# Patient Record
Sex: FEMALE | Race: OTHER | Employment: FULL TIME | URBAN - METROPOLITAN AREA
[De-identification: names, ages, dates, MRNs, and addresses within clinical notes are randomized per-mention and may not be internally consistent; named-entity substitution may affect disease eponyms.]

---

## 2017-01-04 ENCOUNTER — OFFICE VISIT (OUTPATIENT)
Dept: FAMILY MEDICINE CLINIC | Age: 27
End: 2017-01-04

## 2017-01-04 VITALS
HEIGHT: 61 IN | WEIGHT: 190.3 LBS | BODY MASS INDEX: 35.93 KG/M2 | TEMPERATURE: 98.1 F | RESPIRATION RATE: 20 BRPM | OXYGEN SATURATION: 98 % | HEART RATE: 84 BPM | SYSTOLIC BLOOD PRESSURE: 122 MMHG | DIASTOLIC BLOOD PRESSURE: 84 MMHG

## 2017-01-04 DIAGNOSIS — M54.6 THORACIC SPINE PAIN: Primary | ICD-10-CM

## 2017-01-04 PROBLEM — Z98.890 HISTORY OF NISSEN FUNDOPLICATION: Status: ACTIVE | Noted: 2017-01-04

## 2017-01-04 RX ORDER — TRAZODONE HYDROCHLORIDE 100 MG/1
1 TABLET ORAL DAILY
Refills: 0 | Status: ON HOLD | COMMUNITY
Start: 2016-11-30 | End: 2017-04-10

## 2017-01-04 RX ORDER — CYCLOBENZAPRINE HCL 5 MG
1 TABLET ORAL
Refills: 0 | Status: ON HOLD | COMMUNITY
Start: 2016-11-21 | End: 2017-04-10

## 2017-01-04 RX ORDER — CITALOPRAM 20 MG/1
1 TABLET, FILM COATED ORAL DAILY
Refills: 0 | Status: ON HOLD | COMMUNITY
Start: 2016-11-30 | End: 2017-04-10

## 2017-01-04 RX ORDER — OXYCODONE HYDROCHLORIDE 5 MG/1
1 TABLET ORAL
Refills: 0 | Status: ON HOLD | COMMUNITY
Start: 2016-11-21 | End: 2017-04-10
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
## 2017-01-04 NOTE — PATIENT INSTRUCTIONS

## 2017-01-04 NOTE — MR AVS SNAPSHOT
Visit Information Date & Time Provider Department Dept. Phone Encounter #  
 1/4/2017  1:30 PM Prabha Mccord MD [address] [facility] [id number] Follow-up Instructions Return for routine care. Upcoming Health Maintenance Date Due  
 PAP AKA CERVICAL CYTOLOGY 12/5/2009 DTaP/Tdap/Td series (1 - Tdap) 2/2/2011 Allergies as of 1/4/2017  Review Complete On: 1/4/2017 By: Prabha Mccord MD  
  
 Severity Noted Reaction Type Reactions Latex High 01/30/2011    Anaphylaxis Acetaminophen High 01/30/2011    Anaphylaxis Other Plant, Animal, Environmental High 01/04/2017   Systemic Hives Allergic to everything outside. Nsaids (Non-steroidal Anti-inflammatory Drug) Low 01/04/2017   Systemic Other (comments) Advised by her GI doctor not to take till they figure out what is going on with her stomach. Currently has a Nissen-fundiplication. Current Immunizations  Never Reviewed Name Date Influenza Vaccine  Deferred (Contraindication) TD Vaccine 2/1/2011 12:00 AM  
  
 Not reviewed this visit You Were Diagnosed With   
  
 Codes Comments Thoracic spine pain    -  Primary ICD-10-CM: M54.6 ICD-9-CM: 724.1 Vitals BP Pulse Temp Resp Height(growth percentile) Weight(growth percentile) 122/84 (BP 1 Location: Left arm, BP Patient Position: Sitting) 84 98.1 °F (36.7 °C) (Oral) 20 5' 1\" (1.549 m) 190 lb 4.8 oz (86.3 kg) SpO2 BMI OB Status Smoking Status 98% 35.96 kg/m2 IUD Never Smoker Vitals History BMI and BSA Data Body Mass Index Body Surface Area 35.96 kg/m 2 1.93 m 2 Preferred Pharmacy Pharmacy Name Phone [id number] [address] [place] [address] [address] [place] [address] [phone] Your Updated Medication List  
  
   
This list is accurate as of: 1/4/17  2:01 PM.  Always use your most recent med list.  
  
  
  
  
 amitriptyline 25 mg tablet Commonly known as:  ELAVIL Take 1 Tab by mouth nightly. Take 2-3 tablets at bedtime. Anti-depressant for headache prevention. citalopram 20 mg tablet Commonly known as:  Maegan Bongo Take 1 Tab by mouth daily. cyclobenzaprine 5 mg tablet Commonly known as:  FLEXERIL Take 1 Tab by mouth every eight (8) hours as needed. frovatriptan 2.5 mg tablet Commonly known as:  FROVA  
1 tab at onset of migraine; repeat 1 tab in 2 hours if headache remains; Limit: 2 tabs in 24 hours, not more than 3 days a week. guaiFENesin 400 mg tablet Commonly known as:  Sakina China Take 1 Tab by mouth every four (4) hours as needed for Congestion. guaiFENesin-codeine 100-10 mg/5 mL solution Commonly known as:  ROBITUSSIN AC Take 10 mL by mouth three (3) times daily as needed for Cough. Max Daily Amount: 30 mL. oxyCODONE IR 5 mg immediate release tablet Commonly known as:  Elridge Ditto Take 1 Tab by mouth every eight (8) hours as needed. traZODone 100 mg tablet Commonly known as:  Dearborn Askew Take 1 Tab by mouth daily. zolpidem 10 mg tablet Commonly known as:  AMBIEN Take 10 mg by mouth nightly as needed for Sleep. Follow-up Instructions Return for routine care. Referral Information Referral ID Referred By Referred To  
  
 [id number] [facility]   
   [address] Phone: [phone] Fax: [phone] Visits Status Start Date End Date 1 New Request 1/4/17 1/4/18 If your referral has a status of pending review or denied, additional information will be sent to support the outcome of this decision. Patient Instructions A Healthy Lifestyle: Care Instructions Your Care Instructions A healthy lifestyle can help you feel good, stay at a healthy weight, and have plenty of energy for both work and play.  A healthy lifestyle is something you can share with your whole family. A healthy lifestyle also can lower your risk for serious health problems, such as high blood pressure, heart disease, and diabetes. You can follow a few steps listed below to improve your health and the health of your family. Follow-up care is a key part of your treatment and safety. Be sure to make and go to all appointments, and call your doctor if you are having problems. Its also a good idea to know your test results and keep a list of the medicines you take. How can you care for yourself at home? · Do not eat too much sugar, fat, or fast foods. You can still have dessert and treats now and then. The goal is moderation. · Start small to improve your eating habits. Pay attention to portion sizes, drink less juice and soda pop, and eat more fruits and vegetables. ¨ Eat a healthy amount of food. A 3-ounce serving of meat, for example, is about the size of a deck of cards. Fill the rest of your plate with vegetables and whole grains. ¨ Limit the amount of soda and sports drinks you have every day. Drink more water when you are thirsty. ¨ Eat at least 5 servings of fruits and vegetables every day. It may seem like a lot, but it is not hard to reach this goal. A serving or helping is 1 piece of fruit, 1 cup of vegetables, or 2 cups of leafy, raw vegetables. Have an apple or some carrot sticks as an afternoon snack instead of a candy bar. Try to have fruits and/or vegetables at every meal. 
· Make exercise part of your daily routine. You may want to start with simple activities, such as walking, bicycling, or slow swimming. Try to be active 30 to 60 minutes every day. You do not need to do all 30 to 60 minutes all at once. For example, you can exercise 3 times a day for 10 or 20 minutes.  Moderate exercise is safe for most people, but it is always a good idea to talk to your doctor before starting an exercise program. 
 · Keep moving. [place] the lawn, work in the garden, or MUJIN. Take the stairs instead of the elevator at work. · If you smoke, quit. People who smoke have an increased risk for heart attack, stroke, cancer, and other lung illnesses. Quitting is hard, but there are ways to boost your chance of quitting tobacco for good. ¨ Use nicotine gum, patches, or lozenges. ¨ Ask your doctor about stop-smoking programs and medicines. ¨ Keep trying. In addition to reducing your risk of diseases in the future, you will notice some benefits soon after you stop using tobacco. If you have shortness of breath or asthma symptoms, they will likely get better within a few weeks after you quit. · Limit how much alcohol you drink. Moderate amounts of alcohol (up to 2 drinks a day for men, 1 drink a day for women) are okay. But drinking too much can lead to liver problems, high blood pressure, and other health problems. Family health If you have a family, there are many things you can do together to improve your health. · Eat meals together as a family as often as possible. · Eat healthy foods. This includes fruits, vegetables, lean meats and dairy, and whole grains. · Include your family in your fitness plan. Most people think of activities such as jogging or tennis as the way to fitness, but there are many ways you and your family can be more active. Anything that makes you breathe hard and gets your heart pumping is exercise. Here are some tips: 
¨ Walk to do errands or to take your child to school or the bus. ¨ Go for a family bike ride after dinner instead of watching TV. Where can you learn more? Go to http://connor-radha.info/. Enter H751 in the search box to learn more about \"A Healthy Lifestyle: Care Instructions. \" Current as of: July 26, 2016 Content Version: 11.1 © 0909-6220 [employer].  Care instructions adapted under license by [address] (which disclaims liability or warranty for this information). If you have questions about a medical condition or this instruction, always ask your healthcare professional. [address] any warranty or liability for your use of this information. Introducing hospitals & HEALTH SERVICES! [facility] introduces [facility] patient portal. Now you can access parts of your medical record, email your doctor's office, and request medication refills online. 1. In your internet browser, go to [email or web address] 2. Click on the First Time User? Click Here link in the Sign In box. You will see the New Member Sign Up page. 3. Enter your Bloodhound Access Code exactly as it appears below. You will not need to use this code after youve completed the sign-up process. If you do not sign up before the expiration date, you must request a new code. · [employer] Access Code: [email or web address] Expires: 2/5/2017  7:19 PM 
 
4. Enter the last four digits of your Social Security Number (xxxx) and Date of Birth (mm/dd/yyyy) as indicated and click Submit. You will be taken to the next sign-up page. 5. Create a Bloodhound ID. This will be your [employer]hound login ID and cannot be changed, so think of one that is secure and easy to remember. 6. Create a [employer]hound password. You can change your password at any time. 7. Enter your Password Reset Question and Answer. This can be used at a later time if you forget your password. 8. Enter your e-mail address. You will receive e-mail notification when new information is available in [address] 9. Click Sign Up. You can now view and download portions of your medical record. 10. Click the Download Summary menu link to download a portable copy of your medical information. If you have questions, please visit the Frequently Asked Questions section of the [employer] website.  Remember, [employer] is NOT to be used for urgent needs. For medical emergencies, dial 911. Now available from your iPhone and Android! Please provide this summary of care documentation to your next provider. Your primary care clinician is listed as Tyrone Barker. If you have any questions after today's visit, please call [phone].
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
## 2017-01-06 ENCOUNTER — TELEPHONE (OUTPATIENT)
Dept: FAMILY MEDICINE CLINIC | Age: 27
End: 2017-01-06

## 2017-01-06 NOTE — TELEPHONE ENCOUNTER
Patient called stating that the provider on the referral for pain management is no longer accepting new patients.  Advised her to contact her insurance company to see who accepted her insurance as well as new patients then to give us a call with the provider information so we could generate a new referral.

## 2017-03-24 ENCOUNTER — OFFICE VISIT (OUTPATIENT)
Dept: FAMILY MEDICINE CLINIC | Age: 27
End: 2017-03-24

## 2017-03-24 VITALS
TEMPERATURE: 98.6 F | SYSTOLIC BLOOD PRESSURE: 110 MMHG | DIASTOLIC BLOOD PRESSURE: 70 MMHG | BODY MASS INDEX: 36.52 KG/M2 | RESPIRATION RATE: 20 BRPM | HEART RATE: 80 BPM | HEIGHT: 60 IN | WEIGHT: 186 LBS

## 2017-03-24 DIAGNOSIS — L72.3 SEBACEOUS CYST: Primary | ICD-10-CM

## 2017-03-24 DIAGNOSIS — R22.2 CHEST WALL MASS: ICD-10-CM

## 2017-03-24 NOTE — PROGRESS NOTES
HISTORY OF PRESENT ILLNESS  Geoff Chen is a 27 y.o. female. HPI  Pt c/o having a nodule on her central chest, it has been there for months but in last 2 weeks feels larger. It is constant. ROS  ROS negative except for symptoms noted in HPI. Physical Exam  Gen: Oriented to person, place and time and well-developed, well-nourished and in no distress. HEENT:    Head: normocephalic and atraumatic. Eyes:  EOM are normal. Pupils equal and round. Neck:  Normal range of motion. Neck supple. Cardiovascular: normal rate, regular rhythm and normal heart sounds. Pulmonary/Chest:  Effort normal and breath sounds normal.  No respiratory distress. No wheezes, no rales. Abdominal: soft, normal  bowel sounds. Musculoskeletal:  No edema, no tenderness. No calf tenderness or edema. Neurological:  Alert, oriented to person, place and time. Skin: skin is warm and dry. There is a 2 cm circular, firm, movable, non tender nodule on sternal chest. No erthyma or swelling. .       ASSESSMENT and PLAN      Follow-up Disposition:  Return for US chest nodule. 1. Chest wall mass, possible cyst     - US CHEST; Future    I  have discussed the diagnosis with the patient and the intended treatment plan as seen in the above orders. Patient has provided input and agrees with goals. The patient has received an after-visit summary and questions were answered concerning future plans. I have discussed medication side effects and warnings with the patient as well.

## 2017-03-27 DIAGNOSIS — L72.3 SEBACEOUS CYST: Primary | ICD-10-CM

## 2017-03-31 ENCOUNTER — OFFICE VISIT (OUTPATIENT)
Dept: SURGERY | Age: 27
End: 2017-03-31

## 2017-03-31 VITALS
RESPIRATION RATE: 20 BRPM | BODY MASS INDEX: 36.52 KG/M2 | WEIGHT: 186 LBS | OXYGEN SATURATION: 98 % | SYSTOLIC BLOOD PRESSURE: 131 MMHG | DIASTOLIC BLOOD PRESSURE: 78 MMHG | HEART RATE: 81 BPM | TEMPERATURE: 98.1 F | HEIGHT: 60 IN

## 2017-03-31 DIAGNOSIS — L72.3 SEBACEOUS CYST: Primary | ICD-10-CM

## 2017-03-31 PROBLEM — E66.9 OBESITY, CLASS II, BMI 35-39.9: Status: ACTIVE | Noted: 2017-03-31

## 2017-03-31 RX ORDER — DIAZEPAM 5 MG/1
5 TABLET ORAL
COMMUNITY
End: 2017-11-10
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
## 2017-03-31 NOTE — PROGRESS NOTES
1. Have you been to the ER, urgent care clinic since your last visit? Hospitalized since your last visit? new patient    2. Have you seen or consulted any other health care providers outside of the [address] [place] [address] since your last visit? Include any pap smears or colon screening.  New patient

## 2017-04-07 ENCOUNTER — ANESTHESIA EVENT (OUTPATIENT)
Dept: SURGERY | Age: 27
End: 2017-04-07
Payer: MEDICAID

## 2017-04-10 ENCOUNTER — HOSPITAL ENCOUNTER (OUTPATIENT)
Age: 27
Setting detail: OUTPATIENT SURGERY
Discharge: HOME | End: 2017-04-10
Attending: SURGERY | Admitting: SURGERY
Payer: MEDICAID

## 2017-04-10 ENCOUNTER — SURGERY (OUTPATIENT)
Age: 27
End: 2017-04-10

## 2017-04-10 ENCOUNTER — ANESTHESIA (OUTPATIENT)
Dept: SURGERY | Age: 27
End: 2017-04-10
Payer: MEDICAID

## 2017-04-10 VITALS
BODY MASS INDEX: 34.67 KG/M2 | HEART RATE: 84 BPM | SYSTOLIC BLOOD PRESSURE: 128 MMHG | DIASTOLIC BLOOD PRESSURE: 77 MMHG | TEMPERATURE: 98.4 F | WEIGHT: 183.64 LBS | OXYGEN SATURATION: 98 % | HEIGHT: 61 IN | RESPIRATION RATE: 17 BRPM

## 2017-04-10 DIAGNOSIS — L72.3 SEBACEOUS CYST: ICD-10-CM

## 2017-04-10 LAB — HCG UR QL: NEGATIVE

## 2017-04-10 PROCEDURE — 77030031139 HC SUT VCRL2 J&J -A: Performed by: SURGERY

## 2017-04-10 PROCEDURE — 77030002933 HC SUT MCRYL J&J -A: Performed by: SURGERY

## 2017-04-10 PROCEDURE — 74011000250 HC RX REV CODE- 250: Performed by: SURGERY

## 2017-04-10 PROCEDURE — 74011250637 HC RX REV CODE- 250/637: Performed by: SURGERY

## 2017-04-10 PROCEDURE — 76210000021 HC REC RM PH II 0.5 TO 1 HR: Performed by: SURGERY

## 2017-04-10 PROCEDURE — 77030020782 HC GWN BAIR PAWS FLX 3M -B

## 2017-04-10 PROCEDURE — 81025 URINE PREGNANCY TEST: CPT

## 2017-04-10 PROCEDURE — 74011000250 HC RX REV CODE- 250

## 2017-04-10 PROCEDURE — 76060000032 HC ANESTHESIA 0.5 TO 1 HR: Performed by: SURGERY

## 2017-04-10 PROCEDURE — 77030018836 HC SOL IRR NACL ICUM -A: Performed by: SURGERY

## 2017-04-10 PROCEDURE — 77030032490 HC SLV COMPR SCD KNE COVD -B: Performed by: SURGERY

## 2017-04-10 PROCEDURE — 74011250636 HC RX REV CODE- 250/636

## 2017-04-10 PROCEDURE — 88304 TISSUE EXAM BY PATHOLOGIST: CPT | Performed by: SURGERY

## 2017-04-10 PROCEDURE — 76010000138 HC OR TIME 0.5 TO 1 HR: Performed by: SURGERY

## 2017-04-10 PROCEDURE — 74011250636 HC RX REV CODE- 250/636: Performed by: ANESTHESIOLOGY

## 2017-04-10 RX ORDER — PROPOFOL 10 MG/ML
INJECTION, EMULSION INTRAVENOUS AS NEEDED
Status: DISCONTINUED | OUTPATIENT
Start: 2017-04-10 | End: 2017-04-10 | Stop reason: HOSPADM

## 2017-04-10 RX ORDER — MIDAZOLAM HYDROCHLORIDE 1 MG/ML
INJECTION, SOLUTION INTRAMUSCULAR; INTRAVENOUS AS NEEDED
Status: DISCONTINUED | OUTPATIENT
Start: 2017-04-10 | End: 2017-04-10 | Stop reason: HOSPADM

## 2017-04-10 RX ORDER — SODIUM CHLORIDE, SODIUM LACTATE, POTASSIUM CHLORIDE, CALCIUM CHLORIDE 600; 310; 30; 20 MG/100ML; MG/100ML; MG/100ML; MG/100ML
125 INJECTION, SOLUTION INTRAVENOUS CONTINUOUS
Status: DISCONTINUED | OUTPATIENT
Start: 2017-04-10 | End: 2017-04-10 | Stop reason: HOSPADM

## 2017-04-10 RX ORDER — OXYCODONE HYDROCHLORIDE 5 MG/1
5 TABLET ORAL
Status: COMPLETED | OUTPATIENT
Start: 2017-04-10 | End: 2017-04-10

## 2017-04-10 RX ORDER — OXYCODONE HYDROCHLORIDE 5 MG/1
5 TABLET ORAL
Qty: 15 TAB | Refills: 0 | Status: SHIPPED | OUTPATIENT
Start: 2017-04-10 | End: 2017-04-28

## 2017-04-10 RX ORDER — PROPOFOL 10 MG/ML
INJECTION, EMULSION INTRAVENOUS
Status: DISCONTINUED | OUTPATIENT
Start: 2017-04-10 | End: 2017-04-10 | Stop reason: HOSPADM

## 2017-04-10 RX ORDER — FENTANYL CITRATE 50 UG/ML
INJECTION, SOLUTION INTRAMUSCULAR; INTRAVENOUS AS NEEDED
Status: DISCONTINUED | OUTPATIENT
Start: 2017-04-10 | End: 2017-04-10 | Stop reason: HOSPADM

## 2017-04-10 RX ORDER — BACITRACIN ZINC 500 UNIT/G
OINTMENT (GRAM) TOPICAL AS NEEDED
Status: DISCONTINUED | OUTPATIENT
Start: 2017-04-10 | End: 2017-04-10 | Stop reason: HOSPADM

## 2017-04-10 RX ORDER — MIDAZOLAM HYDROCHLORIDE 1 MG/ML
2 INJECTION, SOLUTION INTRAMUSCULAR; INTRAVENOUS
Status: DISCONTINUED | OUTPATIENT
Start: 2017-04-10 | End: 2017-04-10 | Stop reason: HOSPADM

## 2017-04-10 RX ORDER — FLUMAZENIL 0.1 MG/ML
0.2 INJECTION INTRAVENOUS
Status: DISCONTINUED | OUTPATIENT
Start: 2017-04-10 | End: 2017-04-10 | Stop reason: HOSPADM

## 2017-04-10 RX ORDER — LIDOCAINE HYDROCHLORIDE 10 MG/ML
0.1 INJECTION, SOLUTION EPIDURAL; INFILTRATION; INTRACAUDAL; PERINEURAL AS NEEDED
Status: DISCONTINUED | OUTPATIENT
Start: 2017-04-10 | End: 2017-04-10 | Stop reason: HOSPADM

## 2017-04-10 RX ORDER — NALOXONE HYDROCHLORIDE 0.4 MG/ML
0.2 INJECTION, SOLUTION INTRAMUSCULAR; INTRAVENOUS; SUBCUTANEOUS
Status: DISCONTINUED | OUTPATIENT
Start: 2017-04-10 | End: 2017-04-10 | Stop reason: HOSPADM

## 2017-04-10 RX ORDER — LIDOCAINE HYDROCHLORIDE 20 MG/ML
INJECTION, SOLUTION INFILTRATION; PERINEURAL AS NEEDED
Status: DISCONTINUED | OUTPATIENT
Start: 2017-04-10 | End: 2017-04-10 | Stop reason: HOSPADM

## 2017-04-10 RX ORDER — DIPHENHYDRAMINE HYDROCHLORIDE 50 MG/ML
12.5 INJECTION, SOLUTION INTRAMUSCULAR; INTRAVENOUS AS NEEDED
Status: DISCONTINUED | OUTPATIENT
Start: 2017-04-10 | End: 2017-04-10 | Stop reason: HOSPADM

## 2017-04-10 RX ORDER — HYDROMORPHONE HYDROCHLORIDE 1 MG/ML
.25-1 INJECTION, SOLUTION INTRAMUSCULAR; INTRAVENOUS; SUBCUTANEOUS
Status: DISCONTINUED | OUTPATIENT
Start: 2017-04-10 | End: 2017-04-10 | Stop reason: HOSPADM

## 2017-04-10 RX ADMIN — FENTANYL CITRATE 50 MCG: 50 INJECTION, SOLUTION INTRAMUSCULAR; INTRAVENOUS at 12:01

## 2017-04-10 RX ADMIN — LIDOCAINE HYDROCHLORIDE 20 ML: 10; .005 INJECTION, SOLUTION EPIDURAL; INFILTRATION; INTRACAUDAL; PERINEURAL at 12:07

## 2017-04-10 RX ADMIN — SODIUM CHLORIDE, SODIUM LACTATE, POTASSIUM CHLORIDE, AND CALCIUM CHLORIDE 125 ML/HR: 600; 310; 30; 20 INJECTION, SOLUTION INTRAVENOUS at 10:59

## 2017-04-10 RX ADMIN — PROPOFOL 30 MG: 10 INJECTION, EMULSION INTRAVENOUS at 11:55

## 2017-04-10 RX ADMIN — HYDROMORPHONE HYDROCHLORIDE 0.5 MG: 1 INJECTION, SOLUTION INTRAMUSCULAR; INTRAVENOUS; SUBCUTANEOUS at 12:59

## 2017-04-10 RX ADMIN — MIDAZOLAM HYDROCHLORIDE 1 MG: 1 INJECTION, SOLUTION INTRAMUSCULAR; INTRAVENOUS at 11:48

## 2017-04-10 RX ADMIN — BACITRACIN ZINC 500 UNITS: 500 OINTMENT TOPICAL at 12:25

## 2017-04-10 RX ADMIN — MIDAZOLAM HYDROCHLORIDE 1 MG: 1 INJECTION, SOLUTION INTRAMUSCULAR; INTRAVENOUS at 11:49

## 2017-04-10 RX ADMIN — LIDOCAINE HYDROCHLORIDE 60 MG: 20 INJECTION, SOLUTION INFILTRATION; PERINEURAL at 11:54

## 2017-04-10 RX ADMIN — FENTANYL CITRATE 50 MCG: 50 INJECTION, SOLUTION INTRAMUSCULAR; INTRAVENOUS at 11:47

## 2017-04-10 RX ADMIN — PROPOFOL 50 MG: 10 INJECTION, EMULSION INTRAVENOUS at 12:04

## 2017-04-10 RX ADMIN — PROPOFOL 100 MCG/KG/MIN: 10 INJECTION, EMULSION INTRAVENOUS at 11:55

## 2017-04-10 RX ADMIN — OXYCODONE HYDROCHLORIDE 5 MG: 5 TABLET ORAL at 13:25

## 2017-04-10 RX ADMIN — PROPOFOL 20 MG: 10 INJECTION, EMULSION INTRAVENOUS at 11:58

## 2017-04-10 RX ADMIN — MIDAZOLAM HYDROCHLORIDE 3 MG: 1 INJECTION, SOLUTION INTRAMUSCULAR; INTRAVENOUS at 11:47

## 2017-04-10 NOTE — IP AVS SNAPSHOT
Charlie Suggs 
 
 
 [address] [phone] Patient: Quita Ortiz MRN: [id number] GOD:[birth year] You are allergic to the following Allergen Reactions Latex Anaphylaxis Acetaminophen Anaphylaxis Other Plant, Animal, Environmental Hives Allergic to everything outside. Nsaids (Non-Steroidal Anti-Inflammatory Drug) Other (comments) Advised by her GI doctor not to take till they figure out what is going on with her stomach. Currently has a Nissen-fundiplication. Recent Documentation Height Weight BMI OB Status Smoking Status 1.549 m 83.3 kg 34.7 kg/m2 IUD Never Smoker Emergency Contacts Name Discharge Info Relation Home Work Mobile Velasquez,Talia  Parent [1] [phone] Nancie Gains  Other Relative [6] [phone] About your hospitalization You were admitted on:  April 10, 2017 You last received care in the:  [facility] PACU You were discharged on:  April 10, 2017 Unit phone number:  [phone] Why you were hospitalized Your primary diagnosis was:  Not on File Providers Seen During Your Hospitalizations Provider Role Specialty Primary office phone Billy Boyd MD Attending Provider Surgery [phone] Your Primary Care Physician (PCP) Primary Care Physician Office Phone Office Fax Jen [facility] [phone] Follow-up Information Follow up With Details Comments Contact Info Maxwell Velasquez MD   [facility] 
[phone] Your Appointments Monday April 24, 2017  9:00 AM EDT  
POST OP with Billy Boyd MD  
[facility] Surgery [facility]) [address] [phone] Current Discharge Medication List  
  
START taking these medications Dose & Instructions Dispensing Information Comments Morning Noon Evening Bedtime  
 oxyCODONE IR 5 mg immediate release tablet Commonly known as:  Opal Zarate Your last dose was: Your next dose is:    
   
   
 Dose:  5 mg Take 1 Tab by mouth every four (4) hours as needed for Pain. Max Daily Amount: 30 mg.  
 Quantity:  15 Tab Refills:  0 CONTINUE these medications which have NOT CHANGED Dose & Instructions Dispensing Information Comments Morning Noon Evening Bedtime  
 amitriptyline 25 mg tablet Commonly known as:  ELAVIL Your last dose was: Your next dose is:    
   
   
 Dose:  25 mg Take 1 Tab by mouth nightly. Take 2-3 tablets at bedtime. Anti-depressant for headache prevention. Quantity:  90 Tab Refills:  3 VALIUM 5 mg tablet Generic drug:  diazePAM  
   
Your last dose was: Your next dose is:    
   
   
 Dose:  5 mg Take 5 mg by mouth every six (6) hours as needed for Anxiety. Refills:  0 Where to Get Your Medications Information on where to get these meds will be given to you by the nurse or doctor. ! Ask your nurse or doctor about these medications  
  oxyCODONE IR 5 mg immediate release tablet Discharge Instructions Instructions Following Excision of Cyst, Soft Tissue Mass Activity · No pulling or stretching quickly with chest. 
· May shower tomorrow. No bath for 2 weeks and until after seen by your doctor. Diet · Advance to regular diet as tolerated. · If nausea and vomiting continues, call your doctor. Pain · Take pain medication as directed by your doctor. ·  Call your doctor if pain is NOT relieved by medication. · DO NOT take blood thinners until directed by your doctor. Dressing Care:  
Remove your bandage on Wednesday, 4/12. Keep your incision clean and dry. Apply a topical antibiotic ointment daily and cover with a dry bandage. Follow-Up Phone Calls: 
· Call will be made by my nursing staff tomorrow. · If you have any problems or concerns, call your doctor as needed. Call your doctor if you experience: 
· Excessive bleeding that does not stop after holding mild pressure over the area. · Temperature of 101° Fahrenheit or above. · Redness, excessive swelling or bruising, and/or green or yellow, smelly discharge from incision. After Anesthesia: 
· For the first 24 hours and while taking narcotic pain medication: DO NOT drive, drink alcoholic beverages, or make important decisions. · Be aware of dizziness following anesthesia and while taking pain medication. Gallo Jensen. Renny Puri MD, FACS General Surgery at [address], [address] [place] [address] [employer]. 
[phone] Fax [phone] DISCHARGE SUMMARY from your Nurse The following personal items collected during your admission are returned to you:  
Dental Appliance: Dental Appliances: None Vision: Visual Aid: None Hearing Aid:   
Jewelry: Jewelry: None Clothing: Clothing: Other (comment) (Street clothes) Other Valuables: Other Valuables: None Valuables sent to safe: Personal Items Sent to Safe: None PATIENT INSTRUCTIONS: 
 
After general anesthesia or intravenous sedation, for 24 hours or while taking prescription Narcotics: · Limit your activities · Do not drive and operate hazardous machinery · Do not make important personal or business decisions · Do  not drink alcoholic beverages · If you have not urinated within 8 hours after discharge, please contact your surgeon on call. Report the following to your surgeon: 
· Excessive pain, swelling, redness or odor of or around the surgical area · Temperature over 100.5 · Nausea and vomiting lasting longer than 4 hours or if unable to take medications · Any signs of decreased circulation or nerve impairment to extremity: change in color, persistent  numbness, tingling, coldness or increase pain · Any questions [address] Breathing deep and coughing are very important exercises to do after surgery. Deep breathing and coughing open the little air tubes and air sacks in your lungs. You take deep breaths every day. You may not even notice - it is just something you do when you sigh or yawn. It is a natural exercise you do to keep these air passages open. After surgery, take deep breaths and cough, on purpose. Coughing and deep breathing help prevent bronchitis and pneumonia after surgery. If you had chest or belly surgery, use a pillow as a \"hug favian\" and hold it tightly to your chest or belly when you cough. DIRECTIONS: 
6. Take 10 to 15 slow deep breaths every hour while awake. 7. Breathe in deeply, and hold it for 2 seconds. 8. Exhale slowly through puckered lips, like blowing up a balloon. 9. After every 4th or 5th deep breath, hug your pillow to your chest or belly and give a hard, deep cough. Yes, it will probably hurt. But doing this exercise is very important part of healing after surgery. Take your pain medicine to help you do this exercise without too much pain. IF YOU HAVE BEEN DIAGNOSED WITH SLEEP APNEA, PLEASE USE YOUR SLEEIFP APNEA DEVICE OR CPAP MACHINE WHEN YOU INTEND TO NAP AFTER TAKING PAIN MEDICATION. Ankle Pumps Ankle pumps increase the circulation of oxygenated blood to your lower extremities and decrease your risk for circulation problems such as blood clots. They also stretch the muscles, tendons and ligaments in your foot and ankle, and prevent joint contracture in the ankle and foot, especially after surgeries on the legs. It is important to do ankle pump exercises regularly after surgery because immobility increases your risk for developing a blood clot.   Your doctor may also have you take an Aspirin for the next few days as well. If your doctor did not ask you to take an Aspirin, consult with him before starting Aspirin therapy on your own. Slowly point your foot forward, feeling the muscles on the top of your lower leg stretch, and hold this position for 5 seconds. Next, pull your foot back toward you as far as possible, stretching the calf muscles, and hold that position for 5 seconds. Repeat with the other foot. Perform 10 repetitions every hour while awake for both ankles if possible (down and then up with the foot once is one repetition). You should feel gentle stretching of the muscles in your lower leg when doing this exercise. If you feel pain, or your range of motion is limited, don't  Push too hard. Only go the limit your joint and muscles will let you go. If you have increasing pain, progressively worsening leg warmth or swelling, STOP the exercise and call your doctor. Below is information about the medications your doctor is prescribing after your visit: 
 
Other information in your discharge envelope: PRESCRIPTIONS PHYSICAL THERAPY PRESCRIPTION 
     APPOINTMENT CARDS [facility] Pamphlet for block or block with On-Q Catheter from Anesthesia Service Medical device information sheets/pamphlets from their  School/work excuse note. /parent work excuse note. These are general instructions for a healthy lifestyle: *  Please give a list of your current medications to your Primary Care Provider. *  Please update this list whenever your medications are discontinued, doses are 
    changed, or new medications (including over-the-counter products) are added. *  Please carry medication information at all times in case of emergency situations. About Smoking No smoking / No tobacco products / Avoid exposure to second hand smoke Surgeon General's Warning:  Quitting smoking now greatly reduces serious risk to your health. Obesity, smoking, and sedentary lifestyle greatly increases your risk for illness and disease. A healthy diet, regular physical exercise & weight monitoring are important for maintaining a healthy lifestyle. Congestive Heart Failure You may be retaining fluid if you have a history of heart failure or if you experience any of the following symptoms:  Weight gain of 3 pounds or more overnight or 5 pounds in a week, increased swelling in our hands or feet or shortness of breath while lying flat in bed. Please call your doctor as soon as you notice any of these symptoms; do not wait until your next office visit. Recognize signs and symptoms of STROKE: 
F - face looks uneven A - arms unable to move or move even S - speech slurred or non-existent T - time-call 911 as soon as signs and symptoms begin-DO NOT go Back to bed or wait to see if you get better-TIME IS BRAIN. Warning signs of HEART ATTACK Call 911 if you have these symptoms · Chest discomfort. Most heart attacks involve discomfort in the center of the chest that lasts more than a few minutes, or that goes away and comes back. It can feel like uncomfortable pressure, squeezing, fullness, or pain. · Discomfort in other areas of the upper body. Symptoms can include pain or discomfort in one or both Arms, the back, neck, jaw, or stomach. ·  Shortness of breath with or without chest discomfort. · Other signs may include breaking out in a cold sweat, nausea, or lightheadedness Don't wait more than five minutes to call 911 - MINUTES MATTER! Fast action can save your life. Calling 911 is almost always the fastest way to get lifesaving treatment. Emergency Medical Services staff can begin treatment when they arrive - up to an hour sooner than if someone gets to the hospital by car. SHANNAN ARRIAGA MEDICATION AND SIDE EFFECT GUIDE The [address] [place] EFFECT GUIDE was provided to the PATIENT AND CARE PROVIDER. Information provided includes instruction about drug purpose and common side effects for the following medications: 
 
· oxycodone Discharge Orders None Introducing [facility]! Salasarnulfo Ruiz introduces [employer] patient portal. Now you can access parts of your medical record, email your doctor's office, and request medication refills online. 1. In your internet browser, go to [email or web address] 2. Click on the First Time User? Click Here link in the Sign In box. You will see the New Member Sign Up page. 3. Enter your [employer] Access Code exactly as it appears below. You will not need to use this code after youve completed the sign-up process. If you do not sign up before the expiration date, you must request a new code. · [employer] Access Code: [email or web address] Expires: 7/5/2017  9:40 AM 
 
4. Enter the last four digits of your Social Security Number (xxxx) and Date of Birth (mm/dd/yyyy) as indicated and click Submit. You will be taken to the next sign-up page. 5. Create a [employer] ID. This will be your [employer] login ID and cannot be changed, so think of one that is secure and easy to remember. 6. Create a [employer] password. You can change your password at any time. 7. Enter your Password Reset Question and Answer. This can be used at a later time if you forget your password. 8. Enter your e-mail address. You will receive e-mail notification when new information is available in [address] 9. Click Sign Up. You can now view and download portions of your medical record. 10. Click the Download Summary menu link to download a portable copy of your medical information. If you have questions, please visit the Frequently Asked Questions section of the [employer] website.  Remember, [employer] is NOT to be used for urgent needs. For medical emergencies, dial 911. Now available from your iPhone and Android! General Information Please provide this summary of care documentation to your next provider. Patient Signature:  ____________________________________________________________ Date:  ____________________________________________________________  
  
Juma Kostas Provider Signature:  ____________________________________________________________ Date:  ____________________________________________________________

## 2017-04-10 NOTE — OP NOTES
Operative Report        Roberto Bacon    MRN:  [id number]    Date of Procedure:  4/10/2017    Surgeon:  Michelle Friedman MD.     Assistant:   [facility]. Anesthesia:   1. MAC.  2. 1% Xylocaine with Epinephrine and 0.5% Marcaine. Preoperative Diagnosis:   CYST OF THE ANTERIOR CHEST WALL. Postoperative Diagnosis:    CYST OF THE ANTERIOR CHEST WALL. Procedure:  Excision of cyst of the anterior chest wall. Indication:   Roberto Bacon is a 27 yrs black female who presents with a cyst of the chest wall which is getting bigger and causing discomfort. She would like to have it removed. Procedure in Detail:   The patient was seen preoperatively in the holding area. The risks, benefits, and expected outcome were discussed with the patient, and all questions were answered satisfactorily. The patient concurred with the proposed plan, giving informed consent. The cyst was identified by the patient and confirmed by me. The cyst was then marked. The patient was taken to the OR. The patient was identified as Roberto Bacon, and the procedure verified as Procedure(s):  EXCISION CYST ANTERIOR CHEST WALL  (LATEX ALLERGY). The patient was placed on the OR table in the supine position. MAC anesthesia was administered and tolerated well. The patient's anterior chest wall was prepped with Chlorprep and draped in the usual sterile fashion. A Time Out was performed, and the above information was confirmed. The cyst was palpated and remarked. The local anesthetic was infiltrated into the skin and subcutaneous tissue. A longitudinal elliptical incision was made directly over the most prominent portion of the xyst.  Dissection was taken down into the subcutaneous tissue with a #15 blade, and a cyst was discovered. The cysttumor was dissected out in its entirety with a #15 blade and cautery and passed off as a specimen. No other masses were palpated within the wound.       Hemostasis was obtained within the wound as needed with cautery. The wound was irrigated with saline. The subcutaneous tissue was approximated with interrupted 3-0 Vicryl. The skin was closed with 4-0 Nylon in simple interrupted fashion. The wound was cleaned and dried, and a topical antibiotic ointment and a sterile dressing were applied. Estimated Blood Loss:    Less than 25 ml. Specimen:     ID Type Source Tests Collected by Time Destination   1 : CYST-ANTERIOR CHEST WALL Preservative Chest  Jayshree Fuller MD 4/10/2017 1211 Pathology        Drain:   * No implants in log *    Findings: An approximately 3 x 2.25 cm subcutaneous cyst of the anterior chest wall in the midline at the top of the cleavage. Counts: All sponge, needle, and instrument counts were correct x 2. Complications:  None. Disposition:  The patient was transferred to the recovery room in stable condition, having tolerated the procedure and anesthesia well.                Signed By: Jayshree Fuller MD     April 10, 2017        Hugh Stapleton MD

## 2017-04-10 NOTE — ANESTHESIA POSTPROCEDURE EVALUATION
Post-Anesthesia Evaluation and Assessment    Patient: Ilir Ramirez MRN: [id number]  SSN: [id number]    YOB: 1990  Age: 27 y.o. Sex: female       Cardiovascular Function/Vital Signs  Visit Vitals    /77    Pulse 84    Temp 36.9 °C (98.4 °F)    Resp 17    Ht 5' 1\" (1.549 m)    Wt 83.3 kg (183 lb 10.3 oz)    SpO2 98%    BMI 34.7 kg/m2       Patient is status post MAC anesthesia for Procedure(s):  EXCISION CYST ANTERIOR CHEST WALL  (LATEX ALLERGY). Nausea/Vomiting: None    Postoperative hydration reviewed and adequate. Pain:  Pain Scale 1: Numeric (0 - 10) (04/10/17 1310)  Pain Intensity 1: 2 (04/10/17 1310)   Managed    Neurological Status:   Neuro (WDL): Exceptions to WDL (04/10/17 1310)  Neuro  Neurologic State: Drowsy (04/10/17 1310)  LUE Motor Response: Purposeful (04/10/17 1232)  LLE Motor Response: Purposeful (04/10/17 1232)  RUE Motor Response: Purposeful (04/10/17 1232)  RLE Motor Response: Purposeful (04/10/17 1232)   At baseline    Mental Status and Level of Consciousness: Arousable    Pulmonary Status:   O2 Device: Room air (04/10/17 1310)   Adequate oxygenation and airway patent    Complications related to anesthesia: None    Post-anesthesia assessment completed.  No concerns    Signed By: Eliezer Fisher MD     April 10, 2017
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
## 2017-04-10 NOTE — DISCHARGE INSTRUCTIONS
Instructions Following Excision of Cyst, Soft Tissue Mass    Activity  · No pulling or stretching quickly with chest.  · May shower tomorrow. No bath for 2 weeks and until after seen by your doctor. Diet  · Advance to regular diet as tolerated. · If nausea and vomiting continues, call your doctor. Pain  · Take pain medication as directed by your doctor. ·  Call your doctor if pain is NOT relieved by medication. · DO NOT take blood thinners until directed by your doctor. Dressing Care:   Remove your bandage on Wednesday, 4/12. Keep your incision clean and dry. Apply a topical antibiotic ointment daily and cover with a dry bandage. Follow-Up Phone Calls:  · Call will be made by my nursing staff tomorrow. · If you have any problems or concerns, call your doctor as needed. Call your doctor if you experience:  · Excessive bleeding that does not stop after holding mild pressure over the area. · Temperature of 101° Fahrenheit or above. · Redness, excessive swelling or bruising, and/or green or yellow, smelly discharge from incision. After Anesthesia:  · For the first 24 hours and while taking narcotic pain medication: DO NOT drive, drink alcoholic beverages, or make important decisions. · Be aware of dizziness following anesthesia and while taking pain medication. Sasha Simmons. Kaylan Gu MD, FACS  General Surgery at [address] [place] [address], [address]  [phone]  Fax [phone]      DISCHARGE SUMMARY from your Nurse    The following personal items collected during your admission are returned to you:   Dental Appliance: Dental Appliances: None  Vision: Visual Aid: None  Hearing Aid:    Jewelry: Jewelry: None  Clothing: Clothing: Other (comment) (Street clothes)  Other Valuables:  Other Valuables: None  Valuables sent to safe: Personal Items Sent to Safe: None    PATIENT INSTRUCTIONS:    After general anesthesia or intravenous sedation, for 24 hours or while taking prescription Narcotics:  · Limit your activities  · Do not drive and operate hazardous machinery  · Do not make important personal or business decisions  · Do  not drink alcoholic beverages  · If you have not urinated within 8 hours after discharge, please contact your surgeon on call. Report the following to your surgeon:  · Excessive pain, swelling, redness or odor of or around the surgical area  · Temperature over 100.5  · Nausea and vomiting lasting longer than 4 hours or if unable to take medications  · Any signs of decreased circulation or nerve impairment to extremity: change in color, persistent  numbness, tingling, coldness or increase pain  · Any questions    COUGH AND DEEP BREATHE    Breathing deep and coughing are very important exercises to do after surgery. Deep breathing and coughing open the little air tubes and air sacks in your lungs. You take deep breaths every day. You may not even notice - it is just something you do when you sigh or yawn. It is a natural exercise you do to keep these air passages open. After surgery, take deep breaths and cough, on purpose. Coughing and deep breathing help prevent bronchitis and pneumonia after surgery. If you had chest or belly surgery, use a pillow as a \"hug buddy\" and hold it tightly to your chest or belly when you cough. DIRECTIONS:  6. Take 10 to 15 slow deep breaths every hour while awake. 7. Breathe in deeply, and hold it for 2 seconds. 8. Exhale slowly through puckered lips, like blowing up a balloon. 9. After every 4th or 5th deep breath, hug your pillow to your chest or belly and give a hard, deep cough. Yes, it will probably hurt. But doing this exercise is very important part of healing after surgery. Take your pain medicine to help you do this exercise without too much pain.     IF YOU HAVE BEEN DIAGNOSED WITH SLEEP APNEA, PLEASE USE YOUR SLEEIFP APNEA DEVICE OR CPAP MACHINE WHEN YOU INTEND TO NAP AFTER TAKING PAIN MEDICATION. Ankle Pumps    Ankle pumps increase the circulation of oxygenated blood to your lower extremities and decrease your risk for circulation problems such as blood clots. They also stretch the muscles, tendons and ligaments in your foot and ankle, and prevent joint contracture in the ankle and foot, especially after surgeries on the legs. It is important to do ankle pump exercises regularly after surgery because immobility increases your risk for developing a blood clot. Your doctor may also have you take an Aspirin for the next few days as well. If your doctor did not ask you to take an Aspirin, consult with him before starting Aspirin therapy on your own. Slowly point your foot forward, feeling the muscles on the top of your lower leg stretch, and hold this position for 5 seconds. Next, pull your foot back toward you as far as possible, stretching the calf muscles, and hold that position for 5 seconds. Repeat with the other foot. Perform 10 repetitions every hour while awake for both ankles if possible (down and then up with the foot once is one repetition). You should feel gentle stretching of the muscles in your lower leg when doing this exercise. If you feel pain, or your range of motion is limited, don't  Push too hard. Only go the limit your joint and muscles will let you go. If you have increasing pain, progressively worsening leg warmth or swelling, STOP the exercise and call your doctor.      Below is information about the medications your doctor is prescribing after your visit:    Other information in your discharge envelope:  []     PRESCRIPTIONS  []     PHYSICAL THERAPY PRESCRIPTION  []     APPOINTMENT CARDS  []     Regional Anesthesia Pamphlet for block or block with On-Q Catheter from Anesthesia Service  []     Medical device information sheets/pamphlets from their    [] School/work excuse note. []     /parent work excuse note. These are general instructions for a healthy lifestyle:    *  Please give a list of your current medications to your Primary Care Provider. *  Please update this list whenever your medications are discontinued, doses are      changed, or new medications (including over-the-counter products) are added. *  Please carry medication information at all times in case of emergency situations. About Smoking  No smoking / No tobacco products / Avoid exposure to second hand smoke    Surgeon General's Warning:  Quitting smoking now greatly reduces serious risk to your health. Obesity, smoking, and sedentary lifestyle greatly increases your risk for illness and disease. A healthy diet, regular physical exercise & weight monitoring are important for maintaining a healthy lifestyle. Congestive Heart Failure  You may be retaining fluid if you have a history of heart failure or if you experience any of the following symptoms:  Weight gain of 3 pounds or more overnight or 5 pounds in a week, increased swelling in our hands or feet or shortness of breath while lying flat in bed. Please call your doctor as soon as you notice any of these symptoms; do not wait until your next office visit. Recognize signs and symptoms of STROKE:  F - face looks uneven  A - arms unable to move or move even  S - speech slurred or non-existent  T - time-call 911 as soon as signs and symptoms begin-DO NOT go         Back to bed or wait to see if you get better-TIME IS BRAIN. Warning signs of HEART ATTACK  Call 911 if you have these symptoms    · Chest discomfort. Most heart attacks involve discomfort in the center of the chest that lasts more than a few minutes, or that goes away and comes back. It can feel like uncomfortable pressure, squeezing, fullness, or pain. · Discomfort in other areas of the upper body.        Symptoms can include pain or discomfort in one or both        Arms, the back, neck, jaw, or stomach. ·  Shortness of breath with or without chest discomfort. · Other signs may include breaking out in a cold sweat, nausea, or lightheadedness    Don't wait more than five minutes to call 911 - MINUTES MATTER! Fast action can save your life. Calling 911 is almost always the fastest way to get lifesaving treatment. Emergency Medical Services staff can begin treatment when they arrive - up to an hour sooner than if someone gets to the hospital by car. [facility] MEDICATION AND SIDE EFFECT GUIDE    The [facility] MEDICATION AND SIDE EFFECT GUIDE was provided to the PATIENT AND CARE PROVIDER.   Information provided includes instruction about drug purpose and common side effects for the following medications:    · oxycodone

## 2017-04-10 NOTE — INTERVAL H&P NOTE
H&P Update:  Osmar De Santiago was seen and examined. History and physical has been reviewed. The patient has been examined.  There have been no significant clinical changes since the completion of the originally dated History and Physical.    Signed By: Bernardo Rodriguez MD     April 10, 2017 8:18 AM

## 2017-04-10 NOTE — ANESTHESIA PREPROCEDURE EVALUATION
Anesthetic History   No history of anesthetic complications            Review of Systems / Medical History  Patient summary reviewed, nursing notes reviewed and pertinent labs reviewed    Pulmonary  Within defined limits                 Neuro/Psych         Headaches and psychiatric history     Cardiovascular  Within defined limits                     GI/Hepatic/Renal     GERD           Endo/Other        Morbid obesity     Other Findings   Comments: Chronic back pain  Depression  Migraines           Physical Exam    Airway  Mallampati: II  TM Distance: > 6 cm  Neck ROM: normal range of motion   Mouth opening: Normal     Cardiovascular  Regular rate and rhythm,  S1 and S2 normal,  no murmur, click, rub, or gallop             Dental  No notable dental hx       Pulmonary  Breath sounds clear to auscultation               Abdominal  GI exam deferred       Other Findings            Anesthetic Plan    ASA: 2  Anesthesia type: MAC          Induction: Intravenous  Anesthetic plan and risks discussed with: Patient

## 2017-04-11 ENCOUNTER — TELEPHONE (OUTPATIENT)
Dept: SURGERY | Age: 27
End: 2017-04-11

## 2017-04-28 ENCOUNTER — APPOINTMENT (OUTPATIENT)
Dept: CT IMAGING | Age: 27
End: 2017-04-28
Attending: STUDENT IN AN ORGANIZED HEALTH CARE EDUCATION/TRAINING PROGRAM
Payer: MEDICAID

## 2017-04-28 ENCOUNTER — HOSPITAL ENCOUNTER (EMERGENCY)
Age: 27
Discharge: HOME | End: 2017-04-28
Attending: STUDENT IN AN ORGANIZED HEALTH CARE EDUCATION/TRAINING PROGRAM
Payer: MEDICAID

## 2017-04-28 VITALS
OXYGEN SATURATION: 99 % | SYSTOLIC BLOOD PRESSURE: 163 MMHG | HEIGHT: 61 IN | RESPIRATION RATE: 16 BRPM | BODY MASS INDEX: 34.17 KG/M2 | WEIGHT: 181 LBS | HEART RATE: 73 BPM | TEMPERATURE: 98.7 F | DIASTOLIC BLOOD PRESSURE: 85 MMHG

## 2017-04-28 DIAGNOSIS — R10.13 ABDOMINAL PAIN, EPIGASTRIC: Primary | ICD-10-CM

## 2017-04-28 DIAGNOSIS — R79.89 ELEVATED LFTS: ICD-10-CM

## 2017-04-28 LAB
ALBUMIN SERPL BCP-MCNC: 4 G/DL (ref 3.5–5)
ALBUMIN/GLOB SERPL: 1.1 {RATIO} (ref 1.1–2.2)
ALP SERPL-CCNC: 117 U/L (ref 45–117)
ALT SERPL-CCNC: 99 U/L (ref 12–78)
AMYLASE SERPL-CCNC: 56 U/L (ref 25–115)
ANION GAP BLD CALC-SCNC: 12 MMOL/L (ref 5–15)
APPEARANCE UR: CLEAR
AST SERPL W P-5'-P-CCNC: 205 U/L (ref 15–37)
BACTERIA URNS QL MICRO: NEGATIVE /HPF
BASOPHILS # BLD AUTO: 0 K/UL (ref 0–0.1)
BASOPHILS # BLD: 0 % (ref 0–1)
BILIRUB SERPL-MCNC: 0.6 MG/DL (ref 0.2–1)
BILIRUB UR QL: NEGATIVE
BUN SERPL-MCNC: 11 MG/DL (ref 6–20)
BUN/CREAT SERPL: 10 (ref 12–20)
CALCIUM SERPL-MCNC: 9.2 MG/DL (ref 8.5–10.1)
CHLORIDE SERPL-SCNC: 106 MMOL/L (ref 97–108)
CO2 SERPL-SCNC: 23 MMOL/L (ref 21–32)
COLOR UR: ABNORMAL
CREAT SERPL-MCNC: 1.05 MG/DL (ref 0.55–1.02)
EOSINOPHIL # BLD: 0.1 K/UL (ref 0–0.4)
EOSINOPHIL NFR BLD: 1 % (ref 0–7)
EPITH CASTS URNS QL MICRO: ABNORMAL /LPF
ERYTHROCYTE [DISTWIDTH] IN BLOOD BY AUTOMATED COUNT: 12.8 % (ref 11.5–14.5)
GLOBULIN SER CALC-MCNC: 3.8 G/DL (ref 2–4)
GLUCOSE SERPL-MCNC: 118 MG/DL (ref 65–100)
GLUCOSE UR STRIP.AUTO-MCNC: 100 MG/DL
HCG UR QL: NEGATIVE
HCT VFR BLD AUTO: 38.1 % (ref 35–47)
HGB BLD-MCNC: 13.2 G/DL (ref 11.5–16)
HGB UR QL STRIP: ABNORMAL
KETONES UR QL STRIP.AUTO: NEGATIVE MG/DL
LACTATE SERPL-SCNC: 2.1 MMOL/L (ref 0.4–2)
LEUKOCYTE ESTERASE UR QL STRIP.AUTO: NEGATIVE
LIPASE SERPL-CCNC: 266 U/L (ref 73–393)
LYMPHOCYTES # BLD AUTO: 16 % (ref 12–49)
LYMPHOCYTES # BLD: 2.3 K/UL (ref 0.8–3.5)
MCH RBC QN AUTO: 30.4 PG (ref 26–34)
MCHC RBC AUTO-ENTMCNC: 34.6 G/DL (ref 30–36.5)
MCV RBC AUTO: 87.8 FL (ref 80–99)
MONOCYTES # BLD: 1.3 K/UL (ref 0–1)
MONOCYTES NFR BLD AUTO: 9 % (ref 5–13)
NEUTS SEG # BLD: 10.8 K/UL (ref 1.8–8)
NEUTS SEG NFR BLD AUTO: 74 % (ref 32–75)
NITRITE UR QL STRIP.AUTO: NEGATIVE
PH UR STRIP: 7.5 [PH] (ref 5–8)
PLATELET # BLD AUTO: 304 K/UL (ref 150–400)
POTASSIUM SERPL-SCNC: 3.3 MMOL/L (ref 3.5–5.1)
PROT SERPL-MCNC: 7.8 G/DL (ref 6.4–8.2)
PROT UR STRIP-MCNC: 30 MG/DL
RBC # BLD AUTO: 4.34 M/UL (ref 3.8–5.2)
RBC #/AREA URNS HPF: ABNORMAL /HPF (ref 0–5)
SODIUM SERPL-SCNC: 141 MMOL/L (ref 136–145)
SP GR UR REFRACTOMETRY: 1.02 (ref 1–1.03)
UROBILINOGEN UR QL STRIP.AUTO: 2 EU/DL (ref 0.2–1)
WBC # BLD AUTO: 14.5 K/UL (ref 3.6–11)
WBC URNS QL MICRO: ABNORMAL /HPF (ref 0–4)

## 2017-04-28 PROCEDURE — 83605 ASSAY OF LACTIC ACID: CPT | Performed by: STUDENT IN AN ORGANIZED HEALTH CARE EDUCATION/TRAINING PROGRAM

## 2017-04-28 PROCEDURE — 36415 COLL VENOUS BLD VENIPUNCTURE: CPT | Performed by: STUDENT IN AN ORGANIZED HEALTH CARE EDUCATION/TRAINING PROGRAM

## 2017-04-28 PROCEDURE — 81025 URINE PREGNANCY TEST: CPT

## 2017-04-28 PROCEDURE — 82150 ASSAY OF AMYLASE: CPT | Performed by: STUDENT IN AN ORGANIZED HEALTH CARE EDUCATION/TRAINING PROGRAM

## 2017-04-28 PROCEDURE — 81001 URINALYSIS AUTO W/SCOPE: CPT | Performed by: EMERGENCY MEDICINE

## 2017-04-28 PROCEDURE — 74011000250 HC RX REV CODE- 250: Performed by: STUDENT IN AN ORGANIZED HEALTH CARE EDUCATION/TRAINING PROGRAM

## 2017-04-28 PROCEDURE — 74177 CT ABD & PELVIS W/CONTRAST: CPT

## 2017-04-28 PROCEDURE — 74011636320 HC RX REV CODE- 636/320: Performed by: STUDENT IN AN ORGANIZED HEALTH CARE EDUCATION/TRAINING PROGRAM

## 2017-04-28 PROCEDURE — 80053 COMPREHEN METABOLIC PANEL: CPT | Performed by: EMERGENCY MEDICINE

## 2017-04-28 PROCEDURE — 85025 COMPLETE CBC W/AUTO DIFF WBC: CPT | Performed by: EMERGENCY MEDICINE

## 2017-04-28 PROCEDURE — 96361 HYDRATE IV INFUSION ADD-ON: CPT

## 2017-04-28 PROCEDURE — 83690 ASSAY OF LIPASE: CPT | Performed by: EMERGENCY MEDICINE

## 2017-04-28 PROCEDURE — 74011250636 HC RX REV CODE- 250/636: Performed by: STUDENT IN AN ORGANIZED HEALTH CARE EDUCATION/TRAINING PROGRAM

## 2017-04-28 PROCEDURE — 74011000258 HC RX REV CODE- 258: Performed by: STUDENT IN AN ORGANIZED HEALTH CARE EDUCATION/TRAINING PROGRAM

## 2017-04-28 PROCEDURE — 96374 THER/PROPH/DIAG INJ IV PUSH: CPT

## 2017-04-28 PROCEDURE — 99284 EMERGENCY DEPT VISIT MOD MDM: CPT

## 2017-04-28 PROCEDURE — 96375 TX/PRO/DX INJ NEW DRUG ADDON: CPT

## 2017-04-28 RX ORDER — OXYCODONE HYDROCHLORIDE 5 MG/1
5 TABLET ORAL
Qty: 10 TAB | Refills: 0 | Status: SHIPPED | OUTPATIENT
Start: 2017-04-28 | End: 2017-05-08 | Stop reason: SDUPTHER

## 2017-04-28 RX ORDER — SODIUM CHLORIDE 0.9 % (FLUSH) 0.9 %
10 SYRINGE (ML) INJECTION
Status: COMPLETED | OUTPATIENT
Start: 2017-04-28 | End: 2017-04-28

## 2017-04-28 RX ORDER — ONDANSETRON 4 MG/1
4 TABLET, ORALLY DISINTEGRATING ORAL
Qty: 9 TAB | Refills: 0 | Status: SHIPPED | OUTPATIENT
Start: 2017-04-28 | End: 2017-05-25 | Stop reason: SDUPTHER

## 2017-04-28 RX ORDER — MORPHINE SULFATE 4 MG/ML
4 INJECTION, SOLUTION INTRAMUSCULAR; INTRAVENOUS ONCE
Status: DISCONTINUED | OUTPATIENT
Start: 2017-04-28 | End: 2017-04-29 | Stop reason: HOSPADM

## 2017-04-28 RX ORDER — DOCUSATE SODIUM 100 MG/1
100 CAPSULE, LIQUID FILLED ORAL 2 TIMES DAILY
Qty: 60 CAP | Refills: 0 | Status: SHIPPED | OUTPATIENT
Start: 2017-04-28 | End: 2017-07-27

## 2017-04-28 RX ORDER — HYDROMORPHONE HYDROCHLORIDE 1 MG/ML
1 INJECTION, SOLUTION INTRAMUSCULAR; INTRAVENOUS; SUBCUTANEOUS ONCE
Status: COMPLETED | OUTPATIENT
Start: 2017-04-28 | End: 2017-04-28

## 2017-04-28 RX ORDER — FAMOTIDINE 10 MG/ML
20 INJECTION INTRAVENOUS
Status: COMPLETED | OUTPATIENT
Start: 2017-04-28 | End: 2017-04-28

## 2017-04-28 RX ADMIN — HYDROMORPHONE HYDROCHLORIDE 1 MG: 1 INJECTION, SOLUTION INTRAMUSCULAR; INTRAVENOUS; SUBCUTANEOUS at 19:47

## 2017-04-28 RX ADMIN — FAMOTIDINE 20 MG: 10 INJECTION, SOLUTION INTRAVENOUS at 18:16

## 2017-04-28 RX ADMIN — IOPAMIDOL 100 ML: 755 INJECTION, SOLUTION INTRAVENOUS at 20:10

## 2017-04-28 RX ADMIN — Medication 10 ML: at 20:09

## 2017-04-28 RX ADMIN — SODIUM CHLORIDE 100 ML: 900 INJECTION, SOLUTION INTRAVENOUS at 20:09

## 2017-04-28 RX ADMIN — SODIUM CHLORIDE 1000 ML: 900 INJECTION, SOLUTION INTRAVENOUS at 18:16

## 2017-04-28 NOTE — ED NOTES
Patient ambulatory to restroom. Steady gait noted. Urine sample obtained. POC pregnancy test NEGATIVE.
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
## 2017-04-29 NOTE — DISCHARGE INSTRUCTIONS
Abdominal Pain: Care Instructions  Your Care Instructions    Abdominal pain has many possible causes. Some aren't serious and get better on their own in a few days. Others need more testing and treatment. If your pain continues or gets worse, you need to be rechecked and may need more tests to find out what is wrong. You may need surgery to correct the problem. Don't ignore new symptoms, such as fever, nausea and vomiting, urination problems, pain that gets worse, and dizziness. These may be signs of a more serious problem. Your doctor may have recommended a follow-up visit in the next 8 to 12 hours. If you are not getting better, you may need more tests or treatment. The doctor has checked you carefully, but problems can develop later. If you notice any problems or new symptoms, get medical treatment right away. Follow-up care is a key part of your treatment and safety. Be sure to make and go to all appointments, and call your doctor if you are having problems. It's also a good idea to know your test results and keep a list of the medicines you take. How can you care for yourself at home? · Rest until you feel better. · To prevent dehydration, drink plenty of fluids, enough so that your urine is light yellow or clear like water. Choose water and other caffeine-free clear liquids until you feel better. If you have kidney, heart, or liver disease and have to limit fluids, talk with your doctor before you increase the amount of fluids you drink. · If your stomach is upset, eat mild foods, such as rice, dry toast or crackers, bananas, and applesauce. Try eating several small meals instead of two or three large ones. · Wait until 48 hours after all symptoms have gone away before you have spicy foods, alcohol, and drinks that contain caffeine. · Do not eat foods that are high in fat. · Avoid anti-inflammatory medicines such as aspirin, ibuprofen (Advil, Motrin), and naproxen (Aleve).  These can cause stomach upset. Talk to your doctor if you take daily aspirin for another health problem. When should you call for help? Call 911 anytime you think you may need emergency care. For example, call if:  · You passed out (lost consciousness). · You pass maroon or very bloody stools. · You vomit blood or what looks like coffee grounds. · You have new, severe belly pain. Call your doctor now or seek immediate medical care if:  · Your pain gets worse, especially if it becomes focused in one area of your belly. · You have a new or higher fever. · Your stools are black and look like tar, or they have streaks of blood. · You have unexpected vaginal bleeding. · You have symptoms of a urinary tract infection. These may include:  ¨ Pain when you urinate. ¨ Urinating more often than usual.  ¨ Blood in your urine. · You are dizzy or lightheaded, or you feel like you may faint. Watch closely for changes in your health, and be sure to contact your doctor if:  · You are not getting better after 1 day (24 hours). Where can you learn more? Go to http://[employer].info/. Enter Q446 in the search box to learn more about \"Abdominal Pain: Care Instructions. \"  Current as of: May 27, 2016  Content Version: 11.2  © 6275-9272 [employer]. Care instructions adapted under license by [employer] (which disclaims liability or warranty for this information). If you have questions about a medical condition or this instruction, always ask your healthcare professional. [place] [address] any warranty or liability for your use of this information. We hope that we have addressed all of your medical concerns. The examination and treatment you received in the Emergency Department were for an emergent problem and were not intended as complete care. It is important that you follow up with your healthcare provider(s) for ongoing care.  If your symptoms worsen or do not improve as expected, and you are unable to reach your usual health care provider(s), you should return to the Emergency Department. Today's healthcare is undergoing tremendous change, and patient satisfaction surveys are one of the many tools to assess the quality of medical care. You may receive a survey from the [employer] regarding your experience in the Emergency Department. I hope that your experience has been completely positive, particularly the medical care that I provided. As such, please participate in the survey; anything less than excellent does not meet my expectations or intentions. [facility] and [facility] participate in nationally recognized quality of care measures. If your blood pressure is greater than 120/80, as reported below, we urge that you seek medical care to address the potential of high blood pressure, commonly known as hypertension. Hypertension can be hereditary or can be caused by certain medical conditions, pain, stress, or \"white coat syndrome. \"       Please make an appointment with your health care provider(s) for follow up of your Emergency Department visit. VITALS:   Patient Vitals for the past 8 hrs:   Temp Pulse Resp BP SpO2   04/28/17 1712 98.4 °F (36.9 °C) 99 20 131/85 99 %          Thank you for allowing us to provide you with medical care today. We realize that you have many choices for your emergency care needs. Please choose us in the future for any continued health care needs. Epifanio Renee, [address] [place], [place] [address]y 20.   Office: [phone]            Recent Results (from the past 24 hour(s))   CBC WITH AUTOMATED DIFF    Collection Time: 04/28/17  5:42 PM   Result Value Ref Range    WBC 14.5 (H) 3.6 - 11.0 K/uL    RBC 4.34 3.80 - 5.20 M/uL    HGB 13.2 11.5 - 16.0 g/dL    HCT 38.1 35.0 - 47.0 %    MCV 87.8 80.0 - 99.0 FL    MCH 30.4 26.0 - 34.0 PG MCHC 34.6 30.0 - 36.5 g/dL    RDW 12.8 11.5 - 14.5 %    PLATELET 171 090 - 537 K/uL    NEUTROPHILS 74 32 - 75 %    LYMPHOCYTES 16 12 - 49 %    MONOCYTES 9 5 - 13 %    EOSINOPHILS 1 0 - 7 %    BASOPHILS 0 0 - 1 %    ABS. NEUTROPHILS 10.8 (H) 1.8 - 8.0 K/UL    ABS. LYMPHOCYTES 2.3 0.8 - 3.5 K/UL    ABS. MONOCYTES 1.3 (H) 0.0 - 1.0 K/UL    ABS. EOSINOPHILS 0.1 0.0 - 0.4 K/UL    ABS. BASOPHILS 0.0 0.0 - 0.1 K/UL   METABOLIC PANEL, COMPREHENSIVE    Collection Time: 04/28/17  5:42 PM   Result Value Ref Range    Sodium 141 136 - 145 mmol/L    Potassium 3.3 (L) 3.5 - 5.1 mmol/L    Chloride 106 97 - 108 mmol/L    CO2 23 21 - 32 mmol/L    Anion gap 12 5 - 15 mmol/L    Glucose 118 (H) 65 - 100 mg/dL    BUN 11 6 - 20 MG/DL    Creatinine 1.05 (H) 0.55 - 1.02 MG/DL    BUN/Creatinine ratio 10 (L) 12 - 20      GFR est AA >60 >60 ml/min/1.73m2    GFR est non-AA >60 >60 ml/min/1.73m2    Calcium 9.2 8.5 - 10.1 MG/DL    Bilirubin, total 0.6 0.2 - 1.0 MG/DL    ALT (SGPT) 99 (H) 12 - 78 U/L    AST (SGOT) 205 (H) 15 - 37 U/L    Alk.  phosphatase 117 45 - 117 U/L    Protein, total 7.8 6.4 - 8.2 g/dL    Albumin 4.0 3.5 - 5.0 g/dL    Globulin 3.8 2.0 - 4.0 g/dL    A-G Ratio 1.1 1.1 - 2.2     LIPASE    Collection Time: 04/28/17  5:42 PM   Result Value Ref Range    Lipase 266 73 - 393 U/L   AMYLASE    Collection Time: 04/28/17  5:42 PM   Result Value Ref Range    Amylase 56 25 - 115 U/L   LACTIC ACID, PLASMA    Collection Time: 04/28/17  6:15 PM   Result Value Ref Range    Lactic acid 2.1 (HH) 0.4 - 2.0 MMOL/L   URINALYSIS W/MICROSCOPIC    Collection Time: 04/28/17  7:49 PM   Result Value Ref Range    Color YELLOW/STRAW      Appearance CLEAR CLEAR      Specific gravity 1.018 1.003 - 1.030      pH (UA) 7.5 5.0 - 8.0      Protein 30 (A) NEG mg/dL    Glucose 100 (A) NEG mg/dL    Ketone NEGATIVE  NEG mg/dL    Bilirubin NEGATIVE  NEG      Blood SMALL (A) NEG      Urobilinogen 2.0 (H) 0.2 - 1.0 EU/dL    Nitrites NEGATIVE  NEG      Leukocyte Esterase NEGATIVE  NEG      WBC 0-4 0 - 4 /hpf    RBC 0-5 0 - 5 /hpf    Epithelial cells FEW FEW /lpf    Bacteria NEGATIVE  NEG /hpf   HCG URINE, QL. - POC    Collection Time: 04/28/17  7:50 PM   Result Value Ref Range    Pregnancy test,urine (POC) NEGATIVE  NEG         Ct Abd Pelv W Cont    Result Date: 4/28/2017  INDICATION: Abdominal pain COMPARISON: None TECHNIQUE: Following the uneventful intravenous administration of 100 cc Isovue-370, thin axial images were obtained through the abdomen and pelvis. Coronal and sagittal reconstructions were generated. Oral contrast was not administered. CT dose reduction was achieved through use of a standardized protocol tailored for this examination and automatic exposure control for dose modulation. FINDINGS: LUNG BASES: Clear. INCIDENTALLY IMAGED HEART AND MEDIASTINUM: Unremarkable. LIVER: No mass or biliary dilatation. GALLBLADDER: Unremarkable. SPLEEN: No mass. PANCREAS: No mass or ductal dilatation. ADRENALS: Unremarkable. KIDNEYS: No mass, calculus, or hydronephrosis. STOMACH: Unremarkable. SMALL BOWEL: No dilatation or wall thickening. COLON: No dilatation or wall thickening. APPENDIX: Unremarkable. PERITONEUM: No ascites or pneumoperitoneum. RETROPERITONEUM: No lymphadenopathy or aortic aneurysm. REPRODUCTIVE ORGANS: The uterus contains an intrauterine device. There is no pelvic free fluid. URINARY BLADDER: No mass or calculus. BONES: No destructive bone lesion. ADDITIONAL COMMENTS: N/A     IMPRESSION: No acute abdominal or pelvic pathology.

## 2017-05-01 ENCOUNTER — OFFICE VISIT (OUTPATIENT)
Dept: SURGERY | Age: 27
End: 2017-05-01

## 2017-05-01 VITALS
HEIGHT: 61 IN | DIASTOLIC BLOOD PRESSURE: 64 MMHG | OXYGEN SATURATION: 98 % | WEIGHT: 181 LBS | RESPIRATION RATE: 13 BRPM | SYSTOLIC BLOOD PRESSURE: 96 MMHG | HEART RATE: 74 BPM | BODY MASS INDEX: 34.17 KG/M2 | TEMPERATURE: 98.2 F

## 2017-05-01 DIAGNOSIS — Z48.89 ENCOUNTER FOR POSTOPERATIVE WOUND CHECK: Primary | ICD-10-CM

## 2017-05-01 NOTE — PROGRESS NOTES
Subjective:      Deyanira James is a 27 y.o. black female presents for postop care 3 weeks following an excision. Ms. Alfredo Feldman is doing fine. She missed her appointment last week because her grandmother  of lung CA and then her cousin committed suicide. She has had no problems with her incision. Objective:     Visit Vitals    BP 96/64 (BP 1 Location: Left arm, BP Patient Position: Sitting)    Pulse 74    Temp 98.2 °F (36.8 °C) (Oral)    Resp 13    Ht 5' 1\" (1.549 m)    Wt 181 lb (82.1 kg)    SpO2 98%    BMI 34.2 kg/m2       General:  alert, cooperative, no distress   Chest wall: The incision is clean, dry, and intact with no erythema. It is hypertrophic. Assessment:     1st POV, s/p excision of an epidermal inclusion cyst of the anterior chest wall. Plan:     The pathology report was discussed with Ms. Alfredo Feldman. Her sutures were removed in the office today, and her incision looks fine. She can f/u with me on an as needed basis.

## 2017-05-01 NOTE — PROGRESS NOTES
1. Have you been to the ER, urgent care clinic since your last visit? Hospitalized since your last visit?no    2. Have you seen or consulted any other health care providers outside of the [address] [place] [address] since your last visit? Include any pap smears or colon screening.  no

## 2017-05-08 ENCOUNTER — OFFICE VISIT (OUTPATIENT)
Dept: FAMILY MEDICINE CLINIC | Age: 27
End: 2017-05-08

## 2017-05-08 VITALS
DIASTOLIC BLOOD PRESSURE: 85 MMHG | HEIGHT: 61 IN | BODY MASS INDEX: 34.17 KG/M2 | WEIGHT: 181 LBS | HEART RATE: 82 BPM | SYSTOLIC BLOOD PRESSURE: 123 MMHG | TEMPERATURE: 98.2 F | OXYGEN SATURATION: 97 % | RESPIRATION RATE: 20 BRPM

## 2017-05-08 DIAGNOSIS — D72.829 LEUKOCYTOSIS, UNSPECIFIED TYPE: ICD-10-CM

## 2017-05-08 DIAGNOSIS — R19.7 DIARRHEA, UNSPECIFIED TYPE: ICD-10-CM

## 2017-05-08 DIAGNOSIS — R10.11 RUQ PAIN: Primary | ICD-10-CM

## 2017-05-08 RX ORDER — DIPHENOXYLATE HYDROCHLORIDE AND ATROPINE SULFATE 2.5; .025 MG/1; MG/1
1 TABLET ORAL
Qty: 20 TAB | Refills: 0 | Status: SHIPPED | OUTPATIENT
Start: 2017-05-08 | End: 2017-05-10

## 2017-05-08 RX ORDER — CIPROFLOXACIN 500 MG/1
500 TABLET ORAL 2 TIMES DAILY
Qty: 20 TAB | Refills: 0 | Status: SHIPPED | OUTPATIENT
Start: 2017-05-08 | End: 2017-05-18

## 2017-05-08 RX ORDER — OXYCODONE HYDROCHLORIDE 5 MG/1
5 TABLET ORAL
Qty: 20 TAB | Refills: 0 | Status: SHIPPED | OUTPATIENT
Start: 2017-05-08 | End: 2017-11-10

## 2017-05-08 NOTE — MR AVS SNAPSHOT
Visit Information Date & Time Provider Department Dept. Phone Encounter #  
 5/8/2017  1:45 PM Lyric Hamm  [facility] [phone] [id number] Follow-up Instructions Return in about 4 weeks (around 6/5/2017). Upcoming Health Maintenance Date Due  
 PAP AKA CERVICAL CYTOLOGY 12/5/2009 DTaP/Tdap/Td series (1 - Tdap) 2/2/2011 INFLUENZA AGE 9 TO ADULT 8/1/2017 Allergies as of 5/8/2017  Review Complete On: 5/8/2017 By: Jh Salazar Severity Noted Reaction Type Reactions Latex High 01/30/2011    Anaphylaxis Acetaminophen High 01/30/2011    Anaphylaxis Other Plant, Animal, Environmental High 01/04/2017   Systemic Hives Allergic to everything outside. Nsaids (Non-steroidal Anti-inflammatory Drug) Low 01/04/2017   Systemic Other (comments) Advised by her GI doctor not to take till they figure out what is going on with her stomach. Currently has a Nissen-fundiplication. Current Immunizations  Never Reviewed Name Date Influenza Vaccine  Deferred (Contraindication) TD Vaccine 2/1/2011 12:00 AM  
  
 Not reviewed this visit You Were Diagnosed With   
  
 Codes Comments RUQ pain    -  Primary ICD-10-CM: R10.11 ICD-9-CM: 789.01 Leukocytosis, unspecified type     ICD-10-CM: D72.829 ICD-9-CM: 288.60 Diarrhea, unspecified type     ICD-10-CM: R19.7 ICD-9-CM: 787.91 Vitals BP Pulse Temp Resp Height(growth percentile) Weight(growth percentile) 123/85 (BP 1 Location: Right arm, BP Patient Position: Sitting) 82 98.2 °F (36.8 °C) (Oral) 20 5' 1\" (1.549 m) 181 lb (82.1 kg) SpO2 BMI OB Status Smoking Status 97% 34.2 kg/m2 IUD Never Smoker BMI and BSA Data Body Mass Index Body Surface Area  
 34.2 kg/m 2 1.88 m 2 Preferred Pharmacy Pharmacy Name Phone [id number] [address] [place] [address] [phone] Your Updated Medication List  
  
   
This list is accurate as of: 5/8/17  2:32 PM.  Always use your most recent med list.  
  
  
  
  
 amitriptyline 25 mg tablet Commonly known as:  ELAVIL Take 1 Tab by mouth nightly. Take 2-3 tablets at bedtime. Anti-depressant for headache prevention. BENTYL PO Take  by mouth four (4) times daily. ciprofloxacin HCl 500 mg tablet Commonly known as:  CIPRO Take 1 Tab by mouth two (2) times a day for 10 days. diphenoxylate-atropine 2.5-0.025 mg per tablet Commonly known as:  LOMOTIL Take 1 Tab by mouth four (4) times daily as needed for Diarrhea for up to 2 days. Max Daily Amount: 4 Tabs. docusate sodium 100 mg capsule Commonly known as:  Irina Constant Take 1 Cap by mouth two (2) times a day for 90 days. ondansetron 4 mg disintegrating tablet Commonly known as:  ZOFRAN ODT Take 1 Tab by mouth every eight (8) hours as needed for Nausea. oxyCODONE IR 5 mg immediate release tablet Commonly known as:  Melodye Saber Take 1 Tab by mouth every four (4) hours as needed for Pain. Max Daily Amount: 30 mg. VALIUM 5 mg tablet Generic drug:  diazePAM  
Take 5 mg by mouth every six (6) hours as needed for Anxiety. Prescriptions Printed Refills diphenoxylate-atropine (LOMOTIL) 2.5-0.025 mg per tablet 0 Sig: Take 1 Tab by mouth four (4) times daily as needed for Diarrhea for up to 2 days. Max Daily Amount: 4 Tabs. Class: Print Route: Oral  
 oxyCODONE IR (ROXICODONE) 5 mg immediate release tablet 0 Sig: Take 1 Tab by mouth every four (4) hours as needed for Pain. Max Daily Amount: 30 mg.  
 Class: Print Route: Oral  
  
Prescriptions Sent to Pharmacy Refills  
 ciprofloxacin HCl (CIPRO) 500 mg tablet 0 Sig: Take 1 Tab by mouth two (2) times a day for 10 days. Class: Normal  
 Pharmacy: [facility] [address] #: [phone] Route: Oral  
  
We Performed the Following AMYLASE [id number] CPT(R)] CBC WITH AUTOMATED DIFF [40155 CPT(R)] LIPASE [id number] CPT(R)] METABOLIC PANEL, COMPREHENSIVE [11776 CPT(R)] Follow-up Instructions Return in about 4 weeks (around 6/5/2017). Patient Instructions Abdominal Pain: Care Instructions Your Care Instructions Abdominal pain has many possible causes. Some aren't serious and get better on their own in a few days. Others need more testing and treatment. If your pain continues or gets worse, you need to be rechecked and may need more tests to find out what is wrong. You may need surgery to correct the problem. Don't ignore new symptoms, such as fever, nausea and vomiting, urination problems, pain that gets worse, and dizziness. These may be signs of a more serious problem. Your doctor may have recommended a follow-up visit in the next 8 to 12 hours. If you are not getting better, you may need more tests or treatment. The doctor has checked you carefully, but problems can develop later. If you notice any problems or new symptoms, get medical treatment right away. Follow-up care is a key part of your treatment and safety. Be sure to make and go to all appointments, and call your doctor if you are having problems. It's also a good idea to know your test results and keep a list of the medicines you take. How can you care for yourself at home? · Rest until you feel better. · To prevent dehydration, drink plenty of fluids, enough so that your urine is light yellow or clear like water. Choose water and other caffeine-free clear liquids until you feel better. If you have kidney, heart, or liver disease and have to limit fluids, talk with your doctor before you increase the amount of fluids you drink. · If your stomach is upset, eat mild foods, such as rice, dry toast or crackers, bananas, and applesauce.  Try eating several small meals instead of two or three large ones. · Wait until 48 hours after all symptoms have gone away before you have spicy foods, alcohol, and drinks that contain caffeine. · Do not eat foods that are high in fat. · Avoid anti-inflammatory medicines such as aspirin, ibuprofen (Advil, Motrin), and naproxen (Aleve). These can cause stomach upset. Talk to your doctor if you take daily aspirin for another health problem. When should you call for help? Call 911 anytime you think you may need emergency care. For example, call if: 
· You passed out (lost consciousness). · You pass maroon or very bloody stools. · You vomit blood or what looks like coffee grounds. · You have new, severe belly pain. Call your doctor now or seek immediate medical care if: 
· Your pain gets worse, especially if it becomes focused in one area of your belly. · You have a new or higher fever. · Your stools are black and look like tar, or they have streaks of blood. · You have unexpected vaginal bleeding. · You have symptoms of a urinary tract infection. These may include: 
¨ Pain when you urinate. ¨ Urinating more often than usual. 
¨ Blood in your urine. · You are dizzy or lightheaded, or you feel like you may faint. Watch closely for changes in your health, and be sure to contact your doctor if: 
· You are not getting better after 1 day (24 hours). Where can you learn more? Go to http://connor-radah.info/. Enter O006 in the search box to learn more about \"Abdominal Pain: Care Instructions. \" Current as of: May 27, 2016 Content Version: 11.2 © 2898-6927 [employer]. Care instructions adapted under license by [employer] (which disclaims liability or warranty for this information). If you have questions about a medical condition or this instruction, always ask your healthcare professional. [address] any warranty or liability for your use of this information. Diarrhea: Care Instructions Your Care Instructions Diarrhea is loose, watery stools (bowel movements). The exact cause is often hard to find. Sometimes diarrhea is your body's way of getting rid of what caused an upset stomach. Viruses, food poisoning, and many medicines can cause diarrhea. Some people get diarrhea in response to emotional stress, anxiety, or certain foods. Almost everyone has diarrhea now and then. It usually isn't serious, and your stools will return to normal soon. The important thing to do is replace the fluids you have lost, so you can prevent dehydration. The doctor has checked you carefully, but problems can develop later. If you notice any problems or new symptoms, get medical treatment right away. Follow-up care is a key part of your treatment and safety. Be sure to make and go to all appointments, and call your doctor if you are having problems. It's also a good idea to know your test results and keep a list of the medicines you take. How can you care for yourself at home? · Watch for signs of dehydration, which means your body has lost too much water. Dehydration is a serious condition and should be treated right away. Signs of dehydration are: 
¨ Increasing thirst and dry eyes and mouth. ¨ Feeling faint or lightheaded. ¨ Darker urine, and a smaller amount of urine than normal. 
· To prevent dehydration, drink plenty of fluids, enough so that your urine is light yellow or clear like water. Choose water and other caffeine-free clear liquids until you feel better. If you have kidney, heart, or liver disease and have to limit fluids, talk with your doctor before you increase the amount of fluids you drink. · Begin eating small amounts of mild foods the next day, if you feel like it. ¨ Try yogurt that has live cultures of Lactobacillus. (Check the label.) ¨ Avoid spicy foods, fruits, alcohol, and caffeine until 48 hours after all symptoms are gone. ¨ Avoid chewing gum that contains sorbitol. ¨ Avoid dairy products (except for yogurt with Lactobacillus) while you have diarrhea and for 3 days after symptoms are gone. · The doctor may recommend that you take over-the-counter medicine, such as loperamide (Imodium), if you still have diarrhea after 6 hours. Read and follow all instructions on the label. Do not use this medicine if you have bloody diarrhea, a high fever, or other signs of serious illness. Call your doctor if you think you are having a problem with your medicine. When should you call for help? Call 911 anytime you think you may need emergency care. For example, call if: 
· You passed out (lost consciousness). · Your stools are maroon or very bloody. Call your doctor now or seek immediate medical care if: 
· You are dizzy or lightheaded, or you feel like you may faint. · Your stools are black and look like tar, or they have streaks of blood. · You have new or worse belly pain. · You have symptoms of dehydration, such as: ¨ Dry eyes and a dry mouth. ¨ Passing only a little dark urine. ¨ Feeling thirstier than usual. 
· You have a new or higher fever. Watch closely for changes in your health, and be sure to contact your doctor if: 
· Your diarrhea is getting worse. · You see pus in the diarrhea. · You are not getting better after 2 days (48 hours). Where can you learn more? Go to http://connor-radha.info/. Enter Q179 in the search box to learn more about \"Diarrhea: Care Instructions. \" Current as of: May 27, 2016 Content Version: 11.2 © 7474-2043 [employer]. Care instructions adapted under license by [employer] (which disclaims liability or warranty for this information). If you have questions about a medical condition or this instruction, always ask your healthcare professional. [address] any warranty or liability for your use of this information. Introducing [facility]! [facility] introduces Openbucks patient portal. Now you can access parts of your medical record, email your doctor's office, and request medication refills online. 1. In your internet browser, go to [email or web address] 2. Click on the First Time User? Click Here link in the Sign In box. You will see the New Member Sign Up page. 3. Enter your Openbucks Access Code exactly as it appears below. You will not need to use this code after youve completed the sign-up process. If you do not sign up before the expiration date, you must request a new code. · Openbucks Access Code: [email or web address] Expires: 7/5/2017  9:40 AM 
 
4. Enter the last four digits of your Social Security Number (xxxx) and Date of Birth (mm/dd/yyyy) as indicated and click Submit. You will be taken to the next sign-up page. 5. Create a Openbucks ID. This will be your Openbucks login ID and cannot be changed, so think of one that is secure and easy to remember. 6. Create a Openbucks password. You can change your password at any time. 7. Enter your Password Reset Question and Answer. This can be used at a later time if you forget your password. 8. Enter your e-mail address. You will receive e-mail notification when new information is available in [address] 9. Click Sign Up. You can now view and download portions of your medical record. 10. Click the Download Summary menu link to download a portable copy of your medical information. If you have questions, please visit the Frequently Asked Questions section of the Openbucks website. Remember, Openbucks is NOT to be used for urgent needs. For medical emergencies, dial 911. Now available from your iPhone and Android! Please provide this summary of care documentation to your next provider. Your primary care clinician is listed as Samantha Doyle. If you have any questions after today's visit, please call [phone].

## 2017-05-08 NOTE — PATIENT INSTRUCTIONS
Abdominal Pain: Care Instructions  Your Care Instructions    Abdominal pain has many possible causes. Some aren't serious and get better on their own in a few days. Others need more testing and treatment. If your pain continues or gets worse, you need to be rechecked and may need more tests to find out what is wrong. You may need surgery to correct the problem. Don't ignore new symptoms, such as fever, nausea and vomiting, urination problems, pain that gets worse, and dizziness. These may be signs of a more serious problem. Your doctor may have recommended a follow-up visit in the next 8 to 12 hours. If you are not getting better, you may need more tests or treatment. The doctor has checked you carefully, but problems can develop later. If you notice any problems or new symptoms, get medical treatment right away. Follow-up care is a key part of your treatment and safety. Be sure to make and go to all appointments, and call your doctor if you are having problems. It's also a good idea to know your test results and keep a list of the medicines you take. How can you care for yourself at home? · Rest until you feel better. · To prevent dehydration, drink plenty of fluids, enough so that your urine is light yellow or clear like water. Choose water and other caffeine-free clear liquids until you feel better. If you have kidney, heart, or liver disease and have to limit fluids, talk with your doctor before you increase the amount of fluids you drink. · If your stomach is upset, eat mild foods, such as rice, dry toast or crackers, bananas, and applesauce. Try eating several small meals instead of two or three large ones. · Wait until 48 hours after all symptoms have gone away before you have spicy foods, alcohol, and drinks that contain caffeine. · Do not eat foods that are high in fat. · Avoid anti-inflammatory medicines such as aspirin, ibuprofen (Advil, Motrin), and naproxen (Aleve).  These can cause stomach upset. Talk to your doctor if you take daily aspirin for another health problem. When should you call for help? Call 911 anytime you think you may need emergency care. For example, call if:  · You passed out (lost consciousness). · You pass maroon or very bloody stools. · You vomit blood or what looks like coffee grounds. · You have new, severe belly pain. Call your doctor now or seek immediate medical care if:  · Your pain gets worse, especially if it becomes focused in one area of your belly. · You have a new or higher fever. · Your stools are black and look like tar, or they have streaks of blood. · You have unexpected vaginal bleeding. · You have symptoms of a urinary tract infection. These may include:  ¨ Pain when you urinate. ¨ Urinating more often than usual.  ¨ Blood in your urine. · You are dizzy or lightheaded, or you feel like you may faint. Watch closely for changes in your health, and be sure to contact your doctor if:  · You are not getting better after 1 day (24 hours). Where can you learn more? Go to http://connorCITYBIZLISTradha.info/. Enter W980 in the search box to learn more about \"Abdominal Pain: Care Instructions. \"  Current as of: May 27, 2016  Content Version: 11.2  © 1701-5435 [employer]. Care instructions adapted under license by [employer] (which disclaims liability or warranty for this information). If you have questions about a medical condition or this instruction, always ask your healthcare professional. [place] [address] any warranty or liability for your use of this information. Diarrhea: Care Instructions  Your Care Instructions    Diarrhea is loose, watery stools (bowel movements). The exact cause is often hard to find. Sometimes diarrhea is your body's way of getting rid of what caused an upset stomach. Viruses, food poisoning, and many medicines can cause diarrhea.  Some people get diarrhea in response to emotional stress, anxiety, or certain foods. Almost everyone has diarrhea now and then. It usually isn't serious, and your stools will return to normal soon. The important thing to do is replace the fluids you have lost, so you can prevent dehydration. The doctor has checked you carefully, but problems can develop later. If you notice any problems or new symptoms, get medical treatment right away. Follow-up care is a key part of your treatment and safety. Be sure to make and go to all appointments, and call your doctor if you are having problems. It's also a good idea to know your test results and keep a list of the medicines you take. How can you care for yourself at home? · Watch for signs of dehydration, which means your body has lost too much water. Dehydration is a serious condition and should be treated right away. Signs of dehydration are:  ¨ Increasing thirst and dry eyes and mouth. ¨ Feeling faint or lightheaded. ¨ Darker urine, and a smaller amount of urine than normal.  · To prevent dehydration, drink plenty of fluids, enough so that your urine is light yellow or clear like water. Choose water and other caffeine-free clear liquids until you feel better. If you have kidney, heart, or liver disease and have to limit fluids, talk with your doctor before you increase the amount of fluids you drink. · Begin eating small amounts of mild foods the next day, if you feel like it. ¨ Try yogurt that has live cultures of Lactobacillus. (Check the label.)  ¨ Avoid spicy foods, fruits, alcohol, and caffeine until 48 hours after all symptoms are gone. ¨ Avoid chewing gum that contains sorbitol. ¨ Avoid dairy products (except for yogurt with Lactobacillus) while you have diarrhea and for 3 days after symptoms are gone. · The doctor may recommend that you take over-the-counter medicine, such as loperamide (Imodium), if you still have diarrhea after 6 hours.  Read and follow all instructions on the label. Do not use this medicine if you have bloody diarrhea, a high fever, or other signs of serious illness. Call your doctor if you think you are having a problem with your medicine. When should you call for help? Call 911 anytime you think you may need emergency care. For example, call if:  · You passed out (lost consciousness). · Your stools are maroon or very bloody. Call your doctor now or seek immediate medical care if:  · You are dizzy or lightheaded, or you feel like you may faint. · Your stools are black and look like tar, or they have streaks of blood. · You have new or worse belly pain. · You have symptoms of dehydration, such as:  ¨ Dry eyes and a dry mouth. ¨ Passing only a little dark urine. ¨ Feeling thirstier than usual.  · You have a new or higher fever. Watch closely for changes in your health, and be sure to contact your doctor if:  · Your diarrhea is getting worse. · You see pus in the diarrhea. · You are not getting better after 2 days (48 hours). Where can you learn more? Go to http://connor-radha.info/. Enter F134 in the search box to learn more about \"Diarrhea: Care Instructions. \"  Current as of: May 27, 2016  Content Version: 11.2  © 0386-5700 [employer]. Care instructions adapted under license by [employer]-pins (which disclaims liability or warranty for this information). If you have questions about a medical condition or this instruction, always ask your healthcare professional. [place] [address] any warranty or liability for your use of this information.

## 2017-05-08 NOTE — PROGRESS NOTES
Chief Complaint   Patient presents with    Establish Care     New Patient     GI Problem     C/o abdominal x 2 weeks.  Kristin Johnson

## 2017-05-09 LAB
ALBUMIN SERPL-MCNC: 4.9 G/DL (ref 3.5–5.5)
ALBUMIN/GLOB SERPL: 2 {RATIO} (ref 1.2–2.2)
ALP SERPL-CCNC: 99 IU/L (ref 39–117)
ALT SERPL-CCNC: 36 IU/L (ref 0–32)
AMYLASE SERPL-CCNC: 56 U/L (ref 31–124)
AST SERPL-CCNC: 20 IU/L (ref 0–40)
BASOPHILS # BLD AUTO: 0 X10E3/UL (ref 0–0.2)
BASOPHILS NFR BLD AUTO: 0 %
BILIRUB SERPL-MCNC: 0.6 MG/DL (ref 0–1.2)
BUN SERPL-MCNC: 10 MG/DL (ref 6–20)
BUN/CREAT SERPL: 14 (ref 9–23)
CALCIUM SERPL-MCNC: 9.7 MG/DL (ref 8.7–10.2)
CHLORIDE SERPL-SCNC: 100 MMOL/L (ref 96–106)
CO2 SERPL-SCNC: 25 MMOL/L (ref 18–29)
CREAT SERPL-MCNC: 0.73 MG/DL (ref 0.57–1)
EOSINOPHIL # BLD AUTO: 0.1 X10E3/UL (ref 0–0.4)
EOSINOPHIL NFR BLD AUTO: 1 %
ERYTHROCYTE [DISTWIDTH] IN BLOOD BY AUTOMATED COUNT: 13.3 % (ref 12.3–15.4)
GLOBULIN SER CALC-MCNC: 2.5 G/DL (ref 1.5–4.5)
GLUCOSE SERPL-MCNC: 84 MG/DL (ref 65–99)
HCT VFR BLD AUTO: 41.1 % (ref 34–46.6)
HGB BLD-MCNC: 13.8 G/DL (ref 11.1–15.9)
IMM GRANULOCYTES # BLD: 0 X10E3/UL (ref 0–0.1)
IMM GRANULOCYTES NFR BLD: 0 %
LIPASE SERPL-CCNC: 23 U/L (ref 0–59)
LYMPHOCYTES # BLD AUTO: 3.4 X10E3/UL (ref 0.7–3.1)
LYMPHOCYTES NFR BLD AUTO: 39 %
MCH RBC QN AUTO: 30.2 PG (ref 26.6–33)
MCHC RBC AUTO-ENTMCNC: 33.6 G/DL (ref 31.5–35.7)
MCV RBC AUTO: 90 FL (ref 79–97)
MONOCYTES # BLD AUTO: 0.6 X10E3/UL (ref 0.1–0.9)
MONOCYTES NFR BLD AUTO: 7 %
NEUTROPHILS # BLD AUTO: 4.5 X10E3/UL (ref 1.4–7)
NEUTROPHILS NFR BLD AUTO: 53 %
PLATELET # BLD AUTO: 308 X10E3/UL (ref 150–379)
POTASSIUM SERPL-SCNC: 4.1 MMOL/L (ref 3.5–5.2)
PROT SERPL-MCNC: 7.4 G/DL (ref 6–8.5)
RBC # BLD AUTO: 4.57 X10E6/UL (ref 3.77–5.28)
SODIUM SERPL-SCNC: 142 MMOL/L (ref 134–144)
WBC # BLD AUTO: 8.7 X10E3/UL (ref 3.4–10.8)

## 2017-05-09 NOTE — PROGRESS NOTES
Labs all back to normal. Recommend f/u with GI as planned, advance diet as tolerated, push fluids, to ED if symptoms become severe again or become dehydrated.
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
## 2017-05-25 ENCOUNTER — OFFICE VISIT (OUTPATIENT)
Dept: FAMILY MEDICINE CLINIC | Age: 27
End: 2017-05-25

## 2017-05-25 VITALS
TEMPERATURE: 98.3 F | WEIGHT: 181 LBS | DIASTOLIC BLOOD PRESSURE: 73 MMHG | HEIGHT: 61 IN | BODY MASS INDEX: 34.17 KG/M2 | OXYGEN SATURATION: 98 % | HEART RATE: 72 BPM | RESPIRATION RATE: 20 BRPM | SYSTOLIC BLOOD PRESSURE: 127 MMHG

## 2017-05-25 DIAGNOSIS — Z90.49 S/P LAPAROSCOPIC CHOLECYSTECTOMY: ICD-10-CM

## 2017-05-25 DIAGNOSIS — N30.00 ACUTE CYSTITIS WITHOUT HEMATURIA: Primary | ICD-10-CM

## 2017-05-25 DIAGNOSIS — R39.198 DIFFICULTY VOIDING: ICD-10-CM

## 2017-05-25 DIAGNOSIS — R11.2 NON-INTRACTABLE VOMITING WITH NAUSEA, UNSPECIFIED VOMITING TYPE: ICD-10-CM

## 2017-05-25 DIAGNOSIS — G89.18 PAIN ASSOCIATED WITH SURGICAL PROCEDURE: ICD-10-CM

## 2017-05-25 LAB
BILIRUB UR QL STRIP: NORMAL
GLUCOSE UR-MCNC: NEGATIVE MG/DL
KETONES P FAST UR STRIP-MCNC: NEGATIVE MG/DL
PH UR STRIP: 6 [PH] (ref 4.6–8)
PROT UR QL STRIP: NORMAL MG/DL
SP GR UR STRIP: 1.03 (ref 1–1.03)
UA UROBILINOGEN AMB POC: NORMAL (ref 0.2–1)
URINALYSIS CLARITY POC: CLEAR
URINALYSIS COLOR POC: NORMAL
URINE BLOOD POC: NEGATIVE
URINE LEUKOCYTES POC: NORMAL
URINE NITRITES POC: NEGATIVE

## 2017-05-25 RX ORDER — SULFAMETHOXAZOLE AND TRIMETHOPRIM 800; 160 MG/1; MG/1
1 TABLET ORAL 2 TIMES DAILY
Qty: 20 TAB | Refills: 0 | Status: SHIPPED | OUTPATIENT
Start: 2017-05-25 | End: 2017-06-04

## 2017-05-25 RX ORDER — ONDANSETRON 4 MG/1
4 TABLET, ORALLY DISINTEGRATING ORAL
Qty: 25 TAB | Refills: 1 | Status: SHIPPED | OUTPATIENT
Start: 2017-05-25 | End: 2017-10-23 | Stop reason: SDUPTHER

## 2017-05-25 NOTE — PATIENT INSTRUCTIONS
Nausea and Vomiting: Care Instructions  Your Care Instructions    When you are nauseated, you may feel weak and sweaty and notice a lot of saliva in your mouth. Nausea often leads to vomiting. Most of the time you do not need to worry about nausea and vomiting, but they can be signs of other illnesses. Two common causes of nausea and vomiting are stomach flu and food poisoning. Nausea and vomiting from viral stomach flu will usually start to improve within 24 hours. Nausea and vomiting from food poisoning may last from 12 to 48 hours. The doctor has checked you carefully, but problems can develop later. If you notice any problems or new symptoms, get medical treatment right away. Follow-up care is a key part of your treatment and safety. Be sure to make and go to all appointments, and call your doctor if you are having problems. It's also a good idea to know your test results and keep a list of the medicines you take. How can you care for yourself at home? · To prevent dehydration, drink plenty of fluids, enough so that your urine is light yellow or clear like water. Choose water and other caffeine-free clear liquids until you feel better. If you have kidney, heart, or liver disease and have to limit fluids, talk with your doctor before you increase the amount of fluids you drink. · Rest in bed until you feel better. · When you are able to eat, try clear soups, mild foods, and liquids until all symptoms are gone for 12 to 48 hours. Other good choices include dry toast, crackers, cooked cereal, and gelatin dessert, such as Jell-O. When should you call for help? Call 911 anytime you think you may need emergency care. For example, call if:  · You passed out (lost consciousness). Call your doctor now or seek immediate medical care if:  · You have symptoms of dehydration, such as:  ¨ Dry eyes and a dry mouth. ¨ Passing only a little dark urine.   ¨ Feeling thirstier than usual.  · You have new or worsening belly pain. · You have a new or higher fever. · You vomit blood or what looks like coffee grounds. Watch closely for changes in your health, and be sure to contact your doctor if:  · You have ongoing nausea and vomiting. · Your vomiting is getting worse. · Your vomiting lasts longer than 2 days. · You are not getting better as expected. Where can you learn more? Go to http://connor-radha.info/. Enter 25 [id number] in the search box to learn more about \"Nausea and Vomiting: Care Instructions. \"  Current as of: May 27, 2016  Content Version: 11.2  © 4526-7735 [employer]. Care instructions adapted under license by [employer] (which disclaims liability or warranty for this information). If you have questions about a medical condition or this instruction, always ask your healthcare professional. [address] any warranty or liability for your use of this information. Urinary Retention: Care Instructions  Your Care Instructions    Urinary retention means that you aren't able to urinate. In men, it is often caused by a blockage of the urinary tract from an enlarged prostate gland. In men and women, it can also be caused by an infection or nerve damage. Or it may be a side effect of a medicine. The doctor may have drained the urine from your bladder. If you still have problems passing urine, you may need to use a catheter at home. This is used to empty your bladder until the problem can be fixed. Your doctor may put a catheter in your bladder before you go home. If so, he or she will tell you when to come back to have the catheter removed. The doctor has checked you closely. But problems can develop later. If you notice any problems or new symptoms, get medical treatment right away. Follow-up care is a key part of your treatment and safety. Be sure to make and go to all appointments, and call your doctor if you are having problems.  It's also a good idea to know your test results and keep a list of the medicines you take. How can you care for yourself at home? · Take your medicines exactly as prescribed. Call your doctor if you think you are having a problem with your medicine. You will get more details on the specific medicines your doctor prescribes. · Check with your doctor before you use any over-the-counter medicines. Many cold and allergy medicines, for example, can make this problem worse. Make sure your doctor knows all of the medicines, vitamins, supplements, and herbal remedies you take. · Spread out through the day the amount of fluid you drink. Do not drink a lot at bedtime. · Avoid alcohol and caffeine. · If you have been given a catheter, or if one is already in place, follow the instructions you were given. Always wash your hands before and after you handle the catheter. When should you call for help? Call your doctor now or seek immediate medical care if:  · You cannot urinate at all, or it is getting harder to urinate. · You have symptoms of a urinary tract infection. These may include:  ¨ Pain or burning when you urinate. ¨ A frequent need to urinate without being able to pass much urine. ¨ Pain in the flank, which is just below the rib cage and above the waist on either side of the back. ¨ Blood in your urine. ¨ A fever. Watch closely for changes in your health, and be sure to contact your doctor if:  · You have any problems with your catheter. · You do not get better as expected. Where can you learn more? Go to http://connor-radha.info/. Enter M244 in the search box to learn more about \"Urinary Retention: Care Instructions. \"  Current as of: August 12, 2016  Content Version: 11.2  © 2145-7520 [employer]. Care instructions adapted under license by [employer] (which disclaims liability or warranty for this information).  If you have questions about a medical condition or this instruction, always ask your healthcare professional. [address] any warranty or liability for your use of this information. Soft-Textured, [place] Diet: Care Instructions  Your Care Instructions  A soft-textured, bland diet is used when you need food that is easy to chew, swallow, and digest. You will need to choose soft foods that are low in spices and seasonings. You will need to avoid high-fat foods, as well as caffeine and alcohol. Your doctor or dietitian can help you plan a soft-textured, bland diet based on your health and what you prefer to eat. Ask your doctor how long you should stay on this diet. As you get better, you will probably be able to go back to a regular diet. Talk with your doctor or dietitian before you make changes in your diet. Follow-up care is a key part of your treatment and safety. Be sure to make and go to all appointments, and call your doctor if you are having problems. Its also a good idea to know your test results and keep a list of the medicines you take. How can you care for yourself at home? · Choose foods that are easy to chew and swallow. Good choices are mashed potatoes, soft breads and rolls, cream soups, oatmeal, and Cream of Wheat. · Choose soft, well-cooked vegetables and soft or canned fruits. Good choices are applesauce, ripe bananas, and non-citrus fruit juice. · Try milk, yogurt, or other milk products, if you can digest dairy without too many problems. Your doctor may limit milk and milk products for a while. If so, he or she may recommend a calcium and vitamin D supplement. · Choose soft protein foods such as eggs, tofu, steamed fish, chicken, and turkey. Slow-cooking methods, such as stewing, will help soften meat. Chopping meat in a  or  also will make it easier to eat. · Avoid nuts, raw vegetables, hard crackers, tough meats, and prunes and prune juice.   · Avoid foods that are very spicy, such as foods seasoned with black pepper, chili peppers, horseradish, or hot sauce. · Avoid highly acidic foods such as citrus fruits, citrus fruit juices, and tomato-based foods. · Avoid high-fat foods such as fried meat, chips, and rich desserts. · Check with your doctor before you drink alcohol or beverages that have caffeine, such as coffee, tea, and cola beverages. Where can you learn more? Go to http://connor-radha.info/. Enter T351 in the search box to learn more about \"Soft-Textured, Willia Divine Diet: Care Instructions. \"  Current as of: July 26, 2016  Content Version: 11.2  © 9278-1512 "[employer]. Care instructions adapted under license by [employer] (which disclaims liability or warranty for this information). If you have questions about a medical condition or this instruction, always ask your healthcare professional. [address] any warranty or liability for your use of this information.

## 2017-05-25 NOTE — MR AVS SNAPSHOT
Visit Information Date & Time Provider Department Dept. Phone Encounter #  
 5/25/2017 12:15 PM Veronica Wynn  [facility] [phone] [id number] Follow-up Instructions Return if symptoms worsen or fail to improve. Upcoming Health Maintenance Date Due Pneumococcal 19-64 Highest Risk (1 of 3 - PCV13) 12/5/2007 PAP AKA CERVICAL CYTOLOGY 12/5/2009 DTaP/Tdap/Td series (1 - Tdap) 2/2/2011 INFLUENZA AGE 9 TO ADULT 8/1/2017 Allergies as of 5/25/2017  Review Complete On: 5/25/2017 By: Faina Silverman Severity Noted Reaction Type Reactions Latex High 01/30/2011    Anaphylaxis Acetaminophen High 01/30/2011    Anaphylaxis Other Plant, Animal, Environmental High 01/04/2017   Systemic Hives Allergic to everything outside. Nsaids (Non-steroidal Anti-inflammatory Drug) Low 01/04/2017   Systemic Other (comments) Advised by her GI doctor not to take till they figure out what is going on with her stomach. Currently has a Nissen-fundiplication. Current Immunizations  Never Reviewed Name Date Influenza Vaccine  Deferred (Contraindication) TD Vaccine 2/1/2011 12:00 AM  
  
 Not reviewed this visit You Were Diagnosed With   
  
 Codes Comments Acute cystitis without hematuria    -  Primary ICD-10-CM: N30.00 ICD-9-CM: 595.0 Non-intractable vomiting with nausea, unspecified vomiting type     ICD-10-CM: R11.2 ICD-9-CM: 787.01 Difficulty voiding     ICD-10-CM: R39.198 ICD-9-CM: 788.99 S/P laparoscopic cholecystectomy     ICD-10-CM: Z90.49 ICD-9-CM: V45.89 Pain associated with surgical procedure     ICD-10-CM: G89.18 
ICD-9-CM: 338.18 Vitals BP Pulse Temp Resp Height(growth percentile) Weight(growth percentile) 127/73 (BP 1 Location: Left arm, BP Patient Position: Sitting) 72 98.3 °F (36.8 °C) (Oral) 20 5' 1\" (1.549 m) 181 lb (82.1 kg) SpO2 BMI OB Status Smoking Status 98% 34.2 kg/m2 IUD Never Smoker BMI and BSA Data Body Mass Index Body Surface Area  
 34.2 kg/m 2 1.88 m 2 Preferred Pharmacy Pharmacy Name Phone [id number] [address] [place] [address] [address] [place] [address] [phone] Your Updated Medication List  
  
   
This list is accurate as of: 5/25/17 12:26 PM.  Always use your most recent med list.  
  
  
  
  
 amitriptyline 25 mg tablet Commonly known as:  ELAVIL Take 1 Tab by mouth nightly. Take 2-3 tablets at bedtime. Anti-depressant for headache prevention. BENTYL PO Take  by mouth four (4) times daily. docusate sodium 100 mg capsule Commonly known as:  Rafat Stallion Take 1 Cap by mouth two (2) times a day for 90 days. ondansetron 4 mg disintegrating tablet Commonly known as:  ZOFRAN ODT Take 1 Tab by mouth every eight (8) hours as needed for Nausea. oxyCODONE IR 5 mg immediate release tablet Commonly known as:  Heather Oleg Take 1 Tab by mouth every four (4) hours as needed for Pain. Max Daily Amount: 30 mg.  
  
 trimethoprim-sulfamethoxazole 160-800 mg per tablet Commonly known as:  BACTRIM DS, SEPTRA DS Take 1 Tab by mouth two (2) times a day for 10 days. VALIUM 5 mg tablet Generic drug:  diazePAM  
Take 5 mg by mouth every six (6) hours as needed for Anxiety. Prescriptions Sent to Pharmacy Refills  
 ondansetron (ZOFRAN ODT) 4 mg disintegrating tablet 1 Sig: Take 1 Tab by mouth every eight (8) hours as needed for Nausea. Class: Normal  
 [facility] [address] Ph #: [phone] Route: Oral  
 trimethoprim-sulfamethoxazole (BACTRIM DS, SEPTRA DS) 160-800 mg per tablet 0 Sig: Take 1 Tab by mouth two (2) times a day for 10 days. Class: Normal  
 Pharmacy: [address] Ph #: [phone]  Route: Oral  
  
 We Performed the Following AMB POC URINALYSIS DIP STICK AUTO W/O MICRO [37439 CPT(R)] Follow-up Instructions Return if symptoms worsen or fail to improve. Patient Instructions Nausea and Vomiting: Care Instructions Your Care Instructions When you are nauseated, you may feel weak and sweaty and notice a lot of saliva in your mouth. Nausea often leads to vomiting. Most of the time you do not need to worry about nausea and vomiting, but they can be signs of other illnesses. Two common causes of nausea and vomiting are stomach flu and food poisoning. Nausea and vomiting from viral stomach flu will usually start to improve within 24 hours. Nausea and vomiting from food poisoning may last from 12 to 48 hours. The doctor has checked you carefully, but problems can develop later. If you notice any problems or new symptoms, get medical treatment right away. Follow-up care is a key part of your treatment and safety. Be sure to make and go to all appointments, and call your doctor if you are having problems. It's also a good idea to know your test results and keep a list of the medicines you take. How can you care for yourself at home? · To prevent dehydration, drink plenty of fluids, enough so that your urine is light yellow or clear like water. Choose water and other caffeine-free clear liquids until you feel better. If you have kidney, heart, or liver disease and have to limit fluids, talk with your doctor before you increase the amount of fluids you drink. · Rest in bed until you feel better. · When you are able to eat, try clear soups, mild foods, and liquids until all symptoms are gone for 12 to 48 hours. Other good choices include dry toast, crackers, cooked cereal, and gelatin dessert, such as Jell-O. When should you call for help? Call 911 anytime you think you may need emergency care. For example, call if: 
· You passed out (lost consciousness). Call your doctor now or seek immediate medical care if: 
· You have symptoms of dehydration, such as: ¨ Dry eyes and a dry mouth. ¨ Passing only a little dark urine. ¨ Feeling thirstier than usual. 
· You have new or worsening belly pain. · You have a new or higher fever. · You vomit blood or what looks like coffee grounds. Watch closely for changes in your health, and be sure to contact your doctor if: 
· You have ongoing nausea and vomiting. · Your vomiting is getting worse. · Your vomiting lasts longer than 2 days. · You are not getting better as expected. Where can you learn more? Go to http://connor-radha.info/. Enter 25 [id number] in the search box to learn more about \"Nausea and Vomiting: Care Instructions. \" Current as of: May 27, 2016 Content Version: 11.2 © 4728-0117 [employer]. Care instructions adapted under license by "[employer] (which disclaims liability or warranty for this information). If you have questions about a medical condition or this instruction, always ask your healthcare professional. [place] [address] any warranty or liability for your use of this information. Urinary Retention: Care Instructions Your Care Instructions Urinary retention means that you aren't able to urinate. In men, it is often caused by a blockage of the urinary tract from an enlarged prostate gland. In men and women, it can also be caused by an infection or nerve damage. Or it may be a side effect of a medicine. The doctor may have drained the urine from your bladder. If you still have problems passing urine, you may need to use a catheter at home. This is used to empty your bladder until the problem can be fixed. Your doctor may put a catheter in your bladder before you go home. If so, he or she will tell you when to come back to have the catheter removed. The doctor has checked you closely. But problems can develop later.  If you notice any problems or new symptoms, get medical treatment right away. Follow-up care is a key part of your treatment and safety. Be sure to make and go to all appointments, and call your doctor if you are having problems. It's also a good idea to know your test results and keep a list of the medicines you take. How can you care for yourself at home? · Take your medicines exactly as prescribed. Call your doctor if you think you are having a problem with your medicine. You will get more details on the specific medicines your doctor prescribes. · Check with your doctor before you use any over-the-counter medicines. Many cold and allergy medicines, for example, can make this problem worse. Make sure your doctor knows all of the medicines, vitamins, supplements, and herbal remedies you take. · Spread out through the day the amount of fluid you drink. Do not drink a lot at bedtime. · Avoid alcohol and caffeine. · If you have been given a catheter, or if one is already in place, follow the instructions you were given. Always wash your hands before and after you handle the catheter. When should you call for help? Call your doctor now or seek immediate medical care if: 
· You cannot urinate at all, or it is getting harder to urinate. · You have symptoms of a urinary tract infection. These may include: 
¨ Pain or burning when you urinate. ¨ A frequent need to urinate without being able to pass much urine. ¨ Pain in the flank, which is just below the rib cage and above the waist on either side of the back. ¨ Blood in your urine. ¨ A fever. Watch closely for changes in your health, and be sure to contact your doctor if: 
· You have any problems with your catheter. · You do not get better as expected. Where can you learn more? Go to http://connor-radha.info/. Enter M244 in the search box to learn more about \"Urinary Retention: Care Instructions. \" Current as of: August 12, 2016 Content Version: 11.2 © 5861-0032 [employer]. Care instructions adapted under license by [employer] (which disclaims liability or warranty for this information). If you have questions about a medical condition or this instruction, always ask your healthcare professional. [address] any warranty or liability for your use of this information. Soft-Textured, [place] Diet: Care Instructions Your Care Instructions A soft-textured, bland diet is used when you need food that is easy to chew, swallow, and digest. You will need to choose soft foods that are low in spices and seasonings. You will need to avoid high-fat foods, as well as caffeine and alcohol. Your doctor or dietitian can help you plan a soft-textured, bland diet based on your health and what you prefer to eat. Ask your doctor how long you should stay on this diet. As you get better, you will probably be able to go back to a regular diet. Talk with your doctor or dietitian before you make changes in your diet. Follow-up care is a key part of your treatment and safety. Be sure to make and go to all appointments, and call your doctor if you are having problems. Its also a good idea to know your test results and keep a list of the medicines you take. How can you care for yourself at home? · Choose foods that are easy to chew and swallow. Good choices are mashed potatoes, soft breads and rolls, cream soups, oatmeal, and Cream of Wheat. · Choose soft, well-cooked vegetables and soft or canned fruits. Good choices are applesauce, ripe bananas, and non-citrus fruit juice. · Try milk, yogurt, or other milk products, if you can digest dairy without too many problems. Your doctor may limit milk and milk products for a while. If so, he or she may recommend a calcium and vitamin D supplement.  
· Choose soft protein foods such as eggs, tofu, steamed fish, chicken, and turkey. Slow-cooking methods, such as stewing, will help soften meat. Chopping meat in a  or  also will make it easier to eat. · Avoid nuts, raw vegetables, hard crackers, tough meats, and prunes and prune juice. · Avoid foods that are very spicy, such as foods seasoned with black pepper, chili peppers, horseradish, or hot sauce. · Avoid highly acidic foods such as citrus fruits, citrus fruit juices, and tomato-based foods. · Avoid high-fat foods such as fried meat, chips, and rich desserts. · Check with your doctor before you drink alcohol or beverages that have caffeine, such as coffee, tea, and cola beverages. Where can you learn more? Go to http://connor-radha.info/. Enter Z869 in the search box to learn more about \"Soft-Textured, Marni Billing Diet: Care Instructions. \" Current as of: July 26, 2016 Content Version: 11.2 © 3647-7313 Ayi Laile. Care instructions adapted under license by [employer] (which disclaims liability or warranty for this information). If you have questions about a medical condition or this instruction, always ask your healthcare professional. [place] [address] any warranty or liability for your use of this information. Introducing [facility] [place] [facility]! [facility] introduces [employer] patient portal. Now you can access parts of your medical record, email your doctor's office, and request medication refills online. 1. In your internet browser, go to [email or web address]. [employer] 2. Click on the First Time User? Click Here link in the Sign In box. You will see the New Member Sign Up page. 3. Enter your [employer] Access Code exactly as it appears below. You will not need to use this code after youve completed the sign-up process. If you do not sign up before the expiration date, you must request a new code. · [employer] Access Code: [email or web address] Expires: 7/5/2017  9:40 AM 
 
 4. Enter the last four digits of your Social Security Number (xxxx) and Date of Birth (mm/dd/yyyy) as indicated and click Submit. You will be taken to the next sign-up page. 5. Create a [employer] ID. This will be your [employer] login ID and cannot be changed, so think of one that is secure and easy to remember. 6. Create a [employer] password. You can change your password at any time. 7. Enter your Password Reset Question and Answer. This can be used at a later time if you forget your password. 8. Enter your e-mail address. You will receive e-mail notification when new information is available in [address] 9. Click Sign Up. You can now view and download portions of your medical record. 10. Click the Download Summary menu link to download a portable copy of your medical information. If you have questions, please visit the Frequently Asked Questions section of the [employer] website. Remember, [employer] is NOT to be used for urgent needs. For medical emergencies, dial 911. Now available from your iPhone and Android! Please provide this summary of care documentation to your next provider. Your primary care clinician is listed as Ancel Gaucher. If you have any questions after today's visit, please call [phone].

## 2017-05-25 NOTE — PROGRESS NOTES
Chief Complaint   Patient presents with   [facility] Follow Up     [facility] 5/21/17     1. Have you been to the ER, urgent care clinic since your last visit? Hospitalized since your last visit? No    2. Have you seen or consulted any other health care providers outside of the [address] [place] [address] since your last visit? Include any pap smears or colon screening.  Yes Mary Alice Nascimento

## 2017-05-27 LAB — BACTERIA UR CULT: NORMAL

## 2017-05-28 NOTE — PROGRESS NOTES
No infection noted on culture- no additional treatment needed.  If symptoms persist, consider urology referral
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
## 2017-05-30 ENCOUNTER — TELEPHONE (OUTPATIENT)
Dept: FAMILY MEDICINE CLINIC | Age: 27
End: 2017-05-30

## 2017-06-30 ENCOUNTER — OFFICE VISIT (OUTPATIENT)
Dept: FAMILY MEDICINE CLINIC | Age: 27
End: 2017-06-30

## 2017-06-30 DIAGNOSIS — Z71.9 ENCOUNTER FOR HEALTH EDUCATION: Primary | ICD-10-CM

## 2017-06-30 NOTE — PROGRESS NOTES
Answered questions and discussed patients concerns re upcoming ERCP for possible retained stones. Patient is encouraged to contact her gastroenterologist for further information concerning her procedure, anticipated risks and benefits. She verbalizes understanding. Libby Puga NP  06/30/17      .

## 2017-07-14 ENCOUNTER — ED HISTORICAL/CONVERTED ENCOUNTER (OUTPATIENT)
Dept: OTHER | Age: 27
End: 2017-07-14

## 2017-07-14 ENCOUNTER — OP HISTORICAL/CONVERTED ENCOUNTER (OUTPATIENT)
Dept: OTHER | Age: 27
End: 2017-07-14

## 2017-08-14 ENCOUNTER — IP HISTORICAL/CONVERTED ENCOUNTER (OUTPATIENT)
Dept: OTHER | Age: 27
End: 2017-08-14

## 2017-08-30 ENCOUNTER — OFFICE VISIT (OUTPATIENT)
Dept: FAMILY MEDICINE CLINIC | Age: 27
End: 2017-08-30

## 2017-08-30 VITALS
TEMPERATURE: 98.3 F | BODY MASS INDEX: 35.68 KG/M2 | DIASTOLIC BLOOD PRESSURE: 72 MMHG | OXYGEN SATURATION: 98 % | HEART RATE: 96 BPM | HEIGHT: 61 IN | SYSTOLIC BLOOD PRESSURE: 109 MMHG | RESPIRATION RATE: 18 BRPM | WEIGHT: 189 LBS

## 2017-08-30 DIAGNOSIS — L50.9 URTICARIA: Primary | ICD-10-CM

## 2017-08-30 DIAGNOSIS — T78.40XA ALLERGIC REACTION, INITIAL ENCOUNTER: ICD-10-CM

## 2017-08-30 RX ORDER — TRIAMCINOLONE ACETONIDE 0.25 MG/G
CREAM TOPICAL 2 TIMES DAILY
Qty: 15 G | Refills: 0 | Status: SHIPPED | OUTPATIENT
Start: 2017-08-30 | End: 2017-11-10

## 2017-08-30 RX ORDER — PREDNISONE 10 MG/1
TABLET ORAL
Qty: 34 TAB | Refills: 0 | Status: SHIPPED | OUTPATIENT
Start: 2017-08-30 | End: 2017-11-10

## 2017-08-30 NOTE — MR AVS SNAPSHOT
Visit Information Date & Time Provider Department Dept. Phone Encounter #  
 8/30/2017  8:15 AM Lee Conti  [facility] [phone] [id number] Follow-up Instructions Return if symptoms worsen or fail to improve. Upcoming Health Maintenance Date Due Pneumococcal 19-64 Highest Risk (1 of 3 - PCV13) 12/5/2007 PAP AKA CERVICAL CYTOLOGY 12/5/2009 DTaP/Tdap/Td series (1 - Tdap) 2/2/2011 INFLUENZA AGE 9 TO ADULT 8/1/2017 Allergies as of 8/30/2017  Review Complete On: 8/30/2017 By: Brittni Watson Severity Noted Reaction Type Reactions Latex High 01/30/2011    Anaphylaxis Acetaminophen High 01/30/2011    Anaphylaxis Other Plant, Animal, Environmental High 01/04/2017   Systemic Hives Allergic to everything outside. Nsaids (Non-steroidal Anti-inflammatory Drug) Low 01/04/2017   Systemic Other (comments) Advised by her GI doctor not to take till they figure out what is going on with her stomach. Currently has a Nissen-fundiplication. Current Immunizations  Never Reviewed Name Date Influenza Vaccine  Deferred (Contraindication) TD Vaccine 2/1/2011 12:00 AM  
  
 Not reviewed this visit You Were Diagnosed With   
  
 Codes Comments Urticaria    -  Primary ICD-10-CM: L50.9 ICD-9-CM: 708. 9 Allergic reaction, initial encounter     ICD-10-CM: T78.40XA ICD-9-CM: 050. 3 Vitals BP Pulse Temp Resp Height(growth percentile) Weight(growth percentile) 109/72 (BP 1 Location: Right arm, BP Patient Position: Sitting) 96 98.3 °F (36.8 °C) (Oral) 18 5' 1\" (1.549 m) 189 lb (85.7 kg) SpO2 BMI OB Status Smoking Status 98% 35.71 kg/m2 IUD Never Smoker BMI and BSA Data Body Mass Index Body Surface Area 35.71 kg/m 2 1.92 m 2 Preferred Pharmacy Pharmacy Name Phone [id number]  [phone] [phone] Your Updated Medication List  
  
   
This list is accurate as of: 8/30/17  8:37 AM.  Always use your most recent med list.  
  
  
  
  
 amitriptyline 25 mg tablet Commonly known as:  ELAVIL Take 1 Tab by mouth nightly. Take 2-3 tablets at bedtime. Anti-depressant for headache prevention. BENTYL PO Take  by mouth four (4) times daily. NORTRIPTYLINE PO Take  by mouth. ondansetron 4 mg disintegrating tablet Commonly known as:  ZOFRAN ODT Take 1 Tab by mouth every eight (8) hours as needed for Nausea. oxyCODONE IR 5 mg immediate release tablet Commonly known as:  Nobie Marciano Take 1 Tab by mouth every four (4) hours as needed for Pain. Max Daily Amount: 30 mg.  
  
 predniSONE 10 mg tablet Commonly known as:  Lannis Javier Day 1-3: 5 pills; Day 4-5: 4 pills, Day 6-7: 3 pills, Day 8-9: 2 pills; Day 10: 1 pill  
  
 triamcinolone acetonide 0.025 % topical cream  
Commonly known as:  KENALOG Apply  to affected area two (2) times a day. use thin layer VALIUM 5 mg tablet Generic drug:  diazePAM  
Take 5 mg by mouth every six (6) hours as needed for Anxiety. Prescriptions Sent to Pharmacy Refills  
 predniSONE (DELTASONE) 10 mg tablet 0 Sig: Day 1-3: 5 pills; Day 4-5: 4 pills, Day 6-7: 3 pills, Day 8-9: 2 pills; Day 10: 1 pill Class: Normal  
 Pharmacy: [facility] #[id number] Ph #: [phone]  
 triamcinolone acetonide (KENALOG) 0.025 % topical cream 0 Sig: Apply  to affected area two (2) times a day. use thin layer Class: Normal  
 Pharmacy: [facility]/pharmacy #[id number] Jayden Wells [address] [place] [address] Ph #: [phone] Route: Topical  
  
Follow-up Instructions Return if symptoms worsen or fail to improve. Patient Instructions Hives: Care Instructions Your Care Instructions Hives are raised, red, itchy patches of skin.  They are also called wheals or welts. They usually have red borders and pale centers. Hives range in size from ¼ inch to 3 inches or more across. They may seem to move from place to place on the skin. Several hives may form a large area of raised, red skin. You can get hives after an insect sting, after taking medicine or eating certain foods, or because of infection or stress. Other causes include plants, things you breathe in, makeup, heat, cold, sunlight, and latex. You cannot spread hives to other people. Hives may last a few minutes or a few days, but a single spot may last less than 36 hours. Follow-up care is a key part of your treatment and safety. Be sure to make and go to all appointments, and call your doctor if you are having problems. It's also a good idea to know your test results and keep a list of the medicines you take. How can you care for yourself at home? · Avoid whatever you think may have caused your hives, such as a certain food or medicine. However, you may not know the cause. · Put a cool, wet towel on the area to relieve itching. · Take an over-the-counter antihistamine, such as diphenhydramine (Benadryl), cetirizine (Zyrtec), or loratadine (Claritin), to help stop the hives and calm the itching. Read and follow directions on the label. These medicines can make you feel sleepy. Do not drive while using them. · Stay away from strong soaps, detergents, and chemicals. These can make itching worse. When should you call for help? Call 911 anytime you think you may need emergency care. For example, call if: 
· You have symptoms of a severe allergic reaction. These may include: 
¨ Sudden raised, red areas (hives) all over your body. ¨ Swelling of the throat, mouth, lips, or tongue. ¨ Trouble breathing. ¨ Passing out (losing consciousness). Or you may feel very lightheaded or suddenly feel weak, confused, or restless. Call your doctor now or seek immediate medical care if: · You have symptoms of an allergic reaction, such as: ¨ A rash or hives (raised, red areas on the skin). ¨ Itching. ¨ Swelling. ¨ Belly pain, nausea, or vomiting. · You get hives after you start a new medicine. · Hives have not gone away after 24 hours. Watch closely for changes in your health, and be sure to contact your doctor if: 
· You do not get better as expected. Where can you learn more? Go to http://connor-radha.info/. Enter R071 in the search box to learn more about \"Hives: Care Instructions. \" Current as of: March 20, 2017 Content Version: 11.3 © 2526-5181 Easydiagnosis. Care instructions adapted under license by [employer] (which disclaims liability or warranty for this information). If you have questions about a medical condition or this instruction, always ask your healthcare professional. [place] [address] any warranty or liability for your use of this information. Allergic Reaction: Care Instructions Your Care Instructions An allergic reaction is an excessive response from your immune system to a medicine, chemical, food, insect bite, or other substance. A reaction can range from mild to life-threatening. Some people have a mild rash, hives, and itching or stomach cramps. In severe reactions, swelling of your tongue and throat can close up your airway so that you cannot breathe. Follow-up care is a key part of your treatment and safety. Be sure to make and go to all appointments, and call your doctor if you are having problems. It's also a good idea to know your test results and keep a list of the medicines you take. How can you care for yourself at home? · If you know what caused your allergic reaction, be sure to avoid it. Your allergy may become more severe each time you have a reaction.  
· Take an over-the-counter antihistamine, such as cetirizine (Zyrtec) or loratadine (Claritin), to treat mild symptoms. Read and follow directions on the label. Some antihistamines can make you feel sleepy. Do not give antihistamines to a child unless you have checked with your doctor first. Mild symptoms include sneezing or an itchy or runny nose; an itchy mouth; a few hives or mild itching; and mild nausea or stomach discomfort. · Do not scratch hives or a rash. Put a cold, moist towel on them or take cool baths to relieve itching. Put ice packs on hives, swelling, or insect stings for 10 to 15 minutes at a time. Put a thin cloth between the ice pack and your skin. Do not take hot baths or showers. They will make the itching worse. · Your doctor may prescribe a shot of epinephrine to carry with you in case you have a severe reaction. Learn how to give yourself the shot and keep it with you at all times. Make sure it is not . · Go to the emergency room every time you have a severe reaction, even if you have used your shot of epinephrine and are feeling better. Symptoms can come back after a shot. · Wear medical alert jewelry that lists your allergies. You can buy this at most [employer]. · If your child has a severe allergy, make sure that his or her teachers, babysitters, coaches, and other caregivers know about the allergy. They should have an epinephrine shot, know how and when to give it, and have a plan to take your child to the hospital. 
When should you call for help? Give an epinephrine shot if: 
· You think you are having a severe allergic reaction. · You have symptoms in more than one body area, such as mild nausea and an itchy mouth. After giving an epinephrine shot call 911, even if you feel better. Call 911 if: 
· You have symptoms of a severe allergic reaction. These may include: 
¨ Sudden raised, red areas (hives) all over your body. ¨ Swelling of the throat, mouth, lips, or tongue. ¨ Trouble breathing. ¨ Passing out (losing consciousness). Or you may feel very lightheaded or suddenly feel weak, confused, or restless. · You have been given an epinephrine shot, even if you feel better. Call your doctor now or seek immediate medical care if: 
· You have symptoms of an allergic reaction, such as: ¨ A rash or hives (raised, red areas on the skin). ¨ Itching. ¨ Swelling. ¨ Belly pain, nausea, or vomiting. Watch closely for changes in your health, and be sure to contact your doctor if: 
· You do not get better as expected. Where can you learn more? Go to http://connor-radha.info/. Enter S625 in the search box to learn more about \"Allergic Reaction: Care Instructions. \" Current as of: April 3, 2017 Content Version: 11.3 © 5138-1096 Transaq. Care instructions adapted under license by [employer] (which disclaims liability or warranty for this information). If you have questions about a medical condition or this instruction, always ask your healthcare professional. [place] [address] any warranty or liability for your use of this information. Introducing [facility]! [facility] introduces 24Symbols patient portal. Now you can access parts of your medical record, email your doctor's office, and request medication refills online. 1. In your internet browser, go to [email or web address] 2. Click on the First Time User? Click Here link in the Sign In box. You will see the New Member Sign Up page. 3. Enter your 24Symbols Access Code exactly as it appears below. You will not need to use this code after youve completed the sign-up process. If you do not sign up before the expiration date, you must request a new code. · 24Symbols Access Code: [email or web address] Expires: 11/28/2017  8:25 AM 
 
4.  Enter the last four digits of your Social Security Number (xxxx) and Date of Birth (mm/dd/yyyy) as indicated and click Submit. You will be taken to the next sign-up page. 5. Create a turboBOTZ ID. This will be your turboBOTZ login ID and cannot be changed, so think of one that is secure and easy to remember. 6. Create a turboBOTZ password. You can change your password at any time. 7. Enter your Password Reset Question and Answer. This can be used at a later time if you forget your password. 8. Enter your e-mail address. You will receive e-mail notification when new information is available in [address] 9. Click Sign Up. You can now view and download portions of your medical record. 10. Click the Download Summary menu link to download a portable copy of your medical information. If you have questions, please visit the Frequently Asked Questions section of the turboBOTZ website. Remember, turboBOTZ is NOT to be used for urgent needs. For medical emergencies, dial 911. Now available from your iPhone and Android! Please provide this summary of care documentation to your next provider. Your primary care clinician is listed as Vilinda Claude. If you have any questions after today's visit, please call [phone].

## 2017-08-30 NOTE — PROGRESS NOTES
Chief Complaint   Patient presents with    Allergic Reaction     Possible allergic reaction, woke up with red spotches & hives all over various areas of her body. [place] unknown        1. Have you been to the ER, urgent care clinic since your last visit? Hospitalized since your last visit? No    2. Have you seen or consulted any other health care providers outside of the [facility] since your last visit? Include any pap smears or colon screening.  No
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
## 2017-08-30 NOTE — PATIENT INSTRUCTIONS
Hives: Care Instructions  Your Care Instructions  Hives are raised, red, itchy patches of skin. They are also called wheals or welts. They usually have red borders and pale centers. Hives range in size from ¼ inch to 3 inches or more across. They may seem to move from place to place on the skin. Several hives may form a large area of raised, red skin. You can get hives after an insect sting, after taking medicine or eating certain foods, or because of infection or stress. Other causes include plants, things you breathe in, makeup, heat, cold, sunlight, and latex. You cannot spread hives to other people. Hives may last a few minutes or a few days, but a single spot may last less than 36 hours. Follow-up care is a key part of your treatment and safety. Be sure to make and go to all appointments, and call your doctor if you are having problems. It's also a good idea to know your test results and keep a list of the medicines you take. How can you care for yourself at home? · Avoid whatever you think may have caused your hives, such as a certain food or medicine. However, you may not know the cause. · Put a cool, wet towel on the area to relieve itching. · Take an over-the-counter antihistamine, such as diphenhydramine (Benadryl), cetirizine (Zyrtec), or loratadine (Claritin), to help stop the hives and calm the itching. Read and follow directions on the label. These medicines can make you feel sleepy. Do not drive while using them. · Stay away from strong soaps, detergents, and chemicals. These can make itching worse. When should you call for help? Call 911 anytime you think you may need emergency care. For example, call if:  · You have symptoms of a severe allergic reaction. These may include:  ¨ Sudden raised, red areas (hives) all over your body. ¨ Swelling of the throat, mouth, lips, or tongue. ¨ Trouble breathing. ¨ Passing out (losing consciousness).  Or you may feel very lightheaded or suddenly feel weak, confused, or restless. Call your doctor now or seek immediate medical care if:  · You have symptoms of an allergic reaction, such as:  ¨ A rash or hives (raised, red areas on the skin). ¨ Itching. ¨ Swelling. ¨ Belly pain, nausea, or vomiting. · You get hives after you start a new medicine. · Hives have not gone away after 24 hours. Watch closely for changes in your health, and be sure to contact your doctor if:  · You do not get better as expected. Where can you learn more? Go to http://connorreQallradha.info/. Enter B585 in the search box to learn more about \"Hives: Care Instructions. \"  Current as of: March 20, 2017  Content Version: 11.3  © 1161-8241 [employer]. Care instructions adapted under license by [employer] (which disclaims liability or warranty for this information). If you have questions about a medical condition or this instruction, always ask your healthcare professional. [place] [address] any warranty or liability for your use of this information. Allergic Reaction: Care Instructions  Your Care Instructions  An allergic reaction is an excessive response from your immune system to a medicine, chemical, food, insect bite, or other substance. A reaction can range from mild to life-threatening. Some people have a mild rash, hives, and itching or stomach cramps. In severe reactions, swelling of your tongue and throat can close up your airway so that you cannot breathe. Follow-up care is a key part of your treatment and safety. Be sure to make and go to all appointments, and call your doctor if you are having problems. It's also a good idea to know your test results and keep a list of the medicines you take. How can you care for yourself at home? · If you know what caused your allergic reaction, be sure to avoid it. Your allergy may become more severe each time you have a reaction.   · Take an over-the-counter antihistamine, such as cetirizine (Zyrtec) or loratadine (Claritin), to treat mild symptoms. Read and follow directions on the label. Some antihistamines can make you feel sleepy. Do not give antihistamines to a child unless you have checked with your doctor first. Mild symptoms include sneezing or an itchy or runny nose; an itchy mouth; a few hives or mild itching; and mild nausea or stomach discomfort. · Do not scratch hives or a rash. Put a cold, moist towel on them or take cool baths to relieve itching. Put ice packs on hives, swelling, or insect stings for 10 to 15 minutes at a time. Put a thin cloth between the ice pack and your skin. Do not take hot baths or showers. They will make the itching worse. · Your doctor may prescribe a shot of epinephrine to carry with you in case you have a severe reaction. Learn how to give yourself the shot and keep it with you at all times. Make sure it is not . · Go to the emergency room every time you have a severe reaction, even if you have used your shot of epinephrine and are feeling better. Symptoms can come back after a shot. · Wear medical alert jewelry that lists your allergies. You can buy this at most [employer]. · If your child has a severe allergy, make sure that his or her teachers, babysitters, coaches, and other caregivers know about the allergy. They should have an epinephrine shot, know how and when to give it, and have a plan to take your child to the hospital.  When should you call for help? Give an epinephrine shot if:  · You think you are having a severe allergic reaction. · You have symptoms in more than one body area, such as mild nausea and an itchy mouth. After giving an epinephrine shot call 911, even if you feel better. Call 911 if:  · You have symptoms of a severe allergic reaction. These may include:  ¨ Sudden raised, red areas (hives) all over your body. ¨ Swelling of the throat, mouth, lips, or tongue.   ¨ Trouble breathing. ¨ Passing out (losing consciousness). Or you may feel very lightheaded or suddenly feel weak, confused, or restless. · You have been given an epinephrine shot, even if you feel better. Call your doctor now or seek immediate medical care if:  · You have symptoms of an allergic reaction, such as:  ¨ A rash or hives (raised, red areas on the skin). ¨ Itching. ¨ Swelling. ¨ Belly pain, nausea, or vomiting. Watch closely for changes in your health, and be sure to contact your doctor if:  · You do not get better as expected. Where can you learn more? Go to http://connor-radha.info/. Enter X876 in the search box to learn more about \"Allergic Reaction: Care Instructions. \"  Current as of: April 3, 2017  Content Version: 11.3  © 6055-0924 [employer]. Care instructions adapted under license by [employer] (which disclaims liability or warranty for this information). If you have questions about a medical condition or this instruction, always ask your healthcare professional. [address] any warranty or liability for your use of this information.

## 2017-09-01 ENCOUNTER — TELEPHONE (OUTPATIENT)
Dept: FAMILY MEDICINE CLINIC | Age: 27
End: 2017-09-01

## 2017-09-01 NOTE — TELEPHONE ENCOUNTER
Takes time for all to resolve- this is why 10 days of prednisone were prescribed. discontinue the steroid cream to the irritated areas. RTC if discomfort persists or suspect infection has developed.

## 2017-09-01 NOTE — TELEPHONE ENCOUNTER
Returned call to patient regarding allergic reaction/skin problem. Patient states that all the hives have resolved except for the large one on her chest. She states it is really red, painful & has turned into a blister. States when she applies the cream to that area it burns.  Please advise

## 2017-10-23 ENCOUNTER — OFFICE VISIT (OUTPATIENT)
Dept: FAMILY MEDICINE CLINIC | Age: 27
End: 2017-10-23

## 2017-10-23 ENCOUNTER — APPOINTMENT (OUTPATIENT)
Dept: CT IMAGING | Age: 27
End: 2017-10-23
Attending: PHYSICIAN ASSISTANT
Payer: MEDICAID

## 2017-10-23 ENCOUNTER — HOSPITAL ENCOUNTER (EMERGENCY)
Age: 27
Discharge: HOME | End: 2017-10-23
Attending: EMERGENCY MEDICINE
Payer: MEDICAID

## 2017-10-23 VITALS
SYSTOLIC BLOOD PRESSURE: 121 MMHG | TEMPERATURE: 98.5 F | HEART RATE: 91 BPM | RESPIRATION RATE: 17 BRPM | OXYGEN SATURATION: 98 % | DIASTOLIC BLOOD PRESSURE: 80 MMHG

## 2017-10-23 VITALS
HEART RATE: 80 BPM | RESPIRATION RATE: 16 BRPM | WEIGHT: 189 LBS | TEMPERATURE: 98.2 F | OXYGEN SATURATION: 99 % | SYSTOLIC BLOOD PRESSURE: 133 MMHG | HEIGHT: 61 IN | DIASTOLIC BLOOD PRESSURE: 84 MMHG | BODY MASS INDEX: 35.68 KG/M2

## 2017-10-23 DIAGNOSIS — R11.10 VOMITING, INTRACTABILITY OF VOMITING NOT SPECIFIED, PRESENCE OF NAUSEA NOT SPECIFIED, UNSPECIFIED VOMITING TYPE: ICD-10-CM

## 2017-10-23 DIAGNOSIS — M79.10 MYALGIA: ICD-10-CM

## 2017-10-23 DIAGNOSIS — R10.84 ABDOMINAL PAIN, GENERALIZED: Primary | ICD-10-CM

## 2017-10-23 DIAGNOSIS — L93.0 LUPUS ERYTHEMATOSUS, UNSPECIFIED FORM: ICD-10-CM

## 2017-10-23 DIAGNOSIS — R11.2 NON-INTRACTABLE VOMITING WITH NAUSEA, UNSPECIFIED VOMITING TYPE: Primary | ICD-10-CM

## 2017-10-23 LAB
ALBUMIN SERPL-MCNC: 3.8 G/DL (ref 3.5–5)
ALBUMIN/GLOB SERPL: 1 {RATIO} (ref 1.1–2.2)
ALP SERPL-CCNC: 109 U/L (ref 45–117)
ALT SERPL-CCNC: 41 U/L (ref 12–78)
ANION GAP SERPL CALC-SCNC: 11 MMOL/L (ref 5–15)
APPEARANCE UR: ABNORMAL
AST SERPL-CCNC: 19 U/L (ref 15–37)
BACTERIA URNS QL MICRO: NEGATIVE /HPF
BASOPHILS # BLD: 0 K/UL (ref 0–0.1)
BASOPHILS NFR BLD: 0 % (ref 0–1)
BILIRUB SERPL-MCNC: 0.3 MG/DL (ref 0.2–1)
BILIRUB UR QL: NEGATIVE
BUN SERPL-MCNC: 6 MG/DL (ref 6–20)
BUN/CREAT SERPL: 8 (ref 12–20)
CALCIUM SERPL-MCNC: 8.6 MG/DL (ref 8.5–10.1)
CHLORIDE SERPL-SCNC: 108 MMOL/L (ref 97–108)
CO2 SERPL-SCNC: 25 MMOL/L (ref 21–32)
COLOR UR: ABNORMAL
CREAT SERPL-MCNC: 0.8 MG/DL (ref 0.55–1.02)
CRP SERPL-MCNC: 2.28 MG/DL
EOSINOPHIL # BLD: 0.1 K/UL (ref 0–0.4)
EOSINOPHIL NFR BLD: 1 % (ref 0–7)
EPITH CASTS URNS QL MICRO: ABNORMAL /LPF
ERYTHROCYTE [DISTWIDTH] IN BLOOD BY AUTOMATED COUNT: 12.6 % (ref 11.5–14.5)
ERYTHROCYTE [SEDIMENTATION RATE] IN BLOOD: 28 MM/HR (ref 0–20)
GLOBULIN SER CALC-MCNC: 3.7 G/DL (ref 2–4)
GLUCOSE SERPL-MCNC: 109 MG/DL (ref 65–100)
GLUCOSE UR STRIP.AUTO-MCNC: NEGATIVE MG/DL
HCG UR QL: NEGATIVE
HCT VFR BLD AUTO: 35.2 % (ref 35–47)
HGB BLD-MCNC: 12.2 G/DL (ref 11.5–16)
HGB UR QL STRIP: ABNORMAL
KETONES UR QL STRIP.AUTO: NEGATIVE MG/DL
LEUKOCYTE ESTERASE UR QL STRIP.AUTO: NEGATIVE
LYMPHOCYTES # BLD: 3.4 K/UL (ref 0.8–3.5)
LYMPHOCYTES NFR BLD: 31 % (ref 12–49)
MCH RBC QN AUTO: 31.2 PG (ref 26–34)
MCHC RBC AUTO-ENTMCNC: 34.7 G/DL (ref 30–36.5)
MCV RBC AUTO: 90 FL (ref 80–99)
MONOCYTES # BLD: 0.8 K/UL (ref 0–1)
MONOCYTES NFR BLD: 8 % (ref 5–13)
NEUTS SEG # BLD: 6.7 K/UL (ref 1.8–8)
NEUTS SEG NFR BLD: 60 % (ref 32–75)
NITRITE UR QL STRIP.AUTO: NEGATIVE
PH UR STRIP: 5.5 [PH] (ref 5–8)
PLATELET # BLD AUTO: 280 K/UL (ref 150–400)
POTASSIUM SERPL-SCNC: 3.7 MMOL/L (ref 3.5–5.1)
PROT SERPL-MCNC: 7.5 G/DL (ref 6.4–8.2)
PROT UR STRIP-MCNC: NEGATIVE MG/DL
RBC # BLD AUTO: 3.91 M/UL (ref 3.8–5.2)
RBC #/AREA URNS HPF: ABNORMAL /HPF (ref 0–5)
SODIUM SERPL-SCNC: 144 MMOL/L (ref 136–145)
SP GR UR REFRACTOMETRY: >1.03 (ref 1–1.03)
UROBILINOGEN UR QL STRIP.AUTO: 0.2 EU/DL (ref 0.2–1)
WBC # BLD AUTO: 11.1 K/UL (ref 3.6–11)
WBC URNS QL MICRO: ABNORMAL /HPF (ref 0–4)

## 2017-10-23 PROCEDURE — 74011250636 HC RX REV CODE- 250/636: Performed by: PHYSICIAN ASSISTANT

## 2017-10-23 PROCEDURE — 74177 CT ABD & PELVIS W/CONTRAST: CPT

## 2017-10-23 PROCEDURE — 99283 EMERGENCY DEPT VISIT LOW MDM: CPT

## 2017-10-23 PROCEDURE — 81025 URINE PREGNANCY TEST: CPT

## 2017-10-23 PROCEDURE — 85025 COMPLETE CBC W/AUTO DIFF WBC: CPT | Performed by: PHYSICIAN ASSISTANT

## 2017-10-23 PROCEDURE — 86140 C-REACTIVE PROTEIN: CPT | Performed by: PHYSICIAN ASSISTANT

## 2017-10-23 PROCEDURE — 96374 THER/PROPH/DIAG INJ IV PUSH: CPT

## 2017-10-23 PROCEDURE — 74011000250 HC RX REV CODE- 250: Performed by: PHYSICIAN ASSISTANT

## 2017-10-23 PROCEDURE — 96361 HYDRATE IV INFUSION ADD-ON: CPT

## 2017-10-23 PROCEDURE — 80053 COMPREHEN METABOLIC PANEL: CPT | Performed by: PHYSICIAN ASSISTANT

## 2017-10-23 PROCEDURE — 85652 RBC SED RATE AUTOMATED: CPT | Performed by: PHYSICIAN ASSISTANT

## 2017-10-23 PROCEDURE — 74011250637 HC RX REV CODE- 250/637: Performed by: PHYSICIAN ASSISTANT

## 2017-10-23 PROCEDURE — 81001 URINALYSIS AUTO W/SCOPE: CPT | Performed by: PHYSICIAN ASSISTANT

## 2017-10-23 PROCEDURE — 36415 COLL VENOUS BLD VENIPUNCTURE: CPT | Performed by: PHYSICIAN ASSISTANT

## 2017-10-23 PROCEDURE — 74011636320 HC RX REV CODE- 636/320: Performed by: RADIOLOGY

## 2017-10-23 RX ORDER — ONDANSETRON 2 MG/ML
4 INJECTION INTRAMUSCULAR; INTRAVENOUS
Status: COMPLETED | OUTPATIENT
Start: 2017-10-23 | End: 2017-10-23

## 2017-10-23 RX ORDER — TRAMADOL HYDROCHLORIDE 50 MG/1
50 TABLET ORAL
Status: DISCONTINUED | OUTPATIENT
Start: 2017-10-23 | End: 2017-10-23

## 2017-10-23 RX ORDER — ONDANSETRON 4 MG/1
4 TABLET, ORALLY DISINTEGRATING ORAL
Qty: 10 TAB | Refills: 0 | Status: SHIPPED | OUTPATIENT
Start: 2017-10-23 | End: 2017-11-10

## 2017-10-23 RX ORDER — BACLOFEN 10 MG/1
10 TABLET ORAL 3 TIMES DAILY
COMMUNITY
Start: 2017-10-13 | End: 2018-03-10

## 2017-10-23 RX ORDER — ONDANSETRON 4 MG/1
4 TABLET, ORALLY DISINTEGRATING ORAL
Qty: 25 TAB | Refills: 1 | Status: SHIPPED | OUTPATIENT
Start: 2017-10-23 | End: 2017-11-10

## 2017-10-23 RX ADMIN — IOPAMIDOL 100 ML: 755 INJECTION, SOLUTION INTRAVENOUS at 22:40

## 2017-10-23 RX ADMIN — SODIUM CHLORIDE 1000 ML: 900 INJECTION, SOLUTION INTRAVENOUS at 21:02

## 2017-10-23 RX ADMIN — ONDANSETRON 4 MG: 2 INJECTION INTRAMUSCULAR; INTRAVENOUS at 21:02

## 2017-10-23 RX ADMIN — LIDOCAINE HYDROCHLORIDE 40 ML: 20 SOLUTION ORAL; TOPICAL at 21:55

## 2017-10-23 NOTE — PROGRESS NOTES
1. Have you been to the ER, urgent care clinic since your last visit? Hospitalized since your last visit? No    2. Have you seen or consulted any other health care providers outside of the [address] [place] [address] since your last visit? Include any pap smears or colon screening.  No     Chief Complaint   Patient presents with    Generalized Body Aches     x 2 weeks    Vomiting     x 2 weeks, stopped saturday

## 2017-10-23 NOTE — PATIENT INSTRUCTIONS
Muscle Aches: Care Instructions  Your Care Instructions  Muscle aches have many possible causes. Some common ones are overuse, tension, and injuries such as a strained muscle. An infection such as the flu can cause muscle aches. Or the aches may be caused by some medicines, such as statins. Muscle aches may also be a symptom of a disease like lupus or fibromyalgia. Myalgia is the medical term for muscle aches. The doctor will do a physical exam and ask questions to try to find what is causing your pain. You may also have tests such as blood tests or imaging tests like X-rays. These can help find or rule out serious problems. The doctor has checked you carefully, but problems can develop later. If you notice any problems or new symptoms, get medical treatment right away. Follow-up care is a key part of your treatment and safety. Be sure to make and go to all appointments, and call your doctor if you are having problems. It's also a good idea to know your test results and keep a list of the medicines you take. How can you care for yourself at home? · Rest the area that hurts. You may need to stop or reduce the activity that causes your symptoms. Then you can return to it slowly. · Put ice or a cold pack on the area for 10 to 20 minutes at a time to ease pain. Put a thin cloth between the ice and your skin. · Take an over-the-counter pain medicine, such as acetaminophen (Tylenol), ibuprofen (Advil, Motrin), or naproxen (Aleve). Be safe with medicines. Read and follow all instructions on the label. When should you call for help? Call your doctor now or seek immediate medical care if:  · Your pain gets worse. · You have new symptoms, such as a fever, swelling, or a rash. Watch closely for changes in your health, and be sure to contact your doctor if:  · You do not get better as expected. Where can you learn more? Go to http://connor-radha.info/.   Enter G355 in the search box to learn more about \"Muscle Aches: Care Instructions. \"  Current as of: October 14, 2016  Content Version: 11.3  © 9165-0882 [employer]. Care instructions adapted under license by [employer] (which disclaims liability or warranty for this information). If you have questions about a medical condition or this instruction, always ask your healthcare professional. [address] any warranty or liability for your use of this information. Nausea and Vomiting: Care Instructions  Your Care Instructions    When you are nauseated, you may feel weak and sweaty and notice a lot of saliva in your mouth. Nausea often leads to vomiting. Most of the time you do not need to worry about nausea and vomiting, but they can be signs of other illnesses. Two common causes of nausea and vomiting are stomach flu and food poisoning. Nausea and vomiting from viral stomach flu will usually start to improve within 24 hours. Nausea and vomiting from food poisoning may last from 12 to 48 hours. The doctor has checked you carefully, but problems can develop later. If you notice any problems or new symptoms, get medical treatment right away. Follow-up care is a key part of your treatment and safety. Be sure to make and go to all appointments, and call your doctor if you are having problems. It's also a good idea to know your test results and keep a list of the medicines you take. How can you care for yourself at home? · To prevent dehydration, drink plenty of fluids, enough so that your urine is light yellow or clear like water. Choose water and other caffeine-free clear liquids until you feel better. If you have kidney, heart, or liver disease and have to limit fluids, talk with your doctor before you increase the amount of fluids you drink. · Rest in bed until you feel better. · When you are able to eat, try clear soups, mild foods, and liquids until all symptoms are gone for 12 to 48 hours.  Other good choices include dry toast, crackers, cooked cereal, and gelatin dessert, such as Jell-O. When should you call for help? Call 911 anytime you think you may need emergency care. For example, call if:  · You passed out (lost consciousness). Call your doctor now or seek immediate medical care if:  · You have symptoms of dehydration, such as:  ¨ Dry eyes and a dry mouth. ¨ Passing only a little dark urine. ¨ Feeling thirstier than usual.  · You have new or worsening belly pain. · You have a new or higher fever. · You vomit blood or what looks like coffee grounds. Watch closely for changes in your health, and be sure to contact your doctor if:  · You have ongoing nausea and vomiting. · Your vomiting is getting worse. · Your vomiting lasts longer than 2 days. · You are not getting better as expected. Where can you learn more? Go to http://connor-radha.info/. Enter 25 [id number] in the search box to learn more about \"Nausea and Vomiting: Care Instructions. \"  Current as of: March 20, 2017  Content Version: 11.3  © 3796-8947 [employer]. Care instructions adapted under license by [employer] (which disclaims liability or warranty for this information). If you have questions about a medical condition or this instruction, always ask your healthcare professional. [place] [address] any warranty or liability for your use of this information. Lupus: Care Instructions  Your Care Instructions  Lupus is a long-term disease that can cause inflammation, pain, and tissue damage in your body. It is an autoimmune disease. This means the immune system attacks its own tissues. Lupus may cause problems with your skin, kidneys, heart, lungs, nerves, or blood cells. This information is about systemic lupus erythematosus (SLE). SLE is the most common and most serious type of lupus.  But there are other types of lupus, such as discoid or cutaneous lupus, drug-induced systemic lupus, and  lupus. When you have lupus symptoms, you are having flares or relapses. When your symptoms get better, you are in remission. Lupus may get worse very quickly. There is no way to tell when a flare will happen or how bad it will be. When you have a lupus flare, you may have new symptoms as well as symptoms you have had in the past.  Learn your body's signs of a flare, such as joint pain, a rash, a fever, or being more tired. When you see any of these signs, take steps to control your symptoms. Follow-up care is a key part of your treatment and safety. Be sure to make and go to all appointments, and call your doctor if you are having problems. It's also a good idea to know your test results and keep a list of the medicines you take. How can you care for yourself at home? Reduce stress and tiredness  · Keep your daily schedule as simple as possible. · Keep your list of things to do as short as you can. · Exercise regularly. A daily walk or swim, for example, can lower stress, clear your head, improve your mood, and help fight tiredness. · Use meditation, yoga, or guided imagery to relax. · Get plenty of rest. Some people with lupus need up to 12 hours of sleep every night. · Pace yourself. Do not do too many activities. · Ask others for help. Do not try to do everything yourself. · Take short breaks from your usual activities. Think about cutting down on work hours when your symptoms are severe. · If you think that depression or anxiety is making you feel more tired, talk to your doctor, a mental health professional, or both. Take care of your skin  · Ask your doctor about the use of corticosteroid creams for skin symptoms. · If you are bothered by the way a lupus rash looks on your face or if you have scars from lupus, you can try makeup, such as [employer]mark, to cover the rash or scars.   · Stay out of the sun, especially when the sun's rays are the strongest, usually between 10 a.m. and 4 p.m. If you must be in the sun, cover your arms and legs, and wear a hat. Make sure to use a broad-spectrum sunscreen that has a sun protection factor (SPF) of 50 or higher. Put more sunscreen on after swimming, sweating, or toweling off. Practice good self-care  · Learn more about lupus and how to take care of yourself. · Take your medicines exactly as prescribed. Call your doctor if you have any problems with your medicine. · Do not smoke. If you need help quitting, talk to your doctor about stop-smoking programs and medicines. These can increase your chances of quitting for good. · Eat a healthy, balanced diet. A balanced diet includes whole grains, dairy, fruits and vegetables, and protein. Eat a variety of foods from each of those groups so you get all the nutrients you need. · Avoid other people who are sick with colds or the flu. These illnesses can cause lupus flares. Talk to your doctor about flu shots and pneumococcal vaccinations. If you do get sick or think you are getting an infection, talk with your doctor so you can treat your symptoms right away. · Brush and floss your teeth each day. See your dentist two times a year. · Get regular eye exams. · Build a support system of family, friends, and health professionals. When should you call for help? Call 911 anytime you think you may need emergency care. For example, call if:  · You have symptoms of a heart attack. These may include:  ¨ Chest pain or pressure, or a strange feeling in the chest.  ¨ Sweating. ¨ Shortness of breath. ¨ Nausea or vomiting. ¨ Pain, pressure, or a strange feeling in the back, neck, jaw, or upper belly or in one or both shoulders or arms. ¨ Lightheadedness or sudden weakness. ¨ A fast or irregular heartbeat. After you call 911, the  may tell you to chew 1 adult-strength or 2 to 4 low-dose aspirin. Wait for an ambulance. Do not try to drive yourself.   Call your doctor now or seek immediate medical care if:  · You are short of breath. · You have blood in your urine or are urinating less often and in smaller amounts than usual.  · You have a fever. · You feel depressed or notice any changes in your behavior or thinking. · You are dizzy or have muscle weakness. · You have swelling of the lower legs or feet. Watch closely for changes in your health, and be sure to contact your doctor if:  · Your symptoms get worse or you develop any new symptoms. These may include aching or swollen joints, increased fatigue, loss of appetite, hair loss, skin rashes, or new sores in your mouth or nose. Where can you learn more? Go to http://connor-radha.info/. Enter Y392 in the search box to learn more about \"Lupus: Care Instructions. \"  Current as of: October 31, 2016  Content Version: 11.3  © 5102-4902 [employer]. Care instructions adapted under license by [employer] (which disclaims liability or warranty for this information). If you have questions about a medical condition or this instruction, always ask your healthcare professional. [address] any warranty or liability for your use of this information.

## 2017-10-23 NOTE — MR AVS SNAPSHOT
Visit Information Date & Time Provider Department Dept. Phone Encounter #  
 10/23/2017  1:15 PM Sonja Hills  [facility] [phone] [id number] Follow-up Instructions Return if symptoms worsen or fail to improve. Upcoming Health Maintenance Date Due Pneumococcal 19-64 Highest Risk (1 of 3 - PCV13) 12/5/2007 PAP AKA CERVICAL CYTOLOGY 12/5/2009 DTaP/Tdap/Td series (1 - Tdap) 2/2/2011 INFLUENZA AGE 9 TO ADULT 8/1/2017 Allergies as of 10/23/2017  Review Complete On: 10/23/2017 By: Linda Jackson LPN Severity Noted Reaction Type Reactions Latex High 01/30/2011    Anaphylaxis Acetaminophen High 01/30/2011    Anaphylaxis Other Plant, Animal, Environmental High 01/04/2017   Systemic Hives Allergic to everything outside. Nsaids (Non-steroidal Anti-inflammatory Drug) Low 01/04/2017   Systemic Other (comments) Advised by her GI doctor not to take till they figure out what is going on with her stomach. Currently has a Nissen-fundiplication. Current Immunizations  Never Reviewed Name Date Influenza Vaccine  Deferred (Contraindication) TD Vaccine 2/1/2011 12:00 AM  
  
 Not reviewed this visit You Were Diagnosed With   
  
 Codes Comments Non-intractable vomiting with nausea, unspecified vomiting type    -  Primary ICD-10-CM: R11.2 ICD-9-CM: 787.01 Lupus erythematosus, unspecified form     ICD-10-CM: L93.0 ICD-9-CM: 695.4 Myalgia     ICD-10-CM: M79.1 ICD-9-CM: 729.1 Vitals BP Pulse Temp Resp SpO2 OB Status 121/80 91 98.5 °F (36.9 °C) (Oral) 17 98% IUD Smoking Status Never Smoker Vitals History Preferred Pharmacy Pharmacy Name Phone [facility]/PHARMACY #[id number] BARBRA Khan  [phone] [phone] Your Updated Medication List  
  
   
This list is accurate as of: 10/23/17  1:42 PM.  Always use your most recent med list.  
  
  
  
  
 amitriptyline 25 mg tablet Commonly known as:  ELAVIL Take 1 Tab by mouth nightly. Take 2-3 tablets at bedtime. Anti-depressant for headache prevention. baclofen 10 mg tablet Commonly known as:  LIORESAL  
  
 BENTYL PO Take  by mouth four (4) times daily. NORTRIPTYLINE PO Take  by mouth. ondansetron 4 mg disintegrating tablet Commonly known as:  ZOFRAN ODT Take 1 Tab by mouth every eight (8) hours as needed for Nausea. oxyCODONE IR 5 mg immediate release tablet Commonly known as:  Sallie Gita Take 1 Tab by mouth every four (4) hours as needed for Pain. Max Daily Amount: 30 mg.  
  
 predniSONE 10 mg tablet Commonly known as:  [place] Day 1-3: 5 pills; Day 4-5: 4 pills, Day 6-7: 3 pills, Day 8-9: 2 pills; Day 10: 1 pill  
  
 triamcinolone acetonide 0.025 % topical cream  
Commonly known as:  KENALOG Apply  to affected area two (2) times a day. use thin layer VALIUM 5 mg tablet Generic drug:  diazePAM  
Take 5 mg by mouth every six (6) hours as needed for Anxiety. Prescriptions Sent to Pharmacy Refills  
 ondansetron (ZOFRAN ODT) 4 mg disintegrating tablet 1 Sig: Take 1 Tab by mouth every eight (8) hours as needed for Nausea. Class: Normal  
 Pharmacy: [facility]/pharmacy #[id number] Stephanie Knutson[address] Ph #: [phone] Route: Oral  
  
We Performed the Following REFERRAL TO RHEUMATOLOGY [JSQ46 Custom] Comments:  
 Recent lupus diagnosis Follow-up Instructions Return if symptoms worsen or fail to improve. Referral Information Referral ID Referred By Referred To  
  
 [id number] MD Ruben Santana S Yaya Gonzalez Phone: [phone] Fax: [phone] Visits Status Start Date End Date 1 New Request 10/23/17 10/23/18 If your referral has a status of pending review or denied, additional information will be sent to support the outcome of this decision. Patient Instructions Muscle Aches: Care Instructions Your Care Instructions Muscle aches have many possible causes. Some common ones are overuse, tension, and injuries such as a strained muscle. An infection such as the flu can cause muscle aches. Or the aches may be caused by some medicines, such as statins. Muscle aches may also be a symptom of a disease like lupus or fibromyalgia. Myalgia is the medical term for muscle aches. The doctor will do a physical exam and ask questions to try to find what is causing your pain. You may also have tests such as blood tests or imaging tests like X-rays. These can help find or rule out serious problems. The doctor has checked you carefully, but problems can develop later. If you notice any problems or new symptoms, get medical treatment right away. Follow-up care is a key part of your treatment and safety. Be sure to make and go to all appointments, and call your doctor if you are having problems. It's also a good idea to know your test results and keep a list of the medicines you take. How can you care for yourself at home? · Rest the area that hurts. You may need to stop or reduce the activity that causes your symptoms. Then you can return to it slowly. · Put ice or a cold pack on the area for 10 to 20 minutes at a time to ease pain. Put a thin cloth between the ice and your skin. · Take an over-the-counter pain medicine, such as acetaminophen (Tylenol), ibuprofen (Advil, Motrin), or naproxen (Aleve). Be safe with medicines. Read and follow all instructions on the label. When should you call for help? Call your doctor now or seek immediate medical care if: 
· Your pain gets worse. · You have new symptoms, such as a fever, swelling, or a rash.  
Watch closely for changes in your health, and be sure to contact your doctor if: 
· You do not get better as expected. Where can you learn more? Go to http://connor-radha.info/. Enter G355 in the search box to learn more about \"Muscle Aches: Care Instructions. \" Current as of: October 14, 2016 Content Version: 11.3 © 8000-8946 [employer]. Care instructions adapted under license by [employer] (which disclaims liability or warranty for this information). If you have questions about a medical condition or this instruction, always ask your healthcare professional. [place] [address] any warranty or liability for your use of this information. Nausea and Vomiting: Care Instructions Your Care Instructions When you are nauseated, you may feel weak and sweaty and notice a lot of saliva in your mouth. Nausea often leads to vomiting. Most of the time you do not need to worry about nausea and vomiting, but they can be signs of other illnesses. Two common causes of nausea and vomiting are stomach flu and food poisoning. Nausea and vomiting from viral stomach flu will usually start to improve within 24 hours. Nausea and vomiting from food poisoning may last from 12 to 48 hours. The doctor has checked you carefully, but problems can develop later. If you notice any problems or new symptoms, get medical treatment right away. Follow-up care is a key part of your treatment and safety. Be sure to make and go to all appointments, and call your doctor if you are having problems. It's also a good idea to know your test results and keep a list of the medicines you take. How can you care for yourself at home? · To prevent dehydration, drink plenty of fluids, enough so that your urine is light yellow or clear like water. Choose water and other caffeine-free clear liquids until you feel better.  If you have kidney, heart, or liver disease and have to limit fluids, talk with your doctor before you increase the amount of fluids you drink. · Rest in bed until you feel better. · When you are able to eat, try clear soups, mild foods, and liquids until all symptoms are gone for 12 to 48 hours. Other good choices include dry toast, crackers, cooked cereal, and gelatin dessert, such as Jell-O. When should you call for help? Call 911 anytime you think you may need emergency care. For example, call if: 
· You passed out (lost consciousness). Call your doctor now or seek immediate medical care if: 
· You have symptoms of dehydration, such as: ¨ Dry eyes and a dry mouth. ¨ Passing only a little dark urine. ¨ Feeling thirstier than usual. 
· You have new or worsening belly pain. · You have a new or higher fever. · You vomit blood or what looks like coffee grounds. Watch closely for changes in your health, and be sure to contact your doctor if: 
· You have ongoing nausea and vomiting. · Your vomiting is getting worse. · Your vomiting lasts longer than 2 days. · You are not getting better as expected. Where can you learn more? Go to http://connorYASSSUradha.info/. Enter 25 160240 in the search box to learn more about \"Nausea and Vomiting: Care Instructions. \" Current as of: March 20, 2017 Content Version: 11.3 © 1732-6225 [employer]. Care instructions adapted under license by [employer] (which disclaims liability or warranty for this information). If you have questions about a medical condition or this instruction, always ask your healthcare professional. [place] [address] any warranty or liability for your use of this information. Lupus: Care Instructions Your Care Instructions Lupus is a long-term disease that can cause inflammation, pain, and tissue damage in your body. It is an autoimmune disease. This means the immune system attacks its own tissues.  Lupus may cause problems with your skin, kidneys, heart, lungs, nerves, or blood cells. This information is about systemic lupus erythematosus (SLE). SLE is the most common and most serious type of lupus. But there are other types of lupus, such as discoid or cutaneous lupus, drug-induced systemic lupus, and  lupus. When you have lupus symptoms, you are having flares or relapses. When your symptoms get better, you are in remission. Lupus may get worse very quickly. There is no way to tell when a flare will happen or how bad it will be. When you have a lupus flare, you may have new symptoms as well as symptoms you have had in the past. 
Learn your body's signs of a flare, such as joint pain, a rash, a fever, or being more tired. When you see any of these signs, take steps to control your symptoms. Follow-up care is a key part of your treatment and safety. Be sure to make and go to all appointments, and call your doctor if you are having problems. It's also a good idea to know your test results and keep a list of the medicines you take. How can you care for yourself at home? Reduce stress and tiredness · Keep your daily schedule as simple as possible. · Keep your list of things to do as short as you can. · Exercise regularly. A daily walk or swim, for example, can lower stress, clear your head, improve your mood, and help fight tiredness. · Use meditation, yoga, or guided imagery to relax. · Get plenty of rest. Some people with lupus need up to 12 hours of sleep every night. · Pace yourself. Do not do too many activities. · Ask others for help. Do not try to do everything yourself. · Take short breaks from your usual activities. Think about cutting down on work hours when your symptoms are severe. · If you think that depression or anxiety is making you feel more tired, talk to your doctor, a mental health professional, or both. Take care of your skin · Ask your doctor about the use of corticosteroid creams for skin symptoms. · If you are bothered by the way a lupus rash looks on your face or if you have scars from lupus, you can try makeup, such as Covermark, to cover the rash or scars. · Stay out of the sun, especially when the sun's rays are the strongest, usually between 10 a.m. and 4 p.m. If you must be in the sun, cover your arms and legs, and wear a hat. Make sure to use a broad-spectrum sunscreen that has a sun protection factor (SPF) of 50 or higher. Put more sunscreen on after swimming, sweating, or toweling off. Practice good self-care · Learn more about lupus and how to take care of yourself. · Take your medicines exactly as prescribed. Call your doctor if you have any problems with your medicine. · Do not smoke. If you need help quitting, talk to your doctor about stop-smoking programs and medicines. These can increase your chances of quitting for good. · Eat a healthy, balanced diet. A balanced diet includes whole grains, dairy, fruits and vegetables, and protein. Eat a variety of foods from each of those groups so you get all the nutrients you need. · Avoid other people who are sick with colds or the flu. These illnesses can cause lupus flares. Talk to your doctor about flu shots and pneumococcal vaccinations. If you do get sick or think you are getting an infection, talk with your doctor so you can treat your symptoms right away. · Brush and floss your teeth each day. See your dentist two times a year. · Get regular eye exams. · Build a support system of family, friends, and health professionals. When should you call for help? Call 911 anytime you think you may need emergency care. For example, call if: 
· You have symptoms of a heart attack. These may include: ¨ Chest pain or pressure, or a strange feeling in the chest. 
¨ Sweating. ¨ Shortness of breath. ¨ Nausea or vomiting. ¨ Pain, pressure, or a strange feeling in the back, neck, jaw, or upper belly or in one or both shoulders or arms. ¨ Lightheadedness or sudden weakness. ¨ A fast or irregular heartbeat. After you call 911, the  may tell you to chew 1 adult-strength or 2 to 4 low-dose aspirin. Wait for an ambulance. Do not try to drive yourself. Call your doctor now or seek immediate medical care if: 
· You are short of breath. · You have blood in your urine or are urinating less often and in smaller amounts than usual. 
· You have a fever. · You feel depressed or notice any changes in your behavior or thinking. · You are dizzy or have muscle weakness. · You have swelling of the lower legs or feet. Watch closely for changes in your health, and be sure to contact your doctor if: 
· Your symptoms get worse or you develop any new symptoms. These may include aching or swollen joints, increased fatigue, loss of appetite, hair loss, skin rashes, or new sores in your mouth or nose. Where can you learn more? Go to http://connor-radha.info/. Enter Y665 in the search box to learn more about \"Lupus: Care Instructions. \" Current as of: October 31, 2016 Content Version: 11.3 © 4987-0597 [employer]. Care instructions adapted under license by [employer] (which disclaims liability or warranty for this information). If you have questions about a medical condition or this instruction, always ask your healthcare professional. [address] any warranty or liability for your use of this information. Introducing [facility]! [employer] introduces YuMingle patient portal. Now you can access parts of your medical record, email your doctor's office, and request medication refills online. 1. In your internet browser, go to [email or web address] 2. Click on the First Time User? Click Here link in the Sign In box. You will see the New Member Sign Up page. 3. Enter your YuMingle Access Code exactly as it appears below.  You will not need to use this code after youve completed the sign-up process. If you do not sign up before the expiration date, you must request a new code. · [email or web address] Access Code: [email or web address] Expires: 11/28/2017  8:25 AM 
 
4. Enter the last four digits of your Social Security Number (xxxx) and Date of Birth (mm/dd/yyyy) as indicated and click Submit. You will be taken to the next sign-up page. 5. Create a [email or web address] ID. This will be your [email or web address] login ID and cannot be changed, so think of one that is secure and easy to remember. 6. Create a [email or web address] password. You can change your password at any time. 7. Enter your Password Reset Question and Answer. This can be used at a later time if you forget your password. 8. Enter your e-mail address. You will receive e-mail notification when new information is available in [address] 9. Click Sign Up. You can now view and download portions of your medical record. 10. Click the Download Summary menu link to download a portable copy of your medical information. If you have questions, please visit the Frequently Asked Questions section of the [email or web address] website. Remember, [email or web address] is NOT to be used for urgent needs. For medical emergencies, dial 911. Now available from your iPhone and Android! Please provide this summary of care documentation to your next provider. Your primary care clinician is listed as Zully Grimm. If you have any questions after today's visit, please call [phone].
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
## 2017-10-24 NOTE — ED TRIAGE NOTES
Pt went to her PCP today and wanted her to come to Ed and was diagnosed with lupus last month.   Now c/o muscle aches and vomiting and fatigue x2 weeks
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
## 2017-10-24 NOTE — DISCHARGE INSTRUCTIONS
Nausea and Vomiting: Care Instructions  Your Care Instructions    When you are nauseated, you may feel weak and sweaty and notice a lot of saliva in your mouth. Nausea often leads to vomiting. Most of the time you do not need to worry about nausea and vomiting, but they can be signs of other illnesses. Two common causes of nausea and vomiting are stomach flu and food poisoning. Nausea and vomiting from viral stomach flu will usually start to improve within 24 hours. Nausea and vomiting from food poisoning may last from 12 to 48 hours. The doctor has checked you carefully, but problems can develop later. If you notice any problems or new symptoms, get medical treatment right away. Follow-up care is a key part of your treatment and safety. Be sure to make and go to all appointments, and call your doctor if you are having problems. It's also a good idea to know your test results and keep a list of the medicines you take. How can you care for yourself at home? · To prevent dehydration, drink plenty of fluids, enough so that your urine is light yellow or clear like water. Choose water and other caffeine-free clear liquids until you feel better. If you have kidney, heart, or liver disease and have to limit fluids, talk with your doctor before you increase the amount of fluids you drink. · Rest in bed until you feel better. · When you are able to eat, try clear soups, mild foods, and liquids until all symptoms are gone for 12 to 48 hours. Other good choices include dry toast, crackers, cooked cereal, and gelatin dessert, such as Jell-O. When should you call for help? Call 911 anytime you think you may need emergency care. For example, call if:  · You passed out (lost consciousness). Call your doctor now or seek immediate medical care if:  · You have symptoms of dehydration, such as:  ¨ Dry eyes and a dry mouth. ¨ Passing only a little dark urine.   ¨ Feeling thirstier than usual.  · You have new or worsening belly pain. · You have a new or higher fever. · You vomit blood or what looks like coffee grounds. Watch closely for changes in your health, and be sure to contact your doctor if:  · You have ongoing nausea and vomiting. · Your vomiting is getting worse. · Your vomiting lasts longer than 2 days. · You are not getting better as expected. Where can you learn more? Go to http://connor-radha.info/. Enter [id number] in the search box to learn more about \"Nausea and Vomiting: Care Instructions. \"  Current as of: March 20, 2017  Content Version: 11.3  © 7518-9815 [employer]. Care instructions adapted under license by [employer] (which disclaims liability or warranty for this information). If you have questions about a medical condition or this instruction, always ask your healthcare professional. [address] any warranty or liability for your use of this information. We hope that we have addressed all of your medical concerns. The examination and treatment you received in the Emergency Department were for an emergent problem and were not intended as complete care. It is important that you follow up with your healthcare provider(s) for ongoing care. If your symptoms worsen or do not improve as expected, and you are unable to reach your usual health care provider(s), you should return to the Emergency Department. Today's healthcare is undergoing tremendous change, and patient satisfaction surveys are one of the many tools to assess the quality of medical care. You may receive a survey from the [employer]mitt[employer] organization regarding your experience in the Emergency Department. I hope that your experience has been completely positive, particularly the medical care that I provided. As such, please participate in the survey; anything less than excellent does not meet my expectations or intentions.         [address] [facility] participate in nationally recognized quality of care measures. If your blood pressure is greater than 120/80, as reported below, we urge that you seek medical care to address the potential of high blood pressure, commonly known as hypertension. Hypertension can be hereditary or can be caused by certain medical conditions, pain, stress, or \"white coat syndrome. \"       Please make an appointment with your health care provider(s) for follow up of your Emergency Department visit. VITALS:   Patient Vitals for the past 8 hrs:   Temp Pulse Resp BP SpO2   10/23/17 2025 98.2 °F (36.8 °C) 95 16 142/81 99 %          Thank you for allowing us to provide you with medical care today. We realize that you have many choices for your emergency care needs. Please choose us in the future for any continued health care needs. [facility], [address] [place], [place] [address]y [address].   Office: [phone]            Recent Results (from the past 24 hour(s))   CBC WITH AUTOMATED DIFF    Collection Time: 10/23/17  8:59 PM   Result Value Ref Range    WBC 11.1 (H) 3.6 - 11.0 K/uL    RBC 3.91 3.80 - 5.20 M/uL    HGB 12.2 11.5 - 16.0 g/dL    HCT 35.2 35.0 - 47.0 %    MCV 90.0 80.0 - 99.0 FL    MCH 31.2 26.0 - 34.0 PG    MCHC 34.7 30.0 - 36.5 g/dL    RDW 12.6 11.5 - 14.5 %    PLATELET 352 211 - 800 K/uL    NEUTROPHILS 60 32 - 75 %    LYMPHOCYTES 31 12 - 49 %    MONOCYTES 8 5 - 13 %    EOSINOPHILS 1 0 - 7 %    BASOPHILS 0 0 - 1 %    ABS. NEUTROPHILS 6.7 1.8 - 8.0 K/UL    ABS. LYMPHOCYTES 3.4 0.8 - 3.5 K/UL    ABS. MONOCYTES 0.8 0.0 - 1.0 K/UL    ABS. EOSINOPHILS 0.1 0.0 - 0.4 K/UL    ABS.  BASOPHILS 0.0 0.0 - 0.1 K/UL   METABOLIC PANEL, COMPREHENSIVE    Collection Time: 10/23/17  8:59 PM   Result Value Ref Range    Sodium 144 136 - 145 mmol/L    Potassium 3.7 3.5 - 5.1 mmol/L    Chloride 108 97 - 108 mmol/L    CO2 25 21 - 32 mmol/L    Anion gap 11 5 - 15 mmol/L    Glucose 109 (H) 65 - 100 mg/dL    BUN 6 6 - 20 MG/DL    Creatinine 0.80 0.55 - 1.02 MG/DL    BUN/Creatinine ratio 8 (L) 12 - 20      GFR est AA >60 >60 ml/min/1.73m2    GFR est non-AA >60 >60 ml/min/1.73m2    Calcium 8.6 8.5 - 10.1 MG/DL    Bilirubin, total 0.3 0.2 - 1.0 MG/DL    ALT (SGPT) 41 12 - 78 U/L    AST (SGOT) 19 15 - 37 U/L    Alk. phosphatase 109 45 - 117 U/L    Protein, total 7.5 6.4 - 8.2 g/dL    Albumin 3.8 3.5 - 5.0 g/dL    Globulin 3.7 2.0 - 4.0 g/dL    A-G Ratio 1.0 (L) 1.1 - 2.2     SED RATE (ESR)    Collection Time: 10/23/17  8:59 PM   Result Value Ref Range    Sed rate, automated 28 (H) 0 - 20 mm/hr   C REACTIVE PROTEIN, QT    Collection Time: 10/23/17  8:59 PM   Result Value Ref Range    C-Reactive protein 2.28 (H) <0.60 mg/dL   URINALYSIS W/MICROSCOPIC    Collection Time: 10/23/17  8:59 PM   Result Value Ref Range    Color YELLOW/STRAW      Appearance CLOUDY (A) CLEAR      Specific gravity >1.030 (H) 1.003 - 1.030    pH (UA) 5.5 5.0 - 8.0      Protein NEGATIVE  NEG mg/dL    Glucose NEGATIVE  NEG mg/dL    Ketone NEGATIVE  NEG mg/dL    Bilirubin NEGATIVE  NEG      Blood SMALL (A) NEG      Urobilinogen 0.2 0.2 - 1.0 EU/dL    Nitrites NEGATIVE  NEG      Leukocyte Esterase NEGATIVE  NEG      WBC 0-4 0 - 4 /hpf    RBC 0-5 0 - 5 /hpf    Epithelial cells FEW FEW /lpf    Bacteria NEGATIVE  NEG /hpf   HCG URINE, QL. - POC    Collection Time: 10/23/17 10:00 PM   Result Value Ref Range    Pregnancy test,urine (POC) NEGATIVE  NEG         Ct Abd Pelv W Cont    Result Date: 10/23/2017  EXAM:  CT ABD PELV W CONT INDICATION: Abdominal pain, vomiting, fatigue COMPARISON: 4/28/2017 CONTRAST:  100 mL of Isovue-370. TECHNIQUE: Following the uneventful intravenous administration of contrast, thin axial images were obtained through the abdomen and pelvis. Coronal and sagittal reconstructions were generated. Oral contrast was not administered.  CT dose reduction was achieved through use of a standardized protocol tailored for this examination and automatic exposure control for dose modulation. FINDINGS: LUNG BASES: Clear. INCIDENTALLY IMAGED HEART AND MEDIASTINUM: There is a small hiatal hernia. LIVER: No mass or biliary dilatation. GALLBLADDER: Surgically absent. SPLEEN: No mass. PANCREAS: No mass or ductal dilatation. ADRENALS: Unremarkable. KIDNEYS: No mass, calculus, or hydronephrosis. Scarring is noted bilaterally. STOMACH: Unremarkable. SMALL BOWEL: No dilatation or wall thickening. COLON: No dilatation or wall thickening. APPENDIX: Unremarkable. PERITONEUM: No ascites or pneumoperitoneum. RETROPERITONEUM: No lymphadenopathy or aortic aneurysm. REPRODUCTIVE ORGANS: Intrauterine device projects in satisfactory position. URINARY BLADDER: No mass or calculus. BONES: No destructive bone lesion. ADDITIONAL COMMENTS: N/A     IMPRESSION: No acute abnormality.

## 2017-11-07 ENCOUNTER — OFFICE VISIT (OUTPATIENT)
Dept: SURGERY | Age: 27
End: 2017-11-07

## 2017-11-07 VITALS
HEART RATE: 66 BPM | WEIGHT: 189 LBS | DIASTOLIC BLOOD PRESSURE: 71 MMHG | TEMPERATURE: 98.8 F | RESPIRATION RATE: 18 BRPM | OXYGEN SATURATION: 98 % | BODY MASS INDEX: 35.68 KG/M2 | HEIGHT: 61 IN | SYSTOLIC BLOOD PRESSURE: 117 MMHG

## 2017-11-07 VITALS
SYSTOLIC BLOOD PRESSURE: 117 MMHG | HEIGHT: 61 IN | HEART RATE: 66 BPM | TEMPERATURE: 98.8 F | BODY MASS INDEX: 35.68 KG/M2 | DIASTOLIC BLOOD PRESSURE: 71 MMHG | OXYGEN SATURATION: 98 % | WEIGHT: 189 LBS | RESPIRATION RATE: 18 BRPM

## 2017-11-07 DIAGNOSIS — K44.9 HIATAL HERNIA: ICD-10-CM

## 2017-11-07 DIAGNOSIS — K21.9 GASTROESOPHAGEAL REFLUX DISEASE, ESOPHAGITIS PRESENCE NOT SPECIFIED: Primary | ICD-10-CM

## 2017-11-07 DIAGNOSIS — K44.9 HIATAL HERNIA: Primary | ICD-10-CM
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
## 2017-11-07 NOTE — PROGRESS NOTES
Pt. Scheduled for appointment with me for a procedure that I do not perform. I have referred her to Dr. Marty Silva for later today. I will not charge her for the visit.

## 2017-11-07 NOTE — PROGRESS NOTES
1. Have you been to the ER, urgent care clinic since your last visit? Hospitalized since your last visit? [facility] for lupus flare up    2. Have you seen or consulted any other health care providers outside of the [address] [place] [address] since your last visit? Include any pap smears or colon screening.  No

## 2017-11-07 NOTE — PROGRESS NOTES
1. Have you been to the ER, urgent care clinic since your last visit? Hospitalized since your last visit? No    2. Have you seen or consulted any other health care providers outside of the [address] [place] [address] since your last visit? Include any pap smears or colon screening.  No

## 2017-11-10 ENCOUNTER — HOSPITAL ENCOUNTER (OUTPATIENT)
Dept: GENERAL RADIOLOGY | Age: 27
Discharge: HOME | End: 2017-11-10
Attending: SURGERY
Payer: MEDICAID

## 2017-11-10 ENCOUNTER — HOSPITAL ENCOUNTER (OUTPATIENT)
Dept: PREADMISSION TESTING | Age: 27
Discharge: HOME | End: 2017-11-10
Payer: MEDICAID

## 2017-11-10 VITALS
HEART RATE: 81 BPM | DIASTOLIC BLOOD PRESSURE: 74 MMHG | HEIGHT: 61 IN | TEMPERATURE: 98.8 F | SYSTOLIC BLOOD PRESSURE: 134 MMHG | WEIGHT: 193.34 LBS | OXYGEN SATURATION: 100 % | RESPIRATION RATE: 18 BRPM | BODY MASS INDEX: 36.5 KG/M2

## 2017-11-10 LAB
ALBUMIN SERPL-MCNC: 4.1 G/DL (ref 3.5–5)
ALBUMIN/GLOB SERPL: 1.2 {RATIO} (ref 1.1–2.2)
ALP SERPL-CCNC: 87 U/L (ref 45–117)
ALT SERPL-CCNC: 38 U/L (ref 12–78)
ANION GAP SERPL CALC-SCNC: 10 MMOL/L (ref 5–15)
AST SERPL-CCNC: 18 U/L (ref 15–37)
ATRIAL RATE: 65 BPM
BASOPHILS # BLD: 0 K/UL (ref 0–0.1)
BASOPHILS NFR BLD: 0 % (ref 0–1)
BILIRUB SERPL-MCNC: 0.5 MG/DL (ref 0.2–1)
BUN SERPL-MCNC: 9 MG/DL (ref 6–20)
BUN/CREAT SERPL: 12 (ref 12–20)
CALCIUM SERPL-MCNC: 9.1 MG/DL (ref 8.5–10.1)
CALCULATED P AXIS, ECG09: 39 DEGREES
CALCULATED R AXIS, ECG10: -18 DEGREES
CALCULATED T AXIS, ECG11: 22 DEGREES
CHLORIDE SERPL-SCNC: 104 MMOL/L (ref 97–108)
CO2 SERPL-SCNC: 26 MMOL/L (ref 21–32)
CREAT SERPL-MCNC: 0.75 MG/DL (ref 0.55–1.02)
DIAGNOSIS, 93000: NORMAL
EOSINOPHIL # BLD: 0.1 K/UL (ref 0–0.4)
EOSINOPHIL NFR BLD: 1 % (ref 0–7)
ERYTHROCYTE [DISTWIDTH] IN BLOOD BY AUTOMATED COUNT: 13.2 % (ref 11.5–14.5)
GLOBULIN SER CALC-MCNC: 3.3 G/DL (ref 2–4)
GLUCOSE SERPL-MCNC: 76 MG/DL (ref 65–100)
HCT VFR BLD AUTO: 39.8 % (ref 35–47)
HGB BLD-MCNC: 13.1 G/DL (ref 11.5–16)
LYMPHOCYTES # BLD: 3.6 K/UL (ref 0.8–3.5)
LYMPHOCYTES NFR BLD: 35 % (ref 12–49)
MCH RBC QN AUTO: 30.5 PG (ref 26–34)
MCHC RBC AUTO-ENTMCNC: 32.9 G/DL (ref 30–36.5)
MCV RBC AUTO: 92.8 FL (ref 80–99)
MONOCYTES # BLD: 0.8 K/UL (ref 0–1)
MONOCYTES NFR BLD: 8 % (ref 5–13)
NEUTS SEG # BLD: 5.8 K/UL (ref 1.8–8)
NEUTS SEG NFR BLD: 56 % (ref 32–75)
P-R INTERVAL, ECG05: 160 MS
PLATELET # BLD AUTO: 316 K/UL (ref 150–400)
POTASSIUM SERPL-SCNC: 4 MMOL/L (ref 3.5–5.1)
PROT SERPL-MCNC: 7.4 G/DL (ref 6.4–8.2)
Q-T INTERVAL, ECG07: 384 MS
QRS DURATION, ECG06: 78 MS
QTC CALCULATION (BEZET), ECG08: 399 MS
RBC # BLD AUTO: 4.29 M/UL (ref 3.8–5.2)
SODIUM SERPL-SCNC: 140 MMOL/L (ref 136–145)
VENTRICULAR RATE, ECG03: 65 BPM
WBC # BLD AUTO: 10.3 K/UL (ref 3.6–11)

## 2017-11-10 PROCEDURE — 85025 COMPLETE CBC W/AUTO DIFF WBC: CPT | Performed by: SURGERY

## 2017-11-10 PROCEDURE — 80053 COMPREHEN METABOLIC PANEL: CPT | Performed by: SURGERY

## 2017-11-10 PROCEDURE — 71020 XR CHEST PA LAT: CPT

## 2017-11-10 PROCEDURE — 36415 COLL VENOUS BLD VENIPUNCTURE: CPT | Performed by: SURGERY

## 2017-11-10 PROCEDURE — 93005 ELECTROCARDIOGRAM TRACING: CPT

## 2017-11-10 RX ORDER — CLONAZEPAM 0.5 MG/1
1 TABLET ORAL
COMMUNITY
End: 2018-03-10

## 2017-11-10 RX ORDER — TRAMADOL HYDROCHLORIDE 50 MG/1
50 TABLET ORAL
COMMUNITY
End: 2017-11-19

## 2017-11-10 NOTE — PERIOP NOTES
[facility]  PREOPERATIVE INSTRUCTIONS    Surgery Date: Wednesday 11/15/17   Surgery arrival time given by surgeon: NO  (If [facility] staff will call you between 3pm - 7pm the day before surgery with your arrival time. If your surgery is on a Monday, we will call you the preceding Friday. Please call [phone] after 7pm if you did not receive your arrival time. )Phone verification on Tuesday 11/14/17  1. Report  to the 2nd Floor Admitting Desk on the day of your surgery. Bring your insurance card, photo identification, and any copayment (if applicable). 2. You must have a responsible adult to drive you home and stay with you the first 24 hours after surgery if you are going home the same day of your surgery. 3. Nothing to eat or drink after midnight the night before surgery. This means NO water, gum, mints, coffee, juice, etc.    4. MEDICATIONS TO TAKE THE MORNING OF SURGERY WITH A SIP OF WATER:Klonopin, Baclofen  5. No alcoholic beverages 24 hours before and after your surgery. 6. If you are being admitted to the hospital,please leave personal belongings/luggage in your car until you have an assigned hospital room number. ( The hospital discharge time is 12 PM NOON. Your adult  should be at the hospital prior to the noon discharge time unless otherwise instructed.)   7. STOP Aspirin and/or any non-steroidal anti-inflammatory drugs (i.e. Ibuprofen, Naproxen, Advil, Aleve) as directed by your surgeon. You may take Tylenol. Stop herbal supplements 1 week prior to  surgery. 8. If you are currently taking Plavix, Coumadin,or any other blood-thinning/ anticoagulant medication contact your surgeon for instructions. 9. Wear comfortable clothes. Wear your glasses instead of contacts. Please leave all money, jewelry and valuables at home. No make up, particularly mascara, the day of surgery. 10.  REMOVE ALL body piercings, rings,and jewelry and leave at home.   Wear your hair loose or down, no pony-tails, buns, or any metal hair clips. 11. If you shower the morning of surgery, please do not apply any lotions, powders, or deodorants afterwards. Do not shave any body area within 24 hours of your surgery. 12. Please follow all instructions to avoid any potential surgical cancellation. 13. Should your physical condition change, (i.e. fever, cold, flu, etc.) please notify your surgeon as soon as possible. 14. It is important to be on time. If a situation occurs where you may be delayed, please call:  [phone] / [phone] on the day of surgery. 15. The Preadmission Testing staff can be reached at [phone]. 16. Special instructions: Free  parking. Bring completed medications list on day of surgery. 17. The patient was contacted  in person. She  verbalize  understanding of all instructions does not  need reinforcement.

## 2017-11-14 ENCOUNTER — ANESTHESIA EVENT (OUTPATIENT)
Dept: SURGERY | Age: 27
DRG: 220 | End: 2017-11-14
Payer: MEDICAID

## 2017-11-15 ENCOUNTER — HOSPITAL ENCOUNTER (INPATIENT)
Age: 27
LOS: 2 days | Discharge: HOME | DRG: 220 | End: 2017-11-19
Attending: SURGERY | Admitting: SURGERY
Payer: MEDICAID

## 2017-11-15 ENCOUNTER — ANESTHESIA (OUTPATIENT)
Dept: SURGERY | Age: 27
DRG: 220 | End: 2017-11-15
Payer: MEDICAID

## 2017-11-15 PROBLEM — K44.9 PARAESOPHAGEAL HERNIA: Status: ACTIVE | Noted: 2017-11-15

## 2017-11-15 LAB — HCG UR QL: NEGATIVE

## 2017-11-15 PROCEDURE — 74011250636 HC RX REV CODE- 250/636

## 2017-11-15 PROCEDURE — 77030008684 HC TU ET CUF COVD -B: Performed by: NURSE ANESTHETIST, CERTIFIED REGISTERED

## 2017-11-15 PROCEDURE — 99218 HC RM OBSERVATION: CPT

## 2017-11-15 PROCEDURE — 77030002967 HC SUT PDS J&J -B: Performed by: SURGERY

## 2017-11-15 PROCEDURE — 74011000250 HC RX REV CODE- 250: Performed by: SURGERY

## 2017-11-15 PROCEDURE — 81025 URINE PREGNANCY TEST: CPT

## 2017-11-15 PROCEDURE — 77030008756 HC TU IRR SUC STRY -B: Performed by: SURGERY

## 2017-11-15 PROCEDURE — 0BUT4JZ SUPPLEMENT DIAPHRAGM WITH SYNTHETIC SUBSTITUTE, PERCUTANEOUS ENDOSCOPIC APPROACH: ICD-10-PCS | Performed by: SURGERY

## 2017-11-15 PROCEDURE — 77030031139 HC SUT VCRL2 J&J -A: Performed by: SURGERY

## 2017-11-15 PROCEDURE — 0DV44ZZ RESTRICTION OF ESOPHAGOGASTRIC JUNCTION, PERCUTANEOUS ENDOSCOPIC APPROACH: ICD-10-PCS | Performed by: SURGERY

## 2017-11-15 PROCEDURE — 77030027138 HC INCENT SPIROMETER -A

## 2017-11-15 PROCEDURE — 77030020747 HC TU INSUF ENDOSC TELE -A: Performed by: SURGERY

## 2017-11-15 PROCEDURE — 77030034850: Performed by: SURGERY

## 2017-11-15 PROCEDURE — 76210000017 HC OR PH I REC 1.5 TO 2 HR: Performed by: SURGERY

## 2017-11-15 PROCEDURE — 77030013079 HC BLNKT BAIR HGGR 3M -A: Performed by: ANESTHESIOLOGY

## 2017-11-15 PROCEDURE — 77030035236 HC SUT PDS STRATFX BARB J&J -B: Performed by: SURGERY

## 2017-11-15 PROCEDURE — 77030012029 HC APPL CLP LIG COVD -C: Performed by: SURGERY

## 2017-11-15 PROCEDURE — 77030020782 HC GWN BAIR PAWS FLX 3M -B

## 2017-11-15 PROCEDURE — 74011250636 HC RX REV CODE- 250/636: Performed by: ANESTHESIOLOGY

## 2017-11-15 PROCEDURE — 77030032490 HC SLV COMPR SCD KNE COVD -B: Performed by: SURGERY

## 2017-11-15 PROCEDURE — 77030035048 HC TRCR ENDOSC OPTCL COVD -B: Performed by: SURGERY

## 2017-11-15 PROCEDURE — 74011000250 HC RX REV CODE- 250

## 2017-11-15 PROCEDURE — 77030035043 HC TRCR ENDOSC OPTCL BLDLSS COVD -B: Performed by: SURGERY

## 2017-11-15 PROCEDURE — 77030037032 HC INSRT SCIS CLICKLLINE DISP STOR -B: Performed by: SURGERY

## 2017-11-15 PROCEDURE — 77030002895 HC DEV VASC CLOSR COVD -B: Performed by: SURGERY

## 2017-11-15 PROCEDURE — 77030016151 HC PROTCTR LNS DFOG COVD -B: Performed by: SURGERY

## 2017-11-15 PROCEDURE — 77030035051: Performed by: SURGERY

## 2017-11-15 PROCEDURE — 74011250636 HC RX REV CODE- 250/636: Performed by: SURGERY

## 2017-11-15 PROCEDURE — 77030008771 HC TU NG SALEM SUMP -A: Performed by: NURSE ANESTHETIST, CERTIFIED REGISTERED

## 2017-11-15 PROCEDURE — 77030020061 HC IV BLD WRMR ADMIN SET 3M -B: Performed by: ANESTHESIOLOGY

## 2017-11-15 PROCEDURE — C1781 MESH (IMPLANTABLE): HCPCS | Performed by: SURGERY

## 2017-11-15 PROCEDURE — 76060000037 HC ANESTHESIA 3 TO 3.5 HR: Performed by: SURGERY

## 2017-11-15 PROCEDURE — 77030012406 HC DRN WND PENRS BARD -A: Performed by: SURGERY

## 2017-11-15 PROCEDURE — 77030002933 HC SUT MCRYL J&J -A: Performed by: SURGERY

## 2017-11-15 PROCEDURE — 77030002912 HC SUT ETHBND J&J -A: Performed by: SURGERY

## 2017-11-15 PROCEDURE — 77030018390 HC SPNG HEMSTAT2 J&J -B: Performed by: SURGERY

## 2017-11-15 PROCEDURE — 77030033639 HC SHR ENDO COAG HARM 36 J&J -E: Performed by: SURGERY

## 2017-11-15 PROCEDURE — 77030011640 HC PAD GRND REM COVD -A: Performed by: SURGERY

## 2017-11-15 PROCEDURE — 77030018836 HC SOL IRR NACL ICUM -A: Performed by: SURGERY

## 2017-11-15 PROCEDURE — 77030010507 HC ADH SKN DERMBND J&J -B: Performed by: SURGERY

## 2017-11-15 PROCEDURE — 77030035045 HC TRCR ENDOSC VRSPRT BLDLSS COVD -B: Performed by: SURGERY

## 2017-11-15 PROCEDURE — 76010000173 HC OR TIME 3 TO 3.5 HR INTENSV-TIER 1: Performed by: SURGERY

## 2017-11-15 PROCEDURE — 77030026438 HC STYL ET INTUB CARD -A: Performed by: NURSE ANESTHETIST, CERTIFIED REGISTERED

## 2017-11-15 DEVICE — BIO-A TISSUE REINFORCEMENT 7CMX10CM
Type: IMPLANTABLE DEVICE | Site: ABDOMEN | Status: FUNCTIONAL
Brand: GORE BIO-A TISSUE REINFORCEMENT

## 2017-11-15 RX ORDER — DIPHENHYDRAMINE HYDROCHLORIDE 50 MG/ML
50 INJECTION, SOLUTION INTRAMUSCULAR; INTRAVENOUS
Status: DISCONTINUED | OUTPATIENT
Start: 2017-11-15 | End: 2017-11-19 | Stop reason: HOSPADM

## 2017-11-15 RX ORDER — MORPHINE SULFATE 10 MG/ML
2 INJECTION, SOLUTION INTRAMUSCULAR; INTRAVENOUS
Status: DISCONTINUED | OUTPATIENT
Start: 2017-11-15 | End: 2017-11-15 | Stop reason: HOSPADM

## 2017-11-15 RX ORDER — SODIUM CHLORIDE 0.9 % (FLUSH) 0.9 %
5-10 SYRINGE (ML) INJECTION AS NEEDED
Status: DISCONTINUED | OUTPATIENT
Start: 2017-11-15 | End: 2017-11-15 | Stop reason: HOSPADM

## 2017-11-15 RX ORDER — PROPOFOL 10 MG/ML
INJECTION, EMULSION INTRAVENOUS AS NEEDED
Status: DISCONTINUED | OUTPATIENT
Start: 2017-11-15 | End: 2017-11-15 | Stop reason: HOSPADM

## 2017-11-15 RX ORDER — SODIUM CHLORIDE 0.9 % (FLUSH) 0.9 %
5-10 SYRINGE (ML) INJECTION EVERY 8 HOURS
Status: DISCONTINUED | OUTPATIENT
Start: 2017-11-15 | End: 2017-11-15 | Stop reason: HOSPADM

## 2017-11-15 RX ORDER — SODIUM CHLORIDE, SODIUM LACTATE, POTASSIUM CHLORIDE, CALCIUM CHLORIDE 600; 310; 30; 20 MG/100ML; MG/100ML; MG/100ML; MG/100ML
125 INJECTION, SOLUTION INTRAVENOUS CONTINUOUS
Status: DISCONTINUED | OUTPATIENT
Start: 2017-11-15 | End: 2017-11-15 | Stop reason: HOSPADM

## 2017-11-15 RX ORDER — HYDROMORPHONE HYDROCHLORIDE 2 MG/ML
INJECTION, SOLUTION INTRAMUSCULAR; INTRAVENOUS; SUBCUTANEOUS AS NEEDED
Status: DISCONTINUED | OUTPATIENT
Start: 2017-11-15 | End: 2017-11-15 | Stop reason: HOSPADM

## 2017-11-15 RX ORDER — FENTANYL CITRATE 50 UG/ML
INJECTION, SOLUTION INTRAMUSCULAR; INTRAVENOUS AS NEEDED
Status: DISCONTINUED | OUTPATIENT
Start: 2017-11-15 | End: 2017-11-15 | Stop reason: HOSPADM

## 2017-11-15 RX ORDER — CEFAZOLIN SODIUM IN 0.9 % NACL 2 G/50 ML
2 INTRAVENOUS SOLUTION, PIGGYBACK (ML) INTRAVENOUS
Status: COMPLETED | OUTPATIENT
Start: 2017-11-15 | End: 2017-11-15

## 2017-11-15 RX ORDER — BUPIVACAINE HYDROCHLORIDE AND EPINEPHRINE 5; 5 MG/ML; UG/ML
INJECTION, SOLUTION EPIDURAL; INTRACAUDAL; PERINEURAL AS NEEDED
Status: DISCONTINUED | OUTPATIENT
Start: 2017-11-15 | End: 2017-11-15 | Stop reason: HOSPADM

## 2017-11-15 RX ORDER — SCOLOPAMINE TRANSDERMAL SYSTEM 1 MG/1
1.5 PATCH, EXTENDED RELEASE TRANSDERMAL
Status: DISCONTINUED | OUTPATIENT
Start: 2017-11-15 | End: 2017-11-19 | Stop reason: HOSPADM

## 2017-11-15 RX ORDER — LIDOCAINE HYDROCHLORIDE 20 MG/ML
INJECTION, SOLUTION EPIDURAL; INFILTRATION; INTRACAUDAL; PERINEURAL AS NEEDED
Status: DISCONTINUED | OUTPATIENT
Start: 2017-11-15 | End: 2017-11-15 | Stop reason: HOSPADM

## 2017-11-15 RX ORDER — BACLOFEN 10 MG/1
10 TABLET ORAL 3 TIMES DAILY
Status: DISCONTINUED | OUTPATIENT
Start: 2017-11-15 | End: 2017-11-19 | Stop reason: HOSPADM

## 2017-11-15 RX ORDER — DIPHENHYDRAMINE HYDROCHLORIDE 50 MG/ML
12.5 INJECTION, SOLUTION INTRAMUSCULAR; INTRAVENOUS AS NEEDED
Status: DISCONTINUED | OUTPATIENT
Start: 2017-11-15 | End: 2017-11-15 | Stop reason: HOSPADM

## 2017-11-15 RX ORDER — ROCURONIUM BROMIDE 10 MG/ML
INJECTION, SOLUTION INTRAVENOUS AS NEEDED
Status: DISCONTINUED | OUTPATIENT
Start: 2017-11-15 | End: 2017-11-15 | Stop reason: HOSPADM

## 2017-11-15 RX ORDER — HYDROMORPHONE HYDROCHLORIDE 1 MG/ML
.25-1 INJECTION, SOLUTION INTRAMUSCULAR; INTRAVENOUS; SUBCUTANEOUS
Status: DISCONTINUED | OUTPATIENT
Start: 2017-11-15 | End: 2017-11-15 | Stop reason: HOSPADM

## 2017-11-15 RX ORDER — ONDANSETRON 2 MG/ML
4 INJECTION INTRAMUSCULAR; INTRAVENOUS
Status: DISCONTINUED | OUTPATIENT
Start: 2017-11-15 | End: 2017-11-19 | Stop reason: HOSPADM

## 2017-11-15 RX ORDER — LIDOCAINE HYDROCHLORIDE 10 MG/ML
0.1 INJECTION, SOLUTION EPIDURAL; INFILTRATION; INTRACAUDAL; PERINEURAL AS NEEDED
Status: DISCONTINUED | OUTPATIENT
Start: 2017-11-15 | End: 2017-11-15 | Stop reason: HOSPADM

## 2017-11-15 RX ORDER — MORPHINE SULFATE IN 0.9 % NACL 150MG/30ML
PATIENT CONTROLLED ANALGESIA SYRINGE INTRAVENOUS CONTINUOUS
Status: DISCONTINUED | OUTPATIENT
Start: 2017-11-15 | End: 2017-11-15

## 2017-11-15 RX ORDER — LABETALOL HYDROCHLORIDE 5 MG/ML
INJECTION, SOLUTION INTRAVENOUS AS NEEDED
Status: DISCONTINUED | OUTPATIENT
Start: 2017-11-15 | End: 2017-11-15 | Stop reason: HOSPADM

## 2017-11-15 RX ORDER — NEOSTIGMINE METHYLSULFATE 1 MG/ML
INJECTION INTRAVENOUS AS NEEDED
Status: DISCONTINUED | OUTPATIENT
Start: 2017-11-15 | End: 2017-11-15 | Stop reason: HOSPADM

## 2017-11-15 RX ORDER — GLYCOPYRROLATE 0.2 MG/ML
INJECTION INTRAMUSCULAR; INTRAVENOUS AS NEEDED
Status: DISCONTINUED | OUTPATIENT
Start: 2017-11-15 | End: 2017-11-15 | Stop reason: HOSPADM

## 2017-11-15 RX ORDER — LORAZEPAM 2 MG/ML
1 INJECTION INTRAMUSCULAR
Status: DISCONTINUED | OUTPATIENT
Start: 2017-11-15 | End: 2017-11-19 | Stop reason: HOSPADM

## 2017-11-15 RX ORDER — HYDROMORPHONE HCL IN 0.9% NACL 15 MG/30ML
PATIENT CONTROLLED ANALGESIA VIAL INTRAVENOUS CONTINUOUS
Status: DISCONTINUED | OUTPATIENT
Start: 2017-11-15 | End: 2017-11-16

## 2017-11-15 RX ORDER — CEFAZOLIN SODIUM IN 0.9 % NACL 2 G/50 ML
2 INTRAVENOUS SOLUTION, PIGGYBACK (ML) INTRAVENOUS EVERY 8 HOURS
Status: COMPLETED | OUTPATIENT
Start: 2017-11-15 | End: 2017-11-17

## 2017-11-15 RX ORDER — ONDANSETRON 2 MG/ML
INJECTION INTRAMUSCULAR; INTRAVENOUS AS NEEDED
Status: DISCONTINUED | OUTPATIENT
Start: 2017-11-15 | End: 2017-11-15 | Stop reason: HOSPADM

## 2017-11-15 RX ORDER — ENOXAPARIN SODIUM 100 MG/ML
40 INJECTION SUBCUTANEOUS EVERY 24 HOURS
Status: DISCONTINUED | OUTPATIENT
Start: 2017-11-16 | End: 2017-11-19 | Stop reason: HOSPADM

## 2017-11-15 RX ORDER — MIDAZOLAM HYDROCHLORIDE 1 MG/ML
INJECTION, SOLUTION INTRAMUSCULAR; INTRAVENOUS AS NEEDED
Status: DISCONTINUED | OUTPATIENT
Start: 2017-11-15 | End: 2017-11-15 | Stop reason: HOSPADM

## 2017-11-15 RX ORDER — FLUMAZENIL 0.1 MG/ML
0.2 INJECTION INTRAVENOUS
Status: DISCONTINUED | OUTPATIENT
Start: 2017-11-15 | End: 2017-11-15 | Stop reason: HOSPADM

## 2017-11-15 RX ORDER — SODIUM CHLORIDE 0.9 % (FLUSH) 0.9 %
5-10 SYRINGE (ML) INJECTION AS NEEDED
Status: DISCONTINUED | OUTPATIENT
Start: 2017-11-15 | End: 2017-11-19 | Stop reason: HOSPADM

## 2017-11-15 RX ORDER — NALOXONE HYDROCHLORIDE 0.4 MG/ML
0.2 INJECTION, SOLUTION INTRAMUSCULAR; INTRAVENOUS; SUBCUTANEOUS
Status: DISCONTINUED | OUTPATIENT
Start: 2017-11-15 | End: 2017-11-15 | Stop reason: HOSPADM

## 2017-11-15 RX ORDER — SODIUM CHLORIDE 0.9 % (FLUSH) 0.9 %
5-10 SYRINGE (ML) INJECTION EVERY 8 HOURS
Status: DISCONTINUED | OUTPATIENT
Start: 2017-11-15 | End: 2017-11-19 | Stop reason: HOSPADM

## 2017-11-15 RX ORDER — DEXTROSE, SODIUM CHLORIDE, AND POTASSIUM CHLORIDE 5; .45; .15 G/100ML; G/100ML; G/100ML
25 INJECTION INTRAVENOUS CONTINUOUS
Status: DISCONTINUED | OUTPATIENT
Start: 2017-11-15 | End: 2017-11-19 | Stop reason: HOSPADM

## 2017-11-15 RX ORDER — DEXAMETHASONE SODIUM PHOSPHATE 4 MG/ML
INJECTION, SOLUTION INTRA-ARTICULAR; INTRALESIONAL; INTRAMUSCULAR; INTRAVENOUS; SOFT TISSUE AS NEEDED
Status: DISCONTINUED | OUTPATIENT
Start: 2017-11-15 | End: 2017-11-15 | Stop reason: HOSPADM

## 2017-11-15 RX ADMIN — Medication 10 ML: at 21:17

## 2017-11-15 RX ADMIN — Medication: at 12:34

## 2017-11-15 RX ADMIN — DIPHENHYDRAMINE HYDROCHLORIDE 12.5 MG: 50 INJECTION, SOLUTION INTRAMUSCULAR; INTRAVENOUS at 13:19

## 2017-11-15 RX ADMIN — FENTANYL CITRATE 100 MCG: 50 INJECTION, SOLUTION INTRAMUSCULAR; INTRAVENOUS at 09:02

## 2017-11-15 RX ADMIN — HYDROMORPHONE HYDROCHLORIDE 1 MG: 2 INJECTION, SOLUTION INTRAMUSCULAR; INTRAVENOUS; SUBCUTANEOUS at 12:03

## 2017-11-15 RX ADMIN — FENTANYL CITRATE 50 MCG: 50 INJECTION, SOLUTION INTRAMUSCULAR; INTRAVENOUS at 11:51

## 2017-11-15 RX ADMIN — FENTANYL CITRATE 150 MCG: 50 INJECTION, SOLUTION INTRAMUSCULAR; INTRAVENOUS at 09:06

## 2017-11-15 RX ADMIN — PROPOFOL 200 MG: 10 INJECTION, EMULSION INTRAVENOUS at 09:06

## 2017-11-15 RX ADMIN — CEFAZOLIN 2 G: 1 INJECTION, POWDER, FOR SOLUTION INTRAMUSCULAR; INTRAVENOUS; PARENTERAL at 14:48

## 2017-11-15 RX ADMIN — SODIUM CHLORIDE, POTASSIUM CHLORIDE, SODIUM LACTATE AND CALCIUM CHLORIDE: 600; 310; 30; 20 INJECTION, SOLUTION INTRAVENOUS at 09:36

## 2017-11-15 RX ADMIN — PROPOFOL 50 MG: 10 INJECTION, EMULSION INTRAVENOUS at 09:41

## 2017-11-15 RX ADMIN — Medication 10 ML: at 14:48

## 2017-11-15 RX ADMIN — CEFAZOLIN 2 G: 1 INJECTION, POWDER, FOR SOLUTION INTRAMUSCULAR; INTRAVENOUS; PARENTERAL at 09:03

## 2017-11-15 RX ADMIN — ROCURONIUM BROMIDE 10 MG: 10 INJECTION, SOLUTION INTRAVENOUS at 10:50

## 2017-11-15 RX ADMIN — FENTANYL CITRATE 100 MCG: 50 INJECTION, SOLUTION INTRAMUSCULAR; INTRAVENOUS at 09:29

## 2017-11-15 RX ADMIN — ROCURONIUM BROMIDE 10 MG: 10 INJECTION, SOLUTION INTRAVENOUS at 11:31

## 2017-11-15 RX ADMIN — DIPHENHYDRAMINE HYDROCHLORIDE 50 MG: 50 INJECTION, SOLUTION INTRAMUSCULAR; INTRAVENOUS at 21:15

## 2017-11-15 RX ADMIN — DEXAMETHASONE SODIUM PHOSPHATE 8 MG: 4 INJECTION, SOLUTION INTRA-ARTICULAR; INTRALESIONAL; INTRAMUSCULAR; INTRAVENOUS; SOFT TISSUE at 09:14

## 2017-11-15 RX ADMIN — ROCURONIUM BROMIDE 50 MG: 10 INJECTION, SOLUTION INTRAVENOUS at 09:06

## 2017-11-15 RX ADMIN — LIDOCAINE HYDROCHLORIDE 100 MG: 20 INJECTION, SOLUTION EPIDURAL; INFILTRATION; INTRACAUDAL; PERINEURAL at 09:06

## 2017-11-15 RX ADMIN — NEOSTIGMINE METHYLSULFATE 3 MG: 1 INJECTION INTRAVENOUS at 11:59

## 2017-11-15 RX ADMIN — SODIUM CHLORIDE, POTASSIUM CHLORIDE, SODIUM LACTATE AND CALCIUM CHLORIDE 125 ML/HR: 600; 310; 30; 20 INJECTION, SOLUTION INTRAVENOUS at 07:57

## 2017-11-15 RX ADMIN — FENTANYL CITRATE 150 MCG: 50 INJECTION, SOLUTION INTRAMUSCULAR; INTRAVENOUS at 08:56

## 2017-11-15 RX ADMIN — GLYCOPYRROLATE 0.5 MG: 0.2 INJECTION INTRAMUSCULAR; INTRAVENOUS at 11:59

## 2017-11-15 RX ADMIN — PROPOFOL 20 MG: 10 INJECTION, EMULSION INTRAVENOUS at 12:17

## 2017-11-15 RX ADMIN — DEXTROSE MONOHYDRATE, SODIUM CHLORIDE, AND POTASSIUM CHLORIDE 100 ML/HR: 50; 4.5; 1.49 INJECTION, SOLUTION INTRAVENOUS at 21:25

## 2017-11-15 RX ADMIN — DEXTROSE MONOHYDRATE, SODIUM CHLORIDE, AND POTASSIUM CHLORIDE 100 ML/HR: 50; 4.5; 1.49 INJECTION, SOLUTION INTRAVENOUS at 12:33

## 2017-11-15 RX ADMIN — CEFAZOLIN 2 G: 1 INJECTION, POWDER, FOR SOLUTION INTRAMUSCULAR; INTRAVENOUS; PARENTERAL at 22:47

## 2017-11-15 RX ADMIN — Medication: at 15:18

## 2017-11-15 RX ADMIN — FENTANYL CITRATE 100 MCG: 50 INJECTION, SOLUTION INTRAMUSCULAR; INTRAVENOUS at 09:35

## 2017-11-15 RX ADMIN — FENTANYL CITRATE 50 MCG: 50 INJECTION, SOLUTION INTRAMUSCULAR; INTRAVENOUS at 12:09

## 2017-11-15 RX ADMIN — MIDAZOLAM HYDROCHLORIDE 3 MG: 1 INJECTION, SOLUTION INTRAMUSCULAR; INTRAVENOUS at 08:15

## 2017-11-15 RX ADMIN — MIDAZOLAM HYDROCHLORIDE 2 MG: 1 INJECTION, SOLUTION INTRAMUSCULAR; INTRAVENOUS at 08:56

## 2017-11-15 RX ADMIN — LABETALOL HYDROCHLORIDE 10 MG: 5 INJECTION, SOLUTION INTRAVENOUS at 09:52

## 2017-11-15 RX ADMIN — ROCURONIUM BROMIDE 20 MG: 10 INJECTION, SOLUTION INTRAVENOUS at 09:59

## 2017-11-15 RX ADMIN — PROPOFOL 20 MG: 10 INJECTION, EMULSION INTRAVENOUS at 12:07

## 2017-11-15 RX ADMIN — ONDANSETRON 4 MG: 2 INJECTION INTRAMUSCULAR; INTRAVENOUS at 11:32

## 2017-11-15 RX ADMIN — HYDROMORPHONE HYDROCHLORIDE 1 MG: 2 INJECTION, SOLUTION INTRAMUSCULAR; INTRAVENOUS; SUBCUTANEOUS at 11:40

## 2017-11-15 RX ADMIN — SODIUM CHLORIDE, POTASSIUM CHLORIDE, SODIUM LACTATE AND CALCIUM CHLORIDE: 600; 310; 30; 20 INJECTION, SOLUTION INTRAVENOUS at 09:12

## 2017-11-15 RX ADMIN — SODIUM CHLORIDE, POTASSIUM CHLORIDE, SODIUM LACTATE AND CALCIUM CHLORIDE: 600; 310; 30; 20 INJECTION, SOLUTION INTRAVENOUS at 12:00

## 2017-11-15 RX ADMIN — PROPOFOL 50 MG: 10 INJECTION, EMULSION INTRAVENOUS at 10:50

## 2017-11-15 RX ADMIN — FENTANYL CITRATE 50 MCG: 50 INJECTION, SOLUTION INTRAMUSCULAR; INTRAVENOUS at 10:54
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
## 2017-11-15 NOTE — PROGRESS NOTES
11/15/2017     4:36 PM  CM informed PCP of pt's admission. 3:49 PM  CM met with pt for assessment. Demographics and PCP were confirmed. Pt is a 27year old female who lives in a private residence with her fidaron (2 exterior steps, 0 interior steps). During recovery, pt will stay with her niece in [place], [place] (1 flight interior steps). PTA, pt was able to complete ADLs without the use of DME. Pt has prescription drug coverage, and prefers [facility]. No discharge service needs indicated. OBS letter deferred. CM will continue to monitor for finalized discharge recommendations. Boogie Lr MA    Care Management Interventions  PCP Verified by CM: Yes ([facility])  Palliative Care Criteria Met (RRAT>21 & CHF Dx)?: No  Mode of Transport at Discharge:  Other (see comment) (Niece or fiancee)  MyChart Signup: No  Discharge Durable Medical Equipment: No  Physical Therapy Consult: No  Occupational Therapy Consult: No  Speech Therapy Consult: No  Current Support Network: Lives with Spouse (Lives with mary)  Confirm Follow Up Transport: Family  Plan discussed with Pt/Family/Caregiver: Yes  Discharge Location  Discharge Placement: Home with family assistance

## 2017-11-15 NOTE — INTERVAL H&P NOTE
H&P Update:  Mikaela Richardson was seen and examined. History and physical has been reviewed. The patient has been examined.  There have been no significant clinical changes since the completion of the originally dated History and Physical.    Signed By: Alfonso Martinez MD     November 15, 2017 8:49 AM

## 2017-11-15 NOTE — BRIEF OP NOTE
BRIEF OPERATIVE NOTE    Date of Procedure: 11/15/2017   Preoperative Diagnosis: HIATAL HERNIA   Postoperative Diagnosis: HIATAL HERNIA     Procedure(s):  LAPAROSCOPIC REPAIR RECURRENT PARAESOPHAGEAL HERNIA WITH MESH, FUNDOPLICATION, GASTROPEXY, POSSIBLE OPEN (LATEX ALLERGY)   Surgeon(s) and Role:     * Zahida Carney MD - Primary         Assistant Staff:       Surgical Staff:  Circ-1: Velvet James RN  Circ-Relief: Memo Phillips RN  Scrub Tech-1: Faby Allen  Scrub Tech-Relief: Katherine Moseley; Ashutosh Patel  Surg Asst-Relief: Emma Valverde RN  Event Time In   Incision Start 3249   Incision Close      Anesthesia: General   Estimated Blood Loss: 25cc  Specimens: * No specimens in log *   Findings: recurrent paraesophageal hernia, closed over 08O bougie  Complications: none  Implants:   Implant Name Type Inv.  Item Serial No.  Lot No. LRB No. Used Action   MESH SHT BIO ABSORBABLE [id number] --  - [id number]   MESH SHT BIO ABSORBABLE [id number] --  [id number]  [employer] NA N/A 1 Implanted

## 2017-11-15 NOTE — ANESTHESIA PREPROCEDURE EVALUATION
Anesthetic History   No history of anesthetic complications            Review of Systems / Medical History  Patient summary reviewed, nursing notes reviewed and pertinent labs reviewed    Pulmonary  Within defined limits                 Neuro/Psych         Headaches and psychiatric history     Cardiovascular  Within defined limits                     GI/Hepatic/Renal     GERD           Endo/Other        Morbid obesity     Other Findings   Comments: Chronic back pain  Depression  Migraines         Physical Exam    Airway  Mallampati: II  TM Distance: 4 - 6 cm  Neck ROM: normal range of motion   Mouth opening: Normal     Cardiovascular    Rhythm: regular  Rate: normal         Dental    Dentition: Lower dentition intact and Upper dentition intact     Pulmonary  Breath sounds clear to auscultation               Abdominal         Other Findings            Anesthetic Plan    ASA: 2  Anesthesia type: general          Induction: Intravenous  Anesthetic plan and risks discussed with: Patient

## 2017-11-15 NOTE — IP AVS SNAPSHOT
[address] [facility] 104 1007 [facility] 
[phone] Patient: Zakia Davis MRN: [id number] NIL:[id number] About your hospitalization You were admitted on:  November 15, 2017 You last received care in the:  [facility] 4M POST SURG ORT 2 You were discharged on:  November 19, 2017 Why you were hospitalized Your primary diagnosis was:  Not on File Your diagnoses also included:  Paraesophageal Hernia, S/P Repair Of Paraesophageal Hernia Things You Need To Do (next 8 weeks) Follow up with Keturah Enriquez NP Phone:  [phone] Where:  [facility] [address], [address] [address] Friday Dec 01, 2017 POST OP with Varinder Samuel MD at 11:45 AM  
Where: [address] [facility] ([facility]) Discharge Orders None A check shanti indicates which time of day the medication should be taken. My Medications STOP taking these medications   
 traMADol 50 mg tablet Commonly known as:  ULTRAM  
   
  
  
TAKE these medications as instructed Instructions Each Dose to Equal  
 Morning Noon Evening Bedtime  
 baclofen 10 mg tablet Commonly known as:  LIORESAL Your last dose was: Your next dose is: Take 10 mg by mouth three (3) times daily. 10 mg  
    
   
   
   
  
 KlonoPIN 0.5 mg tablet Generic drug:  clonazePAM  
   
Your last dose was: Your next dose is: Take 1 mg by mouth Daily (before breakfast). 1 mg  
    
   
   
   
  
 oxyCODONE IR 5 mg immediate release tablet Commonly known as:  Ariana Pardog Your last dose was: Your next dose is:    
   
   
 2 to 3 tablets every 4 hours as needed for pain Where to Get Your Medications Information on where to get these meds will be given to you by the nurse or doctor. ! Ask your nurse or doctor about these medications oxyCODONE IR 5 mg immediate release tablet Discharge Instructions Nissen Fundoplication: What to Expect at [facility] Your Recovery \You may be sore and have some pain in your belly for several weeks after surgery. If you had laparoscopic surgery, you also may have pain near your shoulder for a day or two after surgery. It may be hard for you to swallow for up to 6 weeks after the surgery. You may also have cramping in your belly, feel bloated, or pass more gas than before. When you burp, you may not get as much relief as you did before the surgery. The cramping and bloating usually go away in 2 to 3 months, but you may continue to pass more gas for a long time. Because the surgery makes your stomach a little smaller, you may get full more quickly when you eat. In 2 to 3 months, the stomach adjusts and you will be able to eat your usual amounts of food. How quickly you recover depends on whether you had a laparoscopic or open surgery. After laparoscopic surgery, most people can go back to work or their normal routine in about 2 to 3 weeks, depending on their work. This care sheet gives you a general idea about how long it will take for you to recover. But each person recovers at a different pace. Follow the steps below to get better as quickly as possible. How can you care for yourself at home? Activity Rest when you feel tired. Getting enough sleep will help you recover. Try to walk each day. Start out by walking a little more than you did the day before. Bit by bit, increase the amount you walk. Walking boosts blood flow and helps prevent pneumonia and constipation. For about 2 weeks, or 4 to 6 weeks if you had an open surgery, avoid lifting objects heavier than about 15 to 20 pounds. This may include heavy grocery bags and milk containers, a heavy briefcase or backpack, cat litter or dog food bags, a vacuum , or a child. Do not do sit-ups or any exercise or activity that uses your belly muscles. You can be active and do things around the house as you can tolerate it. Do not take part in any activity where you could be hit in the belly. This could be sports or playing with children. You may shower. Pat your incisions dry. Do not take baths until your doctor says it is okay. You can drive again once you are no longer taking pain medication. Ask your doctor when it is okay for you to have sex. Diet For the first week, stay on a liquid diet. This includes broths, soups, milk shakes, Have 5 or 6 small meals each day instead of 2 or 3 large meals. Diet Instructions Full Liquid Diet (for the first week after surgery) Principle: Anything you can pour you can have.  
_ YES Items:  
 Jello  juice (Cranberry, Apple, Grape)  coffee  water  Popsicles  broth  ice  cream (strained) soups  pudding  ice cream  
 milk  yogurt  thinned oatmeal (or hot) cereal, Cream of Wheat, Bedoya Keri  milk shakes  liquid meal suppliments like Ensure, [employer], etc.  
 
NO Items: Carbonated beverages, sold foods, gum, hard candy Instructions: 1. Start by taking a cup at a time. 2. Increase the amount a little each day. 3. Dont gulp liquids down. 4. This diet should be strictly adhered to after surgery. --------------------------------------------------------------------------------------------  
DAY 7 THROUGH WEEK 4:  
Soft Diet Principle: These are mushy foods that require very little chewing.  
_ YES Items:  
_ Everything on the clear liquid and full liquid diets _ pasta  
_ rice  
_ fish  
_ mashed potatoes _ apple sauce  
_ mushy-cooked vegetables _ soft fruits (bananas, canned fruits, grapes, peeled peaches and  
nectarines) _ ground or pulled chicken  
_ ground beef  
_ tamales _ Melissa Santiago NO Items: Steak, baked potatoes, pork chops, lamb chops, hamburgers,  
hotdogs, sandwiches, bread, raw or undercooked vegetables, chips, tacos Instructions: 1. Start by taking a cup at a time. 2. Increase the amount a little each day. 3. This diet should be strictly adhered in day 5 though week 4 after Surgery. Chew each bite of food very well. Eat slowly. You may need to take 20 to 30 minutes to eat a meal.  
Avoid crusty breads, bagels, tough meats, raw vegetables, nuts and seeds (including crackers and breads that have nuts and seeds), and other foods that are hard to digest.  
If you feel full quickly, try to drink fluids between meals instead of with meals. Avoid carbonated beverages, such as soda pop. Avoid drinking with straws. This may help you swallow less air when you drink. You may notice that your bowel movements are not regular right after your surgery. This is common. Try to avoid constipation and straining with bowel movements. Take a fiber supplement every day. If you have not had a bowel movement after a couple of days, ask your doctor about taking a mild laxative. Medicines Take pain medicines exactly as directed. If the doctor gave you a prescription medicine for pain, take it as prescribed. If you are not taking a prescription pain medicine, ask your doctor if you can take an over-the-counter medicine. If you think your pain medicine is making you sick to your stomach: Take your medicine after meals (unless your doctor tells you not to). Ask your doctor for a different pain medicine. If your doctor prescribed antibiotics, take them as directed. Do not stop taking them just because you feel better. You need to take the full course of antibiotics. Continue to take your acid-reducing medicine for 1 month after surgery or as your doctor tells you. Incision care If you have strips of tape on the cuts the doctor made (incisions), leave the tape on for a week or until it falls off. Wash the area daily with warm, soapy water and pat it dry. Don't use hydrogen peroxide or alcohol, which can slow healing. You may cover the area with a gauze bandage if it weeps or rubs against clothing. Change the bandage every day. Keep the area clean and dry. Follow-up care is a key part of your treatment and safety. Be sure to make and go to all appointments, and call your doctor if you are having problems. Its also a good idea to know your test results and keep a list of the medicines you take. When should you call for help? Call 911 anytime you think you may need emergency care. For example, call if: You passed out (lost consciousness). You have severe trouble breathing. You have sudden chest pain and shortness of breath, or you cough up blood. You have severe pain in your belly or chest.  
Call your doctor now or seek immediate medical care if:  
You are sick to your stomach or cannot keep fluids down. You have pain that does not get better after you take pain medicine. You have signs of infection, such as: Increased pain, swelling, warmth, or redness. Red streaks leading from the incision. Pus draining from the incision. A fever. You have loose stitches, or your incision comes open. You have signs of a blood clot, such as:  
Pain in your calf, back of the knee, thigh, or groin. Redness and swelling in your leg or groin. You have trouble passing urine or stool, especially if you have pain or swelling in your lower belly. Watch closely for any changes in your health, and be sure to contact your doctor if:  
You have a cough that does not go away or one that brings up mucus. You have shoulder pain that lasts more than 3 days. You do not have a bowel movement after taking a laxative. Where can you learn more? Go to [employer]Expl[employer].be Enter D430 in the search box to learn more about \"Nissen Fundoplication: What to Expect at Home. \"  
© 0983-4968 Healthwise, Incorporated. Care instructions adapted under license by Audelia Aguilar (which disclaims liability or warranty for this information). This care instruction is for use with your licensed healthcare professional. If you have questions about a medical condition or this instruction, always ask your healthcare professional. [place] [address] any warranty or liability for your use of this information. Content Version: 9.4.70735; Last Revised: March 22, 2012 Colby Choe MD, PhD, [facility] at [address], [address] 
[phone] Fax [phone] Introducing [facility] [place] [facility]! Audelia Aguilar introduces [employer] patient portal. Now you can access parts of your medical record, email your doctor's office, and request medication refills online. 1. In your internet browser, go to [email or web address] 2. Click on the First Time User? Click Here link in the Sign In box. You will see the New Member Sign Up page. 3. Enter your [employer] Access Code exactly as it appears below. You will not need to use this code after youve completed the sign-up process. If you do not sign up before the expiration date, you must request a new code. · [employer] Access Code: [email or web address] Expires: 11/28/2017  7:25 AM 
 
4. Enter the last four digits of your Social Security Number (xxxx) and Date of Birth (mm/dd/yyyy) as indicated and click Submit. You will be taken to the next sign-up page. 5. Create a [employer] ID. This will be your [employer] login ID and cannot be changed, so think of one that is secure and easy to remember. 6. Create a [employer] password. You can change your password at any time. 7. Enter your Password Reset Question and Answer.  This can be used at a later time if you forget your password. 8. Enter your e-mail address. You will receive e-mail notification when new information is available in [address] 9. Click Sign Up. You can now view and download portions of your medical record. 10. Click the Download Summary menu link to download a portable copy of your medical information. If you have questions, please visit the Frequently Asked Questions section of the MyChart website. Remember, [employer] is NOT to be used for urgent needs. For medical emergencies, dial 911. Now available from your iPhone and Android! Providers Seen During Your Hospitalization Provider Specialty Primary office phone Clarice De León MD General Surgery [phone] Your Primary Care Physician (PCP) Primary Care Physician Office Phone Office Fax Zeny Fofana [phone] You are allergic to the following Allergen Reactions Latex Anaphylaxis Acetaminophen Anaphylaxis Other Plant, Animal, Environmental Hives Allergic to everything outside. Nsaids (Non-Steroidal Anti-Inflammatory Drug) Other (comments) Advised by her GI doctor not to take till they figure out what is going on with her stomach. Currently has a Nissen-fundiplication. Recent Documentation Height Weight Breastfeeding? BMI OB Status Smoking Status 1.549 m 87.7 kg No 36.54 kg/m2 IUD Never Smoker Emergency Contacts Name Discharge Info Relation Home Work [facility] [3] Parent [1] [phone] [address] [facility] CAREGIVER [3] Other Relative [6] [phone] Patient Belongings The following personal items are in your possession at time of discharge: 
  Dental Appliances: None  Visual Aid: None      Home Medications: None   Jewelry: Body Piercing, Ring (Placed in patients purse)  Clothing: Footwear, Jacket/Coat, Pants, Undergarments    Other Valuables: None Please provide this summary of care documentation to your next provider. Signatures-by signing, you are acknowledging that this After Visit Summary has been reviewed with you and you have received a copy. Patient Signature:  ____________________________________________________________ Date:  ____________________________________________________________  
  
[facility] Provider Signature:  ____________________________________________________________ Date:  ____________________________________________________________

## 2017-11-15 NOTE — ANESTHESIA POSTPROCEDURE EVALUATION
Post-Anesthesia Evaluation and Assessment    Patient: Zoë Castillo MRN: [id number]  SSN: [id number]    YOB: 1990  Age: 27 y.o. Sex: female       Cardiovascular Function/Vital Signs  Visit Vitals    /74    Pulse 71    Temp 36.8 °C (98.3 °F)    Resp 12    Ht 5' 1\" (1.549 m)    Wt 87.7 kg (193 lb 6 oz)    SpO2 99%    BMI 36.54 kg/m2       Patient is status post general anesthesia for Procedure(s):  LAPAROSCOPIC REPAIR RECURRENT PARAESOPHAGEAL HERNIA WITH MESH, FUNDOPLICATION, GASTROPEXY. Nausea/Vomiting: None    Postoperative hydration reviewed and adequate. Pain:  Pain Scale 1: Numeric (0 - 10) (11/15/17 1335)  Pain Intensity 1: 0 (11/15/17 1335)   Managed    Neurological Status:   Neuro (WDL): Exceptions to WDL (11/15/17 1335)  Neuro  Neurologic State: Drowsy; Pharmacologically induced (comment) (11/15/17 1335)   At baseline    Mental Status and Level of Consciousness: Arousable    Pulmonary Status:   O2 Device: Nasal cannula (11/15/17 1335)   Adequate oxygenation and airway patent    Complications related to anesthesia: None    Post-anesthesia assessment completed.  No concerns    Signed By: Bisi Houston MD     November 15, 2017

## 2017-11-15 NOTE — ROUTINE PROCESS
TRANSFER - OUT REPORT:    Verbal report given to King Kruger RN(name) on [employer]  being transferred to [facility](unit) for routine post - op       Report consisted of patients Situation, Background, Assessment and   Recommendations(SBAR). Information from the following report(s) SBAR and Intake/Output was reviewed with the receiving nurse. Opportunity for questions and clarification was provided.       Patient transported with:   O2 @ 2 liters  Registered Nurse

## 2017-11-15 NOTE — PROGRESS NOTES
Primary Nurse Yoanna Montes RN and SHANT franco performed a dual skin assessment on this patient No impairment noted  Etienne score is 21

## 2017-11-16 LAB
ALBUMIN SERPL-MCNC: 3.4 G/DL (ref 3.5–5)
ALBUMIN/GLOB SERPL: 1 {RATIO} (ref 1.1–2.2)
ALP SERPL-CCNC: 63 U/L (ref 45–117)
ALT SERPL-CCNC: 74 U/L (ref 12–78)
ANION GAP SERPL CALC-SCNC: 10 MMOL/L (ref 5–15)
AST SERPL-CCNC: 56 U/L (ref 15–37)
BILIRUB SERPL-MCNC: 0.6 MG/DL (ref 0.2–1)
BUN SERPL-MCNC: 6 MG/DL (ref 6–20)
BUN/CREAT SERPL: 7 (ref 12–20)
CALCIUM SERPL-MCNC: 8.8 MG/DL (ref 8.5–10.1)
CHLORIDE SERPL-SCNC: 103 MMOL/L (ref 97–108)
CO2 SERPL-SCNC: 28 MMOL/L (ref 21–32)
CREAT SERPL-MCNC: 0.85 MG/DL (ref 0.55–1.02)
ERYTHROCYTE [DISTWIDTH] IN BLOOD BY AUTOMATED COUNT: 12.8 % (ref 11.5–14.5)
GLOBULIN SER CALC-MCNC: 3.4 G/DL (ref 2–4)
GLUCOSE SERPL-MCNC: 128 MG/DL (ref 65–100)
HCT VFR BLD AUTO: 34.9 % (ref 35–47)
HGB BLD-MCNC: 11.9 G/DL (ref 11.5–16)
MCH RBC QN AUTO: 30.6 PG (ref 26–34)
MCHC RBC AUTO-ENTMCNC: 34.1 G/DL (ref 30–36.5)
MCV RBC AUTO: 89.7 FL (ref 80–99)
PLATELET # BLD AUTO: 281 K/UL (ref 150–400)
POTASSIUM SERPL-SCNC: 4.1 MMOL/L (ref 3.5–5.1)
PROT SERPL-MCNC: 6.8 G/DL (ref 6.4–8.2)
RBC # BLD AUTO: 3.89 M/UL (ref 3.8–5.2)
SODIUM SERPL-SCNC: 141 MMOL/L (ref 136–145)
WBC # BLD AUTO: 13.4 K/UL (ref 3.6–11)

## 2017-11-16 PROCEDURE — 74011250637 HC RX REV CODE- 250/637: Performed by: SURGERY

## 2017-11-16 PROCEDURE — 85027 COMPLETE CBC AUTOMATED: CPT | Performed by: SURGERY

## 2017-11-16 PROCEDURE — 74011250636 HC RX REV CODE- 250/636: Performed by: SURGERY

## 2017-11-16 PROCEDURE — 77010033678 HC OXYGEN DAILY

## 2017-11-16 PROCEDURE — 80053 COMPREHEN METABOLIC PANEL: CPT | Performed by: SURGERY

## 2017-11-16 PROCEDURE — 99218 HC RM OBSERVATION: CPT

## 2017-11-16 PROCEDURE — 51798 US URINE CAPACITY MEASURE: CPT

## 2017-11-16 PROCEDURE — 36415 COLL VENOUS BLD VENIPUNCTURE: CPT | Performed by: SURGERY

## 2017-11-16 RX ORDER — HYDROMORPHONE HYDROCHLORIDE 1 MG/ML
1 INJECTION, SOLUTION INTRAMUSCULAR; INTRAVENOUS; SUBCUTANEOUS
Status: DISCONTINUED | OUTPATIENT
Start: 2017-11-16 | End: 2017-11-17

## 2017-11-16 RX ORDER — OXYCODONE HYDROCHLORIDE 5 MG/1
10 TABLET ORAL
Status: DISCONTINUED | OUTPATIENT
Start: 2017-11-16 | End: 2017-11-17

## 2017-11-16 RX ADMIN — DIPHENHYDRAMINE HYDROCHLORIDE 50 MG: 50 INJECTION, SOLUTION INTRAMUSCULAR; INTRAVENOUS at 11:28

## 2017-11-16 RX ADMIN — DIPHENHYDRAMINE HYDROCHLORIDE 50 MG: 50 INJECTION, SOLUTION INTRAMUSCULAR; INTRAVENOUS at 05:45

## 2017-11-16 RX ADMIN — Medication 10 ML: at 05:47

## 2017-11-16 RX ADMIN — DEXTROSE MONOHYDRATE, SODIUM CHLORIDE, AND POTASSIUM CHLORIDE 100 ML/HR: 50; 4.5; 1.49 INJECTION, SOLUTION INTRAVENOUS at 07:58

## 2017-11-16 RX ADMIN — OXYCODONE HYDROCHLORIDE 10 MG: 5 TABLET ORAL at 11:28

## 2017-11-16 RX ADMIN — Medication 10 ML: at 21:56

## 2017-11-16 RX ADMIN — DEXTROSE MONOHYDRATE, SODIUM CHLORIDE, AND POTASSIUM CHLORIDE 100 ML/HR: 50; 4.5; 1.49 INJECTION, SOLUTION INTRAVENOUS at 18:39

## 2017-11-16 RX ADMIN — DIPHENHYDRAMINE HYDROCHLORIDE 50 MG: 50 INJECTION, SOLUTION INTRAMUSCULAR; INTRAVENOUS at 19:23

## 2017-11-16 RX ADMIN — Medication 10 ML: at 14:13

## 2017-11-16 RX ADMIN — OXYCODONE HYDROCHLORIDE 10 MG: 5 TABLET ORAL at 19:23

## 2017-11-16 RX ADMIN — HYDROMORPHONE HYDROCHLORIDE 1 MG: 1 INJECTION, SOLUTION INTRAMUSCULAR; INTRAVENOUS; SUBCUTANEOUS at 18:01

## 2017-11-16 RX ADMIN — LORAZEPAM 1 MG: 2 INJECTION INTRAMUSCULAR; INTRAVENOUS at 20:22

## 2017-11-16 RX ADMIN — Medication: at 07:58

## 2017-11-16 RX ADMIN — ENOXAPARIN SODIUM 40 MG: 40 INJECTION SUBCUTANEOUS at 10:31

## 2017-11-16 RX ADMIN — CEFAZOLIN 2 G: 1 INJECTION, POWDER, FOR SOLUTION INTRAMUSCULAR; INTRAVENOUS; PARENTERAL at 05:48

## 2017-11-16 RX ADMIN — HYDROMORPHONE HYDROCHLORIDE 1 MG: 1 INJECTION, SOLUTION INTRAMUSCULAR; INTRAVENOUS; SUBCUTANEOUS at 21:55

## 2017-11-16 RX ADMIN — HYDROMORPHONE HYDROCHLORIDE 1 MG: 1 INJECTION, SOLUTION INTRAMUSCULAR; INTRAVENOUS; SUBCUTANEOUS at 14:09

## 2017-11-16 NOTE — PROGRESS NOTES
SURGERY PROGRESS NOTE      Admit Date: 11/15/2017    POD 1 Day Post-Op    Procedure: Procedure(s):  LAPAROSCOPIC REPAIR RECURRENT PARAESOPHAGEAL HERNIA WITH MESH, FUNDOPLICATION, GASTROPEXY      Subjective:     Patient complains of poor pain control and malaise      Objective:     Visit Vitals    /64 (BP 1 Location: Right arm, BP Patient Position: At rest)    Pulse 60    Temp 98.7 °F (37.1 °C)    Resp 18    Ht 5' 1\" (1.549 m)    Wt 193 lb 6 oz (87.7 kg)    SpO2 92%    BMI 36.54 kg/m2        Temp (24hrs), Av.6 °F (37 °C), Min:98.2 °F (36.8 °C), Max:99 °F (37.2 °C)         1901 -  0700  In: 3000 [I.V.:3000]  Out: 3750 [Urine:3750]    Physical Exam:    General:  fatigued, cooperative, no distress, appears stated age   Abdomen: soft, bowel sounds active, tender   Incision:   healing well, no drainage, no erythema, no hernia, no seroma, no swelling, no dehiscence, incision well approximated           Lab Results  Component Value Date/Time   WBC 13.4 2017 02:41 AM   HGB 11.9 2017 02:41 AM   HCT 34.9 2017 02:41 AM   PLATELET 290  02:41 AM   MCV 89.7 2017 02:41 AM     Lab Results  Component Value Date/Time   GFR est non-AA >60 2017 02:41 AM   GFR est AA >60 2017 02:41 AM   Creatinine 0.85 2017 02:41 AM   BUN 6 2017 02:41 AM   Sodium 131 2017 02:41 AM   Potassium 4.1 2017 02:41 AM   Chloride 103 2017 02:41 AM   CO2 28 2017 02:41 AM           Assessment:   Active Problems:    Paraesophageal hernia (11/15/2017)    stable after redo-paraesophageal hernia    Plan:       Advance diet  Change to pills   D/C PCA

## 2017-11-16 NOTE — ROUTINE PROCESS
Bedside and Verbal shift change report given to Jose Maria Cortez RN (oncoming nurse) by Aaron Anna RN (offgoing nurse). Report included the following information SBAR, Intake/Output and MAR.

## 2017-11-16 NOTE — OP NOTES
[facility]   [address] [facility], [address]   OP NOTE       Name:  Pranav Thompson   MR#:  [id number]   :  1990   Account #:  [de-identified]    Surgery Date:  11/15/2017   Date of Adm:  11/15/2017       PREOPERATIVE DIAGNOSES   1. Recurrent paraesophageal hernia. 2. Gastroesophageal reflux disease, not controlled with medication. 3. Epigastric pain. POSTOPERATIVE DIAGNOSES   1. Recurrent paraesophageal hernia. 2. Gastroesophageal reflux disease, not controlled with medication. 3. Epigastric pain. PROCEDURES PERFORMED   1. Laparoscopic repair of a recurrent paraesophageal hernia with   mesh. 2. Redo fundoplication and gastropexy. SURGEON: Sylvie Jauregui MD.    ANESTHESIA: General endotracheal.    ESTIMATED BLOOD LOSS: 25 mL. TUBES AND DRAINS: None. SPECIMENS REMOVED: None. COMPLICATIONS: None apparent. FINDINGS: A recurrent paraesophageal hernia with the entire   fundoplication wrap in the patient's mediastinum. INDICATIONS FOR PROCEDURE: The patient is a 57-year-old   female who underwent a laparoscopic paraesophageal hernia repair   with fundoplication approximately 1 year ago. She had a brief period of   relief of symptoms, and then they recurred. The patient currently is   taking twice a day proton pump inhibitors and complains of poorly   controlled reflux. Thus, she presents for a redo paraesophageal hernia   repair. DESCRIPTION OF PROCEDURE: The patient was identified in the   preoperative holding area, informed consent obtained. She was given   preoperative antibiotics. She was then taken to the operating room,   placed in supine position, underwent general endotracheal anesthesia   without complication. A Almonte catheter was then inserted under sterile   technique. A time-out was performed to confirm the patient and that   she was presenting for a redo paraesophageal hernia repair.     Once this was confirmed, entry into the abdomen was obtained in the   right upper quadrant at the tip of the 11th rib. At this site, 3 mL of 0.5%   Marcaine were injected in subcutaneous space, a 5-mm incision made,   and then a 5-mm Xcel trocar containing a 5-mm 0-degree laparoscope   was used to dissect through the layers of the abdominal wall under   direct laparoscopic vision. Entry into the abdomen was confirmed   visually. Once within the abdomen, insufflation was provided through   this trocar to a pressure of 15 mmHg. The patient tolerated insufflation   well, and there were no apparent complications. A 360-degree   evaluation of the abdomen was then performed from this trocar site. There were no peritoneal implants. Surface anatomy of the liver   appeared normal.    Attention was focused across the upper abdomen and an 8 mm trocar   was placed care home between the anterior axillary line and the midline in   the right upper quadrant. A 12 mm trocar was placed 10 cm below the   xiphoid just to the left of midline. A 5-mm trocar was placed in the left   subcostal region, and an additional 5 mm trocar was placed in the   anterior axillary line at the level of the umbilicus. Finally, a 5-mm trocar   was placed in the umbilicus The patient was then placed in steep   reverse Trendelenburg. Attention was focused on the liver. It was elevated towards the anterior   abdominal wall. A small stab incision was made just below the xiphoid   and the [facility] liver retractor was inserted, placed under the left   lobe of the liver and locked in place. With the liver retracted, the   esophageal hiatus was visualized. There were significant adhesions   between the stomach and the very proximal edge of the left lobe of the   liver and the diaphragm. Adhesiolysis was begun with the Harmonic   scalpel. Slow, careful dissection was performed to separate adhesions.    There was a clear hiatal hernia with the entire Nissen fundoplication   wrap herniated into the mediastinum. The right crura was identified and   dissection was carried down along its edge to the base. A small   window was made in the pars flaccida, but the left hand side could not   be visualized, so attention was focused along the greater curve of the   stomach on the left hand side, and adhesions were divided with the   Harmonic scalpel. The left crura of the diaphragm was exposed and   dissection was carried along its medial edge to its base. This   communicated with the right-hand side, and a Penrose retractor was   inserted and wrapped around the proximal stomach just below the   Nissen fundoplication wrap. Traction was placed on this Penrose by   the assistant to aid in dissection. Mediastinal dissection was then   performed to free up the fundoplication wrap from mediastinal   attachments. Eventually, the entire wrap was circumferentially   dissected out and retracted down into the abdominal cavity. This   allowed visualization of the esophagus. The esophagus was then   dissected out of the mediastinum mobilizing over 4 cm of esophagus   down into the abdominal cavity. Once this was achieved, traction on   the Penrose was released to confirm that the stomach did not retract   back up into the mediastinum. Once this was completed, attention was   focused on the fundoplication wrap. It appeared to be distorted to the   patient's right-hand side and was approximately 4 cm in length onto the   esophagus, so it was decided to take down the fundoplication wrap   and to create a 2 cm wrap centered just above the GE junction. There   was a pledget identified, and this was divided and allowed the edge of   the fundus to be  from the esophagus. Dissection posterior to   the esophagus was then performed with cold laparoscopic scissors to   free up all adhesions, and eventually the fundus was completely   unfolded and placed back into its normal anatomic position.  The   fundus was thoroughly examined visually to confirm that there were no   signs of any injury to the fundus. Once this was confirmed, the fundus   was placed back behind the esophagus and a shoe shine maneuver   performed to confirm no kinking or twisting of the fundus. Once this   was completed, Anesthesia passed a 56-[place] bougie down the   patient's esophagus and into the stomach. With the bougie in place,   the crural pillars were slightly reapproximated to the bougie size. Two   figure-of-eight sutures of #1 Ethibond were placed posterior to the   esophagus in the crural pillars, and one was placed anterior tightly   approximating the diaphragm to the esophagus. Next, a piece of Bio-A hiatal hernia mesh was inserted and placed over   the crural repair. The esophagus was placed in the notch of the mesh. The mesh was then sutured to the diaphragm using a 2-0 Stratafix   suture. The fundoplication wrap was then completed using 2   interrupted Ethibond sutures, creating a 2 cm wrap just above the GE   junction. Anesthesia then removed the 56-[place] bougie, and it came   out without any resistance. The patient was then placed in a supine   position and the [facility] liver retractor removed. The camera was   switched to the umbilical trocar site and attention focused on the 12   mm trocar. A second Stratafix suture was inserted into the abdomen,   and then the 12 mm trocar removed and the fascia at this trocar site   closed with a transfascial suture of 0 Vicryl using an Endoclose device   under direct laparoscopic vision. Once this suture was tied, there was   no loss of pneumoperitoneum and no palpable defect indicating   adequate closure of this trocar site. Next, a Stratafix suture was used to suture the greater curve of the   stomach to the anterior abdominal wall approximately 4 cm below the   costal margin to prevent the stomach from retracting back up into the   mediastinum and the suture.  This suture line was started to the far left   of the abdomen and continued all the way to the mid antrum all along   the greater curve of the stomach. After the Stratafix suture layer was   completed, an Ethibond layer was placed as a permanent suture in a   running fashion along the greater curve as well to ensure that the   stomach remained adherent to the anterior abdominal wall once the   Stratafix suture absorbs. Once this was completed, insufflation was   vented through the remaining trocars, and they were removed. The   skin at all trocar sites closed with 4-0 Monocryl and Dermabond. The   patient was extubated in the room and taken to the postanesthesia   care unit in stable condition. Instruments and sponge counts correct   x2.         MD JIMMY Almanza / CRISTIAN   D:  11/15/2017   20:35   T:  11/16/2017   09:34   Job #:  [id number]

## 2017-11-17 PROBLEM — Z98.890 S/P REPAIR OF PARAESOPHAGEAL HERNIA: Status: ACTIVE | Noted: 2017-11-17

## 2017-11-17 PROBLEM — Z87.19 S/P REPAIR OF PARAESOPHAGEAL HERNIA: Status: ACTIVE | Noted: 2017-11-17

## 2017-11-17 PROCEDURE — 65270000029 HC RM PRIVATE

## 2017-11-17 PROCEDURE — 99218 HC RM OBSERVATION: CPT

## 2017-11-17 PROCEDURE — 74011250637 HC RX REV CODE- 250/637: Performed by: SURGERY

## 2017-11-17 PROCEDURE — 74011250636 HC RX REV CODE- 250/636: Performed by: SURGERY

## 2017-11-17 RX ORDER — HYDROMORPHONE HYDROCHLORIDE 1 MG/ML
1 INJECTION, SOLUTION INTRAMUSCULAR; INTRAVENOUS; SUBCUTANEOUS
Status: DISCONTINUED | OUTPATIENT
Start: 2017-11-17 | End: 2017-11-18

## 2017-11-17 RX ORDER — OXYCODONE HYDROCHLORIDE 5 MG/1
15 TABLET ORAL
Status: DISCONTINUED | OUTPATIENT
Start: 2017-11-17 | End: 2017-11-19 | Stop reason: HOSPADM

## 2017-11-17 RX ORDER — OXYCODONE HYDROCHLORIDE 5 MG/1
TABLET ORAL
Qty: 45 TAB | Refills: 0 | Status: SHIPPED | OUTPATIENT
Start: 2017-11-17 | End: 2018-03-10

## 2017-11-17 RX ADMIN — ENOXAPARIN SODIUM 40 MG: 40 INJECTION SUBCUTANEOUS at 09:27

## 2017-11-17 RX ADMIN — HYDROMORPHONE HYDROCHLORIDE 1 MG: 1 INJECTION, SOLUTION INTRAMUSCULAR; INTRAVENOUS; SUBCUTANEOUS at 18:09

## 2017-11-17 RX ADMIN — LORAZEPAM 1 MG: 2 INJECTION INTRAMUSCULAR; INTRAVENOUS at 12:14

## 2017-11-17 RX ADMIN — DIPHENHYDRAMINE HYDROCHLORIDE 50 MG: 50 INJECTION, SOLUTION INTRAMUSCULAR; INTRAVENOUS at 22:24

## 2017-11-17 RX ADMIN — ONDANSETRON 4 MG: 2 INJECTION INTRAMUSCULAR; INTRAVENOUS at 20:27

## 2017-11-17 RX ADMIN — DIPHENHYDRAMINE HYDROCHLORIDE 50 MG: 50 INJECTION, SOLUTION INTRAMUSCULAR; INTRAVENOUS at 07:05

## 2017-11-17 RX ADMIN — HYDROMORPHONE HYDROCHLORIDE 1 MG: 1 INJECTION, SOLUTION INTRAMUSCULAR; INTRAVENOUS; SUBCUTANEOUS at 06:50

## 2017-11-17 RX ADMIN — HYDROMORPHONE HYDROCHLORIDE 1 MG: 1 INJECTION, SOLUTION INTRAMUSCULAR; INTRAVENOUS; SUBCUTANEOUS at 22:24

## 2017-11-17 RX ADMIN — OXYCODONE HYDROCHLORIDE 10 MG: 5 TABLET ORAL at 00:20

## 2017-11-17 RX ADMIN — Medication 10 ML: at 06:50

## 2017-11-17 RX ADMIN — DEXTROSE MONOHYDRATE, SODIUM CHLORIDE, AND POTASSIUM CHLORIDE 25 ML/HR: 50; 4.5; 1.49 INJECTION, SOLUTION INTRAVENOUS at 18:12

## 2017-11-17 RX ADMIN — LORAZEPAM 1 MG: 2 INJECTION INTRAMUSCULAR; INTRAVENOUS at 20:26

## 2017-11-17 RX ADMIN — HYDROMORPHONE HYDROCHLORIDE 1 MG: 1 INJECTION, SOLUTION INTRAMUSCULAR; INTRAVENOUS; SUBCUTANEOUS at 14:05

## 2017-11-17 RX ADMIN — OXYCODONE HYDROCHLORIDE 15 MG: 5 TABLET ORAL at 20:26

## 2017-11-17 RX ADMIN — OXYCODONE HYDROCHLORIDE 15 MG: 5 TABLET ORAL at 11:55

## 2017-11-17 RX ADMIN — DEXTROSE MONOHYDRATE, SODIUM CHLORIDE, AND POTASSIUM CHLORIDE 100 ML/HR: 50; 4.5; 1.49 INJECTION, SOLUTION INTRAVENOUS at 04:30

## 2017-11-17 RX ADMIN — HYDROMORPHONE HYDROCHLORIDE 1 MG: 1 INJECTION, SOLUTION INTRAMUSCULAR; INTRAVENOUS; SUBCUTANEOUS at 02:10

## 2017-11-17 RX ADMIN — OXYCODONE HYDROCHLORIDE 10 MG: 5 TABLET ORAL at 04:22

## 2017-11-17 RX ADMIN — HYDROMORPHONE HYDROCHLORIDE 1 MG: 1 INJECTION, SOLUTION INTRAMUSCULAR; INTRAVENOUS; SUBCUTANEOUS at 09:27

## 2017-11-17 RX ADMIN — DIPHENHYDRAMINE HYDROCHLORIDE 50 MG: 50 INJECTION, SOLUTION INTRAMUSCULAR; INTRAVENOUS at 14:50

## 2017-11-17 RX ADMIN — Medication 10 ML: at 14:05

## 2017-11-17 NOTE — PHYSICIAN ADVISORY
Letter of Status Determination:   Recommend hospitalization status upgraded from   OBSERVATION  to INPATIENT  Status     Pt Name:  Radha Velásquez   MR#   [address] [facility] # [id number] /  [id number]   [facility]#  [id number]   [address] [place] [address]  427/01  @ [address] [facility]   Hospitalization date  11/15/2017  7:13 AM   Current Attending Physician  Kimmie Zeng MD   Principal diagnosis  <principal problem not specified>   Hiatal hernia    Clinicals  27 y.o. y.o  female hospitalized with above diagnosis   The pt went through the following procedure \"LAPAROSCOPIC REPAIR RECURRENT PARAESOPHAGEAL HERNIA WITH MESH, FUNDOPLICATION, GASTROPEXY\"   Post operatively she has required significant amount of IV narcotics. Between 11/15 through 11/16 8am she received 13 mg of IV dilaudid. Since then she was switched to PRN dose of IV dilaudid and she is requiring significant amount of that medication through IV. [place] (MCG) criteria   Does   apply Pain control    STATUS DETERMINATION  Based on documented presenting clinical data, comorbid conditions, high risk of adverse events and deterioration, it is our recommendation that the patient's status should be upgraded from OBSERVATION to INPATIENT status. The final decision of the patient's hospitalization status depends on the attending physician's judgment. Additional comments     Payor: BLUE CROSS MEDICAID / Plan: Kristy Hobson / Product Type: Managed Care Medicaid /         Cm Perry MD MPH FACP     Physician Advisor    [id number] [facility],[address] Documentation Management Program  [address]   [facility] Staff, [address] [place] [address]    Cell  [phone]      [phone]    .

## 2017-11-17 NOTE — PROGRESS NOTES
SURGERY PROGRESS NOTE      Admit Date: 11/15/2017    POD 2 Days Post-Op    Procedure: Procedure(s):  LAPAROSCOPIC REPAIR RECURRENT PARAESOPHAGEAL HERNIA WITH MESH, FUNDOPLICATION, GASTROPEXY      Subjective:     Patient complains of poor pain control. States IV dilaudid works for about 2 hours and then stops.   She also has nausea with PO      Objective:     Visit Vitals    BP 94/59 (BP 1 Location: Right arm, BP Patient Position: At rest)    Pulse 77    Temp 98.7 °F (37.1 °C)    Resp 17    Ht 5' 1\" (1.549 m)    Wt 193 lb 6 oz (87.7 kg)    SpO2 98%    Breastfeeding No    BMI 36.54 kg/m2        Temp (24hrs), Av.5 °F (36.9 °C), Min:98 °F (36.7 °C), Max:99.2 °F (37.3 °C)         11/15 1901 -  0700  In: -   Out: 2850 [Urine:2850]    Physical Exam:    General:  alert, cooperative, no distress, appears stated age   Abdomen: soft, bowel sounds active, tender without peritoneal signs   Incision:   healing well, no drainage, no erythema, no hernia, no seroma, no swelling, no dehiscence, incision well approximated             Assessment:     Active Problems:    Paraesophageal hernia (11/15/2017)        Plan:       Adjust pain medication, go up on roxicodone and decrease frequency of dilaudid

## 2017-11-17 NOTE — PROGRESS NOTES
Bedside and Verbal shift change report given to Claudio Durant (oncoming nurse) by Nasir Benavides RN (offgoing nurse). Report included the following information SBAR, Kardex and MAR.

## 2017-11-17 NOTE — PROGRESS NOTES
Patient assisted to the bathroom. No pain relief from IV hydromorphone. Patient is in tears, IV flushed with 10mls of normal saline as requested by patient. Patient told this nurse that the dilaudid was not working, patient was offered PO medication which could be given after her return to bed. Oncoming nurse aware.

## 2017-11-18 LAB
APPEARANCE UR: CLEAR
BACTERIA URNS QL MICRO: NEGATIVE /HPF
BILIRUB UR QL: NEGATIVE
COLOR UR: NORMAL
EPITH CASTS URNS QL MICRO: NORMAL /LPF
GLUCOSE UR STRIP.AUTO-MCNC: NEGATIVE MG/DL
HGB UR QL STRIP: NEGATIVE
HYALINE CASTS URNS QL MICRO: NORMAL /LPF (ref 0–5)
KETONES UR QL STRIP.AUTO: NEGATIVE MG/DL
LEUKOCYTE ESTERASE UR QL STRIP.AUTO: NEGATIVE
NITRITE UR QL STRIP.AUTO: NEGATIVE
PH UR STRIP: 6.5 [PH] (ref 5–8)
PROT UR STRIP-MCNC: NEGATIVE MG/DL
RBC #/AREA URNS HPF: NORMAL /HPF (ref 0–5)
SP GR UR REFRACTOMETRY: 1.01 (ref 1–1.03)
UA: UC IF INDICATED,UAUC: NORMAL
UROBILINOGEN UR QL STRIP.AUTO: 1 EU/DL (ref 0.2–1)
WBC URNS QL MICRO: NORMAL /HPF (ref 0–4)

## 2017-11-18 PROCEDURE — 81001 URINALYSIS AUTO W/SCOPE: CPT | Performed by: SURGERY

## 2017-11-18 PROCEDURE — 74011250637 HC RX REV CODE- 250/637: Performed by: SURGERY

## 2017-11-18 PROCEDURE — 97161 PT EVAL LOW COMPLEX 20 MIN: CPT

## 2017-11-18 PROCEDURE — 97116 GAIT TRAINING THERAPY: CPT

## 2017-11-18 PROCEDURE — 97530 THERAPEUTIC ACTIVITIES: CPT

## 2017-11-18 PROCEDURE — 74011250636 HC RX REV CODE- 250/636: Performed by: SURGERY

## 2017-11-18 PROCEDURE — 65270000029 HC RM PRIVATE

## 2017-11-18 RX ORDER — HYDROMORPHONE HYDROCHLORIDE 1 MG/ML
1 INJECTION, SOLUTION INTRAMUSCULAR; INTRAVENOUS; SUBCUTANEOUS
Status: DISCONTINUED | OUTPATIENT
Start: 2017-11-18 | End: 2017-11-19 | Stop reason: HOSPADM

## 2017-11-18 RX ADMIN — OXYCODONE HYDROCHLORIDE 15 MG: 5 TABLET ORAL at 09:01

## 2017-11-18 RX ADMIN — DIPHENHYDRAMINE HYDROCHLORIDE 50 MG: 50 INJECTION, SOLUTION INTRAMUSCULAR; INTRAVENOUS at 05:48

## 2017-11-18 RX ADMIN — LORAZEPAM 1 MG: 2 INJECTION INTRAMUSCULAR; INTRAVENOUS at 09:18

## 2017-11-18 RX ADMIN — BACLOFEN 10 MG: 10 TABLET ORAL at 15:36

## 2017-11-18 RX ADMIN — HYDROMORPHONE HYDROCHLORIDE 1 MG: 1 INJECTION, SOLUTION INTRAMUSCULAR; INTRAVENOUS; SUBCUTANEOUS at 19:51

## 2017-11-18 RX ADMIN — OXYCODONE HYDROCHLORIDE 15 MG: 5 TABLET ORAL at 23:15

## 2017-11-18 RX ADMIN — Medication 10 ML: at 19:51

## 2017-11-18 RX ADMIN — DIPHENHYDRAMINE HYDROCHLORIDE 50 MG: 50 INJECTION, SOLUTION INTRAMUSCULAR; INTRAVENOUS at 16:59

## 2017-11-18 RX ADMIN — BACLOFEN 10 MG: 10 TABLET ORAL at 09:18

## 2017-11-18 RX ADMIN — OXYCODONE HYDROCHLORIDE 15 MG: 5 TABLET ORAL at 02:24

## 2017-11-18 RX ADMIN — ENOXAPARIN SODIUM 40 MG: 40 INJECTION SUBCUTANEOUS at 09:01

## 2017-11-18 RX ADMIN — DIPHENHYDRAMINE HYDROCHLORIDE 50 MG: 50 INJECTION, SOLUTION INTRAMUSCULAR; INTRAVENOUS at 21:06

## 2017-11-18 RX ADMIN — HYDROMORPHONE HYDROCHLORIDE 1 MG: 1 INJECTION, SOLUTION INTRAMUSCULAR; INTRAVENOUS; SUBCUTANEOUS at 12:16

## 2017-11-18 RX ADMIN — Medication 10 ML: at 23:16

## 2017-11-18 RX ADMIN — DEXTROSE MONOHYDRATE, SODIUM CHLORIDE, AND POTASSIUM CHLORIDE 25 ML/HR: 50; 4.5; 1.49 INJECTION, SOLUTION INTRAVENOUS at 15:37

## 2017-11-18 RX ADMIN — HYDROMORPHONE HYDROCHLORIDE 1 MG: 1 INJECTION, SOLUTION INTRAMUSCULAR; INTRAVENOUS; SUBCUTANEOUS at 05:48

## 2017-11-18 RX ADMIN — OXYCODONE HYDROCHLORIDE 15 MG: 5 TABLET ORAL at 16:59
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
## 2017-11-18 NOTE — PROGRESS NOTES
Bedside shift change report given to Doni Benavides (oncoming nurse) by Joseph Monahan (offgoing nurse). Report included the following information SBAR, Kardex, Procedure Summary, MAR and Recent Results.

## 2017-11-18 NOTE — PROGRESS NOTES
[facility] General Surgery    POD #3    Subjective     Had pain with PO last night at dinner, has not taken any PO since yesterday evening, says she is scared to drink, not OOB much, pain seems improved    Objective     Patient Vitals for the past 24 hrs:   Temp Pulse Resp BP SpO2   11/18/17 0710 98.4 °F (36.9 °C) 70 18 103/64 97 %   11/18/17 0410 98.5 °F (36.9 °C) 75 18 103/59 95 %   11/17/17 2337 98.3 °F (36.8 °C) 75 16 105/75 96 %   11/17/17 2138 98.8 °F (37.1 °C) 83 17 91/62 97 %   11/17/17 1523 98.7 °F (37.1 °C) 77 17 123/56 99 %   11/17/17 1142 98.7 °F (37.1 °C) 78 17 100/67 99 %         Date 11/17/17 0700 - 11/18/17 0659 11/18/17 0700 - 11/19/17 0659   Shift 2690-1517 9063-1848 24 Hour Total 9693-1654 7051-0247 24 Hour Total   I  N  T  A  K  E   Shift Total  (mL/kg)         O  U  T  P  U  T   Urine  (mL/kg/hr)            Urine Occurrence(s) 2 x 1 x 3 x       Shift Total  (mL/kg)         NET         Weight (kg) 87.7 87.7 87.7 87.7 87.7 87.7       PE  Pulm - CTAB  CV - RRR  Abd - soft, ND, BS present, incisions c/d/i    Labs  No results found for this or any previous visit (from the past 12 hour(s)). Assessment     Des Dent is a 27 y. o.yr old female s/p LAPAROSCOPIC REPAIR RECURRENT PARAESOPHAGEAL HERNIA WITH MESH, FUNDOPLICATION, GASTROPEXY    Plan     Pt seems a bit withdrawn this am, not getting OOB much and not taking any PO since last night  Will order PT to get her OOB more today and discussed this with nursing  Pt encouraged to start taking more of her full liquid diet, she cannot be DC till she is tolerating it  Decreasing the frequency of the dilaudid again today  Ordering a UA, pt with some burning and possible odor to her urine per patient  Hopefully she will be ready for DC home soon    Wiley Ruth MD

## 2017-11-18 NOTE — PROGRESS NOTES
Bedside and Verbal shift change report given to Amy Taylor RN (oncoming nurse) by Shannon Jaimes RN (offgoing nurse). Report included the following information SBAR, Kardex, Procedure Summary, Intake/Output, MAR, Accordion and Recent Results.

## 2017-11-18 NOTE — PROGRESS NOTES
Problem: Falls - Risk of  Goal: *Absence of Falls  Document Vlad Fall Risk and appropriate interventions in the flowsheet.    Outcome: Progressing Towards Goal  Fall Risk Interventions:  Mobility Interventions: Bed/chair exit alarm, Patient to call before getting OOB         Medication Interventions: Bed/chair exit alarm, Patient to call before getting OOB    Elimination Interventions: Bed/chair exit alarm, Call light in reach, Patient to call for help with toileting needs

## 2017-11-19 VITALS
DIASTOLIC BLOOD PRESSURE: 77 MMHG | SYSTOLIC BLOOD PRESSURE: 116 MMHG | WEIGHT: 193.38 LBS | HEART RATE: 72 BPM | OXYGEN SATURATION: 96 % | RESPIRATION RATE: 16 BRPM | TEMPERATURE: 99.6 F | BODY MASS INDEX: 36.51 KG/M2 | HEIGHT: 61 IN

## 2017-11-19 PROCEDURE — 74011250636 HC RX REV CODE- 250/636: Performed by: SURGERY

## 2017-11-19 PROCEDURE — 74011250637 HC RX REV CODE- 250/637: Performed by: SURGERY

## 2017-11-19 RX ADMIN — OXYCODONE HYDROCHLORIDE 15 MG: 5 TABLET ORAL at 11:07

## 2017-11-19 RX ADMIN — Medication 10 ML: at 02:45

## 2017-11-19 RX ADMIN — LORAZEPAM 1 MG: 2 INJECTION INTRAMUSCULAR; INTRAVENOUS at 11:08

## 2017-11-19 RX ADMIN — ENOXAPARIN SODIUM 40 MG: 40 INJECTION SUBCUTANEOUS at 08:25

## 2017-11-19 RX ADMIN — HYDROMORPHONE HYDROCHLORIDE 1 MG: 1 INJECTION, SOLUTION INTRAMUSCULAR; INTRAVENOUS; SUBCUTANEOUS at 08:25

## 2017-11-19 RX ADMIN — DEXTROSE MONOHYDRATE, SODIUM CHLORIDE, AND POTASSIUM CHLORIDE 25 ML/HR: 50; 4.5; 1.49 INJECTION, SOLUTION INTRAVENOUS at 05:13

## 2017-11-19 RX ADMIN — HYDROMORPHONE HYDROCHLORIDE 1 MG: 1 INJECTION, SOLUTION INTRAMUSCULAR; INTRAVENOUS; SUBCUTANEOUS at 02:43

## 2017-11-19 RX ADMIN — DIPHENHYDRAMINE HYDROCHLORIDE 50 MG: 50 INJECTION, SOLUTION INTRAMUSCULAR; INTRAVENOUS at 02:43

## 2017-11-19 RX ADMIN — HYDROMORPHONE HYDROCHLORIDE 1 MG: 1 INJECTION, SOLUTION INTRAMUSCULAR; INTRAVENOUS; SUBCUTANEOUS at 14:25

## 2017-11-19 NOTE — PROGRESS NOTES
SURGERY PROGRESS NOTE      Admit Date: 11/15/2017    POD 4 Days Post-Op    Procedure: Procedure(s):  LAPAROSCOPIC REPAIR RECURRENT PARAESOPHAGEAL HERNIA WITH MESH, FUNDOPLICATION, GASTROPEXY      Subjective:     Patient states she feels better. Pain is controlled. Has some nausea with PO.           Objective:     Visit Vitals    /77 (BP 1 Location: Left arm, BP Patient Position: At rest)    Pulse 72    Temp 99.6 °F (37.6 °C)    Resp 16    Ht 5' 1\" (1.549 m)    Wt 193 lb 6 oz (87.7 kg)    SpO2 96%    Breastfeeding No    BMI 36.54 kg/m2        Temp (24hrs), Av.1 °F (37.3 °C), Min:98.7 °F (37.1 °C), Max:99.6 °F (37.6 °C)      701 - 1900  In: -   Out: 600 [Urine:600]  1901 - 700  In: -   Out: 9930 [Urine:1450]    Physical Exam:    General:  alert, cooperative, no distress, appears stated age   Abdomen: soft, bowel sounds active, non-tender   Incision:   healing well, no drainage, no erythema, no hernia, no seroma, no swelling, no dehiscence, incision well approximated           Lab Results  Component Value Date/Time   WBC 13.4 2017 02:41 AM   HGB 11.9 2017 02:41 AM   HCT 34.9 2017 02:41 AM   PLATELET 143  02:41 AM   MCV 89.7 2017 02:41 AM     Lab Results  Component Value Date/Time   GFR est non-AA >60 2017 02:41 AM   GFR est AA >60 2017 02:41 AM   Creatinine 0.85 2017 02:41 AM   BUN 6 2017 02:41 AM   Sodium 141 2017 02:41 AM   Potassium 4.1 2017 02:41 AM   Chloride 103 2017 02:41 AM   CO2 28 2017 02:41 AM           Assessment:   Active Problems:    Paraesophageal hernia (11/15/2017)      S/P repair of paraesophageal hernia (2017)       Ready for discharge    Plan:       D/C home

## 2017-11-19 NOTE — PROGRESS NOTES
Bedside and Verbal shift change report given to [id number] (oncoming nurse) by Taryn Marte (offgoing nurse). Report included the following information SBAR, Kardex, Intake/Output and MAR.

## 2017-11-19 NOTE — PROGRESS NOTES
Problem: Falls - Risk of  Goal: *Absence of Falls  Document Vlad Fall Risk and appropriate interventions in the flowsheet.    Outcome: Progressing Towards Goal  Fall Risk Interventions:  Mobility Interventions: Patient to call before getting OOB         Medication Interventions: Patient to call before getting OOB, Teach patient to arise slowly    Elimination Interventions: Call light in reach, Patient to call for help with toileting needs

## 2017-11-19 NOTE — DISCHARGE INSTRUCTIONS
Nissen Fundoplication: What to Expect at [facility]     \You may be sore and have some pain in your belly for several weeks after surgery. If you had laparoscopic surgery, you also may have pain near your shoulder for a day or two after surgery. It may be hard for you to swallow for up to 6 weeks after the surgery. You may also have cramping in your belly, feel bloated, or pass more gas than before. When you burp, you may not get as much relief as you did before the surgery. The cramping and bloating usually go away in 2 to 3 months, but you may continue to pass more gas for a long time. Because the surgery makes your stomach a little smaller, you may get full more quickly when you eat. In 2 to 3 months, the stomach adjusts and you will be able to eat your usual amounts of food. How quickly you recover depends on whether you had a laparoscopic or open surgery. After laparoscopic surgery, most people can go back to work or their normal routine in about 2 to 3 weeks, depending on their work. This care sheet gives you a general idea about how long it will take for you to recover. But each person recovers at a different pace. Follow the steps below to get better as quickly as possible. How can you care for yourself at home? Activity   Rest when you feel tired. Getting enough sleep will help you recover. Try to walk each day. Start out by walking a little more than you did the day before. Bit by bit, increase the amount you walk. Walking boosts blood flow and helps prevent pneumonia and constipation. For about 2 weeks, or 4 to 6 weeks if you had an open surgery, avoid lifting objects heavier than about 15 to 20 pounds. This may include heavy grocery bags and milk containers, a heavy briefcase or backpack, cat litter or dog food bags, a vacuum , or a child. Do not do sit-ups or any exercise or activity that uses your belly muscles.    You can be active and do things around the house as you can tolerate it. Do not take part in any activity where you could be hit in the belly. This could be sports or playing with children. You may shower. Pat your incisions dry. Do not take baths until your doctor says it is okay. You can drive again once you are no longer taking pain medication. Ask your doctor when it is okay for you to have sex. Diet   For the first week, stay on a liquid diet. This includes broths, soups, milk shakes,   Have 5 or 6 small meals each day instead of 2 or 3 large meals. Diet Instructions     Full Liquid Diet (for the first week after surgery)   Principle: Anything you can pour you can have.   _ YES Items:    Jello    juice (Cranberry, Apple, Grape)    coffee    water    Popsicles    broth    ice    cream (strained) soups    pudding    ice cream    milk    yogurt    thinned oatmeal (or hot) cereal, Cream of Wheat, [place]    milk shakes    liquid meal suppliments like Ensure, [employer], etc.     NO Items: Carbonated beverages, sold foods, gum, hard candy   Instructions:   1. Start by taking a cup at a time. 2. Increase the amount a little each day. 3. Dont gulp liquids down. 4. This diet should be strictly adhered to after surgery. --------------------------------------------------------------------------------------------   DAY 7 THROUGH WEEK 4:   Soft Diet   Principle: These are mushy foods that require very little chewing.   _ YES Items:   _ Everything on the clear liquid and full liquid diets   _ pasta   _ rice   _ fish   _ mashed potatoes   _ apple sauce   _ mushy-cooked vegetables   _ soft fruits (bananas, canned fruits, grapes, peeled peaches and   nectarines)   _ ground or pulled chicken   _ ground beef   _ tamales   _ sushi   NO Items: Steak, baked potatoes, pork chops, lamb chops, hamburgers,   hotdogs, sandwiches, bread, raw or undercooked vegetables, chips, tacos   Instructions:   1.  Start by taking a cup at a time.   2. Increase the amount a little each day. 3. This diet should be strictly adhered in day 5 though week 4 after   Surgery. Chew each bite of food very well. Eat slowly. You may need to take 20 to 30 minutes to eat a meal.   Avoid crusty breads, bagels, tough meats, raw vegetables, nuts and seeds (including crackers and breads that have nuts and seeds), and other foods that are hard to digest.   If you feel full quickly, try to drink fluids between meals instead of with meals. Avoid carbonated beverages, such as soda pop. Avoid drinking with straws. This may help you swallow less air when you drink. You may notice that your bowel movements are not regular right after your surgery. This is common. Try to avoid constipation and straining with bowel movements. Take a fiber supplement every day. If you have not had a bowel movement after a couple of days, ask your doctor about taking a mild laxative. Medicines   Take pain medicines exactly as directed. If the doctor gave you a prescription medicine for pain, take it as prescribed. If you are not taking a prescription pain medicine, ask your doctor if you can take an over-the-counter medicine. If you think your pain medicine is making you sick to your stomach: Take your medicine after meals (unless your doctor tells you not to). Ask your doctor for a different pain medicine. If your doctor prescribed antibiotics, take them as directed. Do not stop taking them just because you feel better. You need to take the full course of antibiotics. Continue to take your acid-reducing medicine for 1 month after surgery or as your doctor tells you. Incision care   If you have strips of tape on the cuts the doctor made (incisions), leave the tape on for a week or until it falls off. Wash the area daily with warm, soapy water and pat it dry. Don't use hydrogen peroxide or alcohol, which can slow healing.  You may cover the area with a gauze bandage if it weeps or rubs against clothing. Change the bandage every day. Keep the area clean and dry. Follow-up care is a key part of your treatment and safety. Be sure to make and go to all appointments, and call your doctor if you are having problems. Its also a good idea to know your test results and keep a list of the medicines you take. When should you call for help? Call 911 anytime you think you may need emergency care. For example, call if:   You passed out (lost consciousness). You have severe trouble breathing. You have sudden chest pain and shortness of breath, or you cough up blood. You have severe pain in your belly or chest.   Call your doctor now or seek immediate medical care if:   You are sick to your stomach or cannot keep fluids down. You have pain that does not get better after you take pain medicine. You have signs of infection, such as: Increased pain, swelling, warmth, or redness. Red streaks leading from the incision. Pus draining from the incision. A fever. You have loose stitches, or your incision comes open. You have signs of a blood clot, such as:   Pain in your calf, back of the knee, thigh, or groin. Redness and swelling in your leg or groin. You have trouble passing urine or stool, especially if you have pain or swelling in your lower belly. Watch closely for any changes in your health, and be sure to contact your doctor if:   You have a cough that does not go away or one that brings up mucus. You have shoulder pain that lasts more than 3 days. You do not have a bowel movement after taking a laxative. Where can you learn more? Go to [employer]   Enter U368 in the search box to learn more about \"Nissen Fundoplication: What to Expect at Home. \"   © 2733-3529 [employer], [employer]. Care instructions adapted under license by [employer] (which disclaims liability or warranty for this information).  This care instruction is for use with your licensed healthcare professional. If you have questions about a medical condition or this instruction, always ask your healthcare professional. [place] [address] any warranty or liability for your use of this information. Content Version: 9.4.86799;  Last Revised: March 22, 2012     Leah Parker MD, PhD, FACS  General Surgery at [address] [place] [address]  Sushant RamirezDignity Health [facility]  [phone]  Fax [phone]

## 2017-11-21 ENCOUNTER — HOSPITAL ENCOUNTER (EMERGENCY)
Age: 27
Discharge: HOME | End: 2017-11-21
Attending: EMERGENCY MEDICINE
Payer: MEDICAID

## 2017-11-21 ENCOUNTER — TELEPHONE (OUTPATIENT)
Dept: SURGERY | Age: 27
End: 2017-11-21

## 2017-11-21 ENCOUNTER — APPOINTMENT (OUTPATIENT)
Dept: CT IMAGING | Age: 27
End: 2017-11-21
Attending: PHYSICIAN ASSISTANT
Payer: MEDICAID

## 2017-11-21 VITALS
HEART RATE: 85 BPM | DIASTOLIC BLOOD PRESSURE: 52 MMHG | HEIGHT: 61 IN | WEIGHT: 187 LBS | BODY MASS INDEX: 35.3 KG/M2 | SYSTOLIC BLOOD PRESSURE: 101 MMHG | RESPIRATION RATE: 17 BRPM | TEMPERATURE: 99.2 F | OXYGEN SATURATION: 98 %

## 2017-11-21 DIAGNOSIS — R11.0 NAUSEA: Primary | ICD-10-CM

## 2017-11-21 DIAGNOSIS — G89.18 POST-OP PAIN: ICD-10-CM

## 2017-11-21 LAB
ALBUMIN SERPL-MCNC: 4 G/DL (ref 3.5–5)
ALBUMIN/GLOB SERPL: 1 {RATIO} (ref 1.1–2.2)
ALP SERPL-CCNC: 125 U/L (ref 45–117)
ALT SERPL-CCNC: 52 U/L (ref 12–78)
ANION GAP SERPL CALC-SCNC: 9 MMOL/L (ref 5–15)
APPEARANCE UR: CLEAR
AST SERPL-CCNC: 26 U/L (ref 15–37)
BACTERIA URNS QL MICRO: NEGATIVE /HPF
BASOPHILS # BLD: 0 K/UL (ref 0–0.1)
BASOPHILS NFR BLD: 0 % (ref 0–1)
BILIRUB SERPL-MCNC: 0.6 MG/DL (ref 0.2–1)
BILIRUB UR QL: NEGATIVE
BUN SERPL-MCNC: 6 MG/DL (ref 6–20)
BUN/CREAT SERPL: 8 (ref 12–20)
CALCIUM SERPL-MCNC: 9.3 MG/DL (ref 8.5–10.1)
CHLORIDE SERPL-SCNC: 100 MMOL/L (ref 97–108)
CO2 SERPL-SCNC: 28 MMOL/L (ref 21–32)
COLOR UR: ABNORMAL
CREAT SERPL-MCNC: 0.71 MG/DL (ref 0.55–1.02)
EOSINOPHIL # BLD: 0.4 K/UL (ref 0–0.4)
EOSINOPHIL NFR BLD: 4 % (ref 0–7)
EPITH CASTS URNS QL MICRO: ABNORMAL /LPF
ERYTHROCYTE [DISTWIDTH] IN BLOOD BY AUTOMATED COUNT: 12.4 % (ref 11.5–14.5)
GLOBULIN SER CALC-MCNC: 3.9 G/DL (ref 2–4)
GLUCOSE SERPL-MCNC: 87 MG/DL (ref 65–100)
GLUCOSE UR STRIP.AUTO-MCNC: NEGATIVE MG/DL
HCG UR QL: NEGATIVE
HCT VFR BLD AUTO: 36.1 % (ref 35–47)
HGB BLD-MCNC: 12.4 G/DL (ref 11.5–16)
HGB UR QL STRIP: NEGATIVE
KETONES UR QL STRIP.AUTO: >80 MG/DL
LEUKOCYTE ESTERASE UR QL STRIP.AUTO: NEGATIVE
LIPASE SERPL-CCNC: 90 U/L (ref 73–393)
LYMPHOCYTES # BLD: 2.8 K/UL (ref 0.8–3.5)
LYMPHOCYTES NFR BLD: 29 % (ref 12–49)
MCH RBC QN AUTO: 30.8 PG (ref 26–34)
MCHC RBC AUTO-ENTMCNC: 34.3 G/DL (ref 30–36.5)
MCV RBC AUTO: 89.8 FL (ref 80–99)
MONOCYTES # BLD: 0.9 K/UL (ref 0–1)
MONOCYTES NFR BLD: 9 % (ref 5–13)
NEUTS SEG # BLD: 5.8 K/UL (ref 1.8–8)
NEUTS SEG NFR BLD: 58 % (ref 32–75)
NITRITE UR QL STRIP.AUTO: NEGATIVE
PH UR STRIP: 7 [PH] (ref 5–8)
PLATELET # BLD AUTO: 292 K/UL (ref 150–400)
POTASSIUM SERPL-SCNC: 3.8 MMOL/L (ref 3.5–5.1)
PROT SERPL-MCNC: 7.9 G/DL (ref 6.4–8.2)
PROT UR STRIP-MCNC: ABNORMAL MG/DL
RBC # BLD AUTO: 4.02 M/UL (ref 3.8–5.2)
RBC #/AREA URNS HPF: ABNORMAL /HPF (ref 0–5)
SODIUM SERPL-SCNC: 137 MMOL/L (ref 136–145)
SP GR UR REFRACTOMETRY: 1.03 (ref 1–1.03)
UR CULT HOLD, URHOLD: NORMAL
UROBILINOGEN UR QL STRIP.AUTO: 1 EU/DL (ref 0.2–1)
WBC # BLD AUTO: 9.9 K/UL (ref 3.6–11)
WBC URNS QL MICRO: ABNORMAL /HPF (ref 0–4)

## 2017-11-21 PROCEDURE — 96374 THER/PROPH/DIAG INJ IV PUSH: CPT

## 2017-11-21 PROCEDURE — 85025 COMPLETE CBC W/AUTO DIFF WBC: CPT | Performed by: PHYSICIAN ASSISTANT

## 2017-11-21 PROCEDURE — 74011636320 HC RX REV CODE- 636/320: Performed by: RADIOLOGY

## 2017-11-21 PROCEDURE — 80053 COMPREHEN METABOLIC PANEL: CPT | Performed by: PHYSICIAN ASSISTANT

## 2017-11-21 PROCEDURE — 36415 COLL VENOUS BLD VENIPUNCTURE: CPT | Performed by: PHYSICIAN ASSISTANT

## 2017-11-21 PROCEDURE — 74177 CT ABD & PELVIS W/CONTRAST: CPT

## 2017-11-21 PROCEDURE — 99284 EMERGENCY DEPT VISIT MOD MDM: CPT

## 2017-11-21 PROCEDURE — 96375 TX/PRO/DX INJ NEW DRUG ADDON: CPT

## 2017-11-21 PROCEDURE — 74011250636 HC RX REV CODE- 250/636: Performed by: PHYSICIAN ASSISTANT

## 2017-11-21 PROCEDURE — 81025 URINE PREGNANCY TEST: CPT

## 2017-11-21 PROCEDURE — 83690 ASSAY OF LIPASE: CPT | Performed by: PHYSICIAN ASSISTANT

## 2017-11-21 PROCEDURE — 81001 URINALYSIS AUTO W/SCOPE: CPT | Performed by: PHYSICIAN ASSISTANT

## 2017-11-21 RX ORDER — MORPHINE SULFATE 4 MG/ML
4 INJECTION INTRAVENOUS
Status: COMPLETED | OUTPATIENT
Start: 2017-11-21 | End: 2017-11-21

## 2017-11-21 RX ORDER — PROMETHAZINE HYDROCHLORIDE 25 MG/1
25 TABLET ORAL
Qty: 12 TAB | Refills: 0 | Status: SHIPPED | OUTPATIENT
Start: 2017-11-21 | End: 2018-03-10

## 2017-11-21 RX ORDER — DIPHENHYDRAMINE HYDROCHLORIDE 50 MG/ML
25 INJECTION, SOLUTION INTRAMUSCULAR; INTRAVENOUS
Status: COMPLETED | OUTPATIENT
Start: 2017-11-21 | End: 2017-11-21

## 2017-11-21 RX ORDER — ONDANSETRON 2 MG/ML
4 INJECTION INTRAMUSCULAR; INTRAVENOUS
Status: COMPLETED | OUTPATIENT
Start: 2017-11-21 | End: 2017-11-21

## 2017-11-21 RX ADMIN — IOPAMIDOL 100 ML: 755 INJECTION, SOLUTION INTRAVENOUS at 22:08

## 2017-11-21 RX ADMIN — MORPHINE SULFATE 4 MG: 4 INJECTION INTRAVENOUS at 21:58

## 2017-11-21 RX ADMIN — DIPHENHYDRAMINE HYDROCHLORIDE 25 MG: 50 INJECTION, SOLUTION INTRAMUSCULAR; INTRAVENOUS at 21:54

## 2017-11-21 RX ADMIN — SODIUM CHLORIDE 1000 ML: 900 INJECTION, SOLUTION INTRAVENOUS at 22:47

## 2017-11-21 RX ADMIN — ONDANSETRON 4 MG: 2 INJECTION INTRAMUSCULAR; INTRAVENOUS at 21:57

## 2017-11-21 NOTE — TELEPHONE ENCOUNTER
Patient called office said she has severe nausea. She is unable to keep anything down. She is taking zofran since yesterday but feels it is not helping. Her pain is around a 7 currently.  She said she going to the pharmacy to see if she can purchase something else over the counter and if that doesn't work than she will go to the ED will inform Dr Kaylan Vernon

## 2017-11-22 NOTE — ED TRIAGE NOTES
Pt states had nissen done on Wednesday and today is having nausea, zofran is not working, with pain and itching from pain medications.
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
## 2017-11-22 NOTE — ED NOTES
Discharged by provider. Reports pain is a 3/10. Reports improvement in nausea. Denies questions. Leaves with discharge paperwork.

## 2017-11-22 NOTE — DISCHARGE INSTRUCTIONS
Nausea and Vomiting After Surgery: Care Instructions  Your Care Instructions    After you've had surgery, you may feel sick to your stomach (nauseated) or you may vomit. Sometimes anesthesia can make you feel sick. It's a common side effect and often doesn't last long. Pain also can make you feel sick or vomit. After the anesthesia wears off, you may feel pain from the incision (cut). That pain could then upset your stomach. Taking pain medicine can also make you feel sick to your stomach. Whatever the cause, you may get medicine that can help. There are also some things you can do at home to prevent nausea and feel better. The doctor has checked you carefully, but problems can develop later. If you notice any problems or new symptoms, get medical treatment right away. Follow-up care is a key part of your treatment and safety. Be sure to make and go to all appointments, and call your doctor if you are having problems. It's also a good idea to know your test results and keep a list of the medicines you take. How can you care for yourself at home? · Be safe with medicines. Read and follow all instructions on the label. ¨ If the doctor gave you a prescription medicine for pain, take it as prescribed. ¨ If you are not taking a prescription pain medicine, ask your doctor if you can take an over-the-counter medicine. · Take your pain medicine as soon as you have pain. It works better if you take it before the pain gets bad. · Call your doctor if you have any problems with your medicine. · Rest in bed until you feel better. · To prevent dehydration, drink plenty of fluids, enough so that your urine is light yellow or clear like water. Choose water and other caffeine-free clear liquids until you feel better. If you have kidney, heart, or liver disease and have to limit fluids, talk with your doctor before you increase the amount of fluids you drink.   · When you are able to eat, try clear soups, mild foods, and liquids until all symptoms are gone for 12 to 48 hours. Other good choices include dry toast, crackers, cooked cereal, and gelatin dessert, such as Jell-O.  · Do not smoke. Smoking and being around smoke can make nausea worse. If you need help quitting, talk to your doctor about stop-smoking programs and medicines. These can increase your chances of quitting for good. When should you call for help? Call 911 anytime you think you may need emergency care. For example, call if:  ? · You passed out (lost consciousness). ?Call your doctor now or seek immediate medical care if:  ? · You have new or worse nausea or vomiting. ? · You are too sick to your stomach to drink any fluids. ? · You cannot keep down fluids. ? · You have symptoms of dehydration, such as:  ¨ Dry eyes and a dry mouth. ¨ Passing only a little dark urine. ¨ Feeling thirstier than usual.   ? · Your pain medicine is not helping. ? · You are dizzy or lightheaded, or you feel like you may faint. ? Watch closely for changes in your health, and be sure to contact your doctor if:  ? · You do not get better as expected. Where can you learn more? Go to http://connor-radha.info/. Enter M536 in the search box to learn more about \"Nausea and Vomiting After Surgery: Care Instructions. \"  Current as of: March 20, 2017  Content Version: 11.4  © 5725-0481 [employer] Care instructions adapted under license by [employer] (which disclaims liability or warranty for this information). If you have questions about a medical condition or this instruction, always ask your healthcare professional. [address] any warranty or liability for your use of this information. We hope that we have addressed all of your medical concerns. The examination and treatment you received in the Emergency Department were for an emergent problem and were not intended as complete care.  It is important that you follow up with your healthcare provider(s) for ongoing care. If your symptoms worsen or do not improve as expected, and you are unable to reach your usual health care provider(s), you should return to the Emergency Department. Today's healthcare is undergoing tremendous change, and patient satisfaction surveys are one of the many tools to assess the quality of medical care. You may receive a survey from the [employer] regarding your experience in the Emergency Department. I hope that your experience has been completely positive, particularly the medical care that I provided. As such, please participate in the survey; anything less than excellent does not meet my expectations or intentions. [address] [facility] and [facility] participate in nationally recognized quality of care measures. If your blood pressure is greater than 120/80, as reported below, we urge that you seek medical care to address the potential of high blood pressure, commonly known as hypertension. Hypertension can be hereditary or can be caused by certain medical conditions, pain, stress, or \"white coat syndrome. \"       Please make an appointment with your health care provider(s) for follow up of your Emergency Department visit. VITALS:   Patient Vitals for the past 8 hrs:   Temp Pulse Resp BP SpO2   11/21/17 2245 - - - 125/73 95 %   11/21/17 2230 - - - 125/70 98 %   11/21/17 2215 - - - 131/73 95 %   11/21/17 2120 99.2 °F (37.3 °C) 85 17 112/85 97 %          Thank you for allowing us to provide you with medical care today. We realize that you have many choices for your emergency care needs. Please choose us in the future for any continued health care needs. Sandip Martinez, [address] [place], [place] [address]y 20.   Office: [phone]            Recent Results (from the past 24 hour(s))   CBC WITH AUTOMATED DIFF    Collection Time: 11/21/17  9:46 PM   Result Value Ref Range    WBC 9.9 3.6 - 11.0 K/uL    RBC 4.02 3.80 - 5.20 M/uL    HGB 12.4 11.5 - 16.0 g/dL    HCT 36.1 35.0 - 47.0 %    MCV 89.8 80.0 - 99.0 FL    MCH 30.8 26.0 - 34.0 PG    MCHC 34.3 30.0 - 36.5 g/dL    RDW 12.4 11.5 - 14.5 %    PLATELET 267 596 - 874 K/uL    NEUTROPHILS 58 32 - 75 %    LYMPHOCYTES 29 12 - 49 %    MONOCYTES 9 5 - 13 %    EOSINOPHILS 4 0 - 7 %    BASOPHILS 0 0 - 1 %    ABS. NEUTROPHILS 5.8 1.8 - 8.0 K/UL    ABS. LYMPHOCYTES 2.8 0.8 - 3.5 K/UL    ABS. MONOCYTES 0.9 0.0 - 1.0 K/UL    ABS. EOSINOPHILS 0.4 0.0 - 0.4 K/UL    ABS. BASOPHILS 0.0 0.0 - 0.1 K/UL   METABOLIC PANEL, COMPREHENSIVE    Collection Time: 11/21/17  9:46 PM   Result Value Ref Range    Sodium 137 136 - 145 mmol/L    Potassium 3.8 3.5 - 5.1 mmol/L    Chloride 100 97 - 108 mmol/L    CO2 28 21 - 32 mmol/L    Anion gap 9 5 - 15 mmol/L    Glucose 87 65 - 100 mg/dL    BUN 6 6 - 20 MG/DL    Creatinine 0.71 0.55 - 1.02 MG/DL    BUN/Creatinine ratio 8 (L) 12 - 20      GFR est AA >60 >60 ml/min/1.73m2    GFR est non-AA >60 >60 ml/min/1.73m2    Calcium 9.3 8.5 - 10.1 MG/DL    Bilirubin, total 0.6 0.2 - 1.0 MG/DL    ALT (SGPT) 52 12 - 78 U/L    AST (SGOT) 26 15 - 37 U/L    Alk.  phosphatase 125 (H) 45 - 117 U/L    Protein, total 7.9 6.4 - 8.2 g/dL    Albumin 4.0 3.5 - 5.0 g/dL    Globulin 3.9 2.0 - 4.0 g/dL    A-G Ratio 1.0 (L) 1.1 - 2.2     LIPASE    Collection Time: 11/21/17  9:46 PM   Result Value Ref Range    Lipase 90 73 - 393 U/L   URINALYSIS W/MICROSCOPIC    Collection Time: 11/21/17  9:46 PM   Result Value Ref Range    Color YELLOW/STRAW      Appearance CLEAR CLEAR      Specific gravity 1.027 1.003 - 1.030      pH (UA) 7.0 5.0 - 8.0      Protein TRACE (A) NEG mg/dL    Glucose NEGATIVE  NEG mg/dL    Ketone >80 (A) NEG mg/dL    Bilirubin NEGATIVE  NEG      Blood NEGATIVE  NEG      Urobilinogen 1.0 0.2 - 1.0 EU/dL    Nitrites NEGATIVE  NEG      Leukocyte Esterase NEGATIVE  NEG      WBC 0-4 0 - 4 /hpf    RBC 0-5 0 - 5 /hpf    Epithelial cells FEW FEW /lpf    Bacteria NEGATIVE  NEG /hpf   URINE CULTURE HOLD SAMPLE    Collection Time: 11/21/17  9:46 PM   Result Value Ref Range    Urine culture hold URINE ON HOLD IN MICROBIOLOGY DEPT FOR 3 DAYS     HCG URINE, QL. - POC    Collection Time: 11/21/17  9:47 PM   Result Value Ref Range    Pregnancy test,urine (POC) NEGATIVE  NEG         Ct Abd Pelv W Cont    Result Date: 11/21/2017  EXAM:  CT ABDOMEN PELVIS WITH CONTRAST INDICATION:  abdominal pain Additional history: Nissen fundoplication performed 6 days previously COMPARISON: CT of the abdomen and pelvis, 4/28/2017 and 10/23/2017. Lance Salas TECHNIQUE: Multislice helical CT was performed from the diaphragm to the symphysis pubis with oral and intravenous contrast administration. Contiguous 5 mm axial images were reconstructed and lung and soft tissue windows were generated. Coronal and sagittal reformations were generated. CT dose reduction was achieved through use of a standardized protocol tailored for this examination and automatic exposure control for dose modulation. [facility] FINDINGS: INCIDENTALLY IMAGED CHEST: Heart/vessels: Within normal limits. Lungs/Pleura: Within normal limits. . ABDOMEN: Liver: Within normal limits. Gallbladder/Biliary: Status post cholecystectomy. Minimal intrahepatic biliary dilation which is not unexpected after cholecystectomy Spleen: Within normal limits. Pancreas: Within normal limits. Adrenals: Within normal limits. Kidneys: Within normal limits. Peritoneum/Mesenteries: Within normal limits. Extraperitoneum: Within normal limits. Gastrointestinal tract: Postsurgical changes at the GE junction. Vascular: Within normal limits. Loletta Nunnery PELVIS: Extraperitoneum: Within normal limits. Ureters: Within normal limits. Bladder: Decompressed and unremarkable. Reproductive System: IUD in the uterus. Retroflexed uterus . MSK: Within normal limits. .    IMPRESSION: 1.  Postsurgical changes of the GE junction consistent with the clinical history of Nissen fundoplication. 2. Status post cholecystectomy. There is slight intrahepatic biliary ductal dilation which is not unexpected in the setting of cholecystectomy. This is increased relative to comparison of uncertain clinical significance. There is no visible choledocholithiasis.

## 2017-11-24 ENCOUNTER — TELEPHONE (OUTPATIENT)
Dept: SURGERY | Age: 27
End: 2017-11-24

## 2017-11-24 ENCOUNTER — DOCUMENTATION ONLY (OUTPATIENT)
Dept: SURGERY | Age: 27
End: 2017-11-24

## 2017-11-24 RX ORDER — OXYCODONE HYDROCHLORIDE 5 MG/1
TABLET ORAL
Qty: 30 TAB | Refills: 0 | Status: SHIPPED | OUTPATIENT
Start: 2017-11-24 | End: 2018-03-10

## 2017-11-24 NOTE — TELEPHONE ENCOUNTER
Pt called in and is requesting a refill on her pain medication, she is rating her pain a 8/10 on her left side. She said she took her last pain pill yesterday. I told her Dr Gilda Burkett was in surgery but I will call her back after I speak to him. Pt in agreement.

## 2017-12-01 ENCOUNTER — OFFICE VISIT (OUTPATIENT)
Dept: SURGERY | Age: 27
End: 2017-12-01

## 2017-12-01 VITALS
DIASTOLIC BLOOD PRESSURE: 65 MMHG | TEMPERATURE: 98.5 F | RESPIRATION RATE: 14 BRPM | WEIGHT: 184 LBS | HEIGHT: 61 IN | OXYGEN SATURATION: 98 % | HEART RATE: 71 BPM | SYSTOLIC BLOOD PRESSURE: 114 MMHG | BODY MASS INDEX: 34.74 KG/M2

## 2017-12-01 DIAGNOSIS — Z09 POSTOPERATIVE EXAMINATION: Primary | ICD-10-CM

## 2017-12-01 RX ORDER — OXYCODONE HYDROCHLORIDE 5 MG/1
5 TABLET ORAL
Qty: 30 TAB | Refills: 0 | Status: SHIPPED | OUTPATIENT
Start: 2017-12-01 | End: 2018-03-10

## 2017-12-01 RX ORDER — PROMETHAZINE HYDROCHLORIDE 25 MG/1
25 TABLET ORAL
Qty: 30 TAB | Refills: 0 | Status: SHIPPED | OUTPATIENT
Start: 2017-12-01 | End: 2018-03-10

## 2017-12-01 NOTE — PROGRESS NOTES
Subjective:      Robi Ibrahim is a 27 y.o. female presents for postop care 2 weeks following Nissen. Appetite is good. Eating a mostly soft diet without difficulty. Has had some dysphagia and nausea with french fries. She states she did not know it was to early for french fries   Bowel movements are regular. The patient is voiding without difficulty. She complains of LLQ pain and nausea    Objective:     Visit Vitals    /65 (BP 1 Location: Left arm, BP Patient Position: Sitting)    Pulse 71    Temp 98.5 °F (36.9 °C) (Oral)    Resp 14    Ht 5' 1\" (1.549 m)    Wt 184 lb (83.5 kg)    SpO2 98%    BMI 34.77 kg/m2       General:  alert, cooperative, no distress, appears stated age   Abdomen: soft, bowel sounds active, non-tender   Incision:   healing well, no drainage, no erythema, no hernia, no seroma, no swelling, no dehiscence, incision well approximated     Assessment:     Doing well postoperatively. Think most of her nausea is dietary indiscretion. Plan:     1. Continue any current medications. 2. Wound care discussed. 3. Pt is to increase activities as tolerated.   4.  Diet instructions she was give and signed for at discharged reprinted and given to patient   5. follow up in 2 weeks

## 2017-12-01 NOTE — PROGRESS NOTES
1. Have you been to the ER, urgent care clinic since your last visit? Hospitalized since your last visit? Yes, 11/21/17 [facility]    2. Have you seen or consulted any other health care providers outside of the [address] [place] [address] since your last visit? Include any pap smears or colon screening.  no

## 2017-12-01 NOTE — MR AVS SNAPSHOT
Visit Information Date & Time Provider Department Dept. Phone Encounter #  
 12/1/2017 11:45 AM Alfonso Martinez MD [id number] [facility] Surgery [phone] [id number] Upcoming Health Maintenance Date Due Pneumococcal 19-64 Highest Risk (1 of 3 - PCV13) 12/5/2007 PAP AKA CERVICAL CYTOLOGY 12/5/2009 DTaP/Tdap/Td series (1 - Tdap) 2/2/2011 Influenza Age 5 to Adult 8/1/2017 Allergies as of 12/1/2017  Review Complete On: 12/1/2017 By: Cesario Juarez LPN Severity Noted Reaction Type Reactions Latex High 01/30/2011    Anaphylaxis Acetaminophen High 01/30/2011    Anaphylaxis Other Plant, Animal, Environmental High 01/04/2017   Systemic Hives Allergic to everything outside. Nsaids (Non-steroidal Anti-inflammatory Drug) Low 01/04/2017   Systemic Other (comments) Advised by her GI doctor not to take till they figure out what is going on with her stomach. Currently has a Nissen-fundiplication. Current Immunizations  Never Reviewed Name Date Influenza Vaccine  Deferred (Contraindication) TD Vaccine 2/1/2011 12:00 AM  
  
 Not reviewed this visit Vitals BP Pulse Temp Resp Height(growth percentile) Weight(growth percentile) 114/65 (BP 1 Location: Left arm, BP Patient Position: Sitting) 71 98.5 °F (36.9 °C) (Oral) 14 5' 1\" (1.549 m) 184 lb (83.5 kg) SpO2 BMI OB Status Smoking Status 98% 34.77 kg/m2 IUD Never Smoker Vitals History BMI and BSA Data Body Mass Index Body Surface Area 34.77 kg/m 2 1.9 m 2 Preferred Pharmacy Pharmacy Name Phone [id number] [address] [place] [address] [phone] Your Updated Medication List  
  
   
This list is accurate as of: 12/1/17 11:50 AM.  Always use your most recent med list.  
  
  
  
  
 baclofen 10 mg tablet Commonly known as:  LIORESAL Take 10 mg by mouth three (3) times daily. KlonoPIN 0.5 mg tablet Generic drug:  clonazePAM  
Take 1 mg by mouth Daily (before breakfast). * oxyCODONE IR 5 mg immediate release tablet Commonly known as:  ROXICODONE  
2 to 3 tablets every 4 hours as needed for pain * oxyCODONE IR 5 mg immediate release tablet Commonly known as:  ROXICODONE  
1 to 2 tablets every 4 hours as needed for pain * oxyCODONE IR 5 mg immediate release tablet Commonly known as:  Ariana Fang Take 1 Tab by mouth every four (4) hours as needed for Pain. Max Daily Amount: 30 mg.  
  
 * promethazine 25 mg tablet Commonly known as:  PHENERGAN Take 1 Tab by mouth every six (6) hours as needed. * promethazine 25 mg tablet Commonly known as:  PHENERGAN Take 1 Tab by mouth every six (6) hours as needed for Nausea. * Notice: This list has 5 medication(s) that are the same as other medications prescribed for you. Read the directions carefully, and ask your doctor or other care provider to review them with you. Prescriptions Printed Refills  
 oxyCODONE IR (ROXICODONE) 5 mg immediate release tablet 0 Sig: Take 1 Tab by mouth every four (4) hours as needed for Pain. Max Daily Amount: 30 mg.  
 Class: Print Route: Oral  
  
Prescriptions Sent to Pharmacy Refills  
 promethazine (PHENERGAN) 25 mg tablet 0 Sig: Take 1 Tab by mouth every six (6) hours as needed for Nausea. Class: Normal  
 Pharmacy: [facility]/pharmacy #[id number] [address] #: [phone] Route: Oral  
  
Patient Instructions Diet For the first week, stay on a liquid diet. This includes broths, soups, milk shakes, Have 5 or 6 small meals each day instead of 2 or 3 large meals. Diet Instructions Full Liquid Diet (for the first week after surgery) Principle: Anything you can pour you can have.  
_ YES Items:  
 Jello  juice (Cranberry, Apple, Grape)  coffee  water  Popsicles  broth  ice  cream (strained) soups  pudding  ice cream  
 milk  yogurt  thinned oatmeal (or hot) cereal, Cream of Wheat, Анна Lor  milk shakes  liquid meal suppliments like Ensure, [employer], etc.  
 
NO Items: Carbonated beverages, sold foods, gum, hard candy Instructions: 1. Start by taking a cup at a time. 2. Increase the amount a little each day. 3. Dont gulp liquids down. 4. This diet should be strictly adhered to after surgery. --------------------------------------------------------------------------------------------  
DAY 7 THROUGH WEEK 4:  
Soft Diet Principle: These are mushy foods that require very little chewing.  
_ YES Items:  
_ Everything on the clear liquid and full liquid diets _ pasta  
_ rice  
_ fish  
_ mashed potatoes _ apple sauce  
_ mushy-cooked vegetables _ soft fruits (bananas, canned fruits, grapes, peeled peaches and  
nectarines) _ ground or pulled chicken  
_ ground beef  
_ isrrael _ Robbi Severin NO Items: Steak, baked potatoes, pork chops, lamb chops, hamburgers,  
hotdogs, sandwiches, bread, raw or undercooked vegetables, chips, tacos Instructions: 1. Start by taking a cup at a time. 2. Increase the amount a little each day. 3. This diet should be strictly adhered in day 5 though week 4 after Surgery. Chew each bite of food very well. Eat slowly. You may need to take 20 to 30 minutes to eat a meal.  
Avoid crusty breads, bagels, tough meats, raw vegetables, nuts and seeds (including crackers and breads that have nuts and seeds), and other foods that are hard to digest.  
If you feel full quickly, try to drink fluids between meals instead of with meals. Avoid carbonated beverages, such as soda pop. Avoid drinking with straws. This may help you swallow less air when you drink. Diet For the first week, stay on a liquid diet.  This includes broths, soups, milk shakes,  
 Have 5 or 6 small meals each day instead of 2 or 3 large meals. Diet Instructions Full Liquid Diet (for the first week after surgery) Principle: Anything you can pour you can have.  
_ YES Items:  
 Jello  juice (Cranberry, Apple, Grape)  coffee  water  Popsicles  broth  ice  cream (strained) soups  pudding  ice cream  
 milk  yogurt  thinned oatmeal (or hot) cereal, Cream of Wheat, Genna Deshler  milk shakes  liquid meal suppliments like Ensure, [employer], etc.  
 
NO Items: Carbonated beverages, sold foods, gum, hard candy Instructions: 1. Start by taking a cup at a time. 2. Increase the amount a little each day. 3. Dont gulp liquids down. 4. This diet should be strictly adhered to after surgery. --------------------------------------------------------------------------------------------  
DAY 7 THROUGH WEEK 4:  
Soft Diet Principle: These are mushy foods that require very little chewing.  
_ YES Items:  
_ Everything on the clear liquid and full liquid diets _ pasta  
_ rice  
_ fish  
_ mashed potatoes _ apple sauce  
_ mushy-cooked vegetables _ soft fruits (bananas, canned fruits, grapes, peeled peaches and  
nectarines) _ ground or pulled chicken  
_ ground beef  
_ isrrael _ Arvind Arredondo NO Items: Steak, baked potatoes, pork chops, lamb chops, hamburgers,  
hotdogs, sandwiches, bread, raw or undercooked vegetables, chips, tacos Instructions: 1. Start by taking a cup at a time. 2. Increase the amount a little each day. 3. This diet should be strictly adhered in day 5 though week 4 after Surgery. Chew each bite of food very well. Eat slowly.  You may need to take 20 to 30 minutes to eat a meal.  
Avoid crusty breads, bagels, tough meats, raw vegetables, nuts and seeds (including crackers and breads that have nuts and seeds), and other foods that are hard to digest.  
 If you feel full quickly, try to drink fluids between meals instead of with meals. Avoid carbonated beverages, such as soda pop. Avoid drinking with straws. This may help you swallow less air when you drink. Introducing [facility]! Liliana Campos introduces [employer] patient portal. Now you can access parts of your medical record, email your doctor's office, and request medication refills online. 1. In your internet browser, go to [email or web address] 2. Click on the First Time User? Click Here link in the Sign In box. You will see the New Member Sign Up page. 3. Enter your [employer] Access Code exactly as it appears below. You will not need to use this code after youve completed the sign-up process. If you do not sign up before the expiration date, you must request a new code. · [employer] Access Code: [email or web address] Expires: 3/1/2018 11:50 AM 
 
4. Enter the last four digits of your Social Security Number (xxxx) and Date of Birth (mm/dd/yyyy) as indicated and click Submit. You will be taken to the next sign-up page. 5. Create a ZeroDesktop ID. This will be your [employer] login ID and cannot be changed, so think of one that is secure and easy to remember. 6. Create a [employer] password. You can change your password at any time. 7. Enter your Password Reset Question and Answer. This can be used at a later time if you forget your password. 8. Enter your e-mail address. You will receive e-mail notification when new information is available in [address] 9. Click Sign Up. You can now view and download portions of your medical record. 10. Click the Download Summary menu link to download a portable copy of your medical information. If you have questions, please visit the Frequently Asked Questions section of the [employer] website. Remember, [employer] is NOT to be used for urgent needs. For medical emergencies, dial 911. Now available from your iPhone and Android! Please provide this summary of care documentation to your next provider. Your primary care clinician is listed as Yancy Espinoza. If you have any questions after today's visit, please call [phone].

## 2017-12-01 NOTE — PATIENT INSTRUCTIONS
Diet     For the first week, stay on a liquid diet. This includes broths, soups, milk shakes,   Have 5 or 6 small meals each day instead of 2 or 3 large meals. Diet Instructions     Full Liquid Diet (for the first week after surgery)   Principle: Anything you can pour you can have.   _ YES Items:    Jello    juice (Cranberry, Apple, Grape)    coffee    water    Popsicles    broth    ice    cream (strained) soups    pudding    ice cream    milk    yogurt    thinned oatmeal (or hot) cereal, Cream of Wheat, [place]    milk shakes    liquid meal suppliments like Ensure, [employer], etc.     NO Items: Carbonated beverages, sold foods, gum, hard candy   Instructions:   1. Start by taking a cup at a time. 2. Increase the amount a little each day. 3. Dont gulp liquids down. 4. This diet should be strictly adhered to after surgery. --------------------------------------------------------------------------------------------   DAY 7 THROUGH WEEK 4:   Soft Diet   Principle: These are mushy foods that require very little chewing.   _ YES Items:   _ Everything on the clear liquid and full liquid diets   _ pasta   _ rice   _ fish   _ mashed potatoes   _ apple sauce   _ mushy-cooked vegetables   _ soft fruits (bananas, canned fruits, grapes, peeled peaches and   nectarines)   _ ground or pulled chicken   _ ground beef   _ tamales   _ sushi   NO Items: Steak, baked potatoes, pork chops, lamb chops, hamburgers,   hotdogs, sandwiches, bread, raw or undercooked vegetables, chips, tacos   Instructions:   1. Start by taking a cup at a time. 2. Increase the amount a little each day. 3. This diet should be strictly adhered in day 5 though week 4 after   Surgery. Chew each bite of food very well. Eat slowly.  You may need to take 20 to 30 minutes to eat a meal.   Avoid crusty breads, bagels, tough meats, raw vegetables, nuts and seeds (including crackers and breads that have nuts and seeds), and other foods that are hard to digest.   If you feel full quickly, try to drink fluids between meals instead of with meals. Avoid carbonated beverages, such as soda pop. Avoid drinking with straws. This may help you swallow less air when you drink. Diet     For the first week, stay on a liquid diet. This includes broths, soups, milk shakes,   Have 5 or 6 small meals each day instead of 2 or 3 large meals. Diet Instructions     Full Liquid Diet (for the first week after surgery)   Principle: Anything you can pour you can have.   _ YES Items:    Jello    juice (Cranberry, Apple, Grape)    coffee    water    Popsicles    broth    ice    cream (strained) soups    pudding    ice cream    milk    yogurt    thinned oatmeal (or hot) cereal, Cream of Wheat, [place]    milk shakes    liquid meal suppliments like Ensure, [employer], etc.     NO Items: Carbonated beverages, sold foods, gum, hard candy   Instructions:   1. Start by taking a cup at a time. 2. Increase the amount a little each day. 3. Dont gulp liquids down. 4. This diet should be strictly adhered to after surgery. --------------------------------------------------------------------------------------------   DAY 7 THROUGH WEEK 4:   Soft Diet   Principle: These are mushy foods that require very little chewing.   _ YES Items:   _ Everything on the clear liquid and full liquid diets   _ pasta   _ rice   _ fish   _ mashed potatoes   _ apple sauce   _ mushy-cooked vegetables   _ soft fruits (bananas, canned fruits, grapes, peeled peaches and   nectarines)   _ ground or pulled chicken   _ ground beef   _ tamales   _ sushi   NO Items: Steak, baked potatoes, pork chops, lamb chops, hamburgers,   hotdogs, sandwiches, bread, raw or undercooked vegetables, chips, tacos   Instructions:   1. Start by taking a cup at a time. 2. Increase the amount a little each day.    3. This diet should be strictly adhered in day 5 though week 4 after   Surgery. Chew each bite of food very well. Eat slowly. You may need to take 20 to 30 minutes to eat a meal.   Avoid crusty breads, bagels, tough meats, raw vegetables, nuts and seeds (including crackers and breads that have nuts and seeds), and other foods that are hard to digest.   If you feel full quickly, try to drink fluids between meals instead of with meals. Avoid carbonated beverages, such as soda pop. Avoid drinking with straws. This may help you swallow less air when you drink.

## 2017-12-06 ENCOUNTER — TELEPHONE (OUTPATIENT)
Dept: SURGERY | Age: 27
End: 2017-12-06

## 2017-12-06 ENCOUNTER — DOCUMENTATION ONLY (OUTPATIENT)
Dept: SURGERY | Age: 27
End: 2017-12-06

## 2017-12-06 NOTE — TELEPHONE ENCOUNTER
Patient said she is experiencing a lot of pain on her side and the pain medication isnt helping she would like to make appointment to see Dr Hafsa Deal I informed patient that the pulling feeling is most likely from the sutures under her skin, she still said her pain is unbearable  and she wants to see dr Hafsa Deal

## 2017-12-06 NOTE — PROGRESS NOTES
Patient refuse to wait until tomorrow to see Dr Tahira Morales, informed patient he is not in the office today but we could schedule her to see him tomorrow. Patient refuses to wait. Explained to patient if the pain is unbearable and she cant wait until tomorrow than she can go to the nearest ED.  Patient said she will do so

## 2017-12-07 ENCOUNTER — OFFICE VISIT (OUTPATIENT)
Dept: SURGERY | Age: 27
End: 2017-12-07

## 2017-12-07 VITALS
WEIGHT: 187 LBS | OXYGEN SATURATION: 97 % | DIASTOLIC BLOOD PRESSURE: 65 MMHG | RESPIRATION RATE: 14 BRPM | HEIGHT: 61 IN | HEART RATE: 82 BPM | BODY MASS INDEX: 35.3 KG/M2 | SYSTOLIC BLOOD PRESSURE: 104 MMHG | TEMPERATURE: 98.6 F

## 2017-12-07 DIAGNOSIS — R11.0 NAUSEA: Primary | ICD-10-CM

## 2017-12-07 DIAGNOSIS — R10.9 ABDOMINAL PAIN, UNSPECIFIED ABDOMINAL LOCATION: ICD-10-CM

## 2017-12-07 DIAGNOSIS — Z09 POSTOPERATIVE EXAMINATION: ICD-10-CM

## 2017-12-07 RX ORDER — CYCLOBENZAPRINE HCL 10 MG
5 TABLET ORAL
Qty: 30 TAB | Refills: 0 | Status: SHIPPED | OUTPATIENT
Start: 2017-12-07 | End: 2018-03-10

## 2017-12-07 RX ORDER — OXYCODONE HYDROCHLORIDE 5 MG/1
5 TABLET ORAL
Qty: 20 TAB | Refills: 0 | Status: SHIPPED | OUTPATIENT
Start: 2017-12-07 | End: 2018-03-10

## 2017-12-07 NOTE — PROGRESS NOTES
1. Have you been to the ER, urgent care clinic since your last visit? Hospitalized since your last visit? Yes, 12/6/17 tricities     2. Have you seen or consulted any other health care providers outside of the [address] [place] [address] since your last visit? Include any pap smears or colon screening.  no

## 2017-12-12 NOTE — PROGRESS NOTES
Subjective:      Wing Welch is a 32 y.o. female presents for postop care 21 days following redo-Nissen. She complains of severe left sided abdominal pain. Pain is sharp and stabbing and does not respond to narcotics. She was in an OSH ER last night. No etiology was found and she was told to follow up today  She states she still has nausea with some PO intake. It is episodic and unpredictable. She states sometimes it happens with just water. Bowel movements are regular. The patient is voiding without difficulty. She is tearful the entire visit. She repeats over and over 'why me' and 'once I get one thing right in my life everything else goes wrong'    She states her supervisor feels she needs to be admitted     Objective:     Visit Vitals    /65 (BP 1 Location: Left arm, BP Patient Position: Sitting)    Pulse 82    Temp 98.6 °F (37 °C) (Oral)    Resp 14    Ht 5' 1\" (1.549 m)    Wt 187 lb (84.8 kg)    SpO2 97%    BMI 35.33 kg/m2       Exam deferred due to patients emotional state    CT report - status post nissen with no acute process    Assessment:     1. Abdominal pain - I suspect it is post surgical and more than the patient expected due to the redo nature. She has had two CT scans in 2 weeks and there has been to problem identified. Will add a muscle relaxant to see if that gets her pain under better control. Offered time off from work but, patient declined stating she needs to work to make money     2. Nausea - I think it is poor compliance with diet basted on her admissions during the last visit. But, will work it up. Will get UGI to assess for stricture, reflux, stasis in the esophagus, etc.    3.  Anxiety - hopefully this will improve with symptom improvement     Plan:     1. Flexeril  2. UGI  3. follow up next week .

## 2017-12-20 NOTE — DISCHARGE SUMMARY
[address] SUMMARY    Chantel Laguna  MR#: [id number]  : 1990  ACCOUNT #: [de-identified]   ADMIT DATE:   DISCHARGE DATE:     ADMISSION DIAGNOSES:  1. Recurrent paraesophageal hernia. 2.  Class 2 obesity. 3.  Heartburn and reflux. DISCHARGE DIAGNOSES:  1. Recurrent paraesophageal hernia. 2.  Class 2 obesity. 3.  Heartburn and reflux. SURGICAL PROCEDURES:  Laparoscopic redo Nissen fundoplication on     CONSULTS:  None. HISTORY AND PHYSICAL ON ADMISSION: Please see separate note. HOSPITAL COURSE: The patient was admitted to the surgical service and underwent a revision of a laparoscopic paraesophageal hernia. There were no apparent complications. She tolerated the procedure well. The patient's postoperative course was complicated by poor pain control and nausea. The patient slowly improved over four days postoperatively. She was transitioned from IV pain medication to oral pain medication and she improved daily on a liquid diet. On postoperative day #4 patient's pain was under control with oral pain medication. She had mild nausea with oral intake. She was up out of bed ambulating and was in stable condition. So the patient was discharged home on a full liquid diet with followup in 1 week. DISCHARGE CONDITION:  Improved. DISCHARGE DISPOSITION: Home. DISCHARGE MEDICATIONS: Please see his medication reconciliation. DISCHARGE DIET: Full liquid diet.       MD FRANKI Bartholomew/MAGDA  D: 2017 12:31     T: 2017 13:15  JOB #: [id number]

## 2017-12-29 ENCOUNTER — ED HISTORICAL/CONVERTED ENCOUNTER (OUTPATIENT)
Dept: OTHER | Age: 27
End: 2017-12-29

## 2018-03-10 ENCOUNTER — HOSPITAL ENCOUNTER (EMERGENCY)
Age: 28
Discharge: HOME | End: 2018-03-10
Attending: INTERNAL MEDICINE
Payer: MEDICAID

## 2018-03-10 VITALS
RESPIRATION RATE: 16 BRPM | DIASTOLIC BLOOD PRESSURE: 77 MMHG | OXYGEN SATURATION: 96 % | SYSTOLIC BLOOD PRESSURE: 122 MMHG | HEART RATE: 109 BPM | HEIGHT: 61 IN | BODY MASS INDEX: 33.99 KG/M2 | TEMPERATURE: 98.3 F | WEIGHT: 180 LBS

## 2018-03-10 DIAGNOSIS — L29.9 ITCHING: Primary | ICD-10-CM

## 2018-03-10 PROCEDURE — 99282 EMERGENCY DEPT VISIT SF MDM: CPT

## 2018-03-10 RX ORDER — AMITRIPTYLINE HYDROCHLORIDE 150 MG/1
100 TABLET, FILM COATED ORAL
COMMUNITY
End: 2018-10-18 | Stop reason: SDUPTHER

## 2018-03-10 RX ORDER — DULOXETIN HYDROCHLORIDE 20 MG/1
20 CAPSULE, DELAYED RELEASE ORAL DAILY
COMMUNITY
End: 2018-09-14 | Stop reason: CLARIF

## 2018-03-10 RX ORDER — PREDNISONE 20 MG/1
60 TABLET ORAL
Status: DISCONTINUED | OUTPATIENT
Start: 2018-03-10 | End: 2018-03-11 | Stop reason: HOSPADM

## 2018-03-10 RX ORDER — FAMOTIDINE 20 MG/1
20 TABLET, FILM COATED ORAL
Status: DISCONTINUED | OUTPATIENT
Start: 2018-03-10 | End: 2018-03-11 | Stop reason: HOSPADM

## 2018-03-10 RX ORDER — HYDROXYZINE PAMOATE 25 MG/1
25 CAPSULE ORAL
Status: DISCONTINUED | OUTPATIENT
Start: 2018-03-10 | End: 2018-03-11 | Stop reason: HOSPADM

## 2018-03-11 NOTE — ED NOTES
This RN in to medicate pt. Pt refused medications stating \"I don't want a pill. It would take another 45 minutes to work, I can be scratching and itching at home. MD made aware. Discharge instructions were given to the patient by Olga Fuentes RN. The patient left the Emergency Department ambulatory, alert and oriented and in no acute distress with 0 prescriptions. The patient was encouraged to call or return to the ED for worsening issues or problems and was encouraged to schedule a follow up appointment for continuing care. The patient verbalized understanding of discharge instructions and prescriptions, all questions were answered. The patient has no further concerns at this time.
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
## 2018-03-11 NOTE — ED NOTES
Pt presents to ED ambulatory complaining of \"itching all over body. \" Pt reports taking 3 benadryl at approx 5pm today without relief. Pt is alert and oriented x 4, RR even and unlabored, skin is warm and dry. Assessment completed and pt updated on plan of care. Emergency Department Nursing Plan of Care       The Nursing Plan of Care is developed from the Nursing assessment and Emergency Department Attending provider initial evaluation. The plan of care may be reviewed in the ED Provider note.     The Plan of Care was developed with the following considerations:   Patient / Family readiness to learn indicated by:verbalized understanding  Persons(s) to be included in education: patient  Barriers to Learning/Limitations:No    Signed     Jenae Guzman RN    3/10/2018   8:59 PM

## 2018-08-20 ENCOUNTER — DOCUMENTATION ONLY (OUTPATIENT)
Dept: FAMILY MEDICINE CLINIC | Age: 28
End: 2018-08-20

## 2018-08-20 NOTE — PROGRESS NOTES
Received VO from Dr. Gil Rodríguez to call Voltaren gel 1% into pt pharmacy for qty of 100 g with 2 additional refills.

## 2018-08-21 ENCOUNTER — HOSPITAL ENCOUNTER (EMERGENCY)
Age: 28
Discharge: HOME | End: 2018-08-21
Attending: EMERGENCY MEDICINE
Payer: COMMERCIAL

## 2018-08-21 ENCOUNTER — APPOINTMENT (OUTPATIENT)
Dept: GENERAL RADIOLOGY | Age: 28
End: 2018-08-21
Attending: EMERGENCY MEDICINE
Payer: COMMERCIAL

## 2018-08-21 VITALS
OXYGEN SATURATION: 98 % | DIASTOLIC BLOOD PRESSURE: 89 MMHG | SYSTOLIC BLOOD PRESSURE: 142 MMHG | RESPIRATION RATE: 16 BRPM | WEIGHT: 199 LBS | HEART RATE: 103 BPM | HEIGHT: 61 IN | BODY MASS INDEX: 37.57 KG/M2 | TEMPERATURE: 98 F

## 2018-08-21 DIAGNOSIS — M54.2 NECK PAIN: Primary | ICD-10-CM

## 2018-08-21 LAB
ALBUMIN SERPL-MCNC: 4.3 G/DL (ref 3.5–5)
ALBUMIN/GLOB SERPL: 0.9 {RATIO} (ref 1.1–2.2)
ALP SERPL-CCNC: 180 U/L (ref 45–117)
ALT SERPL-CCNC: 442 U/L (ref 12–78)
ANION GAP SERPL CALC-SCNC: 9 MMOL/L (ref 5–15)
AST SERPL-CCNC: 334 U/L (ref 15–37)
BASOPHILS # BLD: 0 K/UL (ref 0–0.1)
BASOPHILS NFR BLD: 0 % (ref 0–1)
BILIRUB SERPL-MCNC: 0.7 MG/DL (ref 0.2–1)
BUN SERPL-MCNC: 9 MG/DL (ref 6–20)
BUN/CREAT SERPL: 10 (ref 12–20)
CALCIUM SERPL-MCNC: 9 MG/DL (ref 8.5–10.1)
CHLORIDE SERPL-SCNC: 100 MMOL/L (ref 97–108)
CO2 SERPL-SCNC: 30 MMOL/L (ref 21–32)
CREAT SERPL-MCNC: 0.94 MG/DL (ref 0.55–1.02)
DIFFERENTIAL METHOD BLD: ABNORMAL
EOSINOPHIL # BLD: 0 K/UL (ref 0–0.4)
EOSINOPHIL NFR BLD: 0 % (ref 0–7)
ERYTHROCYTE [DISTWIDTH] IN BLOOD BY AUTOMATED COUNT: 13.2 % (ref 11.5–14.5)
GLOBULIN SER CALC-MCNC: 4.6 G/DL (ref 2–4)
GLUCOSE SERPL-MCNC: 121 MG/DL (ref 65–100)
HCT VFR BLD AUTO: 42.3 % (ref 35–47)
HGB BLD-MCNC: 14.2 G/DL (ref 11.5–16)
IMM GRANULOCYTES # BLD: 0 K/UL (ref 0–0.04)
IMM GRANULOCYTES NFR BLD AUTO: 0 % (ref 0–0.5)
LYMPHOCYTES # BLD: 0.7 K/UL (ref 0.8–3.5)
LYMPHOCYTES NFR BLD: 7 % (ref 12–49)
MCH RBC QN AUTO: 31.6 PG (ref 26–34)
MCHC RBC AUTO-ENTMCNC: 33.6 G/DL (ref 30–36.5)
MCV RBC AUTO: 94 FL (ref 80–99)
MONOCYTES # BLD: 0 K/UL (ref 0–1)
MONOCYTES NFR BLD: 0 % (ref 5–13)
NEUTS SEG # BLD: 9.9 K/UL (ref 1.8–8)
NEUTS SEG NFR BLD: 93 % (ref 32–75)
NRBC # BLD: 0 K/UL (ref 0–0.01)
NRBC BLD-RTO: 0 PER 100 WBC
PLATELET # BLD AUTO: 306 K/UL (ref 150–400)
PMV BLD AUTO: 8.9 FL (ref 8.9–12.9)
POTASSIUM SERPL-SCNC: 3.6 MMOL/L (ref 3.5–5.1)
PROT SERPL-MCNC: 8.9 G/DL (ref 6.4–8.2)
RBC # BLD AUTO: 4.5 M/UL (ref 3.8–5.2)
RBC MORPH BLD: ABNORMAL
SODIUM SERPL-SCNC: 139 MMOL/L (ref 136–145)
WBC # BLD AUTO: 10.6 K/UL (ref 3.6–11)

## 2018-08-21 PROCEDURE — 72052 X-RAY EXAM NECK SPINE 6/>VWS: CPT

## 2018-08-21 PROCEDURE — 96374 THER/PROPH/DIAG INJ IV PUSH: CPT

## 2018-08-21 PROCEDURE — 96375 TX/PRO/DX INJ NEW DRUG ADDON: CPT

## 2018-08-21 PROCEDURE — 96361 HYDRATE IV INFUSION ADD-ON: CPT

## 2018-08-21 PROCEDURE — 80053 COMPREHEN METABOLIC PANEL: CPT | Performed by: EMERGENCY MEDICINE

## 2018-08-21 PROCEDURE — 36415 COLL VENOUS BLD VENIPUNCTURE: CPT | Performed by: EMERGENCY MEDICINE

## 2018-08-21 PROCEDURE — 72050 X-RAY EXAM NECK SPINE 4/5VWS: CPT

## 2018-08-21 PROCEDURE — 74011250637 HC RX REV CODE- 250/637: Performed by: EMERGENCY MEDICINE

## 2018-08-21 PROCEDURE — 85025 COMPLETE CBC W/AUTO DIFF WBC: CPT | Performed by: EMERGENCY MEDICINE

## 2018-08-21 PROCEDURE — 99283 EMERGENCY DEPT VISIT LOW MDM: CPT

## 2018-08-21 PROCEDURE — 74011250636 HC RX REV CODE- 250/636: Performed by: EMERGENCY MEDICINE

## 2018-08-21 RX ORDER — METHOCARBAMOL 750 MG/1
750 TABLET, FILM COATED ORAL
Status: COMPLETED | OUTPATIENT
Start: 2018-08-21 | End: 2018-08-21

## 2018-08-21 RX ORDER — OXYCODONE HYDROCHLORIDE 5 MG/1
5 TABLET ORAL
Qty: 10 TAB | Refills: 0 | Status: SHIPPED | OUTPATIENT
Start: 2018-08-21 | End: 2018-09-14 | Stop reason: CLARIF

## 2018-08-21 RX ORDER — PREDNISONE 20 MG/1
TABLET ORAL
Qty: 10 TAB | Refills: 0 | Status: SHIPPED | OUTPATIENT
Start: 2018-08-21 | End: 2018-09-14 | Stop reason: CLARIF

## 2018-08-21 RX ORDER — PREDNISONE 20 MG/1
TABLET ORAL
Qty: 10 TAB | Refills: 0 | Status: SHIPPED | OUTPATIENT
Start: 2018-08-21 | End: 2018-08-21 | Stop reason: SDUPTHER

## 2018-08-21 RX ORDER — METHOCARBAMOL 500 MG/1
500 TABLET, FILM COATED ORAL 4 TIMES DAILY
Qty: 30 TAB | Refills: 0 | Status: SHIPPED | OUTPATIENT
Start: 2018-08-21 | End: 2018-08-31

## 2018-08-21 RX ORDER — ONDANSETRON 2 MG/ML
4 INJECTION INTRAMUSCULAR; INTRAVENOUS
Status: COMPLETED | OUTPATIENT
Start: 2018-08-21 | End: 2018-08-21

## 2018-08-21 RX ORDER — MORPHINE SULFATE 4 MG/ML
4 INJECTION INTRAVENOUS
Status: COMPLETED | OUTPATIENT
Start: 2018-08-21 | End: 2018-08-21

## 2018-08-21 RX ADMIN — METHOCARBAMOL 750 MG: 750 TABLET ORAL at 20:48

## 2018-08-21 RX ADMIN — SODIUM CHLORIDE 1000 ML: 900 INJECTION, SOLUTION INTRAVENOUS at 20:47

## 2018-08-21 RX ADMIN — ONDANSETRON 4 MG: 2 INJECTION INTRAMUSCULAR; INTRAVENOUS at 20:48

## 2018-08-21 RX ADMIN — MORPHINE SULFATE 4 MG: 4 INJECTION INTRAVENOUS at 20:47

## 2018-08-21 NOTE — PROGRESS NOTES
Patient with h/o lupus and the last 3 days has severe pain in the neck and today dizziness and vomitting  The neck pain is slightly better with voltaren but still very uncomfortable  Will try prednisone taper

## 2018-08-21 NOTE — LETTER
[address] 
[facility] EMERGENCY DEPT 
[address] [facility] [id number] [phone] Work/School Note Date: 8/21/2018 To Whom It May concern: 
 
Irma Esquivel was seen and treated today in the emergency room by the following provider(s): 
Attending Provider: Philip Bello MD 
Physician Assistant: Saintclair Rooks, PA. Irma Esquivel may return to work on 8/23/2018. Hedy Belle Sincerely, Keanu Ag, SHANT

## 2018-08-22 ENCOUNTER — OFFICE VISIT (OUTPATIENT)
Dept: FAMILY MEDICINE CLINIC | Age: 28
End: 2018-08-22

## 2018-08-22 VITALS
SYSTOLIC BLOOD PRESSURE: 131 MMHG | RESPIRATION RATE: 17 BRPM | DIASTOLIC BLOOD PRESSURE: 84 MMHG | TEMPERATURE: 98.4 F | WEIGHT: 199 LBS | HEART RATE: 90 BPM | OXYGEN SATURATION: 95 % | HEIGHT: 61 IN | BODY MASS INDEX: 37.57 KG/M2

## 2018-08-22 DIAGNOSIS — K75.9 HEPATITIS: Primary | ICD-10-CM

## 2018-08-22 PROBLEM — E66.01 SEVERE OBESITY (BMI 35.0-39.9): Status: ACTIVE | Noted: 2018-08-22

## 2018-08-22 NOTE — DISCHARGE INSTRUCTIONS
Neck Pain: Care Instructions  Your Care Instructions    You can have neck pain anywhere from the bottom of your head to the top of your shoulders. It can spread to the upper back or arms. Injuries, painting a ceiling, sleeping with your neck twisted, staying in one position for too long, and many other activities can cause neck pain. Most neck pain gets better with home care. Your doctor may recommend medicine to relieve pain or relax your muscles. He or she may suggest exercise and physical therapy to increase flexibility and relieve stress. You may need to wear a special (cervical) collar to support your neck for a day or two. Follow-up care is a key part of your treatment and safety. Be sure to make and go to all appointments, and call your doctor if you are having problems. It's also a good idea to know your test results and keep a list of the medicines you take. How can you care for yourself at home? · Try using a heating pad on a low or medium setting for 15 to 20 minutes every 2 or 3 hours. Try a warm shower in place of one session with the heating pad. · You can also try an ice pack for 10 to 15 minutes every 2 to 3 hours. Put a thin cloth between the ice and your skin. · Take pain medicines exactly as directed. ¨ If the doctor gave you a prescription medicine for pain, take it as prescribed. ¨ If you are not taking a prescription pain medicine, ask your doctor if you can take an over-the-counter medicine. · If your doctor recommends a cervical collar, wear it exactly as directed. When should you call for help? Call your doctor now or seek immediate medical care if:  ? · You have new or worsening numbness in your arms, buttocks or legs. ? · You have new or worsening weakness in your arms or legs. (This could make it hard to stand up.)   ? · You lose control of your bladder or bowels. ? Watch closely for changes in your health, and be sure to contact your doctor if:  ? · Your neck pain is getting worse. ? · You are not getting better after 1 week. ? · You do not get better as expected. Where can you learn more? Go to http://connor-radha.info/. Enter [id number] in the search box to learn more about \"Neck Pain: Care Instructions. \"  Current as of: March 21, 2017  Content Version: 11.5  © 5154-4559 [employer]. Care instructions adapted under license by [employer] (which disclaims liability or warranty for this information). If you have questions about a medical condition or this instruction, always ask your healthcare professional. [place] [address] any warranty or liability for your use of this information. We hope that we have addressed all of your medical concerns. The examination and treatment you received in the Emergency Department were for an emergent problem and were not intended as complete care. It is important that you follow up with your healthcare provider(s) for ongoing care. If your symptoms worsen or do not improve as expected, and you are unable to reach your usual health care provider(s), you should return to the Emergency Department. Today's healthcare is undergoing tremendous change, and patient satisfaction surveys are one of the many tools to assess the quality of medical care. You may receive a survey from the [employer] organization regarding your experience in the Emergency Department. I hope that your experience has been completely positive, particularly the medical care that I provided. As such, please participate in the survey; anything less than excellent does not meet my expectations or intentions. [facility] and [facility] participate in nationally recognized quality of care measures.   If your blood pressure is greater than 120/80, as reported below, we urge that you seek medical care to address the potential of high blood pressure, commonly known as hypertension. Hypertension can be hereditary or can be caused by certain medical conditions, pain, stress, or \"white coat syndrome. \"       Please make an appointment with your health care provider(s) for follow up of your Emergency Department visit. VITALS:   Patient Vitals for the past 8 hrs:   Temp Pulse Resp BP SpO2   08/21/18 2015 98 °F (36.7 °C) (!) 103 16 142/89 98 %          Thank you for allowing us to provide you with medical care today. We realize that you have many choices for your emergency care needs. Please choose us in the future for any continued health care needs. Kirill Martinez, [facility].   Office: [phone]            Recent Results (from the past 24 hour(s))   CBC WITH AUTOMATED DIFF    Collection Time: 08/21/18  8:44 PM   Result Value Ref Range    WBC 10.6 3.6 - 11.0 K/uL    RBC 4.50 3.80 - 5.20 M/uL    HGB 14.2 11.5 - 16.0 g/dL    HCT 42.3 35.0 - 47.0 %    MCV 94.0 80.0 - 99.0 FL    MCH 31.6 26.0 - 34.0 PG    MCHC 33.6 30.0 - 36.5 g/dL    RDW 13.2 11.5 - 14.5 %    PLATELET 443 399 - 551 K/uL    MPV 8.9 8.9 - 12.9 FL    NRBC 0.0 0  WBC    ABSOLUTE NRBC 0.00 0.00 - 0.01 K/uL    NEUTROPHILS 93 (H) 32 - 75 %    LYMPHOCYTES 7 (L) 12 - 49 %    MONOCYTES 0 (L) 5 - 13 %    EOSINOPHILS 0 0 - 7 %    BASOPHILS 0 0 - 1 %    IMMATURE GRANULOCYTES 0 0.0 - 0.5 %    ABS. NEUTROPHILS 9.9 (H) 1.8 - 8.0 K/UL    ABS. LYMPHOCYTES 0.7 (L) 0.8 - 3.5 K/UL    ABS. MONOCYTES 0.0 0.0 - 1.0 K/UL    ABS. EOSINOPHILS 0.0 0.0 - 0.4 K/UL    ABS. BASOPHILS 0.0 0.0 - 0.1 K/UL    ABS. IMM.  GRANS. 0.0 0.00 - 0.04 K/UL    DF SMEAR SCANNED      RBC COMMENTS NORMOCYTIC, NORMOCHROMIC     METABOLIC PANEL, COMPREHENSIVE    Collection Time: 08/21/18  8:44 PM   Result Value Ref Range    Sodium 139 136 - 145 mmol/L    Potassium 3.6 3.5 - 5.1 mmol/L    Chloride 100 97 - 108 mmol/L    CO2 30 21 - 32 mmol/L    Anion gap 9 5 - 15 mmol/L    Glucose 121 (H) 65 - 100 mg/dL    BUN 9 6 - 20 MG/DL    Creatinine 0.94 0.55 - 1.02 MG/DL    BUN/Creatinine ratio 10 (L) 12 - 20      GFR est AA >60 >60 ml/min/1.73m2    GFR est non-AA >60 >60 ml/min/1.73m2    Calcium 9.0 8.5 - 10.1 MG/DL    Bilirubin, total 0.7 0.2 - 1.0 MG/DL    ALT (SGPT) 442 (H) 12 - 78 U/L    AST (SGOT) 334 (H) 15 - 37 U/L    Alk. phosphatase 180 (H) 45 - 117 U/L    Protein, total 8.9 (H) 6.4 - 8.2 g/dL    Albumin 4.3 3.5 - 5.0 g/dL    Globulin 4.6 (H) 2.0 - 4.0 g/dL    A-G Ratio 0.9 (L) 1.1 - 2.2         Xr Spine Cerv Min 6 Vws    Result Date: 8/21/2018  EXAM:  XR SPINE CERV 4 OR 5 V INDICATION: Neck pain. COMPARISON: None. FINDINGS: AP, lateral, swimmer's lateral, bilateral oblique and open mouth odontoid views of the cervical spine were obtained. The alignment is normal. The vertebral body heights and disc spaces are well-preserved. There is no fracture or subluxation. The prevertebral soft tissues are normal. The odontoid process is intact and the C1-C2 relationship is normal. The neural foramina are symmetrical.     IMPRESSION: Normal cervical spine.

## 2018-08-22 NOTE — ED NOTES
Provider has reviewed discharge instructions with the patient. The patient verbalized understanding. Work note provided by provider. Pt ambulated toward exit without difficulty.

## 2018-08-22 NOTE — PROGRESS NOTES
1. Have you been to the ER, urgent care clinic since your last visit? Hospitalized since your last visit? [facility] on 8/21[facility]18 for neck pain. 2. Have you seen or consulted any other health care providers outside of the [address] [place] [address] since your last visit? Include any pap smears or colon screening.  No     Chief Complaint   Patient presents with   [address] on 8/21/18 for neck pain

## 2018-08-22 NOTE — ED TRIAGE NOTES
Patient co left neck pain since thursday. Patient states the pain has increasingly gotten worse with nausea friday and Saturday with vomiting Sunday, Monday, and Tuesday. Patient denies injury. Patient adds she had a solumedrol injection at work today with no relief.
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
## 2018-08-22 NOTE — PROGRESS NOTES
Chief Complaint   Patient presents with   [address]hele on 8/21/18 for neck pain     she is a 32y.o. year old female who presents for evalution. She developed pain in the neck 37 days ago and has progressively worsened  Saturday she developed moree pain and nausea and dizziness  Yesterday she got an injection of depomedrol  She was given a prescription fro prednisone  She went to the ER yesterday for the neck pain. They did labs and she had elevated  Liver enzymes  The neck pain was above a 10 and now is a five  She has been vaccinated for hep B  No h/o IVDA    Reviewed PmHx, RxHx, FmHx, SocHx, AllgHx and updated and dated in the chart. Aspirin yes ____   No____ N/A____    Patient Active Problem List    Diagnosis    Severe obesity (BMI 35.0-39.9) ([facility] [address]    S/P repair of paraesophageal hernia    Paraesophageal hernia    GERD (gastroesophageal reflux disease)    Obesity, Class II, BMI 35-39.9    Sebaceous cyst     S/P excision; Along sternum in cleavage.  History of Nissen fundoplication     4 duodenal ulcers, chronic gastritis, Grade C esophagitis, Chronic GERD, hernia, small tumor. Done August/2016.  Chronic migraine without aura without status migrainosus, not intractable    Cervical incompetence       Nurse notes were reviewed and copied and are correct  Review of Systems - negative except as listed above in the HPI    Objective:     Vitals:    08/22/18 1307   BP: 131/84   Pulse: 90   Resp: 17   Temp: 98.4 °F (36.9 °C)   TempSrc: Oral   SpO2: 95%   Weight: 199 lb (90.3 kg)   Height: 5' 1\" (1.549 m)     Physical Examination: General appearance - alert, well appearing, and in no distress and oriented to person, place, and time  Mental status - alert, oriented to person, place, and time  Abdomen - soft, nontender, nondistended, no masses or organomegaly      Assessment/ Plan:   Diagnoses and all orders for this visit:    1.  Hepatitis  -     CRP, HIGH SENSITIVITY  - SED RATE (ESR)  -     HEPATIC FUNCTION PANEL  -     HEPATITIS C AB  -     HEPATITIS A AB, IGM & TOTAL  -     Cheli Arthritis [facility]     I have referred her to a new rheum for more aggressive care for the lupus              Follow-up Disposition:  Return if symptoms worsen or fail to improve. ICD-10-CM ICD-9-CM    1. Hepatitis K75.9 573.3 CRP, HIGH SENSITIVITY      SED RATE (ESR)      HEPATIC FUNCTION PANEL      HEPATITIS C AB      HEPATITIS A AB, IGM & TOTAL      REFERRAL TO RHEUMATOLOGY      CANCELED: REFERRAL TO RHEUMATOLOGY       I have discussed the diagnosis with the patient and the intended plan as seen in the above orders. The patient has received an after-visit summary and questions were answered concerning future plans. Medication Side Effects and Warnings were discussed with patient: yes  Patient Labs were reviewed and or requested: yes  Patient Past Records were reviewed and or requested: yes        There are no Patient Instructions on file for this visit.     The patient verbalizes understanding and agrees with the plan of care        Patient has the advanced directives booklet to review

## 2018-08-22 NOTE — MR AVS SNAPSHOT
[address] [facility] [address] 
[phone] Patient: Obey Toure MRN:  [id number] Visit Information Date & Time Provider Department Dept. Phone Encounter #  
 8/22/2018  1:00 PM Fidelia Otero MD [address] [place] [address] [id number] Upcoming Health Maintenance Date Due  
 PAP AKA CERVICAL CYTOLOGY 12/5/2009 DTaP/Tdap/Td series (1 - Tdap) 2/2/2011 Influenza Age 5 to Adult 8/1/2018 Allergies as of 8/22/2018  Review Complete On: 8/22/2018 By: Fidelia Otero MD  
  
 Severity Noted Reaction Type Reactions Latex High 01/30/2011    Anaphylaxis Acetaminophen High 01/30/2011    Anaphylaxis Other Plant, Animal, Environmental High 01/04/2017   Systemic Hives Allergic to everything outside. Nsaids (Non-steroidal Anti-inflammatory Drug) Low 01/04/2017   Systemic Other (comments) Advised by her GI doctor not to take till they figure out what is going on with her stomach. Currently has a Nissen-fundiplication. Current Immunizations  Never Reviewed Name Date Influenza Vaccine  Deferred (Contraindication) TD Vaccine 2/1/2011 12:00 AM  
  
 Not reviewed this visit You Were Diagnosed With   
  
 Codes Comments Hepatitis    -  Primary ICD-10-CM: K75.9 ICD-9-CM: 573.3 Vitals BP Pulse Temp Resp Height(growth percentile) Weight(growth percentile) 131/84 90 98.4 °F (36.9 °C) (Oral) 17 5' 1\" (1.549 m) 199 lb (90.3 kg) SpO2 BMI OB Status Smoking Status 95% 37.6 kg/m2 IUD Never Smoker Vitals History BMI and BSA Data Body Mass Index Body Surface Area  
 37.6 kg/m 2 1.97 m 2 Preferred Pharmacy Pharmacy Name Phone [id number] [address] [facility] AT [place] [address] [phone] Your Updated Medication List  
  
   
 This list is accurate as of 8/22/18  1:44 PM.  Always use your most recent med list.  
  
  
  
  
 amitriptyline 150 mg tablet Commonly known as:  ELAVIL Take 100 mg by mouth nightly. CYMBALTA 20 mg capsule Generic drug:  DULoxetine Take 20 mg by mouth daily. methocarbamol 500 mg tablet Commonly known as:  ROBAXIN Take 1 Tab by mouth four (4) times daily for 10 days. oxyCODONE IR 5 mg immediate release tablet Commonly known as:  Veronica Ramal Take 1 Tab by mouth every eight (8) hours as needed for Pain. Max Daily Amount: 15 mg.  
  
 predniSONE 20 mg tablet Commonly known as:  DELTASONE  
2 tab ea day  For 2 days, 1.5 tab each day for 2 days, then 1 tab each day for 2 days, then 0.5 mg each day for 2 days We Performed the Following CRP, HIGH SENSITIVITY [08188 CPT(R)] HEPATIC FUNCTION PANEL [80837 CPT(R)] HEPATITIS A AB, IGM & TOTAL [IRQ39534 Custom] HEPATITIS C AB [82578 CPT(R)] REFERRAL TO RHEUMATOLOGY [LVH00 Custom] Comments:  
 eval and treat for lupus and posible lupus related hepatitis SED RATE (ESR) [id number] CPT(R)] Referral Information Referral ID Referred By Referred To  
  
 [id number] Rafael lacy, 4 Tamanna Chavez MD   
   [address] [place] [address], [address] [place] [address] Phone: [phone] Fax: [phone] Visits Status Start Date End Date 1 New Request 8/22/18 8/22/19 If your referral has a status of pending review or denied, additional information will be sent to support the outcome of this decision. Introducing [facility]! [employer] introduces [employer] patient portal. Now you can access parts of your medical record, email your doctor's office, and request medication refills online. 1. In your internet browser, go to [email or web address] 2. Click on the First Time User? Click Here link in the Sign In box. You will see the New Member Sign Up page. 3. Enter your [employer] Access Code exactly as it appears below. You will not need to use this code after youve completed the sign-up process. If you do not sign up before the expiration date, you must request a new code. · [employer] Access Code: [email or web address] Expires: 11/19/2018  8:15 PM 
 
4. Enter the last four digits of your Social Security Number (xxxx) and Date of Birth (mm/dd/yyyy) as indicated and click Submit. You will be taken to the next sign-up page. 5. Create a [employer] ID. This will be your [employer] login ID and cannot be changed, so think of one that is secure and easy to remember. 6. Create a [employer] password. You can change your password at any time. 7. Enter your Password Reset Question and Answer. This can be used at a later time if you forget your password. 8. Enter your e-mail address. You will receive e-mail notification when new information is available in [address]. 9. Click Sign Up. You can now view and download portions of your medical record. 10. Click the Download Summary menu link to download a portable copy of your medical information. If you have questions, please visit the Frequently Asked Questions section of the [employer] website. Remember, [employer] is NOT to be used for urgent needs. For medical emergencies, dial 911. Now available from your iPhone and Android! Please provide this summary of care documentation to your next provider. Your primary care clinician is listed as Preet Bruner. If you have any questions after today's visit, please call [phone].

## 2018-08-22 NOTE — ED NOTES
8:19 PM  I have evaluated the patient as the Provider in Triage. I have reviewed Her vital signs and the triage nurse assessment. I have talked with the patient and any available family and advised that I am the provider in triage and have ordered the appropriate study to initiate their work up based on the clinical presentation during my assessment. I have advised that the patient will be accommodated in the Main ED as soon as possible. I have also requested to contact the triage nurse or myself immediately if the patient experiences any changes in their condition during this brief waiting period. Patient with severe neck pain - [employer] employee - symptoms started 5 days ago - check labs, give IVF and meds for symptoms and x-ray.   Samantha Ahmadi MD

## 2018-08-23 LAB
ALBUMIN SERPL-MCNC: 4.6 G/DL (ref 3.5–5.5)
ALP SERPL-CCNC: 156 IU/L (ref 39–117)
ALT SERPL-CCNC: 327 IU/L (ref 0–32)
AST SERPL-CCNC: 153 IU/L (ref 0–40)
BILIRUB DIRECT SERPL-MCNC: 0.11 MG/DL (ref 0–0.4)
BILIRUB SERPL-MCNC: 0.4 MG/DL (ref 0–1.2)
CRP SERPL HS-MCNC: 5.18 MG/L (ref 0–3)
ERYTHROCYTE [SEDIMENTATION RATE] IN BLOOD BY WESTERGREN METHOD: 34 MM/HR (ref 0–32)
HAV AB SER QL IA: NEGATIVE
HAV IGM SERPL QL IA: NEGATIVE
HCV AB S/CO SERPL IA: <0.1 S/CO RATIO (ref 0–0.9)
PROT SERPL-MCNC: 7.1 G/DL (ref 6–8.5)

## 2018-09-07 DIAGNOSIS — R79.89 ABNORMAL LIVER FUNCTION TEST: Primary | ICD-10-CM

## 2018-09-08 LAB
ALBUMIN SERPL-MCNC: 4.5 G/DL (ref 3.5–5.5)
ALBUMIN/GLOB SERPL: 1.7 {RATIO} (ref 1.2–2.2)
ALP SERPL-CCNC: 75 IU/L (ref 39–117)
ALT SERPL-CCNC: 19 IU/L (ref 0–32)
AST SERPL-CCNC: 21 IU/L (ref 0–40)
BILIRUB SERPL-MCNC: 0.7 MG/DL (ref 0–1.2)
BUN SERPL-MCNC: 12 MG/DL (ref 6–20)
BUN/CREAT SERPL: 15 (ref 9–23)
CALCIUM SERPL-MCNC: 9.8 MG/DL (ref 8.7–10.2)
CHLORIDE SERPL-SCNC: 98 MMOL/L (ref 96–106)
CO2 SERPL-SCNC: 25 MMOL/L (ref 20–29)
CREAT SERPL-MCNC: 0.8 MG/DL (ref 0.57–1)
GLOBULIN SER CALC-MCNC: 2.7 G/DL (ref 1.5–4.5)
GLUCOSE SERPL-MCNC: 77 MG/DL (ref 65–99)
POTASSIUM SERPL-SCNC: 3.7 MMOL/L (ref 3.5–5.2)
PROT SERPL-MCNC: 7.2 G/DL (ref 6–8.5)
SODIUM SERPL-SCNC: 138 MMOL/L (ref 134–144)

## 2018-09-14 ENCOUNTER — OFFICE VISIT (OUTPATIENT)
Dept: RHEUMATOLOGY | Age: 28
End: 2018-09-14

## 2018-09-14 VITALS
RESPIRATION RATE: 20 BRPM | SYSTOLIC BLOOD PRESSURE: 127 MMHG | HEART RATE: 106 BPM | TEMPERATURE: 98.9 F | OXYGEN SATURATION: 97 % | WEIGHT: 205 LBS | BODY MASS INDEX: 38.71 KG/M2 | DIASTOLIC BLOOD PRESSURE: 85 MMHG | HEIGHT: 61 IN

## 2018-09-14 DIAGNOSIS — R74.01 TRANSAMINITIS: ICD-10-CM

## 2018-09-14 DIAGNOSIS — M25.50 ARTHRALGIA, UNSPECIFIED JOINT: Primary | ICD-10-CM

## 2018-09-14 DIAGNOSIS — L30.8 INTERFACE DERMATITIS: ICD-10-CM

## 2018-09-14 RX ORDER — LORAZEPAM 1 MG/1
TABLET ORAL
COMMUNITY
End: 2019-06-28

## 2018-09-14 RX ORDER — CYCLOBENZAPRINE HCL 10 MG
TABLET ORAL
Refills: 2 | COMMUNITY
Start: 2018-08-20 | End: 2019-06-28

## 2018-09-14 RX ORDER — ALPRAZOLAM 1 MG/1
TABLET ORAL
COMMUNITY
End: 2018-09-14 | Stop reason: CLARIF

## 2018-09-14 RX ORDER — AMITRIPTYLINE HYDROCHLORIDE 50 MG/1
TABLET, FILM COATED ORAL
Refills: 3 | COMMUNITY
Start: 2018-07-15 | End: 2019-04-11

## 2018-09-14 RX ORDER — GABAPENTIN 300 MG/1
300 CAPSULE ORAL 3 TIMES DAILY
Refills: 3 | COMMUNITY
Start: 2018-08-25 | End: 2020-08-25
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
## 2018-09-14 NOTE — MR AVS SNAPSHOT
[address] [facility] [address] 
[phone] Patient: Evans Damon MRN:  [id number] Visit Information Date & Time Provider Department Dept. Phone Encounter #  
 9/14/2018  1:30 PM Margy Fitch MD [facility] [phone] [id number] Follow-up Instructions Return in about 3 weeks (around 10/5/2018) for follow up. Upcoming Health Maintenance Date Due  
 PAP AKA CERVICAL CYTOLOGY 12/5/2009 DTaP/Tdap/Td series (1 - Tdap) 2/2/2011 Influenza Age 5 to Adult 8/1/2018 Allergies as of 9/14/2018  Review Complete On: 9/14/2018 By: Christopher Gray LPN Severity Noted Reaction Type Reactions Latex High 01/30/2011    Anaphylaxis Acetaminophen High 01/30/2011    Anaphylaxis Other Plant, Animal, Environmental High 01/04/2017   Systemic Hives Allergic to everything outside. Nsaids (Non-steroidal Anti-inflammatory Drug) Low 01/04/2017   Systemic Other (comments) Advised by her GI doctor not to take till they figure out what is going on with her stomach. Currently has a Nissen-fundiplication. Current Immunizations  Never Reviewed Name Date Influenza Vaccine  Deferred (Contraindication) TD Vaccine 2/1/2011 12:00 AM  
  
 Not reviewed this visit You Were Diagnosed With   
  
 Codes Comments Arthralgia, unspecified joint    -  Primary ICD-10-CM: M25.50 ICD-9-CM: 719.40 Transaminitis     ICD-10-CM: R74.0 ICD-9-CM: 790.4 Interface dermatitis     ICD-10-CM: L25.9 ICD-9-CM: 692.9 Vitals BP Pulse Temp Resp Height(growth percentile) Weight(growth percentile) 127/85 (BP 1 Location: Left arm, BP Patient Position: Sitting) (!) 106 98.9 °F (37.2 °C) (Oral) 20 5' 1\" (1.549 m) 205 lb (93 kg) SpO2 BMI OB Status Smoking Status 97% 38.73 kg/m2 IUD Never Smoker Vitals History BMI and BSA Data Body Mass Index Body Surface Area 38.73 kg/m 2 2 m 2 Preferred Pharmacy Pharmacy Name Phone [id number] [address] [place] [address] [phone] Your Updated Medication List  
  
   
This list is accurate as of 9/14/18  2:40 PM.  Always use your most recent med list.  
  
  
  
  
 * amitriptyline 150 mg tablet Commonly known as:  ELAVIL Take 100 mg by mouth nightly. * amitriptyline 50 mg tablet Commonly known as:  ELAVIL TAKE 1 TO 2 TABLETS AT BEDTIME  
  
 cyclobenzaprine 10 mg tablet Commonly known as:  FLEXERIL  
TAKE 1 TABLET BY MOUTH TWICE A DAY AS NEEDED.  
  
 gabapentin 300 mg capsule Commonly known as:  NEURONTIN  
TAKE ONE CAPSULE IN THE EVENING FOR 2 WEEKS, THEN 1 CAPSULE TWICE A DAY  
  
 XANAX 1 mg tablet Generic drug:  ALPRAZolam  
Take  by mouth. * Notice: This list has 2 medication(s) that are the same as other medications prescribed for you. Read the directions carefully, and ask your doctor or other care provider to review them with you. We Performed the Following ALDOLASE L5553482 CPT(R)] ANTI-SMOOTH MUSCLE/MITOCHOND. [02181 CPT(R)] ANTINUCLEAR ANTIBODIES, IFA G4897077 CPT(R)] C REACTIVE PROTEIN, QT [70632 CPT(R)] CK D5944139 CPT(R)] DSDNA (NDNA) SCRN BY BOOKER [[id number] Custom] METABOLIC PANEL, COMPREHENSIVE [43565 CPT(R)] PROT+CREATU (RANDOM) D6220616 CPT(R)] PROTHROMBIN TIME + INR [05690 CPT(R)] RHEUMATOID FACTOR, QL V4852647 CPT(R)] RNP ANTIBODIES T7296883 CPT(R)] SCLERODERMA (SCL-70) AB, IGG [[id number] CPT(R)] SED RATE (ESR) O1160989 CPT(R)] SJOGREN'S ABS, SSA AND SSB [[id number] Custom] SMITH ANTIBODIES [[id number] Custom] SPEP AND JAY, SERUM [[id number] Custom] TSH +T4F + T-3UPT [[id number] Custom] Follow-up Instructions Return in about 3 weeks (around 10/5/2018) for follow up. Introducing [facility]! Kemi Moreland introduces [email or web address] patient portal. Now you can access parts of your medical record, email your doctor's office, and request medication refills online. 1. In your internet browser, go to [email or web address] 2. Click on the First Time User? Click Here link in the Sign In box. You will see the New Member Sign Up page. 3. Enter your [email or web address] Access Code exactly as it appears below. You will not need to use this code after youve completed the sign-up process. If you do not sign up before the expiration date, you must request a new code. · [email or web address] Access Code: [email or web address] Expires: 11/19/2018  8:15 PM 
 
4. Enter the last four digits of your Social Security Number (xxxx) and Date of Birth (mm/dd/yyyy) as indicated and click Submit. You will be taken to the next sign-up page. 5. Create a [email or web address] ID. This will be your [email or web address] login ID and cannot be changed, so think of one that is secure and easy to remember. 6. Create a [email or web address] password. You can change your password at any time. 7. Enter your Password Reset Question and Answer. This can be used at a later time if you forget your password. 8. Enter your e-mail address. You will receive e-mail notification when new information is available in [address] 9. Click Sign Up. You can now view and download portions of your medical record. 10. Click the Download Summary menu link to download a portable copy of your medical information. If you have questions, please visit the Frequently Asked Questions section of the [email or web address] website. Remember, [email or web address] is NOT to be used for urgent needs. For medical emergencies, dial 911. Now available from your iPhone and Android! Please provide this summary of care documentation to your next provider. Your primary care clinician is listed as Matt Pollard. If you have any questions after today's visit, please call [phone].

## 2018-09-15 LAB
CREAT UR-MCNC: 118.5 MG/DL
PROT UR-MCNC: 7.3 MG/DL
PROT/CREAT UR: 62 MG/G CREAT (ref 0–200)

## 2018-09-24 LAB
ACTIN IGG SERPL-ACNC: 6 UNITS (ref 0–19)
ALBUMIN SERPL ELPH-MCNC: 3.7 G/DL (ref 2.9–4.4)
ALBUMIN SERPL-MCNC: 4.2 G/DL (ref 3.5–5.5)
ALBUMIN/GLOB SERPL: 1.3 {RATIO} (ref 0.7–1.7)
ALBUMIN/GLOB SERPL: 1.7 {RATIO} (ref 1.2–2.2)
ALDOLASE SERPL-CCNC: 4.9 U/L (ref 3.3–10.3)
ALP SERPL-CCNC: 78 IU/L (ref 39–117)
ALPHA1 GLOB SERPL ELPH-MCNC: 0.2 G/DL (ref 0–0.4)
ALPHA2 GLOB SERPL ELPH-MCNC: 0.7 G/DL (ref 0.4–1)
ALT SERPL-CCNC: 15 IU/L (ref 0–32)
ANA TITR SER IF: NEGATIVE {TITER}
AST SERPL-CCNC: 17 IU/L (ref 0–40)
B-GLOBULIN SERPL ELPH-MCNC: 1.3 G/DL (ref 0.7–1.3)
BILIRUB SERPL-MCNC: 0.3 MG/DL (ref 0–1.2)
BUN SERPL-MCNC: 12 MG/DL (ref 6–20)
BUN/CREAT SERPL: 14 (ref 9–23)
CALCIUM SERPL-MCNC: 9.8 MG/DL (ref 8.7–10.2)
CHLORIDE SERPL-SCNC: 100 MMOL/L (ref 96–106)
CK SERPL-CCNC: 90 U/L (ref 24–173)
CO2 SERPL-SCNC: 24 MMOL/L (ref 20–29)
CREAT SERPL-MCNC: 0.85 MG/DL (ref 0.57–1)
CRP SERPL-MCNC: 6.8 MG/L (ref 0–4.9)
DSDNA (NDNA) SCRN BY CRITHIDIA: NORMAL TITER
ENA RNP AB SER-ACNC: <0.2 AI (ref 0–0.9)
ENA SCL70 AB SER-ACNC: <0.2 AI (ref 0–0.9)
ENA SM AB SER-ACNC: <0.2 AI (ref 0–0.9)
ENA SS-A AB SER-ACNC: <0.2 AI (ref 0–0.9)
ENA SS-B AB SER-ACNC: <0.2 AI (ref 0–0.9)
ERYTHROCYTE [SEDIMENTATION RATE] IN BLOOD BY WESTERGREN METHOD: 12 MM/HR (ref 0–32)
GAMMA GLOB SERPL ELPH-MCNC: 0.8 G/DL (ref 0.4–1.8)
GLOBULIN SER CALC-MCNC: 2.5 G/DL (ref 1.5–4.5)
GLOBULIN SER-MCNC: 3 G/DL (ref 2.2–3.9)
GLUCOSE SERPL-MCNC: 93 MG/DL (ref 65–99)
IGA SERPL-MCNC: 301 MG/DL (ref 87–352)
IGG SERPL-MCNC: 912 MG/DL (ref 700–1600)
IGM SERPL-MCNC: 108 MG/DL (ref 26–217)
INR PPP: 0.9 (ref 0.8–1.2)
INTERPRETATION SERPL IEP-IMP: NORMAL
M PROTEIN SERPL ELPH-MCNC: NORMAL G/DL
MITOCHONDRIA M2 IGG SER-ACNC: 9.4 UNITS (ref 0–20)
PLEASE NOTE:, 149534: NORMAL
POTASSIUM SERPL-SCNC: 4.1 MMOL/L (ref 3.5–5.2)
PROT SERPL-MCNC: 6.7 G/DL (ref 6–8.5)
PROTHROMBIN TIME: 9.8 SEC (ref 9.1–12)
RHEUMATOID FACT SERPL-ACNC: <10 IU/ML (ref 0–13.9)
SODIUM SERPL-SCNC: 140 MMOL/L (ref 134–144)
T3RU NFR SERPL: 25 % (ref 24–39)
T4 FREE SERPL-MCNC: 1.15 NG/DL (ref 0.82–1.77)
TSH SERPL DL<=0.005 MIU/L-ACNC: 1.09 UIU/ML (ref 0.45–4.5)

## 2018-09-26 ENCOUNTER — CLINICAL SUPPORT (OUTPATIENT)
Dept: FAMILY MEDICINE CLINIC | Age: 28
End: 2018-09-26

## 2018-09-26 VITALS
DIASTOLIC BLOOD PRESSURE: 84 MMHG | TEMPERATURE: 98 F | HEIGHT: 62 IN | OXYGEN SATURATION: 98 % | RESPIRATION RATE: 17 BRPM | BODY MASS INDEX: 36.44 KG/M2 | SYSTOLIC BLOOD PRESSURE: 122 MMHG | WEIGHT: 198 LBS | HEART RATE: 70 BPM

## 2018-10-05 ENCOUNTER — OFFICE VISIT (OUTPATIENT)
Dept: FAMILY MEDICINE CLINIC | Age: 28
End: 2018-10-05

## 2018-10-05 ENCOUNTER — CLINICAL SUPPORT (OUTPATIENT)
Dept: FAMILY MEDICINE CLINIC | Age: 28
End: 2018-10-05

## 2018-10-05 VITALS
BODY MASS INDEX: 36.7 KG/M2 | WEIGHT: 199.4 LBS | DIASTOLIC BLOOD PRESSURE: 84 MMHG | SYSTOLIC BLOOD PRESSURE: 122 MMHG | HEART RATE: 76 BPM | TEMPERATURE: 98.8 F | RESPIRATION RATE: 19 BRPM | OXYGEN SATURATION: 98 % | HEIGHT: 62 IN

## 2018-10-05 DIAGNOSIS — E66.9 OBESITY (BMI 35.0-39.9 WITHOUT COMORBIDITY): Primary | ICD-10-CM

## 2018-10-18 ENCOUNTER — OFFICE VISIT (OUTPATIENT)
Dept: FAMILY MEDICINE CLINIC | Age: 28
End: 2018-10-18

## 2018-10-18 VITALS
DIASTOLIC BLOOD PRESSURE: 82 MMHG | HEIGHT: 62 IN | SYSTOLIC BLOOD PRESSURE: 125 MMHG | WEIGHT: 198 LBS | OXYGEN SATURATION: 98 % | BODY MASS INDEX: 36.44 KG/M2 | HEART RATE: 116 BPM | RESPIRATION RATE: 18 BRPM | TEMPERATURE: 98.4 F

## 2018-10-18 DIAGNOSIS — M54.2 PAIN IN NECK: ICD-10-CM

## 2018-10-18 DIAGNOSIS — M72.9 FASCIITIS: ICD-10-CM

## 2018-10-18 DIAGNOSIS — E66.9 OBESITY (BMI 35.0-39.9 WITHOUT COMORBIDITY): ICD-10-CM

## 2018-10-18 DIAGNOSIS — M79.604 PAIN IN BOTH LOWER EXTREMITIES: Primary | ICD-10-CM

## 2018-10-18 DIAGNOSIS — M79.605 PAIN IN BOTH LOWER EXTREMITIES: Primary | ICD-10-CM

## 2018-10-18 RX ORDER — PHENTERMINE HYDROCHLORIDE 15 MG/1
15 CAPSULE ORAL
Qty: 30 CAP | Refills: 0 | Status: SHIPPED | OUTPATIENT
Start: 2018-10-18 | End: 2019-04-11 | Stop reason: SDUPTHER

## 2018-10-18 RX ORDER — ERGOCALCIFEROL 1.25 MG/1
CAPSULE ORAL
Refills: 0 | COMMUNITY
Start: 2018-08-10 | End: 2019-08-09 | Stop reason: ALTCHOICE

## 2018-10-18 NOTE — PROGRESS NOTES
Chief Complaint   Patient presents with   [address]     she is a 32y.o. year old female who presents for evalution. Here to follow up on the elevated liver enzymes  Having pain in the legs similar to what she gets with the lupus  Not taking any steroids  She is also not on methotrexate at this time  She had a lot of hair loss in the past post diagnosis with lupus. She is now taking biotin and that improved  Also working on weight loss'  She is exercising most days of the week  She is struggling to control the portions  She has been trying to eat two liquid meal replacements and a meal  She feels that if she goes to grocery food she willl not be able to control her portions      Reviewed PmHx, RxHx, FmHx, SocHx, AllgHx and updated and dated in the chart. Aspirin yes ____   No____ N/A____    Patient Active Problem List    Diagnosis    Severe obesity (BMI 35.0-39. 9)    S/P repair of paraesophageal hernia    Paraesophageal hernia    GERD (gastroesophageal reflux disease)    Obesity, Class II, BMI 35-39.9    Sebaceous cyst     S/P excision; Along sternum in cleavage.  History of Nissen fundoplication     4 duodenal ulcers, chronic gastritis, Grade C esophagitis, Chronic GERD, hernia, small tumor. Done August/2016.       Chronic migraine without aura without status migrainosus, not intractable    Cervical incompetence       Nurse notes were reviewed and copied and are correct  Review of Systems - negative except as listed above in the HPI    Objective:     Vitals:    10/18/18 1438   BP: 125/82   Pulse: (!) 116   Resp: 18   Temp: 98.4 °F (36.9 °C)   TempSrc: Oral   SpO2: 98%   Weight: 198 lb (89.8 kg)   Height: 5' 2\" (1.575 m)       Physical Examination: General appearance - alert, well appearing, and in no distress  Mental status - alert, oriented to person, place, and time  Neck - supple, no significant adenopathy  Lymphatics - no palpable lymphadenopathy, no hepatosplenomegaly  Chest - clear to auscultation, no wheezes, rales or rhonchi, symmetric air entry  Heart - normal rate, regular rhythm, normal S1, S2, no murmurs, rubs, clicks or gallops  Abdomen - soft, nontender, nondistended, no masses or organomegaly  Musculoskeletal - no joint tenderness, deformity or swelling, tender bilaterally along the mid portion of IT band      Assessment/ Plan:   Diagnoses and all orders for this visit:    Pain in both lower extremities  -     SED RATE (ESR)  -     CRP, HIGH SENSITIVITY  -     METABOLIC PANEL, COMPREHENSIVE    Pain in neck  -     REFERRAL TO PHYSICAL THERAPY    Obesity (BMI 35.0-39.9 without comorbidity)  -     phentermine (ADIPEX_P) 15 mg capsule; Take 1 Cap by mouth every morning. Max Daily Amount: 15 mg. Fasciitis     cont current care plan. Cont eval in rheumatology  No steroids needed at this time  Cont the voltaren gel 4 times a day for the pain at any location        Follow-up Disposition:  Return in about 1 month (around 11/18/2018). ICD-10-CM ICD-9-CM    1. Pain in both lower extremities M79.604 729.5 SED RATE (ESR)    M79.605  CRP, HIGH SENSITIVITY      METABOLIC PANEL, COMPREHENSIVE   2. Pain in neck M54.2 723.1 REFERRAL TO PHYSICAL THERAPY   3. Obesity (BMI 35.0-39.9 without comorbidity) E66.9 278.00 phentermine (ADIPEX_P) 15 mg capsule   4. Fasciitis M72.9 729.4        I have discussed the diagnosis with the patient and the intended plan as seen in the above orders. The patient has received an after-visit summary and questions were answered concerning future plans. Medication Side Effects and Warnings were discussed with patient: yes  Patient Labs were reviewed and or requested: yes    Patient Past Records were reviewed and or requested: yes        There are no Patient Instructions on file for this visit.     The patient verbalizes understanding and agrees with the plan of care        Patient has the advanced directives booklet to review

## 2018-10-18 NOTE — PROGRESS NOTES
1. Have you been to the ER, urgent care clinic since your last visit? Hospitalized since your last visit? No    2. Have you seen or consulted any other health care providers outside of the [address] [place] [address] since your last visit? Include any pap smears or colon screening.  No     Chief Complaint   Patient presents with   [address]

## 2018-10-19 LAB
ALBUMIN SERPL-MCNC: 4.4 G/DL (ref 3.5–5.5)
ALBUMIN/GLOB SERPL: 1.6 {RATIO} (ref 1.2–2.2)
ALP SERPL-CCNC: 84 IU/L (ref 39–117)
ALT SERPL-CCNC: 16 IU/L (ref 0–32)
AST SERPL-CCNC: 22 IU/L (ref 0–40)
BILIRUB SERPL-MCNC: 0.2 MG/DL (ref 0–1.2)
BUN SERPL-MCNC: 9 MG/DL (ref 6–20)
BUN/CREAT SERPL: 10 (ref 9–23)
CALCIUM SERPL-MCNC: 9.4 MG/DL (ref 8.7–10.2)
CHLORIDE SERPL-SCNC: 102 MMOL/L (ref 96–106)
CO2 SERPL-SCNC: 24 MMOL/L (ref 20–29)
CREAT SERPL-MCNC: 0.89 MG/DL (ref 0.57–1)
CRP SERPL HS-MCNC: 7.85 MG/L (ref 0–3)
ERYTHROCYTE [SEDIMENTATION RATE] IN BLOOD BY WESTERGREN METHOD: NORMAL MM/HR
GLOBULIN SER CALC-MCNC: 2.8 G/DL (ref 1.5–4.5)
GLUCOSE SERPL-MCNC: 89 MG/DL (ref 65–99)
POTASSIUM SERPL-SCNC: 4.5 MMOL/L (ref 3.5–5.2)
PROT SERPL-MCNC: 7.2 G/DL (ref 6–8.5)
SODIUM SERPL-SCNC: 143 MMOL/L (ref 134–144)

## 2018-10-22 DIAGNOSIS — M79.604 PAIN IN BOTH LOWER EXTREMITIES: Primary | ICD-10-CM

## 2018-10-22 DIAGNOSIS — M79.605 PAIN IN BOTH LOWER EXTREMITIES: Primary | ICD-10-CM

## 2018-10-23 PROBLEM — E66.01 SEVERE OBESITY (BMI 35.0-39.9): Chronic | Status: ACTIVE | Noted: 2018-08-22

## 2018-10-23 LAB — ERYTHROCYTE [SEDIMENTATION RATE] IN BLOOD BY WESTERGREN METHOD: 27 MM/HR (ref 0–32)

## 2018-10-24 ENCOUNTER — DOCUMENTATION ONLY (OUTPATIENT)
Dept: FAMILY MEDICINE CLINIC | Age: 28
End: 2018-10-24

## 2018-10-26 NOTE — PROGRESS NOTES
The sed rate was within normal limits. It is higher than it was a month ago but lower than two months ago.   Keep the follow up plan with rheumatology

## 2018-10-30 ENCOUNTER — DOCUMENTATION ONLY (OUTPATIENT)
Dept: FAMILY MEDICINE CLINIC | Age: 28
End: 2018-10-30

## 2018-11-24 ENCOUNTER — ED HISTORICAL/CONVERTED ENCOUNTER (OUTPATIENT)
Dept: OTHER | Age: 28
End: 2018-11-24

## 2018-12-12 ENCOUNTER — ED HISTORICAL/CONVERTED ENCOUNTER (OUTPATIENT)
Dept: OTHER | Age: 28
End: 2018-12-12

## 2019-01-02 ENCOUNTER — TELEPHONE (OUTPATIENT)
Dept: FAMILY MEDICINE CLINIC | Age: 29
End: 2019-01-02

## 2019-01-02 NOTE — TELEPHONE ENCOUNTER
Spoke with patient  And she was looking for her MMR titers. Advised that MMR titer was in an encounter from a 2013 visit. Patient verbalized understanding and had no questions at this time. Printed for patient .    ----- Message from Chato Haas sent at 1/2/2019  2:53 PM EST -----  Regarding: FW: Dr. Paige Bernabe: [phone]      ----- Message -----  From: Kinsey Sandra  Sent: 1/2/2019   2:14 PM  To: [facility] Front Office  Subject: Dr. Russ Ward                              Pt called with questions about the MMR vaccine.

## 2019-01-18 ENCOUNTER — OFFICE VISIT (OUTPATIENT)
Dept: FAMILY MEDICINE CLINIC | Age: 29
End: 2019-01-18

## 2019-01-18 VITALS
HEART RATE: 89 BPM | WEIGHT: 201 LBS | BODY MASS INDEX: 36.76 KG/M2 | OXYGEN SATURATION: 97 % | SYSTOLIC BLOOD PRESSURE: 119 MMHG | TEMPERATURE: 98.6 F | DIASTOLIC BLOOD PRESSURE: 84 MMHG

## 2019-01-18 DIAGNOSIS — M25.511 ACUTE PAIN OF RIGHT SHOULDER: ICD-10-CM

## 2019-01-18 DIAGNOSIS — M54.2 NECK PAIN: Primary | ICD-10-CM

## 2019-01-18 DIAGNOSIS — E66.01 SEVERE OBESITY WITH BODY MASS INDEX (BMI) OF 35.0 TO 39.9 WITH SERIOUS COMORBIDITY (HCC): ICD-10-CM

## 2019-01-18 NOTE — PROGRESS NOTES
Chief Complaint   Patient presents with    Shoulder Pain     she is a 25y.o. year old female who presents for evalution. She has been having right shoulder pain for more than a month  She has had 2 xrays, one showed bone spurs and the other was read as  An old rotator cuff injury. She is seen by ortho and they are trying to get an MRI but   She has lupus and has recently started plaquenil  She has had a lot of neck pain in the past, the left shoulder does not hurt  She gets some relief from voltaren gel      Reviewed PmHx, RxHx, FmHx, SocHx, AllgHx and updated and dated in the chart. Aspirin yes ____   No____ N/A____    Patient Active Problem List    Diagnosis    Severe obesity (BMI 35.0-39. 9)    S/P repair of paraesophageal hernia    Paraesophageal hernia    GERD (gastroesophageal reflux disease)    Obesity, Class II, BMI 35-39.9    Sebaceous cyst     S/P excision; Along sternum in cleavage.  History of Nissen fundoplication     4 duodenal ulcers, chronic gastritis, Grade C esophagitis, Chronic GERD, hernia, small tumor. Done August/2016.  Chronic migraine without aura without status migrainosus, not intractable    Cervical incompetence       Nurse notes were reviewed and copied and are correct  Review of Systems - negative except as listed above in the HPI    Objective:     Vitals:    01/18/19 0813   BP: 119/84   Pulse: 89   Temp: 98.6 °F (37 °C)   SpO2: 97%   Weight: 201 lb (91.2 kg)        Physical Examination: General appearance - alert, well appearing, and in no distress  Mental status - alert, oriented to person, place, and time  Chest - clear to auscultation, no wheezes, rales or rhonchi, symmetric air entry  Heart - normal rate, regular rhythm, normal S1, S2, no murmurs, rubs, clicks or gallops  Musculoskeletal - LROM right arm on extension to 45 degrees        Assessment/ Plan:   Diagnoses and all orders for this visit:    1. Neck pain    2.  Acute pain of right shoulder     I think this is PMR,   Discuss the possibility having another person in the room during the MRI in order to r/o rotator cuff tear. Also if she wants another taper prednisonre call  Meanwhile use voltaren daily 4 times a day    Follow-up Disposition: Not on File    ICD-10-CM ICD-9-CM    1. Neck pain M54.2 723.1    2. Acute pain of right shoulder M25.511 719.41        I have discussed the diagnosis with the patient and the intended plan as seen in the above orders. The patient has received an after-visit summary and questions were answered concerning future plans. Medication Side Effects and Warnings were discussed with patient: yes  Patient Labs were reviewed and or requested: yes  Patient Past Records were reviewed and or requested: yes        There are no Patient Instructions on file for this visit.     The patient verbalizes understanding and agrees with the plan of care        Patient has the advanced directives booklet to review

## 2019-02-20 ENCOUNTER — HOSPITAL ENCOUNTER (OUTPATIENT)
Dept: MRI IMAGING | Age: 29
Discharge: HOME | End: 2019-02-20
Attending: INTERNAL MEDICINE
Payer: MEDICAID

## 2019-02-20 DIAGNOSIS — G89.29 CHRONIC RIGHT SHOULDER PAIN: ICD-10-CM

## 2019-02-20 DIAGNOSIS — M25.511 CHRONIC RIGHT SHOULDER PAIN: ICD-10-CM

## 2019-02-20 PROCEDURE — 73221 MRI JOINT UPR EXTREM W/O DYE: CPT

## 2019-03-05 ENCOUNTER — OFFICE VISIT (OUTPATIENT)
Dept: FAMILY MEDICINE CLINIC | Age: 29
End: 2019-03-05

## 2019-03-05 VITALS
HEART RATE: 82 BPM | BODY MASS INDEX: 36.99 KG/M2 | WEIGHT: 201 LBS | DIASTOLIC BLOOD PRESSURE: 88 MMHG | SYSTOLIC BLOOD PRESSURE: 131 MMHG | OXYGEN SATURATION: 98 % | TEMPERATURE: 98.3 F | RESPIRATION RATE: 17 BRPM | HEIGHT: 62 IN

## 2019-03-05 DIAGNOSIS — Z98.890 POST-OPERATIVE STATE: Primary | ICD-10-CM

## 2019-03-05 DIAGNOSIS — R00.0 RAPID HEART RATE: ICD-10-CM

## 2019-03-05 NOTE — PROGRESS NOTES
1. Have you been to the ER, urgent care clinic since your last visit? Hospitalized since your last visit? No    2. Have you seen or consulted any other health care providers outside of the [address] [place] [address] since your last visit? Include any pap smears or colon screening. No     Chief Complaint   Patient presents with    Follow-up     R.  Rotator cuff surgery

## 2019-03-05 NOTE — PROGRESS NOTES
Chief Complaint   Patient presents with    Follow-up     R. Rotator cuff surgery     she is a 25y.o. year old female who presents for evalution. She is here to remove dressing from  Right shoulder    She c/o heart racing sometimes while sitting resting  She is on a higher dose of prednisone than ever  She drinks water or juice in the middle  Of the night    Reviewed PmHx, RxHx, FmHx, SocHx, AllgHx and updated and dated in the chart. Aspirin yes ____   No____ N/A____    Patient Active Problem List    Diagnosis    Severe obesity (BMI 35.0-39. 9)    S/P repair of paraesophageal hernia    Paraesophageal hernia    GERD (gastroesophageal reflux disease)    Obesity, Class II, BMI 35-39.9    Sebaceous cyst     S/P excision; Along sternum in cleavage.  History of Nissen fundoplication     4 duodenal ulcers, chronic gastritis, Grade C esophagitis, Chronic GERD, hernia, small tumor. Done August/2016.  Chronic migraine without aura without status migrainosus, not intractable    Cervical incompetence       Nurse notes were reviewed and copied and are correct  Review of Systems - negative except as listed above in the HPI    Objective:     Vitals:    03/05/19 1125   BP: 131/88   Pulse: 82   Resp: 17   Temp: 98.3 °F (36.8 °C)   TempSrc: Oral   SpO2: 98%   Weight: 201 lb (91.2 kg)   Height: 5' 2\" (1.575 m)       Physical Examination: General appearance - alert, well appearing, and in no distress  Mental status - alert, oriented to person, place, and time  Chest - clear to auscultation, no wheezes, rales or rhonchi, symmetric air entry  Heart - normal rate, regular rhythm, normal S1, S2, no murmurs, rubs, clicks or gallops  Musculoskeletal - right shoulder 3 surgical wounds dry and opposed with sutures visible. No oozing or swelling        Assessment/ Plan:   Diagnoses and all orders for this visit:    1. Post-operative state    2.  Rapid heart rate     possibly from the caffeine she is ingesting  Stop the caffeine  And after the prednisone taper is down reassess the heart rates. If continues I will refer for cardiac rhytm eval  Follow-up Disposition:  Return if symptoms worsen or fail to improve. ICD-10-CM ICD-9-CM    1. Post-operative state Z98.890 V45.89    2. Rapid heart rate R00.0 785.0        I have discussed the diagnosis with the patient and the intended plan as seen in the above orders. The patient has received an after-visit summary and questions were answered concerning future plans. Medication Side Effects and Warnings were discussed with patient: yes  Patient Labs were reviewed and or requested: yes  Patient Past Records were reviewed and or requested: yes        There are no Patient Instructions on file for this visit.     The patient verbalizes understanding and agrees with the plan of care        Patient has the advanced directives booklet to review

## 2019-04-01 ENCOUNTER — HOSPITAL ENCOUNTER (EMERGENCY)
Age: 29
Discharge: HOME | End: 2019-04-02
Attending: EMERGENCY MEDICINE
Payer: MEDICAID

## 2019-04-01 VITALS
DIASTOLIC BLOOD PRESSURE: 94 MMHG | OXYGEN SATURATION: 100 % | HEIGHT: 61 IN | SYSTOLIC BLOOD PRESSURE: 142 MMHG | RESPIRATION RATE: 18 BRPM | TEMPERATURE: 98.7 F | WEIGHT: 205 LBS | BODY MASS INDEX: 38.71 KG/M2 | HEART RATE: 99 BPM

## 2019-04-01 DIAGNOSIS — M54.2 NECK PAIN: Primary | ICD-10-CM

## 2019-04-01 DIAGNOSIS — G43.809 OTHER MIGRAINE WITHOUT STATUS MIGRAINOSUS, NOT INTRACTABLE: Primary | ICD-10-CM

## 2019-04-01 PROCEDURE — 74011250636 HC RX REV CODE- 250/636: Performed by: EMERGENCY MEDICINE

## 2019-04-01 PROCEDURE — 74011250637 HC RX REV CODE- 250/637: Performed by: EMERGENCY MEDICINE

## 2019-04-01 PROCEDURE — 96372 THER/PROPH/DIAG INJ SC/IM: CPT

## 2019-04-01 PROCEDURE — 99283 EMERGENCY DEPT VISIT LOW MDM: CPT

## 2019-04-01 RX ORDER — DIPHENHYDRAMINE HCL 25 MG
50 CAPSULE ORAL
Status: COMPLETED | OUTPATIENT
Start: 2019-04-01 | End: 2019-04-01

## 2019-04-01 RX ORDER — KETOROLAC TROMETHAMINE 30 MG/ML
30 INJECTION, SOLUTION INTRAMUSCULAR; INTRAVENOUS
Status: COMPLETED | OUTPATIENT
Start: 2019-04-01 | End: 2019-04-01

## 2019-04-01 RX ORDER — ONDANSETRON 4 MG/1
8 TABLET, ORALLY DISINTEGRATING ORAL
Status: COMPLETED | OUTPATIENT
Start: 2019-04-01 | End: 2019-04-01

## 2019-04-01 RX ORDER — DICLOFENAC SODIUM 10 MG/G
GEL TOPICAL
Qty: 100 G | Refills: 5 | Status: SHIPPED | OUTPATIENT
Start: 2019-04-01 | End: 2019-08-09 | Stop reason: ALTCHOICE

## 2019-04-01 RX ORDER — KETOROLAC TROMETHAMINE 30 MG/ML
30 INJECTION, SOLUTION INTRAMUSCULAR; INTRAVENOUS
Status: DISCONTINUED | OUTPATIENT
Start: 2019-04-01 | End: 2019-04-01

## 2019-04-01 RX ADMIN — DIPHENHYDRAMINE HYDROCHLORIDE 50 MG: 25 CAPSULE ORAL at 23:29

## 2019-04-01 RX ADMIN — KETOROLAC TROMETHAMINE 30 MG: 30 INJECTION, SOLUTION INTRAMUSCULAR at 23:29

## 2019-04-01 RX ADMIN — ONDANSETRON 8 MG: 4 TABLET, ORALLY DISINTEGRATING ORAL at 23:29

## 2019-04-01 NOTE — TELEPHONE ENCOUNTER
Received VO from Dr. Geraldo Berg to approve medication for brand due to insurance requesting brand only.

## 2019-04-02 PROCEDURE — 74011250637 HC RX REV CODE- 250/637: Performed by: EMERGENCY MEDICINE

## 2019-04-02 RX ORDER — PROMETHAZINE HYDROCHLORIDE 25 MG/1
25 TABLET ORAL
Qty: 12 TAB | Refills: 0 | Status: SHIPPED | OUTPATIENT
Start: 2019-04-02 | End: 2019-06-28

## 2019-04-02 RX ORDER — DIPHENHYDRAMINE HCL 25 MG
25-50 CAPSULE ORAL
Qty: 20 CAP | Refills: 0 | Status: SHIPPED | OUTPATIENT
Start: 2019-04-02 | End: 2019-04-12

## 2019-04-02 RX ORDER — PROMETHAZINE HYDROCHLORIDE 25 MG/1
25 TABLET ORAL
Status: COMPLETED | OUTPATIENT
Start: 2019-04-02 | End: 2019-04-02

## 2019-04-02 RX ADMIN — PROMETHAZINE HYDROCHLORIDE 25 MG: 25 TABLET ORAL at 00:51
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
## 2019-04-02 NOTE — DISCHARGE INSTRUCTIONS
Patient Education       If your headache returns, you can take the Phenergan for nausea which also helps with migraine headaches. If that is not helping, you can try taking  1 or 2 Benadryl. Follow up with your primary care doctor or the neurologist if your migraines are persistent or frequent. Return here if your symptoms acutely worsen. Migraine Headache: Care Instructions  Your Care Instructions  Migraines are painful, throbbing headaches that often start on one side of the head. They may cause nausea and vomiting and make you sensitive to light, sound, or smell. Without treatment, migraines can last from 4 hours to a few days. Medicines can help prevent migraines or stop them after they have started. Your doctor can help you find which ones work best for you. Follow-up care is a key part of your treatment and safety. Be sure to make and go to all appointments, and call your doctor if you are having problems. It's also a good idea to know your test results and keep a list of the medicines you take. How can you care for yourself at home? · Do not drive if you have taken a prescription pain medicine. · Rest in a quiet, dark room until your headache is gone. Close your eyes, and try to relax or go to sleep. Don't watch TV or read. · Put a cold, moist cloth or cold pack on the painful area for 10 to 20 minutes at a time. Put a thin cloth between the cold pack and your skin. · Use a warm, moist towel or a heating pad set on low to relax tight shoulder and neck muscles. · Have someone gently massage your neck and shoulders. · Take your medicines exactly as prescribed. Call your doctor if you think you are having a problem with your medicine. You will get more details on the specific medicines your doctor prescribes. · Be careful not to take pain medicine more often than the instructions allow. You could get worse or more frequent headaches when the medicine wears off.   To prevent migraines  · Keep a headache diary so you can figure out what triggers your headaches. Avoiding triggers may help you prevent headaches. Record when each headache began, how long it lasted, and what the pain was like. (Was it throbbing, aching, stabbing, or dull?) Write down any other symptoms you had with the headache, such as nausea, flashing lights or dark spots, or sensitivity to bright light or loud noise. Note if the headache occurred near your period. List anything that might have triggered the headache. Triggers may include certain foods (chocolate, cheese, wine) or odors, smoke, bright light, stress, or lack of sleep. · If your doctor has prescribed medicine for your migraines, take it as directed. You may have medicine that you take only when you get a migraine and medicine that you take all the time to help prevent migraines. ? If your doctor has prescribed medicine for when you get a headache, take it at the first sign of a migraine, unless your doctor has given you other instructions. ? If your doctor has prescribed medicine to prevent migraines, take it exactly as prescribed. Call your doctor if you think you are having a problem with your medicine. · Find healthy ways to deal with stress. Migraines are most common during or right after stressful times. Take time to relax before and after you do something that has caused a migraine in the past.  · Try to keep your muscles relaxed by keeping good posture. Check your jaw, face, neck, and shoulder muscles for tension. Try to relax them. When you sit at a desk, change positions often. And make sure to stretch for 30 seconds each hour. · Get plenty of sleep and exercise. · Eat meals on a regular schedule. Avoid foods and drinks that often trigger migraines. These include chocolate, alcohol (especially red wine and port), aspartame, monosodium glutamate (MSG), and some additives found in foods (such as hot dogs, gordon, cold cuts, aged cheeses, and pickled foods).   · Limit caffeine. Don't drink too much coffee, tea, or soda. But don't quit caffeine suddenly. That can also give you migraines. · Do not smoke or allow others to smoke around you. If you need help quitting, talk to your doctor about stop-smoking programs and medicines. These can increase your chances of quitting for good. · If you are taking birth control pills or hormone therapy, talk to your doctor about whether they are triggering your migraines. When should you call for help? Call 911 anytime you think you may need emergency care. For example, call if:    · You have signs of a stroke. These may include:  ? Sudden numbness, paralysis, or weakness in your face, arm, or leg, especially on only one side of your body. ? Sudden vision changes. ? Sudden trouble speaking. ? Sudden confusion or trouble understanding simple statements. ? Sudden problems with walking or balance. ? A sudden, severe headache that is different from past headaches.    Call your doctor now or seek immediate medical care if:    · You have new or worse nausea and vomiting.     · You have a new or higher fever.     · Your headache gets much worse.    Watch closely for changes in your health, and be sure to contact your doctor if:    · You are not getting better after 2 days (48 hours). Where can you learn more? Go to http://connor-radha.info/. Enter U273 in the search box to learn more about \"Migraine Headache: Care Instructions. \"  Current as of: Gisella 3, 2018  Content Version: 11.9  © 1604-3698 [employer]. Care instructions adapted under license by [employer] (which disclaims liability or warranty for this information). If you have questions about a medical condition or this instruction, always ask your healthcare professional. [place] [address] any warranty or liability for your use of this information.

## 2019-04-02 NOTE — ED TRIAGE NOTES
Pt presents with right sided headache that started at approximately 1400 today. Pt reports sensitivity to light, but not sound. Reports hx of migraines, last migraine 2 years ago.

## 2019-04-11 ENCOUNTER — OFFICE VISIT (OUTPATIENT)
Dept: FAMILY MEDICINE CLINIC | Age: 29
End: 2019-04-11

## 2019-04-11 VITALS
SYSTOLIC BLOOD PRESSURE: 123 MMHG | OXYGEN SATURATION: 99 % | WEIGHT: 213 LBS | HEART RATE: 92 BPM | RESPIRATION RATE: 20 BRPM | BODY MASS INDEX: 40.22 KG/M2 | DIASTOLIC BLOOD PRESSURE: 83 MMHG | TEMPERATURE: 98.5 F | HEIGHT: 61 IN

## 2019-04-11 DIAGNOSIS — Z11.1 SCREENING FOR TUBERCULOSIS: ICD-10-CM

## 2019-04-11 DIAGNOSIS — Z13.1 SCREENING FOR DIABETES MELLITUS: ICD-10-CM

## 2019-04-11 DIAGNOSIS — Z13.6 SCREENING FOR ISCHEMIC HEART DISEASE: ICD-10-CM

## 2019-04-11 DIAGNOSIS — L93.0 LUPUS ERYTHEMATOSUS, UNSPECIFIED FORM: Primary | ICD-10-CM

## 2019-04-11 DIAGNOSIS — Z13.220 SCREENING FOR HYPERLIPIDEMIA: ICD-10-CM

## 2019-04-11 DIAGNOSIS — M72.9 FASCIITIS: ICD-10-CM

## 2019-04-11 DIAGNOSIS — E66.9 OBESITY (BMI 35.0-39.9 WITHOUT COMORBIDITY): ICD-10-CM

## 2019-04-11 RX ORDER — PHENTERMINE HYDROCHLORIDE 30 MG/1
30 CAPSULE ORAL
Qty: 30 CAP | Refills: 0 | Status: SHIPPED | OUTPATIENT
Start: 2019-04-11 | End: 2019-05-15 | Stop reason: SDUPTHER

## 2019-04-11 RX ORDER — PHENTERMINE HYDROCHLORIDE 15 MG/1
15 CAPSULE ORAL
Qty: 30 CAP | Refills: 0 | Status: SHIPPED | OUTPATIENT
Start: 2019-04-11 | End: 2019-04-11 | Stop reason: ALTCHOICE

## 2019-04-11 NOTE — PROGRESS NOTES
1. Have you been to the ER, urgent care clinic since your last visit? Hospitalized since your last visit? No    2. Have you seen or consulted any other health care providers outside of the [address] [place] [address] since your last visit? Include any pap smears or colon screening.  Rheumatology    Chief Complaint   Patient presents with    Labs     Lipid, Quantaferion gold, A1C    Medication Refill     Phentermine

## 2019-04-11 NOTE — PROGRESS NOTES
Weight Loss Progress Note: follow up Physician Visit      Gena Cooper is a 25 y.o. female with BMI , 40.25  who is here for her follow up  Evaluation for the medical bariatric care. CC: I want to be healthier  Recently had labs done by rheumatology        Weight History  Current weight 213 and BMI is Body mass index is 40.25 kg/m². Goal weight  160  Highest weight 213   (See weight gain time line scanned into media section of chart)        Significant Medical History    Update on sleep apnea and  CPAP no    Ever had bariatric surgery: no    Pregnant or planning on becoming pregnant w/in 6 months: no         Significant Psychosocial History   Any history of drug abuse or dependence: no  Current Major Lifestyle Changes: no  Any potential unsupportive people: no          Social History  Social History     Tobacco Use    Smoking status: Never Smoker    Smokeless tobacco: Never Used   Substance Use Topics    Alcohol use: No     How many times a week do you eat out? 0    Do you drink any EtOH?  no   If so, how much? Do you have upcoming any travel in the next 6 weeks?  no   If so, what do you have planned?           Exercise  How many days a week do you currently exercise?  2 days  Have you ever been told by a physician not to exercise?  no      Objective  Visit Vitals  /83   Pulse 92   Temp 98.5 °F (36.9 °C) (Oral)   Resp 20   Ht 5' 1\" (1.549 m)   Wt 213 lb (96.6 kg)   SpO2 99%   BMI 40.25 kg/m²   EKG unchanged form normal EKG done 2017    Bicep - (right ) circumference  Waist Circumference: See Weight Management Doc Flowsheet  Neck Circumference: See Weight Management Doc Flowsheet  Percent Body Fat: See Weight Management Doc Flowsheet  Weight Metrics 4/11/2019 4/11/2019 4/1/2019 3/5/2019 1/18/2019 10/18/2018 10/5/2018   Weight - 213 lb 205 lb 201 lb 201 lb 198 lb 199 lb 6.4 oz   Neck Circ (inches) 16 - - - - - -   Waist Measure Inches 43 - - - - - -   BMI - 40.25 kg/m2 38.73 kg/m2 36.76 kg/m2 36.76 kg/m2 36.21 kg/m2 36.47 kg/m2         Labs:       Review of Systems  Complete ROS negative except where noted above      Physical Exam    Vital Signs Reviewed  Weight Management Metrics Reviewed    Vital Signs Reviewed  Appearance: Obese, A&O x 3, NAD  HEENT:  NC/AT, EOMI, PERRL, No scleral icterus, malampatti score:  Skin:    Skin tags - no   Acanthosis Nigricans - no  Neck:  No nodes, thyromegaly   Heart:  RRR without M/R/G  Lungs:  CTAB, no rhonchi, rales, or wheezes with good air exchange   Abdomen:  Non-tender, pos bowel sounds; hepatomegaly -   Ext:  No C/C/E        Assessment & Plan  Diagnoses and all orders for this visit:    1. Lupus erythematosus, unspecified form  Eating low carb will likely decrease the inflammation in her body  2. Screening for ischemic heart disease  -     AMB POC EKG ROUTINE W/ 12 LEADS, INTER & REP  Normal ekg  3. Obesity (BMI 35.0-39.9 without comorbidity)  -     METABOLIC PANEL, COMPREHENSIVE  -     phentermine 30 mg capsule; Take 1 Cap by mouth every morning. Max Daily Amount: 30 mg. DIET: low carb LCD  Activity: the shoulder is still limiting her activity. Try the ellyptical or treadmill or in water  Medication: refill the phentermine today  4. Screening for diabetes mellitus  -     HEMOGLOBIN A1C WITH EAG    5. Screening for hyperlipidemia  -     LIPID PANEL    6. Screening for tuberculosis  -     QUANTIFERON-TB GOLD PLUS    7. Fasciitis  Cont the voltataren gel  Other orders  -     CVD REPORT        Based on his history and exam, Rachel Meza is a good candidate for the  [facility]     Diet:    Activity:    Medication:    There are no Patient Instructions on file for this visit. Follow-up and Dispositions    · Return in about 2 weeks (around 4/25/2019).          Over 50% of the 30 minutes face to face time with Denny Monk consisted of counseling & coordinating and/or discussing treatment plans in reference to her obesity The primary encounter diagnosis was Lupus erythematosus, unspecified form. Diagnoses of Screening for ischemic heart disease, Obesity (BMI 35.0-39.9 without comorbidity), Screening for diabetes mellitus, Screening for hyperlipidemia, Screening for tuberculosis, and Fasciitis were also pertinent to this visit.   The patient verbalizes understanding and agrees with the plan of care    Patient has the advanced directives  booklet to review

## 2019-04-12 LAB
ALBUMIN SERPL-MCNC: 4.5 G/DL (ref 3.5–5.5)
ALBUMIN/GLOB SERPL: 1.7 {RATIO} (ref 1.2–2.2)
ALP SERPL-CCNC: 69 IU/L (ref 39–117)
ALT SERPL-CCNC: 10 IU/L (ref 0–32)
AST SERPL-CCNC: 15 IU/L (ref 0–40)
BILIRUB SERPL-MCNC: 0.4 MG/DL (ref 0–1.2)
BUN SERPL-MCNC: 7 MG/DL (ref 6–20)
BUN/CREAT SERPL: 10 (ref 9–23)
CALCIUM SERPL-MCNC: 9.2 MG/DL (ref 8.7–10.2)
CHLORIDE SERPL-SCNC: 105 MMOL/L (ref 96–106)
CHOLEST SERPL-MCNC: 177 MG/DL (ref 100–199)
CO2 SERPL-SCNC: 23 MMOL/L (ref 20–29)
CREAT SERPL-MCNC: 0.69 MG/DL (ref 0.57–1)
EST. AVERAGE GLUCOSE BLD GHB EST-MCNC: 100 MG/DL
GLOBULIN SER CALC-MCNC: 2.6 G/DL (ref 1.5–4.5)
GLUCOSE SERPL-MCNC: 81 MG/DL (ref 65–99)
HBA1C MFR BLD: 5.1 % (ref 4.8–5.6)
HDLC SERPL-MCNC: 48 MG/DL
INTERPRETATION, 910389: NORMAL
LDLC SERPL CALC-MCNC: 107 MG/DL (ref 0–99)
POTASSIUM SERPL-SCNC: 3.9 MMOL/L (ref 3.5–5.2)
PROT SERPL-MCNC: 7.1 G/DL (ref 6–8.5)
SODIUM SERPL-SCNC: 144 MMOL/L (ref 134–144)
TRIGL SERPL-MCNC: 112 MG/DL (ref 0–149)
VLDLC SERPL CALC-MCNC: 22 MG/DL (ref 5–40)

## 2019-04-15 LAB
GAMMA INTERFERON BACKGROUND BLD IA-ACNC: 0.03 IU/ML
M TB IFN-G BLD-IMP: NEGATIVE
M TB IFN-G CD4+ BCKGRND COR BLD-ACNC: 0.04 IU/ML
MITOGEN IGNF BLD-ACNC: >10 IU/ML
QUANTIFERON INCUBATION, QF1T: NORMAL
QUANTIFERON TB2 AG: 0.03 IU/ML
SERVICE CMNT-IMP: NORMAL

## 2019-04-16 ENCOUNTER — ED HISTORICAL/CONVERTED ENCOUNTER (OUTPATIENT)
Dept: OTHER | Age: 29
End: 2019-04-16

## 2019-04-18 NOTE — PROGRESS NOTES
The tb test is negative  The blood sugar is normal  The cholesterol is slightly above the goal of less than 100

## 2019-05-15 ENCOUNTER — OFFICE VISIT (OUTPATIENT)
Dept: FAMILY MEDICINE CLINIC | Age: 29
End: 2019-05-15

## 2019-05-15 VITALS
WEIGHT: 206 LBS | TEMPERATURE: 98 F | OXYGEN SATURATION: 98 % | BODY MASS INDEX: 38.89 KG/M2 | HEIGHT: 61 IN | HEART RATE: 101 BPM | DIASTOLIC BLOOD PRESSURE: 81 MMHG | RESPIRATION RATE: 18 BRPM | SYSTOLIC BLOOD PRESSURE: 112 MMHG

## 2019-05-15 DIAGNOSIS — L93.0 LUPUS ERYTHEMATOSUS, UNSPECIFIED FORM: Primary | ICD-10-CM

## 2019-05-15 DIAGNOSIS — E66.9 OBESITY (BMI 35.0-39.9 WITHOUT COMORBIDITY): ICD-10-CM

## 2019-05-15 RX ORDER — HYDROXYCHLOROQUINE SULFATE 200 MG/1
200 TABLET, FILM COATED ORAL 2 TIMES DAILY
COMMUNITY
End: 2020-09-10 | Stop reason: SDUPTHER

## 2019-05-15 RX ORDER — DIAPER,BRIEF,INFANT-TODD,DISP
10000 EACH MISCELLANEOUS DAILY
COMMUNITY
End: 2021-01-25

## 2019-05-15 RX ORDER — PHENTERMINE HYDROCHLORIDE 30 MG/1
30 CAPSULE ORAL
Qty: 30 CAP | Refills: 0 | Status: SHIPPED | OUTPATIENT
Start: 2019-05-15 | End: 2019-06-28 | Stop reason: SDUPTHER

## 2019-05-15 NOTE — PROGRESS NOTES
Weight Loss Progress Note: follow up Physician Visit      Tanja Ramirez is a 25 y.o. female with BMI ,38.92who is here for her follow up  Evaluation for the medical bariatric care. CC: I want to be healthier  She has lost 7 lbs since the last visit a month ago  She has reduced th snacking a lot  She is using shakes as 2 meal replacements  She is working out 2.5 hours 2 days a week      Weight History  Current weight 206and BMI is Body mass index is 38.92 kg/m². Goal weight  160  Highest weight 213  (See weight gain time line scanned into media section of chart)        Significant Medical History    Update on sleep apnea and  CPAP no    Ever had bariatric surgery: no    Pregnant or planning on becoming pregnant w/in 6 months: no         Significant Psychosocial History   Any history of drug abuse or dependence: no  Current Major Lifestyle Changes: no  Any potential unsupportive people: no          Social History  Social History     Tobacco Use    Smoking status: Never Smoker    Smokeless tobacco: Never Used   Substance Use Topics    Alcohol use: No     How many times a week do you eat out? Do you drink any EtOH?  no   If so, how much? Do you have upcoming any travel in the next 6 weeks?  no   If so, what do you have planned?           Exercise  How many days a week do you currently exercise?  2 days  Have you ever been told by a physician not to exercise?  no      Objective  Visit Vitals  /81   Pulse (!) 101   Temp 98 °F (36.7 °C) (Oral)   Resp 18   Ht 5' 1\" (1.549 m)   Wt 206 lb (93.4 kg)   SpO2 98%   BMI 38.92 kg/m²       Bicep - (right ) circumference  Waist Circumference: See Weight Management Doc Flowsheet  Neck Circumference: See Weight Management Doc Flowsheet  Percent Body Fat: See Weight Management Doc Flowsheet  Weight Metrics 5/15/2019 5/15/2019 4/11/2019 4/11/2019 4/1/2019 3/5/2019 1/18/2019   Weight - 206 lb - 213 lb 205 lb 201 lb 201 lb   Neck Circ (inches) 15 - 16 - - - - Waist Measure Inches 42 - 43 - - - -   BMI - 38.92 kg/m2 - 40.25 kg/m2 38.73 kg/m2 36.76 kg/m2 36.76 kg/m2         Labs:       Review of Systems  Complete ROS negative except where noted above      Physical Exam    Vital Signs Reviewed  Weight Management Metrics Reviewed    Vital Signs Reviewed  Appearance: Obese, A&O x 3, NAD  HEENT:  NC/AT, EOMI, PERRL, No scleral icterus, malampatti score:  Skin:    Skin tags - no   Acanthosis Nigricans -no   Neck:  No nodes, thyromegaly   Heart:  RRR without M/R/G  Lungs:  CTAB, no rhonchi, rales, or wheezes with good air exchange   Abdomen:  Non-tender, pos bowel sounds; hepatomegaly -   Ext:  No C/C/E        Assessment & Plan  Diagnoses and all orders for this visit:    1. Lupus erythematosus, unspecified form  Stable, followed in rheum  2. Obesity (BMI 35.0-39.9 without comorbidity)  -     phentermine 30 mg capsule; Take 1 Cap by mouth every morning. Max Daily Amount: 30 mg. Diet:cont the Low carb LCD    Activity: try to increas to 4 days a week of exercise    Medication:refilled the phentermine      Based on his history and exam, Allen Killian is a good candidate for the  [facility]       There are no Patient Instructions on file for this visit. Follow-up and Dispositions    · Return in about 2 weeks (around 5/29/2019). Over 50% of the 30 minutes face to face time with Radhika Mario consisted of counseling & coordinating and/or discussing treatment plans in reference to her obesity The primary encounter diagnosis was Lupus erythematosus, unspecified form. A diagnosis of Obesity (BMI 35.0-39.9 without comorbidity) was also pertinent to this visit.   The patient verbalizes understanding and agrees with the plan of care    Patient has the advanced directives  booklet to review

## 2019-05-15 NOTE — PROGRESS NOTES
1. Have you been to the ER, urgent care clinic since your last visit? Hospitalized since your last visit? No    2. Have you seen or consulted any other health care providers outside of the [address] [place] [address] since your last visit? Include any pap smears or colon screening.  Ortho Surgeon    Chief Complaint   Patient presents with    Weight Management     2wk     Body Weight: 206.0  Body Fat%: 41.4  Muscle Mass Weight: 37.5  Body Water Weight: 13.7  Basal Metabolic Rate: 5038  BMI: 38.92

## 2019-06-28 ENCOUNTER — OFFICE VISIT (OUTPATIENT)
Dept: FAMILY MEDICINE CLINIC | Age: 29
End: 2019-06-28

## 2019-06-28 VITALS
TEMPERATURE: 98.2 F | SYSTOLIC BLOOD PRESSURE: 108 MMHG | RESPIRATION RATE: 18 BRPM | OXYGEN SATURATION: 98 % | HEIGHT: 61 IN | BODY MASS INDEX: 38.48 KG/M2 | WEIGHT: 203.8 LBS | HEART RATE: 90 BPM | DIASTOLIC BLOOD PRESSURE: 77 MMHG

## 2019-06-28 DIAGNOSIS — G47.00 INSOMNIA, UNSPECIFIED TYPE: ICD-10-CM

## 2019-06-28 DIAGNOSIS — E66.9 OBESITY (BMI 35.0-39.9 WITHOUT COMORBIDITY): ICD-10-CM

## 2019-06-28 DIAGNOSIS — R53.83 TIREDNESS: ICD-10-CM

## 2019-06-28 DIAGNOSIS — L65.9 THINNING HAIR: ICD-10-CM

## 2019-06-28 DIAGNOSIS — L93.0 LUPUS ERYTHEMATOSUS, UNSPECIFIED FORM: ICD-10-CM

## 2019-06-28 DIAGNOSIS — R11.0 NAUSEA: Primary | ICD-10-CM

## 2019-06-28 LAB
HCG QL BLOOD POCT, HCGQLPOCT: NORMAL
HCG URINE, QL. (POC): NEGATIVE
VALID INTERNAL CONTROL?: YES

## 2019-06-28 RX ORDER — CLOBETASOL PROPIONATE 0.46 MG/ML
SOLUTION TOPICAL
Qty: 50 ML | Refills: 1 | Status: SHIPPED | OUTPATIENT
Start: 2019-06-28 | End: 2019-12-24

## 2019-06-28 RX ORDER — ONDANSETRON 4 MG/1
4 TABLET, ORALLY DISINTEGRATING ORAL
Qty: 8 TAB | Refills: 0 | Status: SHIPPED | OUTPATIENT
Start: 2019-06-28 | End: 2019-08-21 | Stop reason: SDUPTHER

## 2019-06-28 RX ORDER — PHENTERMINE HYDROCHLORIDE 30 MG/1
30 CAPSULE ORAL
Qty: 30 CAP | Refills: 0 | Status: SHIPPED | OUTPATIENT
Start: 2019-06-28 | End: 2019-08-09 | Stop reason: SDUPTHER

## 2019-06-28 NOTE — PROGRESS NOTES
1. Have you been to the ER, urgent care clinic since your last visit? Hospitalized since your last visit? No    2. Have you seen or consulted any other health care providers outside of the [address] [place] [address] since your last visit? Include any pap smears or colon screening.  Rheumatology    Chief Complaint   Patient presents with    Weight Management     Body Weight: 203.8  Body Fat%: 40.0  Muscle Mass Weight: 37.1  Body Water Weight: 65.8  Basal Metabolic Rate: 8137  BMI: 38.51

## 2019-06-28 NOTE — PROGRESS NOTES
Weight Loss Progress Note: follow up Physician Visit      Flip Warren is a 25 y.o. female with BMI ,38.51  who is here for her follow up  Evaluation for the medical bariatric care. CC: I want to be healthier  She is here to get refills on meds  Her pain is better now  She is not having any pain in the shoulder. The surgery was successful    She is still experiencing hair loss  She had a bx and was told it is inflammed but not treated with anything topically        Weight History  Current weight 38.51 and BMI is Body mass index is 38.51 kg/m². Goal weight ,   Highest weight   (See weight gain time line scanned into media section of chart)        Significant Medical History    Update on sleep apnea and  CPAP no    Ever had bariatric surgery: no    Pregnant or planning on becoming pregnant w/in 6 months: no         Significant Psychosocial History   Any history of drug abuse or dependence: no  Current Major Lifestyle Changes: no  Any potential unsupportive people: no          Social History  Social History     Tobacco Use    Smoking status: Never Smoker    Smokeless tobacco: Never Used   Substance Use Topics    Alcohol use: No     How many times a week do you eat out? Do you drink any EtOH?  no   If so, how much? Do you have upcoming any travel in the next 6 weeks?  no   If so, what do you have planned?           Exercise  How many days a week do you currently exercise?  0 days  Have you ever been told by a physician not to exercise?  no      Objective  Visit Vitals  /77   Pulse 90   Temp 98.2 °F (36.8 °C) (Oral)   Resp 18   Ht 5' 1\" (1.549 m)   Wt 203 lb 12.8 oz (92.4 kg)   SpO2 98%   BMI 38.51 kg/m²       Bicep - (right ) circumference  Waist Circumference: See Weight Management Doc Flowsheet  Neck Circumference: See Weight Management Doc Flowsheet  Percent Body Fat: See Weight Management Doc Flowsheet  Weight Metrics 6/28/2019 6/28/2019 5/15/2019 5/15/2019 4/11/2019 4/11/2019 4/1/2019   Weight - 203 lb 12.8 oz - 206 lb - 213 lb 205 lb   Neck Circ (inches) 14.5 - 15 - 16 - -   Waist Measure Inches 43.5 - 42 - 43 - -   BMI - 38.51 kg/m2 - 38.92 kg/m2 - 40.25 kg/m2 38.73 kg/m2         Labs:       Review of Systems  Complete ROS negative except where noted above      Physical Exam    Vital Signs Reviewed  Weight Management Metrics Reviewed    Vital Signs Reviewed  Appearance: Obese, A&O x 3, NAD  HEENT:  NC/AT, EOMI, PERRL, No scleral icterus, malampatti score:  Skin:    Skin tags - no   Acanthosis Nigricans - no  Neck:  No nodes, thyromegaly   Heart:  RRR without M/R/G  Lungs:  CTAB, no rhonchi, rales, or wheezes with good air exchange   Abdomen:  Non-tender, pos bowel sounds; hepatomegaly -   Ext:  No C/C/E        Assessment & Plan  Diagnoses and all orders for this visit:    1. Nausea  -     AMB POC GONADOTROPIN, CHORIONIC (HCG); QUALITATIVE  -     ondansetron (ZOFRAN ODT) 4 mg disintegrating tablet; Take 1 Tab by mouth every eight (8) hours as needed for Nausea. 2. Obesity (BMI 35.0-39.9 without comorbidity)  -     REFERRAL TO PSYCHOLOGY  -     phentermine 30 mg capsule; Take 1 Cap by mouth every morning. Max Daily Amount: 30 mg.  -     METABOLIC PANEL, COMPREHENSIVE  Diet the medication is helping her dietary compliance    Activity keep aim for 4-5 days a week    Medication cont the phentermine  3. Tiredness  -     CBC WITH AUTOMATED DIFF    4. Insomnia, unspecified type  -     REFERRAL TO PSYCHOLOGY    5. Thinning hair  -     clobetasol (TEMOVATE) 0.05 % external solution; Apply to scal once a day    6. Lupus erythematosus, unspecified form  -     METABOLIC PANEL, COMPREHENSIVE  -     clobetasol (TEMOVATE) 0.05 % external solution; Apply to scal once a day        Based on his history and exam, Robi Ibrahim is a good candidate for the  [facility]         There are no Patient Instructions on file for this visit.     Follow-up and Dispositions    · Return in about 2 weeks (around 7/12/2019). Over 50% of the 30 minutes face to face time with Kali consisted of counseling & coordinating and/or discussing treatment plans in reference to her obesity The primary encounter diagnosis was Nausea. Diagnoses of Obesity (BMI 35.0-39.9 without comorbidity), Tiredness, Insomnia, unspecified type, Thinning hair, and Lupus erythematosus, unspecified form were also pertinent to this visit.   The patient verbalizes understanding and agrees with the plan of care    Patient has the advanced directives  booklet to review

## 2019-06-29 LAB
ALBUMIN SERPL-MCNC: 4.9 G/DL (ref 3.5–5.5)
ALBUMIN/GLOB SERPL: 2 {RATIO} (ref 1.2–2.2)
ALP SERPL-CCNC: 89 IU/L (ref 39–117)
ALT SERPL-CCNC: 14 IU/L (ref 0–32)
AST SERPL-CCNC: 17 IU/L (ref 0–40)
BASOPHILS # BLD AUTO: 0 X10E3/UL (ref 0–0.2)
BASOPHILS NFR BLD AUTO: 0 %
BILIRUB SERPL-MCNC: 0.4 MG/DL (ref 0–1.2)
BUN SERPL-MCNC: 9 MG/DL (ref 6–20)
BUN/CREAT SERPL: 12 (ref 9–23)
CALCIUM SERPL-MCNC: 9.8 MG/DL (ref 8.7–10.2)
CHLORIDE SERPL-SCNC: 104 MMOL/L (ref 96–106)
CO2 SERPL-SCNC: 23 MMOL/L (ref 20–29)
CREAT SERPL-MCNC: 0.77 MG/DL (ref 0.57–1)
EOSINOPHIL # BLD AUTO: 0.1 X10E3/UL (ref 0–0.4)
EOSINOPHIL NFR BLD AUTO: 1 %
ERYTHROCYTE [DISTWIDTH] IN BLOOD BY AUTOMATED COUNT: 12.8 % (ref 12.3–15.4)
GLOBULIN SER CALC-MCNC: 2.4 G/DL (ref 1.5–4.5)
GLUCOSE SERPL-MCNC: 73 MG/DL (ref 65–99)
HCT VFR BLD AUTO: 42.5 % (ref 34–46.6)
HGB BLD-MCNC: 13.8 G/DL (ref 11.1–15.9)
IMM GRANULOCYTES # BLD AUTO: 0 X10E3/UL (ref 0–0.1)
IMM GRANULOCYTES NFR BLD AUTO: 0 %
LYMPHOCYTES # BLD AUTO: 3.5 X10E3/UL (ref 0.7–3.1)
LYMPHOCYTES NFR BLD AUTO: 43 %
MCH RBC QN AUTO: 30.5 PG (ref 26.6–33)
MCHC RBC AUTO-ENTMCNC: 32.5 G/DL (ref 31.5–35.7)
MCV RBC AUTO: 94 FL (ref 79–97)
MONOCYTES # BLD AUTO: 0.5 X10E3/UL (ref 0.1–0.9)
MONOCYTES NFR BLD AUTO: 7 %
NEUTROPHILS # BLD AUTO: 3.9 X10E3/UL (ref 1.4–7)
NEUTROPHILS NFR BLD AUTO: 49 %
PLATELET # BLD AUTO: 304 X10E3/UL (ref 150–450)
POTASSIUM SERPL-SCNC: 4.2 MMOL/L (ref 3.5–5.2)
PROT SERPL-MCNC: 7.3 G/DL (ref 6–8.5)
RBC # BLD AUTO: 4.53 X10E6/UL (ref 3.77–5.28)
SODIUM SERPL-SCNC: 143 MMOL/L (ref 134–144)
WBC # BLD AUTO: 8.1 X10E3/UL (ref 3.4–10.8)

## 2019-07-11 ENCOUNTER — DOCUMENTATION ONLY (OUTPATIENT)
Dept: FAMILY MEDICINE CLINIC | Age: 29
End: 2019-07-11

## 2019-08-09 ENCOUNTER — OFFICE VISIT (OUTPATIENT)
Dept: FAMILY MEDICINE CLINIC | Age: 29
End: 2019-08-09

## 2019-08-09 VITALS
SYSTOLIC BLOOD PRESSURE: 111 MMHG | HEIGHT: 61 IN | BODY MASS INDEX: 38.63 KG/M2 | WEIGHT: 204.6 LBS | OXYGEN SATURATION: 98 % | HEART RATE: 84 BPM | DIASTOLIC BLOOD PRESSURE: 76 MMHG | TEMPERATURE: 97.6 F | RESPIRATION RATE: 18 BRPM

## 2019-08-09 DIAGNOSIS — Z23 ENCOUNTER FOR IMMUNIZATION: ICD-10-CM

## 2019-08-09 DIAGNOSIS — L93.0 LUPUS ERYTHEMATOSUS, UNSPECIFIED FORM: Primary | ICD-10-CM

## 2019-08-09 DIAGNOSIS — E66.9 OBESITY (BMI 35.0-39.9 WITHOUT COMORBIDITY): ICD-10-CM

## 2019-08-09 RX ORDER — PHENTERMINE HYDROCHLORIDE 30 MG/1
30 CAPSULE ORAL
Qty: 30 CAP | Refills: 0 | Status: SHIPPED | OUTPATIENT
Start: 2019-08-09 | End: 2019-11-07 | Stop reason: SDUPTHER

## 2019-08-09 NOTE — PROGRESS NOTES
1. Have you been to the ER, urgent care clinic since your last visit? Hospitalized since your last visit? No    2. Have you seen or consulted any other health care providers outside of the [address] [place] [address] since your last visit? Include any pap smears or colon screening.  GYN    Chief Complaint   Patient presents with    Weight Management     Body Weight: 204.6  Body Fat%: 40.2  Muscle Mass Weight: 37.2  Body Water Weight: 93.0  Basal Metabolic Rate: 2015  BMI: 38.66

## 2019-08-09 NOTE — PROGRESS NOTES
Weight Loss Progress Note: follow up Physician Visit      Suzan Burger is a 25 y.o. female with BMI 38.66    who is here for her follow up  Evaluation for the medical bariatric care. CC: I want to be healthier  She is doing yard work 2 days a weeek no other exercise  Not documenting cals  Eating a salad for lunch, no breakfast and skips dinner    Weight History  Current weight 204   and BMI is Body mass index is 38.66 kg/m². Goal weight  BMI 28  Highest weight 204   (See weight gain time line scanned into media section of chart)        Significant Medical History    Update on sleep apnea and  CPAP no    Ever had bariatric surgery: no    Pregnant or planning on becoming pregnant w/in 6 months: no         Significant Psychosocial History   Any history of drug abuse or dependence: no  Current Major Lifestyle Changes: no  Any potential unsupportive people: no          Social History  Social History     Tobacco Use    Smoking status: Never Smoker    Smokeless tobacco: Never Used   Substance Use Topics    Alcohol use: No     How many times a week do you eat out? Lunches from [employer] 4 a week mostly salads      Do you drink any EtOH?  no   If so, how much? Do you have upcoming any travel in the next 6 weeks?  no   If so, what do you have planned?           Exercise  How many days a week do you currently exercise?  2 days work in the yard  Have you ever been told by a physician not to exercise?  no      Objective  Visit Vitals  /76   Pulse 84   Temp 97.6 °F (36.4 °C) (Oral)   Resp 18   Ht 5' 1\" (1.549 m)   Wt 204 lb 9.6 oz (92.8 kg)   SpO2 98%   BMI 38.66 kg/m²       Bicep - (right ) circumference  Waist Circumference: See Weight Management Doc Flowsheet  Neck Circumference: See Weight Management Doc Flowsheet  Percent Body Fat: See Weight Management Doc Flowsheet  Weight Metrics 8/9/2019 8/9/2019 6/28/2019 6/28/2019 5/15/2019 5/15/2019 4/11/2019   Weight - 204 lb 9.6 oz - 203 lb 12.8 oz - 206 lb -   Neck Circ (inches) 14.5 - 14.5 - 15 - 16   Waist Measure Inches 43.5 - 43.5 - 42 - 43   BMI - 38.66 kg/m2 - 38.51 kg/m2 - 38.92 kg/m2 -         Labs:       Review of Systems  Complete ROS negative except where noted above      Physical Exam    Vital Signs Reviewed  Weight Management Metrics Reviewed    Vital Signs Reviewed  Appearance: Obese, A&O x 3, NAD  HEENT:  NC/AT, EOMI, PERRL, No scleral icterus, malampatti score:  Skin:    Skin tags - no   Acanthosis Nigricans -no   Neck:  No nodes, thyromegaly   Heart:  RRR without M/R/G  Lungs:  CTAB, no rhonchi, rales, or wheezes with good air exchange   Abdomen:  Non-tender, pos bowel sounds; hepatomegaly -   Ext:  No C/C/E        Assessment & Plan  Diagnoses and all orders for this visit:    1. Lupus erythematosus, unspecified form  Symptoms stable now  2. Encounter for immunization  -     TETANUS, DIPHTHERIA TOXOIDS AND ACELLULAR PERTUSSIS VACCINE (TDAP), IN INDIVIDS. >=7, IM  -     LA IMMUNIZ ADMIN,1 SINGLE/COMB VAC/TOXOID    3. Obesity (BMI 35.0-39.9 without comorbidity)  DIET: still having trouble controlling carbs  ACTIVITY: not consistent now    MEDICATION: phentermine      Based on his history and exam, Antonio Hager is a good candidate for the  [facility]       There are no Patient Instructions on file for this visit. Follow-up and Dispositions    · Return in about 2 weeks (around 8/23/2019). Over 50% of the 30minutes face to face time with Daniel Foots consisted of counseling & coordinating and/or discussing treatment plans in reference to her obesity The primary encounter diagnosis was Lupus erythematosus, unspecified form. Diagnoses of Encounter for immunization and Obesity (BMI 35.0-39.9 without comorbidity) were also pertinent to this visit.   The patient verbalizes understanding and agrees with the plan of care    Patient has the advanced directives  booklet to review

## 2019-08-12 DIAGNOSIS — L02.92 BOIL: Primary | ICD-10-CM

## 2019-08-12 RX ORDER — SULFAMETHOXAZOLE AND TRIMETHOPRIM 800; 160 MG/1; MG/1
1 TABLET ORAL 2 TIMES DAILY
Qty: 20 TAB | Refills: 0 | Status: SHIPPED | OUTPATIENT
Start: 2019-08-12 | End: 2019-08-22

## 2019-08-13 NOTE — PROGRESS NOTES
She called and sent pic of red and raised nodule on llq of abd. She says bloody drainage was expressed from it today. no recall of trauma, unknown source  Could be spider bite, or staph also could be the start of pyoderma gangrnosum.  If is worse tomorrow she will go to ER

## 2019-08-21 DIAGNOSIS — R11.0 NAUSEA: ICD-10-CM

## 2019-08-21 RX ORDER — ONDANSETRON 4 MG/1
TABLET, ORALLY DISINTEGRATING ORAL
Qty: 8 TAB | Refills: 0 | Status: SHIPPED | OUTPATIENT
Start: 2019-08-21 | End: 2020-02-13 | Stop reason: SDUPTHER

## 2019-11-07 ENCOUNTER — OFFICE VISIT (OUTPATIENT)
Dept: FAMILY MEDICINE CLINIC | Age: 29
End: 2019-11-07

## 2019-11-07 VITALS
BODY MASS INDEX: 36.91 KG/M2 | HEIGHT: 61 IN | OXYGEN SATURATION: 98 % | SYSTOLIC BLOOD PRESSURE: 78 MMHG | DIASTOLIC BLOOD PRESSURE: 43 MMHG | WEIGHT: 195.5 LBS | RESPIRATION RATE: 16 BRPM | HEART RATE: 74 BPM | TEMPERATURE: 98.4 F

## 2019-11-07 DIAGNOSIS — L93.0 LUPUS ERYTHEMATOSUS, UNSPECIFIED FORM: Primary | ICD-10-CM

## 2019-11-07 DIAGNOSIS — E66.9 OBESITY (BMI 35.0-39.9 WITHOUT COMORBIDITY): ICD-10-CM

## 2019-11-07 DIAGNOSIS — M76.30 IT BAND SYNDROME, UNSPECIFIED LATERALITY: ICD-10-CM

## 2019-11-07 DIAGNOSIS — I95.9 HYPOTENSION, UNSPECIFIED HYPOTENSION TYPE: ICD-10-CM

## 2019-11-07 LAB
GLUCOSE POC: 82 MG/DL
HGB BLD-MCNC: 12.1 G/DL

## 2019-11-07 RX ORDER — PHENTERMINE HYDROCHLORIDE 30 MG/1
30 CAPSULE ORAL
Qty: 30 CAP | Refills: 0 | Status: SHIPPED | OUTPATIENT
Start: 2019-11-07 | End: 2019-12-09 | Stop reason: SDUPTHER

## 2019-11-07 RX ORDER — PREDNISONE 10 MG/1
TABLET ORAL
Qty: 20 TAB | Refills: 0 | Status: SHIPPED | OUTPATIENT
Start: 2019-11-07 | End: 2020-06-04 | Stop reason: SDUPTHER

## 2019-11-07 NOTE — PROGRESS NOTES
Chief Complaint   Patient presents with    Fatigue    Elbow Pain     bilateral    Numbness     bilateral  forearm radiates to hands      she is a 25y.o. year old female who presents for evalution. She is aching more especially the hips and down the IT band area  She takes plaquenil twice a day  She has not been able to exercise the last two months  She saw endocrine yesterday and a lot of blood was taken  She has lost 18 lbs since April  She has cut down on sweets significantly      Reviewed PmHx, RxHx, FmHx, SocHx, AllgHx and updated and dated in the chart. Aspirin yes ____   No____ N/A____    Patient Active Problem List    Diagnosis    Severe obesity (BMI 35.0-39. 9)    S/P repair of paraesophageal hernia    Paraesophageal hernia    GERD (gastroesophageal reflux disease)    Obesity, Class II, BMI 35-39.9    Sebaceous cyst     S/P excision; Along sternum in cleavage.  History of Nissen fundoplication     4 duodenal ulcers, chronic gastritis, Grade C esophagitis, Chronic GERD, hernia, small tumor. Done August/2016.  Chronic migraine without aura without status migrainosus, not intractable    Cervical incompetence       Nurse notes were reviewed and copied and are correct  Review of Systems - negative except as listed above in the HPI    Objective:     Vitals:    11/07/19 1328   BP: (!) 78/43   Pulse: 74   Resp: 16   Temp: 98.4 °F (36.9 °C)   TempSrc: Oral   SpO2: 98%   Weight: 195 lb 8 oz (88.7 kg)   Height: 5' 1\" (1.549 m)       Physical Examination: General appearance - alert, well appearing, and in no distress  Mental status - alert, oriented to person, place, and time  Chest - clear to auscultation, no wheezes, rales or rhonchi, symmetric air entry  Heart - normal rate, regular rhythm, normal S1, S2, no murmurs, rubs, clicks or gallops  Musculoskeletal - no joint tenderness, deformity or swelling      Assessment/ Plan:   Diagnoses and all orders for this visit:    1.  Lupus erythematosus, unspecified form  -     predniSONE (DELTASONE) 10 mg tablet; Take 4 tab ea day for 2 days, then 3 tab ea day for 2 days , then 2 tab ea day for 2 days, then 1 tab ea day for 2 days    2. It band syndrome, unspecified laterality  -     REFERRAL TO PHYSICAL THERAPY  -     predniSONE (DELTASONE) 10 mg tablet; Take 4 tab ea day for 2 days, then 3 tab ea day for 2 days , then 2 tab ea day for 2 days, then 1 tab ea day for 2 days    3. Obesity (BMI 35.0-39.9 without comorbidity)  -     phentermine 30 mg capsule; Take 1 Cap by mouth every morning. Max Daily Amount: 30 mg.    4. Hypotension, unspecified hypotension type  -     AMB POC HEMOGLOBIN (HGB)  -     AMB POC GLUCOSE BLOOD, BY GLUCOSE MONITORING DEVICE       Follow-up and Dispositions    · Return in about 1 month (around 12/7/2019). ICD-10-CM ICD-9-CM    1. Lupus erythematosus, unspecified form L93.0 695.4 predniSONE (DELTASONE) 10 mg tablet   2. It band syndrome, unspecified laterality M76.30 728.89 REFERRAL TO PHYSICAL THERAPY      predniSONE (DELTASONE) 10 mg tablet   3. Obesity (BMI 35.0-39.9 without comorbidity) E66.9 278.00 phentermine 30 mg capsule   4. Hypotension, unspecified hypotension type I95.9 458.9 AMB POC HEMOGLOBIN (HGB)      AMB POC GLUCOSE BLOOD, BY GLUCOSE MONITORING DEVICE       I have discussed the diagnosis with the patient and the intended plan as seen in the above orders. The patient has received an after-visit summary and questions were answered concerning future plans. Medication Side Effects and Warnings were discussed with patient: yes  Patient Labs were reviewed and or requested: yes  Patient Past Records were reviewed and or requested: yes        There are no Patient Instructions on file for this visit.     The patient verbalizes understanding and agrees with the plan of care        Patient has the advanced directives booklet to review

## 2019-11-07 NOTE — PROGRESS NOTES
1. Have you been to the ER, urgent care clinic since your last visit? Hospitalized since your last visit? No    2. Have you seen or consulted any other health care providers outside of the [address] [place] [address] since your last visit? Include any pap smears or colon screening.  Rheumatology    Chief Complaint   Patient presents with    Fatigue    Elbow Pain     bilateral    Numbness     bilateral  forearm radiates to hands

## 2019-11-07 NOTE — LETTER
NOTIFICATION RETURN TO WORK / SCHOOL 
 
11/7/2019 2:20 PM 
 
Ms. Mary Short [place] 
[address] [address] [id number] To Whom It May Concern: 
 
Eusebio Hernandez is currently under the care of Elva Dsouza. She will return to work/school on: November 11, 2019 If there are questions or concerns please have the patient contact our office.  
 
 
 
Sincerely, 
 
 
Nereida Hraden MD

## 2019-12-01 NOTE — PROGRESS NOTES
Has appt in rheum Monday. I am ordering repeat cmp to recheck liver and lytes so there will be a new set for the doc.    I think the lupus is the reason for the elevated liver enzymes
Spoke with pt while in office and advised of lab results. Pt verbalized understanding and no further questions. Lab letter provided.
The liver is back to normal-great news!
URI (upper respiratory infection)

## 2019-12-09 ENCOUNTER — OFFICE VISIT (OUTPATIENT)
Dept: FAMILY MEDICINE CLINIC | Age: 29
End: 2019-12-09

## 2019-12-09 VITALS
BODY MASS INDEX: 37.74 KG/M2 | DIASTOLIC BLOOD PRESSURE: 81 MMHG | RESPIRATION RATE: 18 BRPM | TEMPERATURE: 98.2 F | OXYGEN SATURATION: 98 % | WEIGHT: 199.9 LBS | HEIGHT: 61 IN | HEART RATE: 100 BPM | SYSTOLIC BLOOD PRESSURE: 115 MMHG

## 2019-12-09 DIAGNOSIS — G56.20 ULNAR NERVE ENTRAPMENT, UNSPECIFIED LATERALITY: Primary | ICD-10-CM

## 2019-12-09 DIAGNOSIS — E66.9 OBESITY (BMI 35.0-39.9 WITHOUT COMORBIDITY): ICD-10-CM

## 2019-12-09 RX ORDER — PHENTERMINE HYDROCHLORIDE 30 MG/1
30 CAPSULE ORAL
Qty: 30 CAP | Refills: 0 | Status: SHIPPED | OUTPATIENT
Start: 2019-12-17 | End: 2020-08-25

## 2019-12-09 NOTE — PROGRESS NOTES
Chief Complaint   Patient presents with    Elbow Pain     bilateral but R. elbow worse     she is a 30y.o. year old female who presents for evalution. C/o pain in both elbows, right worse than left  She leans on both elbows. She is getting numbness in the hands w the pressure but goes away when she takes the pressure off  She has not tried the voltaren gel      Obesity  Taking phnetrmine 5 days a week  Has 8-9 more in the bottle  She has started eating a muffin each day  Reanna Fitzpatrick is two a week    Reviewed PmHx, RxHx, FmHx, SocHx, AllgHx and updated and dated in the chart. Aspirin yes ____   No____ N/A____    Patient Active Problem List    Diagnosis    Severe obesity (BMI 35.0-39. 9)    S/P repair of paraesophageal hernia    Paraesophageal hernia    GERD (gastroesophageal reflux disease)    Obesity, Class II, BMI 35-39.9    Sebaceous cyst     S/P excision; Along sternum in cleavage.  History of Nissen fundoplication     4 duodenal ulcers, chronic gastritis, Grade C esophagitis, Chronic GERD, hernia, small tumor. Done August/2016.  Chronic migraine without aura without status migrainosus, not intractable    Cervical incompetence       Nurse notes were reviewed and copied and are correct  Review of Systems - negative except as listed above in the HPI    Objective:     Vitals:    12/09/19 1312   BP: 115/81   Pulse: 100   Resp: 18   Temp: 98.2 °F (36.8 °C)   TempSrc: Oral   SpO2: 98%   Weight: 199 lb 14.4 oz (90.7 kg)   Height: 5' 1\" (1.549 m)        Physical Examination: General appearance - alert, well appearing, and in no distress  Mental status - alert, oriented to person, place, and time  Musculoskeletal - tender on right ulnar tunnel, reproducd pain and tinging in the right hand        Assessment/ Plan:   Diagnoses and all orders for this visit:    1. Ulnar nerve entrapment, unspecified laterality    2. Obesity (BMI 35.0-39.9 without comorbidity)  -     phentermine 30 mg capsule;  Take 1 Cap by mouth every morning. Max Daily Amount: 30 mg. Follow-up and Dispositions    · Return in about 1 month (around 1/9/2020). ICD-10-CM ICD-9-CM    1. Ulnar nerve entrapment, unspecified laterality G56.20 354.2    2. Obesity (BMI 35.0-39.9 without comorbidity) E66.9 278.00 phentermine 30 mg capsule       I have discussed the diagnosis with the patient and the intended plan as seen in the above orders. The patient has received an after-visit summary and questions were answered concerning future plans. Medication Side Effects and Warnings were discussed with patient: yes  Patient Labs were reviewed and or requested: yes  Patient Past Records were reviewed and or requested: yes        There are no Patient Instructions on file for this visit.     The patient verbalizes understanding and agrees with the plan of care        Patient has the advanced directives booklet to review

## 2020-01-06 DIAGNOSIS — R11.0 NAUSEA: ICD-10-CM

## 2020-01-07 RX ORDER — ONDANSETRON 4 MG/1
TABLET, ORALLY DISINTEGRATING ORAL
Qty: 8 TAB | Refills: 0 | OUTPATIENT
Start: 2020-01-07

## 2020-01-13 NOTE — TELEPHONE ENCOUNTER
3rd attempted made to pt to inform message. Patient x2 id verified. Informed message to pt, pt verbalized understanding.

## 2020-02-13 ENCOUNTER — HOSPITAL ENCOUNTER (OUTPATIENT)
Dept: LAB | Age: 30
Discharge: HOME | End: 2020-02-13

## 2020-02-13 ENCOUNTER — OFFICE VISIT (OUTPATIENT)
Dept: FAMILY MEDICINE CLINIC | Age: 30
End: 2020-02-13

## 2020-02-13 VITALS
TEMPERATURE: 98.9 F | HEIGHT: 61 IN | SYSTOLIC BLOOD PRESSURE: 131 MMHG | HEART RATE: 101 BPM | DIASTOLIC BLOOD PRESSURE: 88 MMHG | OXYGEN SATURATION: 97 % | RESPIRATION RATE: 20 BRPM | BODY MASS INDEX: 37.77 KG/M2

## 2020-02-13 DIAGNOSIS — R11.0 NAUSEA: ICD-10-CM

## 2020-02-13 DIAGNOSIS — G93.2 PSEUDOTUMOR CEREBRI SYNDROME: ICD-10-CM

## 2020-02-13 DIAGNOSIS — R79.89 ABNORMAL LIVER FUNCTION TEST: ICD-10-CM

## 2020-02-13 DIAGNOSIS — R11.10 VOMITING, INTRACTABILITY OF VOMITING NOT SPECIFIED, PRESENCE OF NAUSEA NOT SPECIFIED, UNSPECIFIED VOMITING TYPE: ICD-10-CM

## 2020-02-13 DIAGNOSIS — R11.10 VOMITING, INTRACTABILITY OF VOMITING NOT SPECIFIED, PRESENCE OF NAUSEA NOT SPECIFIED, UNSPECIFIED VOMITING TYPE: Primary | ICD-10-CM

## 2020-02-13 LAB
FLUAV+FLUBV AG NOSE QL IA.RAPID: NEGATIVE POS/NEG
FLUAV+FLUBV AG NOSE QL IA.RAPID: NEGATIVE POS/NEG
VALID INTERNAL CONTROL?: YES

## 2020-02-13 RX ORDER — PROMETHAZINE HYDROCHLORIDE 25 MG/ML
25 INJECTION, SOLUTION INTRAMUSCULAR; INTRAVENOUS ONCE
Qty: 1 VIAL | Refills: 0
Start: 2020-02-13 | End: 2020-02-13

## 2020-02-13 RX ORDER — ACETAZOLAMIDE 250 MG/1
750 TABLET ORAL 2 TIMES DAILY
COMMUNITY
End: 2020-08-25

## 2020-02-13 RX ORDER — PROMETHAZINE HYDROCHLORIDE 25 MG/ML
25 INJECTION, SOLUTION INTRAMUSCULAR; INTRAVENOUS ONCE
Qty: 1 ML | Refills: 0
Start: 2020-02-13 | End: 2020-02-13

## 2020-02-13 RX ORDER — ONDANSETRON 4 MG/1
TABLET, ORALLY DISINTEGRATING ORAL
Qty: 16 TAB | Refills: 0 | Status: SHIPPED | OUTPATIENT
Start: 2020-02-13 | End: 2020-03-31

## 2020-02-13 NOTE — PROGRESS NOTES
Chief Complaint   Patient presents with   Norris 232     released from [address] 3 days ago, was admitted for 2 weeks. Dx with pseudocerebral ?.    Nausea    Vomiting     states that she is not able to keep anything down    Fatigue    Generalized Body Aches     states that she has full body muscle tension pain. she is a 30y.o. year old female who presents for evalution. She was discharged 3 days ago from [facility] for pseudotumor ceribri  [facility] that [facility] and they were treating for lupus rlated swelling of the optic nerve and increased IC presure  She was getting IV zofran while inpatient so she was able to eat  She was discharged with none  Since then she has been vomitting every time she tries to eat  Also has trouble keeping meds down  She has a follow up with neuropthalmology at u  She was not given follow up with neurology     Reviewed PmHx, RxHx, FmHx, SocHx, AllgHx and updated and dated in the chart. Aspirin yes ____   No____ N/A____    Patient Active Problem List    Diagnosis    Severe obesity (BMI 35.0-39. 9)    S/P repair of paraesophageal hernia    Paraesophageal hernia    GERD (gastroesophageal reflux disease)    Obesity, Class II, BMI 35-39.9    Sebaceous cyst     S/P excision; Along sternum in cleavage.  History of Nissen fundoplication     4 duodenal ulcers, chronic gastritis, Grade C esophagitis, Chronic GERD, hernia, small tumor. Done August/2016.       Chronic migraine without aura without status migrainosus, not intractable    Cervical incompetence       Nurse notes were reviewed and copied and are correct  Review of Systems - negative except as listed above in the HPI    Objective:     Vitals:    02/13/20 1035   BP: 131/88   Pulse: (!) 101   Resp: 20   Temp: 98.9 °F (37.2 °C)   SpO2: 97%   Height: 5' 1\" (1.549 m)       Physical Examination: General appearance - alert, well appearing, and in no distress  Mental status - alert, oriented to person, place, and time  Eyes - sclera injected, no icterus  Neck: no adenopathy  Chest - clear to auscultation, no wheezes, rales or rhonchi, symmetric air entry  Heart - normal rate, regular rhythm, normal S1, S2, no murmurs, rubs, clicks or gallops  Abd: soft , nontender  Ext: no edema  MSK: no deformity  Neuro: could not examine due to nausea and vomitting  Skin: no rashes    Assessment/ Plan:   Diagnoses and all orders for this visit:    1. Vomiting, intractability of vomiting not specified, presence of nausea not specified, unspecified vomiting type  -     AMB POC SUDHEER INFLUENZA A/B TEST  -     promethazine (PHENERGAN) 25 mg/mL injection; 1 mL by IntraMUSCular route once for 1 dose. -     AR THER/PROPH/DIAG INJECTION, SUBCUT/IM  -     PROMETHAZINE HCL INJECTION  -     promethazine (PHENERGAN) 25 mg/mL injection; 1 mL by IntraMUSCular route once for 1 dose. 2. Nausea  -     ondansetron (ZOFRAN ODT) 4 mg disintegrating tablet; TAKE 1 TABLET BY MOUTH EVERY 8 HOURS AS NEEDED FOR NAUSEA  -     AMB POC SUDHEER INFLUENZA A/B TEST  -     promethazine (PHENERGAN) 25 mg/mL injection; 1 mL by IntraMUSCular route once for 1 dose. -     AR THER/PROPH/DIAG INJECTION, SUBCUT/IM  -     PROMETHAZINE HCL INJECTION  -     promethazine (PHENERGAN) 25 mg/mL injection; 1 mL by IntraMUSCular route once for 1 dose. 3. Pseudotumor cerebri syndrome  -     promethazine (PHENERGAN) 25 mg/mL injection; 1 mL by IntraMUSCular route once for 1 dose. -     AR THER/PROPH/DIAG INJECTION, SUBCUT/IM  -     PROMETHAZINE HCL INJECTION  -     promethazine (PHENERGAN) 25 mg/mL injection; 1 mL by IntraMUSCular route once for 1 dose. 4. Abnormal liver function test  -     HEPATIC FUNCTION PANEL; Future    Other orders  -     METABOLIC PANEL, COMPREHENSIVE           ICD-10-CM ICD-9-CM    1.  Vomiting, intractability of vomiting not specified, presence of nausea not specified, unspecified vomiting type R11.10 787.03 AMB POC SUDHEER INFLUENZA A/B TEST      promethazine (PHENERGAN) 25 mg/mL injection      VT THER/PROPH/DIAG INJECTION, SUBCUT/IM      PROMETHAZINE HCL INJECTION      promethazine (PHENERGAN) 25 mg/mL injection      CANCELED: METABOLIC PANEL, COMPREHENSIVE   2. Nausea R11.0 787.02 ondansetron (ZOFRAN ODT) 4 mg disintegrating tablet      AMB POC SUDHEER INFLUENZA A/B TEST      promethazine (PHENERGAN) 25 mg/mL injection      VT THER/PROPH/DIAG INJECTION, SUBCUT/IM      PROMETHAZINE HCL INJECTION      promethazine (PHENERGAN) 25 mg/mL injection      CANCELED: AMB POC RAPID INFLUENZA TEST   3. Pseudotumor cerebri syndrome G93.2 348. 2 promethazine (PHENERGAN) 25 mg/mL injection      VT THER/PROPH/DIAG INJECTION, SUBCUT/IM      PROMETHAZINE HCL INJECTION      promethazine (PHENERGAN) 25 mg/mL injection      CANCELED: METABOLIC PANEL, COMPREHENSIVE   4. Abnormal liver function test R94.5 790.6 HEPATIC FUNCTION PANEL       I have discussed the diagnosis with the patient and the intended plan as seen in the above orders. The patient has received an after-visit summary and questions were answered concerning future plans. Medication Side Effects and Warnings were discussed with patient: yes  Patient Labs were reviewed and or requested: yes  Patient Past Records were reviewed and or requested: yes        There are no Patient Instructions on file for this visit.     The patient verbalizes understanding and agrees with the plan of care        Patient has the advanced directives booklet to review

## 2020-02-13 NOTE — PROGRESS NOTES
Des Dent is a 30 y.o. female , id x 2(name and ). Reviewed record, history, and  medications. Chief Complaint   Patient presents with   Norris Moore     released from [address] 3 days ago, was admitted for 2 weeks. Dx with pseudocerebral ?.    Nausea    Vomiting     states that she is not able to keep anything down    Fatigue    Generalized Body Aches     states that she has full body muscle tension pain. Vitals:    20 1035   BP: 131/88   Pulse: (!) 101   Resp: 20   Temp: 98.9 °F (37.2 °C)   SpO2: 97%   Height: 5' 1\" (1.549 m)   PainSc:   5   PainLoc: Generalized   LMP: 2020      Chief Complaint   Patient presents with   Norris 232     released from [address] 3 days ago, was admitted for 2 weeks. Dx with pseudocerebral ?.    Nausea    Vomiting     states that she is not able to keep anything down    Fatigue    Generalized Body Aches     states that she has full body muscle tension pain. Visit Vitals  /88   Pulse (!) 101   Temp 98.9 °F (37.2 °C)   Resp 20   Ht 5' 1\" (1.549 m)   SpO2 97%   BMI 37.77 kg/m²       After obtaining Kali Velasquez's consent, and per orders of Dr. Mendel Cons, injection of Phenergan 25mg given by Ivanna Conti LPN in (R) glut. Patient instructed to remain in clinic for 20 minutes afterwards, and to report any adverse reaction to me immediately. Patient did not display any adverse side effects. Coordination of Care Questionnaire:   1) Have you been to an emergency room, urgent care, or hospitalized since your last visit? yes   See visit reason    2. Have seen or consulted any other health care provider since your last visit?  NO      3 most recent PHQ Screens 2020   PHQ Not Done -   Little interest or pleasure in doing things Not at all   Feeling down, depressed, irritable, or hopeless Not at all   Total Score PHQ 2 0       Patient is accompanied by self and  I have received verbal consent from Des Dent to discuss any/all medical information while they are present in the room.

## 2020-02-14 LAB
ALBUMIN SERPL-MCNC: 4.4 G/DL (ref 3.8–4.8)
ALBUMIN/GLOB SERPL: 1.9 {RATIO} (ref 1.2–2.2)
ALP SERPL-CCNC: 125 IU/L (ref 39–117)
ALT SERPL-CCNC: 201 IU/L (ref 0–32)
AST SERPL-CCNC: 103 IU/L (ref 0–40)
BILIRUB SERPL-MCNC: 0.5 MG/DL (ref 0–1.2)
BUN SERPL-MCNC: 13 MG/DL (ref 6–20)
BUN/CREAT SERPL: 15 (ref 9–23)
CALCIUM SERPL-MCNC: 9.4 MG/DL (ref 8.7–10.2)
CHLORIDE SERPL-SCNC: 102 MMOL/L (ref 96–106)
CO2 SERPL-SCNC: 21 MMOL/L (ref 20–29)
CREAT SERPL-MCNC: 0.88 MG/DL (ref 0.57–1)
GLOBULIN SER CALC-MCNC: 2.3 G/DL (ref 1.5–4.5)
GLUCOSE SERPL-MCNC: 127 MG/DL (ref 65–99)
POTASSIUM SERPL-SCNC: 3.5 MMOL/L (ref 3.5–5.2)
PROT SERPL-MCNC: 6.7 G/DL (ref 6–8.5)
SODIUM SERPL-SCNC: 138 MMOL/L (ref 134–144)

## 2020-02-15 NOTE — PROGRESS NOTES
The liver was irritated on ths blood test. I want to recheck before your next visit.  I will enter the order and you come in 8-12 one day next week

## 2020-02-17 ENCOUNTER — TELEPHONE (OUTPATIENT)
Dept: FAMILY MEDICINE CLINIC | Age: 30
End: 2020-02-17

## 2020-02-23 LAB
ALBUMIN SERPL-MCNC: 4.7 G/DL (ref 3.8–4.8)
ALP SERPL-CCNC: 98 IU/L (ref 39–117)
ALT SERPL-CCNC: 37 IU/L (ref 0–32)
AST SERPL-CCNC: 12 IU/L (ref 0–40)
BILIRUB DIRECT SERPL-MCNC: 0.05 MG/DL (ref 0–0.4)
BILIRUB SERPL-MCNC: <0.2 MG/DL (ref 0–1.2)
PROT SERPL-MCNC: 6.9 G/DL (ref 6–8.5)

## 2020-02-26 ENCOUNTER — OFFICE VISIT (OUTPATIENT)
Dept: FAMILY MEDICINE CLINIC | Age: 30
End: 2020-02-26

## 2020-02-26 VITALS
DIASTOLIC BLOOD PRESSURE: 73 MMHG | SYSTOLIC BLOOD PRESSURE: 107 MMHG | OXYGEN SATURATION: 100 % | HEIGHT: 61 IN | RESPIRATION RATE: 18 BRPM | HEART RATE: 102 BPM | BODY MASS INDEX: 37.52 KG/M2 | WEIGHT: 198.7 LBS | TEMPERATURE: 97.8 F

## 2020-02-26 DIAGNOSIS — G93.2 PSEUDOTUMOR CEREBRI SYNDROME: Primary | ICD-10-CM

## 2020-02-26 DIAGNOSIS — Z02.89 ENCOUNTER FOR COMPLETION OF FORM WITH PATIENT: ICD-10-CM

## 2020-02-26 NOTE — PROGRESS NOTES
1. Have you been to the ER, urgent care clinic since your last visit? Hospitalized since your last visit? No    2. Have you seen or consulted any other health care providers outside of the [address] [place] [address] since your last visit? Include any pap smears or colon screening.  No     Chief Complaint   Patient presents with   [facility]       Visit Vitals  /73 (BP 1 Location: Right arm, BP Patient Position: Sitting)   Pulse (!) 102   Temp 97.8 °F (36.6 °C) (Oral)   Resp 18   Ht 5' 1\" (1.549 m)   Wt 198 lb 11.2 oz (90.1 kg)   SpO2 100%   BMI 37.54 kg/m²     Body weight: 198.7lb  Body Fat: 39.4%  MMW: 36.2  Water: 44.7%  Basal: 1559

## 2020-02-26 NOTE — PROGRESS NOTES
Chief Complaint   Patient presents with   [facility] Follow Up     she is a 30y.o. year old female who presents for evalution. No longer vomitting  No more headache  She saw neuropthalmology  They did the testing and said the opti nerve is still slightly swollen and the peripheral vision is not normal but is good enough to drive and to go back to work  She is getting tingling in her mouth from side effects of diamox  She has also had a change in taste such as she does not like soda now  She is not getting any exercise      Reviewed PmHx, RxHx, FmHx, SocHx, AllgHx and updated and dated in the chart. Aspirin yes ____   No____ N/A____    Patient Active Problem List    Diagnosis    Severe obesity (BMI 35.0-39. 9)    S/P repair of paraesophageal hernia    Paraesophageal hernia    GERD (gastroesophageal reflux disease)    Obesity, Class II, BMI 35-39.9    Sebaceous cyst     S/P excision; Along sternum in cleavage.  History of Nissen fundoplication     4 duodenal ulcers, chronic gastritis, Grade C esophagitis, Chronic GERD, hernia, small tumor. Done August/2016.       Chronic migraine without aura without status migrainosus, not intractable    Cervical incompetence       Nurse notes were reviewed and copied and are correct  Review of Systems - negative except as listed above in the HPI    Objective:     Vitals:    02/26/20 1517   BP: 107/73   Pulse: (!) 102   Resp: 18   Temp: 97.8 °F (36.6 °C)   TempSrc: Oral   SpO2: 100%   Weight: 198 lb 11.2 oz (90.1 kg)   Height: 5' 1\" (1.549 m)       Physical Examination: General appearance - alert, well appearing, and in no distress  Mental status - alert, oriented to person, place, and time  Neck - supple, no significant adenopathy  Chest - clear to auscultation, no wheezes, rales or rhonchi, symmetric air entry  Heart - normal rate, regular rhythm, normal S1, S2, no murmurs, rubs, clicks or gallops  Neurological - alert, oriented, normal speech, no focal findings or movement disorder noted      Assessment/ Plan:   Diagnoses and all orders for this visit:    1. Pseudotumor cerebri syndrome  Doing much better  Still some edema of the optic nerve on left eye  She can read well now and can see the computer  She wants a note to go ozzy to work Monday  She was able to read the small letering on my ink pen  2. Encounter for completion of form with patient  Form completed to return to work         ICD-10-CM ICD-9-CM    1. Pseudotumor cerebri syndrome G93.2 348.2    2. Encounter for completion of form with patient Z02.89 V68.89        I have discussed the diagnosis with the patient and the intended plan as seen in the above orders. The patient has received an after-visit summary and questions were answered concerning future plans. Medication Side Effects and Warnings were discussed with patient: yes  Patient Labs were reviewed and or requested: yes  Patient Past Records were reviewed and or requested: yes        There are no Patient Instructions on file for this visit.     The patient verbalizes understanding and agrees with the plan of care        Patient has the advanced directives booklet to review

## 2020-05-20 ENCOUNTER — VIRTUAL VISIT (OUTPATIENT)
Dept: FAMILY MEDICINE CLINIC | Age: 30
End: 2020-05-20

## 2020-05-20 VITALS — BODY MASS INDEX: 36.09 KG/M2 | WEIGHT: 191 LBS

## 2020-05-20 DIAGNOSIS — E66.9 OBESITY (BMI 35.0-39.9 WITHOUT COMORBIDITY): ICD-10-CM

## 2020-05-20 DIAGNOSIS — R51.9 FREQUENT HEADACHES: ICD-10-CM

## 2020-05-20 DIAGNOSIS — R06.83 SNORING: Primary | ICD-10-CM

## 2020-05-20 NOTE — PROGRESS NOTES
Shaw Rodriguez is a 30 y.o. female who was seen by synchronous (real-time) audio-video technology on 5/20/2020. Consent: Shaw Rodriguez, who was seen by synchronous (real-time) audio-video technology, and/or her healthcare decision maker, is aware that this patient-initiated, Telehealth encounter on 5/20/2020 is a billable service, with coverage as determined by her insurance carrier. She is aware that she may receive a bill and has provided verbal consent to proceed: Yes. C/o pain in legs and feet  She was supposed to get a steroid injection in hips but that was cancelled  Her last period was 3/5 days and this month none. she is not taking anything to prevent pregnancy  Two pregnancy tests were neg    She is getting headaches again  The neurologist put her on topamax and stopped diamox a few weeks ago  She is not taking either  Assessment & Plan:   Diagnoses and all orders for this visit:    1. Snoring  -     SLEEP MEDICINE REFERRAL    2. Frequent headaches  -     SLEEP MEDICINE REFERRAL    3. Obesity (BMI 35.0-39.9 without comorbidity)  -     SLEEP MEDICINE REFERRAL    I suggested she get sleep study and also discuss with neurologist her plan to get pregnant. This will effect what medication she can use safely          Subjective:   Shaw Rodriguez is a 30 y.o. female who was seen for No chief complaint on file. Prior to Admission medications    Medication Sig Start Date End Date Taking? Authorizing Provider   ondansetron (ZOFRAN ODT) 4 mg disintegrating tablet TAKE 1 TABLET BY MOUTH EVERY 8 HOURS AS NEEDED FOR NAUSEA 3/31/20   Tylor Bates MD   acetaZOLAMIDE (DIAMOX) 250 mg tablet Take 750 mg by mouth two (2) times a day. Provider, Historical   clobetasol (TEMOVATE) 0.05 % external solution APPLY TO SCALP ONCE A DAY 12/24/19   Tylor Bates MD   phentermine 30 mg capsule Take 1 Cap by mouth every morning.  Max Daily Amount: 30 mg. 12/17/19   Tylor Bates MD   predniSONE (DELTASONE) 10 mg tablet Take 4 tab ea day for 2 days, then 3 tab ea day for 2 days , then 2 tab ea day for 2 days, then 1 tab ea day for 2 days 11/7/19   Bailey Salas MD   biotin 10,000 mcg cap Take 10,000 mg by mouth daily. Provider, Historical   hydroxychloroquine (PLAQUENIL) 200 mg tablet Take 200 mg by mouth two (2) times a day. Provider, Historical   gabapentin (NEURONTIN) 300 mg capsule 600 mg three (3) times daily. 8/25/18   Provider, Historical     Allergies   Allergen Reactions    Latex Anaphylaxis    Acetaminophen Anaphylaxis    Other Plant, Animal, Environmental Hives     Allergic to everything outside.  Nsaids (Non-Steroidal Anti-Inflammatory Drug) Other (comments)     Advised by her GI doctor not to take till they figure out what is going on with her stomach. Currently has a Nissen-fundiplication. Patient Active Problem List    Diagnosis Date Noted    Severe obesity (BMI 35.0-39.9) 08/22/2018    S/P repair of paraesophageal hernia 11/17/2017    Paraesophageal hernia 11/15/2017    GERD (gastroesophageal reflux disease) 11/07/2017    Obesity, Class II, BMI 35-39.9 03/31/2017    Sebaceous cyst 03/24/2017    History of Nissen fundoplication 39/28/4572    Chronic migraine without aura without status migrainosus, not intractable 05/18/2016    Cervical incompetence 04/12/2013     Current Outpatient Medications   Medication Sig Dispense Refill    ondansetron (ZOFRAN ODT) 4 mg disintegrating tablet TAKE 1 TABLET BY MOUTH EVERY 8 HOURS AS NEEDED FOR NAUSEA 15 Tab 0    acetaZOLAMIDE (DIAMOX) 250 mg tablet Take 750 mg by mouth two (2) times a day.  clobetasol (TEMOVATE) 0.05 % external solution APPLY TO SCALP ONCE A DAY 50 mL 1    phentermine 30 mg capsule Take 1 Cap by mouth every morning.  Max Daily Amount: 30 mg. 30 Cap 0    predniSONE (DELTASONE) 10 mg tablet Take 4 tab ea day for 2 days, then 3 tab ea day for 2 days , then 2 tab ea day for 2 days, then 1 tab ea day for 2 days 20 Tab 0    biotin 10,000 mcg cap Take 10,000 mg by mouth daily.  hydroxychloroquine (PLAQUENIL) 200 mg tablet Take 200 mg by mouth two (2) times a day.  gabapentin (NEURONTIN) 300 mg capsule 600 mg three (3) times daily. 3       ROS    Objective:   Vital Signs: (As obtained by patient/caregiver at home)  Visit Vitals  Wt 191 lb (86.6 kg)   BMI 36.09 kg/m²        [INSTRUCTIONS:  \"[x]\" Indicates a positive item  \"[]\" Indicates a negative item  -- DELETE ALL ITEMS NOT EXAMINED]    Constitutional: [x] Appears well-developed and well-nourished [x] No apparent distress      [] Abnormal -     Mental status: [x] Alert and awake  [x] Oriented to person/place/time [x] Able to follow commands    [] Abnormal -     Eyes:   EOM    [x]  Normal    [] Abnormal -   Sclera  [x]  Normal    [] Abnormal -          Discharge [x]  None visible   [] Abnormal -     HENT: [x] Normocephalic, atraumatic  [] Abnormal -   [x] Mouth/Throat: Mucous membranes are moist    External Ears [x] Normal  [] Abnormal -    Neck: [x] No visualized mass [] Abnormal -     Pulmonary/Chest: [x] Respiratory effort normal   [x] No visualized signs of difficulty breathing or respiratory distress        [] Abnormal -      Musculoskeletal:   [x] Normal gait with no signs of ataxia         [x] Normal range of motion of neck        [] Abnormal -     Neurological:        [x] No Facial Asymmetry (Cranial nerve 7 motor function) (limited exam due to video visit)          [x] No gaze palsy        [] Abnormal -          Skin:        [x] No significant exanthematous lesions or discoloration noted on facial skin         [] Abnormal -            Psychiatric:       [x] Normal Affect [] Abnormal -        [x] No Hallucinations    Other pertinent observable physical exam findings:-        We discussed the expected course, resolution and complications of the diagnosis(es) in detail. Medication risks, benefits, costs, interactions, and alternatives were discussed as indicated.   I advised her to contact the office if her condition worsens, changes or fails to improve as anticipated. She expressed understanding with the diagnosis(es) and plan. Christen Alford is a 30 y.o. female who was evaluated by a video visit encounter for concerns as above. Patient identification was verified prior to start of the visit. A caregiver was present when appropriate. Due to this being a TeleHealth encounter (During [facility]- public [facility] emergency), evaluation of the following organ systems was limited: Vitals/Constitutional/EENT/Resp/CV/GI//MS/Neuro/Skin/Heme-Lymph-Imm. Pursuant to the emergency declaration under the [facility], 1135 waiver authority and the [employer] Act, this Virtual  Visit was conducted, with patient's (and/or legal guardian's) consent, to reduce the patient's risk of exposure to COVID-19 and provide necessary medical care. Services were provided through a video synchronous discussion virtually to substitute for in-person clinic visit. Patient and provider were located at their individual homes.       Daniel Arce MD

## 2020-06-04 DIAGNOSIS — L93.0 LUPUS ERYTHEMATOSUS, UNSPECIFIED FORM: ICD-10-CM

## 2020-06-04 DIAGNOSIS — M76.30 IT BAND SYNDROME, UNSPECIFIED LATERALITY: ICD-10-CM

## 2020-06-04 RX ORDER — PREDNISONE 10 MG/1
TABLET ORAL
Qty: 20 TAB | Refills: 0 | Status: SHIPPED | OUTPATIENT
Start: 2020-06-04 | End: 2020-08-25

## 2020-06-04 NOTE — PROGRESS NOTES
C/o recurrence of the inflammatory based joint pains   asking for steroids  [facility] [place]

## 2020-06-16 ENCOUNTER — VIRTUAL VISIT (OUTPATIENT)
Dept: FAMILY MEDICINE CLINIC | Age: 30
End: 2020-06-16

## 2020-06-16 DIAGNOSIS — H53.8 BLURRED VISION: ICD-10-CM

## 2020-06-16 DIAGNOSIS — G93.2 PSEUDOTUMOR CEREBRI SYNDROME: Primary | ICD-10-CM

## 2020-06-16 NOTE — PROGRESS NOTES
Hedy Pope is a 30 y.o. female who was seen by synchronous (real-time) audio-video technology on 6/16/2020. Consent: Hedy Pope, who was seen by synchronous (real-time) audio-video technology, and/or her healthcare decision maker, is aware that this patient-initiated, Telehealth encounter on 6/16/2020 is a billable service, with coverage as determined by her insurance carrier. She is aware that she may receive a bill and has provided verbal consent to proceed: Yes. She has pseudotumor ceribri  She had this present a few months ago   she was admitted and had spinal tap to relieve pressure. She took diamox a few weeks and then stopped when the pressure went down  2 weeks ago the vison became very blurred again and she was vommitting   she had a spinal tap last week which gave her some improvement of the vision and the headache. The headache has become a little worse and the vision a little margot blurred the last 3-4 days   she had appt w neuro-ophthalmology today   they want her to have a shunt placed to relieve the pressure and to discuss the pain management w me or her gyn      Assessment & Plan:   Diagnoses and all orders for this visit:    1. Pseudotumor cerebri syndrome    2. Blurred vision    restart the diamox and restart topamax   both were prescribed by neuro at [facility]    Avoid getting pregnant until the ICP is lower and stable    Subjective:   Hedy Pope is a 30 y.o. female who was seen for No chief complaint on file. Prior to Admission medications    Medication Sig Start Date End Date Taking?  Authorizing Provider   predniSONE (DELTASONE) 10 mg tablet Take 4 tab ea day for 2 days, then 3 tab ea day for 2 days , then 2 tab ea day for 2 days, then 1 tab ea day for 2 days 6/4/20   Maldonado Barr MD   ondansetron (ZOFRAN ODT) 4 mg disintegrating tablet TAKE 1 TABLET BY MOUTH EVERY 8 HOURS AS NEEDED FOR NAUSEA 3/31/20   Maldonado Barr MD   acetaZOLAMIDE (DIAMOX) 250 mg tablet Take 750 mg by mouth two (2) times a day. Provider, Historical   clobetasol (TEMOVATE) 0.05 % external solution APPLY TO SCALP ONCE A DAY 12/24/19   Tylor Bates MD   phentermine 30 mg capsule Take 1 Cap by mouth every morning. Max Daily Amount: 30 mg. 12/17/19   Tylor Bates MD   biotin 10,000 mcg cap Take 10,000 mg by mouth daily. Provider, Historical   hydroxychloroquine (PLAQUENIL) 200 mg tablet Take 200 mg by mouth two (2) times a day. Provider, Historical   gabapentin (NEURONTIN) 300 mg capsule 600 mg three (3) times daily. 8/25/18   Provider, Historical     Allergies   Allergen Reactions    Latex Anaphylaxis    Acetaminophen Anaphylaxis    Other Plant, Animal, Environmental Hives     Allergic to everything outside.  Nsaids (Non-Steroidal Anti-Inflammatory Drug) Other (comments)     Advised by her GI doctor not to take till they figure out what is going on with her stomach. Currently has a Nissen-fundiplication. Patient Active Problem List    Diagnosis Date Noted    Severe obesity (BMI 35.0-39.9) 08/22/2018    S/P repair of paraesophageal hernia 11/17/2017    Paraesophageal hernia 11/15/2017    GERD (gastroesophageal reflux disease) 11/07/2017    Obesity, Class II, BMI 35-39.9 03/31/2017    Sebaceous cyst 03/24/2017    History of Nissen fundoplication 86/21/3437    Chronic migraine without aura without status migrainosus, not intractable 05/18/2016    Cervical incompetence 04/12/2013     Current Outpatient Medications   Medication Sig Dispense Refill    predniSONE (DELTASONE) 10 mg tablet Take 4 tab ea day for 2 days, then 3 tab ea day for 2 days , then 2 tab ea day for 2 days, then 1 tab ea day for 2 days 20 Tab 0    ondansetron (ZOFRAN ODT) 4 mg disintegrating tablet TAKE 1 TABLET BY MOUTH EVERY 8 HOURS AS NEEDED FOR NAUSEA 15 Tab 0    acetaZOLAMIDE (DIAMOX) 250 mg tablet Take 750 mg by mouth two (2) times a day.       clobetasol (TEMOVATE) 0.05 % external solution APPLY TO SCALP ONCE A DAY 50 mL 1    phentermine 30 mg capsule Take 1 Cap by mouth every morning. Max Daily Amount: 30 mg. 30 Cap 0    biotin 10,000 mcg cap Take 10,000 mg by mouth daily.  hydroxychloroquine (PLAQUENIL) 200 mg tablet Take 200 mg by mouth two (2) times a day.  gabapentin (NEURONTIN) 300 mg capsule 600 mg three (3) times daily. 3       ROS    Objective:   Vital Signs: (As obtained by patient/caregiver at home)  There were no vitals taken for this visit.      [INSTRUCTIONS:  \"[x]\" Indicates a positive item  \"[]\" Indicates a negative item  -- DELETE ALL ITEMS NOT EXAMINED]    Constitutional: [x] Appears well-developed and well-nourished [x] No apparent distress      [] Abnormal -     Mental status: [x] Alert and awake  [x] Oriented to person/place/time [x] Able to follow commands    [] Abnormal -     Eyes:   EOM    [x]  Normal    [] Abnormal -   Sclera  [x]  Normal    [] Abnormal -          Discharge [x]  None visible   [] Abnormal -     HENT: [x] Normocephalic, atraumatic  [] Abnormal -   [x] Mouth/Throat: Mucous membranes are moist    External Ears [x] Normal  [] Abnormal -    Neck: [x] No visualized mass [] Abnormal -     Pulmonary/Chest: [x] Respiratory effort normal   [x] No visualized signs of difficulty breathing or respiratory distress        [] Abnormal -      Musculoskeletal:   [x] Normal gait with no signs of ataxia         [x] Normal range of motion of neck        [] Abnormal -     Neurological:        [x] No Facial Asymmetry (Cranial nerve 7 motor function) (limited exam due to video visit)          [x] No gaze palsy        [] Abnormal -          Skin:        [x] No significant exanthematous lesions or discoloration noted on facial skin         [] Abnormal -            Psychiatric:       [x] Normal Affect [] Abnormal -        [x] No Hallucinations    Other pertinent observable physical exam findings:-        We discussed the expected course, resolution and complications of the diagnosis(es) in detail. Medication risks, benefits, costs, interactions, and alternatives were discussed as indicated. I advised her to contact the office if her condition worsens, changes or fails to improve as anticipated. She expressed understanding with the diagnosis(es) and plan. Zayra Juárez is a 30 y.o. female who was evaluated by a video visit encounter for concerns as above. Patient identification was verified prior to start of the visit. A caregiver was present when appropriate. Due to this being a TeleHealth encounter (During OTBNR-87 public [facility] emergency), evaluation of the following organ systems was limited: Vitals/Constitutional/EENT/Resp/CV/GI//MS/Neuro/Skin/Heme-Lymph-Imm. Pursuant to the emergency declaration under the [facility], 1135 waiver authority and the [employer] Act, this Virtual  Visit was conducted, with patient's (and/or legal guardian's) consent, to reduce the patient's risk of exposure to COVID-19 and provide necessary medical care. Services were provided through a video synchronous discussion virtually to substitute for in-person clinic visit. Patient and provider were located at their individual homes.       Jeni López MD

## 2020-07-06 ENCOUNTER — TELEPHONE (OUTPATIENT)
Dept: FAMILY MEDICINE CLINIC | Age: 30
End: 2020-07-06

## 2020-07-06 NOTE — TELEPHONE ENCOUNTER
Pt was looking to schedule in office visit but you are full for the month of July for 3 per half day. Are you willing to work in more in office visits or have pt call back when August calendar open?

## 2020-07-15 ENCOUNTER — OFFICE VISIT (OUTPATIENT)
Dept: FAMILY MEDICINE CLINIC | Age: 30
End: 2020-07-15

## 2020-07-15 VITALS
WEIGHT: 207 LBS | DIASTOLIC BLOOD PRESSURE: 85 MMHG | HEIGHT: 61 IN | HEART RATE: 73 BPM | SYSTOLIC BLOOD PRESSURE: 132 MMHG | OXYGEN SATURATION: 98 % | BODY MASS INDEX: 39.08 KG/M2 | RESPIRATION RATE: 16 BRPM | TEMPERATURE: 98.6 F

## 2020-07-15 DIAGNOSIS — M76.30 IT BAND SYNDROME, UNSPECIFIED LATERALITY: ICD-10-CM

## 2020-07-15 DIAGNOSIS — L93.0 LUPUS ERYTHEMATOSUS, UNSPECIFIED FORM: ICD-10-CM

## 2020-07-15 DIAGNOSIS — R79.89 ABNORMAL LIVER FUNCTION TEST: ICD-10-CM

## 2020-07-15 DIAGNOSIS — G93.2 PSEUDOTUMOR CEREBRI SYNDROME: Primary | ICD-10-CM

## 2020-07-15 RX ORDER — CYCLOBENZAPRINE HCL 10 MG
10 TABLET ORAL 2 TIMES DAILY
COMMUNITY
Start: 2020-07-01 | End: 2020-09-10 | Stop reason: SDUPTHER

## 2020-07-15 NOTE — PROGRESS NOTES
1. Have you been to the ER, urgent care clinic since your last visit? Hospitalized since your last visit? Yes When: 20202 Where: [facility] Reason for visit: Neruo issue    2. Have you seen or consulted any other health care providers outside of the [address] [place] [address] since your last visit? Include any pap smears or colon screening. No     Chief Complaint   Patient presents with   [facility] Follow Up       Visit Vitals  /85 (BP 1 Location: Right arm, BP Patient Position: Sitting)   Pulse 73   Temp 98.6 °F (37 °C) (Oral)   Resp 16   Ht 5' 1\" (1.549 m)   Wt 207 lb (93.9 kg)   SpO2 98%   BMI 39.11 kg/m²     Pharmacy verified.

## 2020-07-17 NOTE — PROGRESS NOTES
Chief Complaint   Patient presents with   [facility] Follow Up     she is a 30y.o. year old female who presents for evalution. Here to follow up on pseudotumor ceribri  She is taking the acetozolamide and there has not been a headache in more than a week and no N/V  She has lost 6 lbs based on her scale at home  This scale is a new one for her    Reviewed PmHx, RxHx, FmHx, SocHx, AllgHx and updated and dated in the chart. Aspirin yes ____   No____ N/A____    Patient Active Problem List    Diagnosis    Severe obesity (BMI 35.0-39. 9)    S/P repair of paraesophageal hernia    Paraesophageal hernia    GERD (gastroesophageal reflux disease)    Obesity, Class II, BMI 35-39.9    Sebaceous cyst     S/P excision; Along sternum in cleavage.  History of Nissen fundoplication     4 duodenal ulcers, chronic gastritis, Grade C esophagitis, Chronic GERD, hernia, small tumor. Done August/2016.       Chronic migraine without aura without status migrainosus, not intractable    Cervical incompetence       Nurse notes were reviewed and copied and are correct  Review of Systems - negative except as listed above in the HPI    Objective:     Vitals:    07/15/20 1125   BP: 132/85   Pulse: 73   Resp: 16   Temp: 98.6 °F (37 °C)   TempSrc: Oral   SpO2: 98%   Weight: 207 lb (93.9 kg)   Height: 5' 1\" (1.549 m)     Physical Examination: General appearance - alert, well appearing, and in no distress  Mental status - alert, oriented to person, place, and time  Eyes - pupils equal and reactive, extraocular eye movements intact  Ears - bilateral TM's and external ear canals normal  Mouth - mucous membranes moist, pharynx normal without lesions  Neck - supple, no significant adenopathy  Chest - clear to auscultation, no wheezes, rales or rhonchi, symmetric air entry  Heart - normal rate, regular rhythm, normal S1, S2, no murmurs, rubs, clicks or gallops  Neurological - alert, oriented, normal speech, no focal findings or movement disorder noted  Musculoskeletal - no joint tenderness, deformity or swelling  Extremities - peripheral pulses normal, no pedal edema, no clubbing or cyanosis  Skin - normal coloration and turgor, no rashes, no suspicious skin lesions noted         Assessment/ Plan:   Diagnoses and all orders for this visit:    1. Pseudotumor cerebri syndrome  Better right now as she works on weight loss things are improving  2. It band syndrome, unspecified laterality  -     SED RATE (ESR); Future  Checking her degree of inflammation  Her thigh on the IT band area Is hurting again  Ice it daily  Take the anti-inflammatory  3. Abnormal liver function test  -     METABOLIC PANEL, COMPREHENSIVE; Future    4. Lupus erythematosus, unspecified form  -     METABOLIC PANEL, COMPREHENSIVE; Future  -     SED RATE (ESR); Future           ICD-10-CM ICD-9-CM    1. Pseudotumor cerebri syndrome  G93.2 348.2    2. It band syndrome, unspecified laterality  M76.30 728.89 SED RATE (ESR)   3. Abnormal liver function test  A23.1 862.0 METABOLIC PANEL, COMPREHENSIVE   4. Lupus erythematosus, unspecified form  I25.2 229.1 METABOLIC PANEL, COMPREHENSIVE      SED RATE (ESR)       I have discussed the diagnosis with the patient and the intended plan as seen in the above orders. The patient has received an after-visit summary and questions were answered concerning future plans. Medication Side Effects and Warnings were discussed with patient: yes  Patient Labs were reviewed and or requested: yes  Patient Past Records were reviewed and or requested: yes        There are no Patient Instructions on file for this visit.     The patient verbalizes understanding and agrees with the plan of care        Patient has the advanced directives booklet to review

## 2020-07-23 LAB
ALBUMIN SERPL-MCNC: 4.2 G/DL (ref 3.8–4.8)
ALBUMIN/GLOB SERPL: 1.8 {RATIO} (ref 1.2–2.2)
ALP SERPL-CCNC: 79 IU/L (ref 39–117)
ALT SERPL-CCNC: 25 IU/L (ref 0–32)
AST SERPL-CCNC: 22 IU/L (ref 0–40)
BILIRUB SERPL-MCNC: 0.4 MG/DL (ref 0–1.2)
BUN SERPL-MCNC: 9 MG/DL (ref 6–20)
BUN/CREAT SERPL: 13 (ref 9–23)
CALCIUM SERPL-MCNC: 9.3 MG/DL (ref 8.7–10.2)
CHLORIDE SERPL-SCNC: 100 MMOL/L (ref 96–106)
CO2 SERPL-SCNC: 27 MMOL/L (ref 20–29)
CREAT SERPL-MCNC: 0.69 MG/DL (ref 0.57–1)
ERYTHROCYTE [SEDIMENTATION RATE] IN BLOOD BY WESTERGREN METHOD: 9 MM/HR (ref 0–32)
GLOBULIN SER CALC-MCNC: 2.3 G/DL (ref 1.5–4.5)
GLUCOSE SERPL-MCNC: 80 MG/DL (ref 65–99)
POTASSIUM SERPL-SCNC: 3.9 MMOL/L (ref 3.5–5.2)
PROT SERPL-MCNC: 6.5 G/DL (ref 6–8.5)
SODIUM SERPL-SCNC: 141 MMOL/L (ref 134–144)
SPECIMEN STATUS REPORT, ROLRST: NORMAL

## 2020-08-25 ENCOUNTER — HOSPITAL ENCOUNTER (OUTPATIENT)
Age: 30
Setting detail: OBSERVATION
Discharge: HOME | End: 2020-08-27
Attending: EMERGENCY MEDICINE | Admitting: INTERNAL MEDICINE
Payer: MEDICAID

## 2020-08-25 ENCOUNTER — APPOINTMENT (OUTPATIENT)
Dept: GENERAL RADIOLOGY | Age: 30
End: 2020-08-25
Attending: INTERNAL MEDICINE
Payer: MEDICAID

## 2020-08-25 ENCOUNTER — APPOINTMENT (OUTPATIENT)
Dept: CT IMAGING | Age: 30
End: 2020-08-25
Attending: PHYSICIAN ASSISTANT
Payer: MEDICAID

## 2020-08-25 ENCOUNTER — APPOINTMENT (OUTPATIENT)
Dept: GENERAL RADIOLOGY | Age: 30
End: 2020-08-25
Attending: NURSE PRACTITIONER
Payer: MEDICAID

## 2020-08-25 DIAGNOSIS — G93.2 PSEUDOTUMOR CEREBRI SYNDROME: Primary | ICD-10-CM

## 2020-08-25 PROBLEM — M32.9 SLE (SYSTEMIC LUPUS ERYTHEMATOSUS) (HCC): Chronic | Status: ACTIVE | Noted: 2020-08-25

## 2020-08-25 LAB
ALBUMIN SERPL-MCNC: 4.2 G/DL (ref 3.5–5)
ALBUMIN/GLOB SERPL: 1 {RATIO} (ref 1.1–2.2)
ALP SERPL-CCNC: 92 U/L (ref 45–117)
ALT SERPL-CCNC: 29 U/L (ref 12–78)
ANION GAP SERPL CALC-SCNC: 6 MMOL/L (ref 5–15)
APPEARANCE CSF: CLEAR
APTT PPP: 29.6 SEC (ref 22.1–32)
AST SERPL-CCNC: 18 U/L (ref 15–37)
BASOPHILS # BLD: 0 K/UL (ref 0–0.1)
BASOPHILS NFR BLD: 0 % (ref 0–1)
BILIRUB SERPL-MCNC: 0.4 MG/DL (ref 0.2–1)
BUN SERPL-MCNC: 7 MG/DL (ref 6–20)
BUN/CREAT SERPL: 9 (ref 12–20)
CALCIUM SERPL-MCNC: 9.1 MG/DL (ref 8.5–10.1)
CHLORIDE SERPL-SCNC: 107 MMOL/L (ref 97–108)
CO2 SERPL-SCNC: 25 MMOL/L (ref 21–32)
COLOR CSF: COLORLESS
COMMENT, HOLDF: NORMAL
CREAT SERPL-MCNC: 0.81 MG/DL (ref 0.55–1.02)
DIFFERENTIAL METHOD BLD: NORMAL
EOSINOPHIL # BLD: 0.1 K/UL (ref 0–0.4)
EOSINOPHIL NFR BLD: 1 % (ref 0–7)
ERYTHROCYTE [DISTWIDTH] IN BLOOD BY AUTOMATED COUNT: 12.7 % (ref 11.5–14.5)
GLOBULIN SER CALC-MCNC: 4.1 G/DL (ref 2–4)
GLUCOSE CSF-MCNC: 51 MG/DL (ref 40–70)
GLUCOSE SERPL-MCNC: 78 MG/DL (ref 65–100)
HCT VFR BLD AUTO: 42.8 % (ref 35–47)
HGB BLD-MCNC: 14.6 G/DL (ref 11.5–16)
IMM GRANULOCYTES # BLD AUTO: 0 K/UL (ref 0–0.04)
IMM GRANULOCYTES NFR BLD AUTO: 0 % (ref 0–0.5)
INR PPP: 1 (ref 0.9–1.1)
LYMPHOCYTES # BLD: 2.9 K/UL (ref 0.8–3.5)
LYMPHOCYTES NFR BLD: 33 % (ref 12–49)
MCH RBC QN AUTO: 31.1 PG (ref 26–34)
MCHC RBC AUTO-ENTMCNC: 34.1 G/DL (ref 30–36.5)
MCV RBC AUTO: 91.1 FL (ref 80–99)
MONOCYTES # BLD: 0.7 K/UL (ref 0–1)
MONOCYTES NFR BLD: 8 % (ref 5–13)
NEUTS SEG # BLD: 5.2 K/UL (ref 1.8–8)
NEUTS SEG NFR BLD: 58 % (ref 32–75)
NRBC # BLD: 0 K/UL (ref 0–0.01)
NRBC BLD-RTO: 0 PER 100 WBC
PLATELET # BLD AUTO: 310 K/UL (ref 150–400)
PMV BLD AUTO: 9.6 FL (ref 8.9–12.9)
POTASSIUM SERPL-SCNC: 4 MMOL/L (ref 3.5–5.1)
PROT CSF-MCNC: 38 MG/DL (ref 15–45)
PROT SERPL-MCNC: 8.3 G/DL (ref 6.4–8.2)
PROTHROMBIN TIME: 9.9 SEC (ref 9–11.1)
RBC # BLD AUTO: 4.7 M/UL (ref 3.8–5.2)
RBC # CSF: 26 /CU MM
SAMPLES BEING HELD,HOLD: NORMAL
SODIUM SERPL-SCNC: 138 MMOL/L (ref 136–145)
THERAPEUTIC RANGE,PTTT: NORMAL SECS (ref 58–77)
TUBE # CSF: 1
TUBE # CSF: 1
TUBE # CSF: 3
WBC # BLD AUTO: 9 K/UL (ref 3.6–11)
WBC # CSF: 2 /CU MM (ref 0–5)

## 2020-08-25 PROCEDURE — 96374 THER/PROPH/DIAG INJ IV PUSH: CPT

## 2020-08-25 PROCEDURE — 77030014132 HC TY LUMBR PUNC BD -A

## 2020-08-25 PROCEDURE — 80053 COMPREHEN METABOLIC PANEL: CPT

## 2020-08-25 PROCEDURE — 89050 BODY FLUID CELL COUNT: CPT

## 2020-08-25 PROCEDURE — 77030003666 HC NDL SPINAL BD -A

## 2020-08-25 PROCEDURE — 74011250636 HC RX REV CODE- 250/636: Performed by: INTERNAL MEDICINE

## 2020-08-25 PROCEDURE — 85610 PROTHROMBIN TIME: CPT

## 2020-08-25 PROCEDURE — 36415 COLL VENOUS BLD VENIPUNCTURE: CPT

## 2020-08-25 PROCEDURE — 82945 GLUCOSE OTHER FLUID: CPT

## 2020-08-25 PROCEDURE — 74011000250 HC RX REV CODE- 250: Performed by: INTERNAL MEDICINE

## 2020-08-25 PROCEDURE — 74011250637 HC RX REV CODE- 250/637: Performed by: INTERNAL MEDICINE

## 2020-08-25 PROCEDURE — 70450 CT HEAD/BRAIN W/O DYE: CPT

## 2020-08-25 PROCEDURE — 84157 ASSAY OF PROTEIN OTHER: CPT

## 2020-08-25 PROCEDURE — 74011250636 HC RX REV CODE- 250/636: Performed by: EMERGENCY MEDICINE

## 2020-08-25 PROCEDURE — 96376 TX/PRO/DX INJ SAME DRUG ADON: CPT

## 2020-08-25 PROCEDURE — 85730 THROMBOPLASTIN TIME PARTIAL: CPT

## 2020-08-25 PROCEDURE — 77003 FLUOROGUIDE FOR SPINE INJECT: CPT

## 2020-08-25 PROCEDURE — 96375 TX/PRO/DX INJ NEW DRUG ADDON: CPT

## 2020-08-25 PROCEDURE — 99218 HC RM OBSERVATION: CPT

## 2020-08-25 PROCEDURE — 87205 SMEAR GRAM STAIN: CPT

## 2020-08-25 PROCEDURE — 85025 COMPLETE CBC W/AUTO DIFF WBC: CPT

## 2020-08-25 PROCEDURE — 74011250636 HC RX REV CODE- 250/636: Performed by: NURSE PRACTITIONER

## 2020-08-25 PROCEDURE — 99283 EMERGENCY DEPT VISIT LOW MDM: CPT

## 2020-08-25 RX ORDER — SODIUM CHLORIDE 0.9 % (FLUSH) 0.9 %
5-40 SYRINGE (ML) INJECTION EVERY 8 HOURS
Status: DISCONTINUED | OUTPATIENT
Start: 2020-08-25 | End: 2020-08-27 | Stop reason: HOSPADM

## 2020-08-25 RX ORDER — PROCHLORPERAZINE EDISYLATE 5 MG/ML
10 INJECTION INTRAMUSCULAR; INTRAVENOUS
Status: DISCONTINUED | OUTPATIENT
Start: 2020-08-25 | End: 2020-08-27 | Stop reason: HOSPADM

## 2020-08-25 RX ORDER — ONDANSETRON 2 MG/ML
4 INJECTION INTRAMUSCULAR; INTRAVENOUS
Status: DISCONTINUED | OUTPATIENT
Start: 2020-08-25 | End: 2020-08-27 | Stop reason: HOSPADM

## 2020-08-25 RX ORDER — AMOXICILLIN 250 MG
1 CAPSULE ORAL 2 TIMES DAILY
Status: DISCONTINUED | OUTPATIENT
Start: 2020-08-25 | End: 2020-08-27 | Stop reason: HOSPADM

## 2020-08-25 RX ORDER — FAMOTIDINE 20 MG/1
20 TABLET, FILM COATED ORAL 2 TIMES DAILY
Status: DISCONTINUED | OUTPATIENT
Start: 2020-08-25 | End: 2020-08-27 | Stop reason: HOSPADM

## 2020-08-25 RX ORDER — GABAPENTIN 300 MG/1
600 CAPSULE ORAL 3 TIMES DAILY
Status: DISCONTINUED | OUTPATIENT
Start: 2020-08-25 | End: 2020-08-27 | Stop reason: HOSPADM

## 2020-08-25 RX ORDER — LIDOCAINE HYDROCHLORIDE 10 MG/ML
5 INJECTION, SOLUTION EPIDURAL; INFILTRATION; INTRACAUDAL; PERINEURAL
Status: DISPENSED | OUTPATIENT
Start: 2020-08-25 | End: 2020-08-26

## 2020-08-25 RX ORDER — SODIUM CHLORIDE 0.9 % (FLUSH) 0.9 %
5-40 SYRINGE (ML) INJECTION AS NEEDED
Status: DISCONTINUED | OUTPATIENT
Start: 2020-08-25 | End: 2020-08-27 | Stop reason: HOSPADM

## 2020-08-25 RX ORDER — GABAPENTIN 600 MG/1
600 TABLET ORAL 3 TIMES DAILY
COMMUNITY
End: 2020-09-10 | Stop reason: SDUPTHER

## 2020-08-25 RX ORDER — DIPHENHYDRAMINE HYDROCHLORIDE 50 MG/ML
12.5 INJECTION, SOLUTION INTRAMUSCULAR; INTRAVENOUS ONCE
Status: COMPLETED | OUTPATIENT
Start: 2020-08-25 | End: 2020-08-25

## 2020-08-25 RX ORDER — HYDROXYCHLOROQUINE SULFATE 200 MG/1
200 TABLET, FILM COATED ORAL 2 TIMES DAILY
Status: DISCONTINUED | OUTPATIENT
Start: 2020-08-25 | End: 2020-08-27 | Stop reason: HOSPADM

## 2020-08-25 RX ORDER — LIDOCAINE HYDROCHLORIDE AND EPINEPHRINE 20; 10 MG/ML; UG/ML
1.5 INJECTION, SOLUTION INFILTRATION; PERINEURAL
Status: DISPENSED | OUTPATIENT
Start: 2020-08-25 | End: 2020-08-26

## 2020-08-25 RX ORDER — HYDROMORPHONE HYDROCHLORIDE 1 MG/ML
1 INJECTION, SOLUTION INTRAMUSCULAR; INTRAVENOUS; SUBCUTANEOUS
Status: DISCONTINUED | OUTPATIENT
Start: 2020-08-25 | End: 2020-08-27 | Stop reason: HOSPADM

## 2020-08-25 RX ORDER — LORAZEPAM 2 MG/ML
1 INJECTION INTRAMUSCULAR
Status: COMPLETED | OUTPATIENT
Start: 2020-08-25 | End: 2020-08-25

## 2020-08-25 RX ORDER — METOCLOPRAMIDE HYDROCHLORIDE 5 MG/ML
10 INJECTION INTRAMUSCULAR; INTRAVENOUS
Status: COMPLETED | OUTPATIENT
Start: 2020-08-25 | End: 2020-08-25

## 2020-08-25 RX ORDER — LORAZEPAM 2 MG/ML
2 INJECTION INTRAMUSCULAR
Status: COMPLETED | OUTPATIENT
Start: 2020-08-25 | End: 2020-08-25

## 2020-08-25 RX ORDER — SODIUM CHLORIDE 9 MG/ML
25 INJECTION, SOLUTION INTRAVENOUS AS NEEDED
Status: DISCONTINUED | OUTPATIENT
Start: 2020-08-25 | End: 2020-08-27 | Stop reason: HOSPADM

## 2020-08-25 RX ORDER — SODIUM CHLORIDE 9 MG/ML
75 INJECTION, SOLUTION INTRAVENOUS CONTINUOUS
Status: DISCONTINUED | OUTPATIENT
Start: 2020-08-25 | End: 2020-08-27 | Stop reason: HOSPADM

## 2020-08-25 RX ORDER — PROMETHAZINE HYDROCHLORIDE 25 MG/1
12.5 TABLET ORAL
Status: DISCONTINUED | OUTPATIENT
Start: 2020-08-25 | End: 2020-08-27 | Stop reason: HOSPADM

## 2020-08-25 RX ORDER — FACIAL-BODY WIPES
10 EACH TOPICAL DAILY PRN
Status: DISCONTINUED | OUTPATIENT
Start: 2020-08-25 | End: 2020-08-27 | Stop reason: HOSPADM

## 2020-08-25 RX ORDER — LIDOCAINE HYDROCHLORIDE 10 MG/ML
5 INJECTION, SOLUTION EPIDURAL; INFILTRATION; INTRACAUDAL; PERINEURAL
Status: COMPLETED | OUTPATIENT
Start: 2020-08-25 | End: 2020-08-25

## 2020-08-25 RX ORDER — SODIUM CHLORIDE 9 MG/ML
1000 INJECTION, SOLUTION INTRAVENOUS ONCE
Status: COMPLETED | OUTPATIENT
Start: 2020-08-25 | End: 2020-08-25

## 2020-08-25 RX ADMIN — ACETAZOLAMIDE SODIUM 500 MG: 500 INJECTION, POWDER, LYOPHILIZED, FOR SOLUTION INTRAVENOUS at 23:46

## 2020-08-25 RX ADMIN — FAMOTIDINE 20 MG: 20 TABLET, FILM COATED ORAL at 21:48

## 2020-08-25 RX ADMIN — HYDROXYCHLOROQUINE SULFATE 200 MG: 200 TABLET ORAL at 21:48

## 2020-08-25 RX ADMIN — LIDOCAINE HYDROCHLORIDE 5 ML: 10 INJECTION INFILTRATION at 22:00

## 2020-08-25 RX ADMIN — SODIUM CHLORIDE 1000 ML/HR: 900 INJECTION, SOLUTION INTRAVENOUS at 17:57

## 2020-08-25 RX ADMIN — LORAZEPAM 2 MG: 2 INJECTION INTRAMUSCULAR; INTRAVENOUS at 19:28

## 2020-08-25 RX ADMIN — METOCLOPRAMIDE 10 MG: 5 INJECTION, SOLUTION INTRAMUSCULAR; INTRAVENOUS at 17:57

## 2020-08-25 RX ADMIN — LORAZEPAM 1 MG: 2 INJECTION INTRAMUSCULAR; INTRAVENOUS at 18:46

## 2020-08-25 RX ADMIN — METHYLPREDNISOLONE SODIUM SUCCINATE 60 MG: 40 INJECTION, POWDER, FOR SOLUTION INTRAMUSCULAR; INTRAVENOUS at 21:48

## 2020-08-25 RX ADMIN — DOCUSATE SODIUM 50MG AND SENNOSIDES 8.6MG 1 TABLET: 8.6; 5 TABLET, FILM COATED ORAL at 21:48

## 2020-08-25 RX ADMIN — SODIUM CHLORIDE 75 ML/HR: 900 INJECTION, SOLUTION INTRAVENOUS at 23:46

## 2020-08-25 RX ADMIN — DIPHENHYDRAMINE HYDROCHLORIDE 12.5 MG: 50 INJECTION, SOLUTION INTRAMUSCULAR; INTRAVENOUS at 17:57

## 2020-08-25 RX ADMIN — HYDROMORPHONE HYDROCHLORIDE 1 MG: 1 INJECTION, SOLUTION INTRAMUSCULAR; INTRAVENOUS; SUBCUTANEOUS at 21:53

## 2020-08-25 RX ADMIN — GABAPENTIN 600 MG: 300 CAPSULE ORAL at 21:48
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
## 2020-08-25 NOTE — ED NOTES
TRANSFER - OUT REPORT:    Verbal report given to Char (name) on Misael Pérez  being transferred to 5th floor(unit) for routine progression of care       Report consisted of patients Situation, Background, Assessment and   Recommendations(SBAR). Information from the following report(s) SBAR, Kardex, ED Summary, Procedure Summary and Intake/Output was reviewed with the receiving nurse. Lines:   Peripheral IV 08/25/20 Left Antecubital (Active)   Site Assessment Clean, dry, & intact 08/25/20 1318   Phlebitis Assessment 0 08/25/20 1318   Infiltration Assessment 0 08/25/20 1318   Dressing Status Clean, dry, & intact 08/25/20 1318   Dressing Type Transparent;Tape 08/25/20 1318   Hub Color/Line Status Pink;Flushed 08/25/20 1318   Action Taken Blood drawn 08/25/20 1318        Opportunity for questions and clarification was provided.       Patient transported with:   [employer]
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
## 2020-08-25 NOTE — ED NOTES
1:17 PM  I have evaluated the patient as the Provider in Triage. I have reviewed Her vital signs and the triage nurse assessment. I have talked with the patient and any available family and advised that I am the provider in triage and have ordered the appropriate study to initiate their work up based on the clinical presentation during my assessment. I have advised that the patient will be accommodated in the Main ED as soon as possible. I have also requested to contact the triage nurse or myself immediately if the patient experiences any changes in their condition during this brief waiting period.   Umer Mccallum PA-C    Hx of pseudotumor, referred by neuro ophtho, HA and visual changes

## 2020-08-26 LAB
ALBUMIN SERPL-MCNC: 3.9 G/DL (ref 3.5–5)
ALBUMIN/GLOB SERPL: 1 {RATIO} (ref 1.1–2.2)
ALP SERPL-CCNC: 90 U/L (ref 45–117)
ALT SERPL-CCNC: 36 U/L (ref 12–78)
ANION GAP SERPL CALC-SCNC: 9 MMOL/L (ref 5–15)
AST SERPL-CCNC: 30 U/L (ref 15–37)
BILIRUB DIRECT SERPL-MCNC: 0.1 MG/DL (ref 0–0.2)
BILIRUB SERPL-MCNC: 0.6 MG/DL (ref 0.2–1)
BUN SERPL-MCNC: 9 MG/DL (ref 6–20)
BUN/CREAT SERPL: 9 (ref 12–20)
CALCIUM SERPL-MCNC: 8.8 MG/DL (ref 8.5–10.1)
CHLORIDE SERPL-SCNC: 110 MMOL/L (ref 97–108)
CO2 SERPL-SCNC: 17 MMOL/L (ref 21–32)
CREAT SERPL-MCNC: 0.95 MG/DL (ref 0.55–1.02)
ERYTHROCYTE [DISTWIDTH] IN BLOOD BY AUTOMATED COUNT: 12.3 % (ref 11.5–14.5)
GLOBULIN SER CALC-MCNC: 4 G/DL (ref 2–4)
GLUCOSE SERPL-MCNC: 142 MG/DL (ref 65–100)
HCT VFR BLD AUTO: 41.7 % (ref 35–47)
HGB BLD-MCNC: 13.9 G/DL (ref 11.5–16)
MAGNESIUM SERPL-MCNC: 2.2 MG/DL (ref 1.6–2.4)
MCH RBC QN AUTO: 30.8 PG (ref 26–34)
MCHC RBC AUTO-ENTMCNC: 33.3 G/DL (ref 30–36.5)
MCV RBC AUTO: 92.5 FL (ref 80–99)
NRBC # BLD: 0 K/UL (ref 0–0.01)
NRBC BLD-RTO: 0 PER 100 WBC
PHOSPHATE SERPL-MCNC: 2.2 MG/DL (ref 2.6–4.7)
PLATELET # BLD AUTO: 311 K/UL (ref 150–400)
PMV BLD AUTO: 9.5 FL (ref 8.9–12.9)
POTASSIUM SERPL-SCNC: 4.1 MMOL/L (ref 3.5–5.1)
PROT SERPL-MCNC: 7.9 G/DL (ref 6.4–8.2)
RBC # BLD AUTO: 4.51 M/UL (ref 3.8–5.2)
SODIUM SERPL-SCNC: 136 MMOL/L (ref 136–145)
WBC # BLD AUTO: 10.1 K/UL (ref 3.6–11)

## 2020-08-26 PROCEDURE — 99205 OFFICE O/P NEW HI 60 MIN: CPT | Performed by: PSYCHIATRY & NEUROLOGY

## 2020-08-26 PROCEDURE — 84100 ASSAY OF PHOSPHORUS: CPT

## 2020-08-26 PROCEDURE — 74011250637 HC RX REV CODE- 250/637: Performed by: PSYCHIATRY & NEUROLOGY

## 2020-08-26 PROCEDURE — 85027 COMPLETE CBC AUTOMATED: CPT

## 2020-08-26 PROCEDURE — 80048 BASIC METABOLIC PNL TOTAL CA: CPT

## 2020-08-26 PROCEDURE — 97535 SELF CARE MNGMENT TRAINING: CPT

## 2020-08-26 PROCEDURE — 99218 HC RM OBSERVATION: CPT

## 2020-08-26 PROCEDURE — 97116 GAIT TRAINING THERAPY: CPT

## 2020-08-26 PROCEDURE — 80076 HEPATIC FUNCTION PANEL: CPT

## 2020-08-26 PROCEDURE — 74011250636 HC RX REV CODE- 250/636: Performed by: INTERNAL MEDICINE

## 2020-08-26 PROCEDURE — 74011000250 HC RX REV CODE- 250: Performed by: INTERNAL MEDICINE

## 2020-08-26 PROCEDURE — 97165 OT EVAL LOW COMPLEX 30 MIN: CPT

## 2020-08-26 PROCEDURE — 36415 COLL VENOUS BLD VENIPUNCTURE: CPT

## 2020-08-26 PROCEDURE — 97161 PT EVAL LOW COMPLEX 20 MIN: CPT

## 2020-08-26 PROCEDURE — 83735 ASSAY OF MAGNESIUM: CPT

## 2020-08-26 PROCEDURE — 96376 TX/PRO/DX INJ SAME DRUG ADON: CPT

## 2020-08-26 PROCEDURE — 97530 THERAPEUTIC ACTIVITIES: CPT

## 2020-08-26 PROCEDURE — 74011250637 HC RX REV CODE- 250/637: Performed by: INTERNAL MEDICINE

## 2020-08-26 PROCEDURE — 96375 TX/PRO/DX INJ NEW DRUG ADDON: CPT

## 2020-08-26 RX ORDER — ZONISAMIDE 25 MG/1
50 CAPSULE ORAL DAILY
Status: DISCONTINUED | OUTPATIENT
Start: 2020-08-26 | End: 2020-08-27 | Stop reason: HOSPADM

## 2020-08-26 RX ORDER — KETOROLAC TROMETHAMINE 30 MG/ML
15 INJECTION, SOLUTION INTRAMUSCULAR; INTRAVENOUS
Status: COMPLETED | OUTPATIENT
Start: 2020-08-26 | End: 2020-08-26

## 2020-08-26 RX ORDER — ZONISAMIDE 100 MG/1
100 CAPSULE ORAL DAILY
Status: DISCONTINUED | OUTPATIENT
Start: 2020-09-09 | End: 2020-08-27 | Stop reason: HOSPADM

## 2020-08-26 RX ORDER — DIPHENHYDRAMINE HCL 25 MG
25 CAPSULE ORAL
Status: DISCONTINUED | OUTPATIENT
Start: 2020-08-26 | End: 2020-08-27 | Stop reason: HOSPADM

## 2020-08-26 RX ADMIN — KETOROLAC TROMETHAMINE 15 MG: 30 INJECTION, SOLUTION INTRAMUSCULAR at 01:00

## 2020-08-26 RX ADMIN — Medication 10 ML: at 01:05

## 2020-08-26 RX ADMIN — Medication 10 ML: at 15:05

## 2020-08-26 RX ADMIN — SODIUM CHLORIDE 75 ML/HR: 900 INJECTION, SOLUTION INTRAVENOUS at 21:46

## 2020-08-26 RX ADMIN — DOCUSATE SODIUM 50MG AND SENNOSIDES 8.6MG 1 TABLET: 8.6; 5 TABLET, FILM COATED ORAL at 21:44

## 2020-08-26 RX ADMIN — DOCUSATE SODIUM 50MG AND SENNOSIDES 8.6MG 1 TABLET: 8.6; 5 TABLET, FILM COATED ORAL at 08:54

## 2020-08-26 RX ADMIN — ONDANSETRON 4 MG: 2 INJECTION INTRAMUSCULAR; INTRAVENOUS at 18:12

## 2020-08-26 RX ADMIN — METHYLPREDNISOLONE SODIUM SUCCINATE 60 MG: 40 INJECTION, POWDER, FOR SOLUTION INTRAMUSCULAR; INTRAVENOUS at 05:43

## 2020-08-26 RX ADMIN — METHYLPREDNISOLONE SODIUM SUCCINATE 60 MG: 40 INJECTION, POWDER, FOR SOLUTION INTRAMUSCULAR; INTRAVENOUS at 15:04

## 2020-08-26 RX ADMIN — ZONISAMIDE 50 MG: 25 CAPSULE ORAL at 12:38

## 2020-08-26 RX ADMIN — FAMOTIDINE 20 MG: 20 TABLET, FILM COATED ORAL at 18:12

## 2020-08-26 RX ADMIN — ACETAZOLAMIDE SODIUM 500 MG: 500 INJECTION, POWDER, LYOPHILIZED, FOR SOLUTION INTRAVENOUS at 08:54

## 2020-08-26 RX ADMIN — HYDROMORPHONE HYDROCHLORIDE 1 MG: 1 INJECTION, SOLUTION INTRAMUSCULAR; INTRAVENOUS; SUBCUTANEOUS at 02:17

## 2020-08-26 RX ADMIN — Medication 10 ML: at 21:43

## 2020-08-26 RX ADMIN — GABAPENTIN 600 MG: 300 CAPSULE ORAL at 08:54

## 2020-08-26 RX ADMIN — ONDANSETRON 4 MG: 2 INJECTION INTRAMUSCULAR; INTRAVENOUS at 08:54

## 2020-08-26 RX ADMIN — HYDROMORPHONE HYDROCHLORIDE 1 MG: 1 INJECTION, SOLUTION INTRAMUSCULAR; INTRAVENOUS; SUBCUTANEOUS at 12:37

## 2020-08-26 RX ADMIN — HYDROMORPHONE HYDROCHLORIDE 1 MG: 1 INJECTION, SOLUTION INTRAMUSCULAR; INTRAVENOUS; SUBCUTANEOUS at 21:58

## 2020-08-26 RX ADMIN — ACETAZOLAMIDE SODIUM 500 MG: 500 INJECTION, POWDER, LYOPHILIZED, FOR SOLUTION INTRAVENOUS at 21:58

## 2020-08-26 RX ADMIN — FAMOTIDINE 20 MG: 20 TABLET, FILM COATED ORAL at 08:54

## 2020-08-26 RX ADMIN — HYDROMORPHONE HYDROCHLORIDE 1 MG: 1 INJECTION, SOLUTION INTRAMUSCULAR; INTRAVENOUS; SUBCUTANEOUS at 06:44

## 2020-08-26 RX ADMIN — PROCHLORPERAZINE EDISYLATE 10 MG: 5 INJECTION INTRAMUSCULAR; INTRAVENOUS at 01:37

## 2020-08-26 RX ADMIN — HYDROXYCHLOROQUINE SULFATE 200 MG: 200 TABLET ORAL at 08:54

## 2020-08-26 RX ADMIN — HYDROMORPHONE HYDROCHLORIDE 1 MG: 1 INJECTION, SOLUTION INTRAMUSCULAR; INTRAVENOUS; SUBCUTANEOUS at 18:12

## 2020-08-26 RX ADMIN — GABAPENTIN 600 MG: 300 CAPSULE ORAL at 21:44

## 2020-08-26 RX ADMIN — GABAPENTIN 600 MG: 300 CAPSULE ORAL at 15:04

## 2020-08-26 RX ADMIN — METHYLPREDNISOLONE SODIUM SUCCINATE 60 MG: 40 INJECTION, POWDER, FOR SOLUTION INTRAMUSCULAR; INTRAVENOUS at 21:41

## 2020-08-26 RX ADMIN — DIPHENHYDRAMINE HYDROCHLORIDE 25 MG: 25 CAPSULE ORAL at 23:30

## 2020-08-26 RX ADMIN — PROCHLORPERAZINE EDISYLATE 10 MG: 5 INJECTION INTRAMUSCULAR; INTRAVENOUS at 12:37

## 2020-08-26 RX ADMIN — HYDROXYCHLOROQUINE SULFATE 200 MG: 200 TABLET ORAL at 18:12

## 2020-08-26 RX ADMIN — Medication 10 ML: at 05:43

## 2020-08-26 NOTE — PROGRESS NOTES
Bedside and Verbal shift change report given to Rosalina RN (oncoming nurse) by Dawna Ross RN (offgoing nurse). Report included the following information SBAR, Kardex, Intake/Output, MAR, Accordion and Recent Results.
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
## 2020-08-26 NOTE — PROCEDURES
Interventional Radiology  Procedure Note        8/25/2020 11:12 PM    Patient: Suzan Low     Informed consent obtained    Diagnosis: Pseudotumor cerebri    Procedure(s): Fluoro guided lumbar puncture, therapeutic    Specimens removed:  30cc total clear CSF; first tube was blood-tinged, remainder were clear.  8cc in tube 1; 4c in tube 2; 8cc in tube 3; 8cc in tube 4; an additional 2cc were removed in the tubing but not submitted for analysis    Complications: None    Primary Physician: Francisco Ballesteros MD    Recomendations: Remain flat in bed for 2 hours    Discharge Disposition: Stable; return to floor    Full dictated report to follow    Francisco Ballesteros MD  Interventional Radiology  [facility]  11:12 PM, 8/25/2020
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
## 2020-08-26 NOTE — PROGRESS NOTES
Reason for Admission:   Pseudotumor cerebri syndrome                   RUR Score:   N/A                  Plan for utilizing home health:      No    PCP: First and Last name:  Violeta Geiger MD   Name of Practice:    Are you a current patient: Yes/No: Yes   Approximate date of last visit:  \"A couple of weeks ago. \"   Can you participate in a virtual visit with your PCP:  Yes                    Current Advanced Directive/Advance Care Plan: Not on file. Pt's , Jaxon Gotti ([phone]), is surrogate decision maker. Transition of Care Plan:                    CM met with pt for initial assessment and to begin d/c planning. Pt resides with her  and two children (ages 10 and 6) at charted address. She has no prior HH hx and does not own any DME. [facility]. Pt is on observation status. Observation letter was explained and provided to pt. 1. CM following- no discharge needs identified. 2. Pt's  will transport at d/c.  3. Outpatient f/u with PCP and neurology. Pt lives outside the [address] [place], [place] [address] Floor service area. Care Management Interventions  PCP Verified by CM:  Yes  Transition of Care Consult (CM Consult): Discharge Planning  Discharge Durable Medical Equipment: No  Physical Therapy Consult: Yes  Occupational Therapy Consult: Yes  Speech Therapy Consult: No  Current Support Network: Lives with Spouse  Confirm Follow Up Transport: Family  Discharge Location  Discharge Placement: Home     [place], [place]

## 2020-08-26 NOTE — PROGRESS NOTES
Bedside shift change report given to SHANT Bolaños (oncoming nurse) by Carlos Alberto Contreras RN (offgoing nurse). Report included the following information SBAR, Kardex, ED Summary, Procedure Summary, Intake/Output, MAR, Recent Results and Med Rec Status.
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
## 2020-08-26 NOTE — PROGRESS NOTES
[facility]                                                                                          Hospitalist Progress Note  Ahsan Boyer MD        Date of Service:  2020  NAME:  Donnie Navarrete  :  1990  MRN:  [id number]      Admission Summary:   31 yo female with a history of paraesophageal hernia s/p Nissen, SLE, pseudotumor cerebri, presented w/ HA, visual changes consistent with pseudotumor cerebri. Interval history / Subjective:    pt feels nauseated, and states vomiting makes headaches worse. Assessment & Plan:     1) Acute headache/Pseudotumor cerebri syndrome:   -Head CT unremarkable.    -s/p therapeutic lumbar puncture with IR  -cont  IV solumedrol, IV diamox.    -Use IV dilaudid prn severe pain. -neurology consulted.    2) SLE  -cont plaquenil     3) GERD/paraesophageal hernia  -hx of Nissen. -H2 blocker due to steroids    Code status: full  DVT prophylaxis: [facility]       Hospital Problems  Date Reviewed: 2020          Codes Class Noted POA    * (Principal) Pseudotumor cerebri syndrome ICD-10-CM: G93.2  ICD-9-CM: 348.2  2020 Unknown        SLE (systemic lupus erythematosus) (HCC) (Chronic) ICD-10-CM: M32.9  ICD-9-CM: 710.0  2020 Yes        GERD (gastroesophageal reflux disease) ICD-10-CM: K21.9  ICD-9-CM: 530.81  2017 Yes                Review of Systems:   A comprehensive review of systems was negative except for that written in the HPI. Vital Signs:    Last 24hrs VS reviewed since prior progress note.  Most recent are:  Visit Vitals  /76 (BP 1 Location: Right arm, BP Patient Position: At rest)   Pulse (!) 107   Temp 98.1 °F (36.7 °C)   Resp 18   Ht 5' 2\" (1.575 m)   Wt 97.3 kg (214 lb 8.1 oz)   SpO2 96%   BMI 39.23 kg/m²         Intake/Output Summary (Last 24 hours) at 2020 0800  Last data filed at 2020 0251  Gross per 24 hour   Intake 240 ml   Output --   Net 240 ml        Physical Examination: Constitutional:  No acute distress, cooperative, pleasant    ENT:  Oral mucosa moist, oropharynx benign. Resp:  CTA bilaterally. No wheezing/rhonchi/rales. No accessory muscle use   CV:  Regular rhythm, normal rate, no murmurs, gallops, rubs    GI:  Soft, non distended, non tender. normoactive bowel sounds, no hepatosplenomegaly     Musculoskeletal:  No edema, warm, 2+ pulses throughout    Neurologic:  Moves all extremities. AAOx3, CN II-XII reviewed     Psych:  Good insight, Not anxious nor agitated. Data Review:    Review and/or order of clinical lab test      Labs:     Recent Labs     08/26/20 0523 08/25/20  1320   WBC 10.1 9.0   HGB 13.9 14.6   HCT 41.7 42.8    310     Recent Labs     08/26/20 0523 08/25/20  1320    138   K 4.1 4.0   * 107   CO2 17* 25   BUN 9 7   CREA 0.95 0.81   * 78   CA 8.8 9.1   MG 2.2  --    PHOS 2.2*  --      Recent Labs     08/26/20  0523 08/25/20  1320   ALT 36 29   AP 90 92   TBILI 0.6 0.4   TP 7.9 8.3*   ALB 3.9 4.2   GLOB 4.0 4.1*     Recent Labs     08/25/20  1320   INR 1.0   PTP 9.9   APTT 29.6      No results for input(s): FE, TIBC, PSAT, FERR in the last 72 hours. No results found for: FOL, RBCF   No results for input(s): PH, PCO2, PO2 in the last 72 hours. No results for input(s): CPK, CKNDX, TROIQ in the last 72 hours.     No lab exists for component: CPKMB  Lab Results   Component Value Date/Time    Cholesterol, total 177 04/11/2019 11:35 AM    HDL Cholesterol 48 04/11/2019 11:35 AM    LDL, calculated 107 (H) 04/11/2019 11:35 AM    Triglyceride 112 04/11/2019 11:35 AM     Lab Results   Component Value Date/Time    Glucose POC 82 11/07/2019 02:00 PM     Lab Results   Component Value Date/Time    Color YELLOW/STRAW 11/21/2017 09:46 PM    Appearance CLEAR 11/21/2017 09:46 PM    Specific gravity 1.027 11/21/2017 09:46 PM    Specific gravity >1.030 (H) 10/23/2017 08:59 PM    pH (UA) 7.0 11/21/2017 09:46 PM    Protein TRACE (A) 11/21/2017 09:46 PM    Glucose NEGATIVE  11/21/2017 09:46 PM    Ketone >80 (A) 11/21/2017 09:46 PM    Bilirubin NEGATIVE  11/21/2017 09:46 PM    Urobilinogen 1.0 11/21/2017 09:46 PM    Nitrites NEGATIVE  11/21/2017 09:46 PM    Leukocyte Esterase NEGATIVE  11/21/2017 09:46 PM    Epithelial cells FEW 11/21/2017 09:46 PM    Bacteria NEGATIVE  11/21/2017 09:46 PM    WBC 0-4 11/21/2017 09:46 PM    RBC 0-5 11/21/2017 09:46 PM         Medications Reviewed:     Current Facility-Administered Medications   Medication Dose Route Frequency    acetaZOLAMIDE (DIAMOX) 500 mg in sterile water (preservative free) 5 mL injection  500 mg IntraVENous Q12H    methylPREDNISolone (PF) (SOLU-MEDROL) injection 60 mg  60 mg IntraVENous Q8H    HYDROmorphone (DILAUDID) syringe 1 mg  1 mg IntraVENous Q4H PRN    prochlorperazine (COMPAZINE) injection 10 mg  10 mg IntraVENous Q6H PRN    gabapentin (NEURONTIN) capsule 600 mg  600 mg Oral TID    hydrOXYchloroQUINE (PLAQUENIL) tablet 200 mg  200 mg Oral BID    0.9% sodium chloride infusion  75 mL/hr IntraVENous CONTINUOUS    sodium chloride (NS) flush 5-40 mL  5-40 mL IntraVENous Q8H    sodium chloride (NS) flush 5-40 mL  5-40 mL IntraVENous PRN    0.9% sodium chloride infusion 25 mL  25 mL IntraVENous PRN    senna-docusate (PERICOLACE) 8.6-50 mg per tablet 1 Tab  1 Tab Oral BID    bisacodyL (DULCOLAX) suppository 10 mg  10 mg Rectal DAILY PRN    promethazine (PHENERGAN) tablet 12.5 mg  12.5 mg Oral Q6H PRN    Or    ondansetron (ZOFRAN) injection 4 mg  4 mg IntraVENous Q6H PRN    famotidine (PEPCID) tablet 20 mg  20 mg Oral BID    lidocaine (PF) (XYLOCAINE) 10 mg/mL (1 %) injection 5 mL  5 mL IntraDERMal RAD ONCE     ______________________________________________________________________  EXPECTED LENGTH OF STAY: - - -  ACTUAL LENGTH OF STAY:          Jonathan Kumar MD

## 2020-08-27 VITALS
BODY MASS INDEX: 39.47 KG/M2 | DIASTOLIC BLOOD PRESSURE: 83 MMHG | SYSTOLIC BLOOD PRESSURE: 143 MMHG | WEIGHT: 214.51 LBS | HEIGHT: 62 IN | HEART RATE: 105 BPM | TEMPERATURE: 98.4 F | RESPIRATION RATE: 18 BRPM | OXYGEN SATURATION: 98 %

## 2020-08-27 LAB
ANION GAP SERPL CALC-SCNC: 7 MMOL/L (ref 5–15)
BASOPHILS # BLD: 0 K/UL (ref 0–0.1)
BASOPHILS NFR BLD: 0 % (ref 0–1)
BUN SERPL-MCNC: 10 MG/DL (ref 6–20)
BUN/CREAT SERPL: 12 (ref 12–20)
CALCIUM SERPL-MCNC: 9.2 MG/DL (ref 8.5–10.1)
CHLORIDE SERPL-SCNC: 112 MMOL/L (ref 97–108)
CO2 SERPL-SCNC: 18 MMOL/L (ref 21–32)
CREAT SERPL-MCNC: 0.85 MG/DL (ref 0.55–1.02)
DIFFERENTIAL METHOD BLD: ABNORMAL
EOSINOPHIL # BLD: 0 K/UL (ref 0–0.4)
EOSINOPHIL NFR BLD: 0 % (ref 0–7)
ERYTHROCYTE [DISTWIDTH] IN BLOOD BY AUTOMATED COUNT: 12.6 % (ref 11.5–14.5)
GLUCOSE SERPL-MCNC: 138 MG/DL (ref 65–100)
HCT VFR BLD AUTO: 43.6 % (ref 35–47)
HGB BLD-MCNC: 14.5 G/DL (ref 11.5–16)
IMM GRANULOCYTES # BLD AUTO: 0.2 K/UL (ref 0–0.04)
IMM GRANULOCYTES NFR BLD AUTO: 1 % (ref 0–0.5)
LYMPHOCYTES # BLD: 1.9 K/UL (ref 0.8–3.5)
LYMPHOCYTES NFR BLD: 8 % (ref 12–49)
MCH RBC QN AUTO: 31.2 PG (ref 26–34)
MCHC RBC AUTO-ENTMCNC: 33.3 G/DL (ref 30–36.5)
MCV RBC AUTO: 93.8 FL (ref 80–99)
MONOCYTES # BLD: 0.7 K/UL (ref 0–1)
MONOCYTES NFR BLD: 3 % (ref 5–13)
NEUTS SEG # BLD: 21.1 K/UL (ref 1.8–8)
NEUTS SEG NFR BLD: 88 % (ref 32–75)
NRBC # BLD: 0 K/UL (ref 0–0.01)
NRBC BLD-RTO: 0 PER 100 WBC
PLATELET # BLD AUTO: 344 K/UL (ref 150–400)
PMV BLD AUTO: 9.6 FL (ref 8.9–12.9)
POTASSIUM SERPL-SCNC: 4.1 MMOL/L (ref 3.5–5.1)
RBC # BLD AUTO: 4.65 M/UL (ref 3.8–5.2)
RBC MORPH BLD: ABNORMAL
SODIUM SERPL-SCNC: 137 MMOL/L (ref 136–145)
WBC # BLD AUTO: 23.9 K/UL (ref 3.6–11)

## 2020-08-27 PROCEDURE — 74011000250 HC RX REV CODE- 250: Performed by: INTERNAL MEDICINE

## 2020-08-27 PROCEDURE — 74011250637 HC RX REV CODE- 250/637: Performed by: PSYCHIATRY & NEUROLOGY

## 2020-08-27 PROCEDURE — 96376 TX/PRO/DX INJ SAME DRUG ADON: CPT

## 2020-08-27 PROCEDURE — 36415 COLL VENOUS BLD VENIPUNCTURE: CPT

## 2020-08-27 PROCEDURE — 74011250636 HC RX REV CODE- 250/636: Performed by: INTERNAL MEDICINE

## 2020-08-27 PROCEDURE — 80048 BASIC METABOLIC PNL TOTAL CA: CPT

## 2020-08-27 PROCEDURE — 85025 COMPLETE CBC W/AUTO DIFF WBC: CPT

## 2020-08-27 PROCEDURE — 74011250637 HC RX REV CODE- 250/637: Performed by: INTERNAL MEDICINE

## 2020-08-27 PROCEDURE — 99218 HC RM OBSERVATION: CPT

## 2020-08-27 RX ORDER — ZONISAMIDE 100 MG/1
100 CAPSULE ORAL DAILY
Qty: 30 CAP | Refills: 0 | Status: SHIPPED | OUTPATIENT
Start: 2020-09-09 | End: 2020-09-01

## 2020-08-27 RX ORDER — OXYCODONE HYDROCHLORIDE 5 MG/1
5 TABLET ORAL
Qty: 10 TAB | Refills: 0 | Status: SHIPPED | OUTPATIENT
Start: 2020-08-27 | End: 2020-08-30

## 2020-08-27 RX ORDER — OXYCODONE HYDROCHLORIDE 5 MG/1
5 TABLET ORAL
Status: DISCONTINUED | OUTPATIENT
Start: 2020-08-27 | End: 2020-08-27 | Stop reason: HOSPADM

## 2020-08-27 RX ORDER — ZONISAMIDE 50 MG/1
50 CAPSULE ORAL DAILY
Qty: 14 CAP | Refills: 0 | Status: SHIPPED | OUTPATIENT
Start: 2020-08-27 | End: 2020-09-01

## 2020-08-27 RX ORDER — FAMOTIDINE 20 MG/1
20 TABLET, FILM COATED ORAL
Qty: 30 TAB | Refills: 0 | Status: SHIPPED | OUTPATIENT
Start: 2020-08-27 | End: 2020-09-23

## 2020-08-27 RX ORDER — PROMETHAZINE HYDROCHLORIDE 12.5 MG/1
12.5 TABLET ORAL
Qty: 20 TAB | Refills: 0 | Status: SHIPPED | OUTPATIENT
Start: 2020-08-27 | End: 2020-09-23

## 2020-08-27 RX ADMIN — HYDROMORPHONE HYDROCHLORIDE 1 MG: 1 INJECTION, SOLUTION INTRAMUSCULAR; INTRAVENOUS; SUBCUTANEOUS at 04:00

## 2020-08-27 RX ADMIN — METHYLPREDNISOLONE SODIUM SUCCINATE 60 MG: 40 INJECTION, POWDER, FOR SOLUTION INTRAMUSCULAR; INTRAVENOUS at 06:00

## 2020-08-27 RX ADMIN — GABAPENTIN 600 MG: 300 CAPSULE ORAL at 08:19

## 2020-08-27 RX ADMIN — HYDROXYCHLOROQUINE SULFATE 200 MG: 200 TABLET ORAL at 08:20

## 2020-08-27 RX ADMIN — DOCUSATE SODIUM 50MG AND SENNOSIDES 8.6MG 1 TABLET: 8.6; 5 TABLET, FILM COATED ORAL at 08:20

## 2020-08-27 RX ADMIN — Medication 10 ML: at 06:00

## 2020-08-27 RX ADMIN — OXYCODONE 5 MG: 5 TABLET ORAL at 06:33

## 2020-08-27 RX ADMIN — ZONISAMIDE 50 MG: 25 CAPSULE ORAL at 08:19

## 2020-08-27 RX ADMIN — HYDROMORPHONE HYDROCHLORIDE 1 MG: 1 INJECTION, SOLUTION INTRAMUSCULAR; INTRAVENOUS; SUBCUTANEOUS at 08:37

## 2020-08-27 RX ADMIN — FAMOTIDINE 20 MG: 20 TABLET, FILM COATED ORAL at 08:20

## 2020-08-27 RX ADMIN — ACETAZOLAMIDE SODIUM 500 MG: 500 INJECTION, POWDER, LYOPHILIZED, FOR SOLUTION INTRAVENOUS at 08:21

## 2020-08-27 NOTE — DISCHARGE INSTRUCTIONS
Discharge Instructions       PATIENT ID: Lynnette Armstrong  MRN: [id number]   YOB: 1990    DATE OF ADMISSION: 8/25/2020  1:02 PM    DATE OF DISCHARGE: 8/27/2020    PRIMARY CARE PROVIDER: Dedra Tim MD     ATTENDING PHYSICIAN: Karen Silva MD  DISCHARGING PROVIDER: Miranda Saravia MD        DISCHARGE DIAGNOSES   1.  pseudotumor cerebri   2. SLE  3. Headache  4. GERD    CONSULTATIONS: IP CONSULT TO INTERVENTIONAL RADIOLOGY  IP CONSULT TO NEUROLOGY    PROCEDURES/SURGERIES: * No surgery found *    PENDING TEST RESULTS:   At the time of discharge the following test results are still pending: none    FOLLOW UP APPOINTMENTS:   Follow-up Information     Follow up With Specialties Details Why Contact Leelee Mary MD Neurology On 9/1/2020 [facility] is at the [address] [place] [address] Street:  [facility], [facility], [facility].  9/1/20 at [address] [facility] 170      Dedra Tim, [facility] In 1 week Hospital discharge [facility]  [phone]        [facility]/ED Visit Follow-Up Instructions/Information    You may have an in home follow up visit set up with [facility] or may wish to contact [facility] to set-up a visit:    What are we? [facility] is an in-home urgent care service staffed with emergency trained medical teams. We come to your home in a vehicle stocked with medical supplies and technology. An ER physician is always available if needed. When? As a part of your hospital follow-up, an appointment has been/ or can be set up for us to come see you. Usually, this will be 24-72 hours after you leave the hospital or as needed. [facility] is open 7am-9pm, 7 days a week, 365 days a year, including holidays. Why?   We know that you cannot always get to your doctor after being in the hospital and that your doctor is not always available when you need them. Once your workup is complete, we'll call in your prescriptions, update your family doctor, and handle billing with your insurance so you can focus on feeling better, faster without leaving home. How much? We accept most major health insurance plans, including Medicaid, Medicare, and Medicare Advantage [address] [facility] [employer], [employer], and [employer]. We also accept: credit, debit, health savings account (HSA), health reimbursement account (HRA) and flexible spending account (FSA) payments. [facility] prices compare to conventional urgent care facilities, but we bring the care to you. How to reach us? Getting care is easy- use our mobile genevieve ([employer]), website [email or web address]) or call us [phone]. ADDITIONAL CARE RECOMMENDATIONS: Take zonisamide every night for 2 weeks at the lower dose, then start taking the higher dose. Follow up with neurology as scheduled. DIET: regular    ACTIVITY: as tolerated       DISCHARGE MEDICATIONS:   See Medication Reconciliation Form    · It is important that you take the medication exactly as they are prescribed. · Keep your medication in the bottles provided by the pharmacist and keep a list of the medication names, dosages, and times to be taken in your wallet. · Do not take other medications without consulting your doctor. NOTIFY YOUR PHYSICIAN FOR ANY OF THE FOLLOWING:   Fever over 101 degrees for 24 hours. Chest pain, shortness of breath, fever, chills, nausea, vomiting, diarrhea, change in mentation, falling, weakness, bleeding. Severe pain or pain not relieved by medications. Or, any other signs or symptoms that you may have questions about.       DISPOSITION:  x  Home With:   OT  PT  HH  RN       SNF/Inpatient Rehab/LTAC    Independent/assisted living    Hospice    Other:     CDMP Checked:   Yes ***     PROBLEM LIST Updated:  Yes ***       Signed:   Johana Dye MD  8/27/2020  8:17 AM

## 2020-08-27 NOTE — PROGRESS NOTES
Mrs Lance Roberts stated that her head is still hurting 2 hours after I gave her 1mg of dilaudid. She started crying and saying her feet was tingling pulses were good warm to touch also gave her an ice pack for her head and perfect serve the hospitalist for additional pain medication. She add oxy 5mg q4.

## 2020-08-27 NOTE — PROGRESS NOTES
Bedside, Verbal and Written shift change report given to Felipa Lewis (oncoming nurse) by Katelyn Sampson RN (offgoing nurse). Report included the following information SBAR, Kardex, Procedure Summary, Intake/Output, MAR, Recent Results and Med Rec Status.

## 2020-08-27 NOTE — PROGRESS NOTES
Discharge info reviewed with patient, verbalizes understanding. Prescription given to patient. Aware of rx sent to preferred pharmacy with med changes. Peripheral IV's removed. Aware of f/u Md appointments.

## 2020-08-27 NOTE — PROGRESS NOTES
Hospital follow-up Virtual PCP transitional care appointment has been scheduled with Dr. Lucille Cano for Thursday, 9/3/20 at 9:00 a.m. Pending patient discharge.   Sabi Gunn, Care Management Specialist.

## 2020-08-27 NOTE — DISCHARGE SUMMARY
Discharge Summary       PATIENT ID: Darius Carolina  MRN: [id number]   YOB: 1990    DATE OF ADMISSION: 8/25/2020  1:02 PM    DATE OF DISCHARGE: 08/27/20   PRIMARY CARE PROVIDER: Mushtaq Prabhakar MD     DISCHARGING PROVIDER: Gris De La Cruz MD      CONSULTATIONS: IP CONSULT TO INTERVENTIONAL RADIOLOGY  IP CONSULT TO NEUROLOGY    PROCEDURES/SURGERIES: * No surgery found *    [address] COURSE:   29 yo female with a history of paraesophageal hernia s/p Nissen, SLE, pseudotumor cerebri, presented w/ HA, visual changes consistent with pseudotumor cerebri. She underwent lumbar puncture with IR with some relief. A Ct head was unremarkable. She was started on methylprednisolone and acetazolamide. Neurology was consulted and recommended starting zonisamide. An appointment was made for her to follow up in neurology clinic in 3 days. She had improvement of blurry vision but continued to have headaches. She will be discharged with a prescription for oxycodone and phenergan for symptom relief until she can go to her appointment. DISCHARGE DIAGNOSES / PLAN:      1.  pseudotumor cerebri   2. SLE  3. Headache  4. GERD     ADDITIONAL CARE RECOMMENDATIONS:   Take zonisamide every night for 2 weeks at the lower dose, then start taking the higher dose. Follow up with neurology as scheduled.       PENDING TEST RESULTS:   At the time of discharge the following test results are still pending: none    FOLLOW UP APPOINTMENTS:    Follow-up Information     Follow up With Specialties Details Why Contact Carlene Vines MD Neurology On 9/1/2020 [facility] is at the [address] [place] [address] Street:  [address] [facility], [facility], [address] [place] [address].  9/1/20 at [address]      Mushtaq Prabhakar, [address] [facility] In 1 week Hospital discharge follow up [facility] 20  [phone]               DIET: regular    ACTIVITY: as tolerated       DISCHARGE MEDICATIONS:  Current Discharge Medication List      START taking these medications    Details   famotidine (PEPCID) 20 mg tablet Take 1 Tab by mouth two (2) times daily as needed for Heartburn, Gastroesophageal Reflux Disease (GERD) or Indigestion. Qty: 30 Tab, Refills: 0      oxyCODONE IR (ROXICODONE) 5 mg immediate release tablet Take 1 Tab by mouth every four (4) hours as needed for Pain for up to 3 days. Max Daily Amount: 30 mg.  Qty: 10 Tab, Refills: 0    Associated Diagnoses: Pseudotumor cerebri syndrome      promethazine (PHENERGAN) 12.5 mg tablet Take 1 Tab by mouth every six (6) hours as needed for Nausea. Qty: 20 Tab, Refills: 0      !! zonisamide (ZONEGRAN) 100 mg capsule Take 1 Cap by mouth daily. Qty: 30 Cap, Refills: 0      !! zonisamide (ZONEGRAN) 50 mg capsule Take 1 Cap by mouth daily. Qty: 14 Cap, Refills: 0       !! - Potential duplicate medications found. Please discuss with provider. CONTINUE these medications which have NOT CHANGED    Details   gabapentin (NEURONTIN) 600 mg tablet Take 600 mg by mouth three (3) times daily. cyclobenzaprine (FLEXERIL) 10 mg tablet 10 mg two (2) times a day. ondansetron (ZOFRAN ODT) 4 mg disintegrating tablet TAKE 1 TABLET BY MOUTH EVERY 8 HOURS AS NEEDED FOR NAUSEA  Qty: 15 Tab, Refills: 0    Comments: DX Code Needed  . Associated Diagnoses: Nausea      biotin 10,000 mcg cap Take 10,000 mg by mouth daily. hydroxychloroquine (PLAQUENIL) 200 mg tablet Take 200 mg by mouth two (2) times a day. NOTIFY YOUR PHYSICIAN FOR ANY OF THE FOLLOWING:   Fever over 101 degrees for 24 hours. Chest pain, shortness of breath, fever, chills, nausea, vomiting, diarrhea, change in mentation, falling, weakness, bleeding. Severe pain or pain not relieved by medications. Or, any other signs or symptoms that you may have questions about.     DISPOSITION:   x Home With:   OT  PT  [facility]  [facility] term SNF/Inpatient Rehab    Independent/assisted living    Hospice    Other:       PATIENT CONDITION AT DISCHARGE:     Functional status    Poor     Deconditioned    x Independent      Cognition   x  Lucid     Forgetful     Dementia      Catheters/lines (plus indication)    [place]     PICC     PEG    x None      Code status   x  Full code     DNR      PHYSICAL EXAMINATION AT DISCHARGE:  General:          Alert, cooperative, no distress, appears stated age. HEENT:           Atraumatic, anicteric sclerae, pink conjunctivae                          No oral ulcers, mucosa moist, throat clear, dentition fair  Neck:               Supple, symmetrical  Lungs:             Clear to auscultation bilaterally. No Wheezing or Rhonchi. No rales. Heart:              Regular  rhythm,  No  murmur   No edema  Abdomen:        Soft, non-tender. Not distended. Bowel sounds normal  Extremities:     No cyanosis. No clubbing,                            Skin turgor normal, Capillary refill normal  Skin:                Not pale. Not Jaundiced  No rashes   Psych:             Not anxious or agitated.   Neurologic:      Alert, moves all extremities, answers questions appropriately and responds to commands       CHRONIC MEDICAL DIAGNOSES:  Problem List as of 8/27/2020 Date Reviewed: 8/25/2020          Codes Class Noted - Resolved    * (Principal) Pseudotumor cerebri syndrome ICD-10-CM: G93.2  ICD-9-CM: 348.2  8/25/2020 - Present        SLE (systemic lupus erythematosus) ([facility] [place] [address] (Chronic) ICD-10-CM: M32.9  ICD-9-CM: 710.0  8/25/2020 - Present        Severe obesity (BMI 35.0-39.9) (Chronic) ICD-10-CM: E66.01  ICD-9-CM: 278.01  8/22/2018 - Present        S/P repair of paraesophageal hernia ICD-10-CM: Z98.890, Z87.19  ICD-9-CM: V45.89  11/17/2017 - Present        Paraesophageal hernia ICD-10-CM: K44.9  ICD-9-CM: 553.3  11/15/2017 - Present        GERD (gastroesophageal reflux disease) ICD-10-CM: K21.9  ICD-9-CM: 530.81  11/7/2017 - Present Obesity, Class II, BMI 35-39.9 ICD-10-CM: E66.9  ICD-9-CM: 278.00  3/31/2017 - Present        Sebaceous cyst ICD-10-CM: L72.3  ICD-9-CM: 706.2  3/24/2017 - Present    Overview Addendum 5/1/2017 10:52 AM by Geena Mccullough MD     S/P excision; Along sternum in cleavage. History of Nissen fundoplication [id number]-[id number]: A62.617  ICD-9-CM: V15.29  1/4/2017 - Present    Overview Signed 1/4/2017  1:51 PM by Kash Luz LPN     4 duodenal ulcers, chronic gastritis, Grade C esophagitis, Chronic GERD, hernia, small tumor. Done August/2016.              Chronic migraine without aura without status migrainosus, not intractable ICD-10-CM: N11.817  ICD-9-CM: 346.70  5/18/2016 - Present        Cervical incompetence ICD-10-CM: N88.3  ICD-9-CM: 622.5  4/12/2013 - Present              Greater than 15 minutes were spent with the patient on counseling and coordination of care    Signed:   Marino Lizama MD  8/27/2020  8:12 AM

## 2020-08-27 NOTE — PROGRESS NOTES
Pharmacist Discharge Medication Reconciliation    Discharge Provider:  Dr. Elisa Montgomery       Discharge Medications:      My Medications        START taking these medications        Instructions Each Dose to Equal Morning Noon Evening Bedtime   famotidine 20 mg tablet  Commonly known as:  PEPCID    Your last dose was: Your next dose is: Take 1 Tab by mouth two (2) times daily as needed for Heartburn, Gastroesophageal Reflux Disease (GERD) or Indigestion. 20 mg                 oxyCODONE IR 5 mg immediate release tablet  Commonly known as:  ROXICODONE    Your last dose was: Your next dose is: Take 1 Tab by mouth every four (4) hours as needed for Pain for up to 3 days. Max Daily Amount: 30 mg.   5 mg                 promethazine 12.5 mg tablet  Commonly known as:  PHENERGAN    Your last dose was: Your next dose is: Take 1 Tab by mouth every six (6) hours as needed for Nausea. 12.5 mg                 * zonisamide 50 mg capsule  Commonly known as:  ZONEGRAN    Your last dose was: Your next dose is: Take 1 Cap by mouth daily. 50 mg                 * zonisamide 100 mg capsule  Commonly known as:  Aileen Adams  Start taking on:  September 9, 2020    Your last dose was: Your next dose is: Take 1 Cap by mouth daily. 100 mg                       * This list has 2 medication(s) that are the same as other medications prescribed for you. Read the directions carefully, and ask your doctor or other care provider to review them with you. CONTINUE taking these medications        Instructions Each Dose to Equal Morning Noon Evening Bedtime   biotin 10,000 mcg Cap    Your last dose was: Your next dose is: Take 10,000 mg by mouth daily. 10,000 mg                 cyclobenzaprine 10 mg tablet  Commonly known as:  FLEXERIL    Your last dose was: Your next dose is:          10 mg two (2) times a day.    10 mg                 gabapentin 600 mg tablet  Commonly known as:  NEURONTIN    Your last dose was: Your next dose is: Take 600 mg by mouth three (3) times daily. 600 mg                 hydrOXYchloroQUINE 200 mg tablet  Commonly known as:  PLAQUENIL    Your last dose was: Your next dose is: Take 200 mg by mouth two (2) times a day. 200 mg                 ondansetron 4 mg disintegrating tablet  Commonly known as:  ZOFRAN ODT    Your last dose was: Your next dose is:          TAKE 1 TABLET BY MOUTH EVERY 8 HOURS AS NEEDED FOR NAUSEA                            Where to Get Your Medications        These medications were sent to [facility]/pharmacy #[id number]- [place], [place] - [facility] [address] [address]      Phone:  [phone]   famotidine 20 mg tablet  promethazine 12.5 mg tablet  zonisamide 100 mg capsule  zonisamide 50 mg capsule       Information on where to get these meds will be given to you by the nurse or doctor.     Ask your nurse or doctor about these medications  oxyCODONE IR 5 mg immediate release tablet            The patient's chart, MAR, and AVS were reviewed by   Familia Mandujano, PharmD, BCPS  Contact: [phone]

## 2020-08-28 ENCOUNTER — HOSPITAL ENCOUNTER (OUTPATIENT)
Age: 30
Setting detail: OBSERVATION
Discharge: HOME | End: 2020-08-29
Attending: EMERGENCY MEDICINE | Admitting: INTERNAL MEDICINE
Payer: MEDICAID

## 2020-08-28 DIAGNOSIS — G43.709 CHRONIC MIGRAINE WITHOUT AURA WITHOUT STATUS MIGRAINOSUS, NOT INTRACTABLE: ICD-10-CM

## 2020-08-28 DIAGNOSIS — G44.89 OTHER HEADACHE SYNDROME: ICD-10-CM

## 2020-08-28 DIAGNOSIS — M54.81 BILATERAL OCCIPITAL NEURALGIA: ICD-10-CM

## 2020-08-28 DIAGNOSIS — H53.8 BLURRY VISION: ICD-10-CM

## 2020-08-28 DIAGNOSIS — G93.2 PSEUDOTUMOR CEREBRI: Primary | ICD-10-CM

## 2020-08-28 PROCEDURE — 96375 TX/PRO/DX INJ NEW DRUG ADDON: CPT

## 2020-08-28 PROCEDURE — 74011250636 HC RX REV CODE- 250/636: Performed by: EMERGENCY MEDICINE

## 2020-08-28 PROCEDURE — 96365 THER/PROPH/DIAG IV INF INIT: CPT

## 2020-08-28 PROCEDURE — 99285 EMERGENCY DEPT VISIT HI MDM: CPT

## 2020-08-28 RX ORDER — MAGNESIUM SULFATE HEPTAHYDRATE 40 MG/ML
2 INJECTION, SOLUTION INTRAVENOUS ONCE
Status: COMPLETED | OUTPATIENT
Start: 2020-08-28 | End: 2020-08-28

## 2020-08-28 RX ORDER — METOCLOPRAMIDE HYDROCHLORIDE 5 MG/ML
10 INJECTION INTRAMUSCULAR; INTRAVENOUS
Status: COMPLETED | OUTPATIENT
Start: 2020-08-28 | End: 2020-08-28

## 2020-08-28 RX ORDER — DIPHENHYDRAMINE HYDROCHLORIDE 50 MG/ML
25 INJECTION, SOLUTION INTRAMUSCULAR; INTRAVENOUS
Status: COMPLETED | OUTPATIENT
Start: 2020-08-28 | End: 2020-08-28

## 2020-08-28 RX ADMIN — SODIUM CHLORIDE 1000 ML: 900 INJECTION, SOLUTION INTRAVENOUS at 22:27

## 2020-08-28 RX ADMIN — MAGNESIUM SULFATE HEPTAHYDRATE 2 G: 40 INJECTION, SOLUTION INTRAVENOUS at 22:23

## 2020-08-28 RX ADMIN — METOCLOPRAMIDE 10 MG: 5 INJECTION, SOLUTION INTRAMUSCULAR; INTRAVENOUS at 22:23

## 2020-08-28 RX ADMIN — DIPHENHYDRAMINE HYDROCHLORIDE 25 MG: 50 INJECTION, SOLUTION INTRAMUSCULAR; INTRAVENOUS at 22:23

## 2020-08-29 VITALS
DIASTOLIC BLOOD PRESSURE: 89 MMHG | HEIGHT: 62 IN | OXYGEN SATURATION: 99 % | RESPIRATION RATE: 16 BRPM | BODY MASS INDEX: 39.38 KG/M2 | TEMPERATURE: 98.3 F | SYSTOLIC BLOOD PRESSURE: 123 MMHG | WEIGHT: 214 LBS | HEART RATE: 79 BPM

## 2020-08-29 PROBLEM — R51.9 HEADACHE: Status: ACTIVE | Noted: 2020-08-29

## 2020-08-29 LAB
ALBUMIN SERPL-MCNC: 3.5 G/DL (ref 3.5–5)
ALBUMIN/GLOB SERPL: 0.9 {RATIO} (ref 1.1–2.2)
ALP SERPL-CCNC: 81 U/L (ref 45–117)
ALT SERPL-CCNC: 23 U/L (ref 12–78)
ANION GAP SERPL CALC-SCNC: 8 MMOL/L (ref 5–15)
APPEARANCE UR: CLEAR
AST SERPL-CCNC: 10 U/L (ref 15–37)
BACTERIA URNS QL MICRO: NEGATIVE /HPF
BILIRUB SERPL-MCNC: 0.4 MG/DL (ref 0.2–1)
BILIRUB UR QL: NEGATIVE
BUN SERPL-MCNC: 14 MG/DL (ref 6–20)
BUN/CREAT SERPL: 16 (ref 12–20)
CALCIUM SERPL-MCNC: 8 MG/DL (ref 8.5–10.1)
CHLORIDE SERPL-SCNC: 110 MMOL/L (ref 97–108)
CO2 SERPL-SCNC: 22 MMOL/L (ref 21–32)
COLOR UR: ABNORMAL
CREAT SERPL-MCNC: 0.9 MG/DL (ref 0.55–1.02)
EPITH CASTS URNS QL MICRO: ABNORMAL /LPF
ERYTHROCYTE [DISTWIDTH] IN BLOOD BY AUTOMATED COUNT: 13.1 % (ref 11.5–14.5)
GLOBULIN SER CALC-MCNC: 3.7 G/DL (ref 2–4)
GLUCOSE SERPL-MCNC: 95 MG/DL (ref 65–100)
GLUCOSE UR STRIP.AUTO-MCNC: NEGATIVE MG/DL
HCT VFR BLD AUTO: 40.2 % (ref 35–47)
HGB BLD-MCNC: 13.7 G/DL (ref 11.5–16)
HGB UR QL STRIP: NEGATIVE
HYALINE CASTS URNS QL MICRO: ABNORMAL /LPF (ref 0–5)
KETONES UR QL STRIP.AUTO: NEGATIVE MG/DL
LEUKOCYTE ESTERASE UR QL STRIP.AUTO: ABNORMAL
MAGNESIUM SERPL-MCNC: 2.7 MG/DL (ref 1.6–2.4)
MCH RBC QN AUTO: 31.1 PG (ref 26–34)
MCHC RBC AUTO-ENTMCNC: 34.1 G/DL (ref 30–36.5)
MCV RBC AUTO: 91.4 FL (ref 80–99)
NITRITE UR QL STRIP.AUTO: NEGATIVE
NRBC # BLD: 0 K/UL (ref 0–0.01)
NRBC BLD-RTO: 0 PER 100 WBC
PH UR STRIP: 6.5 [PH] (ref 5–8)
PHOSPHATE SERPL-MCNC: 2.3 MG/DL (ref 2.6–4.7)
PLATELET # BLD AUTO: 271 K/UL (ref 150–400)
PMV BLD AUTO: 9.1 FL (ref 8.9–12.9)
POTASSIUM SERPL-SCNC: 3.2 MMOL/L (ref 3.5–5.1)
PROT SERPL-MCNC: 7.2 G/DL (ref 6.4–8.2)
PROT UR STRIP-MCNC: NEGATIVE MG/DL
RBC # BLD AUTO: 4.4 M/UL (ref 3.8–5.2)
RBC #/AREA URNS HPF: ABNORMAL /HPF (ref 0–5)
SODIUM SERPL-SCNC: 140 MMOL/L (ref 136–145)
SP GR UR REFRACTOMETRY: 1.01 (ref 1–1.03)
UA: UC IF INDICATED,UAUC: ABNORMAL
UROBILINOGEN UR QL STRIP.AUTO: 1 EU/DL (ref 0.2–1)
WBC # BLD AUTO: 11.3 K/UL (ref 3.6–11)
WBC URNS QL MICRO: ABNORMAL /HPF (ref 0–4)

## 2020-08-29 PROCEDURE — 99218 HC RM OBSERVATION: CPT

## 2020-08-29 PROCEDURE — 81001 URINALYSIS AUTO W/SCOPE: CPT

## 2020-08-29 PROCEDURE — 36415 COLL VENOUS BLD VENIPUNCTURE: CPT

## 2020-08-29 PROCEDURE — 96366 THER/PROPH/DIAG IV INF ADDON: CPT

## 2020-08-29 PROCEDURE — 74011250637 HC RX REV CODE- 250/637: Performed by: INTERNAL MEDICINE

## 2020-08-29 PROCEDURE — 74011250637 HC RX REV CODE- 250/637: Performed by: FAMILY MEDICINE

## 2020-08-29 PROCEDURE — 80053 COMPREHEN METABOLIC PANEL: CPT

## 2020-08-29 PROCEDURE — 85027 COMPLETE CBC AUTOMATED: CPT

## 2020-08-29 PROCEDURE — 83735 ASSAY OF MAGNESIUM: CPT

## 2020-08-29 PROCEDURE — 84100 ASSAY OF PHOSPHORUS: CPT

## 2020-08-29 PROCEDURE — 99215 OFFICE O/P EST HI 40 MIN: CPT | Performed by: PSYCHIATRY & NEUROLOGY

## 2020-08-29 RX ORDER — OXYCODONE HYDROCHLORIDE 5 MG/1
5 TABLET ORAL
Status: DISCONTINUED | OUTPATIENT
Start: 2020-08-29 | End: 2020-08-29 | Stop reason: HOSPADM

## 2020-08-29 RX ORDER — DIAZEPAM 5 MG/1
5 TABLET ORAL
Qty: 7 TAB | Refills: 0 | Status: SHIPPED | OUTPATIENT
Start: 2020-08-29 | End: 2020-09-23

## 2020-08-29 RX ORDER — ZONISAMIDE 25 MG/1
50 CAPSULE ORAL DAILY
Status: DISCONTINUED | OUTPATIENT
Start: 2020-08-29 | End: 2020-08-29 | Stop reason: HOSPADM

## 2020-08-29 RX ORDER — BUTALBITAL, ASPIRIN, AND CAFFEINE 325; 50; 40 MG/1; MG/1; MG/1
1 CAPSULE ORAL
Qty: 12 CAP | Refills: 0 | Status: SHIPPED | OUTPATIENT
Start: 2020-08-29 | End: 2020-09-01

## 2020-08-29 RX ORDER — BUTALBITAL, ASPIRIN, AND CAFFEINE 325; 50; 40 MG/1; MG/1; MG/1
1 CAPSULE ORAL
Status: DISCONTINUED | OUTPATIENT
Start: 2020-08-29 | End: 2020-08-29 | Stop reason: HOSPADM

## 2020-08-29 RX ORDER — HYDROXYCHLOROQUINE SULFATE 200 MG/1
200 TABLET, FILM COATED ORAL 2 TIMES DAILY
Status: DISCONTINUED | OUTPATIENT
Start: 2020-08-29 | End: 2020-08-29 | Stop reason: HOSPADM

## 2020-08-29 RX ORDER — POTASSIUM CHLORIDE 750 MG/1
10 CAPSULE, EXTENDED RELEASE ORAL 2 TIMES DAILY
Qty: 60 CAP | Refills: 0 | Status: SHIPPED | OUTPATIENT
Start: 2020-08-29 | End: 2021-01-25

## 2020-08-29 RX ORDER — SODIUM CHLORIDE 0.9 % (FLUSH) 0.9 %
5-40 SYRINGE (ML) INJECTION EVERY 8 HOURS
Status: DISCONTINUED | OUTPATIENT
Start: 2020-08-29 | End: 2020-08-29 | Stop reason: HOSPADM

## 2020-08-29 RX ORDER — PANTOPRAZOLE SODIUM 40 MG/1
40 TABLET, DELAYED RELEASE ORAL DAILY PRN
Status: DISCONTINUED | OUTPATIENT
Start: 2020-08-29 | End: 2020-08-29

## 2020-08-29 RX ORDER — ENOXAPARIN SODIUM 100 MG/ML
40 INJECTION SUBCUTANEOUS EVERY 24 HOURS
Status: DISCONTINUED | OUTPATIENT
Start: 2020-08-29 | End: 2020-08-29 | Stop reason: HOSPADM

## 2020-08-29 RX ORDER — KETOROLAC TROMETHAMINE 10 MG/1
10 TABLET, FILM COATED ORAL EVERY 6 HOURS
Qty: 20 TAB | Refills: 0 | Status: SHIPPED | OUTPATIENT
Start: 2020-08-29 | End: 2020-09-01

## 2020-08-29 RX ORDER — SODIUM CHLORIDE 0.9 % (FLUSH) 0.9 %
5-40 SYRINGE (ML) INJECTION AS NEEDED
Status: DISCONTINUED | OUTPATIENT
Start: 2020-08-29 | End: 2020-08-29 | Stop reason: HOSPADM

## 2020-08-29 RX ORDER — FAMOTIDINE 20 MG/1
20 TABLET, FILM COATED ORAL
Status: DISCONTINUED | OUTPATIENT
Start: 2020-08-29 | End: 2020-08-29 | Stop reason: HOSPADM

## 2020-08-29 RX ORDER — POTASSIUM CHLORIDE 750 MG/1
40 TABLET, FILM COATED, EXTENDED RELEASE ORAL
Status: COMPLETED | OUTPATIENT
Start: 2020-08-29 | End: 2020-08-29

## 2020-08-29 RX ORDER — POLYETHYLENE GLYCOL 3350 17 G/17G
17 POWDER, FOR SOLUTION ORAL DAILY PRN
Status: DISCONTINUED | OUTPATIENT
Start: 2020-08-29 | End: 2020-08-29 | Stop reason: HOSPADM

## 2020-08-29 RX ORDER — CYCLOBENZAPRINE HCL 10 MG
10 TABLET ORAL 2 TIMES DAILY
Status: DISCONTINUED | OUTPATIENT
Start: 2020-08-29 | End: 2020-08-29 | Stop reason: HOSPADM

## 2020-08-29 RX ORDER — ACETAZOLAMIDE 500 MG/1
500 CAPSULE, EXTENDED RELEASE ORAL EVERY 12 HOURS
Status: DISCONTINUED | OUTPATIENT
Start: 2020-08-29 | End: 2020-08-29 | Stop reason: HOSPADM

## 2020-08-29 RX ORDER — GABAPENTIN 300 MG/1
600 CAPSULE ORAL 3 TIMES DAILY
Status: DISCONTINUED | OUTPATIENT
Start: 2020-08-29 | End: 2020-08-29 | Stop reason: HOSPADM

## 2020-08-29 RX ORDER — ACETAZOLAMIDE 500 MG/1
1000 CAPSULE, EXTENDED RELEASE ORAL EVERY 12 HOURS
Qty: 120 CAP | Refills: 0 | Status: SHIPPED | OUTPATIENT
Start: 2020-08-29 | End: 2020-09-15

## 2020-08-29 RX ADMIN — OXYCODONE 5 MG: 5 TABLET ORAL at 03:50

## 2020-08-29 RX ADMIN — Medication 10 ML: at 01:30

## 2020-08-29 RX ADMIN — ACETAZOLAMIDE 500 MG: 500 CAPSULE, EXTENDED RELEASE ORAL at 06:10

## 2020-08-29 RX ADMIN — ZONISAMIDE 50 MG: 25 CAPSULE ORAL at 09:38

## 2020-08-29 RX ADMIN — POTASSIUM CHLORIDE 40 MEQ: 750 TABLET, FILM COATED, EXTENDED RELEASE ORAL at 12:55

## 2020-08-29 RX ADMIN — Medication 10 ML: at 06:02

## 2020-08-29 RX ADMIN — HYDROXYCHLOROQUINE SULFATE 200 MG: 200 TABLET, FILM COATED ORAL at 09:33

## 2020-08-29 RX ADMIN — POTASSIUM BICARBONATE 40 MEQ: 782 TABLET, EFFERVESCENT ORAL at 05:58

## 2020-08-29 RX ADMIN — BUTALBITAL, ASPIRIN, AND CAFFEINE 1 CAPSULE: 50; 325; 40 CAPSULE ORAL at 12:55

## 2020-08-29 RX ADMIN — BUTALBITAL, ASPIRIN, AND CAFFEINE 1 CAPSULE: 50; 325; 40 CAPSULE ORAL at 02:12
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
## 2020-08-29 NOTE — ED TRIAGE NOTES
Pt complaining of headache and blurred vision X 12 hours. Pt was discharged yesterday for pseudotumors. Pt currently taking 5 mg oxycodone for pain. Pt experiencing nausea.

## 2020-08-29 NOTE — PROGRESS NOTES
CMS Note  8/29/2020    Patient received and signed the Observation letter. Patient was given a copy for their record.   Micky Pineda CMS

## 2020-08-29 NOTE — PROGRESS NOTES
TRANSFER - IN REPORT:    Verbal report received from GreerRN(name) on Mally Ward  being received from ED(unit) for routine progression of care      Report consisted of patients Situation, Background, Assessment and   Recommendations(SBAR). Information from the following report(s) SBAR, Kardex, ED Summary, Procedure Summary, Intake/Output, MAR, Accordion, Recent Results and Med Rec Status was reviewed with the receiving nurse. Opportunity for questions and clarification was provided. Assessment completed upon patients arrival to unit and care assumed.
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
## 2020-08-29 NOTE — DISCHARGE SUMMARY
Discharge Summary       PATIENT ID: Tawanna Rios  MRN: [id number]   YOB: 1990    DATE OF ADMISSION: 8/28/2020  9:42 PM    DATE OF DISCHARGE: 08/29/20   PRIMARY CARE PROVIDER: None     DISCHARGING PROVIDER: Morena Lobato MD      CONSULTATIONS: IP CONSULT TO NEUROLOGY    PROCEDURES/SURGERIES: * No surgery found *    [address] COURSE:   77-year-old female with a history of pseudotumor cerebri who was recently admitted for similar symptoms presented to the hospital again with persistent headache associated with visual changes. She was evaluated by neurology and was started on Diamox, scheduled ketorolac to control pain and short-term Valium before bedtime to help with sleep. She has a follow-up appointment with neurology scheduled in 3 days. She was advised to follow-up. DISCHARGE DIAGNOSES / PLAN:      1. 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 Chronic migraine without aura  3. SLE  4. Paraesophageal hernia  5. Morbid obesity     ADDITIONAL CARE RECOMMENDATIONS:   Continue taking your medications as prescribed and go to follow-up appointment with Dr. Larry Richardson as scheduled    PENDING TEST RESULTS:   At the time of discharge the following test results are still pending: None    FOLLOW UP APPOINTMENTS:    Follow-up Information    None          DIET: Regular    ACTIVITY: As tolerated      DISCHARGE MEDICATIONS:  Current Discharge Medication List      START taking these medications    Details   acetaZOLAMIDE SR (DIAMOX) 500 mg capsule Take 2 Caps by mouth every twelve (12) hours. Qty: 120 Cap, Refills: 0      butalbital-aspirin-caffeine (FIORINAL) capsule Take 1 Cap by mouth every six (6) hours as needed for Headache. Max Daily Amount: 4 Caps.   Qty: 12 Cap, Refills: 0    Associated Diagnoses: Chronic migraine without aura without status migrainosus, not intractable      ketorolac (TORADOL) 10 mg tablet Take 1 Tab by mouth every six (6) hours for 5 days. Qty: 20 Tab, Refills: 0      diazePAM (Valium) 5 mg tablet Take 1 Tab by mouth every six (6) hours as needed for Anxiety. Max Daily Amount: 20 mg.  Qty: 7 Tab, Refills: 0    Associated Diagnoses: Chronic migraine without aura without status migrainosus, not intractable      potassium chloride SA (MICRO-K) 10 mEq capsule Take 1 Cap by mouth two (2) times a day. Qty: 60 Cap, Refills: 0         CONTINUE these medications which have NOT CHANGED    Details   famotidine (PEPCID) 20 mg tablet Take 1 Tab by mouth two (2) times daily as needed for Heartburn, Gastroesophageal Reflux Disease (GERD) or Indigestion. Qty: 30 Tab, Refills: 0      oxyCODONE IR (ROXICODONE) 5 mg immediate release tablet Take 1 Tab by mouth every four (4) hours as needed for Pain for up to 3 days. Max Daily Amount: 30 mg.  Qty: 10 Tab, Refills: 0    Associated Diagnoses: Pseudotumor cerebri syndrome      promethazine (PHENERGAN) 12.5 mg tablet Take 1 Tab by mouth every six (6) hours as needed for Nausea. Qty: 20 Tab, Refills: 0      !! zonisamide (ZONEGRAN) 100 mg capsule Take 1 Cap by mouth daily. Qty: 30 Cap, Refills: 0      !! zonisamide (ZONEGRAN) 50 mg capsule Take 1 Cap by mouth daily. Qty: 14 Cap, Refills: 0      gabapentin (NEURONTIN) 600 mg tablet Take 600 mg by mouth three (3) times daily. cyclobenzaprine (FLEXERIL) 10 mg tablet 10 mg two (2) times a day. ondansetron (ZOFRAN ODT) 4 mg disintegrating tablet TAKE 1 TABLET BY MOUTH EVERY 8 HOURS AS NEEDED FOR NAUSEA  Qty: 15 Tab, Refills: 0    Comments: DX Code Needed  . Associated Diagnoses: Nausea      biotin 10,000 mcg cap Take 10,000 mg by mouth daily. hydroxychloroquine (PLAQUENIL) 200 mg tablet Take 200 mg by mouth two (2) times a day. !! - Potential duplicate medications found. Please discuss with provider. NOTIFY YOUR PHYSICIAN FOR ANY OF THE FOLLOWING:   Fever over 101 degrees for 24 hours.    Chest pain, shortness of breath, fever, chills, nausea, vomiting, diarrhea, change in mentation, falling, weakness, bleeding. Severe pain or pain not relieved by medications. Or, any other signs or symptoms that you may have questions about. DISPOSITION:  x  Home With:   OT  PT  HH  RN       Long term SNF/Inpatient Rehab    Independent/assisted living    Hospice    Other:       PATIENT CONDITION AT DISCHARGE:     Functional status    Poor     Deconditioned    x Independent      Cognition    x Lucid     Forgetful     Dementia      Catheters/lines (plus indication)    [place]     PICC     PEG    x None      Code status   x  Full code     DNR      PHYSICAL EXAMINATION AT DISCHARGE:  General:          Alert, cooperative, no distress, appears stated age. HEENT:           Atraumatic, anicteric sclerae, pink conjunctivae                          No oral ulcers, mucosa moist, throat clear, dentition fair  Neck:               Supple, symmetrical  Lungs:             Clear to auscultation bilaterally. No Wheezing or Rhonchi. No rales. Heart:              Regular  rhythm,  No  murmur   No edema  Abdomen:        Soft, non-tender. Not distended. Bowel sounds normal  Extremities:     No cyanosis. No clubbing,                            Skin turgor normal, Capillary refill normal  Skin:                Not pale. Not Jaundiced  No rashes   Psych:             Not anxious or agitated.   Neurologic:      Alert, moves all extremities, answers questions appropriately and responds to commands       CHRONIC MEDICAL DIAGNOSES:  Problem List as of 8/29/2020 Date Reviewed: 8/29/2020          Codes Class Noted - Resolved    Headache ICD-10-CM: R51  ICD-9-CM: 784.0  8/29/2020 - Present        Pseudotumor cerebri syndrome ICD-10-CM: G93.2  ICD-9-CM: 348.2  8/25/2020 - Present        [facility] (Chronic) ICD-10-CM: M32.9  ICD-9-CM: 710.0  8/25/2020 - Present        Severe obesity with body mass index (BMI) of 35.0 to 39.9 with serious comorbidity [facility]) ICD-10-CM: E66.01  ICD-9-CM: 278.01  8/22/2018 - Present        S/P repair of paraesophageal hernia ICD-10-CM: Z98.890, Z87.19  ICD-9-CM: V45.89  11/17/2017 - Present        Paraesophageal hernia ICD-10-CM: K44.9  ICD-9-CM: 553.3  11/15/2017 - Present        GERD (gastroesophageal reflux disease) ICD-10-CM: K21.9  ICD-9-CM: 530.81  11/7/2017 - Present        Obesity, Class II, BMI 35-39.9 ICD-10-CM: E66.9  ICD-9-CM: 278.00  3/31/2017 - Present        Sebaceous cyst ICD-10-CM: L72.3  ICD-9-CM: 706.2  3/24/2017 - Present    Overview Addendum 5/1/2017 10:52 AM by Roya Calhoun., MD     S/P excision; Along sternum in cleavage. History of Nissen fundoplication [id number]: B90.798  ICD-9-CM: V15.29  1/4/2017 - Present    Overview Signed 1/4/2017  1:51 PM by Therese Alatorre LPN     4 duodenal ulcers, chronic gastritis, Grade C esophagitis, Chronic GERD, hernia, small tumor. Done August/2016.              Chronic migraine without aura without status migrainosus, not intractable ICD-10-CM: U29.685  ICD-9-CM: 346.70  5/18/2016 - Present        Cervical incompetence ICD-10-CM: N88.3  ICD-9-CM: 622.5  4/12/2013 - Present              Greater than 20 minutes were spent with the patient on counseling and coordination of care    Signed:   Johana Dye MD  8/29/2020  12:54 PM
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
## 2020-08-29 NOTE — PROGRESS NOTES
I have reviewed discharge instructions with the patient. The patient verbalized understanding. Peripheral IV removed upon D/C.

## 2020-08-29 NOTE — DISCHARGE INSTRUCTIONS
Discharge Instructions       PATIENT ID: Saji Overton  MRN: [id number]   YOB: 1990    DATE OF ADMISSION: 8/28/2020  9:42 PM    DATE OF DISCHARGE: 8/29/2020    PRIMARY CARE PROVIDER: None     ATTENDING PHYSICIAN: Minh Foreman MD  DISCHARGING PROVIDER: Dorothy Pitts MD        DISCHARGE DIAGNOSES   1. 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 Chronic migraine without aura  3. SLE  4. Paraesophageal hernia  5. Morbid obesity    CONSULTATIONS: IP CONSULT TO NEUROLOGY    PROCEDURES/SURGERIES: * No surgery found *    PENDING TEST RESULTS:   At the time of discharge the following test results are still pending: None    FOLLOW UP APPOINTMENTS:   Follow-up Information    None          ADDITIONAL CARE RECOMMENDATIONS: Continue taking your medications as prescribed and go to follow-up appointment with Dr. Raciel Ochoa as scheduled    DIET: Regular    ACTIVITY: As tolerated    [facility]/ED Visit Follow-Up Instructions/Information    You may have an in home follow up visit set up with [facility] or may wish to contact [facility] to set-up a visit:    What are we? [facility] is an in-home urgent care service staffed with emergency trained medical teams. We come to your home in a vehicle stocked with medical supplies and technology. An ER physician is always available if needed. When? As a part of your hospital follow-up, an appointment has been/ or can be set up for us to come see you. Usually, this will be 24-72 hours after you leave the hospital or as needed. [facility] is open 7am-9pm, 7 days a week, 365 days a year, including holidays. Why? We know that you cannot always get to your doctor after being in the hospital and that your doctor is not always available when you need them.  Once your workup is complete, we'll call in your prescriptions, update your family doctor, and handle billing with your insurance so you can focus on feeling better, faster without leaving home. How much? We accept most major health insurance plans, including Medicaid, Medicare, and Medicare Advantage [address] [facility], [employer] [place], and [employer]. We also accept: credit, debit, health savings account (HSA), health reimbursement account (HRA) and flexible spending account (FSA) payments. [facility] prices compare to conventional urgent care facilities, but we bring the care to you. How to reach us? Getting care is easy- use our mobile genevieve ([employer]), website ([email or web address]) or call us [phone]. DISCHARGE MEDICATIONS:   See Medication Reconciliation Form    · It is important that you take the medication exactly as they are prescribed. · Keep your medication in the bottles provided by the pharmacist and keep a list of the medication names, dosages, and times to be taken in your wallet. · Do not take other medications without consulting your doctor. NOTIFY YOUR PHYSICIAN FOR ANY OF THE FOLLOWING:   Fever over 101 degrees for 24 hours. Chest pain, shortness of breath, fever, chills, nausea, vomiting, diarrhea, change in mentation, falling, weakness, bleeding. Severe pain or pain not relieved by medications. Or, any other signs or symptoms that you may have questions about.       DISPOSITION:  x  Home With:   OT  PT  HH  RN       SNF/Inpatient Rehab/LTAC    Independent/assisted living    Hospice    Other:     CDMP Checked:   Yes ***     PROBLEM LIST Updated:  Yes ***       Signed:   Paula Stark MD  8/29/2020  12:57 PM

## 2020-08-29 NOTE — ED NOTES
2:58 AM TRANSFER - OUT REPORT:    Verbal report given to fe RN (name) on Antonio Juárez  being transferred to 5th floor (unit) for routine progression of care       Report consisted of patients Situation, Background, Assessment and   Recommendations(SBAR). Information from the following report(s) SBAR, Kardex, Intake/Output, MAR and Recent Results was reviewed with the receiving nurse. Lines:   Peripheral IV 08/28/20 Right; Anterior Antecubital (Active)   Site Assessment Clean, dry, & intact 08/28/20 2222   Phlebitis Assessment 0 08/28/20 2222   Infiltration Assessment 0 08/28/20 2222   Dressing Status Clean, dry, & intact 08/28/20 2222   Hub Color/Line Status Pink;Flushed;Patent 08/28/20 2222   Action Taken Open ports on tubing capped 08/28/20 2222        Opportunity for questions and clarification was provided. Patient transported with:   Tech    1:13 AM Dr. Nasir Esteban at bedside discussing admission with patient. 11:51 PM Dr. Jose Rafael Mehta at bedside speaking to patient regarding treatment plan.

## 2020-09-01 ENCOUNTER — OFFICE VISIT (OUTPATIENT)
Dept: NEUROLOGY | Age: 30
End: 2020-09-01
Payer: MEDICAID

## 2020-09-01 VITALS
HEART RATE: 90 BPM | RESPIRATION RATE: 20 BRPM | BODY MASS INDEX: 39.14 KG/M2 | HEIGHT: 62 IN | OXYGEN SATURATION: 100 % | SYSTOLIC BLOOD PRESSURE: 126 MMHG | DIASTOLIC BLOOD PRESSURE: 74 MMHG

## 2020-09-01 DIAGNOSIS — G43.709 CHRONIC MIGRAINE WITHOUT AURA WITHOUT STATUS MIGRAINOSUS, NOT INTRACTABLE: ICD-10-CM

## 2020-09-01 DIAGNOSIS — G93.2 PSEUDOTUMOR CEREBRI SYNDROME: Primary | ICD-10-CM

## 2020-09-01 DIAGNOSIS — E66.01 CLASS 2 SEVERE OBESITY WITH SERIOUS COMORBIDITY AND BODY MASS INDEX (BMI) OF 39.0 TO 39.9 IN ADULT, UNSPECIFIED OBESITY TYPE (HCC): ICD-10-CM

## 2020-09-01 DIAGNOSIS — G47.00 INSOMNIA, UNSPECIFIED TYPE: ICD-10-CM

## 2020-09-01 DIAGNOSIS — M54.81 BILATERAL OCCIPITAL NEURALGIA: ICD-10-CM

## 2020-09-01 PROCEDURE — 99215 OFFICE O/P EST HI 40 MIN: CPT | Performed by: PSYCHIATRY & NEUROLOGY

## 2020-09-01 RX ORDER — RIMEGEPANT SULFATE 75 MG/75MG
TABLET, ORALLY DISINTEGRATING ORAL
Qty: 8 TAB | Refills: 2 | Status: SHIPPED | OUTPATIENT
Start: 2020-09-01 | End: 2021-01-25

## 2020-09-01 RX ORDER — ACETAZOLAMIDE 500 MG/1
1500 CAPSULE, EXTENDED RELEASE ORAL 2 TIMES DAILY
Qty: 180 CAP | Refills: 1 | Status: SHIPPED | OUTPATIENT
Start: 2020-09-01 | End: 2020-09-23 | Stop reason: SDUPTHER

## 2020-09-01 NOTE — PROGRESS NOTES
She was very confused when she was in the hospital she was given a bunch of medication and she doesn't understand which ones she is supposed to be using   She wasn't sure about the zonisamide so she isn't taking that   Still having a headache and feels weak all over

## 2020-09-01 NOTE — PATIENT INSTRUCTIONS
Neck: Exercises  Introduction  Here are some examples of exercises for you to try. The exercises may be suggested for a condition or for rehabilitation. Start each exercise slowly. Ease off the exercises if you start to have pain. You will be told when to start these exercises and which ones will work best for you. How to do the exercises  Neck stretch   1. This stretch works best if you keep your shoulder down as you lean away from it. To help you remember to do this, start by relaxing your shoulders and lightly holding on to your thighs or your chair. 2. Tilt your head toward your shoulder and hold for 15 to 30 seconds. Let the weight of your head stretch your muscles. 3. If you would like a little added stretch, use your hand to gently and steadily pull your head toward your shoulder. For example, keeping your right shoulder down, lean your head to the left. 4. Repeat 2 to 4 times toward each shoulder. Diagonal neck stretch   1. Turn your head slightly toward the direction you will be stretching, and tilt your head diagonally toward your chest and hold for 15 to 30 seconds. 2. If you would like a little added stretch, use your hand to gently and steadily pull your head forward on the diagonal.  3. Repeat 2 to 4 times toward each side. Dorsal glide stretch   The dorsal glide stretches the back of the neck. If you feel pain, do not glide so far back. Some people find this exercise easier to do while lying on their backs with an ice pack on the neck. 1. Sit or stand tall and look straight ahead. 2. Slowly tuck your chin as you glide your head backward over your body  3. Hold for a count of 6, and then relax for up to 10 seconds. 4. Repeat 8 to 12 times. Chest and shoulder stretch   1. Sit or stand tall and glide your head backward as in the dorsal glide stretch. 2. Raise both arms so that your hands are next to your ears.   3. Take a deep breath, and as you breathe out, lower your elbows down and behind your back. You will feel your shoulder blades slide down and together, and at the same time you will feel a stretch across your chest and the front of your shoulders. 4. Hold for about 6 seconds, and then relax for up to 10 seconds. 5. Repeat 8 to 12 times. Strengthening: Hands on head   1. Move your head backward, forward, and side to side against gentle pressure from your hands, holding each position for about 6 seconds. 2. Repeat 8 to 12 times. Follow-up care is a key part of your treatment and safety. Be sure to make and go to all appointments, and call your doctor if you are having problems. It's also a good idea to know your test results and keep a list of the medicines you take. Where can you learn more? Go to http://connor-radha.info/  Enter P975 in the search box to learn more about \"Neck: Exercises. \"  Current as of: March 2, 2020               Content Version: 12.5  © 2006-2020 [employer]. Care instructions adapted under license by [employer] (which disclaims liability or warranty for this information). If you have questions about a medical condition or this instruction, always ask your healthcare professional. [place] [address] any warranty or liability for your use of this information. Learning About Pseudotumor Cerebri  What is pseudotumor cerebri? Pseudotumor cerebri (say \"zbo-bcr-WCV-hortencia SAIR--almita\") is an increase in pressure of the fluid that surrounds the brain. Your doctor may also call the condition \"idiopathic intracranial hypertension. \" Normally, this clear fluid acts like a buffer to protect the brain. It is called cerebrospinal fluid, or CSF. It's not clear what makes the CSF pressure rise. Some medicines may cause it. Sleep apnea, obesity, and anemia may also play a part. The pressure may build over time. The rising pressure can make the optic nerve swell.  The optic nerve is at the back of the eye. It carries visual information from the eye to the brain. The swelling may damage the nerve and lead to permanent loss of some or all of the eyesight. There are several symptoms of rising CSF pressure. Some symptoms may be present all the time. Some might come and go. Symptoms include:  · Severe headaches. They sometimes come with nausea and vomiting. The pain may be centered behind the eyes. · A hissing or roaring sound in the ears that keeps time with your heartbeat. · Problems with vision. You may not be able to see well at the edge of your field of view. You may also lose center vision. It may happen now and then, in one or both eyes, and last for a few seconds at a time. The word \"pseudotumor\" may sound scary. But it's not a brain tumor. The condition gets its name because the pressure inside the skull can mimic what happens when a person has a tumor. How is it treated? · If you are taking medicines that could be raising your CSF pressure, your doctor may change your medicines. · You may get medicines to help your body make less CSF. This can help lower the pressure around the brain. · Your doctor may also give you medicine for headaches. · If sleep apnea, obesity, or anemia may be causing the CSF pressure to rise, your doctor may treat those problems. · If your vision is getting worse, you may have surgery to make a small opening on the optic nerve to reduce swelling. · Your doctor may implant small tubes inside the skull to drain the extra fluid. This helps lower the pressure around the brain. Follow-up care is a key part of your treatment and safety. Be sure to make and go to all appointments, and call your doctor if you are having problems. It's also a good idea to know your test results and keep a list of the medicines you take. Where can you learn more?   Go to http://connor-radha.info/  Enter D455 in the search box to learn more about \"Learning About Pseudotumor Cerebri. \"  Current as of: December 18, 2019               Content Version: 12.5  © 7856-6314 [employer]. Care instructions adapted under license by [employer] (which disclaims liability or warranty for this information). If you have questions about a medical condition or this instruction, always ask your healthcare professional. [address] any warranty or liability for your use of this information. Migraine Headache: Care Instructions  Your Care Instructions  Migraines are painful, throbbing headaches that often start on one side of the head. They may cause nausea and vomiting and make you sensitive to light, sound, or smell. Without treatment, migraines can last from 4 hours to a few days. Medicines can help prevent migraines or stop them after they have started. Your doctor can help you find which ones work best for you. Follow-up care is a key part of your treatment and safety. Be sure to make and go to all appointments, and call your doctor if you are having problems. It's also a good idea to know your test results and keep a list of the medicines you take. How can you care for yourself at home? · Do not drive if you have taken a prescription pain medicine. · Rest in a quiet, dark room until your headache is gone. Close your eyes, and try to relax or go to sleep. Don't watch TV or read. · Put a cold, moist cloth or cold pack on the painful area for 10 to 20 minutes at a time. Put a thin cloth between the cold pack and your skin. · Use a warm, moist towel or a heating pad set on low to relax tight shoulder and neck muscles. · Have someone gently massage your neck and shoulders. · Take your medicines exactly as prescribed. Call your doctor if you think you are having a problem with your medicine. You will get more details on the specific medicines your doctor prescribes. · Don't take medicine for headache pain too often.  Talk to your doctor if you are taking medicine more than 2 days a week to stop a headache. Taking too much pain medicine can lead to more headaches. These are called medicine-overuse headaches. To prevent migraines  · Keep a headache diary so you can figure out what triggers your headaches. Avoiding triggers may help you prevent headaches. Record when each headache began, how long it lasted, and what the pain was like. Write down any other symptoms you had with the headache, such as nausea, flashing lights or dark spots, or sensitivity to bright light or loud noise. Note if the headache occurred near your period. List anything that might have triggered the headache. Triggers may include certain foods (chocolate, cheese, wine) or odors, smoke, bright light, stress, or lack of sleep. · If your doctor has prescribed medicine for your migraines, take it as directed. You may have medicine that you take only when you get a migraine and medicine that you take all the time to help prevent migraines. ? If your doctor has prescribed medicine for when you get a headache, take it at the first sign of a migraine, unless your doctor has given you other instructions. ? If your doctor has prescribed medicine to prevent migraines, take it exactly as prescribed. Call your doctor if you think you are having a problem with your medicine. · Find healthy ways to deal with stress. Migraines are most common during or right after stressful times. Try finding ways to reduce stress like practicing mindfulness or deep breathing exercises. · Get plenty of sleep and exercise. But be careful to not push yourself too hard during exercise. It may trigger a headache. · Eat meals on a regular schedule. Avoid foods and drinks that often trigger migraines.  These include chocolate, alcohol (especially red wine and port), aspartame, monosodium glutamate (MSG), and some additives found in foods (such as hot dogs, gordon, cold cuts, aged cheeses, and pickled foods). · Limit caffeine. Don't drink too much coffee, tea, or soda. But don't quit caffeine suddenly. That can also give you migraines. · Do not smoke or allow others to smoke around you. If you need help quitting, talk to your doctor about stop-smoking programs and medicines. These can increase your chances of quitting for good. · If you are taking birth control pills or hormone therapy, talk to your doctor about whether they are triggering your migraines. When should you call for help? [id number] anytime you think you may need emergency care. For example, call if:  · You have signs of a stroke. These may include:  ? Sudden numbness, paralysis, or weakness in your face, arm, or leg, especially on only one side of your body. ? Sudden vision changes. ? Sudden trouble speaking. ? Sudden confusion or trouble understanding simple statements. ? Sudden problems with walking or balance. ? A sudden, severe headache that is different from past headaches. Call your doctor now or seek immediate medical care if:  · You have new or worse nausea and vomiting. · You have a new or higher fever. · Your headache gets much worse. Watch closely for changes in your health, and be sure to contact your doctor if:  · You are not getting better after 2 days (48 hours). Where can you learn more? Go to http://connor-radha.info/  Enter H790 in the search box to learn more about \"Migraine Headache: Care Instructions. \"  Current as of: November 20, 2019               Content Version: 12.5  © 5211-5969 [employer]. Care instructions adapted under license by [employer] (which disclaims liability or warranty for this information). If you have questions about a medical condition or this instruction, always ask your healthcare professional. [address] any warranty or liability for your use of this information.
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
## 2020-09-02 LAB
BACTERIA SPEC CULT: NORMAL
BACTERIA SPEC CULT: NORMAL
GRAM STN SPEC: NORMAL
SERVICE CMNT-IMP: NORMAL

## 2020-09-03 ENCOUNTER — TELEPHONE (OUTPATIENT)
Dept: NEUROLOGY | Age: 30
End: 2020-09-03

## 2020-09-03 ENCOUNTER — OFFICE VISIT (OUTPATIENT)
Dept: FAMILY MEDICINE CLINIC | Age: 30
End: 2020-09-03
Payer: MEDICAID

## 2020-09-03 VITALS
SYSTOLIC BLOOD PRESSURE: 133 MMHG | HEART RATE: 116 BPM | TEMPERATURE: 98.5 F | WEIGHT: 202.6 LBS | DIASTOLIC BLOOD PRESSURE: 91 MMHG | BODY MASS INDEX: 37.28 KG/M2 | HEIGHT: 62 IN

## 2020-09-03 DIAGNOSIS — G93.2 PSEUDOTUMOR CEREBRI SYNDROME: Primary | ICD-10-CM

## 2020-09-03 DIAGNOSIS — H53.8 BLURRED VISION: ICD-10-CM

## 2020-09-03 PROCEDURE — 1111F DSCHRG MED/CURRENT MED MERGE: CPT | Performed by: FAMILY MEDICINE

## 2020-09-03 PROCEDURE — 99213 OFFICE O/P EST LOW 20 MIN: CPT | Performed by: FAMILY MEDICINE

## 2020-09-03 NOTE — PROGRESS NOTES
Transitional Care Management Progress Note    Patient: Hal Kaur  : 1990  PCP: None    Date of admission: 20  Date of discharge: 20    Patient was contacted by Transitional Care Management services within two days after her discharge: Yes. This encounter and supporting documentation was reviewed if available. Medication reconciliation was performed today (9/3/2020). Her vision is blurred again since this morning  She is also short of breath slightly whil wearing the mask          Assessment/Plan:   Diagnoses and all orders for this visit:    1. Pseudotumor cerebri syndrome    2. Blurred vision    go back to [facility] for care   go to [facility]  Subjective:   Hal Kaur is a 30 y.o. female presenting today for follow-up after being discharged from [facility].  The discharge summary was reviewed. The main problem requiring admission was pseudotumor ceribri and vision blurred. Complications during admission: none    Interval history/Current status:   I spoke to her Monday and Tuesday after her discharge  She was feeling better and doing well   this morning she woke up with vision blurred again   she had said no to a spinal tap on that admission  She is now interested in getting that done  Admitting symptoms have: was better for 4 days but now is back at the same as it was on the day of admission      Medications marked \"taking\" at this time:  Home Medications    Medication Sig Start Date End Date Taking? Authorizing Provider   acetaZOLAMIDE SR (DIAMOX) 500 mg capsule Take 3 Caps by mouth two (2) times a day. 20  Yes Ayaka Jolly MD   acetaZOLAMIDE SR (DIAMOX) 500 mg capsule Take 2 Caps by mouth every twelve (12) hours. 20  Yes Ana M Bourne MD   potassium chloride SA (MICRO-K) 10 mEq capsule Take 1 Cap by mouth two (2) times a day.  20  Yes Ana M Bourne MD   ondansetron (ZOFRAN ODT) 4 mg disintegrating tablet TAKE 1 TABLET BY MOUTH EVERY 8 HOURS AS NEEDED FOR NAUSEA 3/31/20  Yes Rio Iraheta MD   biotin 10,000 mcg cap Take 10,000 mg by mouth daily. Yes Provider, Historical   hydroxychloroquine (PLAQUENIL) 200 mg tablet Take 200 mg by mouth two (2) times a day. Yes Provider, Historical   rimegepant (Nurtec ODT) 75 mg disintegrating tablet Take 1 tab at onset of a headache (max 1/day) 9/1/20   Ayaka Jolly MD   diazePAM (Valium) 5 mg tablet Take 1 Tab by mouth every six (6) hours as needed for Anxiety. Max Daily Amount: 20 mg. 8/29/20   Ana M Bourne MD   famotidine (PEPCID) 20 mg tablet Take 1 Tab by mouth two (2) times daily as needed for Heartburn, Gastroesophageal Reflux Disease (GERD) or Indigestion. 8/27/20   Ana M Buorne MD   promethazine (PHENERGAN) 12.5 mg tablet Take 1 Tab by mouth every six (6) hours as needed for Nausea. 8/27/20   Ana M Bourne MD   gabapentin (NEURONTIN) 600 mg tablet Take 600 mg by mouth three (3) times daily. Provider, Historical   cyclobenzaprine (FLEXERIL) 10 mg tablet 10 mg two (2) times a day.  7/1/20   Provider, Historical        Review of Systems:  Heart rate fast   lungs clear  Neuro: vision blurred          Patient Active Problem List    Diagnosis Date Noted    Headache 08/29/2020    Pseudotumor cerebri syndrome 08/25/2020    SLE (systemic lupus erythematosus) ([facility] 08/25/2020    Severe obesity with body mass index (BMI) of 35.0 to 39.9 with serious comorbidity ([facility] 08/22/2018    S/P repair of paraesophageal hernia 11/17/2017    Paraesophageal hernia 11/15/2017    GERD (gastroesophageal reflux disease) 11/07/2017    Obesity, Class II, BMI 35-39.9 03/31/2017    Sebaceous cyst 03/24/2017    History of Nissen fundoplication 69/74/0622    Chronic migraine without aura without status migrainosus, not intractable 05/18/2016    Cervical incompetence 04/12/2013     Current Outpatient Medications   Medication Sig Dispense Refill    acetaZOLAMIDE SR (DIAMOX) 500 mg capsule Take 3 Caps by mouth two (2) times a day. 180 Cap 1    acetaZOLAMIDE SR (DIAMOX) 500 mg capsule Take 2 Caps by mouth every twelve (12) hours. 120 Cap 0    potassium chloride SA (MICRO-K) 10 mEq capsule Take 1 Cap by mouth two (2) times a day. 60 Cap 0    ondansetron (ZOFRAN ODT) 4 mg disintegrating tablet TAKE 1 TABLET BY MOUTH EVERY 8 HOURS AS NEEDED FOR NAUSEA 15 Tab 0    biotin 10,000 mcg cap Take 10,000 mg by mouth daily.  hydroxychloroquine (PLAQUENIL) 200 mg tablet Take 200 mg by mouth two (2) times a day.  rimegepant (Nurtec ODT) 75 mg disintegrating tablet Take 1 tab at onset of a headache (max 1/day) 8 Tab 2    diazePAM (Valium) 5 mg tablet Take 1 Tab by mouth every six (6) hours as needed for Anxiety. Max Daily Amount: 20 mg. 7 Tab 0    famotidine (PEPCID) 20 mg tablet Take 1 Tab by mouth two (2) times daily as needed for Heartburn, Gastroesophageal Reflux Disease (GERD) or Indigestion. 30 Tab 0    promethazine (PHENERGAN) 12.5 mg tablet Take 1 Tab by mouth every six (6) hours as needed for Nausea. 20 Tab 0    gabapentin (NEURONTIN) 600 mg tablet Take 600 mg by mouth three (3) times daily.  cyclobenzaprine (FLEXERIL) 10 mg tablet 10 mg two (2) times a day. Objective:   BP (!) 133/91   Pulse (!) 116   Temp 98.5 °F (36.9 °C) (Oral)   Ht 5' 2\" (1.575 m)   Wt 202 lb 9.6 oz (91.9 kg)   BMI 37.06 kg/m²            We discussed the expected course, resolution and complications of the diagnosis(es) in detail. Medication risks, benefits, costs, interactions, and alternatives were discussed as indicated. I advised her to contact the office if her condition worsens, changes or fails to improve as anticipated. She expressed understanding with the diagnosis(es) and plan.        Her insurance k[employer] snot cover Srinivasa Rapp MD

## 2020-09-03 NOTE — TELEPHONE ENCOUNTER
Returned her call, she was advised by her PCP to go back to the hospital. She was asking which one should she go to. She was advised to stay within [facility] so that they had all of her information. She was advised to go to [facility]. She has verbalized understanding.

## 2020-09-03 NOTE — TELEPHONE ENCOUNTER
PCP wants patient to return to the hospital \"for the same reason I saw him\". Patient is asking whether she can go to [facility] or does she need to be seen at [facility].   # [phone]

## 2020-09-03 NOTE — PROGRESS NOTES
HIPAA verified by two patient identifiers. Health Maintenance Due   Topic    PAP AKA CERVICAL CYTOLOGY     Flu Vaccine (1)     Chief Complaint   Patient presents with   [facility] Follow Up     dizzy,sob,can't half way see     Visit Vitals  BP (!) 133/91   Pulse (!) 116   Temp 98.5 °F (36.9 °C) (Oral)   Ht 5' 2\" (1.575 m)   Wt 202 lb 9.6 oz (91.9 kg)   BMI 37.06 kg/m²       Pain Scale: 5/10  Pain Location: Head  1. Have you been to the ER, urgent care clinic since your last visit? Hospitalized since your last visit? yes [facility], had a lumbar punture    2. Have you seen or consulted any other health care providers outside of the [address] [place] [address] since your last visit? Include any pap smears or colon screening.  No

## 2020-09-10 ENCOUNTER — PATIENT MESSAGE (OUTPATIENT)
Dept: FAMILY MEDICINE CLINIC | Age: 30
End: 2020-09-10

## 2020-09-10 DIAGNOSIS — L93.0 LUPUS ERYTHEMATOSUS, UNSPECIFIED FORM: ICD-10-CM

## 2020-09-10 DIAGNOSIS — M76.30 IT BAND SYNDROME, UNSPECIFIED LATERALITY: Primary | ICD-10-CM

## 2020-09-10 DIAGNOSIS — R51.9 FREQUENT HEADACHES: ICD-10-CM

## 2020-09-10 RX ORDER — GABAPENTIN 600 MG/1
600 TABLET ORAL 3 TIMES DAILY
Qty: 90 TAB | Refills: 0 | Status: SHIPPED | OUTPATIENT
Start: 2020-09-10 | End: 2020-11-04

## 2020-09-10 RX ORDER — CYCLOBENZAPRINE HCL 10 MG
10 TABLET ORAL 2 TIMES DAILY
Qty: 60 TAB | Refills: 0 | Status: SHIPPED | OUTPATIENT
Start: 2020-09-10 | End: 2020-11-04

## 2020-09-10 RX ORDER — HYDROXYCHLOROQUINE SULFATE 200 MG/1
200 TABLET, FILM COATED ORAL 2 TIMES DAILY
Qty: 60 TAB | Refills: 0 | Status: SHIPPED | OUTPATIENT
Start: 2020-09-10 | End: 2021-01-25

## 2020-09-11 NOTE — TELEPHONE ENCOUNTER
They should be able to refill even though your appt is not for 3 months.  I am sending 30 days of meds to [facility] until you get more from the rheum

## 2020-09-11 NOTE — TELEPHONE ENCOUNTER
From: Hal Kaur  To: Praful Owens MD  Sent: 9/10/2020 3:36 PM EDT  Subject: Prescription Question    My rhumatologist has no appts for the next 3 months, they claim to have put me waiting list. I only have 3 days of my meds left gabapentin 600mg TID  Flexeril 10mg BID   Plaquanel 200mg BID  Are you able to send these in for me

## 2020-09-13 ENCOUNTER — APPOINTMENT (OUTPATIENT)
Dept: CT IMAGING | Age: 30
End: 2020-09-13
Attending: EMERGENCY MEDICINE
Payer: MEDICAID

## 2020-09-13 ENCOUNTER — HOSPITAL ENCOUNTER (OUTPATIENT)
Age: 30
Setting detail: OBSERVATION
Discharge: HOME | End: 2020-09-15
Attending: EMERGENCY MEDICINE | Admitting: HOSPITALIST
Payer: MEDICAID

## 2020-09-13 DIAGNOSIS — G43.811 OTHER MIGRAINE WITH STATUS MIGRAINOSUS, INTRACTABLE: ICD-10-CM

## 2020-09-13 DIAGNOSIS — G93.2 PSEUDOTUMOR CEREBRI SYNDROME: ICD-10-CM

## 2020-09-13 DIAGNOSIS — R51.9 NONINTRACTABLE HEADACHE, UNSPECIFIED CHRONICITY PATTERN, UNSPECIFIED HEADACHE TYPE: ICD-10-CM

## 2020-09-13 DIAGNOSIS — G43.709 CHRONIC MIGRAINE WITHOUT AURA WITHOUT STATUS MIGRAINOSUS, NOT INTRACTABLE: ICD-10-CM

## 2020-09-13 DIAGNOSIS — G93.2 IIH (IDIOPATHIC INTRACRANIAL HYPERTENSION): ICD-10-CM

## 2020-09-13 DIAGNOSIS — R27.0 ATAXIA: Primary | ICD-10-CM

## 2020-09-13 LAB
BASOPHILS # BLD: 0 K/UL (ref 0–0.1)
BASOPHILS NFR BLD: 0 % (ref 0–1)
COMMENT, HOLDF: NORMAL
DIFFERENTIAL METHOD BLD: ABNORMAL
EOSINOPHIL # BLD: 0.2 K/UL (ref 0–0.4)
EOSINOPHIL NFR BLD: 2 % (ref 0–7)
ERYTHROCYTE [DISTWIDTH] IN BLOOD BY AUTOMATED COUNT: 13.1 % (ref 11.5–14.5)
GLUCOSE BLD STRIP.AUTO-MCNC: 86 MG/DL (ref 65–100)
HCT VFR BLD AUTO: 39.3 % (ref 35–47)
HGB BLD-MCNC: 13.6 G/DL (ref 11.5–16)
IMM GRANULOCYTES # BLD AUTO: 0 K/UL (ref 0–0.04)
IMM GRANULOCYTES NFR BLD AUTO: 0 % (ref 0–0.5)
LYMPHOCYTES # BLD: 4.6 K/UL (ref 0.8–3.5)
LYMPHOCYTES NFR BLD: 46 % (ref 12–49)
MCH RBC QN AUTO: 31.2 PG (ref 26–34)
MCHC RBC AUTO-ENTMCNC: 34.6 G/DL (ref 30–36.5)
MCV RBC AUTO: 90.1 FL (ref 80–99)
MONOCYTES # BLD: 0.8 K/UL (ref 0–1)
MONOCYTES NFR BLD: 8 % (ref 5–13)
NEUTS SEG # BLD: 4.5 K/UL (ref 1.8–8)
NEUTS SEG NFR BLD: 44 % (ref 32–75)
NRBC # BLD: 0 K/UL (ref 0–0.01)
NRBC BLD-RTO: 0 PER 100 WBC
PLATELET # BLD AUTO: 322 K/UL (ref 150–400)
PMV BLD AUTO: 9.6 FL (ref 8.9–12.9)
RBC # BLD AUTO: 4.36 M/UL (ref 3.8–5.2)
SAMPLES BEING HELD,HOLD: NORMAL
SERVICE CMNT-IMP: NORMAL
WBC # BLD AUTO: 10.2 K/UL (ref 3.6–11)

## 2020-09-13 PROCEDURE — 85025 COMPLETE CBC W/AUTO DIFF WBC: CPT

## 2020-09-13 PROCEDURE — 96375 TX/PRO/DX INJ NEW DRUG ADDON: CPT

## 2020-09-13 PROCEDURE — 80307 DRUG TEST PRSMV CHEM ANLYZR: CPT

## 2020-09-13 PROCEDURE — 93005 ELECTROCARDIOGRAM TRACING: CPT

## 2020-09-13 PROCEDURE — 36415 COLL VENOUS BLD VENIPUNCTURE: CPT

## 2020-09-13 PROCEDURE — 82962 GLUCOSE BLOOD TEST: CPT

## 2020-09-13 PROCEDURE — 96365 THER/PROPH/DIAG IV INF INIT: CPT

## 2020-09-13 PROCEDURE — 84703 CHORIONIC GONADOTROPIN ASSAY: CPT

## 2020-09-13 PROCEDURE — 99285 EMERGENCY DEPT VISIT HI MDM: CPT

## 2020-09-13 PROCEDURE — 80048 BASIC METABOLIC PNL TOTAL CA: CPT

## 2020-09-13 PROCEDURE — 74011250636 HC RX REV CODE- 250/636: Performed by: EMERGENCY MEDICINE

## 2020-09-13 PROCEDURE — 84484 ASSAY OF TROPONIN QUANT: CPT

## 2020-09-13 PROCEDURE — 81001 URINALYSIS AUTO W/SCOPE: CPT

## 2020-09-13 RX ORDER — KETOROLAC TROMETHAMINE 30 MG/ML
30 INJECTION, SOLUTION INTRAMUSCULAR; INTRAVENOUS
Status: COMPLETED | OUTPATIENT
Start: 2020-09-13 | End: 2020-09-13

## 2020-09-13 RX ORDER — DIAZEPAM 10 MG/2ML
5 INJECTION INTRAMUSCULAR
Status: COMPLETED | OUTPATIENT
Start: 2020-09-13 | End: 2020-09-13

## 2020-09-13 RX ORDER — ONDANSETRON 2 MG/ML
8 INJECTION INTRAMUSCULAR; INTRAVENOUS
Status: COMPLETED | OUTPATIENT
Start: 2020-09-13 | End: 2020-09-13

## 2020-09-13 RX ADMIN — ONDANSETRON HYDROCHLORIDE 8 MG: 2 INJECTION, SOLUTION INTRAMUSCULAR; INTRAVENOUS at 23:41

## 2020-09-13 RX ADMIN — KETOROLAC TROMETHAMINE 30 MG: 30 INJECTION, SOLUTION INTRAMUSCULAR at 23:58

## 2020-09-13 RX ADMIN — Medication 5 MG: at 23:41

## 2020-09-13 RX ADMIN — SODIUM CHLORIDE 1000 ML: 9 INJECTION, SOLUTION INTRAVENOUS at 23:56

## 2020-09-14 ENCOUNTER — APPOINTMENT (OUTPATIENT)
Dept: CT IMAGING | Age: 30
End: 2020-09-14
Attending: EMERGENCY MEDICINE
Payer: MEDICAID

## 2020-09-14 ENCOUNTER — APPOINTMENT (OUTPATIENT)
Dept: GENERAL RADIOLOGY | Age: 30
End: 2020-09-14
Attending: PSYCHIATRY & NEUROLOGY
Payer: MEDICAID

## 2020-09-14 ENCOUNTER — APPOINTMENT (OUTPATIENT)
Dept: MRI IMAGING | Age: 30
End: 2020-09-14
Attending: PSYCHIATRY & NEUROLOGY
Payer: MEDICAID

## 2020-09-14 PROBLEM — R27.0 ATAXIA: Status: ACTIVE | Noted: 2020-09-14

## 2020-09-14 LAB
ALBUMIN SERPL-MCNC: 4.4 G/DL (ref 3.5–5)
ALBUMIN/GLOB SERPL: 1.1 {RATIO} (ref 1.1–2.2)
ALP SERPL-CCNC: 120 U/L (ref 45–117)
ALT SERPL-CCNC: 39 U/L (ref 12–78)
AMPHET UR QL SCN: NEGATIVE
ANION GAP SERPL CALC-SCNC: 6 MMOL/L (ref 5–15)
APPEARANCE CSF: CLEAR
APPEARANCE UR: CLEAR
AST SERPL-CCNC: 18 U/L (ref 15–37)
ATRIAL RATE: 100 BPM
BACTERIA URNS QL MICRO: NEGATIVE /HPF
BARBITURATES UR QL SCN: NEGATIVE
BENZODIAZ UR QL: POSITIVE
BILIRUB DIRECT SERPL-MCNC: <0.1 MG/DL (ref 0–0.2)
BILIRUB SERPL-MCNC: 0.4 MG/DL (ref 0.2–1)
BILIRUB UR QL: NEGATIVE
BUN SERPL-MCNC: 11 MG/DL (ref 6–20)
BUN/CREAT SERPL: 12 (ref 12–20)
CALCIUM SERPL-MCNC: 9.3 MG/DL (ref 8.5–10.1)
CALCULATED P AXIS, ECG09: 61 DEGREES
CALCULATED R AXIS, ECG10: 19 DEGREES
CALCULATED T AXIS, ECG11: 9 DEGREES
CANNABINOIDS UR QL SCN: NEGATIVE
CHLORIDE SERPL-SCNC: 112 MMOL/L (ref 97–108)
CO2 SERPL-SCNC: 21 MMOL/L (ref 21–32)
COCAINE UR QL SCN: NEGATIVE
COLOR CSF: COLORLESS
COLOR UR: NORMAL
COMMENT, HOLDF: NORMAL
CREAT SERPL-MCNC: 0.95 MG/DL (ref 0.55–1.02)
DIAGNOSIS, 93000: NORMAL
DRUG SCRN COMMENT,DRGCM: ABNORMAL
EPITH CASTS URNS QL MICRO: NORMAL /LPF
GLOBULIN SER CALC-MCNC: 4 G/DL (ref 2–4)
GLUCOSE CSF-MCNC: 80 MG/DL (ref 40–70)
GLUCOSE SERPL-MCNC: 69 MG/DL (ref 65–100)
GLUCOSE UR STRIP.AUTO-MCNC: NEGATIVE MG/DL
HCG SERPL QL: NEGATIVE
HGB UR QL STRIP: NEGATIVE
HYALINE CASTS URNS QL MICRO: NORMAL /LPF (ref 0–5)
KETONES UR QL STRIP.AUTO: NEGATIVE MG/DL
LEUKOCYTE ESTERASE UR QL STRIP.AUTO: NEGATIVE
METHADONE UR QL: NEGATIVE
NITRITE UR QL STRIP.AUTO: NEGATIVE
OPIATES UR QL: NEGATIVE
P-R INTERVAL, ECG05: 160 MS
PCP UR QL: NEGATIVE
PH UR STRIP: 6 [PH] (ref 5–8)
POTASSIUM SERPL-SCNC: 4.1 MMOL/L (ref 3.5–5.1)
PROT CSF-MCNC: 52 MG/DL (ref 15–45)
PROT SERPL-MCNC: 8.4 G/DL (ref 6.4–8.2)
PROT UR STRIP-MCNC: NEGATIVE MG/DL
Q-T INTERVAL, ECG07: 344 MS
QRS DURATION, ECG06: 88 MS
QTC CALCULATION (BEZET), ECG08: 443 MS
RBC # CSF: 0 /CU MM
RBC #/AREA URNS HPF: NORMAL /HPF (ref 0–5)
SAMPLES BEING HELD,HOLD: NORMAL
SODIUM SERPL-SCNC: 139 MMOL/L (ref 136–145)
SP GR UR REFRACTOMETRY: 1.01 (ref 1–1.03)
TROPONIN I SERPL-MCNC: <0.05 NG/ML
TUBE # CSF: 1
UR CULT HOLD, URHOLD: NORMAL
UROBILINOGEN UR QL STRIP.AUTO: 1 EU/DL (ref 0.2–1)
VENTRICULAR RATE, ECG03: 100 BPM
WBC # CSF: 2 /CU MM (ref 0–5)
WBC URNS QL MICRO: NORMAL /HPF (ref 0–4)

## 2020-09-14 PROCEDURE — 80076 HEPATIC FUNCTION PANEL: CPT

## 2020-09-14 PROCEDURE — 70450 CT HEAD/BRAIN W/O DYE: CPT

## 2020-09-14 PROCEDURE — 74011250637 HC RX REV CODE- 250/637: Performed by: HOSPITALIST

## 2020-09-14 PROCEDURE — 96376 TX/PRO/DX INJ SAME DRUG ADON: CPT

## 2020-09-14 PROCEDURE — 70544 MR ANGIOGRAPHY HEAD W/O DYE: CPT

## 2020-09-14 PROCEDURE — 74011250637 HC RX REV CODE- 250/637: Performed by: PSYCHIATRY & NEUROLOGY

## 2020-09-14 PROCEDURE — 84157 ASSAY OF PROTEIN OTHER: CPT

## 2020-09-14 PROCEDURE — 87205 SMEAR GRAM STAIN: CPT

## 2020-09-14 PROCEDURE — 74011250636 HC RX REV CODE- 250/636: Performed by: EMERGENCY MEDICINE

## 2020-09-14 PROCEDURE — 70551 MRI BRAIN STEM W/O DYE: CPT

## 2020-09-14 PROCEDURE — 99215 OFFICE O/P EST HI 40 MIN: CPT | Performed by: PSYCHIATRY & NEUROLOGY

## 2020-09-14 PROCEDURE — 96366 THER/PROPH/DIAG IV INF ADDON: CPT

## 2020-09-14 PROCEDURE — 74011250636 HC RX REV CODE- 250/636: Performed by: HOSPITALIST

## 2020-09-14 PROCEDURE — 87015 SPECIMEN INFECT AGNT CONCNTJ: CPT

## 2020-09-14 PROCEDURE — 96372 THER/PROPH/DIAG INJ SC/IM: CPT

## 2020-09-14 PROCEDURE — 96365 THER/PROPH/DIAG IV INF INIT: CPT

## 2020-09-14 PROCEDURE — 82945 GLUCOSE OTHER FLUID: CPT

## 2020-09-14 PROCEDURE — 77030014143 XR SPINAL PUNC LUMB DX

## 2020-09-14 PROCEDURE — 99218 HC RM OBSERVATION: CPT

## 2020-09-14 PROCEDURE — 96375 TX/PRO/DX INJ NEW DRUG ADDON: CPT

## 2020-09-14 PROCEDURE — 74011250636 HC RX REV CODE- 250/636: Performed by: NURSE PRACTITIONER

## 2020-09-14 PROCEDURE — 89050 BODY FLUID CELL COUNT: CPT

## 2020-09-14 RX ORDER — ACETAZOLAMIDE 500 MG/1
1500 CAPSULE, EXTENDED RELEASE ORAL EVERY 12 HOURS
Status: DISCONTINUED | OUTPATIENT
Start: 2020-09-14 | End: 2020-09-15 | Stop reason: HOSPADM

## 2020-09-14 RX ORDER — SODIUM CHLORIDE 0.9 % (FLUSH) 0.9 %
5-40 SYRINGE (ML) INJECTION EVERY 8 HOURS
Status: DISCONTINUED | OUTPATIENT
Start: 2020-09-14 | End: 2020-09-15 | Stop reason: HOSPADM

## 2020-09-14 RX ORDER — ACETAZOLAMIDE 500 MG/1
1000 CAPSULE, EXTENDED RELEASE ORAL EVERY 12 HOURS
Status: DISCONTINUED | OUTPATIENT
Start: 2020-09-14 | End: 2020-09-14

## 2020-09-14 RX ORDER — ONDANSETRON 2 MG/ML
4 INJECTION INTRAMUSCULAR; INTRAVENOUS
Status: DISCONTINUED | OUTPATIENT
Start: 2020-09-14 | End: 2020-09-15 | Stop reason: HOSPADM

## 2020-09-14 RX ORDER — GABAPENTIN 600 MG/1
600 TABLET ORAL 3 TIMES DAILY
Status: DISCONTINUED | OUTPATIENT
Start: 2020-09-14 | End: 2020-09-15 | Stop reason: HOSPADM

## 2020-09-14 RX ORDER — CYCLOBENZAPRINE HCL 10 MG
10 TABLET ORAL 2 TIMES DAILY
Status: DISCONTINUED | OUTPATIENT
Start: 2020-09-14 | End: 2020-09-15 | Stop reason: HOSPADM

## 2020-09-14 RX ORDER — ONDANSETRON 4 MG/1
4 TABLET, ORALLY DISINTEGRATING ORAL
Status: DISCONTINUED | OUTPATIENT
Start: 2020-09-14 | End: 2020-09-15 | Stop reason: HOSPADM

## 2020-09-14 RX ORDER — DIPHENHYDRAMINE HYDROCHLORIDE 50 MG/ML
50 INJECTION, SOLUTION INTRAMUSCULAR; INTRAVENOUS
Status: COMPLETED | OUTPATIENT
Start: 2020-09-14 | End: 2020-09-14

## 2020-09-14 RX ORDER — HYDROXYCHLOROQUINE SULFATE 200 MG/1
200 TABLET, FILM COATED ORAL 2 TIMES DAILY
Status: DISCONTINUED | OUTPATIENT
Start: 2020-09-14 | End: 2020-09-15 | Stop reason: HOSPADM

## 2020-09-14 RX ORDER — LORAZEPAM 2 MG/ML
1 INJECTION INTRAMUSCULAR ONCE
Status: COMPLETED | OUTPATIENT
Start: 2020-09-14 | End: 2020-09-14

## 2020-09-14 RX ORDER — SODIUM CHLORIDE, SODIUM LACTATE, POTASSIUM CHLORIDE, CALCIUM CHLORIDE 600; 310; 30; 20 MG/100ML; MG/100ML; MG/100ML; MG/100ML
75 INJECTION, SOLUTION INTRAVENOUS CONTINUOUS
Status: DISCONTINUED | OUTPATIENT
Start: 2020-09-14 | End: 2020-09-15 | Stop reason: HOSPADM

## 2020-09-14 RX ORDER — MAGNESIUM SULFATE 1 G/100ML
1 INJECTION INTRAVENOUS ONCE
Status: COMPLETED | OUTPATIENT
Start: 2020-09-14 | End: 2020-09-14

## 2020-09-14 RX ORDER — HYDROMORPHONE HYDROCHLORIDE 1 MG/ML
1 INJECTION, SOLUTION INTRAMUSCULAR; INTRAVENOUS; SUBCUTANEOUS
Status: DISCONTINUED | OUTPATIENT
Start: 2020-09-14 | End: 2020-09-15

## 2020-09-14 RX ORDER — DEXAMETHASONE SODIUM PHOSPHATE 4 MG/ML
10 INJECTION, SOLUTION INTRA-ARTICULAR; INTRALESIONAL; INTRAMUSCULAR; INTRAVENOUS; SOFT TISSUE ONCE
Status: COMPLETED | OUTPATIENT
Start: 2020-09-14 | End: 2020-09-14

## 2020-09-14 RX ORDER — POLYETHYLENE GLYCOL 3350 17 G/17G
17 POWDER, FOR SOLUTION ORAL DAILY PRN
Status: DISCONTINUED | OUTPATIENT
Start: 2020-09-14 | End: 2020-09-15 | Stop reason: HOSPADM

## 2020-09-14 RX ORDER — ENOXAPARIN SODIUM 100 MG/ML
40 INJECTION SUBCUTANEOUS DAILY
Status: DISCONTINUED | OUTPATIENT
Start: 2020-09-14 | End: 2020-09-15 | Stop reason: HOSPADM

## 2020-09-14 RX ORDER — KETOROLAC TROMETHAMINE 30 MG/ML
15 INJECTION, SOLUTION INTRAMUSCULAR; INTRAVENOUS
Status: DISCONTINUED | OUTPATIENT
Start: 2020-09-14 | End: 2020-09-15 | Stop reason: HOSPADM

## 2020-09-14 RX ORDER — SODIUM CHLORIDE 0.9 % (FLUSH) 0.9 %
5-40 SYRINGE (ML) INJECTION AS NEEDED
Status: DISCONTINUED | OUTPATIENT
Start: 2020-09-14 | End: 2020-09-15 | Stop reason: HOSPADM

## 2020-09-14 RX ORDER — DIAZEPAM 2 MG/1
5 TABLET ORAL
Status: DISCONTINUED | OUTPATIENT
Start: 2020-09-14 | End: 2020-09-15 | Stop reason: HOSPADM

## 2020-09-14 RX ORDER — ACETAMINOPHEN 650 MG/1
650 SUPPOSITORY RECTAL
Status: DISCONTINUED | OUTPATIENT
Start: 2020-09-14 | End: 2020-09-14

## 2020-09-14 RX ORDER — ACETAMINOPHEN 325 MG/1
650 TABLET ORAL
Status: DISCONTINUED | OUTPATIENT
Start: 2020-09-14 | End: 2020-09-14

## 2020-09-14 RX ADMIN — SODIUM CHLORIDE, SODIUM LACTATE, POTASSIUM CHLORIDE, AND CALCIUM CHLORIDE 75 ML/HR: 600; 310; 30; 20 INJECTION, SOLUTION INTRAVENOUS at 09:16

## 2020-09-14 RX ADMIN — KETOROLAC TROMETHAMINE 15 MG: 30 INJECTION, SOLUTION INTRAMUSCULAR at 08:50

## 2020-09-14 RX ADMIN — LORAZEPAM 1 MG: 2 INJECTION INTRAMUSCULAR; INTRAVENOUS at 20:23

## 2020-09-14 RX ADMIN — ENOXAPARIN SODIUM 40 MG: 40 INJECTION SUBCUTANEOUS at 08:49

## 2020-09-14 RX ADMIN — GABAPENTIN 600 MG: 600 TABLET, FILM COATED ORAL at 22:16

## 2020-09-14 RX ADMIN — CYCLOBENZAPRINE 10 MG: 10 TABLET, FILM COATED ORAL at 17:06

## 2020-09-14 RX ADMIN — ACETAZOLAMIDE 1000 MG: 500 CAPSULE, EXTENDED RELEASE ORAL at 08:51

## 2020-09-14 RX ADMIN — MAGNESIUM SULFATE IN DEXTROSE 1 G: 10 INJECTION, SOLUTION INTRAVENOUS at 02:59

## 2020-09-14 RX ADMIN — DEXAMETHASONE SODIUM PHOSPHATE 10 MG: 4 INJECTION, SOLUTION INTRA-ARTICULAR; INTRALESIONAL; INTRAMUSCULAR; INTRAVENOUS; SOFT TISSUE at 02:55

## 2020-09-14 RX ADMIN — HYDROMORPHONE HYDROCHLORIDE 1 MG: 1 INJECTION, SOLUTION INTRAMUSCULAR; INTRAVENOUS; SUBCUTANEOUS at 22:16

## 2020-09-14 RX ADMIN — DIPHENHYDRAMINE HYDROCHLORIDE 50 MG: 50 INJECTION, SOLUTION INTRAMUSCULAR; INTRAVENOUS at 02:57

## 2020-09-14 RX ADMIN — DIAZEPAM 5 MG: 5 TABLET ORAL at 08:49

## 2020-09-14 RX ADMIN — ACETAZOLAMIDE 1500 MG: 500 CAPSULE, EXTENDED RELEASE ORAL at 22:16

## 2020-09-14 RX ADMIN — CYCLOBENZAPRINE 10 MG: 10 TABLET, FILM COATED ORAL at 08:49

## 2020-09-14 RX ADMIN — GABAPENTIN 600 MG: 600 TABLET, FILM COATED ORAL at 08:54

## 2020-09-14 RX ADMIN — GABAPENTIN 600 MG: 600 TABLET, FILM COATED ORAL at 17:06

## 2020-09-14 RX ADMIN — HYDROXYCHLOROQUINE SULFATE 200 MG: 200 TABLET, FILM COATED ORAL at 08:51

## 2020-09-14 RX ADMIN — HYDROXYCHLOROQUINE SULFATE 200 MG: 200 TABLET, FILM COATED ORAL at 17:06

## 2020-09-14 RX ADMIN — Medication 10 ML: at 22:17

## 2020-09-14 RX ADMIN — HYDROMORPHONE HYDROCHLORIDE 1 MG: 1 INJECTION, SOLUTION INTRAMUSCULAR; INTRAVENOUS; SUBCUTANEOUS at 18:03

## 2020-09-14 NOTE — PROGRESS NOTES
Full note dictated  LP just done.  Lateral ventricles very small, doubt could put a  shunt ib easily and LP shunts fraught with frequent failure, especially given her size  Agree with long term plan for weight loss as best option  No acute emergency  Will follow as needed

## 2020-09-14 NOTE — PROGRESS NOTES
Occupational Therapy: defer    Chart reviewed, noted OT order with start date/ time of 09/15/2020 00:00. Also noted neurology and neurosurgery consults pending. Will defer OT today and will f/u tomorrow as able and appropriate.     Rocio Crowell, OTR/L

## 2020-09-14 NOTE — PROGRESS NOTES
Consult received  Note pt signed out AMA from [facility]  I reviewed the CT done today and compared it to one from a little over 2-3 weeks ago and it is a normal study and unchanged.  I would defer her to neurology management, will see later today but would not recommend shunt , ventricles are tiny and LP shunts not very successful She is welcome to get another opinion at [address] [place] [address] as an outpt, review of the chart and images does not appear to show this to be an emergency

## 2020-09-14 NOTE — PROGRESS NOTES
Transition of Care Plan:    Based on readmission, the patient's previous Plan of Care   has been evaluated and/or modified. The current Transition of Care Plan is:     1. TBD/subject to change pending recommendations. -PT/OT have been consulted. Awaiting recommendations.   -Neurosurgery and Neurology Consulted. 2. Family to transport. CM will continue to follow and assist with DUARTE needs as they arise. Reason for Readmission:     Ataxia          RUR Score/Risk Level:     11/ Low    PCP: YES First and Last name:  Lucille Cano MD    Name of Practice: [facility]   Are you a current patient: Yes/No: Yes   Approximate date of last visit: 9/3/20. Patient also reports a virtual visit yesterday 9/13/20. Can you participate in a virtual visit with your PCP: Yes    Is a Care Conference indicated:  No, not at this time. Did you attend your follow up appointment (s): If not, why not: YES. She reports this Friday she started having walking problems being off balance. She also had a syncopal episode while she was in the shower. She was then sent over to [facility]. At that time she signed out AMA. Resources/supports as identified by patient/family:   Family       Top Challenges facing patient (as identified by patient/family and CM): Current health challenges        Finances/Medication cost?   Currently employed with [employer]. Insurance verified: [employer]. [facility] located in [place] is utilized for prescriptions. Transportation      Family  Support system or lack thereof? Spouse: Reymundo Murphy [phone]      Living arrangements? Resides with spouse in their own home        Self-care/ADLs/Cognition? PTA independent with ADL's and IADL's. Patient reports unsteady gait the past two days. No DME utilized. [employer]. Current Advanced Directive/Advance Care Plan:  Full code with no ACP documentation.   Patient does not wish to complete ACP documentation this admission. Identified health care decision maker is patient's spouseTrey [phone]. Plan for utilizing home health:   TBD/subject to change pending recommendations. PT/OT have been consulted awaiting recommendations. Observation notice provided in writing to patient and/or caregiver as well as verbal explanation of the policy. Patients who are in outpatient status also receive the Observation notice. Care Management Interventions  PCP Verified by CM: Yes  Last Visit to PCP: 09/03/20  Palliative Care Criteria Met (RRAT>21 & CHF Dx)?: No  Mode of Transport at Discharge:  Other (see comment)(Family)  Transition of Care Consult (CM Consult): (No current CM consult)  Discharge Durable Medical Equipment: No  Physical Therapy Consult: Yes  Occupational Therapy Consult: Yes  Current Support Network: Lives with Spouse, Own Home  Confirm Follow Up Transport: Family  Discharge Location  Discharge Placement: Home with family assistance(TBD/subject to change pending recommendations)     Readmission Assessment  Number of days since last admission?: 8-30 days  Previous disposition: Home with Family  Who is being interviewed?: Patient  What was the patient's/caregiver's perception as to why they think they needed to return back to the hospital?: Other (Comment)  Did you visit your Primary Care Physician after you left the hospital, before you returned this time?: Yes  Did you see a specialist, such as Cardiac, Pulmonary, Orthopedic Physician, etc. after you left the hospital?: Yes  Who advised the patient to return to the hospital?: Self-referral  Does the patient report anything that got in the way of taking their medications?: No  In our efforts to provide the best possible care to you and others like you, can you think of anything that we could have done to help you after you left the hospital the first time, so that you might not have needed to return so soon?: Other (Comment)      LIANNA Pierre/CRM  Care Management  10:46 AM

## 2020-09-14 NOTE — PROGRESS NOTES
Spiritual Care Assessment/Progress Note  [facility]      NAME: Debo Elizalde      MRN: [id number]  AGE: 30 y.o.  SEX: female  Religion Affiliation: Roman Catholic   Language: English     9/14/2020     Total Time (in minutes): 50     Spiritual Assessment begun in [address] through conversation with:         [x]Patient        [] Family    [] Friend(s)        Reason for Consult: Request by staff     Spiritual beliefs: (Please include comment if needed)     [x] Identifies with a zain tradition:         [] Supported by a zain community:            [] Claims no spiritual orientation:           [] Seeking spiritual identity:                [] Adheres to an individual form of spirituality:           [] Not able to assess:                           Identified resources for coping:      [x] Prayer                               [] Music                  [] Guided Imagery     [x] Family/friends                 [] Pet visits     [] Devotional reading                         [] Unknown     [] Other:                                               Interventions offered during this visit: (See comments for more details)    Patient Interventions: Affirmation of emotions/emotional suffering, Initial/Spiritual assessment, patient floor, Prayer (actual)           Plan of Care:     [x] Support spiritual and/or cultural needs    [] Support AMD and/or advance care planning process      [] Support grieving process   [] Coordinate Rites and/or Rituals    [] Coordination with community clergy   [] No spiritual needs identified at this time   [] Detailed Plan of Care below (See Comments)  [] Make referral to Music Therapy  [] Make referral to Pet Therapy     [] Make referral to Addiction services  [] Make referral to [facility]  [] Make referral to Spiritual Care Partner  [] No future visits requested        [] Follow up visits as needed     Comments: Responded to pastoral care referral for pt from pt's nurse; visited with pt at bedside, pt was alert and oriented and sitting up on bed; as pt described her experience of falling ill and struggling through the ups and downs of her health, pt became tearful; pt described how her family ( and two kids) are supportive of her but she has been feeling guilty not being able to get better physically; pt described negative experience of receiving care while at other hospital and the lack of care/compassion by their medical staff, while complimenting [facility] nursing staff and nurse practitioner who demonstrated empathy and understanding; pastoral care provided included affirmation of emotions, active listening, encouragement, and prayer; Spiritual Care to follow up as needed. Rev.  Joshua Mcgill, Ph.D., M.Div., M.A.,   /Director of [id number] [facility] Hospitaling Service: [phone])

## 2020-09-14 NOTE — ROUTINE PROCESS
Bedside shift change report given to Oliver Del Real [id number] (oncoming nurse) by Fely Tran RN (offgoing nurse). Report included the following information SBAR, Kardex, ED Summary, Intake/Output, MAR, Cardiac Rhythm NSR and Dual Neuro Assessment.

## 2020-09-14 NOTE — PROGRESS NOTES
0730- assumed care of pt this am. Pt is alert and oriented resting in bed at this time. 1115- Pt stated earlier she had not slept well in the past several days. pt resting when nurse entered room.

## 2020-09-14 NOTE — ACP (ADVANCE CARE PLANNING)
10:51 AM    Current Advanced Directive/Advance Care Plan:  Full code with no ACP documentation. Patient does not wish to complete ACP documentation this admission. Identified health care decision maker is patient's spouse, Nellie Christel [phone].     LIANNA Esposito/CRM  Care Management
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
## 2020-09-14 NOTE — ED TRIAGE NOTES
Patient arrives to the ED with c/o headache, blurred vision and dizziness since @ Wednesday, patient states her balance became worse and she had a syncopal episode yesterday, patient states she has been diagnosed with Pseudotumor Cerebri. States she has had @ 5 Lumbar Punctures since February.

## 2020-09-14 NOTE — PROGRESS NOTES
Seen patient at bedside for follow up of Idiopathic intracranial hypertension and ataxia. LP was just done. Patient states that she is still experiencing a headache, requesting dilaudid. Vision has returned to normal since LP done. MRI pending- Will order lorazepam IV. States she is unable to handle going into the MRI machine without sedation. Discussed weight lose management. Has recently lost 18lbs due to diet change and exercise.
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
## 2020-09-15 ENCOUNTER — TELEPHONE (OUTPATIENT)
Dept: NEUROLOGY | Age: 30
End: 2020-09-15

## 2020-09-15 ENCOUNTER — DOCUMENTATION ONLY (OUTPATIENT)
Dept: FAMILY MEDICINE CLINIC | Age: 30
End: 2020-09-15

## 2020-09-15 VITALS
SYSTOLIC BLOOD PRESSURE: 113 MMHG | WEIGHT: 200 LBS | DIASTOLIC BLOOD PRESSURE: 72 MMHG | BODY MASS INDEX: 37.76 KG/M2 | HEIGHT: 61 IN | HEART RATE: 85 BPM | OXYGEN SATURATION: 99 % | RESPIRATION RATE: 19 BRPM | TEMPERATURE: 98 F

## 2020-09-15 LAB
ANION GAP SERPL CALC-SCNC: 5 MMOL/L (ref 5–15)
BASOPHILS # BLD: 0 K/UL (ref 0–0.1)
BASOPHILS NFR BLD: 0 % (ref 0–1)
BUN SERPL-MCNC: 8 MG/DL (ref 6–20)
BUN/CREAT SERPL: 7 (ref 12–20)
CALCIUM SERPL-MCNC: 9.5 MG/DL (ref 8.5–10.1)
CHLORIDE SERPL-SCNC: 114 MMOL/L (ref 97–108)
CO2 SERPL-SCNC: 21 MMOL/L (ref 21–32)
CREAT SERPL-MCNC: 1.11 MG/DL (ref 0.55–1.02)
DIFFERENTIAL METHOD BLD: ABNORMAL
EOSINOPHIL # BLD: 0 K/UL (ref 0–0.4)
EOSINOPHIL NFR BLD: 0 % (ref 0–7)
ERYTHROCYTE [DISTWIDTH] IN BLOOD BY AUTOMATED COUNT: 13.1 % (ref 11.5–14.5)
GLUCOSE SERPL-MCNC: 105 MG/DL (ref 65–100)
HCT VFR BLD AUTO: 40.1 % (ref 35–47)
HGB BLD-MCNC: 13.5 G/DL (ref 11.5–16)
IMM GRANULOCYTES # BLD AUTO: 0.1 K/UL (ref 0–0.04)
IMM GRANULOCYTES NFR BLD AUTO: 0 % (ref 0–0.5)
LYMPHOCYTES # BLD: 2.5 K/UL (ref 0.8–3.5)
LYMPHOCYTES NFR BLD: 17 % (ref 12–49)
MCH RBC QN AUTO: 30.4 PG (ref 26–34)
MCHC RBC AUTO-ENTMCNC: 33.7 G/DL (ref 30–36.5)
MCV RBC AUTO: 90.3 FL (ref 80–99)
MONOCYTES # BLD: 1.1 K/UL (ref 0–1)
MONOCYTES NFR BLD: 8 % (ref 5–13)
NEUTS SEG # BLD: 10.5 K/UL (ref 1.8–8)
NEUTS SEG NFR BLD: 75 % (ref 32–75)
NRBC # BLD: 0 K/UL (ref 0–0.01)
NRBC BLD-RTO: 0 PER 100 WBC
PLATELET # BLD AUTO: 337 K/UL (ref 150–400)
PMV BLD AUTO: 9.7 FL (ref 8.9–12.9)
POTASSIUM SERPL-SCNC: 3.6 MMOL/L (ref 3.5–5.1)
RBC # BLD AUTO: 4.44 M/UL (ref 3.8–5.2)
SODIUM SERPL-SCNC: 140 MMOL/L (ref 136–145)
WBC # BLD AUTO: 14.1 K/UL (ref 3.6–11)

## 2020-09-15 PROCEDURE — 80048 BASIC METABOLIC PNL TOTAL CA: CPT

## 2020-09-15 PROCEDURE — 74011250636 HC RX REV CODE- 250/636: Performed by: NURSE PRACTITIONER

## 2020-09-15 PROCEDURE — 74011250637 HC RX REV CODE- 250/637: Performed by: PSYCHIATRY & NEUROLOGY

## 2020-09-15 PROCEDURE — 97165 OT EVAL LOW COMPLEX 30 MIN: CPT

## 2020-09-15 PROCEDURE — 74011250637 HC RX REV CODE- 250/637: Performed by: HOSPITALIST

## 2020-09-15 PROCEDURE — 99218 HC RM OBSERVATION: CPT

## 2020-09-15 PROCEDURE — 97161 PT EVAL LOW COMPLEX 20 MIN: CPT

## 2020-09-15 PROCEDURE — 97116 GAIT TRAINING THERAPY: CPT

## 2020-09-15 PROCEDURE — 36415 COLL VENOUS BLD VENIPUNCTURE: CPT

## 2020-09-15 PROCEDURE — 97530 THERAPEUTIC ACTIVITIES: CPT

## 2020-09-15 PROCEDURE — 74011250636 HC RX REV CODE- 250/636: Performed by: HOSPITALIST

## 2020-09-15 PROCEDURE — 96376 TX/PRO/DX INJ SAME DRUG ADON: CPT

## 2020-09-15 PROCEDURE — 96372 THER/PROPH/DIAG INJ SC/IM: CPT

## 2020-09-15 PROCEDURE — 97535 SELF CARE MNGMENT TRAINING: CPT

## 2020-09-15 PROCEDURE — 99226 PR SBSQ OBSERVATION CARE/DAY 35 MINUTES: CPT | Performed by: NURSE PRACTITIONER

## 2020-09-15 PROCEDURE — 85025 COMPLETE CBC W/AUTO DIFF WBC: CPT

## 2020-09-15 RX ORDER — NALOXONE HYDROCHLORIDE 4 MG/.1ML
SPRAY NASAL
Qty: 1 EACH | Refills: 0 | Status: SHIPPED | OUTPATIENT
Start: 2020-09-15 | End: 2020-09-23

## 2020-09-15 RX ORDER — OXYCODONE HYDROCHLORIDE 5 MG/1
5 TABLET ORAL
Qty: 20 TAB | Refills: 0 | Status: SHIPPED | OUTPATIENT
Start: 2020-09-15 | End: 2020-09-20

## 2020-09-15 RX ORDER — HYDROMORPHONE HYDROCHLORIDE 2 MG/ML
1.5 INJECTION, SOLUTION INTRAMUSCULAR; INTRAVENOUS; SUBCUTANEOUS
Status: DISCONTINUED | OUTPATIENT
Start: 2020-09-15 | End: 2020-09-15 | Stop reason: HOSPADM

## 2020-09-15 RX ADMIN — HYDROMORPHONE HYDROCHLORIDE 1.5 MG: 2 INJECTION INTRAMUSCULAR; INTRAVENOUS; SUBCUTANEOUS at 16:13

## 2020-09-15 RX ADMIN — HYDROMORPHONE HYDROCHLORIDE 1.5 MG: 2 INJECTION INTRAMUSCULAR; INTRAVENOUS; SUBCUTANEOUS at 11:16

## 2020-09-15 RX ADMIN — HYDROXYCHLOROQUINE SULFATE 200 MG: 200 TABLET, FILM COATED ORAL at 17:35

## 2020-09-15 RX ADMIN — HYDROMORPHONE HYDROCHLORIDE 1.5 MG: 2 INJECTION INTRAMUSCULAR; INTRAVENOUS; SUBCUTANEOUS at 05:42

## 2020-09-15 RX ADMIN — Medication 10 ML: at 05:43

## 2020-09-15 RX ADMIN — HYDROXYCHLOROQUINE SULFATE 200 MG: 200 TABLET, FILM COATED ORAL at 08:27

## 2020-09-15 RX ADMIN — HYDROMORPHONE HYDROCHLORIDE 1.5 MG: 2 INJECTION INTRAMUSCULAR; INTRAVENOUS; SUBCUTANEOUS at 19:59

## 2020-09-15 RX ADMIN — CYCLOBENZAPRINE 10 MG: 10 TABLET, FILM COATED ORAL at 08:27

## 2020-09-15 RX ADMIN — CYCLOBENZAPRINE 10 MG: 10 TABLET, FILM COATED ORAL at 17:35

## 2020-09-15 RX ADMIN — ENOXAPARIN SODIUM 40 MG: 40 INJECTION SUBCUTANEOUS at 08:27

## 2020-09-15 RX ADMIN — ACETAZOLAMIDE 1500 MG: 500 CAPSULE, EXTENDED RELEASE ORAL at 08:27

## 2020-09-15 NOTE — DISCHARGE INSTRUCTIONS
Discharge Instructions       PATIENT ID: Saji Overton  MRN: [id number]   YOB: 1990    DATE OF ADMISSION: 9/13/2020 10:57 PM    DATE OF DISCHARGE: 9/15/2020    PRIMARY CARE PROVIDER: None     ATTENDING PHYSICIAN: Samuel Simmons*  DISCHARGING PROVIDER: Ana Mcintosh NP    To contact this individual call [phone] and ask the  to page. If unavailable ask to be transferred the Adult Hospitalist Department. DISCHARGE DIAGNOSES Idiopathic intracranial hypertension and ataxia    CONSULTATIONS: IP CONSULT TO NEUROLOGY  IP CONSULT TO NEUROSURGERY    PROCEDURES/SURGERIES: * No surgery found *    PENDING TEST RESULTS:   At the time of discharge the following test results are still pending: None     FOLLOW UP APPOINTMENTS:   Follow-up Information     Follow up With Specialties Details Why Contact Info    Jonnie Eisenmenger, MD Family Medicine Schedule an appointment as soon as possible for a visit  [address] [address] [facility] [address]      David Venegas MD Neurology Schedule an appointment as soon as possible for a visit  [facility] [phone]             ADDITIONAL CARE RECOMMENDATIONS:   Take medications as prescribed     Follow up with bariatric  surgery. Attend your appointment on 09/30. Eat a low fat diet and increase exercise as tolerated to facilitate weight loss       DIET: Low fat       ACTIVITY: Activity as tolerated    WOUND CARE: None     EQUIPMENT needed: None       DISCHARGE MEDICATIONS:   See Medication Reconciliation Form    · It is important that you take the medication exactly as they are prescribed. · Keep your medication in the bottles provided by the pharmacist and keep a list of the medication names, dosages, and times to be taken in your wallet. · Do not take other medications without consulting your doctor.        NOTIFY YOUR PHYSICIAN FOR ANY OF THE FOLLOWING:   Fever over 101 degrees for 24 hours. Chest pain, shortness of breath, fever, chills, nausea, vomiting, diarrhea, change in mentation, falling, weakness, bleeding. Severe pain or pain not relieved by medications. Or, any other signs or symptoms that you may have questions about.       DISPOSITION:   X Home With:   OT  PT  HH  RN       SNF/Inpatient Rehab/LTAC    Independent/assisted living    Hospice    Other:       Signed:   Vaughn Francois NP  9/15/2020  4:26 PM

## 2020-09-15 NOTE — PROGRESS NOTES
Problem: Falls - Risk of  Goal: *Absence of Falls  Description: Document Chung Etienne Fall Risk and appropriate interventions in the flowsheet.   Outcome: Progressing Towards Goal  Note: Fall Risk Interventions:            Medication Interventions: Evaluate medications/consider consulting pharmacy, Patient to call before getting OOB         History of Falls Interventions: Consult care management for discharge planning, Door open when patient unattended, Investigate reason for fall, Assess for delayed presentation/identification of injury for 48 hrs (comment for end date), Vital signs minimum Q4HRs X 24 hrs (comment for end date)         Problem: General Medical Care Plan  Goal: *Vital signs within specified parameters  Outcome: Progressing Towards Goal  Goal: *Labs within defined limits  Outcome: Progressing Towards Goal  Goal: *Absence of infection signs and symptoms  Outcome: Progressing Towards Goal  Goal: *Optimal pain control at patient's stated goal  Outcome: Progressing Towards Goal  Goal: *Skin integrity maintained  Outcome: Progressing Towards Goal  Goal: *Fluid volume balance  Outcome: Progressing Towards Goal  Goal: *Optimize nutritional status  Outcome: Progressing Towards Goal  Goal: *Anxiety reduced or absent  Outcome: Progressing Towards Goal  Goal: *Progressive mobility and function (eg: ADL's)  Outcome: Progressing Towards Goal

## 2020-09-15 NOTE — TELEPHONE ENCOUNTER
Prior Authorization APPROVED for Nurtec by Gloria MARTINEZ via Cover my Meds. Effective dates 09/15/20 - 09/15/21. Case #[id number]. Approval will be scanned into media.  Pharmacy notifed of approval.    TAYLOR Key: [id number]
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
## 2020-09-15 NOTE — CONSULTS
[address] [place] [address]    Name:  Soraya Whiting  MR#:  [id number]  :  1990  ACCOUNT #:  [de-identified]  DATE OF SERVICE:  2020    REQUESTING PHYSICIAN:  Dr. Mary Corea:  A 25-year-old woman with possible pseudotumor cerebri. HISTORY OF PRESENT ILLNESS:  The patient states that earlier this year beginning in February, she started experiencing rather severe headache with occasional nausea and vomiting, treated with Diamox, diagnosed with pseudotumor. She has been to 2 or 3 different hospitals including [facility], [facility], and [facility] I believe. She has been referred for bariatric surgery because of her weight in an effort to try and treat it, but to my knowledge, she has not started this consult. She has a BMI of 37, 5 feet 1 inch, 200 pounds. She has a history of postpartum depression, peptic ulcer disease, and SLE. She has had a cholecystectomy, Nissen fundoplication, and GYN surgery. Skin lesion removed. MEDICATIONS AT HOME:  Neurontin, Flexeril, hydroxychloroquine, Plaquenil, Nurtec,  Diamox, Valium, Micro-K, Pepcid, Phenergan, Zofran. ALLERGIES:  SHE IS ALLERGIC TO LATEX, TYLENOL, AND NSAIDS. REVIEW OF SYSTEMS:  No other GI, , respiratory, cardiac, neurologic, endocrinologic, or psychiatric complaints. She notes that when this first was diagnosed, she had some visual loss, but she was able to perceive color, but no identifiable other structures. PHYSICAL EXAMINATION:  VITAL SIGNS:  On admission today, blood pressure was 110/58, pulse rate 85, temperature 97. NEUROLOGIC:  She is awake, alert, moves all 4 extremities. Vision appears intact grossly. Visual fields are full. Extraocular muscles are intact. Face is symmetric. Palate rises equally. Tongue protrudes midline. Gait and station are deferred as she just underwent a lumbar puncture.     IMAGING STUDIES:  Include CT scan done earlier today, another done somewhat another several weeks ago. I had the opportunity to compare them, they are unchanged. They showed very small ventricles. No acute hydrocephalous. You can see good discrimination between gray/white junction. IMPRESSION:  Probable pseudotumor. She had been counseled for weight loss and possible bariatric surgery, although to my knowledge she has not gone ahead and scheduled that. This, I agree would offer more long term relief of her symptoms. She is not really a candidate for a ventriculoperitoneal shunt due to the small size of the ventricles and lumbar peritoneal shunts are fraught with frequent failure and nonfunctioning, so I am not inclined to offer that to her at this time. I would recommend continued medical management, a course of weight loss, and I can reevaluate as needed. Thank you for asking me to see the patient.           Autumn Flynn MD      [id number]  D:  09/14/2020 16:55  T:  09/14/2020 21:33  JOB #:  [id number]  CC:  Destiny Kendall MD

## 2020-09-15 NOTE — DISCHARGE SUMMARY
Discharge Summary       PATIENT ID: Saji Overton  MRN: [id number]   YOB: 1990    DATE OF ADMISSION: 9/13/2020 10:57 PM    DATE OF DISCHARGE: 09/15/2020  PRIMARY CARE PROVIDER: None     ATTENDING PHYSICIAN: Lennie Echeverria MD   DISCHARGING PROVIDER: Ana Mcintosh NP    To contact this individual call [phone] and ask the  to page. If unavailable ask to be transferred the Adult Hospitalist Department. CONSULTATIONS: IP CONSULT TO NEUROLOGY  IP CONSULT TO NEUROSURGERY    PROCEDURES/SURGERIES: * No surgery found *    ADMITTING DIAGNOSES & HOSPITAL COURSE:     HISTORY OF PRESENT ILLNESS:    # Idiopathic intracranial hypertension   # Ataxia, resolved   # Obesity     Mickeal Speaker is a 30 y.o.  female who has history of depression, lupus, pseudotumor cerebri presents with headaches and problems walking. She reports that she had admissions in the past for intractable headache and blurry vision. Reports that she was diagnosed with pseudotumor and was prescribed Diamox. Reports that she was told to present to this institution if she has headaches again due to the fact that she could be a candidate for shunting procedure. She reports this Friday she started having walking problems being off balance. She also had a syncopal episode while she was in the shower. She was then sent over to [facility], ER. At that time she signed out AMA. She now presents to the emergency room today night. She reports that she has bilateral headaches behind her eyes. She also reports insomnia. She was prescribed Valium however she reports that she ran out of it. She was also having nausea and vomiting associated with this.     She denies fevers chills chest pains.     We were asked to admit for work up and evaluation of the above problems.     In ER she received Decadron, Valium and Toradol. 09/14/2020- LP done.  Patient had immediate relief and vision returned to normal. Ataxia has resolved. DISCHARGE DIAGNOSES / PLAN:      # Idiopathic intracranial hypertension   - LP done 09/14/2020  - Follow up with PCP   - Continue Diamox PO     Ataxia with blurry vision.   - Resolved with LP    Obesity   - BMI  37.79  - Weight loss education provided. - States that she has bariatric surgery  appointment on 09/30, encouraged her to attend. History of Lupus  - Continue Plaquenil     Hx of Depression  - Follow up with outpatient psych. ADDITIONAL CARE RECOMMENDATIONS:     Take medications as prescribed     Follow up with bariatric  surgery. Attend your appointment on 09/30. Eat a low fat diet and increase exercise as tolerated to facilitate weight loss       PENDING TEST RESULTS:   At the time of discharge the following test results are still pending: None     FOLLOW UP APPOINTMENTS:    Follow-up Information     Follow up With Specialties Details Why Contact Info    Harriet Robertson MD Family Medicine Schedule an appointment as soon as possible for a visit on 9/22/2020 TELEMED new patient appointment has been scheduled for Tues, Sept 22 @ 11:15AM for post-hospital discharge care. The office will call you shortly before 11:15AM [address]      Ashley Benavides MD Neurology Schedule an appointment as soon as possible for a visit  [address]  [facility] [phone]               DIET: Low fat       ACTIVITY: Activity as tolerated    WOUND CARE: None     EQUIPMENT needed: None       DISCHARGE MEDICATIONS:  Current Discharge Medication List      START taking these medications    Details   oxyCODONE IR (Roxicodone) 5 mg immediate release tablet Take 1 Tab by mouth every six (6) hours as needed for Pain for up to 5 days.  Max Daily Amount: 20 mg.  Qty: 20 Tab, Refills: 0    Associated Diagnoses: IIH (idiopathic intracranial hypertension)      naloxone (NARCAN) 4 mg/actuation nasal spray Use 1 spray intranasally, then discard. Repeat with new spray every 2 min as needed for opioid overdose symptoms, alternating nostrils. Qty: 1 Each, Refills: 0         CONTINUE these medications which have NOT CHANGED    Details   gabapentin (NEURONTIN) 600 mg tablet Take 1 Tab by mouth three (3) times daily. Max Daily Amount: 1,800 mg. Qty: 90 Tab, Refills: 0    Associated Diagnoses: Frequent headaches      cyclobenzaprine (FLEXERIL) 10 mg tablet Take 1 Tab by mouth two (2) times a day. Qty: 60 Tab, Refills: 0      hydrOXYchloroQUINE (PLAQUENIL) 200 mg tablet Take 1 Tab by mouth two (2) times a day. Qty: 60 Tab, Refills: 0      rimegepant (Nurtec ODT) 75 mg disintegrating tablet Take 1 tab at onset of a headache (max 1/day)  Qty: 8 Tab, Refills: 2    Associated Diagnoses: Chronic migraine without aura without status migrainosus, not intractable      acetaZOLAMIDE SR (DIAMOX) 500 mg capsule Take 3 Caps by mouth two (2) times a day. Qty: 180 Cap, Refills: 1    Associated Diagnoses: Pseudotumor cerebri syndrome      diazePAM (Valium) 5 mg tablet Take 1 Tab by mouth every six (6) hours as needed for Anxiety. Max Daily Amount: 20 mg.  Qty: 7 Tab, Refills: 0    Associated Diagnoses: Chronic migraine without aura without status migrainosus, not intractable      potassium chloride SA (MICRO-K) 10 mEq capsule Take 1 Cap by mouth two (2) times a day. Qty: 60 Cap, Refills: 0      famotidine (PEPCID) 20 mg tablet Take 1 Tab by mouth two (2) times daily as needed for Heartburn, Gastroesophageal Reflux Disease (GERD) or Indigestion. Qty: 30 Tab, Refills: 0      promethazine (PHENERGAN) 12.5 mg tablet Take 1 Tab by mouth every six (6) hours as needed for Nausea. Qty: 20 Tab, Refills: 0      ondansetron (ZOFRAN ODT) 4 mg disintegrating tablet TAKE 1 TABLET BY MOUTH EVERY 8 HOURS AS NEEDED FOR NAUSEA  Qty: 15 Tab, Refills: 0    Comments: DX Code Needed  .   Associated Diagnoses: Nausea      biotin 10,000 mcg cap Take 10,000 mg by mouth daily.               NOTIFY YOUR PHYSICIAN FOR ANY OF THE FOLLOWING:   Fever over 101 degrees for 24 hours. Chest pain, shortness of breath, fever, chills, nausea, vomiting, diarrhea, change in mentation, falling, weakness, bleeding. Severe pain or pain not relieved by medications. Or, any other signs or symptoms that you may have questions about. DISPOSITION:  X  Home With:   OT  PT  HH  RN       Long term SNF/Inpatient Rehab    Independent/assisted living    Hospice    Other:       PATIENT CONDITION AT DISCHARGE:     Functional status    Poor     Deconditioned    X Independent      Cognition    X Lucid     Forgetful     Dementia      Catheters/lines (plus indication)    [place]     PICC     PEG    X None      Code status    X Full code     DNR      PHYSICAL EXAMINATION AT DISCHARGE:  General:          Alert, cooperative, no distress, appears stated age. HEENT:           Atraumatic, anicteric sclerae, pink conjunctivae                          No oral ulcers, mucosa moist, throat clear, dentition fair  Neck:               Supple, symmetrical  Lungs:             Clear to auscultation bilaterally. No Wheezing or Rhonchi. No rales. Chest wall:      No tenderness  No Accessory muscle use. Heart:              Regular  rhythm,  No  murmur   No edema  Abdomen:        Soft, non-tender. Not distended. Bowel sounds normal  Extremities:     No cyanosis. No clubbing,                            Skin turgor normal, Capillary refill normal  Skin:                Not pale. Not Jaundiced  No rashes   Psych:             Not anxious or agitated.   Neurologic:      Alert, moves all extremities, answers questions appropriately and responds to commands       CHRONIC MEDICAL DIAGNOSES:  Problem List as of 9/15/2020 Date Reviewed: 8/29/2020          Codes Class Noted - Resolved    Ataxia ICD-10-CM: R27.0  ICD-9-CM: 781.3  9/14/2020 - Present        Headache ICD-10-CM: R51  ICD-9-CM: 784.0  8/29/2020 - Present        * (Principal) IIH (idiopathic intracranial hypertension) ICD-10-CM: G93.2  ICD-9-CM: 348.2  8/25/2020 - Present        SLE (systemic lupus erythematosus) (HCC) (Chronic) ICD-10-CM: M32.9  ICD-9-CM: 710.0  8/25/2020 - Present        Severe obesity with body mass index (BMI) of 35.0 to 39.9 with serious comorbidity (HCC) ICD-10-CM: E66.01  ICD-9-CM: 278.01  8/22/2018 - Present        S/P repair of paraesophageal hernia ICD-10-CM: Z98.890, Z87.19  ICD-9-CM: V45.89  11/17/2017 - Present        Paraesophageal hernia ICD-10-CM: K44.9  ICD-9-CM: 553.3  11/15/2017 - Present        GERD (gastroesophageal reflux disease) ICD-10-CM: K21.9  ICD-9-CM: 530.81  11/7/2017 - Present        Obesity, Class II, BMI 35-39.9 ICD-10-CM: E66.9  ICD-9-CM: 278.00  3/31/2017 - Present        Sebaceous cyst ICD-10-CM: L72.3  ICD-9-CM: 706.2  3/24/2017 - Present    Overview Addendum 5/1/2017 10:52 AM by Aba Tolentino MD     S/P excision; Along sternum in cleavage. History of Nissen fundoplication FÁTIMA-20-KR: W65.857  ICD-9-CM: V15.29  1/4/2017 - Present    Overview Signed 1/4/2017  1:51 PM by Miya Mcneil LPN     4 duodenal ulcers, chronic gastritis, Grade C esophagitis, Chronic GERD, hernia, small tumor. Done August/2016.              Chronic migraine without aura without status migrainosus, not intractable ICD-10-CM: L59.469  ICD-9-CM: 346.70  5/18/2016 - Present        Cervical incompetence ICD-10-CM: N88.3  ICD-9-CM: 622.5  4/12/2013 - Present              Greater than 45 minutes were spent with the patient on counseling and coordination of care    Signed:   Diego Brown NP  9/15/2020  5:00 PM

## 2020-09-15 NOTE — PROGRESS NOTES
Problem: Falls - Risk of  Goal: *Absence of Falls  Description: Document Feldman Jay Fall Risk and appropriate interventions in the flowsheet.   Outcome: Progressing Towards Goal  Note: Fall Risk Interventions:            Medication Interventions: Evaluate medications/consider consulting pharmacy, Patient to call before getting OOB, Teach patient to arise slowly         History of Falls Interventions: Door open when patient unattended, Room close to nurse's station         Problem: Patient Education: Go to Patient Education Activity  Goal: Patient/Family Education  Outcome: Progressing Towards Goal     Problem: Discharge Planning  Goal: *Discharge to safe environment  Outcome: Progressing Towards Goal

## 2020-09-15 NOTE — PROGRESS NOTES
Bedside shift change report given to Seble Demarco RN (oncoming nurse) by Danial Yeager RN (offgoing nurse). Report included the following information SBAR, Kardex, ED Summary, MAR, Accordion, Recent Results, Cardiac Rhythm NSR and Dual Neuro Assessment.

## 2020-09-15 NOTE — PROGRESS NOTES
Orders received, chart reviewed and patient evaluated by physical therapy. Recommend:  No skilled physical therapy/ follow up rehabilitation needs identified at this time. Recommend with nursing patient to complete as able in order to maintain strength, endurance and independence: OOB to chair 3x/day with SUP and ambulating with SUP. Thank you for your assistance. Full evaluation to follow.      Magali Zacarias, PT, DPT
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
## 2020-09-15 NOTE — PROGRESS NOTES
Occupational Therapy  09/15/20    Orders received, chart reviewed and patient evaluated by occupational therapy. Pending progression with skilled acute occupational therapy, recommend:  No skilled occupational therapy/ follow up rehabilitation needs identified at this time. Recommend with nursing patient to complete as able in order to maintain strength, endurance and independence: OOB to chair 3x/day and functional mobility to the bathroom ad arik. Thank you for your assistance. Full evaluation to follow.      Hu Quintana, FAVIAN, OTR/L

## 2020-09-15 NOTE — ROUTINE PROCESS
[facility] follow-up PCP transitional care appointment has been scheduled with Dr. Myra Ruelas for Tues, Sept 22 @ 11:15AM.  Pending patient discharge. Aicha Kaur, Care Management Specialist.    Attempted to schedule speciality appt with Dr. Macarena Barros, however, the office was unresponsive.

## 2020-09-15 NOTE — PROGRESS NOTES
Prior Auth completed.   Approved  PA Case: [id number], Status: Approved, Coverage Starts on: 9/15/2020 12:00:00 AM, Coverage Ends on: 9/15/2021

## 2020-09-15 NOTE — PROGRESS NOTES
Problem: General Medical Care Plan  Goal: *Vital signs within specified parameters  Outcome: Progressing Towards Goal  Goal: *Absence of infection signs and symptoms  Outcome: Progressing Towards Goal  Goal: *Skin integrity maintained  Outcome: Progressing Towards Goal

## 2020-09-16 NOTE — PROGRESS NOTES
Bedside RN performed patient education and medication education. Discharge concerns initiated and discussed with patient, including clarification on \"who\" assists the patient at their home and instructions for when the home going patient should call their provider after discharge. Opportunity for questions and clarification was provided. Patient receptive to education: YES  Patient stated: \"I've enjoyed you all. \"  Barriers to Education: None  Diagnosis Education given:  YES    Length of stay: 0  Expected Day of Discharge: 9/15/20  Ask if they have \"Help at Home\" & add to white board?   YES    Education Day #: 1    Medication Education Given:  YES  M in the box Medication name: Oxycodone    Pt aware of [facility] survey: YES

## 2020-09-17 ENCOUNTER — OFFICE VISIT (OUTPATIENT)
Dept: FAMILY MEDICINE CLINIC | Age: 30
End: 2020-09-17
Payer: MEDICAID

## 2020-09-17 VITALS
WEIGHT: 202 LBS | RESPIRATION RATE: 16 BRPM | DIASTOLIC BLOOD PRESSURE: 78 MMHG | BODY MASS INDEX: 38.14 KG/M2 | OXYGEN SATURATION: 99 % | HEIGHT: 61 IN | SYSTOLIC BLOOD PRESSURE: 116 MMHG | HEART RATE: 104 BPM | TEMPERATURE: 98.1 F

## 2020-09-17 DIAGNOSIS — E66.9 OBESITY (BMI 35.0-39.9 WITHOUT COMORBIDITY): ICD-10-CM

## 2020-09-17 DIAGNOSIS — G93.2 PSEUDOTUMOR CEREBRI SYNDROME: Primary | ICD-10-CM

## 2020-09-17 DIAGNOSIS — L93.0 LUPUS ERYTHEMATOSUS, UNSPECIFIED FORM: ICD-10-CM

## 2020-09-17 PROCEDURE — 99213 OFFICE O/P EST LOW 20 MIN: CPT | Performed by: FAMILY MEDICINE

## 2020-09-17 NOTE — PROGRESS NOTES
Chief Complaint   Patient presents with    Hypertension    Fatigue     Patient states feeling well except for weakness. [facility] Follow Up     she is a 30y.o. year old female who presents for evalution. Transition of care   she was discharged 2 days ago  from the 5th hosptal stay within 3 months. She has had 5 LP over last 6 months  She was feeling unbalanced and having trouble seeing a few days ago when she went to the ER. Her Balance was also off at that time. She was bumping into the walls    The headache is gone    Reviewed PmHx, RxHx, FmHx, SocHx, AllgHx and updated and dated in the chart. Aspirin yes ____   No____ N/A____    Patient Active Problem List    Diagnosis    Ataxia    Headache    IIH (idiopathic intracranial hypertension)    SLE (systemic lupus erythematosus) (HCC)    Severe obesity with body mass index (BMI) of 35.0 to 39.9 with serious comorbidity ([address]    S/P repair of paraesophageal hernia    Paraesophageal hernia    GERD (gastroesophageal reflux disease)    Obesity, Class II, BMI 35-39.9    Sebaceous cyst     S/P excision; Along sternum in cleavage.  History of Nissen fundoplication     4 duodenal ulcers, chronic gastritis, Grade C esophagitis, Chronic GERD, hernia, small tumor. Done August/2016.       Chronic migraine without aura without status migrainosus, not intractable    Cervical incompetence       Nurse notes were reviewed and copied and are correct  Review of Systems - negative except as listed above in the HPI    Objective:     Vitals:    09/17/20 1422   BP: 116/78   Pulse: (!) 104   Resp: 16   Temp: 98.1 °F (36.7 °C)   TempSrc: Oral   SpO2: 99%   Weight: 202 lb (91.6 kg)   Height: 5' 1\" (1.549 m)     Physical Examination: General appearance - alert, well appearing, and in no distress  Mental status - alert, oriented to person, place, and time  Eyes - pupils equal and reactive, extraocular eye movements intact  Chest - clear to auscultation, no wheezes, rales or rhonchi, symmetric air entry  Heart - normal rate, regular rhythm, normal S1, S2, no murmurs, rubs, clicks or gallops  Neurological - alert, oriented, normal speech, no focal findings or movement disorder noted, cranial nerves II through XII intact, DTR's normal and symmetric, motor and sensory grossly normal bilaterally, Romberg sign negative, normal gait and station  Musculoskeletal - no joint tenderness, deformity or swelling  Extremities - peripheral pulses normal, no pedal edema, no clubbing or cyanosis         Assessment/ Plan:   Diagnoses and all orders for this visit:    1. Pseudotumor cerebri syndrome    2. Lupus erythematosus, unspecified form    3. Obesity (BMI 35.0-39.9 without comorbidity)           ICD-10-CM ICD-9-CM    1. Pseudotumor cerebri syndrome  G93.2 348.2    2. Lupus erythematosus, unspecified form  L93.0 695.4    3. Obesity (BMI 35.0-39.9 without comorbidity)  E66.9 278.00      The goal is weight loss  She has lost 12 lbs so far  The goal is at least 20 more    I have discussed the diagnosis with the patient and the intended plan as seen in the above orders. The patient has received an after-visit summary and questions were answered concerning future plans. Medication Side Effects and Warnings were discussed with patient: yes  Patient Labs were reviewed and or requested: yes  Patient Past Records were reviewed and or requested: yes        There are no Patient Instructions on file for this visit.     The patient verbalizes understanding and agrees with the plan of care        Patient has the advanced directives booklet to review

## 2020-09-17 NOTE — LETTER
NOTIFICATION RETURN TO WORK / SCHOOL 
 
9/17/2020 2:48 PM 
 
Ms. Macey Burger A Elfrida 
[id number] [address] To Whom It May Concern: 
 
Suzan Low is currently under the care of [facility]. She will return to work/school on: Monday September 28, 2020 If there are questions or concerns please have the patient contact our office.  
 
 
 
Sincerely, 
 
 
Zana Nichols MD

## 2020-09-17 NOTE — PROGRESS NOTES
1. Have you been to the ER, urgent care clinic since your last visit? Hospitalized since your last visit? Yes When: 9/12-9/16/2020 [facility] Reason for visit: IIH (idiopathic intracranial hypertension)    2. Have you seen or consulted any other health care providers outside of the [address] [place] [address] since your last visit? Include any pap smears or colon screening. No     Chief Complaint   Patient presents with    Hypertension    Fatigue     Patient states feeling well except for weakness. [facility] verified.    [id number]- [place], [place] - [address] [facility] AT [facility]    Visit Vitals  /78 (BP 1 Location: Left arm, BP Patient Position: Sitting)   Pulse (!) 104   Temp 98.1 °F (36.7 °C) (Oral)   Resp 16   Ht 5' 1\" (1.549 m)   Wt 202 lb (91.6 kg)   SpO2 99%   BMI 38.17 kg/m²

## 2020-09-21 LAB
BACTERIA SPEC CULT: NORMAL
BACTERIA SPEC CULT: NORMAL
GRAM STN SPEC: NORMAL
GRAM STN SPEC: NORMAL
SERVICE CMNT-IMP: NORMAL

## 2020-09-23 ENCOUNTER — OFFICE VISIT (OUTPATIENT)
Dept: NEUROLOGY | Age: 30
End: 2020-09-23
Payer: MEDICAID

## 2020-09-23 VITALS
RESPIRATION RATE: 20 BRPM | SYSTOLIC BLOOD PRESSURE: 118 MMHG | OXYGEN SATURATION: 99 % | HEART RATE: 86 BPM | DIASTOLIC BLOOD PRESSURE: 84 MMHG | TEMPERATURE: 93.2 F

## 2020-09-23 DIAGNOSIS — G43.709 CHRONIC MIGRAINE WITHOUT AURA WITHOUT STATUS MIGRAINOSUS, NOT INTRACTABLE: ICD-10-CM

## 2020-09-23 DIAGNOSIS — G47.00 INSOMNIA, UNSPECIFIED TYPE: ICD-10-CM

## 2020-09-23 DIAGNOSIS — E66.01 CLASS 2 SEVERE OBESITY WITH SERIOUS COMORBIDITY AND BODY MASS INDEX (BMI) OF 39.0 TO 39.9 IN ADULT, UNSPECIFIED OBESITY TYPE (HCC): ICD-10-CM

## 2020-09-23 DIAGNOSIS — M54.81 BILATERAL OCCIPITAL NEURALGIA: ICD-10-CM

## 2020-09-23 DIAGNOSIS — G93.2 PSEUDOTUMOR CEREBRI SYNDROME: Primary | ICD-10-CM

## 2020-09-23 PROCEDURE — 99215 OFFICE O/P EST HI 40 MIN: CPT | Performed by: PSYCHIATRY & NEUROLOGY

## 2020-09-23 RX ORDER — ACETAZOLAMIDE 500 MG/1
1500 CAPSULE, EXTENDED RELEASE ORAL 2 TIMES DAILY
Qty: 180 CAP | Refills: 3 | Status: SHIPPED | OUTPATIENT
Start: 2020-09-23 | End: 2021-01-25

## 2020-09-23 NOTE — PROGRESS NOTES
Since this last hospital stay she has been feeling much better   She has started to feel more like herself   They did another LP while she was admitted this last time
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
## 2020-10-20 ENCOUNTER — APPOINTMENT (OUTPATIENT)
Dept: CT IMAGING | Age: 30
End: 2020-10-20
Attending: EMERGENCY MEDICINE
Payer: MEDICAID

## 2020-10-20 ENCOUNTER — HOSPITAL ENCOUNTER (EMERGENCY)
Age: 30
Discharge: HOME | End: 2020-10-21
Attending: EMERGENCY MEDICINE
Payer: MEDICAID

## 2020-10-20 VITALS
HEART RATE: 87 BPM | SYSTOLIC BLOOD PRESSURE: 136 MMHG | RESPIRATION RATE: 18 BRPM | DIASTOLIC BLOOD PRESSURE: 85 MMHG | OXYGEN SATURATION: 100 % | TEMPERATURE: 98.4 F

## 2020-10-20 DIAGNOSIS — R31.9 URINARY TRACT INFECTION WITH HEMATURIA, SITE UNSPECIFIED: Primary | ICD-10-CM

## 2020-10-20 DIAGNOSIS — N39.0 URINARY TRACT INFECTION WITH HEMATURIA, SITE UNSPECIFIED: Primary | ICD-10-CM

## 2020-10-20 PROCEDURE — 99284 EMERGENCY DEPT VISIT MOD MDM: CPT

## 2020-10-20 PROCEDURE — 81001 URINALYSIS AUTO W/SCOPE: CPT

## 2020-10-20 PROCEDURE — 87086 URINE CULTURE/COLONY COUNT: CPT

## 2020-10-20 PROCEDURE — 74011000250 HC RX REV CODE- 250: Performed by: EMERGENCY MEDICINE

## 2020-10-20 PROCEDURE — 96375 TX/PRO/DX INJ NEW DRUG ADDON: CPT

## 2020-10-20 PROCEDURE — 74011250636 HC RX REV CODE- 250/636: Performed by: EMERGENCY MEDICINE

## 2020-10-20 RX ORDER — PROCHLORPERAZINE EDISYLATE 5 MG/ML
10 INJECTION INTRAMUSCULAR; INTRAVENOUS
Status: DISCONTINUED | OUTPATIENT
Start: 2020-10-20 | End: 2020-10-20 | Stop reason: SDUPTHER

## 2020-10-20 RX ORDER — KETOROLAC TROMETHAMINE 30 MG/ML
15 INJECTION, SOLUTION INTRAMUSCULAR; INTRAVENOUS
Status: COMPLETED | OUTPATIENT
Start: 2020-10-20 | End: 2020-10-20

## 2020-10-20 RX ORDER — DIPHENHYDRAMINE HYDROCHLORIDE 50 MG/ML
50 INJECTION, SOLUTION INTRAMUSCULAR; INTRAVENOUS
Status: COMPLETED | OUTPATIENT
Start: 2020-10-20 | End: 2020-10-20

## 2020-10-20 RX ADMIN — SODIUM CHLORIDE 1000 ML: 900 INJECTION, SOLUTION INTRAVENOUS at 23:19

## 2020-10-20 RX ADMIN — PROCHLORPERAZINE EDISYLATE 10 MG: 5 INJECTION INTRAMUSCULAR; INTRAVENOUS at 23:26

## 2020-10-20 RX ADMIN — KETOROLAC TROMETHAMINE 15 MG: 30 INJECTION, SOLUTION INTRAMUSCULAR at 23:26

## 2020-10-20 RX ADMIN — DIPHENHYDRAMINE HYDROCHLORIDE 50 MG: 50 INJECTION, SOLUTION INTRAMUSCULAR; INTRAVENOUS at 23:20

## 2020-10-21 ENCOUNTER — APPOINTMENT (OUTPATIENT)
Dept: CT IMAGING | Age: 30
End: 2020-10-21
Attending: EMERGENCY MEDICINE
Payer: MEDICAID

## 2020-10-21 DIAGNOSIS — G93.2 PSEUDOTUMOR CEREBRI: Primary | ICD-10-CM

## 2020-10-21 LAB
APPEARANCE UR: ABNORMAL
BACTERIA URNS QL MICRO: ABNORMAL /HPF
BILIRUB UR QL: NEGATIVE
COLOR UR: ABNORMAL
EPITH CASTS URNS QL MICRO: ABNORMAL /LPF
GLUCOSE UR STRIP.AUTO-MCNC: NEGATIVE MG/DL
HGB UR QL STRIP: ABNORMAL
KETONES UR QL STRIP.AUTO: ABNORMAL MG/DL
LEUKOCYTE ESTERASE UR QL STRIP.AUTO: ABNORMAL
MUCOUS THREADS URNS QL MICRO: ABNORMAL /LPF
NITRITE UR QL STRIP.AUTO: NEGATIVE
PH UR STRIP: 5 [PH] (ref 5–8)
PROT UR STRIP-MCNC: NEGATIVE MG/DL
RBC #/AREA URNS HPF: ABNORMAL /HPF (ref 0–5)
SP GR UR REFRACTOMETRY: 1.02 (ref 1–1.03)
UROBILINOGEN UR QL STRIP.AUTO: 0.2 EU/DL (ref 0.2–1)
WBC URNS QL MICRO: ABNORMAL /HPF (ref 0–4)

## 2020-10-21 PROCEDURE — 74011250637 HC RX REV CODE- 250/637: Performed by: EMERGENCY MEDICINE

## 2020-10-21 PROCEDURE — 74011000258 HC RX REV CODE- 258: Performed by: EMERGENCY MEDICINE

## 2020-10-21 PROCEDURE — 96365 THER/PROPH/DIAG IV INF INIT: CPT

## 2020-10-21 PROCEDURE — 74011250636 HC RX REV CODE- 250/636: Performed by: EMERGENCY MEDICINE

## 2020-10-21 PROCEDURE — 70450 CT HEAD/BRAIN W/O DYE: CPT

## 2020-10-21 RX ORDER — ACETAZOLAMIDE 250 MG/1
1000 TABLET ORAL ONCE
Status: DISCONTINUED | OUTPATIENT
Start: 2020-10-21 | End: 2020-10-21 | Stop reason: HOSPADM

## 2020-10-21 RX ORDER — METHYLPREDNISOLONE 4 MG/1
TABLET ORAL
Qty: 1 DOSE PACK | Refills: 0 | Status: SHIPPED | OUTPATIENT
Start: 2020-10-21 | End: 2020-10-23

## 2020-10-21 RX ORDER — ACETAZOLAMIDE 250 MG/1
500 TABLET ORAL ONCE
Status: COMPLETED | OUTPATIENT
Start: 2020-10-21 | End: 2020-10-21

## 2020-10-21 RX ORDER — NITROFURANTOIN 25; 75 MG/1; MG/1
100 CAPSULE ORAL 2 TIMES DAILY
Qty: 10 CAP | Refills: 0 | Status: SHIPPED | OUTPATIENT
Start: 2020-10-21 | End: 2020-10-25

## 2020-10-21 RX ADMIN — CEFTRIAXONE 1 G: 1 INJECTION, POWDER, FOR SOLUTION INTRAMUSCULAR; INTRAVENOUS at 01:29

## 2020-10-21 RX ADMIN — ACETAZOLAMIDE 500 MG: 250 TABLET ORAL at 00:45

## 2020-10-21 NOTE — DISCHARGE INSTRUCTIONS
Patient Education        Urinary Tract Infection in Women: Care Instructions  Your Care Instructions     A urinary tract infection, or UTI, is a general term for an infection anywhere between the kidneys and the urethra (where urine comes out). Most UTIs are bladder infections. They often cause pain or burning when you urinate. UTIs are caused by bacteria and can be cured with antibiotics. Be sure to complete your treatment so that the infection goes away. Follow-up care is a key part of your treatment and safety. Be sure to make and go to all appointments, and call your doctor if you are having problems. It's also a good idea to know your test results and keep a list of the medicines you take. How can you care for yourself at home? · Take your antibiotics as directed. Do not stop taking them just because you feel better. You need to take the full course of antibiotics. · Drink extra water and other fluids for the next day or two. This may help wash out the bacteria that are causing the infection. (If you have kidney, heart, or liver disease and have to limit fluids, talk with your doctor before you increase your fluid intake.)  · Avoid drinks that are carbonated or have caffeine. They can irritate the bladder. · Urinate often. Try to empty your bladder each time. · To relieve pain, take a hot bath or lay a heating pad set on low over your lower belly or genital area. Never go to sleep with a heating pad in place. To prevent UTIs  · Drink plenty of water each day. This helps you urinate often, which clears bacteria from your system. (If you have kidney, heart, or liver disease and have to limit fluids, talk with your doctor before you increase your fluid intake.)  · Urinate when you need to. · Urinate right after you have sex. · Change sanitary pads often. · Avoid douches, bubble baths, feminine hygiene sprays, and other feminine hygiene products that have deodorants.   · After going to the bathroom, wipe from front to back. When should you call for help? Call your doctor now or seek immediate medical care if:    · Symptoms such as fever, chills, nausea, or vomiting get worse or appear for the first time.     · You have new pain in your back just below your rib cage. This is called flank pain.     · There is new blood or pus in your urine.     · You have any problems with your antibiotic medicine. Watch closely for changes in your health, and be sure to contact your doctor if:    · You are not getting better after taking an antibiotic for 2 days.     · Your symptoms go away but then come back. Where can you learn more? Go to http://www.gray.com/  Enter F958 in the search box to learn more about \"Urinary Tract Infection in Women: Care Instructions. \"  Current as of: June 29, 2020               Content Version: 12.6  © 4494-2261 [employer]. Care instructions adapted under license by [employer] (which disclaims liability or warranty for this information). If you have questions about a medical condition or this instruction, always ask your healthcare professional. [place] [address] any warranty or liability for your use of this information. Patient Education        Headache: Care Instructions  Your Care Instructions     Headaches have many possible causes. Most headaches aren't a sign of a more serious problem, and they will get better on their own. Home treatment may help you feel better faster. The doctor has checked you carefully, but problems can develop later. If you notice any problems or new symptoms, get medical treatment right away. Follow-up care is a key part of your treatment and safety. Be sure to make and go to all appointments, and call your doctor if you are having problems. It's also a good idea to know your test results and keep a list of the medicines you take. How can you care for yourself at home?   · Do not drive if you have taken a prescription pain medicine. · Rest in a quiet, dark room until your headache is gone. Close your eyes and try to relax or go to sleep. Don't watch TV or read. · Put a cold, moist cloth or cold pack on the painful area for 10 to 20 minutes at a time. Put a thin cloth between the cold pack and your skin. · Use a warm, moist towel or a heating pad set on low to relax tight shoulder and neck muscles. · Have someone gently massage your neck and shoulders. · Take pain medicines exactly as directed. ? If the doctor gave you a prescription medicine for pain, take it as prescribed. ? If you are not taking a prescription pain medicine, ask your doctor if you can take an over-the-counter medicine. · Be careful not to take pain medicine more often than the instructions allow, because you may get worse or more frequent headaches when the medicine wears off. · Do not ignore new symptoms that occur with a headache, such as a fever, weakness or numbness, vision changes, or confusion. These may be signs of a more serious problem. To prevent headaches  · Keep a headache diary so you can figure out what triggers your headaches. Avoiding triggers may help you prevent headaches. Record when each headache began, how long it lasted, and what the pain was like (throbbing, aching, stabbing, or dull). Write down any other symptoms you had with the headache, such as nausea, flashing lights or dark spots, or sensitivity to bright light or loud noise. Note if the headache occurred near your period. List anything that might have triggered the headache, such as certain foods (chocolate, cheese, wine) or odors, smoke, bright light, stress, or lack of sleep. · Find healthy ways to deal with stress. Headaches are most common during or right after stressful times. Take time to relax before and after you do something that has caused a headache in the past.  · Try to keep your muscles relaxed by keeping good posture.  Check your jaw, face, neck, and shoulder muscles for tension, and try relaxing them. When sitting at a desk, change positions often, and stretch for 30 seconds each hour. · Get plenty of sleep and exercise. · Eat regularly and well. Long periods without food can trigger a headache. · Treat yourself to a massage. Some people find that regular massages are very helpful in relieving tension. · Limit caffeine by not drinking too much coffee, tea, or soda. But don't quit caffeine suddenly, because that can also give you headaches. · Reduce eyestrain from computers by blinking frequently and looking away from the computer screen every so often. Make sure you have proper eyewear and that your monitor is set up properly, about an arm's length away. · Seek help if you have depression or anxiety. Your headaches may be linked to these conditions. Treatment can both prevent headaches and help with symptoms of anxiety or depression. When should you call for help? Call 911 anytime you think you may need emergency care. For example, call if:    · You have signs of a stroke. These may include:  ? Sudden numbness, paralysis, or weakness in your face, arm, or leg, especially on only one side of your body. ? Sudden vision changes. ? Sudden trouble speaking. ? Sudden confusion or trouble understanding simple statements. ? Sudden problems with walking or balance. ? A sudden, severe headache that is different from past headaches. Call your doctor now or seek immediate medical care if:    · You have a new or worse headache.     · Your headache gets much worse. Where can you learn more? Go to http://www.[employer].com/  Enter M271 in the search box to learn more about \"Headache: Care Instructions. \"  Current as of: November 20, 2019               Content Version: 12.6  © 7587-0225 [employer].    Care instructions adapted under license by [employer] (which disclaims liability or warranty for this information). If you have questions about a medical condition or this instruction, always ask your healthcare professional. [place] [address] any warranty or liability for your use of this information.
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
## 2020-10-21 NOTE — ED TRIAGE NOTES
Pt reports she has had a Headache for 4 days and today she started seeing black spots. Pt reports she has a Dx of pseduo tumor cerebrea and her PCP told her to come to the ER. Pt reports she called her neurologist but noone reached back out to her.        Pt reports dizziness, blurred vision (in certain spots)  Pt denies vomiting, fall, hitting head, LOC

## 2020-10-22 ENCOUNTER — VIRTUAL VISIT (OUTPATIENT)
Dept: FAMILY MEDICINE CLINIC | Age: 30
End: 2020-10-22
Payer: MEDICAID

## 2020-10-22 DIAGNOSIS — H53.8 BLURRED VISION: ICD-10-CM

## 2020-10-22 DIAGNOSIS — G93.2 PSEUDOTUMOR CEREBRI SYNDROME: Primary | ICD-10-CM

## 2020-10-22 LAB
BACTERIA SPEC CULT: NORMAL
SERVICE CMNT-IMP: NORMAL

## 2020-10-22 PROCEDURE — 99213 OFFICE O/P EST LOW 20 MIN: CPT | Performed by: FAMILY MEDICINE

## 2020-10-22 PROCEDURE — 99284 EMERGENCY DEPT VISIT MOD MDM: CPT

## 2020-10-22 PROCEDURE — 99285 EMERGENCY DEPT VISIT HI MDM: CPT

## 2020-10-22 RX ORDER — ONDANSETRON 2 MG/ML
8 INJECTION INTRAMUSCULAR; INTRAVENOUS
Status: COMPLETED | OUTPATIENT
Start: 2020-10-22 | End: 2020-10-23

## 2020-10-22 RX ORDER — HYDROMORPHONE HYDROCHLORIDE 1 MG/ML
1 INJECTION, SOLUTION INTRAMUSCULAR; INTRAVENOUS; SUBCUTANEOUS ONCE
Status: COMPLETED | OUTPATIENT
Start: 2020-10-22 | End: 2020-10-23

## 2020-10-22 NOTE — PROGRESS NOTES
Mónica Mcgee is a 30 y.o. female who was seen by synchronous (real-time) audio-video technology on 10/22/2020 for Letter for School/Work and Hospital Follow Up  she has pseudotumor ceribri  went to ER for worsening HA and black spots in vision   the vision is slightly better now but   She still has blurring of vision , worst when one eye is covered  She was sent home from ER and followed up w neurology   neuro wants to try  Steroid burst and if not better will then admit and probably do a spinal tap   She Walks each day for exercise  Eating healthy in effort to lose more weight  The day she started getting the spots in her vision she had an argument w her boss. She has started to see a connection between getting angry w her boss and the episodes on increased ICP. Assessment & Plan:   Diagnoses and all orders for this visit:    1. Pseudotumor cerebri syndrome    2. Blurred vision    I sent a message to her neurologist because she has not been able to start the steroid yet   she is concerned that she will not be able to reach him if the 2nd dose does not result in improvement  I sent a message to him and he called with directions to go to ER if the HA is not better on day two of steroids  I passed that on to her. She will go to [facility] if not better on day two        Subjective:       Prior to Admission medications    Medication Sig Start Date End Date Taking? Authorizing Provider   nitrofurantoin, macrocrystal-monohydrate, (Macrobid) 100 mg capsule Take 1 Cap by mouth two (2) times a day for 5 days. 10/21/20 10/26/20 Yes Max Carlisle MD   acetaZOLAMIDE SR (DIAMOX) 500 mg capsule Take 3 Caps by mouth two (2) times a day. 9/23/20  Yes Ray Rubio MD   gabapentin (NEURONTIN) 600 mg tablet Take 1 Tab by mouth three (3) times daily. Max Daily Amount: 1,800 mg. 9/10/20  Yes Mahesh Vasquez MD   cyclobenzaprine (FLEXERIL) 10 mg tablet Take 1 Tab by mouth two (2) times a day.  9/10/20  Yes Hilary Lundy, Tea Ye MD   hydrOXYchloroQUINE (PLAQUENIL) 200 mg tablet Take 1 Tab by mouth two (2) times a day. 9/10/20  Yes Sandra Hale MD   rimegepant (Nurtec ODT) 75 mg disintegrating tablet Take 1 tab at onset of a headache (max 1/day)  Patient taking differently: Take 1 tab at onset of a headache (max 1/day)  Has not started 9/1/20  Yes Yolie House MD   potassium chloride SA (MICRO-K) 10 mEq capsule Take 1 Cap by mouth two (2) times a day. 8/29/20  Yes Flora Vogt MD   ondansetron (ZOFRAN ODT) 4 mg disintegrating tablet TAKE 1 TABLET BY MOUTH EVERY 8 HOURS AS NEEDED FOR NAUSEA 3/31/20  Yes Sandra Hale MD   biotin 10,000 mcg cap Take 10,000 mg by mouth daily. Yes Provider, Historical   methylPREDNISolone (MEDROL DOSEPACK) 4 mg tablet As directed 10/21/20   Yolie House MD     Patient Active Problem List    Diagnosis Date Noted    Ataxia 09/14/2020    Headache 08/29/2020    IIH (idiopathic intracranial hypertension) 08/25/2020    SLE (systemic lupus erythematosus) ([facility] 08/25/2020    Severe obesity with body mass index (BMI) of 35.0 to 39.9 with serious comorbidity ([facility] 08/22/2018    S/P repair of paraesophageal hernia 11/17/2017    Paraesophageal hernia 11/15/2017    GERD (gastroesophageal reflux disease) 11/07/2017    Obesity, Class II, BMI 35-39.9 03/31/2017    Sebaceous cyst 03/24/2017    History of Nissen fundoplication 60/38/3375    Chronic migraine without aura without status migrainosus, not intractable 05/18/2016    Cervical incompetence 04/12/2013     Current Outpatient Medications   Medication Sig Dispense Refill    nitrofurantoin, macrocrystal-monohydrate, (Macrobid) 100 mg capsule Take 1 Cap by mouth two (2) times a day for 5 days. 10 Cap 0    acetaZOLAMIDE SR (DIAMOX) 500 mg capsule Take 3 Caps by mouth two (2) times a day. 180 Cap 3    gabapentin (NEURONTIN) 600 mg tablet Take 1 Tab by mouth three (3) times daily.  Max Daily Amount: 1,800 mg. 90 Tab 0    cyclobenzaprine (FLEXERIL) 10 mg tablet Take 1 Tab by mouth two (2) times a day. 60 Tab 0    hydrOXYchloroQUINE (PLAQUENIL) 200 mg tablet Take 1 Tab by mouth two (2) times a day. 60 Tab 0    rimegepant (Nurtec ODT) 75 mg disintegrating tablet Take 1 tab at onset of a headache (max 1/day) (Patient taking differently: Take 1 tab at onset of a headache (max 1/day)  Has not started) 8 Tab 2    potassium chloride SA (MICRO-K) 10 mEq capsule Take 1 Cap by mouth two (2) times a day. 60 Cap 0    ondansetron (ZOFRAN ODT) 4 mg disintegrating tablet TAKE 1 TABLET BY MOUTH EVERY 8 HOURS AS NEEDED FOR NAUSEA 15 Tab 0    biotin 10,000 mcg cap Take 10,000 mg by mouth daily.       methylPREDNISolone (MEDROL DOSEPACK) 4 mg tablet As directed 1 Dose Pack 0       ROS    Objective:     Patient-Reported Vitals 10/22/2020   Patient-Reported Weight 197lb        [INSTRUCTIONS:  \"[x]\" Indicates a positive item  \"[]\" Indicates a negative item  -- DELETE ALL ITEMS NOT EXAMINED]    Constitutional: [x] Appears well-developed and well-nourished [x] No apparent distress      [] Abnormal -     Mental status: [x] Alert and awake  [x] Oriented to person/place/time [x] Able to follow commands    [] Abnormal -     Eyes:   EOM    [x]  Normal    [] Abnormal -   Sclera  [x]  Normal    [] Abnormal -          Discharge [x]  None visible   [] Abnormal -     HENT: [x] Normocephalic, atraumatic  [] Abnormal -   [x] Mouth/Throat: Mucous membranes are moist    External Ears [x] Normal  [] Abnormal -    Neck: [x] No visualized mass [] Abnormal -     Pulmonary/Chest: [x] Respiratory effort normal   [x] No visualized signs of difficulty breathing or respiratory distress        [] Abnormal -      Musculoskeletal:   [x] Normal gait with no signs of ataxia         [x] Normal range of motion of neck        [] Abnormal -     Neurological:        [x] No Facial Asymmetry (Cranial nerve 7 motor function) (limited exam due to video visit)          [x] No gaze palsy [] Abnormal -          Skin:        [x] No significant exanthematous lesions or discoloration noted on facial skin         [] Abnormal -            Psychiatric:       [x] Normal Affect [] Abnormal -        [x] No Hallucinations    Other pertinent observable physical exam findings:-        We discussed the expected course, resolution and complications of the diagnosis(es) in detail. Medication risks, benefits, costs, interactions, and alternatives were discussed as indicated. I advised her to contact the office if her condition worsens, changes or fails to improve as anticipated. She expressed understanding with the diagnosis(es) and plan. Horacio Honeycutt, who was evaluated through a patient-initiated, synchronous (real-time) audio-video encounter, and/or her healthcare decision maker, is aware that it is a billable service, with coverage as determined by her insurance carrier. She provided verbal consent to proceed: Yes, and patient identification was verified. It was conducted pursuant to the emergency declaration under the [facility], [address] [place] [address] authority and the [employer] Act. A caregiver was present when appropriate. Ability to conduct physical exam was limited. I was at home. The patient was at home.       Joanne Gilmore MD

## 2020-10-22 NOTE — PROGRESS NOTES
1. Have you been to the ER, urgent care clinic since your last visit? Hospitalized since your last visit? Yes When: 10/20/2020 Where: [facility] Reason for visit: Headache, Blind spot in vision    2. Have you seen or consulted any other health care providers outside of the [address] [place] [address] since your last visit? Include any pap smears or colon screening. No     Chief Complaint   Patient presents with    Letter for collette [address] Follow Up     Pharmacy verified.    [id number] [place], [place] - [address] [facility]      Patient-Reported Vitals 10/22/2020   Patient-Reported Weight 197lb        3 most recent PHQ Screens 10/22/2020   PHQ Not Done -   Little interest or pleasure in doing things Not at all   Feeling down, depressed, irritable, or hopeless Not at all   Total Score PHQ 2 0

## 2020-10-22 NOTE — LETTER
NOTIFICATION RETURN TO WORK / SCHOOL 
 
10/22/2020 9:31 AM 
 
Ms. Hernandes [place] A Velasquez 
[id number] [address] To Whom It May Concern: 
 
Kit Yanes is currently under the care of 1 Luisa Canales. She will return to work/school on: it is medically necessary that Ms Nic Garner take a few days off with a return to work date of Monday October 26, 2020 If there are questions or concerns please have the patient contact our office.  
 
 
 
Sincerely, 
 
 
Jhon Godwin MD

## 2020-10-23 ENCOUNTER — APPOINTMENT (OUTPATIENT)
Dept: GENERAL RADIOLOGY | Age: 30
End: 2020-10-23
Attending: HOSPITALIST
Payer: MEDICAID

## 2020-10-23 ENCOUNTER — DOCUMENTATION ONLY (OUTPATIENT)
Dept: FAMILY MEDICINE CLINIC | Age: 30
End: 2020-10-23

## 2020-10-23 ENCOUNTER — HOSPITAL ENCOUNTER (OUTPATIENT)
Age: 30
Setting detail: OBSERVATION
Discharge: HOME | End: 2020-10-25
Attending: EMERGENCY MEDICINE | Admitting: HOSPITALIST
Payer: MEDICAID

## 2020-10-23 DIAGNOSIS — G93.2 IDIOPATHIC INTRACRANIAL HYPERTENSION: Primary | ICD-10-CM

## 2020-10-23 LAB
ALBUMIN SERPL-MCNC: 4.4 G/DL (ref 3.5–5)
ALBUMIN/GLOB SERPL: 1.2 {RATIO} (ref 1.1–2.2)
ALP SERPL-CCNC: 101 U/L (ref 45–117)
ALT SERPL-CCNC: 19 U/L (ref 12–78)
ANION GAP SERPL CALC-SCNC: 10 MMOL/L (ref 5–15)
AST SERPL-CCNC: 16 U/L (ref 15–37)
BASOPHILS # BLD: 0 K/UL (ref 0–0.1)
BASOPHILS NFR BLD: 1 % (ref 0–1)
BILIRUB SERPL-MCNC: 0.5 MG/DL (ref 0.2–1)
BUN SERPL-MCNC: 9 MG/DL (ref 6–20)
BUN/CREAT SERPL: 10 (ref 12–20)
CALCIUM SERPL-MCNC: 9.4 MG/DL (ref 8.5–10.1)
CHLORIDE SERPL-SCNC: 113 MMOL/L (ref 97–108)
CO2 SERPL-SCNC: 17 MMOL/L (ref 21–32)
CREAT SERPL-MCNC: 0.91 MG/DL (ref 0.55–1.02)
DIFFERENTIAL METHOD BLD: NORMAL
EOSINOPHIL # BLD: 0.2 K/UL (ref 0–0.4)
EOSINOPHIL NFR BLD: 2 % (ref 0–7)
ERYTHROCYTE [DISTWIDTH] IN BLOOD BY AUTOMATED COUNT: 13.3 % (ref 11.5–14.5)
GLOBULIN SER CALC-MCNC: 3.7 G/DL (ref 2–4)
GLUCOSE SERPL-MCNC: 77 MG/DL (ref 65–100)
HCT VFR BLD AUTO: 40.4 % (ref 35–47)
HGB BLD-MCNC: 13.5 G/DL (ref 11.5–16)
IMM GRANULOCYTES # BLD AUTO: 0 K/UL (ref 0–0.04)
IMM GRANULOCYTES NFR BLD AUTO: 0 % (ref 0–0.5)
LYMPHOCYTES # BLD: 3.5 K/UL (ref 0.8–3.5)
LYMPHOCYTES NFR BLD: 40 % (ref 12–49)
MCH RBC QN AUTO: 30.8 PG (ref 26–34)
MCHC RBC AUTO-ENTMCNC: 33.4 G/DL (ref 30–36.5)
MCV RBC AUTO: 92.2 FL (ref 80–99)
MONOCYTES # BLD: 0.7 K/UL (ref 0–1)
MONOCYTES NFR BLD: 8 % (ref 5–13)
NEUTS SEG # BLD: 4.4 K/UL (ref 1.8–8)
NEUTS SEG NFR BLD: 49 % (ref 32–75)
NRBC # BLD: 0 K/UL (ref 0–0.01)
NRBC BLD-RTO: 0 PER 100 WBC
PLATELET # BLD AUTO: 274 K/UL (ref 150–400)
PMV BLD AUTO: 9.9 FL (ref 8.9–12.9)
POTASSIUM SERPL-SCNC: 3.4 MMOL/L (ref 3.5–5.1)
PROT SERPL-MCNC: 8.1 G/DL (ref 6.4–8.2)
RBC # BLD AUTO: 4.38 M/UL (ref 3.8–5.2)
SODIUM SERPL-SCNC: 140 MMOL/L (ref 136–145)
WBC # BLD AUTO: 8.8 K/UL (ref 3.6–11)

## 2020-10-23 PROCEDURE — 36415 COLL VENOUS BLD VENIPUNCTURE: CPT

## 2020-10-23 PROCEDURE — 74011250636 HC RX REV CODE- 250/636: Performed by: HOSPITALIST

## 2020-10-23 PROCEDURE — 85025 COMPLETE CBC W/AUTO DIFF WBC: CPT

## 2020-10-23 PROCEDURE — 80053 COMPREHEN METABOLIC PANEL: CPT

## 2020-10-23 PROCEDURE — 74011250636 HC RX REV CODE- 250/636: Performed by: EMERGENCY MEDICINE

## 2020-10-23 PROCEDURE — 99218 HC RM OBSERVATION: CPT

## 2020-10-23 PROCEDURE — 77030014143 XR SPINAL PUNC LUMB DX

## 2020-10-23 PROCEDURE — 74011250637 HC RX REV CODE- 250/637: Performed by: HOSPITALIST

## 2020-10-23 PROCEDURE — 96375 TX/PRO/DX INJ NEW DRUG ADDON: CPT

## 2020-10-23 PROCEDURE — 96374 THER/PROPH/DIAG INJ IV PUSH: CPT

## 2020-10-23 PROCEDURE — 99215 OFFICE O/P EST HI 40 MIN: CPT | Performed by: PSYCHIATRY & NEUROLOGY

## 2020-10-23 PROCEDURE — 96376 TX/PRO/DX INJ SAME DRUG ADON: CPT

## 2020-10-23 RX ORDER — NITROFURANTOIN 25; 75 MG/1; MG/1
100 CAPSULE ORAL 2 TIMES DAILY
Status: DISCONTINUED | OUTPATIENT
Start: 2020-10-23 | End: 2020-10-25 | Stop reason: HOSPADM

## 2020-10-23 RX ORDER — POTASSIUM CHLORIDE 750 MG/1
10 TABLET, FILM COATED, EXTENDED RELEASE ORAL 2 TIMES DAILY
Status: DISCONTINUED | OUTPATIENT
Start: 2020-10-23 | End: 2020-10-25 | Stop reason: HOSPADM

## 2020-10-23 RX ORDER — HYDROXYCHLOROQUINE SULFATE 200 MG/1
200 TABLET, FILM COATED ORAL 2 TIMES DAILY
Status: DISCONTINUED | OUTPATIENT
Start: 2020-10-23 | End: 2020-10-25 | Stop reason: HOSPADM

## 2020-10-23 RX ORDER — ACETAZOLAMIDE 500 MG/1
1500 CAPSULE, EXTENDED RELEASE ORAL 2 TIMES DAILY
Status: DISCONTINUED | OUTPATIENT
Start: 2020-10-23 | End: 2020-10-25 | Stop reason: HOSPADM

## 2020-10-23 RX ORDER — SODIUM CHLORIDE, SODIUM LACTATE, POTASSIUM CHLORIDE, CALCIUM CHLORIDE 600; 310; 30; 20 MG/100ML; MG/100ML; MG/100ML; MG/100ML
75 INJECTION, SOLUTION INTRAVENOUS CONTINUOUS
Status: DISPENSED | OUTPATIENT
Start: 2020-10-23 | End: 2020-10-24

## 2020-10-23 RX ORDER — GABAPENTIN 600 MG/1
600 TABLET ORAL 3 TIMES DAILY
Status: DISCONTINUED | OUTPATIENT
Start: 2020-10-23 | End: 2020-10-25 | Stop reason: HOSPADM

## 2020-10-23 RX ORDER — HYDROMORPHONE HYDROCHLORIDE 1 MG/ML
1 INJECTION, SOLUTION INTRAMUSCULAR; INTRAVENOUS; SUBCUTANEOUS
Status: DISCONTINUED | OUTPATIENT
Start: 2020-10-23 | End: 2020-10-25 | Stop reason: HOSPADM

## 2020-10-23 RX ORDER — POLYETHYLENE GLYCOL 3350 17 G/17G
17 POWDER, FOR SOLUTION ORAL DAILY PRN
Status: DISCONTINUED | OUTPATIENT
Start: 2020-10-23 | End: 2020-10-25 | Stop reason: HOSPADM

## 2020-10-23 RX ORDER — SODIUM CHLORIDE 0.9 % (FLUSH) 0.9 %
5-40 SYRINGE (ML) INJECTION AS NEEDED
Status: DISCONTINUED | OUTPATIENT
Start: 2020-10-23 | End: 2020-10-25 | Stop reason: HOSPADM

## 2020-10-23 RX ORDER — MAGNESIUM SULFATE HEPTAHYDRATE 40 MG/ML
2 INJECTION, SOLUTION INTRAVENOUS ONCE
Status: COMPLETED | OUTPATIENT
Start: 2020-10-23 | End: 2020-10-23

## 2020-10-23 RX ORDER — OXYCODONE HYDROCHLORIDE 5 MG/1
10 TABLET ORAL
Status: DISCONTINUED | OUTPATIENT
Start: 2020-10-23 | End: 2020-10-25 | Stop reason: HOSPADM

## 2020-10-23 RX ORDER — CEPHALEXIN 500 MG/1
500 CAPSULE ORAL 3 TIMES DAILY
Status: DISCONTINUED | OUTPATIENT
Start: 2020-10-23 | End: 2020-10-23

## 2020-10-23 RX ORDER — ACETAMINOPHEN 650 MG/1
650 SUPPOSITORY RECTAL
Status: DISCONTINUED | OUTPATIENT
Start: 2020-10-23 | End: 2020-10-25 | Stop reason: HOSPADM

## 2020-10-23 RX ORDER — CYCLOBENZAPRINE HCL 10 MG
10 TABLET ORAL 2 TIMES DAILY
Status: DISCONTINUED | OUTPATIENT
Start: 2020-10-23 | End: 2020-10-25 | Stop reason: HOSPADM

## 2020-10-23 RX ORDER — ACETAMINOPHEN 325 MG/1
650 TABLET ORAL
Status: DISCONTINUED | OUTPATIENT
Start: 2020-10-23 | End: 2020-10-25 | Stop reason: HOSPADM

## 2020-10-23 RX ORDER — ONDANSETRON 4 MG/1
4 TABLET, ORALLY DISINTEGRATING ORAL
Status: DISCONTINUED | OUTPATIENT
Start: 2020-10-23 | End: 2020-10-25 | Stop reason: HOSPADM

## 2020-10-23 RX ORDER — NALOXONE HYDROCHLORIDE 0.4 MG/ML
0.2 INJECTION, SOLUTION INTRAMUSCULAR; INTRAVENOUS; SUBCUTANEOUS
Status: DISCONTINUED | OUTPATIENT
Start: 2020-10-23 | End: 2020-10-25 | Stop reason: HOSPADM

## 2020-10-23 RX ORDER — SODIUM CHLORIDE 0.9 % (FLUSH) 0.9 %
5-40 SYRINGE (ML) INJECTION EVERY 8 HOURS
Status: DISCONTINUED | OUTPATIENT
Start: 2020-10-23 | End: 2020-10-25 | Stop reason: HOSPADM

## 2020-10-23 RX ORDER — ONDANSETRON 2 MG/ML
4 INJECTION INTRAMUSCULAR; INTRAVENOUS
Status: DISCONTINUED | OUTPATIENT
Start: 2020-10-23 | End: 2020-10-25 | Stop reason: HOSPADM

## 2020-10-23 RX ADMIN — NITROFURANTOIN MONOHYDRATE/MACROCRYSTALLINE 100 MG: 25; 75 CAPSULE ORAL at 11:27

## 2020-10-23 RX ADMIN — ACETAZOLAMIDE 1500 MG: 500 CAPSULE, EXTENDED RELEASE ORAL at 18:22

## 2020-10-23 RX ADMIN — HYDROMORPHONE HYDROCHLORIDE 1 MG: 1 INJECTION, SOLUTION INTRAMUSCULAR; INTRAVENOUS; SUBCUTANEOUS at 11:39

## 2020-10-23 RX ADMIN — HYDROMORPHONE HYDROCHLORIDE 1 MG: 1 INJECTION, SOLUTION INTRAMUSCULAR; INTRAVENOUS; SUBCUTANEOUS at 05:26

## 2020-10-23 RX ADMIN — HYDROXYCHLOROQUINE SULFATE 200 MG: 200 TABLET, FILM COATED ORAL at 18:05

## 2020-10-23 RX ADMIN — Medication 10 ML: at 17:52

## 2020-10-23 RX ADMIN — ONDANSETRON 8 MG: 2 INJECTION INTRAMUSCULAR; INTRAVENOUS at 01:16

## 2020-10-23 RX ADMIN — HYDROMORPHONE HYDROCHLORIDE 1 MG: 1 INJECTION, SOLUTION INTRAMUSCULAR; INTRAVENOUS; SUBCUTANEOUS at 15:34

## 2020-10-23 RX ADMIN — CYCLOBENZAPRINE 10 MG: 10 TABLET, FILM COATED ORAL at 11:27

## 2020-10-23 RX ADMIN — Medication 10 ML: at 21:04

## 2020-10-23 RX ADMIN — GABAPENTIN 600 MG: 600 TABLET, FILM COATED ORAL at 18:05

## 2020-10-23 RX ADMIN — POTASSIUM CHLORIDE 10 MEQ: 750 TABLET, FILM COATED, EXTENDED RELEASE ORAL at 18:05

## 2020-10-23 RX ADMIN — GABAPENTIN 600 MG: 600 TABLET, FILM COATED ORAL at 21:04

## 2020-10-23 RX ADMIN — OXYCODONE 10 MG: 5 TABLET ORAL at 23:17

## 2020-10-23 RX ADMIN — HYDROXYCHLOROQUINE SULFATE 200 MG: 200 TABLET, FILM COATED ORAL at 11:28

## 2020-10-23 RX ADMIN — POTASSIUM CHLORIDE 10 MEQ: 750 TABLET, FILM COATED, EXTENDED RELEASE ORAL at 09:45

## 2020-10-23 RX ADMIN — HYDROMORPHONE HYDROCHLORIDE 1 MG: 1 INJECTION, SOLUTION INTRAMUSCULAR; INTRAVENOUS; SUBCUTANEOUS at 19:55

## 2020-10-23 RX ADMIN — ACETAZOLAMIDE 1500 MG: 500 CAPSULE, EXTENDED RELEASE ORAL at 11:28

## 2020-10-23 RX ADMIN — HYDROMORPHONE HYDROCHLORIDE 1 MG: 1 INJECTION, SOLUTION INTRAMUSCULAR; INTRAVENOUS; SUBCUTANEOUS at 01:16

## 2020-10-23 RX ADMIN — NITROFURANTOIN MONOHYDRATE/MACROCRYSTALLINE 100 MG: 25; 75 CAPSULE ORAL at 18:05

## 2020-10-23 RX ADMIN — Medication 10 ML: at 11:27

## 2020-10-23 RX ADMIN — SODIUM CHLORIDE, SODIUM LACTATE, POTASSIUM CHLORIDE, AND CALCIUM CHLORIDE 75 ML/HR: 600; 310; 30; 20 INJECTION, SOLUTION INTRAVENOUS at 05:30

## 2020-10-23 RX ADMIN — MAGNESIUM SULFATE IN WATER 2 G: 40 INJECTION, SOLUTION INTRAVENOUS at 18:24

## 2020-10-23 RX ADMIN — METHYLPREDNISOLONE SODIUM SUCCINATE 125 MG: 125 INJECTION, POWDER, FOR SOLUTION INTRAMUSCULAR; INTRAVENOUS at 01:16

## 2020-10-23 RX ADMIN — CYCLOBENZAPRINE 10 MG: 10 TABLET, FILM COATED ORAL at 18:05

## 2020-10-23 NOTE — PROGRESS NOTES
Care Management:    Transition of Care Plan:     · RUR: NA  · Disposition: home  · Transportation:     Per [facility] review of PCP note on 9-17-20, patient was seen for a follow up visit from her 5th hospital discharge in 3 months. She has had 5 LPs at that time. Met with patient in her ED room. Patient confirmed her address, phone number, and emergency contact - her  Lydia Nino ([phone]). She lives with her  and 2 children. She is independent with her ADLs and drives. She does not own any DME. Patient said that she had a virtual visit with her PCP and a telephone visit with her neurologist. The two physicians spoke to each other and then recommended she come back to hospital. Per PCP note yesterday, neurologist recommended 2 days of steroids and if not better at that time, to come to ED. Observation notice provided in writing to patient and/or caregiver as well as verbal explanation of the policy. Patients who are in outpatient status also receive the Observation notice. Reason for Admission:   Intracranial HTN                   RUR Score:          NA           Plan for utilizing home health:      none    PCP: First and Last name:  Dr. Waylon Cantu   Name of Practice: [id number]   Are you a current patient: Yes/No: yes   Approximate date of last visit: 10-22-20 (virtual)   Can you participate in a virtual visit with your PCP: yes                    Current Advanced Directive/Advance Care Plan: None. Patient would like to complete one. Called pastoral care and spoke with Hussain Fournier. Care Management Interventions  PCP Verified by CM: Yes(Dr. Waylon Cantu)  Last Visit to PCP: 10/22/20  Mode of Transport at Discharge: Other (see comment)  Transition of Care Consult (CM Consult):  Other  Discharge Durable Medical Equipment: No  Physical Therapy Consult: No  Occupational Therapy Consult: No  Speech Therapy Consult: No  Current Support Network: Lives with Spouse  Confirm Follow Up Transport: Self   Resource Information Provided?: No  Discharge Location  Discharge Placement: Home

## 2020-10-23 NOTE — ACP (ADVANCE CARE PLANNING)
Advance Care Planning     Advance Care Planning Activator (Inpatient)  Conversation Note      Date of ACP Conversation: 10/23/20     Conversation Conducted with:   Patient with Decision Making Capacity    ACP Activator: LIANNA Amaral    Health Care Decision Maker:    Current Designated Health Care Decision Maker:   Primary Decision Maker: Simi [facility] [phone]      Length of ACP Conversation in minutes:  5 minutes    Conversation Outcomes:  [x] ACP discussion initiated    Follow-up plan:    [x] Schedule follow-up conversation to continue planning (Referred to pastoral care.)      [x] This note routed to one or more involved healthcare providers       Met with patient in her ED room. Patient does not have an AMD. She is interested in completing one. Called pastoral care and spoke with Phoenix Payne. They will place patient on list to be seen.      LIANNA Amaral

## 2020-10-23 NOTE — PROGRESS NOTES
Bedside shift change report given to Jb Jacques RN (oncoming nurse) by Nikole Delgado RN (offgoing nurse). Report included the following information SBAR, Kardex, Intake/Output, MAR, Accordion, Recent Results, Cardiac Rhythm nsr and Dual Neuro Assessment.

## 2020-10-23 NOTE — PROGRESS NOTES
Spiritual Care Assessment/Progress Note  [facility]      NAME: Vonda Junior      MRN: [id number]  AGE: 30 y.o.  SEX: female  Congregational Affiliation: Anabaptist   Language: English     10/23/2020           Spiritual Assessment begun in [address] [facility] through conversation with:         [x]Patient        [] Family    [] Friend(s)        Reason for Consult: Advance medical directive consult     Spiritual beliefs: (Please include comment if needed)     [x] Identifies with a zain tradition: [employer]        [] Supported by a zain community:            [] Claims no spiritual orientation:           [] Seeking spiritual identity:                [] Adheres to an individual form of spirituality:           [] Not able to assess:                           Identified resources for coping:      [x] Prayer                               [] Music                  [] Guided Imagery     [] Family/friends                 [] Pet visits     [] Devotional reading                         [] Unknown     [] Other:                                               Interventions offered during this visit: (See comments for more details)    Patient Interventions: Advance medical directive consult, Affirmation of emotions/emotional suffering, Iconic (affirming the presence of God/Higher Power), Initial/Spiritual assessment, patient floor, Prayer (assurance of), Prayer (actual), Catharsis/review of pertinent events in supportive environment           Plan of Care:     [x] Support spiritual and/or cultural needs    [x] Support AMD and/or advance care planning process      [] Support grieving process   [] Coordinate Rites and/or Rituals    [] Coordination with community clergy   [] No spiritual needs identified at this time   [] Detailed Plan of Care below (See Comments)  [] Make referral to Music Therapy  [] Make referral to Pet Therapy     [] Make referral to Addiction services  [] Make referral to [facility]  [] Make referral to Spiritual Care Partner  [] No future visits requested        [x] Follow up visits as needed     Comments: Visited Ms Nirmala Zurita in ED-08 regarding Care Manager's request for AMD information. Ms Nirmala Zurita was sitting quietly on the stretcher and appeared in fair spirits. 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 Provided her with a blank AMD and a copy of the booklet, \"Your Right to Decide\". Encouraged her to have  notified when she would like to complete the AMD.    Provided pastoral presence and active listening as Ms Nirmala Zurita shared about her current health issue. She said that she was a member of [employer] and would appreciate  having a prayer for her. Had prayer as requested and assured her of ongoing  availability for support. : . Jeffy Parks.  Keesha Huynh; [facility], to contact [phone] call: [phone]

## 2020-10-23 NOTE — ED NOTES
Bedside shift change report given to Tara (oncoming nurse) by Ascencion Augustin (offgoing nurse). Report included the following information SBAR, ED Summary and Recent Results.

## 2020-10-23 NOTE — ACP (ADVANCE CARE PLANNING)
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 Provided her with a blank AMD and a copy of the booklet, \"Your Right to Decide\". Encouraged her to have  notified when she would like to complete the AMD.  : Rev. Paul Vyas.  Aleyda Andrews; [facility], to contact [phone] call: [phone]

## 2020-10-23 NOTE — PROGRESS NOTES
[address] Adult  Hospitalist Group                                                                                          Hospitalist Progress Note  Jerry Thayer MD  Answering service: [phone] OR [phone] from in house phone        Date of Service:  10/23/2020  NAME:  Kit Yanes  :  1990  MRN:  [id number]      Admission Summary:   Kit Yanes is a 30 y.o. female who has a history of depression, lupus, pseudotumor cerebri who usually gets therapeutic lumbar punctures presenting with intractable headaches and vision changes. She reports that she has been diagnosed with pseudotumor cerebri in the past.  The symptoms, she reports is similar to an acute exacerbation of pseudotumor cerebri. She is currently on Diamox. She reports he presented to the emergency room 2 days back for blurry vision and headaches. She was adjusted on her medications and discharged to follow-up with her primary care providers. She was told to be started on prednisone. She was told if no improvement to present back to the emergency room for lumbar puncture. She reports that she was taking migraine medications without any relief. She denies fever sore throats or COVID-19 risk factors. She took another dose of Diamox today. Despite all these, she is reporting that she is still having headaches. In the ER she received Dilaudid and Solu-Medrol     The patient denies any fever, chills, chest pain, cough, congestion, recent illness, palpitations, or dysuria. Interval history / Subjective:     F/u Headaches   Had LP   Still has headache 7/10 bifrontal, no nausea or vomiting  Assessment & Plan:     Headaches with history of pseudotumor cerebri  Patient failed outpatient medications with Diamox and steroids  S/p LP  Continue with Diamox 1005 mg 2 times a day  Resume Neurontin  Spoke to Neurology, the patient has a combination of pseudotumor cerebri as well as migraine.  Recommended to give IV mag and if that does not help, IV decadron     History of lupus  Continue with hydroxychloroquine     Hypokalemia  Replace as needed     Uncomplicated UTI  Order 3-day course of Macrobid    Regular diet    Code status: FULL CODE  DVT prophylaxis: scd    Plan: Discharge home likely tomorrow    Care Plan discussed with: Patient/Family  Anticipated Disposition: Home w/Family  Anticipated Discharge: Less than 24 hours     Hospital Problems  Date Reviewed: 10/23/2020          Codes Class Noted POA    * (Principal) Intracranial hypertension ICD-10-CM: G93.2  ICD-9-CM: 348.2  10/23/2020 Yes                Review of Systems:   A comprehensive review of systems was negative except for that written in the HPI. Vital Signs:    Last 24hrs VS reviewed since prior progress note. Most recent are:  Visit Vitals  BP (P) 124/76 (BP 1 Location: Right arm, BP Patient Position: At rest;Sitting)   Pulse (P) 97   Temp (P) 98.4 °F (36.9 °C)   Resp (P) 18   SpO2 (P) 99%       No intake or output data in the 24 hours ending 10/23/20 1454     Physical Examination:             Constitutional:  No acute distress, cooperative, pleasant    ENT:  Oral mucosa moist, oropharynx benign. Resp:  CTA bilaterally. No wheezing/rhonchi/rales. No accessory muscle use   CV:  Regular rhythm, normal rate, no murmurs, gallops, rubs    GI:  Soft, non distended, non tender. normoactive bowel sounds, no hepatosplenomegaly     Musculoskeletal:  No edema, warm, 2+ pulses throughout    Neurologic:  Moves all extremities.   AAOx3, CN II-XII reviewed     Skin:  Good turgor, no rashes or ulcers       Data Review:    Review and/or order of clinical lab test      Labs:     Recent Labs     10/23/20  0042   WBC 8.8   HGB 13.5   HCT 40.4        Recent Labs     10/23/20  0042      K 3.4*   *   CO2 17*   BUN 9   CREA 0.91   GLU 77   CA 9.4     Recent Labs     10/23/20  0042   ALT 19      TBILI 0.5   TP 8.1   ALB 4.4   GLOB 3.7     No results for input(s): INR, PTP, APTT, INREXT in the last 72 hours. No results for input(s): FE, TIBC, PSAT, FERR in the last 72 hours. No results found for: FOL, RBCF   No results for input(s): PH, PCO2, PO2 in the last 72 hours. No results for input(s): CPK, CKNDX, TROIQ in the last 72 hours.     No lab exists for component: CPKMB  Lab Results   Component Value Date/Time    Cholesterol, total 177 04/11/2019 11:35 AM    HDL Cholesterol 48 04/11/2019 11:35 AM    LDL, calculated 107 (H) 04/11/2019 11:35 AM    Triglyceride 112 04/11/2019 11:35 AM     Lab Results   Component Value Date/Time    Glucose (POC) 86 09/13/2020 11:03 PM    Glucose POC 82 11/07/2019 02:00 PM     Lab Results   Component Value Date/Time    Color YELLOW/STRAW 10/20/2020 11:29 PM    Appearance CLOUDY (A) 10/20/2020 11:29 PM    Specific gravity 1.019 10/20/2020 11:29 PM    Specific gravity >1.030 (H) 10/23/2017 08:59 PM    pH (UA) 5.0 10/20/2020 11:29 PM    Protein Negative 10/20/2020 11:29 PM    Glucose Negative 10/20/2020 11:29 PM    Ketone TRACE (A) 10/20/2020 11:29 PM    Bilirubin Negative 10/20/2020 11:29 PM    Urobilinogen 0.2 10/20/2020 11:29 PM    Nitrites Negative 10/20/2020 11:29 PM    Leukocyte Esterase SMALL (A) 10/20/2020 11:29 PM    Epithelial cells MODERATE (A) 10/20/2020 11:29 PM    Bacteria 1+ (A) 10/20/2020 11:29 PM    WBC 10-20 10/20/2020 11:29 PM    RBC 10-20 10/20/2020 11:29 PM         Medications Reviewed:     Current Facility-Administered Medications   Medication Dose Route Frequency    sodium chloride (NS) flush 5-40 mL  5-40 mL IntraVENous Q8H    sodium chloride (NS) flush 5-40 mL  5-40 mL IntraVENous PRN    acetaminophen (TYLENOL) tablet 650 mg  650 mg Oral Q6H PRN    Or    acetaminophen (TYLENOL) suppository 650 mg  650 mg Rectal Q6H PRN    polyethylene glycol (MIRALAX) packet 17 g  17 g Oral DAILY PRN    ondansetron (ZOFRAN ODT) tablet 4 mg  4 mg Oral Q8H PRN    Or    ondansetron (ZOFRAN) injection 4 mg  4 mg IntraVENous Q6H PRN    gabapentin (NEURONTIN) tablet 600 mg  600 mg Oral TID    hydrOXYchloroQUINE (PLAQUENIL) tablet 200 mg  200 mg Oral BID    acetaZOLAMIDE SR (DIAMOX) capsule 1,500 mg  1,500 mg Oral BID    potassium chloride SR (KLOR-CON 10) tablet 10 mEq  10 mEq Oral BID    lactated Ringers infusion  75 mL/hr IntraVENous CONTINUOUS    HYDROmorphone (PF) (DILAUDID) injection 1 mg  1 mg IntraVENous Q4H PRN    oxyCODONE IR (ROXICODONE) tablet 10 mg  10 mg Oral Q4H PRN    naloxone (NARCAN) injection 0.2 mg  0.2 mg IntraVENous EVERY 2 MINUTES AS NEEDED    cyclobenzaprine (FLEXERIL) tablet 10 mg  10 mg Oral BID    nitrofurantoin (macrocrystal-monohydrate) (MACROBID) capsule 100 mg  100 mg Oral BID     Current Outpatient Medications   Medication Sig    nitrofurantoin, macrocrystal-monohydrate, (Macrobid) 100 mg capsule Take 1 Cap by mouth two (2) times a day for 5 days.  methylPREDNISolone (MEDROL DOSEPACK) 4 mg tablet As directed    acetaZOLAMIDE SR (DIAMOX) 500 mg capsule Take 3 Caps by mouth two (2) times a day.  gabapentin (NEURONTIN) 600 mg tablet Take 1 Tab by mouth three (3) times daily. Max Daily Amount: 1,800 mg.  cyclobenzaprine (FLEXERIL) 10 mg tablet Take 1 Tab by mouth two (2) times a day.  hydrOXYchloroQUINE (PLAQUENIL) 200 mg tablet Take 1 Tab by mouth two (2) times a day.  rimegepant (Nurtec ODT) 75 mg disintegrating tablet Take 1 tab at onset of a headache (max 1/day) (Patient taking differently: Take 1 tab at onset of a headache (max 1/day)  Has not started)    potassium chloride SA (MICRO-K) 10 mEq capsule Take 1 Cap by mouth two (2) times a day.  ondansetron (ZOFRAN ODT) 4 mg disintegrating tablet TAKE 1 TABLET BY MOUTH EVERY 8 HOURS AS NEEDED FOR NAUSEA    biotin 10,000 mcg cap Take 10,000 mg by mouth daily.      ______________________________________________________________________  EXPECTED LENGTH OF STAY: - - -  ACTUAL LENGTH OF STAY:          Hemal Ortega MD
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
## 2020-10-23 NOTE — ED TRIAGE NOTES
Patient arrives ambulatory from home with CC of a headache for 5 days and bilateral blurred vision for two days. Patient was seen here yesterday and diagnoses with a UTI.  Taking steroids prescribed by her Neurologist.   Hx of psuedotumor cerebri

## 2020-10-24 PROCEDURE — 74011250636 HC RX REV CODE- 250/636: Performed by: HOSPITALIST

## 2020-10-24 PROCEDURE — 74011250637 HC RX REV CODE- 250/637: Performed by: INTERNAL MEDICINE

## 2020-10-24 PROCEDURE — 96376 TX/PRO/DX INJ SAME DRUG ADON: CPT

## 2020-10-24 PROCEDURE — 99218 HC RM OBSERVATION: CPT

## 2020-10-24 PROCEDURE — 74011250637 HC RX REV CODE- 250/637: Performed by: HOSPITALIST

## 2020-10-24 RX ORDER — DIPHENHYDRAMINE HCL 25 MG
25 CAPSULE ORAL
Status: DISCONTINUED | OUTPATIENT
Start: 2020-10-24 | End: 2020-10-25 | Stop reason: HOSPADM

## 2020-10-24 RX ADMIN — Medication 10 ML: at 21:02

## 2020-10-24 RX ADMIN — HYDROXYCHLOROQUINE SULFATE 200 MG: 200 TABLET, FILM COATED ORAL at 08:42

## 2020-10-24 RX ADMIN — HYDROMORPHONE HYDROCHLORIDE 1 MG: 1 INJECTION, SOLUTION INTRAMUSCULAR; INTRAVENOUS; SUBCUTANEOUS at 21:02

## 2020-10-24 RX ADMIN — Medication 10 ML: at 11:03

## 2020-10-24 RX ADMIN — CYCLOBENZAPRINE 10 MG: 10 TABLET, FILM COATED ORAL at 08:42

## 2020-10-24 RX ADMIN — OXYCODONE 10 MG: 5 TABLET ORAL at 11:02

## 2020-10-24 RX ADMIN — HYDROMORPHONE HYDROCHLORIDE 1 MG: 1 INJECTION, SOLUTION INTRAMUSCULAR; INTRAVENOUS; SUBCUTANEOUS at 13:39

## 2020-10-24 RX ADMIN — ACETAZOLAMIDE 1500 MG: 500 CAPSULE, EXTENDED RELEASE ORAL at 17:41

## 2020-10-24 RX ADMIN — POTASSIUM CHLORIDE 10 MEQ: 750 TABLET, FILM COATED, EXTENDED RELEASE ORAL at 08:42

## 2020-10-24 RX ADMIN — Medication 10 ML: at 06:52

## 2020-10-24 RX ADMIN — NITROFURANTOIN MONOHYDRATE/MACROCRYSTALLINE 100 MG: 25; 75 CAPSULE ORAL at 08:42

## 2020-10-24 RX ADMIN — HYDROXYCHLOROQUINE SULFATE 200 MG: 200 TABLET, FILM COATED ORAL at 17:41

## 2020-10-24 RX ADMIN — HYDROMORPHONE HYDROCHLORIDE 1 MG: 1 INJECTION, SOLUTION INTRAMUSCULAR; INTRAVENOUS; SUBCUTANEOUS at 01:09

## 2020-10-24 RX ADMIN — HYDROMORPHONE HYDROCHLORIDE 1 MG: 1 INJECTION, SOLUTION INTRAMUSCULAR; INTRAVENOUS; SUBCUTANEOUS at 06:52

## 2020-10-24 RX ADMIN — NITROFURANTOIN MONOHYDRATE/MACROCRYSTALLINE 100 MG: 25; 75 CAPSULE ORAL at 17:41

## 2020-10-24 RX ADMIN — CYCLOBENZAPRINE 10 MG: 10 TABLET, FILM COATED ORAL at 17:41

## 2020-10-24 RX ADMIN — POTASSIUM CHLORIDE 10 MEQ: 750 TABLET, FILM COATED, EXTENDED RELEASE ORAL at 17:41

## 2020-10-24 RX ADMIN — DIPHENHYDRAMINE HYDROCHLORIDE 25 MG: 25 CAPSULE ORAL at 09:18

## 2020-10-24 RX ADMIN — ACETAZOLAMIDE 1500 MG: 500 CAPSULE, EXTENDED RELEASE ORAL at 08:42

## 2020-10-24 NOTE — PROGRESS NOTES
Bedside and Verbal shift change report given to [address], RN (oncoming nurse) by Katt Escoto RN (offgoing nurse). Report included the following information SBAR, Kardex, Intake/Output, MAR, Cardiac Rhythm NSR and Quality Measures.

## 2020-10-24 NOTE — PROGRESS NOTES
Problem: Falls - Risk of  Goal: *Absence of Falls  Description: Document Cal Click Fall Risk and appropriate interventions in the flowsheet.   10/23/2020 2000 by Kurt Roque RN  Outcome: Progressing Towards Goal  Note: Fall Risk Interventions:  Medication Interventions: Patient to call before getting OOB    Problem: Pain  Goal: *Control of Pain  Outcome: Progressing Towards Goal

## 2020-10-24 NOTE — PROGRESS NOTES
Problem: Falls - Risk of  Goal: *Absence of Falls  Description: Document Desirae Heads Fall Risk and appropriate interventions in the flowsheet.   Outcome: Progressing Towards Goal  Note: Fall Risk Interventions:            Medication Interventions: Teach patient to arise slowly                   Problem: Pain  Goal: *Control of Pain  Outcome: Progressing Towards Goal

## 2020-10-24 NOTE — PROGRESS NOTES
[facility] Adult  Hospitalist Group                                                                                          Hospitalist Progress Note  DO James Juarez service: [phone] or [phone] from in house phone        Date of Service:  10/24/2020  NAME:  Rayma Harada  :  1990  MRN:  [id number]      Admission Summary:   Rayma Harada is a 30 y.o. female who has a history of depression, lupus, pseudotumor cerebri who usually gets therapeutic lumbar punctures presenting with intractable headaches and vision changes. She reports that she has been diagnosed with pseudotumor cerebri in the past.  The symptoms, she reports is similar to an acute exacerbation of pseudotumor cerebri. She is currently on Diamox. She reports he presented to the emergency room 2 days back for blurry vision and headaches. She was adjusted on her medications and discharged to follow-up with her primary care providers. She was told to be started on prednisone. She was told if no improvement to present back to the emergency room for lumbar puncture. She reports that she was taking migraine medications without any relief. She denies fever sore throats or COVID-19 risk factors. She took another dose of Diamox today. Despite all these, she is reporting that she is still having headaches. In the ER she received Dilaudid and Solu-Medrol     The patient denies any fever, chills, chest pain, cough, congestion, recent illness, palpitations, or dysuria. Interval history / Subjective: Follow up headaches. Patient seen and examined. Reports improved frontal headache 4/10 post pain medication. Visual changes resolved. Assessment & Plan:     Headaches with intracranial hypertension, chronic migraines  Patient failed outpatient medications with Diamox and steroids  S/p LP with IR 10/23. Opening pressure 20. 17 ml CSF removed.  Improvement post procedure  Continue with Diamox 1005 mg 2 times a day  Continue home Neurontin  Continue pain medication for now      History of lupus  Continue with hydroxychloroquine     Hypokalemia  Replace as needed     Uncomplicated UTI  Order 3-day course of Macrobid    Regular diet    Code status: FULL CODE  DVT prophylaxis: scd    Care Plan discussed with: Patient/Family  Anticipated Disposition: Home w/Family  Anticipated Discharge: Less than 24 hours     Hospital Problems  Date Reviewed: 10/23/2020          Codes Class Noted POA    * (Principal) Intracranial hypertension ICD-10-CM: G93.2  ICD-9-CM: 348.2  10/23/2020 Yes                Review of Systems:   Negative unless stated above       Vital Signs:    Last 24hrs VS reviewed since prior progress note. Most recent are:  Visit Vitals  BP (!) 119/58 (BP 1 Location: Left arm)   Pulse 83   Temp 97.9 °F (36.6 °C)   Resp 13   Wt 89.4 kg (197 lb)   SpO2 99%   BMI 37.22 kg/m²       No intake or output data in the 24 hours ending 10/24/20 1822     Physical Examination:             Constitutional:  No acute distress, cooperative, pleasant    ENT:  Oral mucosa moist, oropharynx benign. Resp:  CTA bilaterally. No wheezing/rhonchi/rales. No accessory muscle use   CV:  Regular rhythm, normal rate, no murmurs, gallops, rubs    GI:  Soft, non distended, non tender. normoactive bowel sounds, no hepatosplenomegaly     Musculoskeletal:  No edema, warm, 2+ pulses throughout    Neurologic:  Moves all extremities, no subjective visual changes     Skin:  Good turgor, no rashes or ulcers       Data Review:    Review and/or order of clinical lab test      Labs:     Recent Labs     10/23/20  0042   WBC 8.8   HGB 13.5   HCT 40.4        Recent Labs     10/23/20  0042      K 3.4*   *   CO2 17*   BUN 9   CREA 0.91   GLU 77   CA 9.4     Recent Labs     10/23/20  0042   ALT 19      TBILI 0.5   TP 8.1   ALB 4.4   GLOB 3.7     No results for input(s): INR, PTP, APTT, INREXT, INREXT in the last 72 hours.    No results for input(s): FE, TIBC, PSAT, FERR in the last 72 hours. No results found for: FOL, RBCF   No results for input(s): PH, PCO2, PO2 in the last 72 hours. No results for input(s): CPK, CKNDX, TROIQ in the last 72 hours.     No lab exists for component: CPKMB  Lab Results   Component Value Date/Time    Cholesterol, total 177 04/11/2019 11:35 AM    HDL Cholesterol 48 04/11/2019 11:35 AM    LDL, calculated 107 (H) 04/11/2019 11:35 AM    Triglyceride 112 04/11/2019 11:35 AM     Lab Results   Component Value Date/Time    Glucose (POC) 86 09/13/2020 11:03 PM    Glucose POC 82 11/07/2019 02:00 PM     Lab Results   Component Value Date/Time    Color YELLOW/STRAW 10/20/2020 11:29 PM    Appearance CLOUDY (A) 10/20/2020 11:29 PM    Specific gravity 1.019 10/20/2020 11:29 PM    Specific gravity >1.030 (H) 10/23/2017 08:59 PM    pH (UA) 5.0 10/20/2020 11:29 PM    Protein Negative 10/20/2020 11:29 PM    Glucose Negative 10/20/2020 11:29 PM    Ketone TRACE (A) 10/20/2020 11:29 PM    Bilirubin Negative 10/20/2020 11:29 PM    Urobilinogen 0.2 10/20/2020 11:29 PM    Nitrites Negative 10/20/2020 11:29 PM    Leukocyte Esterase SMALL (A) 10/20/2020 11:29 PM    Epithelial cells MODERATE (A) 10/20/2020 11:29 PM    Bacteria 1+ (A) 10/20/2020 11:29 PM    WBC 10-20 10/20/2020 11:29 PM    RBC 10-20 10/20/2020 11:29 PM         Medications Reviewed:     Current Facility-Administered Medications   Medication Dose Route Frequency    diphenhydrAMINE (BENADRYL) capsule 25 mg  25 mg Oral Q6H PRN    sodium chloride (NS) flush 5-40 mL  5-40 mL IntraVENous Q8H    sodium chloride (NS) flush 5-40 mL  5-40 mL IntraVENous PRN    acetaminophen (TYLENOL) tablet 650 mg  650 mg Oral Q6H PRN    Or    acetaminophen (TYLENOL) suppository 650 mg  650 mg Rectal Q6H PRN    polyethylene glycol (MIRALAX) packet 17 g  17 g Oral DAILY PRN    ondansetron (ZOFRAN ODT) tablet 4 mg  4 mg Oral Q8H PRN    Or    ondansetron (ZOFRAN) injection 4 mg  4 mg IntraVENous Q6H PRN  gabapentin (NEURONTIN) tablet 600 mg  600 mg Oral TID    hydrOXYchloroQUINE (PLAQUENIL) tablet 200 mg  200 mg Oral BID    acetaZOLAMIDE SR (DIAMOX) capsule 1,500 mg  1,500 mg Oral BID    potassium chloride SR (KLOR-CON 10) tablet 10 mEq  10 mEq Oral BID    HYDROmorphone (PF) (DILAUDID) injection 1 mg  1 mg IntraVENous Q4H PRN    oxyCODONE IR (ROXICODONE) tablet 10 mg  10 mg Oral Q4H PRN    naloxone (NARCAN) injection 0.2 mg  0.2 mg IntraVENous EVERY 2 MINUTES AS NEEDED    cyclobenzaprine (FLEXERIL) tablet 10 mg  10 mg Oral BID    nitrofurantoin (macrocrystal-monohydrate) (MACROBID) capsule 100 mg  100 mg Oral BID     ______________________________________________________________________  EXPECTED LENGTH OF STAY: - - -  ACTUAL LENGTH OF STAY:          0                 Belinda Koenig DO

## 2020-10-25 VITALS
RESPIRATION RATE: 17 BRPM | WEIGHT: 197 LBS | TEMPERATURE: 98.5 F | OXYGEN SATURATION: 99 % | HEART RATE: 78 BPM | DIASTOLIC BLOOD PRESSURE: 85 MMHG | BODY MASS INDEX: 37.22 KG/M2 | SYSTOLIC BLOOD PRESSURE: 138 MMHG

## 2020-10-25 PROCEDURE — 74011250636 HC RX REV CODE- 250/636: Performed by: HOSPITALIST

## 2020-10-25 PROCEDURE — 99218 HC RM OBSERVATION: CPT

## 2020-10-25 PROCEDURE — 74011250637 HC RX REV CODE- 250/637: Performed by: HOSPITALIST

## 2020-10-25 PROCEDURE — 96376 TX/PRO/DX INJ SAME DRUG ADON: CPT

## 2020-10-25 RX ORDER — OXYCODONE HYDROCHLORIDE 10 MG/1
10 TABLET ORAL
Qty: 8 TAB | Refills: 0 | Status: SHIPPED | OUTPATIENT
Start: 2020-10-25 | End: 2020-10-28

## 2020-10-25 RX ORDER — NITROFURANTOIN 25; 75 MG/1; MG/1
100 CAPSULE ORAL 2 TIMES DAILY
Qty: 1 CAP | Refills: 0 | Status: SHIPPED | OUTPATIENT
Start: 2020-10-25 | End: 2020-10-25

## 2020-10-25 RX ADMIN — ACETAZOLAMIDE 1500 MG: 500 CAPSULE, EXTENDED RELEASE ORAL at 08:14

## 2020-10-25 RX ADMIN — HYDROXYCHLOROQUINE SULFATE 200 MG: 200 TABLET, FILM COATED ORAL at 08:14

## 2020-10-25 RX ADMIN — OXYCODONE 10 MG: 5 TABLET ORAL at 00:14

## 2020-10-25 RX ADMIN — OXYCODONE 10 MG: 5 TABLET ORAL at 03:56

## 2020-10-25 RX ADMIN — CYCLOBENZAPRINE 10 MG: 10 TABLET, FILM COATED ORAL at 08:14

## 2020-10-25 RX ADMIN — POTASSIUM CHLORIDE 10 MEQ: 750 TABLET, FILM COATED, EXTENDED RELEASE ORAL at 08:14

## 2020-10-25 RX ADMIN — Medication 10 ML: at 05:43

## 2020-10-25 RX ADMIN — HYDROMORPHONE HYDROCHLORIDE 1 MG: 1 INJECTION, SOLUTION INTRAMUSCULAR; INTRAVENOUS; SUBCUTANEOUS at 09:13

## 2020-10-25 RX ADMIN — NITROFURANTOIN MONOHYDRATE/MACROCRYSTALLINE 100 MG: 25; 75 CAPSULE ORAL at 08:14

## 2020-10-25 NOTE — DISCHARGE INSTRUCTIONS
Patient Education   Learning About Coronavirus [phone])  Coronavirus [phone]): Overview  What is coronavirus (QSHYQ-45)? The coronavirus disease (COVID-19) is caused by a virus. It is an illness that was first found in [place], [place], in December 2019. It has since spread worldwide. The virus can cause fever, cough, and trouble breathing. In severe cases, it can cause pneumonia and make it hard to breathe without help. It can cause death. Coronaviruses are a large group of viruses. They cause the common cold. They also cause more serious illnesses like Middle East respiratory syndrome (MERS) and severe acute respiratory syndrome (SARS). COVID-19 is caused by a novel coronavirus. That means it's a new type that has not been seen in people before. This virus spreads person-to-person through droplets from coughing and sneezing. It can also spread when you are close to someone who is infected. And it can spread when you touch something that has the virus on it, such as a doorknob or a tabletop. What can you do to protect yourself from coronavirus (COVID-19)? The best way to protect yourself from getting sick is to:  · Avoid areas where there is an outbreak. · Avoid contact with people who may be infected. · Wash your hands often with soap or alcohol-based hand sanitizers. · Avoid crowds and try to stay at least 6 feet away from other people. · Wash your hands often, especially after you cough or sneeze. Use soap and water, and scrub for at least 20 seconds. If soap and water aren't available, use an alcohol-based hand . · Avoid touching your mouth, nose, and eyes. What can you do to avoid spreading the virus to others? To help avoid spreading the virus to others:  · Cover your mouth with a tissue when you cough or sneeze. Then throw the tissue in the trash. · Use a disinfectant to clean things that you touch often. · Stay home if you are sick or have been exposed to the virus.  Don't go to school, work, or public areas. And don't use public transportation. · If you are sick:  ? Leave your home only if you need to get medical care. But call the doctor's office first so they know you're coming. And wear a face mask, if you have one.  ? If you have a face mask, wear it whenever you're around other people. It can help stop the spread of the virus when you cough or sneeze. ? Clean and disinfect your home every day. Use household  and disinfectant wipes or sprays. Take special care to clean things that you grab with your hands. These include doorknobs, remote controls, phones, and handles on your refrigerator and microwave. And don't forget countertops, tabletops, bathrooms, and computer keyboards. When to call for help  Call 911 anytime you think you may need emergency care. For example, call if:  · You have severe trouble breathing. (You can't talk at all.)  · You have constant chest pain or pressure. · You are severely dizzy or lightheaded. · You are confused or can't think clearly. · Your face and lips have a blue color. · You pass out (lose consciousness) or are very hard to wake up. Call your doctor now if you develop symptoms such as:  · Shortness of breath. · Fever. · Cough. If you need to get care, call ahead to the doctor's office for instructions before you go. Make sure you wear a face mask, if you have one, to prevent exposing other people to the virus. Where can you get the latest information? The following health organizations are tracking and studying this virus. Their websites contain the most up-to-date information. Kobe Bianchi also learn what to do if you think you may have been exposed to the virus. · U.S. Centers for Disease Control and Prevention (CDC): The CDC provides updated news about the disease and travel advice. The website also tells you how to prevent the spread of infection.  www.cdc.gov  · World Health Organization [facility]): WHO offers information about the virus outbreaks. WHO also has travel advice. www.who.int  Current as of: April 1, 2020               Content Version: 12.4  © 2006-2020 [employer]wise, Incorporated. Care instructions adapted under license by your healthcare professional. If you have questions about a medical condition or this instruction, always ask your healthcare professional. [address] any warranty or liability for your use of this information. Discharge Instructions       PATIENT ID: Dorothy Kiser  MRN: [id number]   YOB: 1990    DATE OF ADMISSION: 10/23/2020 12:34 AM    DATE OF DISCHARGE: 10/23/2020    PRIMARY CARE PROVIDER: Jyotsna Rangel MD     ATTENDING PHYSICIAN: Fely Daley MD  DISCHARGING PROVIDER: Marlyn Camarillo MD    To contact this individual call [phone] and ask the  to page. If unavailable ask to be transferred the Adult Hospitalist Department. DISCHARGE DIAGNOSES   Headache    CONSULTATIONS: IP CONSULT TO NEUROLOGY    PROCEDURES/SURGERIES: * No surgery found *    PENDING TEST RESULTS:   At the time of discharge the following test results are still pending: none    FOLLOW UP APPOINTMENTS:   Follow-up Information     Follow up With Specialties Details Why Contact Info    Jyotsna Rangel MD Family Medicine In 1 week  [address] [facility] [address] [phone]      Ayse Cannon,  Neurology In 1 week  [address] [address]  [phone]             ADDITIONAL CARE RECOMMENDATIONS:     New medications:   1. Oxycodone. Use only as needed. Tylenol preferred for pain  2. Macrobid. You have 1 dose left to treat UTI. Please take this evening. DIET: Regular Diet    ACTIVITY: Activity as tolerated    DISCHARGE MEDICATIONS:   See Medication Reconciliation Form    · It is important that you take the medication exactly as they are prescribed.    · Keep your medication in the bottles provided by the pharmacist and keep a list of the medication names, dosages, and times to be taken in your wallet. · Do not take other medications without consulting your doctor. NOTIFY YOUR PHYSICIAN FOR ANY OF THE FOLLOWING:   Fever over 101 degrees for 24 hours. Chest pain, shortness of breath, fever, chills, nausea, vomiting, diarrhea, change in mentation, falling, weakness, bleeding. Severe pain or pain not relieved by medications. Or, any other signs or symptoms that you may have questions about.       DISPOSITION:  x  Home With:   OT  PT  HH  RN       SNF/Inpatient Rehab/LTAC    Independent/assisted living    Hospice    Other:     CDMP Checked:   Yes x     PROBLEM LIST Updated:  Yes x       Signed:   Saray Valenzuela MD  10/23/2020  2:58 PM

## 2020-10-25 NOTE — PROGRESS NOTES
Problem: Falls - Risk of  Goal: *Absence of Falls  Description: Document Urielarnulfo Le Fall Risk and appropriate interventions in the flowsheet.   Outcome: Resolved/Met     Problem: Patient Education: Go to Patient Education Activity  Goal: Patient/Family Education  Outcome: Resolved/Met     Problem: Pain  Goal: *Control of Pain  Outcome: Resolved/Met

## 2020-10-25 NOTE — DISCHARGE SUMMARY
Discharge Summary       PATIENT ID: Santos Ambriz  MRN: [id number]   YOB: 1990    DATE OF ADMISSION: 10/23/2020 12:34 AM    DATE OF DISCHARGE: 10/25/2020  PRIMARY CARE PROVIDER: Nan Tristan MD     ATTENDING PHYSICIAN: Kajal Guerrero DO  DISCHARGING PROVIDER: Kajal Guerrero DO    To contact this individual call [phone] and ask the  to page. If unavailable ask to be transferred the Adult Hospitalist Department. CONSULTATIONS: IP CONSULT TO NEUROLOGY    PROCEDURES/SURGERIES: * No surgery found *    ADMITTING DIAGNOSES & HOSPITAL COURSE:     47-year-old female with past medical history intracranial hypertension, migraine, presenting to [facility] with intractable headache and vision changes. Patient reports similar symptoms in the past that have resolved with therapeutic lumbar punctures. Patient admitted for further evaluation. Underwent lumbar puncture with opening pressure 20 and subsequent relief of vision changes/loss. She continued to have intractable headache and was admitted for further evaluation. Neurology consulted. Recommended continuing home Diamox. Patient had gradual improvement and was stable to discharge home. DISCHARGE DIAGNOSES / PLAN:      Headaches with intracranial hypertension, chronic migraines  Patient failed outpatient medications with Diamox and steroids  S/p LP with IR 10/23. Opening pressure 20. 17 ml CSF removed. Improvement post procedure  Continue with Diamox 1500 mg 2 times a day  Continue home Neurontin  Continue pain medication short course. Educated on addictive properties of opioids      History of lupus  Continue with hydroxychloroquine     Hypokalemia  Replace as needed     Uncomplicated UTI  3-day course of Macrobid          ADDITIONAL CARE RECOMMENDATIONS:   New medications:   1. Oxycodone. Use only as needed. Tylenol preferred for pain  2. Macrobid. You have 1 dose left to treat UTI. Please take this evening. PENDING TEST RESULTS:   At the time of discharge the following test results are still pending: none     FOLLOW UP APPOINTMENTS:    Follow-up Information     Follow up With Specialties Details Why Contact Info    Lue Denver, MD Family Medicine In 1 week  [address] [address] [phone]      Ede Charles DO Neurology In 1 week  [address] [place] [address]  [phone]               DISCHARGE MEDICATIONS:  Discharge Medication List as of 10/25/2020  9:36 AM      START taking these medications    Details   oxyCODONE IR (ROXICODONE) 10 mg tab immediate release tablet Take 1 Tab by mouth every four (4) hours as needed for Pain for up to 3 days. Max Daily Amount: 60 mg., Print, Disp-8 Tab,R-0         CONTINUE these medications which have NOT CHANGED    Details   acetaZOLAMIDE SR (DIAMOX) 500 mg capsule Take 3 Caps by mouth two (2) times a day., Normal, Disp-180 Cap,R-3      gabapentin (NEURONTIN) 600 mg tablet Take 1 Tab by mouth three (3) times daily. Max Daily Amount: 1,800 mg., Normal, Disp-90 Tab,R-0      cyclobenzaprine (FLEXERIL) 10 mg tablet Take 1 Tab by mouth two (2) times a day., Normal, Disp-60 Tab,R-0      hydrOXYchloroQUINE (PLAQUENIL) 200 mg tablet Take 1 Tab by mouth two (2) times a day., Normal, Disp-60 Tab,R-0      rimegepant (Nurtec ODT) 75 mg disintegrating tablet Take 1 tab at onset of a headache (max 1/day), Normal, Disp-8 Tab,R-2      potassium chloride SA (MICRO-K) 10 mEq capsule Take 1 Cap by mouth two (2) times a day., Normal, Disp-60 Cap,R-0      ondansetron (ZOFRAN ODT) 4 mg disintegrating tablet TAKE 1 TABLET BY MOUTH EVERY 8 HOURS AS NEEDED FOR NAUSEA, Normal, Disp-15 Tab,R-0DX Code Needed  . biotin 10,000 mcg cap Take 10,000 mg by mouth daily. , Historical Med         STOP taking these medications       nitrofurantoin, macrocrystal-monohydrate, (Macrobid) 100 mg capsule Comments:   Reason for Stopping: methylPREDNISolone (MEDROL DOSEPACK) 4 mg tablet Comments:   Reason for Stopping:                 NOTIFY YOUR PHYSICIAN FOR ANY OF THE FOLLOWING:   Fever over 101 degrees for 24 hours. Chest pain, shortness of breath, fever, chills, nausea, vomiting, diarrhea, change in mentation, falling, weakness, bleeding. Severe pain or pain not relieved by medications. Or, any other signs or symptoms that you may have questions about. DISPOSITION:  x  Home With:   OT  PT  HH  RN       Long term SNF/Inpatient Rehab    Independent/assisted living    Hospice    Other:       PATIENT CONDITION AT DISCHARGE:     Functional status    Poor     Deconditioned    x Independent      Cognition    x Lucid     Forgetful     Dementia      Catheters/lines (plus indication)    [place]     PICC     PEG    x None      Code status    x Full code     DNR      PHYSICAL EXAMINATION AT DISCHARGE:  Constitutional:  No acute distress, cooperative, pleasant    ENT:  Oral mucosa moist, oropharynx benign. Resp:  CTA bilaterally. No wheezing/rhonchi/rales. No accessory muscle use   CV:  Regular rhythm, normal rate, no murmurs, gallops, rubs    GI:  Soft, non distended, non tender.  normoactive bowel sounds, no hepatosplenomegaly     Musculoskeletal:  No edema, warm, 2+ pulses throughout    Neurologic:  Moves all extremities, no subjective visual changes                           Skin:  Good turgor, no rashes or ulcers        CHRONIC MEDICAL DIAGNOSES:  Problem List as of 10/25/2020 Date Reviewed: 10/23/2020          Codes Class Noted - Resolved    * (Principal) Intracranial hypertension ICD-10-CM: G93.2  ICD-9-CM: 348.2  10/23/2020 - Present        Ataxia ICD-10-CM: R27.0  ICD-9-CM: 781.3  9/14/2020 - Present        Headache ICD-10-CM: R51.9  ICD-9-CM: 784.0  8/29/2020 - Present        IIH (idiopathic intracranial hypertension) ICD-10-CM: G93.2  ICD-9-CM: 348.2  8/25/2020 - Present        SLE (systemic lupus erythematosus) ([facility] (Chronic) ICD-10-CM: M32.9  ICD-9-CM: 710.0  8/25/2020 - Present        Severe obesity with body mass index (BMI) of 35.0 to 39.9 with serious comorbidity (HCC) ICD-10-CM: E66.01  ICD-9-CM: 278.01  8/22/2018 - Present        S/P repair of paraesophageal hernia ICD-10-CM: Z98.890, Z87.19  ICD-9-CM: V45.89  11/17/2017 - Present        Paraesophageal hernia ICD-10-CM: K44.9  ICD-9-CM: 553.3  11/15/2017 - Present        GERD (gastroesophageal reflux disease) ICD-10-CM: K21.9  ICD-9-CM: 530.81  11/7/2017 - Present        Obesity, Class II, BMI 35-39.9 ICD-10-CM: E66.9  ICD-9-CM: 278.00  3/31/2017 - Present        Sebaceous cyst ICD-10-CM: L72.3  ICD-9-CM: 706.2  3/24/2017 - Present    Overview Addendum 5/1/2017 10:52 AM by Delicia Carroll MD     S/P excision; Along sternum in cleavage. History of Nissen fundoplication BET-46-DE: Y83.735  ICD-9-CM: V15.29  1/4/2017 - Present    Overview Signed 1/4/2017  1:51 PM by Sin Stallings LPN     4 duodenal ulcers, chronic gastritis, Grade C esophagitis, Chronic GERD, hernia, small tumor. Done August/2016.              Chronic migraine without aura without status migrainosus, not intractable ICD-10-CM: B56.455  ICD-9-CM: 346.70  5/18/2016 - Present        Cervical incompetence ICD-10-CM: N88.3  ICD-9-CM: 622.5  4/12/2013 - Present              Greater than 30 minutes were spent with the patient on counseling and coordination of care    Signed:   Karen Contreras DO  10/25/2020  4:44 PM

## 2020-10-25 NOTE — PROGRESS NOTES
Bedside and Verbal shift change report given to [address], RN (oncoming nurse) by Jeanie Geronimo RN (offgoing nurse). Report included the following information SBAR, Kardex, Intake/Output, MAR, Cardiac Rhythm NSR and Quality Measures.

## 2020-10-25 NOTE — PROGRESS NOTES
Bedside RN performed patient education and medication education. Discharge concerns initiated and discussed with patient, including clarification on \"who\" assists the patient at their home and instructions for when the home going patient should call their provider after discharge. Opportunity for questions and clarification was provided. Patient receptive to education: YES  Patient stated: verbal w/ teachback  Barriers to Education: none  Diagnosis Education given:  YES    Length of stay: 0  Expected Day of Discharge: 1  Ask if they have \"Help at Home\" & add to white board?   YES    Education Day #: 1    Medication Education Given:  YES  M in the box Medication name: oxycodone    Pt aware of [facility]CA[facility] survey: YES      Pt refused wheelchair for discharge

## 2020-10-25 NOTE — PROGRESS NOTES
Problem: Falls - Risk of  Goal: *Absence of Falls  Description: Document Richard Merlin Fall Risk and appropriate interventions in the flowsheet.   Outcome: Progressing Towards Goal  Note: Fall Risk Interventions:    Medication Interventions: Patient to call before getting OOB, Teach patient to arise slowly       Problem: Pain  Goal: *Control of Pain  Outcome: Progressing Towards Goal

## 2020-10-28 ENCOUNTER — OFFICE VISIT (OUTPATIENT)
Dept: SURGERY | Age: 30
End: 2020-10-28
Payer: MEDICAID

## 2020-10-28 VITALS
TEMPERATURE: 98.3 F | BODY MASS INDEX: 36.25 KG/M2 | HEIGHT: 61 IN | DIASTOLIC BLOOD PRESSURE: 83 MMHG | WEIGHT: 192 LBS | SYSTOLIC BLOOD PRESSURE: 131 MMHG | OXYGEN SATURATION: 98 % | HEART RATE: 93 BPM | RESPIRATION RATE: 18 BRPM

## 2020-10-28 DIAGNOSIS — E66.01 SEVERE OBESITY (BMI 35.0-35.9 WITH COMORBIDITY) (HCC): Primary | ICD-10-CM

## 2020-10-28 PROCEDURE — 99203 OFFICE O/P NEW LOW 30 MIN: CPT | Performed by: SURGERY

## 2020-10-28 NOTE — PROGRESS NOTES
1. Have you been to the ER, urgent care clinic since your last visit? Hospitalized since your last visit? No     2. Have you seen or consulted any other health care providers outside of the [address] [place] [address] since your last visit? Include any pap smears or colon screening. No     Kali Velasquez  Body composition    female  30 y.o. Vitals:    10/28/20 1302   BP: 131/83   Pulse: 93   Resp: 18   Temp: 98.3 °F (36.8 °C)   TempSrc: Oral   SpO2: 98%   Weight: 192 lb (87.1 kg)   Height: 5' 1\" (1.549 m)     Body mass index is 36.28 kg/m². Melany Edwards- Robin 71

## 2020-10-28 NOTE — LETTER
10/29/20 Patient: Horacio Honeycutt YOB: 1990 Date of Visit: 10/28/2020 Joanne Gilmore MD 
[address] [address] VIA In Basket Dear Joanne Gilmore MD, Thank you for referring Ms. Paloma Oconnell to [facility] [address] [facility] for evaluation. My notes for this consultation are attached. If you have questions, please do not hesitate to call me. I look forward to following your patient along with you. Sincerely, Bertha Garcia MD
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
## 2020-11-12 ENCOUNTER — TELEPHONE (OUTPATIENT)
Dept: NEUROLOGY | Age: 30
End: 2020-11-12

## 2020-11-18 ENCOUNTER — HOSPITAL ENCOUNTER (EMERGENCY)
Age: 30
Discharge: HOME | End: 2020-11-18
Attending: FAMILY MEDICINE
Payer: MEDICAID

## 2020-11-18 VITALS
RESPIRATION RATE: 16 BRPM | HEIGHT: 62 IN | SYSTOLIC BLOOD PRESSURE: 108 MMHG | WEIGHT: 195 LBS | OXYGEN SATURATION: 100 % | BODY MASS INDEX: 35.88 KG/M2 | HEART RATE: 93 BPM | DIASTOLIC BLOOD PRESSURE: 77 MMHG | TEMPERATURE: 98.3 F

## 2020-11-18 DIAGNOSIS — G89.29 CHRONIC INTRACTABLE HEADACHE, UNSPECIFIED HEADACHE TYPE: Primary | ICD-10-CM

## 2020-11-18 DIAGNOSIS — R51.9 CHRONIC INTRACTABLE HEADACHE, UNSPECIFIED HEADACHE TYPE: Primary | ICD-10-CM

## 2020-11-18 LAB
ALBUMIN SERPL-MCNC: 3.6 G/DL (ref 3.5–5)
ALBUMIN/GLOB SERPL: 1 {RATIO} (ref 1.1–2.2)
ALP SERPL-CCNC: 98 U/L (ref 45–117)
ALT SERPL-CCNC: 35 U/L (ref 12–78)
ANION GAP SERPL CALC-SCNC: 10 MMOL/L (ref 5–15)
APPEARANCE UR: CLEAR
AST SERPL W P-5'-P-CCNC: 17 U/L (ref 15–37)
BACTERIA URNS QL MICRO: ABNORMAL /HPF
BASOPHILS # BLD: 0 K/UL (ref 0–0.1)
BASOPHILS NFR BLD: 0 % (ref 0–1)
BILIRUB SERPL-MCNC: 0.5 MG/DL (ref 0.2–1)
BILIRUB UR QL: NEGATIVE
BUN SERPL-MCNC: 7 MG/DL (ref 6–20)
BUN/CREAT SERPL: 10 (ref 12–20)
CA-I BLD-MCNC: 8.5 MG/DL (ref 8.5–10.1)
CHLORIDE SERPL-SCNC: 108 MMOL/L (ref 97–108)
CO2 SERPL-SCNC: 23 MMOL/L (ref 21–32)
COLOR UR: YELLOW
CREAT SERPL-MCNC: 0.73 MG/DL (ref 0.55–1.02)
DIFFERENTIAL METHOD BLD: NORMAL
EOSINOPHIL # BLD: 0.1 K/UL (ref 0–0.4)
EOSINOPHIL NFR BLD: 1 % (ref 0–7)
EPITH CASTS URNS QL MICRO: ABNORMAL /LPF
ERYTHROCYTE [DISTWIDTH] IN BLOOD BY AUTOMATED COUNT: 12.5 % (ref 11.5–14.5)
GLOBULIN SER CALC-MCNC: 3.5 G/DL (ref 2–4)
GLUCOSE SERPL-MCNC: 77 MG/DL (ref 65–100)
GLUCOSE UR STRIP.AUTO-MCNC: NEGATIVE MG/DL
HCG UR QL: NEGATIVE
HCT VFR BLD AUTO: 38.2 % (ref 35–47)
HGB BLD-MCNC: 12.8 G/DL (ref 11.5–16)
HGB UR QL STRIP: NEGATIVE
IMM GRANULOCYTES # BLD AUTO: 0 K/UL (ref 0–0.04)
IMM GRANULOCYTES NFR BLD AUTO: 0 % (ref 0–0.5)
KETONES UR QL STRIP.AUTO: NEGATIVE MG/DL
LEUKOCYTE ESTERASE UR QL STRIP.AUTO: ABNORMAL
LYMPHOCYTES # BLD: 2.7 K/UL (ref 0.8–3.5)
LYMPHOCYTES NFR BLD: 37 % (ref 12–49)
MCH RBC QN AUTO: 31.6 PG (ref 26–34)
MCHC RBC AUTO-ENTMCNC: 33.5 G/DL (ref 30–36.5)
MCV RBC AUTO: 94.3 FL (ref 80–99)
MONOCYTES # BLD: 0.5 K/UL (ref 0–1)
MONOCYTES NFR BLD: 7 % (ref 5–13)
NEUTS SEG # BLD: 4 K/UL (ref 1.8–8)
NEUTS SEG NFR BLD: 55 % (ref 32–75)
NITRITE UR QL STRIP.AUTO: NEGATIVE
PH UR STRIP: 5 [PH] (ref 5–8)
PLATELET # BLD AUTO: 285 K/UL (ref 150–400)
PMV BLD AUTO: 9.6 FL (ref 8.9–12.9)
POTASSIUM SERPL-SCNC: 3.8 MMOL/L (ref 3.5–5.1)
PROT SERPL-MCNC: 7.1 G/DL (ref 6.4–8.2)
PROT UR STRIP-MCNC: NEGATIVE MG/DL
RBC # BLD AUTO: 4.05 M/UL (ref 3.8–5.2)
RBC #/AREA URNS HPF: ABNORMAL /HPF (ref 0–5)
SODIUM SERPL-SCNC: 141 MMOL/L (ref 136–145)
SP GR UR REFRACTOMETRY: 1.02 (ref 1–1.03)
UROBILINOGEN UR QL STRIP.AUTO: 0.1 EU/DL (ref 0.2–1)
WBC # BLD AUTO: 7.4 K/UL (ref 3.6–11)
WBC URNS QL MICRO: ABNORMAL /HPF (ref 0–4)

## 2020-11-18 PROCEDURE — 96374 THER/PROPH/DIAG INJ IV PUSH: CPT

## 2020-11-18 PROCEDURE — 81025 URINE PREGNANCY TEST: CPT

## 2020-11-18 PROCEDURE — 81001 URINALYSIS AUTO W/SCOPE: CPT

## 2020-11-18 PROCEDURE — 85025 COMPLETE CBC W/AUTO DIFF WBC: CPT

## 2020-11-18 PROCEDURE — 80053 COMPREHEN METABOLIC PANEL: CPT

## 2020-11-18 PROCEDURE — 74011250636 HC RX REV CODE- 250/636: Performed by: FAMILY MEDICINE

## 2020-11-18 PROCEDURE — 96375 TX/PRO/DX INJ NEW DRUG ADDON: CPT

## 2020-11-18 PROCEDURE — 99283 EMERGENCY DEPT VISIT LOW MDM: CPT

## 2020-11-18 RX ORDER — PROCHLORPERAZINE EDISYLATE 5 MG/ML
10 INJECTION INTRAMUSCULAR; INTRAVENOUS
Status: DISCONTINUED | OUTPATIENT
Start: 2020-11-18 | End: 2020-11-18 | Stop reason: HOSPADM

## 2020-11-18 RX ORDER — ONDANSETRON 2 MG/ML
8 INJECTION INTRAMUSCULAR; INTRAVENOUS
Status: COMPLETED | OUTPATIENT
Start: 2020-11-18 | End: 2020-11-18

## 2020-11-18 RX ORDER — DIPHENHYDRAMINE HYDROCHLORIDE 50 MG/ML
25 INJECTION, SOLUTION INTRAMUSCULAR; INTRAVENOUS ONCE
Status: DISCONTINUED | OUTPATIENT
Start: 2020-11-18 | End: 2020-11-18 | Stop reason: HOSPADM

## 2020-11-18 RX ORDER — HYDROMORPHONE HYDROCHLORIDE 1 MG/ML
1 INJECTION, SOLUTION INTRAMUSCULAR; INTRAVENOUS; SUBCUTANEOUS ONCE
Status: COMPLETED | OUTPATIENT
Start: 2020-11-18 | End: 2020-11-18

## 2020-11-18 RX ADMIN — ONDANSETRON 4 MG: 2 INJECTION INTRAMUSCULAR; INTRAVENOUS at 14:08

## 2020-11-18 RX ADMIN — SODIUM CHLORIDE 1000 ML: 9 INJECTION, SOLUTION INTRAVENOUS at 14:01

## 2020-11-18 RX ADMIN — METHYLPREDNISOLONE SODIUM SUCCINATE 125 MG: 125 INJECTION, POWDER, FOR SOLUTION INTRAMUSCULAR; INTRAVENOUS at 14:05

## 2020-11-18 RX ADMIN — HYDROMORPHONE HYDROCHLORIDE 1 MG: 1 INJECTION, SOLUTION INTRAMUSCULAR; INTRAVENOUS; SUBCUTANEOUS at 14:11

## 2020-11-18 NOTE — ED NOTES
Dr. Marin Silva at bedside, this nurse to bedside following. Patient tearful, angry, \"I don't want this to be treated as a migraine! \", refused benadryl and compazine. Explained  To this nurse that she wants to be admitted \"anywhere except [facility]\" for a spinal tap and pain meds.

## 2020-11-18 NOTE — ED TRIAGE NOTES
Dx with \"pseudotumor\" last Feb \"excessive cerebrospinal fluid\" and \"they do a lumbar puncture every visit to remove fluid\", headache and blurred vision, multiple ct scans this year, \"couldn't get in touch with my neurologist\"- \"they always give me dilaudid\"

## 2020-11-18 NOTE — ED NOTES
This nurse at bedside. Apologized to patient and  about long wait. Explained to patient that Clay Mart is awaiting phone call from neurologist at [facility] and that she would be in to talk to her very shortly. Patient somber, appearing angry. This nurse attempted to gently question patient as to why she is angry. Patient offered little response to questions. Remained angry. This nurse offered snack,drink,blanket which were all refused.

## 2020-11-18 NOTE — ED NOTES
Following administration of meds, patient tearful, pain level is a 5/10. \"I don't want this to be treated as a migraine. \" explaining to this nurse that \"the only thing that makes me feel better is a spinal tap. \" stated that she wants to be admitted for a spinal tap and then \"I get pain medicine for a few days and then I feel better\",  at bedside
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
## 2020-11-18 NOTE — ED NOTES
This nurse to bedside. Patient dressed and pulled her own IV out. Refused to speak to nurse. Left without discharge instructions.

## 2020-12-01 ENCOUNTER — OFFICE VISIT (OUTPATIENT)
Dept: FAMILY MEDICINE CLINIC | Age: 30
End: 2020-12-01
Payer: MEDICAID

## 2020-12-01 VITALS
HEART RATE: 79 BPM | WEIGHT: 195 LBS | DIASTOLIC BLOOD PRESSURE: 77 MMHG | OXYGEN SATURATION: 100 % | RESPIRATION RATE: 16 BRPM | HEIGHT: 62 IN | SYSTOLIC BLOOD PRESSURE: 113 MMHG | BODY MASS INDEX: 35.88 KG/M2 | TEMPERATURE: 98.4 F

## 2020-12-01 DIAGNOSIS — G93.2 PSEUDOTUMOR CEREBRI SYNDROME: Primary | ICD-10-CM

## 2020-12-01 DIAGNOSIS — Z3A.01 LESS THAN 8 WEEKS GESTATION OF PREGNANCY: ICD-10-CM

## 2020-12-01 DIAGNOSIS — R51.9 FREQUENT HEADACHES: ICD-10-CM

## 2020-12-01 DIAGNOSIS — R79.89 ABNORMAL LIVER FUNCTION TEST: ICD-10-CM

## 2020-12-01 PROCEDURE — 99215 OFFICE O/P EST HI 40 MIN: CPT | Performed by: FAMILY MEDICINE

## 2020-12-01 NOTE — PROGRESS NOTES
1. Have you been to the ER, urgent care clinic since your last visit? Hospitalized since your last visit? Yes When: 11/2020 Where: [facility] Reason for visit: tumor    2. Have you seen or consulted any other health care providers outside of the [address] [place] [address] since your last visit? Include any pap smears or colon screening.  No     Visit Vitals  /77 (BP 1 Location: Left arm, BP Patient Position: Sitting)   Pulse 79   Temp 98.4 °F (36.9 °C) (Oral)   Resp 16   Ht 5' 2\" (1.575 m)   Wt 195 lb (88.5 kg)   SpO2 100%   BMI 35.67 kg/m²     Chief Complaint   Patient presents with   [facility] Follow Up     MCV    Headache    Nausea    Fatigue     3 most recent PHQ Screens 12/1/2020   PHQ Not Done -   Little interest or pleasure in doing things Not at all   Feeling down, depressed, irritable, or hopeless Not at all   Total Score PHQ 2 0

## 2020-12-01 NOTE — PROGRESS NOTES
Chief Complaint   Patient presents with   [facility] Follow Up     MCV    Headache    Nausea    Fatigue     she is a 26y.o. year old female who presents for evalution. She is a few weeks pregnant, also has pseudotumor ceribri and lupus   she had a LP last week , pressure was up in 30s according to the patiaant   she is here for follow up   her vision started changing again yesterday   she has a headache this morning  The headache is not severe at this point  The vision is blurred, has trouble seeing words on TV screen    Reviewed PmHx, RxHx, FmHx, SocHx, AllgHx and updated and dated in the chart. Aspirin yes ____   No____ N/A____    Patient Active Problem List    Diagnosis    Intracranial hypertension    Ataxia    Headache    IIH (idiopathic intracranial hypertension)    SLE (systemic lupus erythematosus) (HCC)    Severe obesity with body mass index (BMI) of 35.0 to 39.9 with serious comorbidity ([address]    S/P repair of paraesophageal hernia    Paraesophageal hernia    GERD (gastroesophageal reflux disease)    Obesity, Class II, BMI 35-39.9    Sebaceous cyst     S/P excision; Along sternum in cleavage.  History of Nissen fundoplication     4 duodenal ulcers, chronic gastritis, Grade C esophagitis, Chronic GERD, hernia, small tumor. Done August/2016.       Chronic migraine without aura without status migrainosus, not intractable    Cervical incompetence       Nurse notes were reviewed and copied and are correct  Review of Systems - negative except as listed above in the HPI    Objective:     Vitals:    12/01/20 1110   BP: 113/77   Pulse: 79   Resp: 16   Temp: 98.4 °F (36.9 °C)   TempSrc: Oral   SpO2: 100%   Weight: 195 lb (88.5 kg)   Height: 5' 2\" (1.575 m)        Physical Examination: General appearance - alert, well appearing, and in no distress  Mental status - alert, oriented to person, place, and time  Eyes - pupils equal and reactive, extraocular eye movements intact  Chest - clear to auscultation, no wheezes, rales or rhonchi, symmetric air entry  Heart - normal rate, regular rhythm, normal S1, S2, no murmurs, rubs, clicks or gallops  Neurological - alert, oriented, normal speech, no focal findings or movement disorder noted      Assessment/ Plan:   Diagnoses and all orders for this visit:    1. Pseudotumor cerebri syndrome  -     METABOLIC PANEL, COMPREHENSIVE; Future    2. Frequent headaches  -     METABOLIC PANEL, COMPREHENSIVE; Future    3. Abnormal liver function test  -     METABOLIC PANEL, COMPREHENSIVE; Future    4. Less than 8 weeks gestation of pregnancy    I called u neurology for a consultation. I wanted  to get her seen today. I explained the case and her change in vison and headache as well as the pregnancy  The neurologist suggested she go in to ER   and the ER would contact him to come down once she was checked in   she agreed she will go there today  All of this took more than 60 mins of exam and consultation    ICD-10-CM ICD-9-CM    1. Pseudotumor cerebri syndrome  D95.2 815.6 METABOLIC PANEL, COMPREHENSIVE   2. Frequent headaches  E25.4 990.0 METABOLIC PANEL, COMPREHENSIVE   3. Abnormal liver function test  S85.1 443.5 METABOLIC PANEL, COMPREHENSIVE   4. Less than 8 weeks gestation of pregnancy  Z3A.01 V22.2        I have discussed the diagnosis with the patient and the intended plan as seen in the above orders. The patient has received an after-visit summary and questions were answered concerning future plans. There are no Patient Instructions on file for this visit.     The patient verbalizes understanding and agrees with the plan of care        Patient has the advanced directives booklet to review

## 2020-12-03 ENCOUNTER — APPOINTMENT (OUTPATIENT)
Dept: ULTRASOUND IMAGING | Age: 30
End: 2020-12-03
Attending: EMERGENCY MEDICINE
Payer: MEDICAID

## 2020-12-03 ENCOUNTER — HOSPITAL ENCOUNTER (EMERGENCY)
Age: 30
Discharge: HOME | End: 2020-12-03
Attending: EMERGENCY MEDICINE
Payer: MEDICAID

## 2020-12-03 VITALS
TEMPERATURE: 98.2 F | OXYGEN SATURATION: 100 % | DIASTOLIC BLOOD PRESSURE: 87 MMHG | RESPIRATION RATE: 16 BRPM | HEART RATE: 89 BPM | SYSTOLIC BLOOD PRESSURE: 141 MMHG

## 2020-12-03 DIAGNOSIS — O20.0 THREATENED MISCARRIAGE: Primary | ICD-10-CM

## 2020-12-03 LAB
ABO + RH BLD: NORMAL
APPEARANCE UR: CLEAR
BACTERIA URNS QL MICRO: NEGATIVE /HPF
BASOPHILS # BLD: 0.1 K/UL (ref 0–0.1)
BASOPHILS NFR BLD: 0 % (ref 0–1)
BILIRUB UR QL: NEGATIVE
COLOR UR: ABNORMAL
COMMENT, HOLDF: NORMAL
DIFFERENTIAL METHOD BLD: ABNORMAL
EOSINOPHIL # BLD: 0.2 K/UL (ref 0–0.4)
EOSINOPHIL NFR BLD: 2 % (ref 0–7)
EPITH CASTS URNS QL MICRO: ABNORMAL /LPF
ERYTHROCYTE [DISTWIDTH] IN BLOOD BY AUTOMATED COUNT: 12.3 % (ref 11.5–14.5)
GLUCOSE UR STRIP.AUTO-MCNC: NEGATIVE MG/DL
HCG SERPL-ACNC: ABNORMAL MIU/ML (ref 0–6)
HCT VFR BLD AUTO: 36.8 % (ref 35–47)
HGB BLD-MCNC: 12.6 G/DL (ref 11.5–16)
HGB UR QL STRIP: ABNORMAL
HYALINE CASTS URNS QL MICRO: ABNORMAL /LPF (ref 0–5)
IMM GRANULOCYTES # BLD AUTO: 0 K/UL (ref 0–0.04)
IMM GRANULOCYTES NFR BLD AUTO: 0 % (ref 0–0.5)
KETONES UR QL STRIP.AUTO: NEGATIVE MG/DL
LEUKOCYTE ESTERASE UR QL STRIP.AUTO: ABNORMAL
LYMPHOCYTES # BLD: 3.3 K/UL (ref 0.8–3.5)
LYMPHOCYTES NFR BLD: 30 % (ref 12–49)
MCH RBC QN AUTO: 31.3 PG (ref 26–34)
MCHC RBC AUTO-ENTMCNC: 34.2 G/DL (ref 30–36.5)
MCV RBC AUTO: 91.5 FL (ref 80–99)
MONOCYTES # BLD: 0.8 K/UL (ref 0–1)
MONOCYTES NFR BLD: 7 % (ref 5–13)
NEUTS SEG # BLD: 7 K/UL (ref 1.8–8)
NEUTS SEG NFR BLD: 61 % (ref 32–75)
NITRITE UR QL STRIP.AUTO: NEGATIVE
NRBC # BLD: 0 K/UL (ref 0–0.01)
NRBC BLD-RTO: 0 PER 100 WBC
PH UR STRIP: 6 [PH] (ref 5–8)
PLATELET # BLD AUTO: 282 K/UL (ref 150–400)
PMV BLD AUTO: 9.5 FL (ref 8.9–12.9)
PROT UR STRIP-MCNC: NEGATIVE MG/DL
RBC # BLD AUTO: 4.02 M/UL (ref 3.8–5.2)
RBC #/AREA URNS HPF: ABNORMAL /HPF (ref 0–5)
SAMPLES BEING HELD,HOLD: NORMAL
SP GR UR REFRACTOMETRY: 1.01 (ref 1–1.03)
UR CULT HOLD, URHOLD: NORMAL
UROBILINOGEN UR QL STRIP.AUTO: 1 EU/DL (ref 0.2–1)
WBC # BLD AUTO: 11.3 K/UL (ref 3.6–11)
WBC URNS QL MICRO: ABNORMAL /HPF (ref 0–4)

## 2020-12-03 PROCEDURE — 76801 OB US < 14 WKS SINGLE FETUS: CPT

## 2020-12-03 PROCEDURE — 86900 BLOOD TYPING SEROLOGIC ABO: CPT

## 2020-12-03 PROCEDURE — 85025 COMPLETE CBC W/AUTO DIFF WBC: CPT

## 2020-12-03 PROCEDURE — 84702 CHORIONIC GONADOTROPIN TEST: CPT

## 2020-12-03 PROCEDURE — 81001 URINALYSIS AUTO W/SCOPE: CPT

## 2020-12-03 PROCEDURE — 99283 EMERGENCY DEPT VISIT LOW MDM: CPT

## 2020-12-03 PROCEDURE — 36415 COLL VENOUS BLD VENIPUNCTURE: CPT

## 2020-12-03 RX ORDER — SODIUM CHLORIDE 0.9 % (FLUSH) 0.9 %
5-40 SYRINGE (ML) INJECTION AS NEEDED
Status: DISCONTINUED | OUTPATIENT
Start: 2020-12-03 | End: 2020-12-03 | Stop reason: HOSPADM

## 2020-12-03 RX ORDER — SODIUM CHLORIDE 0.9 % (FLUSH) 0.9 %
5-40 SYRINGE (ML) INJECTION EVERY 8 HOURS
Status: DISCONTINUED | OUTPATIENT
Start: 2020-12-03 | End: 2020-12-03 | Stop reason: HOSPADM
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
## 2020-12-03 NOTE — ED TRIAGE NOTES
Triage: pt has had one episode of urinating blood.  Pt advises that it occurred 30 minutes prior to her arrival.

## 2020-12-03 NOTE — DISCHARGE INSTRUCTIONS
Patient Education        Threatened Miscarriage: Care Instructions  Overview     Some women have light spotting or bleeding during the first 12 weeks of pregnancy. In some cases this is normal. Light spotting or bleeding can also be a sign of a possible loss of the pregnancy. This is called a threatened miscarriage. At this point, the doctor may not be able to tell if your vaginal bleeding is normal or is a sign of a miscarriage. In early pregnancy, things such as stress, exercise, and sex do not cause miscarriage. You may be worried or upset about the possibility of losing your pregnancy. But do not blame yourself. There is no treatment to stop a miscarriage. If you do have a miscarriage, there was nothing you could have done to prevent it. A miscarriage usually means that the pregnancy is not developing normally. Follow-up care is a key part of your treatment and safety. Be sure to make and go to all appointments, and call your doctor if you are having problems. It's also a good idea to know your test results and keep a list of the medicines you take. How can you care for yourself at home? · Take acetaminophen (Tylenol) for cramps. Read and follow all instructions on the label. · Do not take two or more pain medicines at the same time unless the doctor told you to. Many pain medicines have acetaminophen, which is Tylenol. Too much acetaminophen (Tylenol) can be harmful. · Do not have sex until your doctor says it is okay. · Get lots of rest over the next several days. · You may do your normal activities if you feel well enough to do them. But do not do any heavy exercise until your doctor says it is okay. · Eat a balanced diet that is high in iron and vitamin C. Foods rich in iron include red meat, shellfish, eggs, beans, and leafy green vegetables. Foods high in vitamin C include citrus fruits, tomatoes, and broccoli. Talk to your doctor about whether you need to take iron pills or a multivitamin.   · Do not drink alcohol or use tobacco or illegal drugs. · Do not smoke. If you need help quitting, talk to your doctor about stop-smoking programs and medicines. These can increase your chances of quitting for good. When should you call for help? Call 911 anytime you think you may need emergency care. For example, call if:    · You passed out (lost consciousness). Call your doctor now or seek immediate medical care if:    · You have severe vaginal bleeding.     · You are dizzy or lightheaded, or you feel like you may faint.     · You have new or worse pain in your belly or pelvis.     · You have a fever.     · You have vaginal discharge that smells bad. Watch closely for changes in your health, and be sure to contact your doctor if:    · You do not get better as expected. Where can you learn more? Go to http://www.gray.com/  Enter [id number] in the search box to learn more about \"Threatened Miscarriage: Care Instructions. \"  Current as of: February 11, 2020               Content Version: 12.6  © 2006-2020 [employer]. Care instructions adapted under license by [employer] (which disclaims liability or warranty for this information). If you have questions about a medical condition or this instruction, always ask your healthcare professional. [address] any warranty or liability for your use of this information.

## 2021-01-25 ENCOUNTER — APPOINTMENT (OUTPATIENT)
Dept: ULTRASOUND IMAGING | Age: 31
End: 2021-01-25
Attending: FAMILY MEDICINE
Payer: MEDICAID

## 2021-01-25 ENCOUNTER — HOSPITAL ENCOUNTER (EMERGENCY)
Age: 31
Discharge: HOME | End: 2021-01-25
Attending: FAMILY MEDICINE
Payer: MEDICAID

## 2021-01-25 VITALS
RESPIRATION RATE: 102 BRPM | OXYGEN SATURATION: 99 % | WEIGHT: 193 LBS | HEIGHT: 62 IN | BODY MASS INDEX: 35.51 KG/M2 | DIASTOLIC BLOOD PRESSURE: 96 MMHG | TEMPERATURE: 98.6 F | SYSTOLIC BLOOD PRESSURE: 146 MMHG

## 2021-01-25 DIAGNOSIS — O03.4 INCOMPLETE ABORTION: Primary | ICD-10-CM

## 2021-01-25 LAB
AMORPH CRY URNS QL MICRO: ABNORMAL
APPEARANCE UR: ABNORMAL
BACTERIA URNS QL MICRO: NEGATIVE /HPF
BASOPHILS # BLD: 0 K/UL (ref 0–0.1)
BASOPHILS NFR BLD: 0 % (ref 0–1)
BILIRUB UR QL: NEGATIVE
COLOR UR: YELLOW
DIFFERENTIAL METHOD BLD: NORMAL
EOSINOPHIL # BLD: 0.2 K/UL (ref 0–0.4)
EOSINOPHIL NFR BLD: 3 % (ref 0–7)
EPITH CASTS URNS QL MICRO: ABNORMAL /LPF
ERYTHROCYTE [DISTWIDTH] IN BLOOD BY AUTOMATED COUNT: 12.1 % (ref 11.5–14.5)
GLUCOSE UR STRIP.AUTO-MCNC: NEGATIVE MG/DL
HCG SERPL-ACNC: 157.2 MIU/ML (ref 0–6)
HCT VFR BLD AUTO: 35.4 % (ref 35–47)
HGB BLD-MCNC: 12.2 G/DL (ref 11.5–16)
HGB UR QL STRIP: ABNORMAL
IMM GRANULOCYTES # BLD AUTO: 0 K/UL (ref 0–0.04)
IMM GRANULOCYTES NFR BLD AUTO: 0 % (ref 0–0.5)
KETONES UR QL STRIP.AUTO: NEGATIVE MG/DL
LEUKOCYTE ESTERASE UR QL STRIP.AUTO: NEGATIVE
LYMPHOCYTES # BLD: 2.8 K/UL (ref 0.8–3.5)
LYMPHOCYTES NFR BLD: 42 % (ref 12–49)
MCH RBC QN AUTO: 31.9 PG (ref 26–34)
MCHC RBC AUTO-ENTMCNC: 34.5 G/DL (ref 30–36.5)
MCV RBC AUTO: 92.7 FL (ref 80–99)
MONOCYTES # BLD: 0.8 K/UL (ref 0–1)
MONOCYTES NFR BLD: 12 % (ref 5–13)
NEUTS SEG # BLD: 2.9 K/UL (ref 1.8–8)
NEUTS SEG NFR BLD: 43 % (ref 32–75)
NITRITE UR QL STRIP.AUTO: NEGATIVE
PH UR STRIP: 8 [PH] (ref 5–8)
PLATELET # BLD AUTO: 297 K/UL (ref 150–400)
PMV BLD AUTO: 9.1 FL (ref 8.9–12.9)
PROT UR STRIP-MCNC: NEGATIVE MG/DL
RBC # BLD AUTO: 3.82 M/UL (ref 3.8–5.2)
RBC #/AREA URNS HPF: ABNORMAL /HPF (ref 0–5)
SP GR UR REFRACTOMETRY: 1.01 (ref 1–1.03)
UROBILINOGEN UR QL STRIP.AUTO: 0.1 EU/DL (ref 0.2–1)
WBC # BLD AUTO: 6.8 K/UL (ref 3.6–11)
WBC URNS QL MICRO: ABNORMAL /HPF (ref 0–4)

## 2021-01-25 PROCEDURE — 99283 EMERGENCY DEPT VISIT LOW MDM: CPT

## 2021-01-25 PROCEDURE — 76817 TRANSVAGINAL US OBSTETRIC: CPT

## 2021-01-25 PROCEDURE — 81001 URINALYSIS AUTO W/SCOPE: CPT

## 2021-01-25 PROCEDURE — 84702 CHORIONIC GONADOTROPIN TEST: CPT

## 2021-01-25 PROCEDURE — 85025 COMPLETE CBC W/AUTO DIFF WBC: CPT

## 2021-01-25 PROCEDURE — 36415 COLL VENOUS BLD VENIPUNCTURE: CPT
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
## 2021-01-25 NOTE — DISCHARGE INSTRUCTIONS
Thank you! Thank you for allowing me to care for you in the emergency department. I sincerely hope that you are satisfied with your visit today. It is my goal to provide you with excellent care. Below you will find a list of your labs and imaging from your visit today. Should you have any questions regarding these results please do not hesitate to call the emergency department. Labs -     Recent Results (from the past 12 hour(s))   CBC WITH AUTOMATED DIFF    Collection Time: 01/25/21  2:30 PM   Result Value Ref Range    WBC 6.8 3.6 - 11.0 K/uL    RBC 3.82 3.80 - 5.20 M/uL    HGB 12.2 11.5 - 16.0 g/dL    HCT 35.4 35.0 - 47.0 %    MCV 92.7 80.0 - 99.0 FL    MCH 31.9 26.0 - 34.0 PG    MCHC 34.5 30.0 - 36.5 g/dL    RDW 12.1 11.5 - 14.5 %    PLATELET 516 706 - 214 K/uL    MPV 9.1 8.9 - 12.9 FL    NEUTROPHILS 43 32 - 75 %    LYMPHOCYTES 42 12 - 49 %    MONOCYTES 12 5 - 13 %    EOSINOPHILS 3 0 - 7 %    BASOPHILS 0 0 - 1 %    IMMATURE GRANULOCYTES 0 0.0 - 0.5 %    ABS. NEUTROPHILS 2.9 1.8 - 8.0 K/UL    ABS. LYMPHOCYTES 2.8 0.8 - 3.5 K/UL    ABS. MONOCYTES 0.8 0.0 - 1.0 K/UL    ABS. EOSINOPHILS 0.2 0.0 - 0.4 K/UL    ABS. BASOPHILS 0.0 0.0 - 0.1 K/UL    ABS. IMM.  GRANS. 0.0 0.00 - 0.04 K/UL    DF AUTOMATED     BETA HCG, QT    Collection Time: 01/25/21  2:30 PM   Result Value Ref Range    Beta HCG, .2 (H) 0 - 6 mIU/mL   URINALYSIS W/ RFLX MICROSCOPIC    Collection Time: 01/25/21  2:30 PM   Result Value Ref Range    Color Yellow      Appearance Cloudy (A) Clear      Specific gravity 1.015 1.003 - 1.030      pH (UA) 8.0 5.0 - 8.0      Protein Negative Negative mg/dL    Glucose Negative Negative mg/dL    Ketone Negative Negative mg/dL    Bilirubin Negative Negative      Blood Large (A) Negative      Urobilinogen 0.1 (L) 0.2 - 1.0 EU/dL    Nitrites Negative Negative      Leukocyte Esterase Negative Negative     URINE MICROSCOPIC    Collection Time: 01/25/21  2:30 PM   Result Value Ref Range    WBC 0-4 0 - 4 /hpf RBC 10-20 0 - 5 /hpf    Epithelial cells Few Few /lpf    Bacteria Negative Negative /hpf    Amorphous Crystals 2+ (A) Negative       Radiologic Studies -   US UTS TRANSVAGINAL OB   Final Result        CT Results  (Last 48 hours)      None          CXR Results  (Last 48 hours)      None               If you feel that you have not received excellent quality care or timely care, please ask to speak to the nurse manager. Please choose us in the future for your continued health care needs. ------------------------------------------------------------------------------------------------------------  The exam and treatment you received in the Emergency Department were for an urgent problem and are not intended as complete care. It is important that you follow-up with a doctor, nurse practitioner, or physician assistant to:  (1) confirm your diagnosis,  (2) re-evaluation of changes in your illness and treatment, and  (3) for ongoing care. If your symptoms become worse or you do not improve as expected and you are unable to reach your usual health care provider, you should return to the Emergency Department. We are available 24 hours a day. Please take your discharge instructions with you when you go to your follow-up appointment. If you have any problem arranging a follow-up appointment, contact the Emergency Department immediately. If a prescription has been provided, please have it filled as soon as possible to prevent a delay in treatment. Read the entire medication instruction sheet provided to you by the pharmacy. If you have any questions or reservations about taking the medication due to side effects or interactions with other medications, please call your primary care physician or contact the ER to speak with the charge nurse.      Make an appointment with your family doctor or the physician you were referred to for follow-up of this visit as instructed on your discharge paperwork, as this is a mandatory follow-up. Return to the ER if you are unable to be seen or if you are unable to be seen in a timely manner. If you have any problem arranging the follow-up visit, contact the Emergency Department immediately.

## 2021-01-31 DIAGNOSIS — R51.9 FREQUENT HEADACHES: ICD-10-CM

## 2021-01-31 RX ORDER — GABAPENTIN 600 MG/1
600 TABLET ORAL 3 TIMES DAILY
Qty: 90 TAB | Refills: 1 | Status: SHIPPED | OUTPATIENT
Start: 2021-01-31 | End: 2021-05-04

## 2021-03-07 ENCOUNTER — HOSPITAL ENCOUNTER (EMERGENCY)
Age: 31
Discharge: HOME | End: 2021-03-07
Attending: STUDENT IN AN ORGANIZED HEALTH CARE EDUCATION/TRAINING PROGRAM | Admitting: STUDENT IN AN ORGANIZED HEALTH CARE EDUCATION/TRAINING PROGRAM
Payer: MEDICAID

## 2021-03-07 ENCOUNTER — APPOINTMENT (OUTPATIENT)
Dept: CT IMAGING | Age: 31
End: 2021-03-07
Attending: EMERGENCY MEDICINE
Payer: MEDICAID

## 2021-03-07 VITALS
HEART RATE: 99 BPM | TEMPERATURE: 98.4 F | WEIGHT: 207.67 LBS | BODY MASS INDEX: 38.22 KG/M2 | DIASTOLIC BLOOD PRESSURE: 91 MMHG | SYSTOLIC BLOOD PRESSURE: 131 MMHG | RESPIRATION RATE: 16 BRPM | HEIGHT: 62 IN | OXYGEN SATURATION: 96 %

## 2021-03-07 DIAGNOSIS — G93.2 PSEUDOTUMOR CEREBRI: Primary | ICD-10-CM

## 2021-03-07 DIAGNOSIS — G44.89 OTHER HEADACHE SYNDROME: ICD-10-CM

## 2021-03-07 LAB
ANION GAP SERPL CALC-SCNC: 9 MMOL/L (ref 5–15)
APPEARANCE UR: CLEAR
BACTERIA URNS QL MICRO: NEGATIVE /HPF
BASOPHILS # BLD: 0 K/UL (ref 0–0.1)
BASOPHILS NFR BLD: 0 % (ref 0–1)
BILIRUB UR QL: NEGATIVE
BUN SERPL-MCNC: 14 MG/DL (ref 6–20)
BUN/CREAT SERPL: 12 (ref 12–20)
CALCIUM SERPL-MCNC: 9.5 MG/DL (ref 8.5–10.1)
CHLORIDE SERPL-SCNC: 105 MMOL/L (ref 97–108)
CO2 SERPL-SCNC: 21 MMOL/L (ref 21–32)
COLOR UR: NORMAL
CREAT SERPL-MCNC: 1.17 MG/DL (ref 0.55–1.02)
DIFFERENTIAL METHOD BLD: ABNORMAL
EOSINOPHIL # BLD: 0 K/UL (ref 0–0.4)
EOSINOPHIL NFR BLD: 0 % (ref 0–7)
EPITH CASTS URNS QL MICRO: NORMAL /LPF
ERYTHROCYTE [DISTWIDTH] IN BLOOD BY AUTOMATED COUNT: 12.6 % (ref 11.5–14.5)
GLUCOSE SERPL-MCNC: 100 MG/DL (ref 65–100)
GLUCOSE UR STRIP.AUTO-MCNC: NEGATIVE MG/DL
HCG UR QL: NEGATIVE
HCT VFR BLD AUTO: 42.4 % (ref 35–47)
HGB BLD-MCNC: 13.9 G/DL (ref 11.5–16)
HGB UR QL STRIP: NEGATIVE
HYALINE CASTS URNS QL MICRO: NORMAL /LPF (ref 0–5)
IMM GRANULOCYTES # BLD AUTO: 0.1 K/UL (ref 0–0.04)
IMM GRANULOCYTES NFR BLD AUTO: 0 % (ref 0–0.5)
KETONES UR QL STRIP.AUTO: NEGATIVE MG/DL
LEUKOCYTE ESTERASE UR QL STRIP.AUTO: NEGATIVE
LYMPHOCYTES # BLD: 2.5 K/UL (ref 0.8–3.5)
LYMPHOCYTES NFR BLD: 18 % (ref 12–49)
MCH RBC QN AUTO: 30 PG (ref 26–34)
MCHC RBC AUTO-ENTMCNC: 32.8 G/DL (ref 30–36.5)
MCV RBC AUTO: 91.6 FL (ref 80–99)
MONOCYTES # BLD: 1.1 K/UL (ref 0–1)
MONOCYTES NFR BLD: 8 % (ref 5–13)
NEUTS SEG # BLD: 10.1 K/UL (ref 1.8–8)
NEUTS SEG NFR BLD: 74 % (ref 32–75)
NITRITE UR QL STRIP.AUTO: NEGATIVE
NRBC # BLD: 0 K/UL (ref 0–0.01)
NRBC BLD-RTO: 0 PER 100 WBC
PH UR STRIP: 5 [PH] (ref 5–8)
PLATELET # BLD AUTO: 326 K/UL (ref 150–400)
PMV BLD AUTO: 9.6 FL (ref 8.9–12.9)
POTASSIUM SERPL-SCNC: 3.9 MMOL/L (ref 3.5–5.1)
PROT UR STRIP-MCNC: NEGATIVE MG/DL
RBC # BLD AUTO: 4.63 M/UL (ref 3.8–5.2)
RBC #/AREA URNS HPF: NORMAL /HPF (ref 0–5)
SODIUM SERPL-SCNC: 135 MMOL/L (ref 136–145)
SP GR UR REFRACTOMETRY: 1.01 (ref 1–1.03)
UR CULT HOLD, URHOLD: NORMAL
UROBILINOGEN UR QL STRIP.AUTO: 0.2 EU/DL (ref 0.2–1)
WBC # BLD AUTO: 13.8 K/UL (ref 3.6–11)
WBC URNS QL MICRO: NORMAL /HPF (ref 0–4)

## 2021-03-07 PROCEDURE — 96375 TX/PRO/DX INJ NEW DRUG ADDON: CPT

## 2021-03-07 PROCEDURE — 80048 BASIC METABOLIC PNL TOTAL CA: CPT

## 2021-03-07 PROCEDURE — 74011250636 HC RX REV CODE- 250/636: Performed by: EMERGENCY MEDICINE

## 2021-03-07 PROCEDURE — 96374 THER/PROPH/DIAG INJ IV PUSH: CPT

## 2021-03-07 PROCEDURE — 81001 URINALYSIS AUTO W/SCOPE: CPT

## 2021-03-07 PROCEDURE — 36415 COLL VENOUS BLD VENIPUNCTURE: CPT

## 2021-03-07 PROCEDURE — 85025 COMPLETE CBC W/AUTO DIFF WBC: CPT

## 2021-03-07 PROCEDURE — 81025 URINE PREGNANCY TEST: CPT

## 2021-03-07 PROCEDURE — 99284 EMERGENCY DEPT VISIT MOD MDM: CPT

## 2021-03-07 PROCEDURE — 74011000250 HC RX REV CODE- 250: Performed by: EMERGENCY MEDICINE

## 2021-03-07 PROCEDURE — 70450 CT HEAD/BRAIN W/O DYE: CPT

## 2021-03-07 RX ORDER — KETOROLAC TROMETHAMINE 30 MG/ML
15 INJECTION, SOLUTION INTRAMUSCULAR; INTRAVENOUS
Status: COMPLETED | OUTPATIENT
Start: 2021-03-07 | End: 2021-03-07

## 2021-03-07 RX ORDER — DIPHENHYDRAMINE HYDROCHLORIDE 50 MG/ML
25 INJECTION, SOLUTION INTRAMUSCULAR; INTRAVENOUS
Status: COMPLETED | OUTPATIENT
Start: 2021-03-07 | End: 2021-03-07

## 2021-03-07 RX ADMIN — PROCHLORPERAZINE EDISYLATE 10 MG: 5 INJECTION INTRAMUSCULAR; INTRAVENOUS at 03:23

## 2021-03-07 RX ADMIN — DIPHENHYDRAMINE HYDROCHLORIDE 25 MG: 50 INJECTION, SOLUTION INTRAMUSCULAR; INTRAVENOUS at 03:23

## 2021-03-07 RX ADMIN — KETOROLAC TROMETHAMINE 15 MG: 30 INJECTION, SOLUTION INTRAMUSCULAR at 03:23

## 2021-03-07 RX ADMIN — SODIUM CHLORIDE 1000 ML: 9 INJECTION, SOLUTION INTRAVENOUS at 03:23

## 2021-03-07 NOTE — ED TRIAGE NOTES
Patient arrives to the ED with c/o contiuned blurred vision and a headache which started  Days ago, patient states that she was seen at [facility] earlier today for same.
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
## 2021-03-10 ENCOUNTER — VIRTUAL VISIT (OUTPATIENT)
Dept: SURGERY | Age: 31
End: 2021-03-10
Payer: MEDICAID

## 2021-03-10 VITALS — BODY MASS INDEX: 39.01 KG/M2 | WEIGHT: 212 LBS | HEIGHT: 62 IN

## 2021-03-10 DIAGNOSIS — K21.9 GASTROESOPHAGEAL REFLUX DISEASE, UNSPECIFIED WHETHER ESOPHAGITIS PRESENT: Primary | ICD-10-CM

## 2021-03-10 DIAGNOSIS — E66.01 SEVERE OBESITY (BMI 35.0-35.9 WITH COMORBIDITY) (HCC): ICD-10-CM

## 2021-03-10 DIAGNOSIS — G93.2 PSEUDOTUMOR CEREBRI: ICD-10-CM

## 2021-03-10 PROCEDURE — 99214 OFFICE O/P EST MOD 30 MIN: CPT | Performed by: SURGERY

## 2021-03-10 NOTE — PROGRESS NOTES
1. Have you been to the ER, urgent care clinic since your last visit? Hospitalized since your last visit? Was treated at [facility] this past Friday for headaches and double vision. History of pseudotumor cerebri, anxiety Also seen at [facility] on Saturday. 2. Have you seen or consulted any other health care providers outside of the [address] [place] [address] [place] since your last visit? Include any pap smears or colon screening.  [facility]
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
## 2021-03-17 ENCOUNTER — OFFICE VISIT (OUTPATIENT)
Dept: FAMILY MEDICINE CLINIC | Age: 31
End: 2021-03-17
Payer: MEDICAID

## 2021-03-17 ENCOUNTER — TELEPHONE (OUTPATIENT)
Dept: SURGERY | Age: 31
End: 2021-03-17

## 2021-03-17 VITALS
HEIGHT: 62 IN | DIASTOLIC BLOOD PRESSURE: 81 MMHG | HEART RATE: 90 BPM | BODY MASS INDEX: 38.63 KG/M2 | RESPIRATION RATE: 16 BRPM | SYSTOLIC BLOOD PRESSURE: 130 MMHG | WEIGHT: 209.9 LBS | TEMPERATURE: 97.8 F | OXYGEN SATURATION: 99 %

## 2021-03-17 DIAGNOSIS — R51.9 FREQUENT HEADACHES: ICD-10-CM

## 2021-03-17 DIAGNOSIS — E66.9 OBESITY (BMI 35.0-39.9 WITHOUT COMORBIDITY): ICD-10-CM

## 2021-03-17 DIAGNOSIS — R06.83 SNORING: Primary | ICD-10-CM

## 2021-03-17 DIAGNOSIS — G93.2 PSEUDOTUMOR CEREBRI SYNDROME: ICD-10-CM

## 2021-03-17 DIAGNOSIS — R79.89 ABNORMAL SERUM CREATININE LEVEL: ICD-10-CM

## 2021-03-17 PROCEDURE — 99214 OFFICE O/P EST MOD 30 MIN: CPT | Performed by: FAMILY MEDICINE

## 2021-03-17 RX ORDER — HYDROXYCHLOROQUINE SULFATE 200 MG/1
TABLET, FILM COATED ORAL
COMMUNITY
Start: 2021-02-02 | End: 2021-03-26 | Stop reason: SDUPTHER

## 2021-03-17 RX ORDER — PHENTERMINE HYDROCHLORIDE 15 MG/1
15 CAPSULE ORAL
Qty: 30 CAP | Refills: 0 | Status: SHIPPED | OUTPATIENT
Start: 2021-03-17 | End: 2021-09-30

## 2021-03-17 RX ORDER — BIOTIN 10 MG
10 TABLET ORAL DAILY
Status: ON HOLD | COMMUNITY
Start: 2021-01-22 | End: 2021-11-01

## 2021-03-17 RX ORDER — LORAZEPAM 1 MG/1
1.5 TABLET ORAL
COMMUNITY
Start: 2021-03-04 | End: 2022-03-30

## 2021-03-17 RX ORDER — DIAZEPAM 5 MG/1
TABLET ORAL
Status: ON HOLD | COMMUNITY
Start: 2021-03-04 | End: 2021-11-01

## 2021-03-17 RX ORDER — TOPIRAMATE 50 MG/1
50 TABLET, FILM COATED ORAL
Qty: 90 TAB | Refills: 1 | Status: SHIPPED | OUTPATIENT
Start: 2021-03-17 | End: 2021-09-30

## 2021-03-17 NOTE — TELEPHONE ENCOUNTER
Pt called Yuli Lema who stated she could not answer clinical questions. Pt has several questions about the procedures. Requesting a call back when available please.

## 2021-03-17 NOTE — PROGRESS NOTES
1. Have you been to the ER, urgent care clinic since your last visit? Hospitalized since your last visit? Yes When: 3/9/2021-3/13/2021 Where: [facility] Reason for visit: [place] / Headaches    2. Have you seen or consulted any other health care providers outside of the [address] [place] [address] since your last visit? Include any pap smears or colon screening.  No     Chief Complaint   Patient presents with    Weight Management   Patient reports miscarriage 1/2021    Body Weight: 209.9 LB  Body Fat%: 41.0 %  BMI: 38.2  Body Water Weight: 43.9 %  Resting Energy: 1605    Health Maintenance Due   Topic Date Due    COVID-19 Vaccine (1) Never done    PAP AKA CERVICAL CYTOLOGY  Never done    Flu Vaccine (1) Never done     3 most recent PHQ Screens 3/17/2021   PHQ Not Done -   Little interest or pleasure in doing things Not at all   Feeling down, depressed, irritable, or hopeless Not at all   Total Score PHQ 2 0

## 2021-03-17 NOTE — PROGRESS NOTES
Weight Loss Progress Note: follow up Physician Visit      Kavitha Hawkins is a 26 y.o. female  who is here for her follow up  Evaluation for the medical bariatric care. CC: I NEED TO LOSE WEIGHT TO CURE PSEUDOTUMOR CERIBRI  She is getting up in the middle of the night eating chips  And other junk foods  She eats low carb for meals but then does this midnight raid in the kitchen  Weight History  Current weight 209  and BMI is Body mass index is 38.39 kg/m². Goal weight BMI 27  Highest weight   (See weight gain time line scanned into media section of chart)        Significant Medical History    Update on sleep apnea and  CPAP she admits to snoring  I referred her a few years ago for sleep eval but she did not do it due to insurance coverage issues    Ever had bariatric surgery: getting evaluated for a sleeve procedure    Pregnant or planning on becoming pregnant w/in 6 months: no         Significant Psychosocial History     Current Major Lifestyle Changes: no  Any potential unsupportive people: no          Social History  Social History     Tobacco Use    Smoking status: Never Smoker    Smokeless tobacco: Never Used   Substance Use Topics    Alcohol use: No     How many times a week do you eat out?  0    Do you drink any EtOH?  no   If so, how much? Do you have upcoming any travel in the next 6 weeks?  no   If so, what do you have planned? Exercise  How many days a week do you currently exercise?   Not been consistent days  Have you ever been told by a physician not to exercise?  no      Objective  Visit Vitals  /81 (BP 1 Location: Left arm, BP Patient Position: Sitting, BP Cuff Size: Adult long)   Pulse 90   Temp 97.8 °F (36.6 °C) (Skin)   Resp 16   Ht 5' 2\" (1.575 m)   Wt 209 lb 14.4 oz (95.2 kg)   LMP  (LMP Unknown)   SpO2 99%   Breastfeeding Unknown   BMI 38.39 kg/m²         Waist Circumference: See Weight Management Doc Flowsheet  Neck Circumference: See Weight Management Doc Flowsheet  Percent Body Fat: See Weight Management Doc Flowsheet  Weight Metrics 3/17/2021 3/10/2021 3/7/2021 1/25/2021 12/1/2020 11/18/2020 10/28/2020   Weight 209 lb 14.4 oz 212 lb 207 lb 10.8 oz 193 lb 195 lb 195 lb 192 lb   Neck Circ (inches) - - - - - - -   Waist Measure Inches - - - - - - -   BMI 38.39 kg/m2 38.78 kg/m2 37.98 kg/m2 35.3 kg/m2 35.67 kg/m2 35.67 kg/m2 36.28 kg/m2         Labs: doing labs today       Review of Systems  Complete ROS negative except where noted above      Physical Exam    Vital Signs Reviewed  Weight Management Metrics Reviewed    Vital Signs Reviewed  Appearance: Obese, A&O x 3, NAD  HEENT:  NC/AT, EOMI, PERRL, No scleral icterus, malampatti score:  Skin:    Skin tags - no   Acanthosis Nigricans -   Neck:  No nodes, thyromegaly   Heart:  RRR without M/R/G  Lungs:  CTAB, no rhonchi, rales, or wheezes with good air exchange   Abdomen:  Non-tender, pos bowel sounds; hepatomegaly -   Ext:  No C/C/E        Assessment & Plan  Diagnoses and all orders for this visit:    1. Snoring  -     SLEEP MEDICINE REFERRAL    2. Obesity (BMI 35.0-39.9 without comorbidity)  -     topiramate (TOPAMAX) 50 mg tablet; Take 1 Tab by mouth daily (with dinner). -     phentermine (ADIPEX_P) 15 mg capsule; Take 1 Cap by mouth every morning. Max Daily Amount: 15 mg.    3. Frequent headaches  -     SLEEP MEDICINE REFERRAL  -     topiramate (TOPAMAX) 50 mg tablet; Take 1 Tab by mouth daily (with dinner). 4. Abnormal serum creatinine level  -     METABOLIC PANEL, COMPREHENSIVE; Future    5. Pseudotumor cerebri syndrome          Diet:    Activity:    Medication:    Based on his history and exam, Kacie Manning is a good candidate for the  [facility] Program     There are no Patient Instructions on file for this visit. The primary encounter diagnosis was Snoring.  Diagnoses of Obesity (BMI 35.0-39.9 without comorbidity), Frequent headaches, Abnormal serum creatinine level, and Pseudotumor cerebri syndrome were also pertinent to this visit. The patient verbalizes understanding and agrees with the plan of care    Patient has the advanced directives  booklet to review    Patient Active Problem List    Diagnosis    Intracranial hypertension    Ataxia    Headache    IIH (idiopathic intracranial hypertension)    SLE (systemic lupus erythematosus) (HCC)    Severe obesity with body mass index (BMI) of 35.0 to 39.9 with serious comorbidity ([address]    S/P repair of paraesophageal hernia    Paraesophageal hernia    GERD (gastroesophageal reflux disease)    Obesity, Class II, BMI 35-39.9    Sebaceous cyst     S/P excision; Along sternum in cleavage.  History of Nissen fundoplication     4 duodenal ulcers, chronic gastritis, Grade C esophagitis, Chronic GERD, hernia, small tumor. Done August/2016.       Chronic migraine without aura without status migrainosus, not intractable    Cervical incompetence       Nurse notes were reviewed and copied and are correct  Review of Systems - negative except as listed above in the HPI    Objective:     Vitals:    03/17/21 1347   BP: 130/81   Pulse: 90   Resp: 16   Temp: 97.8 °F (36.6 °C)   TempSrc: Skin   SpO2: 99%   Weight: 209 lb 14.4 oz (95.2 kg)   Height: 5' 2\" (1.575 m)     Physical Examination: General appearance - alert, well appearing, and in no distress  Mental status - alert, oriented to person, place, and time  Chest - clear to auscultation, no wheezes, rales or rhonchi, symmetric air entry  Heart - normal rate, regular rhythm, normal S1, S2, no murmurs, rubs, clicks or gallops  Abdomen - soft, nontender, nondistended, no masses or organomegaly  Neurological - alert, oriented, normal speech, no focal findings or movement disorder noted  Extremities - peripheral pulses normal, no pedal edema, no clubbing or cyanosis  Skin - normal coloration and turgor, no rashes, no suspicious skin lesions noted       Assessment/ Plan:   Diagnoses and all orders for this visit: 1. Snoring  -     SLEEP MEDICINE REFERRAL  The headaches may be set off by hypoxia  2. Obesity (BMI 35.0-39.9 without comorbidity)  -     topiramate (TOPAMAX) 50 mg tablet; Take 1 Tab by mouth daily (with dinner). -     phentermine (ADIPEX_P) 15 mg capsule; Take 1 Cap by mouth every morning. Max Daily Amount: 15 mg. She has night eating syndrome. I think she is waking though due to JEOVANY  Hopefully cpap will keep her sleeping  3. Frequent headaches  -     SLEEP MEDICINE REFERRAL  -     topiramate (TOPAMAX) 50 mg tablet; Take 1 Tab by mouth daily (with dinner)  she  has pseudotumor ceribri as well as signs and Sx of JEOVANY   trying topamax along with phentermine  The topamax  Helps headaches and also has been helpful with night eating syndrome as well  4. Abnormal serum creatinine level  -     METABOLIC PANEL, COMPREHENSIVE; Future  5. Pseudotumor ceribri         ICD-10-CM ICD-9-CM    1. Snoring  R06.83 786.09 SLEEP MEDICINE REFERRAL   2. Obesity (BMI 35.0-39.9 without comorbidity)  E66.9 278.00 topiramate (TOPAMAX) 50 mg tablet      phentermine (ADIPEX_P) 15 mg capsule   3. Frequent headaches  R51.9 784.0 SLEEP MEDICINE REFERRAL      topiramate (TOPAMAX) 50 mg tablet   4. Abnormal serum creatinine level  C95.5 300.11 METABOLIC PANEL, COMPREHENSIVE      METABOLIC PANEL, COMPREHENSIVE   5. Pseudotumor cerebri syndrome  G93.2 348.2        I have discussed the diagnosis with the patient and the intended plan as seen in the above orders. The patient has received an after-visit summary and questions were answered concerning future plans. There are no Patient Instructions on file for this visit.

## 2021-03-17 NOTE — TELEPHONE ENCOUNTER
Returned call to Ms Elizabeth Angeles verified [address] [place] [address]. Patient states she needs clarification with regards to the recommendations prior to the surgery. She received the letter requesting a UGI and a Endoscopy. I explained to patient the EGD will need to be scheduled at Dr Jennifer Hernandez office CS will call to schedule the UGI. Patient states the UGI is scheduled for this Friday at [facility]. She was told by CS the EGD could be done at the same time. I reiterated to patient the EGD will need to be scheduled with Dr Jennifer Hernandez. I will send the referral to the office. The office will call to schedule appointment. Referral faxed with confirmation.

## 2021-03-18 ENCOUNTER — HOSPITAL ENCOUNTER (OUTPATIENT)
Dept: GENERAL RADIOLOGY | Age: 31
Discharge: HOME | End: 2021-03-18
Attending: SURGERY
Payer: MEDICAID

## 2021-03-18 DIAGNOSIS — E66.01 SEVERE OBESITY (BMI 35.0-35.9 WITH COMORBIDITY) (HCC): ICD-10-CM

## 2021-03-18 DIAGNOSIS — K21.9 GASTROESOPHAGEAL REFLUX DISEASE, UNSPECIFIED WHETHER ESOPHAGITIS PRESENT: ICD-10-CM

## 2021-03-18 LAB
ALBUMIN SERPL-MCNC: 4.5 G/DL (ref 3.8–4.8)
ALBUMIN/GLOB SERPL: 2 {RATIO} (ref 1.2–2.2)
ALP SERPL-CCNC: 131 IU/L (ref 39–117)
ALT SERPL-CCNC: 56 IU/L (ref 0–32)
AST SERPL-CCNC: 21 IU/L (ref 0–40)
BILIRUB SERPL-MCNC: 0.2 MG/DL (ref 0–1.2)
BUN SERPL-MCNC: 7 MG/DL (ref 6–20)
BUN/CREAT SERPL: 9 (ref 9–23)
CALCIUM SERPL-MCNC: 9.3 MG/DL (ref 8.7–10.2)
CHLORIDE SERPL-SCNC: 101 MMOL/L (ref 96–106)
CO2 SERPL-SCNC: 26 MMOL/L (ref 20–29)
CREAT SERPL-MCNC: 0.76 MG/DL (ref 0.57–1)
GLOBULIN SER CALC-MCNC: 2.3 G/DL (ref 1.5–4.5)
GLUCOSE SERPL-MCNC: 84 MG/DL (ref 65–99)
POTASSIUM SERPL-SCNC: 3.9 MMOL/L (ref 3.5–5.2)
PROT SERPL-MCNC: 6.8 G/DL (ref 6–8.5)
SODIUM SERPL-SCNC: 140 MMOL/L (ref 134–144)

## 2021-03-18 PROCEDURE — 74246 X-RAY XM UPR GI TRC 2CNTRST: CPT

## 2021-03-23 ENCOUNTER — TELEPHONE (OUTPATIENT)
Dept: FAMILY MEDICINE CLINIC | Age: 31
End: 2021-03-23

## 2021-03-23 RX ORDER — CYCLOBENZAPRINE HCL 10 MG
10 TABLET ORAL 2 TIMES DAILY
Qty: 180 TAB | Refills: 1 | Status: SHIPPED | OUTPATIENT
Start: 2021-03-23 | End: 2021-09-24

## 2021-04-09 ENCOUNTER — CLINICAL SUPPORT (OUTPATIENT)
Dept: SURGERY | Age: 31
End: 2021-04-09

## 2021-04-09 DIAGNOSIS — E66.9 OBESITY (BMI 30-39.9): Primary | ICD-10-CM

## 2021-04-09 NOTE — PROGRESS NOTES
Pre-operative Bariatric Nutrition Evaluation ()     Date: 2021   Lali Calvert M.D. Name: Milton Huynh  :  1990  Age:  26  Gender: Female   Type of Surgery: [x]           Gastric Bypass   []           Sleeve Gastrectomy    ASSESSMENT:    Past Medical History: GERD, anxiety, Pseudotumor cerebri, autoimmune disorder     Medications/Supplements:   Prior to Admission medications    Medication Sig Start Date End Date Taking? Authorizing Provider   hydrOXYchloroQUINE (PLAQUENIL) 200 mg tablet TAKE 1 TABLET BY MOUTH TWICE A DAY PA REQUIRED FOR QTY   16 FAXED MD 3/28/21   Christofer Kimble MD   cyclobenzaprine (FLEXERIL) 10 mg tablet Take 1 Tab by mouth two (2) times a day. 3/23/21   Christofer Kimble MD   diazePAM (VALIUM) 5 mg tablet TAKE ONE DAILY AS NEEDED FOR ANXIETY 3/4/21   Provider, Historical   LORazepam (ATIVAN) 1 mg tablet TAKE ONE TABLET AT NIGHT AS NEEDED FOR ANXIETY 3/4/21   Provider, Historical   biotin 10 mg tab 10 mg = 1 tab each dose, PO, daily 21   Provider, Historical   topiramate (TOPAMAX) 50 mg tablet Take 1 Tab by mouth daily (with dinner). 3/17/21   Christofer Kimble MD   phentermine (ADIPEX_P) 15 mg capsule Take 1 Cap by mouth every morning. Max Daily Amount: 15 mg. 3/17/21   Christofer Kimble MD   gabapentin (NEURONTIN) 600 mg tablet TAKE 1 TAB BY MOUTH THREE (3) TIMES DAILY. MAX DAILY AMOUNT: 1,800 MG. 21   Christofer Kimble MD       Food Allergies/Intolerances:no food allergies     Anthropometrics:    Ht:62\"   Recent Office Wt: 209#    IBW: 110#    %IBW: 190%     BMI:38    Category: obesity II     Reported wt history: Pt completing pre-op nutrition evaluation for wt loss surgery over the phone d/t social distancing guidelines d/t COVID-19. Reports lowest adult BW of 120# and highest adult BW of 212#. Attributes wt gain over the years r/t bedrest with pregnancy and multiple medical conditions and medications that have influenced wt gain.  Has attempted wt loss through various methods with most successful wt loss of 20-30#. Has been unable to maintain long term or significant wt loss and is now seeking approval for weight loss surgery. Pt will need to complete 6 months of supervised weight loss for insurance requirements. Exercise/Physical Activity:none at present    Reported Diet History:physician supervised diets, exercise, diet pills/medications     24 Hour Diet Recall  Breakfast  Skips    Lunch  Chicken sandwich    Dinner  Eats dinner 1-2 times per week (chicken, fish/seafood); eating out 1 time per week   Snacks  Chips, ice cream, cake, cookies   Beverages  Mostly water, 1-2 sodas per week       Environment/Psychosocial/Support:Pt reports good support from , family and kids. Pt is a nurse. NUTRITION DIAGNOSIS:  1. Self-monitoring deficit r/t lack of prior value for this change evidenced by pt skips meals and often eats 1 meal per day. 2. Food and nutrition related knowledge deficit r/t lack of prior exposure to information evidenced by pt seeking nutrition education for gastric bypass. NUTRITION INTERVENTION:  Pt educated on nutrition recommendations for weight loss surgery, specifically gastric bypass. Instructed on consuming 3 meals per day starting now. Use the balanced plate method to plan meals, include 3 oz of lean source of protein, 1/2 cup whole grains, unlimited non-starchy vegetables, 1/2 cup fruit and 1 serving of low fat dairy. Utilize handouts listing healthy snack and meal ideas to limit restaurant meals. After surgery measure all meals to 1/2 cup. Each meal will contain a 1/4 cup lean protein and 1/4 cup fruit, non-starchy vegetable or starch (limiting to once per day). Aim for 60 g protein per day. Sip on 48-64 oz of sugar free, calorie free, non-carbonated beverages each day. Do not use a straw. Do not consume beverages 30 minutes before, during or 30 minutes after meals. Read all nutrition labels.  Demonstrated and emphasized identifying serving size, total fat, sugar and protein content. Defined low fat as </= 3 g per serving. Discussed lean and extra lean sources of protein. Provided list of low fat cooking methods. Avoid foods with sugar listed in the first 3 ingredients and >/15 g sugar per serving. Excess sugar/fat intake may lead to dumping syndrome. Discussed signs and symptoms of dumping syndrome. Practice mindful eating habits; take small bites, chew thoroughly, avoid distractions, utilize hunger/fullness scale. Consume meals over 20-30 minutes. Attend Bariatric Support Group and increase physical activity (approved per MD) for long term weight maintenance. NUTRITION MONITORING AND EVALUATION:    The following goals were established with patient;  1. Work on eating 3 meals a day and not skipping meals. This will be important to achieve adequate protein intake after surgery. Use protein shakes PRN. Set reminders. 2. Continue to drink mostly water and eventually eliminate carbonated and sugar sweetened beverages. 3. Continue to review nutrition education materials for gastric bypass. Follow up next month for continued nutrition education and supervised weight loss. Specific tips and techniques to facilitate compliance with above recommendations were provided and discussed. Nutrition evaluation reveals important lifestyle changes are indicated. Goals set and recommendations made. Will continue to assess. If further details are desired please feel free to contact me at [phone]. This phone number was also provided to the patient for any further questions or concerns.            Sterling Ribera RD

## 2021-04-30 DIAGNOSIS — R51.9 FREQUENT HEADACHES: ICD-10-CM

## 2021-05-04 RX ORDER — GABAPENTIN 600 MG/1
600 TABLET ORAL 3 TIMES DAILY
Qty: 90 TAB | Refills: 1 | Status: SHIPPED | OUTPATIENT
Start: 2021-05-04 | End: 2021-09-30

## 2021-05-07 ENCOUNTER — CLINICAL SUPPORT (OUTPATIENT)
Dept: SURGERY | Age: 31
End: 2021-05-07

## 2021-05-07 DIAGNOSIS — E66.9 OBESITY (BMI 30-39.9): Primary | ICD-10-CM

## 2021-05-07 NOTE — PROGRESS NOTES
[facility] at [facility]  Supervised Weight Loss     Date:   2021    Patient's Name: Taryn Cosby  : 1990    Insurance:  [facility]           Session: 2 of  6  Surgery: Gastric Bypass  Surgeon:  Colten Schroeder M.D. Height: 62\"   Reported Wt:    212      Lbs. BMI: 38   Pounds Lost since last month: 0               Pounds Gained since last month: 3#    Starting Weight: 209   Previous Months Weight: 209  Overall Pounds Lost: 0  Overall Pounds Gained: 3#    Other Pertinent Information: Today's appointment was completed in a virtual setting d/t COVID-19. Today's wt was self-reported. Smoking Status:  none  Alcohol Intake: none    I have reviewed with pt the guidelines of the supervised wt loss program.  Pt understands the expectations of some wt loss during the program and that wt gain could delay the process. I have also explained that appointments need to be consecutive and missing an appointment may result in starting over. Pt has received this information in writing. Changes that patient has made since last month include:  Reduced snacking/grazing (ice cream, chips). Using protein shakes in place of skipping meals. Eating Habits and Behaviors  A nutrition lesson was presented on label reading with specific guidelines provided for limiting added sugars. This information will help increase healthy food choices, promote weight loss and prevent dumping syndrome after gastric bypass. We also reviewed the general nutrition guidelines for bariatric surgery. Patient's current diet habits include: Pt is eating 2-3 meals per day. Using a protein shake in place of skipping breakfast. Is still skipping lunch and dinner d/t lack of physical hunger. Snack choices include chips and ice cream but has recently reduced this past month. Pt is eating refined carbohydrate foods (bread, pasta, rice, potatoes) a few times per week.  Pt is eating sweets/desserts 2 times this past month. Pt is eating meals prepared outside of the home 1 meal per week. Pt is drinking mostly water and juice if taking medication. Pt reports no to emotional eating. Physical Activity/Exercise  We talked about the importance of increasing daily physical activity and beginning to develop an exercise regimen/routine. We talked about exercise as being an important part of long term weight loss after surgery. Comments:  During class, I discussed with patient the importance of getting into an exercise routine. Pt is currently not exercising d/t hip pain. Pt has been encouraged to consider short segments of walking as tolerated. Behavior Modification       We talked about how to eat more mindfully. Tips and recommendations for how to make these changes were provided. Pt was encouraged to keep a food journal and record what they were taking in daily. Overall Assessment: Pt demonstrates appropriate lifestyle changes evidenced by reported changes. Will continue to assess. Patient-Set Goals:   1. Nutrition - work on 2 meals a day and eventually 3 meals a day  2. Exercise - short segments of activity if better tolerated   3.  Behavior -reading food labels for sugar content     Lucinda Guan, THUY  5/7/2021

## 2021-05-20 ENCOUNTER — HOSPITAL ENCOUNTER (OUTPATIENT)
Dept: LAB | Age: 31
Discharge: HOME | End: 2021-05-20
Payer: MEDICAID

## 2021-05-20 ENCOUNTER — TRANSCRIBE ORDER (OUTPATIENT)
Dept: EMERGENCY DEPT | Age: 31
End: 2021-05-20

## 2021-05-20 DIAGNOSIS — Z01.812 PRE-PROCEDURAL LABORATORY EXAMINATIONS: ICD-10-CM

## 2021-05-20 DIAGNOSIS — Z01.812 PRE-PROCEDURAL LABORATORY EXAMINATIONS: Primary | ICD-10-CM

## 2021-05-20 PROCEDURE — U0003 INFECTIOUS AGENT DETECTION BY NUCLEIC ACID (DNA OR RNA); SEVERE ACUTE RESPIRATORY SYNDROME CORONAVIRUS 2 (SARS-COV-2) (CORONAVIRUS DISEASE [COVID-19]), AMPLIFIED PROBE TECHNIQUE, MAKING USE OF HIGH THROUGHPUT TECHNOLOGIES AS DESCRIBED BY CMS-2020-01-R: HCPCS

## 2021-05-21 ENCOUNTER — APPOINTMENT (OUTPATIENT)
Dept: CT IMAGING | Age: 31
End: 2021-05-21
Attending: EMERGENCY MEDICINE
Payer: MEDICAID

## 2021-05-21 ENCOUNTER — HOSPITAL ENCOUNTER (EMERGENCY)
Age: 31
Discharge: HOME | End: 2021-05-22
Attending: EMERGENCY MEDICINE | Admitting: EMERGENCY MEDICINE
Payer: MEDICAID

## 2021-05-21 DIAGNOSIS — R51.9 CHRONIC NONINTRACTABLE HEADACHE, UNSPECIFIED HEADACHE TYPE: Primary | ICD-10-CM

## 2021-05-21 DIAGNOSIS — G89.29 CHRONIC NONINTRACTABLE HEADACHE, UNSPECIFIED HEADACHE TYPE: Primary | ICD-10-CM

## 2021-05-21 LAB — SARS-COV-2, COV2NT: NOT DETECTED

## 2021-05-21 PROCEDURE — 36415 COLL VENOUS BLD VENIPUNCTURE: CPT

## 2021-05-21 PROCEDURE — 70450 CT HEAD/BRAIN W/O DYE: CPT

## 2021-05-21 PROCEDURE — 80053 COMPREHEN METABOLIC PANEL: CPT

## 2021-05-21 PROCEDURE — 84703 CHORIONIC GONADOTROPIN ASSAY: CPT

## 2021-05-21 PROCEDURE — 96375 TX/PRO/DX INJ NEW DRUG ADDON: CPT

## 2021-05-21 PROCEDURE — 85025 COMPLETE CBC W/AUTO DIFF WBC: CPT

## 2021-05-21 PROCEDURE — 96374 THER/PROPH/DIAG INJ IV PUSH: CPT

## 2021-05-21 PROCEDURE — 74011250636 HC RX REV CODE- 250/636: Performed by: EMERGENCY MEDICINE

## 2021-05-21 PROCEDURE — 99284 EMERGENCY DEPT VISIT MOD MDM: CPT

## 2021-05-21 RX ORDER — HYDROMORPHONE HYDROCHLORIDE 1 MG/ML
0.5 INJECTION, SOLUTION INTRAMUSCULAR; INTRAVENOUS; SUBCUTANEOUS ONCE
Status: COMPLETED | OUTPATIENT
Start: 2021-05-21 | End: 2021-05-21

## 2021-05-21 RX ORDER — FUROSEMIDE 10 MG/ML
40 INJECTION INTRAMUSCULAR; INTRAVENOUS
Status: COMPLETED | OUTPATIENT
Start: 2021-05-21 | End: 2021-05-21

## 2021-05-21 RX ORDER — ONDANSETRON 2 MG/ML
4 INJECTION INTRAMUSCULAR; INTRAVENOUS ONCE
Status: COMPLETED | OUTPATIENT
Start: 2021-05-21 | End: 2021-05-21

## 2021-05-21 RX ADMIN — FUROSEMIDE 40 MG: 10 INJECTION, SOLUTION INTRAMUSCULAR; INTRAVENOUS at 23:45

## 2021-05-21 RX ADMIN — ONDANSETRON 4 MG: 2 INJECTION INTRAMUSCULAR; INTRAVENOUS at 23:46

## 2021-05-21 RX ADMIN — HYDROMORPHONE HYDROCHLORIDE 0.5 MG: 1 INJECTION, SOLUTION INTRAMUSCULAR; INTRAVENOUS; SUBCUTANEOUS at 23:46

## 2021-05-22 VITALS
WEIGHT: 212 LBS | TEMPERATURE: 98.3 F | RESPIRATION RATE: 16 BRPM | BODY MASS INDEX: 39.01 KG/M2 | SYSTOLIC BLOOD PRESSURE: 114 MMHG | HEIGHT: 62 IN | HEART RATE: 81 BPM | DIASTOLIC BLOOD PRESSURE: 84 MMHG | OXYGEN SATURATION: 99 %

## 2021-05-22 LAB
ALBUMIN SERPL-MCNC: 3.8 G/DL (ref 3.5–5)
ALBUMIN/GLOB SERPL: 1 {RATIO} (ref 1.1–2.2)
ALP SERPL-CCNC: 98 U/L (ref 45–117)
ALT SERPL-CCNC: 49 U/L (ref 12–78)
ANION GAP SERPL CALC-SCNC: 4 MMOL/L (ref 5–15)
AST SERPL W P-5'-P-CCNC: 13 U/L (ref 15–37)
BASOPHILS # BLD: 0 K/UL (ref 0–0.1)
BASOPHILS NFR BLD: 0 % (ref 0–1)
BILIRUB SERPL-MCNC: 0.4 MG/DL (ref 0.2–1)
BUN SERPL-MCNC: 13 MG/DL (ref 6–20)
BUN/CREAT SERPL: 15 (ref 12–20)
CA-I BLD-MCNC: 8.8 MG/DL (ref 8.5–10.1)
CHLORIDE SERPL-SCNC: 105 MMOL/L (ref 97–108)
CO2 SERPL-SCNC: 28 MMOL/L (ref 21–32)
CREAT SERPL-MCNC: 0.86 MG/DL (ref 0.55–1.02)
DIFFERENTIAL METHOD BLD: NORMAL
EOSINOPHIL # BLD: 0.1 K/UL (ref 0–0.4)
EOSINOPHIL NFR BLD: 1 % (ref 0–7)
ERYTHROCYTE [DISTWIDTH] IN BLOOD BY AUTOMATED COUNT: 13.3 % (ref 11.5–14.5)
GLOBULIN SER CALC-MCNC: 3.7 G/DL (ref 2–4)
GLUCOSE SERPL-MCNC: 84 MG/DL (ref 65–100)
HCG SERPL QL: NEGATIVE
HCT VFR BLD AUTO: 41.4 % (ref 35–47)
HGB BLD-MCNC: 13.4 G/DL (ref 11.5–16)
IMM GRANULOCYTES # BLD AUTO: 0 K/UL (ref 0–0.04)
IMM GRANULOCYTES NFR BLD AUTO: 0 % (ref 0–0.5)
LYMPHOCYTES # BLD: 3.4 K/UL (ref 0.8–3.5)
LYMPHOCYTES NFR BLD: 35 % (ref 12–49)
MCH RBC QN AUTO: 29.8 PG (ref 26–34)
MCHC RBC AUTO-ENTMCNC: 32.4 G/DL (ref 30–36.5)
MCV RBC AUTO: 92.2 FL (ref 80–99)
MONOCYTES # BLD: 0.8 K/UL (ref 0–1)
MONOCYTES NFR BLD: 8 % (ref 5–13)
NEUTS SEG # BLD: 5.5 K/UL (ref 1.8–8)
NEUTS SEG NFR BLD: 56 % (ref 32–75)
NRBC # BLD: 0 K/UL (ref 0–0.01)
NRBC BLD-RTO: 0 PER 100 WBC
PLATELET # BLD AUTO: 339 K/UL (ref 150–400)
PMV BLD AUTO: 8.9 FL (ref 8.9–12.9)
POTASSIUM SERPL-SCNC: 3.9 MMOL/L (ref 3.5–5.1)
PROT SERPL-MCNC: 7.5 G/DL (ref 6.4–8.2)
RBC # BLD AUTO: 4.49 M/UL (ref 3.8–5.2)
SODIUM SERPL-SCNC: 137 MMOL/L (ref 136–145)
WBC # BLD AUTO: 9.9 K/UL (ref 3.6–11)

## 2021-05-22 PROCEDURE — 74011250636 HC RX REV CODE- 250/636: Performed by: EMERGENCY MEDICINE

## 2021-05-22 PROCEDURE — 74011250637 HC RX REV CODE- 250/637: Performed by: EMERGENCY MEDICINE

## 2021-05-22 RX ORDER — DIPHENHYDRAMINE HCL 12.5MG/5ML
12.5 ELIXIR ORAL ONCE
Status: COMPLETED | OUTPATIENT
Start: 2021-05-22 | End: 2021-05-22

## 2021-05-22 RX ORDER — LORAZEPAM 2 MG/ML
1 INJECTION INTRAMUSCULAR ONCE
Status: COMPLETED | OUTPATIENT
Start: 2021-05-22 | End: 2021-05-22

## 2021-05-22 RX ORDER — HALOPERIDOL 5 MG/ML
4 INJECTION INTRAMUSCULAR ONCE
Status: DISCONTINUED | OUTPATIENT
Start: 2021-05-22 | End: 2021-05-22 | Stop reason: HOSPADM

## 2021-05-22 RX ORDER — DIPHENHYDRAMINE HCL 25 MG
12.5 CAPSULE ORAL
Status: DISCONTINUED | OUTPATIENT
Start: 2021-05-22 | End: 2021-05-22

## 2021-05-22 RX ADMIN — DIPHENHYDRAMINE HYDROCHLORIDE 12.5 MG: 25 SOLUTION ORAL at 02:22

## 2021-05-22 RX ADMIN — LORAZEPAM 1 MG: 2 INJECTION INTRAMUSCULAR; INTRAVENOUS at 02:25
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
## 2021-05-22 NOTE — ED NOTES
Pt states she has been seen by neurologist and Dx with pseudotumors due to the presentation of symptoms with no actual tumor found in cranium. Currently working on finding a new neurologist as Primary.  Last Isabel aHrtman MD. PCP Crys Diego MD

## 2021-05-22 NOTE — ED NOTES
In to dc pt after Dr. Grecia Hernandez in to speak with her regarding dx and follow up. Pt states \"I feel like the doctor doesn't care\". Pt asking about take home RX. After speaking with Dr. Grecia Hernandez there will not be an RX. Pt to f/u with neurology. Pt. Then given dc instructions and asked to sign pt acknowledgment of receiving dc instructions. Pt refused to sign and also refused to take the printed dc instructions with her. Pt states \"I'm leaving here with the same problem I came in for\". Pt a/o, amb to exit without difficulty.

## 2021-05-24 ENCOUNTER — ANESTHESIA EVENT (OUTPATIENT)
Dept: ENDOSCOPY | Age: 31
End: 2021-05-24
Payer: MEDICAID

## 2021-05-24 ENCOUNTER — ANESTHESIA (OUTPATIENT)
Dept: ENDOSCOPY | Age: 31
End: 2021-05-24
Payer: MEDICAID

## 2021-05-24 ENCOUNTER — HOSPITAL ENCOUNTER (OUTPATIENT)
Age: 31
Setting detail: OUTPATIENT SURGERY
Discharge: HOME | End: 2021-05-24
Attending: INTERNAL MEDICINE | Admitting: INTERNAL MEDICINE
Payer: MEDICAID

## 2021-05-24 VITALS
RESPIRATION RATE: 17 BRPM | DIASTOLIC BLOOD PRESSURE: 93 MMHG | SYSTOLIC BLOOD PRESSURE: 130 MMHG | OXYGEN SATURATION: 99 % | HEIGHT: 62 IN | TEMPERATURE: 97.8 F | WEIGHT: 209 LBS | BODY MASS INDEX: 38.46 KG/M2 | HEART RATE: 78 BPM

## 2021-05-24 LAB — HCG UR QL: NEGATIVE

## 2021-05-24 PROCEDURE — 76040000019: Performed by: INTERNAL MEDICINE

## 2021-05-24 PROCEDURE — 74011250636 HC RX REV CODE- 250/636: Performed by: NURSE ANESTHETIST, CERTIFIED REGISTERED

## 2021-05-24 PROCEDURE — 76060000031 HC ANESTHESIA FIRST 0.5 HR: Performed by: INTERNAL MEDICINE

## 2021-05-24 PROCEDURE — 2709999900 HC NON-CHARGEABLE SUPPLY: Performed by: INTERNAL MEDICINE

## 2021-05-24 PROCEDURE — 81025 URINE PREGNANCY TEST: CPT

## 2021-05-24 PROCEDURE — 88305 TISSUE EXAM BY PATHOLOGIST: CPT

## 2021-05-24 PROCEDURE — 74011000250 HC RX REV CODE- 250: Performed by: NURSE ANESTHETIST, CERTIFIED REGISTERED

## 2021-05-24 PROCEDURE — 77030021593 HC FCPS BIOP ENDOSC BSC -A: Performed by: INTERNAL MEDICINE

## 2021-05-24 RX ORDER — DEXTROMETHORPHAN/PSEUDOEPHED 2.5-7.5/.8
1.2 DROPS ORAL
Status: DISCONTINUED | OUTPATIENT
Start: 2021-05-24 | End: 2021-05-24 | Stop reason: HOSPADM

## 2021-05-24 RX ORDER — ATROPINE SULFATE 0.1 MG/ML
0.5 INJECTION INTRAVENOUS
Status: DISCONTINUED | OUTPATIENT
Start: 2021-05-24 | End: 2021-05-24 | Stop reason: HOSPADM

## 2021-05-24 RX ORDER — EPINEPHRINE 0.1 MG/ML
1 INJECTION INTRACARDIAC; INTRAVENOUS
Status: DISCONTINUED | OUTPATIENT
Start: 2021-05-24 | End: 2021-05-24 | Stop reason: HOSPADM

## 2021-05-24 RX ORDER — SODIUM CHLORIDE 0.9 % (FLUSH) 0.9 %
5-40 SYRINGE (ML) INJECTION EVERY 8 HOURS
Status: DISCONTINUED | OUTPATIENT
Start: 2021-05-24 | End: 2021-05-24 | Stop reason: HOSPADM

## 2021-05-24 RX ORDER — LIDOCAINE HYDROCHLORIDE 20 MG/ML
INJECTION, SOLUTION EPIDURAL; INFILTRATION; INTRACAUDAL; PERINEURAL AS NEEDED
Status: DISCONTINUED | OUTPATIENT
Start: 2021-05-24 | End: 2021-05-24 | Stop reason: HOSPADM

## 2021-05-24 RX ORDER — NALOXONE HYDROCHLORIDE 0.4 MG/ML
0.4 INJECTION, SOLUTION INTRAMUSCULAR; INTRAVENOUS; SUBCUTANEOUS
Status: DISCONTINUED | OUTPATIENT
Start: 2021-05-24 | End: 2021-05-24 | Stop reason: HOSPADM

## 2021-05-24 RX ORDER — SODIUM CHLORIDE 9 MG/ML
50 INJECTION, SOLUTION INTRAVENOUS CONTINUOUS
Status: DISCONTINUED | OUTPATIENT
Start: 2021-05-24 | End: 2021-05-24 | Stop reason: HOSPADM

## 2021-05-24 RX ORDER — PROPOFOL 10 MG/ML
INJECTION, EMULSION INTRAVENOUS AS NEEDED
Status: DISCONTINUED | OUTPATIENT
Start: 2021-05-24 | End: 2021-05-24 | Stop reason: HOSPADM

## 2021-05-24 RX ORDER — SODIUM CHLORIDE 0.9 % (FLUSH) 0.9 %
5-40 SYRINGE (ML) INJECTION AS NEEDED
Status: DISCONTINUED | OUTPATIENT
Start: 2021-05-24 | End: 2021-05-24 | Stop reason: HOSPADM

## 2021-05-24 RX ORDER — FLUMAZENIL 0.1 MG/ML
0.2 INJECTION INTRAVENOUS
Status: DISCONTINUED | OUTPATIENT
Start: 2021-05-24 | End: 2021-05-24 | Stop reason: HOSPADM

## 2021-05-24 RX ADMIN — PROPOFOL 100 MG: 10 INJECTION, EMULSION INTRAVENOUS at 14:48

## 2021-05-24 RX ADMIN — PROPOFOL 50 MG: 10 INJECTION, EMULSION INTRAVENOUS at 14:50

## 2021-05-24 RX ADMIN — PROPOFOL 50 MG: 10 INJECTION, EMULSION INTRAVENOUS at 14:49

## 2021-05-24 RX ADMIN — PROPOFOL 50 MG: 10 INJECTION, EMULSION INTRAVENOUS at 14:52

## 2021-05-24 RX ADMIN — LIDOCAINE HYDROCHLORIDE 100 MG: 20 INJECTION, SOLUTION EPIDURAL; INFILTRATION; INTRACAUDAL; PERINEURAL at 14:48

## 2021-05-24 NOTE — PROCEDURES
[address]  [address] [facility] [address] [place]  [place], [address]  [phone]                            NAME:  Bhavesh Murillo   :   1990   MRN:   [id number]     Date/Time:  2021 2:59 PM    Esophagogastroduodenoscopy (EGD) Procedure Note    :  Dennie Reid, MD    Staff: Endoscopy Technician-1: Alysha Narvaez  Endoscopy RN-1: Luis Ennis RN     Implants: none    Referring Provider:  Bakari Carey MD    Anethesia/Sedation:  MAC anesthesia    Procedure Details     After infom consent was obtained for the procedure, with all risks and benefits of procedure explained the patient was taken to the endoscopy suite and placed in the left lateral decubitus position. Following sequential administration of sedation as per above, the [id number] gastroscope was inserted into the mouth and advanced under direct vision to second portion of the duodenum. A careful inspection was made as the gastroscope was withdrawn, including a retroflexed view of the proximal stomach; findings and interventions are described below. Findings:  Esophagus: LA grade B esophagitis was noted. Z line was irregular. Rest of the esophagus was normal   Stomach: Normal mucosa entire stomach. Previous fundoplication was noted and was intact  Duodenum/jejunum:normal      Therapies:  biopsy of esophagus at GE junction    Specimens: biopsy of esophagus at GE junction           EBL: None    Complications:   None; patient tolerated the procedure well. Impression:    See Postoperative diagnosis above    Recommendations:  -Continue acid suppression. , -Await pathology. , -Follow symptoms.     Discharge disposition:  Home in the company of  when able to ambulate    Dennie Reid, MD

## 2021-05-24 NOTE — DISCHARGE INSTRUCTIONS
118 [facility].  [address]  [id number]  1990    DISCOMFORT:  Sore throat- throat lozenges or warm salt water gargle  redness at IV site- apply warm compress to area; if redness or soreness persist- contact your physician  Gaseous discomfort- walking, belching will help relieve any discomfort    DIET  You may eat and drink after you leave. You may resume your regular diet - however -  remember your colon is empty and a heavy meal will produce gas. Avoid these foods:  vegetables, fried / greasy foods, carbonated drinks   You may not drink alcoholic beverages for at least 12 hours    ACTIVITY  You may resume your normal daily activities   Spend the remainder of the day resting -  avoid any strenuous activity. You may not operate a vehicle for 12 hours  You may not engage in an occupation involving machinery or appliances for rest of today  Avoid making any critical decisions for at least 24 hour    CALL M.D. ANY SIGN OF   Increasing pain, nausea, vomiting  Abdominal distension (swelling)  New increased bleeding (oral or rectal)  Fever (chills)  Pain in chest area  Bloody discharge from nose or mouth  Shortness of breath    Follow-up Instructions:   Call Dr. Nelli Del Cid for any questions or problems. If we took a biopsy please call the office within 2 weeks to discuss your pathology results. Telephone # [phone]       ENDOSCOPY FINDINGS:   Esophagitis  Fundiplication         Learning About Coronavirus (COVID-19)  Coronavirus [phone]): Overview  What is coronavirus ([id number])? The coronavirus disease (COVID-19) is caused by a virus. It is an illness that was first found in [place], [place], in December 2019. It has since spread worldwide. The virus can cause fever, cough, and trouble breathing. In severe cases, it can cause pneumonia and make it hard to breathe without help.  It can cause death. Coronaviruses are a large group of viruses. They cause the common cold. They also cause more serious illnesses like Middle East respiratory syndrome (MERS) and severe acute respiratory syndrome (SARS). COVID-19 is caused by a novel coronavirus. That means it's a new type that has not been seen in people before. This virus spreads person-to-person through droplets from coughing and sneezing. It can also spread when you are close to someone who is infected. And it can spread when you touch something that has the virus on it, such as a doorknob or a tabletop. What can you do to protect yourself from coronavirus (COVID-19)? The best way to protect yourself from getting sick is to:  · Avoid areas where there is an outbreak. · Avoid contact with people who may be infected. · Wash your hands often with soap or alcohol-based hand sanitizers. · Avoid crowds and try to stay at least 6 feet away from other people. · Wash your hands often, especially after you cough or sneeze. Use soap and water, and scrub for at least 20 seconds. If soap and water aren't available, use an alcohol-based hand . · Avoid touching your mouth, nose, and eyes. What can you do to avoid spreading the virus to others? To help avoid spreading the virus to others:  · Cover your mouth with a tissue when you cough or sneeze. Then throw the tissue in the trash. · Use a disinfectant to clean things that you touch often. · Stay home if you are sick or have been exposed to the virus. Don't go to school, work, or public areas. And don't use public transportation. · If you are sick:  ? Leave your home only if you need to get medical care. But call the doctor's office first so they know you're coming. And wear a face mask, if you have one.  ? If you have a face mask, wear it whenever you're around other people. It can help stop the spread of the virus when you cough or sneeze. ? Clean and disinfect your home every day.  Use household  and disinfectant wipes or sprays. Take special care to clean things that you grab with your hands. These include doorknobs, remote controls, phones, and handles on your refrigerator and microwave. And don't forget countertops, tabletops, bathrooms, and computer keyboards. When to call for help  Call 911 anytime you think you may need emergency care. For example, call if:  · You have severe trouble breathing. (You can't talk at all.)  · You have constant chest pain or pressure. · You are severely dizzy or lightheaded. · You are confused or can't think clearly. · Your face and lips have a blue color. · You pass out (lose consciousness) or are very hard to wake up. Call your doctor now if you develop symptoms such as:  · Shortness of breath. · Fever. · Cough. If you need to get care, call ahead to the doctor's office for instructions before you go. Make sure you wear a face mask, if you have one, to prevent exposing other people to the virus. Where can you get the latest information? The following health organizations are tracking and studying this virus. Their websites contain the most up-to-date information. [place] also learn what to do if you think you may have been exposed to the virus. · U.S. Centers for Disease Control and Prevention (CDC): The CDC provides updated news about the disease and travel advice. The website also tells you how to prevent the spread of infection. www.cdc.gov  · World Health Organization [facility]): WHO offers information about the virus outbreaks. WHO also has travel advice. www.who.int  Current as of: April 1, 2020               Content Version: 12.4  © 9323-8836 Healthwise, Incorporated.    Care instructions adapted under license by your healthcare professional. If you have questions about a medical condition or this instruction, always ask your healthcare professional. [address] any warranty or liability for your use of this information. Patient Education        Esophagitis: Care Instructions  Your Care Instructions     Esophagitis (say \"ih-sof-uh-JY-tus\") is irritation of the esophagus, the tube that carries food from your throat to your stomach. Acid reflux is the most common cause of this condition. When you have reflux, stomach acid and juices flow upward. This can cause pain or a burning feeling in your chest. You may have a sore throat. It may be hard to swallow. Other causes of this condition include some medicines and supplements. Allergies or an infection can also cause it. Your doctor will ask about your symptoms and past health. He or she might do tests to find the cause of your symptoms. Treatment depends on what is causing the problem. Treatment might include changing your diet or taking medicine to relieve your symptoms. It might also include changing a medicine that is causing your symptoms. If you have reflux, medicine that reduces the stomach acid helps your body heal. It might take 1 to 3 weeks to heal.  Follow-up care is a key part of your treatment and safety. Be sure to make and go to all appointments, and call your doctor if you are having problems. It's also a good idea to know your test results and keep a list of the medicines you take. How can you care for yourself at home? · If you have acid reflux, your doctor may recommend that you:  ? Eat several small meals instead of two or three large meals. After you eat, wait 2 to 3 hours before you lie down. ? Avoid chocolate, mint, alcohol, and spicy foods. ? Don't smoke or use smokeless tobacco. Smoking can make this condition worse. If you need help quitting, talk to your doctor about stop-smoking programs and medicines. These can increase your chances of quitting for good. ? Raise the head of your bed 6 to 8 inches if you have symptoms at night. ? Lose weight if you are overweight. ? Take an over-the-counter antacid, such as Maalox, Mylanta, or Tums. Be careful when you take over-the-counter antacid medicines. Many of these medicines have aspirin in them. Read the label to make sure that you are not taking more than the recommended dose. Too much aspirin can be harmful. ? Take stronger acid reducers. Examples are famotidine (such as Pepcid) and omeprazole (such as Prilosec). · If your condition is caused by infection, allergy, or other problems, use the medicine or treatments that your doctor recommends. · Be safe with medicines. Take your medicines exactly as prescribed. Call your doctor if you think you are having a problem with your medicine. When should you call for help? Call your doctor now or seek immediate medical care if:    · You have new or worse belly pain.     · You are vomiting. Watch closely for changes in your health, and be sure to contact your doctor if:    · You have new or worse symptoms of reflux.     · You have trouble or pain swallowing.     · You are losing weight.     · You do not get better as expected. Where can you learn more? Go to http://www.gray.com/  Enter N977 in the search box to learn more about \"Esophagitis: Care Instructions. \"  Current as of: April 15, 2020               Content Version: 12.8  © 2006-2021 Healthwise, Incorporated. Care instructions adapted under license by [employer] (which disclaims liability or warranty for this information). If you have questions about a medical condition or this instruction, always ask your healthcare professional. [place] [address] any warranty or liability for your use of this information.

## 2021-05-24 NOTE — ANESTHESIA PREPROCEDURE EVALUATION
Relevant Problems   NEUROLOGY   (+) Headache      GASTROINTESTINAL   (+) GERD (gastroesophageal reflux disease)   (+) Paraesophageal hernia      ENDOCRINE   (+) Severe obesity with body mass index (BMI) of 35.0 to 39.9 with serious comorbidity (HCC)       Anesthetic History   No history of anesthetic complications            Review of Systems / Medical History  Patient summary reviewed, nursing notes reviewed and pertinent labs reviewed    Pulmonary  Within defined limits                 Neuro/Psych   Within defined limits          Comments: pseudotumor Cardiovascular  Within defined limits                Exercise tolerance: >4 METS     GI/Hepatic/Renal     GERD      PUD     Endo/Other        Obesity     Other Findings   Comments: SLE           Physical Exam    Airway  Mallampati: II  TM Distance: > 6 cm  Neck ROM: normal range of motion   Mouth opening: Normal     Cardiovascular  Regular rate and rhythm,  S1 and S2 normal,  no murmur, click, rub, or gallop             Dental  No notable dental hx       Pulmonary  Breath sounds clear to auscultation               Abdominal  GI exam deferred       Other Findings            Anesthetic Plan    ASA: 3  Anesthesia type: MAC          Induction: Intravenous  Anesthetic plan and risks discussed with: Patient
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
## 2021-05-24 NOTE — PERIOP NOTES

## 2021-05-25 NOTE — ANESTHESIA POSTPROCEDURE EVALUATION
Post-Anesthesia Evaluation and Assessment    Patient: Anup Marrero MRN: [id number]  SSN: [id number]    YOB: 1990  Age: 26 y.o. Sex: female      I have evaluated the patient and they are stable and ready for discharge from the PACU. Cardiovascular Function/Vital Signs  Visit Vitals  BP (!) 130/93   Pulse 78   Temp 36.6 °C (97.8 °F)   Resp 17   Ht 5' 2\" (1.575 m)   Wt 94.8 kg (209 lb)   SpO2 99%   Breastfeeding No   BMI 38.23 kg/m²       Patient is status post MAC anesthesia for Procedure(s):  ESOPHAGOGASTRODUODENOSCOPY (EGD) (LATEX ALLERGY)   :-  ESOPHAGOGASTRODUODENAL (EGD) BIOPSY. Nausea/Vomiting: None    Postoperative hydration reviewed and adequate. Pain:  Pain Scale 1: Numeric (0 - 10) (05/24/21 1520)  Pain Intensity 1: 3 (05/24/21 1531)   Managed    Neurological Status: At baseline    Mental Status, Level of Consciousness: Alert and  oriented to person, place, and time    Pulmonary Status:   O2 Device: None (Room air) (05/24/21 1520)   Adequate oxygenation and airway patent    Complications related to anesthesia: None    Post-anesthesia assessment completed. No concerns    Signed By: Ally Dillard MD     May 25, 2021              Procedure(s):  ESOPHAGOGASTRODUODENOSCOPY (EGD) (LATEX ALLERGY)   :-  ESOPHAGOGASTRODUODENAL (EGD) BIOPSY. MAC    <BSHSIANPOST>    INITIAL Post-op Vital signs:   Vitals Value Taken Time   /98 05/24/21 1525   Temp 36.6 °C (97.8 °F) 05/24/21 1501   Pulse 84 05/24/21 1525   Resp 17 05/24/21 1525   SpO2 99 % 05/24/21 1525   Vitals shown include unvalidated device data.

## 2021-06-03 ENCOUNTER — TELEPHONE (OUTPATIENT)
Dept: SURGERY | Age: 31
End: 2021-06-03

## 2021-06-03 ENCOUNTER — HOSPITAL ENCOUNTER (EMERGENCY)
Age: 31
Discharge: HOME | End: 2021-06-04
Attending: EMERGENCY MEDICINE
Payer: MEDICAID

## 2021-06-03 ENCOUNTER — APPOINTMENT (OUTPATIENT)
Dept: CT IMAGING | Age: 31
End: 2021-06-03
Attending: NURSE PRACTITIONER
Payer: MEDICAID

## 2021-06-03 DIAGNOSIS — G44.029 CHRONIC CLUSTER HEADACHE, NOT INTRACTABLE: Primary | ICD-10-CM

## 2021-06-03 DIAGNOSIS — G93.2 INTRACRANIAL HYPERTENSION: ICD-10-CM

## 2021-06-03 LAB — HCG UR QL: NEGATIVE

## 2021-06-03 PROCEDURE — 81025 URINE PREGNANCY TEST: CPT

## 2021-06-03 PROCEDURE — 96361 HYDRATE IV INFUSION ADD-ON: CPT

## 2021-06-03 PROCEDURE — 99284 EMERGENCY DEPT VISIT MOD MDM: CPT

## 2021-06-03 PROCEDURE — 99285 EMERGENCY DEPT VISIT HI MDM: CPT

## 2021-06-03 PROCEDURE — 96375 TX/PRO/DX INJ NEW DRUG ADDON: CPT

## 2021-06-03 PROCEDURE — 70450 CT HEAD/BRAIN W/O DYE: CPT

## 2021-06-03 PROCEDURE — 96374 THER/PROPH/DIAG INJ IV PUSH: CPT

## 2021-06-03 PROCEDURE — 96376 TX/PRO/DX INJ SAME DRUG ADON: CPT

## 2021-06-03 RX ORDER — KETOROLAC TROMETHAMINE 30 MG/ML
30 INJECTION, SOLUTION INTRAMUSCULAR; INTRAVENOUS
Status: COMPLETED | OUTPATIENT
Start: 2021-06-03 | End: 2021-06-04

## 2021-06-03 RX ORDER — HYDROMORPHONE HYDROCHLORIDE 1 MG/ML
0.5 INJECTION, SOLUTION INTRAMUSCULAR; INTRAVENOUS; SUBCUTANEOUS
Status: COMPLETED | OUTPATIENT
Start: 2021-06-03 | End: 2021-06-04

## 2021-06-03 RX ORDER — ONDANSETRON 2 MG/ML
8 INJECTION INTRAMUSCULAR; INTRAVENOUS
Status: COMPLETED | OUTPATIENT
Start: 2021-06-03 | End: 2021-06-04

## 2021-06-03 NOTE — TELEPHONE ENCOUNTER
Pt called stating that she had GI surgery in the past but still has acid reflux and esophagitis, although she does not have symptoms. She wants to get bariatric surgery, but  informed her he would basically have to undo her previous surgery to do the bariatric surgery. She wants to know if she has the Bariatric surgery, will it cause he previous symptoms (acid reflux, etc) to come back. Please call patient to discuss this with her.

## 2021-06-03 NOTE — Clinical Note
[address] 
[facility] EMERGENCY DEPT 
[address] [facility] [place] [address] [phone] Work/School Note Date: 6/3/2021 To Whom It May concern: 
 
Krysta Harris was seen and treated today in the emergency room by the following provider(s): 
Attending Provider: Malou Beltran MD. Krysta Harris is excused from work/school on 06/04/21 and 06/05/21. She is medically clear to return to work/school on 6/6/2021. Sincerely, Elizabeth Beaver MD

## 2021-06-03 NOTE — TELEPHONE ENCOUNTER
Returned call to Ms Kimberlee Maloney verified all PHI. Patient wants to know if she will have reflux after she has Bariatric Surgery. She currently isn't having a issue. I explained to patient I can't determine if the reflux will come back after surgery. I advised patient to call to schedule a follow up appointment with Dr Batsheva Martins to discuss the surgery.

## 2021-06-04 VITALS
DIASTOLIC BLOOD PRESSURE: 85 MMHG | TEMPERATURE: 98.2 F | HEIGHT: 62 IN | HEART RATE: 99 BPM | WEIGHT: 222 LBS | RESPIRATION RATE: 17 BRPM | BODY MASS INDEX: 40.85 KG/M2 | OXYGEN SATURATION: 97 % | SYSTOLIC BLOOD PRESSURE: 125 MMHG

## 2021-06-04 LAB
ALBUMIN SERPL-MCNC: 4 G/DL (ref 3.5–5)
ALBUMIN/GLOB SERPL: 1.2 {RATIO} (ref 1.1–2.2)
ALP SERPL-CCNC: 126 U/L (ref 45–117)
ALT SERPL-CCNC: 47 U/L (ref 12–78)
ANION GAP SERPL CALC-SCNC: 1 MMOL/L (ref 5–15)
APTT PPP: 29.1 SEC (ref 22.1–31)
AST SERPL-CCNC: 20 U/L (ref 15–37)
BASOPHILS # BLD: 0 K/UL (ref 0–0.1)
BASOPHILS NFR BLD: 0 % (ref 0–1)
BILIRUB SERPL-MCNC: 0.4 MG/DL (ref 0.2–1)
BUN SERPL-MCNC: 9 MG/DL (ref 6–20)
BUN/CREAT SERPL: 12 (ref 12–20)
CALCIUM SERPL-MCNC: 8.9 MG/DL (ref 8.5–10.1)
CHLORIDE SERPL-SCNC: 107 MMOL/L (ref 97–108)
CO2 SERPL-SCNC: 31 MMOL/L (ref 21–32)
COMMENT, HOLDF: NORMAL
CREAT SERPL-MCNC: 0.77 MG/DL (ref 0.55–1.02)
CRP SERPL-MCNC: 0.36 MG/DL (ref 0–0.6)
DIFFERENTIAL METHOD BLD: NORMAL
EOSINOPHIL # BLD: 0.2 K/UL (ref 0–0.4)
EOSINOPHIL NFR BLD: 2 % (ref 0–7)
ERYTHROCYTE [DISTWIDTH] IN BLOOD BY AUTOMATED COUNT: 13.6 % (ref 11.5–14.5)
ERYTHROCYTE [SEDIMENTATION RATE] IN BLOOD: 16 MM/HR (ref 0–20)
GLOBULIN SER CALC-MCNC: 3.4 G/DL (ref 2–4)
GLUCOSE SERPL-MCNC: 92 MG/DL (ref 65–100)
HCT VFR BLD AUTO: 40.7 % (ref 35–47)
HGB BLD-MCNC: 13.1 G/DL (ref 11.5–16)
IMM GRANULOCYTES # BLD AUTO: 0 K/UL
IMM GRANULOCYTES NFR BLD AUTO: 0 %
INR PPP: 0.9 (ref 0.9–1.1)
LYMPHOCYTES # BLD: 3 K/UL (ref 0.8–3.5)
LYMPHOCYTES NFR BLD: 30 % (ref 12–49)
MCH RBC QN AUTO: 30.6 PG (ref 26–34)
MCHC RBC AUTO-ENTMCNC: 32.2 G/DL (ref 30–36.5)
MCV RBC AUTO: 95.1 FL (ref 80–99)
MONOCYTES # BLD: 0.7 K/UL (ref 0–1)
MONOCYTES NFR BLD: 7 % (ref 5–13)
NEUTS SEG # BLD: 6 K/UL (ref 1.8–8)
NEUTS SEG NFR BLD: 61 % (ref 32–75)
NRBC # BLD: 0 K/UL (ref 0–0.01)
NRBC BLD-RTO: 0 PER 100 WBC
PLATELET # BLD AUTO: 287 K/UL (ref 150–400)
PMV BLD AUTO: 9.1 FL (ref 8.9–12.9)
POTASSIUM SERPL-SCNC: 4.6 MMOL/L (ref 3.5–5.1)
PROT SERPL-MCNC: 7.4 G/DL (ref 6.4–8.2)
PROTHROMBIN TIME: 9.6 SEC (ref 9–11.1)
RBC # BLD AUTO: 4.28 M/UL (ref 3.8–5.2)
RBC MORPH BLD: NORMAL
SAMPLES BEING HELD,HOLD: NORMAL
SODIUM SERPL-SCNC: 139 MMOL/L (ref 136–145)
THERAPEUTIC RANGE,PTTT: NORMAL SECS (ref 58–77)
WBC # BLD AUTO: 9.9 K/UL (ref 3.6–11)

## 2021-06-04 PROCEDURE — 85025 COMPLETE CBC W/AUTO DIFF WBC: CPT

## 2021-06-04 PROCEDURE — 96375 TX/PRO/DX INJ NEW DRUG ADDON: CPT

## 2021-06-04 PROCEDURE — 36415 COLL VENOUS BLD VENIPUNCTURE: CPT

## 2021-06-04 PROCEDURE — 85730 THROMBOPLASTIN TIME PARTIAL: CPT

## 2021-06-04 PROCEDURE — 85652 RBC SED RATE AUTOMATED: CPT

## 2021-06-04 PROCEDURE — 96361 HYDRATE IV INFUSION ADD-ON: CPT

## 2021-06-04 PROCEDURE — 74011250636 HC RX REV CODE- 250/636: Performed by: EMERGENCY MEDICINE

## 2021-06-04 PROCEDURE — 80053 COMPREHEN METABOLIC PANEL: CPT

## 2021-06-04 PROCEDURE — 96374 THER/PROPH/DIAG INJ IV PUSH: CPT

## 2021-06-04 PROCEDURE — 96376 TX/PRO/DX INJ SAME DRUG ADON: CPT

## 2021-06-04 PROCEDURE — 86140 C-REACTIVE PROTEIN: CPT

## 2021-06-04 PROCEDURE — 85610 PROTHROMBIN TIME: CPT

## 2021-06-04 RX ORDER — OXYCODONE HYDROCHLORIDE 5 MG/1
5 TABLET ORAL
Qty: 5 TABLET | Refills: 0 | Status: SHIPPED | OUTPATIENT
Start: 2021-06-04 | End: 2021-06-09

## 2021-06-04 RX ORDER — HYDROMORPHONE HYDROCHLORIDE 1 MG/ML
0.5 INJECTION, SOLUTION INTRAMUSCULAR; INTRAVENOUS; SUBCUTANEOUS
Status: COMPLETED | OUTPATIENT
Start: 2021-06-04 | End: 2021-06-04

## 2021-06-04 RX ORDER — ONDANSETRON 4 MG/1
4 TABLET, ORALLY DISINTEGRATING ORAL
Qty: 20 TABLET | Refills: 0 | Status: SHIPPED | OUTPATIENT
Start: 2021-06-04 | End: 2021-09-30

## 2021-06-04 RX ADMIN — SODIUM CHLORIDE 1000 ML: 9 INJECTION, SOLUTION INTRAVENOUS at 00:27

## 2021-06-04 RX ADMIN — HYDROMORPHONE HYDROCHLORIDE 0.5 MG: 1 INJECTION, SOLUTION INTRAMUSCULAR; INTRAVENOUS; SUBCUTANEOUS at 00:26

## 2021-06-04 RX ADMIN — HYDROMORPHONE HYDROCHLORIDE 0.5 MG: 1 INJECTION, SOLUTION INTRAMUSCULAR; INTRAVENOUS; SUBCUTANEOUS at 01:58

## 2021-06-04 RX ADMIN — ONDANSETRON 8 MG: 2 INJECTION INTRAMUSCULAR; INTRAVENOUS at 00:27

## 2021-06-04 RX ADMIN — KETOROLAC TROMETHAMINE 30 MG: 30 INJECTION, SOLUTION INTRAMUSCULAR at 00:27

## 2021-06-04 NOTE — ED NOTES
Dr. Miguel Gabriel reviewed discharge instructions with the patient. The patient verbalized understanding. The patient was given opportunity for questions. Patient discharged in stable condition to the waiting room via ambulatory.
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
## 2021-06-04 NOTE — PROGRESS NOTES
Worsening atraumatic HA x1w w/ blurred/ double vision starting tonight. Reports seeing peripheral \"black spots. \" States balance hahs also felt off- s/sx worsened around 2000 tonight. States hx of migraines, but hasn't had any in 2 years. Denies chance of pregnancy. States followed by neurologist for pseudotumor cerebri/ chronic HA. Couldn't have intracranial shunt placement 2/2 concern for small vessels, reports has seen neurosurgeon. Has been referred to new neurosurgeon at [address] [place] [address]. No F/C, N/V/D, cough, congestion, CP, SOB,urinary concerns. 10:43 PM  I have evaluated the patient as the Provider in Triage. I have reviewed Her vital signs and the triage nurse assessment. I have talked with the patient and any available family and advised that I am the provider in triage and have ordered the appropriate study to initiate their work up based on the clinical presentation during my assessment. I have advised that the patient will be accommodated in the Main ED as soon as possible. I have also requested to contact the triage nurse or myself immediately if the patient experiences any changes in their condition during this brief waiting period.   Tyler Monte NP

## 2021-06-04 NOTE — ED TRIAGE NOTES
Pt reports she has hx of pseudotumor cerebri and is followed by neurology. Pt reports she has been having headaches since about a week ago and tonight she developed double vision, blurred vision, and feeling off balance at about 2000. States she also has hx of migraines. Called PCP and recommended she come to the ED for evaluation.

## 2021-06-16 ENCOUNTER — OFFICE VISIT (OUTPATIENT)
Dept: SURGERY | Age: 31
End: 2021-06-16
Payer: MEDICAID

## 2021-06-16 VITALS
HEART RATE: 88 BPM | HEIGHT: 62 IN | RESPIRATION RATE: 18 BRPM | TEMPERATURE: 98.2 F | OXYGEN SATURATION: 99 % | DIASTOLIC BLOOD PRESSURE: 80 MMHG | SYSTOLIC BLOOD PRESSURE: 122 MMHG | WEIGHT: 219 LBS | BODY MASS INDEX: 40.3 KG/M2

## 2021-06-16 DIAGNOSIS — G93.2 INTRACRANIAL HYPERTENSION: ICD-10-CM

## 2021-06-16 DIAGNOSIS — E66.01 MORBID OBESITY WITH BMI OF 40.0-44.9, ADULT (HCC): Primary | ICD-10-CM

## 2021-06-16 DIAGNOSIS — Z98.890 STATUS POST NISSEN FUNDOPLICATION: ICD-10-CM

## 2021-06-16 PROCEDURE — 99215 OFFICE O/P EST HI 40 MIN: CPT | Performed by: SURGERY
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
## 2021-06-16 NOTE — PROGRESS NOTES
1. Have you been to the ER, urgent care clinic since your last visit? Hospitalized since your last visit? No    2. Have you seen or consulted any other health care providers outside of the [address] [place] [address] since your last visit? Include any pap smears or colon screening.  No

## 2021-06-17 ENCOUNTER — OFFICE VISIT (OUTPATIENT)
Dept: FAMILY MEDICINE CLINIC | Age: 31
End: 2021-06-17
Payer: MEDICAID

## 2021-06-17 VITALS
RESPIRATION RATE: 16 BRPM | TEMPERATURE: 98 F | OXYGEN SATURATION: 98 % | BODY MASS INDEX: 40.3 KG/M2 | DIASTOLIC BLOOD PRESSURE: 83 MMHG | HEART RATE: 101 BPM | WEIGHT: 219 LBS | HEIGHT: 62 IN | SYSTOLIC BLOOD PRESSURE: 131 MMHG

## 2021-06-17 DIAGNOSIS — G93.2 PSEUDOTUMOR CEREBRI SYNDROME: Primary | ICD-10-CM

## 2021-06-17 DIAGNOSIS — R51.9 FREQUENT HEADACHES: ICD-10-CM

## 2021-06-17 DIAGNOSIS — E66.9 OBESITY (BMI 35.0-39.9 WITHOUT COMORBIDITY): ICD-10-CM

## 2021-06-17 PROCEDURE — 99214 OFFICE O/P EST MOD 30 MIN: CPT | Performed by: FAMILY MEDICINE

## 2021-06-17 NOTE — PROGRESS NOTES
Weight Loss Progress Note: follow up Physician Visit      Joleen Pitts is a 26 y.o. female  who is here for her follow up  Evaluation for the medical bariatric care. CC: I want to cure pseudotumor ceribri    Weight History  Current weight 219 and BMI is Body mass index is 40.06 kg/m². Goal weight bmi 28,   Highest weight 219   (See weight gain time line scanned into media section of chart)        Significant Medical History    Update on sleep apnea and  CPAP no    Ever had bariatric surgery: no    Pregnant or planning on becoming pregnant w/in 6 months: no         Significant Psychosocial History     Current Major Lifestyle Changes: no  Any potential unsupportive people: no          Social History  Social History     Tobacco Use    Smoking status: Never Smoker    Smokeless tobacco: Never Used   Substance Use Topics    Alcohol use: No     How many times a week do you eat out?  0    Do you drink any EtOH?  no   If so, how much? Do you have upcoming any travel in the next 6 weeks?  no   If so, what do you have planned?           Exercise  How many days a week do you currently exercise?  0 days  Have you ever been told by a physician not to exercise?  no      Objective  Visit Vitals  /83 (BP 1 Location: Left arm, BP Patient Position: Sitting, BP Cuff Size: Adult)   Pulse (!) 101   Temp 98 °F (36.7 °C) (Skin)   Resp 16   Ht 5' 2\" (1.575 m)   Wt 219 lb (99.3 kg)   SpO2 98%   BMI 40.06 kg/m²         Waist Circumference: See Weight Management Doc Flowsheet  Neck Circumference: See Weight Management Doc Flowsheet  Percent Body Fat: See Weight Management Doc Flowsheet  Weight Metrics 6/17/2021 6/16/2021 6/3/2021 5/24/2021 5/21/2021 3/17/2021 3/10/2021   Weight 219 lb 219 lb 222 lb 0.1 oz 209 lb 212 lb 209 lb 14.4 oz 212 lb   Neck Circ (inches) - - - - - - -   Waist Measure Inches - - - - - - -   BMI 40.06 kg/m2 40.06 kg/m2 40.6 kg/m2 38.23 kg/m2 38.78 kg/m2 38.39 kg/m2 38.78 kg/m2         Labs: Review of Systems  Complete ROS negative except where noted above      Physical Exam    Vital Signs Reviewed  Weight Management Metrics Reviewed    Vital Signs Reviewed  Appearance: Obese, A&O x 3, NAD  HEENT:  NC/AT, EOMI, PERRL, No scleral icterus, malampatti score:  Skin:    Skin tags - no   Acanthosis Nigricans - no  Neck:  No nodes, thyromegaly   Heart:  RRR without M/R/G  Lungs:  CTAB, no rhonchi, rales, or wheezes with good air exchange   Abdomen:  Non-tender, pos bowel sounds; hepatomegaly -   Ext:  No C/C/E        Assessment & Plan  Diagnoses and all orders for this visit:    1. Pseudotumor cerebri syndrome  Needs weight los in order to resolve this  2. Obesity (BMI 35.0-39.9 without comorbidity)  Planning gbp  I completed the form that I agree with medical necessiy of gbp  3. BMI 40.0-44.9, adult ([facility] [address]    4. Frequent headaches  Caused by the pseudotumor        Diet:    Activity:    Medication:    Based on his history and exam, Milton Huynh is a good candidate for the  [facility]     There are no Patient Instructions on file for this visit. The primary encounter diagnosis was Pseudotumor cerebri syndrome. Diagnoses of Obesity (BMI 35.0-39.9 without comorbidity), BMI 40.0-44.9, adult ([address] and Frequent headaches were also pertinent to this visit.   The patient verbalizes understanding and agrees with the plan of care

## 2021-06-17 NOTE — PROGRESS NOTES
1. Have you been to the ER, urgent care clinic since your last visit? Hospitalized since your last visit? Yes When: 6/2021 [facility]: [facility] Reason for visit: Double vision    2. Have you seen or consulted any other health care providers outside of the [address] [place] [address] since your last visit? Include any pap smears or colon screening. No     Visit Vitals  /83 (BP 1 Location: Left arm, BP Patient Position: Sitting, BP Cuff Size: Adult)   Pulse (!) 101   Temp 98 °F (36.7 °C) (Skin)   Resp 16   Ht 5' 2\" (1.575 m)   Wt 219 lb (99.3 kg)   SpO2 98%   BMI 40.06 kg/m²     Chief Complaint   [facility] with   [facility]     Visit Vitals  /83 (BP 1 Location: Left arm, BP Patient Position: Sitting, BP Cuff Size: Adult)   Pulse (!) 101   Temp 98 °F (36.7 °C) (Skin)   Resp 16   Ht 5' 2\" (1.575 m)   Wt 219 lb (99.3 kg)   SpO2 98%   BMI 40.06 kg/m²       Health Maintenance Due   Topic Date Due    COVID-19 Vaccine (1) Never done    PAP AKA CERVICAL CYTOLOGY  Never done     Abuse Screening Questionnaire 6/17/2021   Do you ever feel afraid of your partner? N   Are you in a relationship with someone who physically or mentally threatens you? N   Is it safe for you to go home?  Y     3 most recent PHQ Screens 6/17/2021   PHQ Not Done -   Little interest or pleasure in doing things Not at all   Feeling down, depressed, irritable, or hopeless Not at all   Total Score PHQ 2 0

## 2021-06-23 ENCOUNTER — CLINICAL SUPPORT (OUTPATIENT)
Dept: SURGERY | Age: 31
End: 2021-06-23

## 2021-06-23 DIAGNOSIS — E66.01 MORBID OBESITY WITH BMI OF 40.0-44.9, ADULT (HCC): Primary | ICD-10-CM

## 2021-06-23 NOTE — PROGRESS NOTES
[facility] at [facility]  Supervised Weight Loss     Date:   2021    Patient's Name: Jasiel Muse  : 1990    Insurance:  [id number] [address] [facility] [employer]                     Session: 3 of  6  Surgery: Gastric Bypass                  Surgeon:  Zac Morrow M.D.      Height: 62\"                 Reported Wt:    219      Lbs. BMI: 40             Pounds Lost since last month: 0               Pounds Gained since last month: 7#     Starting Weight: 209                         Previous Months Weight: 212  Overall Pounds Lost: 0                    Overall Pounds Gained: 10#     Other Pertinent Information: Today's appointment was completed in a virtual setting d/t COVID-19. Today's wt was pulled from recent office visit with PCP. Smoking Status:  none  Alcohol Intake: none    I have reviewed with pt the guidelines of the supervised wt loss program.  Pt understands the expectations of some wt loss during the program and that wt gain could delay the process. I have also explained that classes need to be consecutive. Missing a class may result in starting over. Pt has received this information in writing. Changes that patient has made since last month include:  Tried protein shakes (Premier Protein), cut back on might time snacking. Eating Habits and Behaviors  General healthy eating guidelines were discussed. A nutrition lesson specific to the importance of protein intake after surgery was provided. We discussed food sources of protein, protein supplements and multiple reasons as to why protein is important after bariatric surgery. Pts were instructed to focus on including protein at every meal and practice eating protein first at the meal. Pts were encouraged to sample a protein shake for tolerance. Patients were also instructed to use the balanced plate method for help with portion control and general healthy eating prior to surgery.  We discussed measuring meals to 1/2 cup total per meal after surgery. Drinking only calorie-free, sugar-free and non-carbonated beverages. We discussed the importance of drinking 64 ounces of fluid per day to prevent dehydration post-operatively. Patient's current diet habits include: Pt is eating 2-3 meals per day. Snacking recently reduced. Reports h/o snacking on ice cream and chips at night but has recently reduced. Pt is eating refined carbohydrate foods (bread, pasta, rice, potatoes) moderation. Pt is eating sweets/desserts in reduced amounts. Pt is using baked, grilled, broiled cooking methods. Pt is eating meals prepared outside of the home 1-3 times per week. Pt is drinking water. Pt reports sometimes emotional eating. Physical Activity/Exercise  We talked about the importance of increasing daily physical activity and beginning to develop an exercise regimen/routine. We talked about exercise as being an important part of long term weight loss after surgery. Comments:  During class, I discussed with patient the importance of getting into an exercise routine. Pt is currently none d/t hip pain that limits activity. Pt has been encouraged to consider seated/chair exercises if tolerated. Behavior Modification       We talked about how to eat more mindfully. Tips and recommendations for how to make these changes were provided. Pt was encouraged to keep a food journal and record what they were taking in daily. Overall Assessment: Pt demonstrates appropriate behavior changes this past month with continued changes indicated. Goals set and recommendations made. Will continue to assess. Patient-Set Goals:   1. Nutrition - continue to work on eating 3 meals a day, protein at each meal   2. Exercise - review chair exercise videos or research [employer] program as recommended by PCP  3.  Behavior -continue to limit/eliminate mindless snacking    Reymundo Chacon, THUY  6/23/2021

## 2021-07-02 ENCOUNTER — HOSPITAL ENCOUNTER (EMERGENCY)
Age: 31
Discharge: HOME | End: 2021-07-03
Attending: EMERGENCY MEDICINE
Payer: MEDICAID

## 2021-07-02 VITALS
RESPIRATION RATE: 16 BRPM | HEIGHT: 62 IN | WEIGHT: 220.2 LBS | BODY MASS INDEX: 40.52 KG/M2 | OXYGEN SATURATION: 100 % | HEART RATE: 108 BPM | TEMPERATURE: 98.2 F | SYSTOLIC BLOOD PRESSURE: 119 MMHG | DIASTOLIC BLOOD PRESSURE: 86 MMHG

## 2021-07-02 DIAGNOSIS — R51.9 NONINTRACTABLE HEADACHE, UNSPECIFIED CHRONICITY PATTERN, UNSPECIFIED HEADACHE TYPE: Primary | ICD-10-CM

## 2021-07-02 PROCEDURE — 99281 EMR DPT VST MAYX REQ PHY/QHP: CPT
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
## 2021-07-03 NOTE — ED TRIAGE NOTES
Patient arrives to the ED with c/o headaches and blurred vision x 6 days, states history od Benign Intracranial Hypertension, states this feels the same.

## 2021-07-23 ENCOUNTER — CLINICAL SUPPORT (OUTPATIENT)
Dept: SURGERY | Age: 31
End: 2021-07-23

## 2021-07-23 DIAGNOSIS — E66.01 MORBID OBESITY WITH BMI OF 40.0-44.9, ADULT (HCC): Primary | ICD-10-CM

## 2021-07-23 NOTE — PROGRESS NOTES
[facility] at [facility]  Supervised Weight Loss     Date:   2021    Patient's Name: Jake Askew  : 1990    Insurance:  [place] [employer]                     Session:   Surgery: Gastric Bypass                  Surgeon: Lindley Kanner, M.D.      Height: 62\"                 PULGCSWP Wt:    220      Lbs.                           FAJ: 12             Pounds Lost since last month: 0               Pounds Gained since last month: 1#     Starting Weight: 209                         Previous Months Weight: 219  Overall Pounds Lost: 0                    Overall Pounds Gained: 11#     Other Pertinent Information: Today's appointment was completed in a virtual setting d/t COVID[facility]. Today's wt was pulled from recent office visit with PCP. Smoking Status:  none  Alcohol Intake: none    I have reviewed with pt the guidelines of the supervised wt loss program.  Pt understands the expectations of some wt loss during the program and that wt gain could delay the process. I have also explained that appointments need to be consecutive and missing an appointment may result in starting over. Pt has received this information in writing. Changes that patient has made since last month include: Increased exercise (tried chair exercises, outdoor activity), still trying to reduce snacking. Eliminated sodas. Eating Habits and Behaviors  A nutrition lesson specific to vitamins was provided. We discussed the various reasons for needing vitamins and different types and doses. General healthy eating guidelines were also discussed. Pts were instructed that their plate should be made up 1/2 plate coming from non-starchy vegetables, 1/4 coming from lean meat, and 1/4 of their plate coming from carbohydrates, including fruits, starches, or milk. We discussed measuring meals to 1/2 cup total per meal after surgery. Drinking only calorie-free, sugar-free and non-carbonated beverages.  We discussed the importance of drinking 64 ounces of fluid per day to prevent dehydration post-operatively. Patient's current diet habits include: Pt is eating 2-3 meals per day. Using protein shakes in place of skipping meals. Eating chicken, fish, seafood for protein choices. Pt is eating refined carbohydrate foods (bread, pasta, rice, potatoes) in moderation. Pt is using healthy cooking methods. Pt is eating meals prepared outside of the home once per wek. Pt cooks dinner at home 6 night per week. Is reading food labels. Pt is drinking mostly water and cranberry juice. Has eliminated sodas. Physical Activity/Exercise  We talked about the importance of increasing daily physical activity and beginning to develop an exercise regimen/routine. We talked about exercise as being an important part of long term weight loss after surgery. Comments:  During class, I discussed with patient the importance of getting into an exercise routine. Pt is currently engaging in increased activity for activity. Pt has been encouraged to continue to increase as tolerated. Behavior Modification       We talked about how to eat more mindfully and identify emotional eating triggers. Tips and recommendations for how to make these changes were provided. Pt was encouraged to keep a food journal and record what they were taking in daily. Overall Assessment: Pt demonstrates small/appropriate lifestyle changes evidenced by reported changes. Recent wt in EMR does still indicate wt gain. Will continue to assess. Patient-Set Goals:   1. Nutrition - decrease intake of juice, eventually limit to no more than 4 oz per day  2. Exercise - increase daily activity as tolerated   3.  Behavior -continue to work on eating more consistently, not skipping meals     Tomas Fragoso, THUY  7/23/2021

## 2021-08-20 ENCOUNTER — CLINICAL SUPPORT (OUTPATIENT)
Dept: SURGERY | Age: 31
End: 2021-08-20

## 2021-08-20 DIAGNOSIS — E66.01 MORBID OBESITY WITH BMI OF 40.0-44.9, ADULT (HCC): Primary | ICD-10-CM

## 2021-08-20 NOTE — PROGRESS NOTES
[facility] at [facility]  Supervised Weight Loss     Date:   2021    Patient's Name: Aracelis Morales  : 1990    Insurance:  [place] PABLOJD: 4 NM  6  Surgery: Gastric Bypass                  Surgeon: Ck Mann M.D.      Height: 62\"                 PIHAPXFG Wt:    215      Lbs.                           ZQE: 07             Pounds Lost since last month: 5               Pounds Gained since last month: 0     Starting Weight: 209                         Previous Months Weight: 220  Overall Pounds Lost: 0                    Overall Pounds Gained: 6#     Other Pertinent Information: Today's appointment was completed in a virtual setting d/t COVID-19. Today's wt was reported from recent office visit.     Smoking Status:  none  Alcohol Intake: none    I have reviewed with pt the guidelines of the supervised wt loss program.  Pt understands the expectations of some wt loss during the program and that wt gain could delay the process. I have also explained that appointments need to be consecutive and missing an appointment may result in starting over. Pt has received this information in writing. Changes that patient has made since last month include:  Avoiding sodas, decreased snacking. Is struggling with skipping meals d/t not wanting to cook when her family is away from the home (more during the day time). Eating Habits and Behaviors  General healthy eating guidelines were also discussed. Pts were instructed that their plate should be made up 1/2 plate coming from non-starchy vegetables, 1/4 coming from lean meat, and 1/4 of their plate coming from carbohydrates, including fruits, starches, or milk. We discussed measuring meals to 1/2 cup total per meal after surgery. Drinking only calorie-free, sugar-free and non-carbonated beverages. We discussed the importance of drinking 64 ounces of fluid per day to prevent dehydration post-operatively. Physical Activity/Exercise  We talked about the importance of increasing daily physical activity and beginning to develop an exercise regimen/routine. We talked about exercise as being an important part of long term weight loss after surgery. Comments:  During class, I discussed with patient the importance of getting into an exercise routine. Pt is currently trying to walk and increase daily activity. Pt has been encouraged to maintain and increase as tolerated. Behavior Modification       A behavior modification lesson was provided with an emphasis on developing mindful eating behaviors. We talked about how to eat more mindfully and identify emotional eating triggers. Tips and recommendations for how to make these changes were provided. Pt was encouraged to keep a food journal and record what they were taking in daily. Overall Assessment: Pt demonstrates continued changes evidenced by reported changes. We discussed continued changes and goals to work on this month. Will continue to assess. Patient-Set Goals:   1. Nutrition - work on not skipping meals and having healthy/convenient options or protein shakes instead of skipping meals   2. Exercise - continue to increase activity as tolerated   3.  Behavior -meal prepping/planning     Tomy Garcia RD  8/20/2021

## 2021-08-27 ENCOUNTER — VIRTUAL VISIT (OUTPATIENT)
Dept: FAMILY MEDICINE CLINIC | Age: 31
End: 2021-08-27
Payer: MEDICAID

## 2021-08-27 DIAGNOSIS — E66.9 OBESITY (BMI 35.0-39.9 WITHOUT COMORBIDITY): ICD-10-CM

## 2021-08-27 DIAGNOSIS — R51.9 FREQUENT HEADACHES: ICD-10-CM

## 2021-08-27 DIAGNOSIS — L93.0 LUPUS ERYTHEMATOSUS, UNSPECIFIED FORM: ICD-10-CM

## 2021-08-27 DIAGNOSIS — G93.2 PSEUDOTUMOR CEREBRI SYNDROME: Primary | ICD-10-CM

## 2021-08-27 PROCEDURE — 99214 OFFICE O/P EST MOD 30 MIN: CPT | Performed by: FAMILY MEDICINE

## 2021-08-27 NOTE — PROGRESS NOTES
Markus Ford is a 26 y.o. female who was seen by synchronous (real-time) audio-video technology on 8/27/2021 for Letter (Patient reports would like letter for Work. )      she has stopped all soda  She has 4 oz juice a day  She is not getting 3 meals a day  She cooks at home for dinner 6/7 days  This week her vision is good  Last week it was blurred a few days    [facility] Gunn fr pseudotumor ceribri  And Dr Jade Rausch at [facility] who referred her to [facility] interventional surgical services to implant a stent to drain the fluid from the brain. This is designed as a cure  She also is getting weight loss surgery    She has an in person  training for [address] care and she needs a letter to say she is exempt due to immune suppression and covid risk      Assessment & Plan:   Diagnoses and all orders for this visit:    1. Pseudotumor cerebri syndrome  She is working with an interventional neurosurgeon who will be inserting a stent into her sinus to drain csf when the pressure gets too high  This is designed to cure it  She also has GBP planned within the next 6 weeks  2. Frequent headaches  From prob #1  3. Lupus erythematosus, unspecified form  No change in meds  4. Obesity (BMI 35.0-39.9 without comorbidity)    Working on weight loss and planning GBP        Subjective:       Prior to Admission medications    Medication Sig Start Date End Date Taking? Authorizing Provider   gabapentin (NEURONTIN) 600 mg tablet TAKE 1 TAB BY MOUTH THREE (3) TIMES DAILY. MAX DAILY AMOUNT: 1,800 MG. 5/4/21  Yes Dylan Dick MD   hydrOXYchloroQUINE (PLAQUENIL) 200 mg tablet TAKE 1 TABLET BY MOUTH TWICE A DAY PA REQUIRED FOR QTY   16 FAXED MD 3/28/21  Yes Dylan Dick MD   cyclobenzaprine (FLEXERIL) 10 mg tablet Take 1 Tab by mouth two (2) times a day.  3/23/21  Yes Dylan Dick MD   diazePAM (VALIUM) 5 mg tablet TAKE ONE DAILY AS NEEDED FOR ANXIETY 3/4/21  Yes Provider, Historical   LORazepam (ATIVAN) 1 mg tablet TAKE ONE TABLET AT NIGHT AS NEEDED FOR ANXIETY 3/4/21  Yes Provider, Historical   biotin 10 mg tab 10 mg = 1 tab each dose, PO, daily 1/22/21  Yes Provider, Historical   topiramate (Topamax) 200 mg tablet Take 200 mg by mouth two (2) times a day. Provider, Historical   ondansetron (Zofran ODT) 4 mg disintegrating tablet 1 Tablet by SubLINGual route every eight (8) hours as needed for Nausea or Vomiting. Patient not taking: Reported on 6/16/2021 6/4/21   Claudell Cruz, MD   topiramate (TOPAMAX) 50 mg tablet Take 1 Tab by mouth daily (with dinner). Patient not taking: Reported on 8/30/2021 3/17/21   Vaibhav Murray MD   phentermine (ADIPEX_P) 15 mg capsule Take 1 Cap by mouth every morning. Max Daily Amount: 15 mg. Patient not taking: Reported on 6/16/2021 3/17/21   Vaibhav Murray MD     Patient Active Problem List    Diagnosis Date Noted    Stenosis of cerebral venous sinus 08/30/2021    Papilledema associated with increased intracranial pressure 08/30/2021    Intracranial hypertension 10/23/2020    Ataxia 09/14/2020    Headache 08/29/2020    IIH (idiopathic intracranial hypertension) 08/25/2020    SLE (systemic lupus erythematosus) ([facility] 08/25/2020    Severe obesity with body mass index (BMI) of 35.0 to 39.9 with serious comorbidity ([facility] 08/22/2018    S/P repair of paraesophageal hernia 11/17/2017    Paraesophageal hernia 11/15/2017    GERD (gastroesophageal reflux disease) 11/07/2017    Obesity, Class II, BMI 35-39.9 03/31/2017    Sebaceous cyst 03/24/2017    History of Nissen fundoplication 30/33/5368    Chronic migraine without aura without status migrainosus, not intractable 05/18/2016    Cervical incompetence 04/12/2013     Current Outpatient Medications   Medication Sig Dispense Refill    gabapentin (NEURONTIN) 600 mg tablet TAKE 1 TAB BY MOUTH THREE (3) TIMES DAILY. MAX DAILY AMOUNT: 1,800 MG.  90 Tab 1    hydrOXYchloroQUINE (PLAQUENIL) 200 mg tablet TAKE 1 TABLET BY MOUTH TWICE A DAY PA REQUIRED FOR QTY   16 FAXED MD 60 Tab 2    cyclobenzaprine (FLEXERIL) 10 mg tablet Take 1 Tab by mouth two (2) times a day. 180 Tab 1    diazePAM (VALIUM) 5 mg tablet TAKE ONE DAILY AS NEEDED FOR ANXIETY      LORazepam (ATIVAN) 1 mg tablet TAKE ONE TABLET AT NIGHT AS NEEDED FOR ANXIETY      biotin 10 mg tab 10 mg = 1 tab each dose, PO, daily      topiramate (Topamax) 200 mg tablet Take 200 mg by mouth two (2) times a day.  ondansetron (Zofran ODT) 4 mg disintegrating tablet 1 Tablet by SubLINGual route every eight (8) hours as needed for Nausea or Vomiting. (Patient not taking: Reported on 6/16/2021) 20 Tablet 0    topiramate (TOPAMAX) 50 mg tablet Take 1 Tab by mouth daily (with dinner). (Patient not taking: Reported on 8/30/2021) 90 Tab 1    phentermine (ADIPEX_P) 15 mg capsule Take 1 Cap by mouth every morning. Max Daily Amount: 15 mg.  (Patient not taking: Reported on 6/16/2021) 30 Cap 0       ROS    Objective:     Patient-Reported Vitals 8/27/2021   Patient-Reported Weight 220lb   Patient-Reported Height -   Patient-Reported Pulse -   Patient-Reported Temperature -   Patient-Reported SpO2 -   Patient-Reported Systolic  -   Patient-Reported Diastolic -   Patient-Reported LMP -        [INSTRUCTIONS:  \"[x]\" Indicates a positive item  \"[]\" Indicates a negative item  -- DELETE ALL ITEMS NOT EXAMINED]    Constitutional: [x] Appears well-developed and well-nourished [x] No apparent distress      [] Abnormal -     Mental status: [x] Alert and awake  [x] Oriented to person/place/time [x] Able to follow commands    [] Abnormal -     Eyes:   EOM    [x]  Normal    [] Abnormal -   Sclera  [x]  Normal    [] Abnormal -          Discharge [x]  None visible   [] Abnormal -     HENT: [x] Normocephalic, atraumatic  [] Abnormal -   [x] Mouth/Throat: Mucous membranes are moist    External Ears [x] Normal  [] Abnormal -    Neck: [x] No visualized mass [] Abnormal -     Pulmonary/Chest: [x] Respiratory effort normal   [x] No visualized signs of difficulty breathing or respiratory distress        [] Abnormal -      Musculoskeletal:   [x] Normal gait with no signs of ataxia         [x] Normal range of motion of neck        [] Abnormal -     Neurological:        [x] No Facial Asymmetry (Cranial nerve 7 motor function) (limited exam due to video visit)          [x] No gaze palsy        [] Abnormal -          Skin:        [x] No significant exanthematous lesions or discoloration noted on facial skin         [] Abnormal -            Psychiatric:       [x] Normal Affect [] Abnormal -        [x] No Hallucinations    Other pertinent observable physical exam findings:-        We discussed the expected course, resolution and complications of the diagnosis(es) in detail. Medication risks, benefits, costs, interactions, and alternatives were discussed as indicated. I advised her to contact the office if her condition worsens, changes or fails to improve as anticipated. She expressed understanding with the diagnosis(es) and plan. Tanika Falcon, was evaluated through a synchronous (real-time) audio-video encounter. The patient (or guardian if applicable) is aware that this is a billable service. Verbal consent to proceed has been obtained within the past 12 months. The visit was conducted pursuant to the emergency declaration under the [facility] [address] authority and the [place] [employer] [place] General Act. Patient identification was verified, and a caregiver was present when appropriate. The patient was located in a state where the provider was credentialed to provide care.       Symone Veronica MD

## 2021-08-27 NOTE — LETTER
8/27/2021 11:18 AM    Ms.  [address] [facility] [address]    Dear Itzel Pedro:  This patient is exempt from COVID 19 vaccine due to medical reasons          Sincerely,      Julianna Mortensen MD

## 2021-08-27 NOTE — PROGRESS NOTES
1. Have you been to the ER, urgent care clinic since your last visit? Hospitalized since your last visit? No    2. Have you seen or consulted any other health care providers outside of the [address] [place] [address] since your last visit? Include any pap smears or colon screening. No    Chief Complaint   Patient presents with    Letter     Patient reports would like letter for Work. Patient-Reported Vitals 8/27/2021   Patient-Reported Weight 220lb   Patient-Reported Height -   Patient-Reported Pulse -   Patient-Reported Temperature -   Patient-Reported SpO2 -   Patient-Reported Systolic  -   Patient-Reported Diastolic -   Patient-Reported LMP -      Health Maintenance Due   Topic Date Due    COVID-19 Vaccine (1) Never done    PAP AKA CERVICAL CYTOLOGY  Never done     3 most recent PHQ Screens 8/27/2021   PHQ Not Done -   Little interest or pleasure in doing things Not at all   Feeling down, depressed, irritable, or hopeless Not at all   Total Score PHQ 2 0     Abuse Screening Questionnaire 8/27/2021   Do you ever feel afraid of your partner? N   Are you in a relationship with someone who physically or mentally threatens you? N   Is it safe for you to go home?  Y     Learning Assessment 10/28/2020   PRIMARY LEARNER Patient   HIGHEST LEVEL OF EDUCATION - PRIMARY LEARNER  2 YEARS OF COLLEGE   BARRIERS PRIMARY LEARNER NONE   CO-LEARNER CAREGIVER -   PRIMARY LANGUAGE ENGLISH    NEED -   LEARNER PREFERENCE PRIMARY LISTENING     -   LEARNING SPECIAL TOPICS -   ANSWERED BY patient   RELATIONSHIP SELF
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
## 2021-08-30 ENCOUNTER — OFFICE VISIT (OUTPATIENT)
Dept: NEUROSURGERY | Age: 31
End: 2021-08-30
Payer: MEDICAID

## 2021-08-30 VITALS
SYSTOLIC BLOOD PRESSURE: 110 MMHG | WEIGHT: 218 LBS | BODY MASS INDEX: 40.12 KG/M2 | DIASTOLIC BLOOD PRESSURE: 78 MMHG | TEMPERATURE: 98.1 F | HEART RATE: 91 BPM | HEIGHT: 62 IN | OXYGEN SATURATION: 96 %

## 2021-08-30 DIAGNOSIS — Z87.19 S/P REPAIR OF PARAESOPHAGEAL HERNIA: ICD-10-CM

## 2021-08-30 DIAGNOSIS — E66.01 SEVERE OBESITY WITH BODY MASS INDEX (BMI) OF 35.0 TO 39.9 WITH SERIOUS COMORBIDITY (HCC): ICD-10-CM

## 2021-08-30 DIAGNOSIS — R51.9 NONINTRACTABLE HEADACHE, UNSPECIFIED CHRONICITY PATTERN, UNSPECIFIED HEADACHE TYPE: ICD-10-CM

## 2021-08-30 DIAGNOSIS — G93.2 INTRACRANIAL HYPERTENSION: ICD-10-CM

## 2021-08-30 DIAGNOSIS — Z98.890 S/P REPAIR OF PARAESOPHAGEAL HERNIA: ICD-10-CM

## 2021-08-30 DIAGNOSIS — Z98.890 HISTORY OF NISSEN FUNDOPLICATION: ICD-10-CM

## 2021-08-30 DIAGNOSIS — M32.9 SYSTEMIC LUPUS ERYTHEMATOSUS, UNSPECIFIED SLE TYPE, UNSPECIFIED ORGAN INVOLVEMENT STATUS (HCC): Primary | ICD-10-CM

## 2021-08-30 DIAGNOSIS — H47.11 PAPILLEDEMA ASSOCIATED WITH INCREASED INTRACRANIAL PRESSURE: ICD-10-CM

## 2021-08-30 DIAGNOSIS — I66.9 STENOSIS OF INTRACRANIAL VESSEL: ICD-10-CM

## 2021-08-30 PROCEDURE — 99215 OFFICE O/P EST HI 40 MIN: CPT | Performed by: RADIOLOGY

## 2021-08-30 RX ORDER — TOPIRAMATE 200 MG/1
200 TABLET ORAL 2 TIMES DAILY
Status: ON HOLD | COMMUNITY
End: 2021-10-04 | Stop reason: SDUPTHER

## 2021-08-30 NOTE — PROGRESS NOTES
New patient referred by Dr Claudia Rollins presenting with IIH, blurred vision and headaches. Patient reports symptoms started February 2020. She has had 10 Lumbar punctures in 2020 and 4 Lumbar punctures this year to relieve the pressure. Reports 2 episodes of double vision since symptoms started. Has tried Diamox and reached 1500 mg twice a day with no relief. No acute problems reported.

## 2021-08-30 NOTE — PATIENT INSTRUCTIONS
Plan diagnostic cerebral angiogram with intravenous pressure measurements (manometry) under monitored anesthesia care at [facility].  Indication: Medically refractory pseudotumor cerebri, bilateral transverse venous sinus stenosis. Your Cerebral angiogram with intracranial venous pressure mesurements is scheduled for 9/13/2021at [facility] [address] time is 7:00 am.  Covid Testing is needed. No eating or drinking after midnight the night prior to treatment and take all morning medications the day of procedure with sips of water. A Healthy Lifestyle: Care Instructions  Your Care Instructions     A healthy lifestyle can help you feel good, stay at a healthy weight, and have plenty of energy for both work and play. A healthy lifestyle is something you can share with your whole family. A healthy lifestyle also can lower your risk for serious health problems, such as high blood pressure, heart disease, and diabetes. You can follow a few steps listed below to improve your health and the health of your family. Follow-up care is a key part of your treatment and safety. Be sure to make and go to all appointments, and call your doctor if you are having problems. It's also a good idea to know your test results and keep a list of the medicines you take. How can you care for yourself at home? · Do not eat too much sugar, fat, or fast foods. You can still have dessert and treats now and then. The goal is moderation. · Start small to improve your eating habits. Pay attention to portion sizes, drink less juice and soda pop, and eat more fruits and vegetables. ? Eat a healthy amount of food. A 3-ounce serving of meat, for example, is about the size of a deck of cards. Fill the rest of your plate with vegetables and whole grains. ? Limit the amount of soda and sports drinks you have every day. Drink more water when you are thirsty. ? Eat plenty of fruits and vegetables every day.  Have an apple or some carrot sticks as an afternoon snack instead of a candy bar. Try to have fruits and/or vegetables at every meal.  · Make exercise part of your daily routine. You may want to start with simple activities, such as walking, bicycling, or slow swimming. Try to be active 30 to 60 minutes every day. You do not need to do all 30 to 60 minutes all at once. For example, you can exercise 3 times a day for 10 or 20 minutes. Moderate exercise is safe for most people, but it is always a good idea to talk to your doctor before starting an exercise program.  · Keep moving. Latha Gil the lawn, work in the garden, or [place]. Take the stairs instead of the elevator at work. · If you smoke, quit. People who smoke have an increased risk for heart attack, stroke, cancer, and other lung illnesses. Quitting is hard, but there are ways to boost your chance of quitting tobacco for good. ? Use nicotine gum, patches, or lozenges. ? Ask your doctor about stop-smoking programs and medicines. ? Keep trying. In addition to reducing your risk of diseases in the future, you will notice some benefits soon after you stop using tobacco. If you have shortness of breath or asthma symptoms, they will likely get better within a few weeks after you quit. · Limit how much alcohol you drink. Moderate amounts of alcohol (up to 2 drinks a day for men, 1 drink a day for women) are okay. But drinking too much can lead to liver problems, high blood pressure, and other health problems. Family health  If you have a family, there are many things you can do together to improve your health. · Eat meals together as a family as often as possible. · Eat healthy foods. This includes fruits, vegetables, lean meats and dairy, and whole grains. · Include your family in your fitness plan. Most people think of activities such as jogging or tennis as the way to fitness, but there are many ways you and your family can be more active.  Anything that makes you breathe hard and gets your heart pumping is exercise. Here are some tips:  ? Walk to do errands or to take your child to school or the bus.  ? Go for a family bike ride after dinner instead of watching TV. Where can you learn more? Go to http://www.gray.com/  Enter L274 in the search box to learn more about \"A Healthy Lifestyle: Care Instructions. \"  Current as of: September 23, 2020               Content Version: 12.8  © 2006-2021 [employer]. Care instructions adapted under license by [employer] (which disclaims liability or warranty for this information). If you have questions about a medical condition or this instruction, always ask your healthcare professional. [address] any warranty or liability for your use of this information.

## 2021-08-31 DIAGNOSIS — I66.9 STENOSIS OF INTRACRANIAL VESSEL: Primary | ICD-10-CM

## 2021-09-02 DIAGNOSIS — I66.9 STENOSIS OF INTRACRANIAL VESSEL: Primary | ICD-10-CM

## 2021-09-02 DIAGNOSIS — H47.11 PAPILLEDEMA ASSOCIATED WITH INCREASED INTRACRANIAL PRESSURE: ICD-10-CM

## 2021-09-03 ENCOUNTER — HOSPITAL ENCOUNTER (OUTPATIENT)
Age: 31
Setting detail: OBSERVATION
Discharge: HOME | End: 2021-09-04
Attending: STUDENT IN AN ORGANIZED HEALTH CARE EDUCATION/TRAINING PROGRAM | Admitting: INTERNAL MEDICINE
Payer: MEDICAID

## 2021-09-03 ENCOUNTER — TELEPHONE (OUTPATIENT)
Dept: NEUROSURGERY | Age: 31
End: 2021-09-03

## 2021-09-03 ENCOUNTER — PATIENT MESSAGE (OUTPATIENT)
Dept: NEUROSURGERY | Age: 31
End: 2021-09-03

## 2021-09-03 DIAGNOSIS — I66.9 STENOSIS OF INTRACRANIAL VESSEL: ICD-10-CM

## 2021-09-03 DIAGNOSIS — M32.9 SYSTEMIC LUPUS ERYTHEMATOSUS, UNSPECIFIED SLE TYPE, UNSPECIFIED ORGAN INVOLVEMENT STATUS (HCC): Chronic | ICD-10-CM

## 2021-09-03 DIAGNOSIS — E66.9 OBESITY, CLASS II, BMI 35-39.9: ICD-10-CM

## 2021-09-03 DIAGNOSIS — G93.2 PSEUDOTUMOR CEREBRI: Primary | ICD-10-CM

## 2021-09-03 DIAGNOSIS — R51.9 ACUTE NONINTRACTABLE HEADACHE, UNSPECIFIED HEADACHE TYPE: ICD-10-CM

## 2021-09-03 LAB
ANION GAP SERPL CALC-SCNC: 5 MMOL/L (ref 5–15)
BASOPHILS # BLD: 0.1 K/UL (ref 0–0.1)
BASOPHILS NFR BLD: 1 % (ref 0–1)
BUN SERPL-MCNC: 13 MG/DL (ref 6–20)
BUN/CREAT SERPL: 16 (ref 12–20)
CALCIUM SERPL-MCNC: 9.5 MG/DL (ref 8.5–10.1)
CHLORIDE SERPL-SCNC: 104 MMOL/L (ref 97–108)
CO2 SERPL-SCNC: 29 MMOL/L (ref 21–32)
CREAT SERPL-MCNC: 0.8 MG/DL (ref 0.55–1.02)
DIFFERENTIAL METHOD BLD: NORMAL
EOSINOPHIL # BLD: 0.1 K/UL (ref 0–0.4)
EOSINOPHIL NFR BLD: 1 % (ref 0–7)
ERYTHROCYTE [DISTWIDTH] IN BLOOD BY AUTOMATED COUNT: 12.2 % (ref 11.5–14.5)
GLUCOSE SERPL-MCNC: 82 MG/DL (ref 65–100)
HCG SERPL QL: NEGATIVE
HCT VFR BLD AUTO: 40.2 % (ref 35–47)
HGB BLD-MCNC: 13.6 G/DL (ref 11.5–16)
IMM GRANULOCYTES # BLD AUTO: 0 K/UL (ref 0–0.04)
IMM GRANULOCYTES NFR BLD AUTO: 0 % (ref 0–0.5)
LYMPHOCYTES # BLD: 2.9 K/UL (ref 0.8–3.5)
LYMPHOCYTES NFR BLD: 31 % (ref 12–49)
MAGNESIUM SERPL-MCNC: 2.4 MG/DL (ref 1.6–2.4)
MCH RBC QN AUTO: 30.9 PG (ref 26–34)
MCHC RBC AUTO-ENTMCNC: 33.8 G/DL (ref 30–36.5)
MCV RBC AUTO: 91.4 FL (ref 80–99)
MONOCYTES # BLD: 0.8 K/UL (ref 0–1)
MONOCYTES NFR BLD: 8 % (ref 5–13)
NEUTS SEG # BLD: 5.6 K/UL (ref 1.8–8)
NEUTS SEG NFR BLD: 59 % (ref 32–75)
NRBC # BLD: 0 K/UL (ref 0–0.01)
NRBC BLD-RTO: 0 PER 100 WBC
PLATELET # BLD AUTO: 280 K/UL (ref 150–400)
PMV BLD AUTO: 9.6 FL (ref 8.9–12.9)
POTASSIUM SERPL-SCNC: 3.8 MMOL/L (ref 3.5–5.1)
RBC # BLD AUTO: 4.4 M/UL (ref 3.8–5.2)
SODIUM SERPL-SCNC: 138 MMOL/L (ref 136–145)
WBC # BLD AUTO: 9.4 K/UL (ref 3.6–11)

## 2021-09-03 PROCEDURE — 96374 THER/PROPH/DIAG INJ IV PUSH: CPT

## 2021-09-03 PROCEDURE — 99283 EMERGENCY DEPT VISIT LOW MDM: CPT

## 2021-09-03 PROCEDURE — 96375 TX/PRO/DX INJ NEW DRUG ADDON: CPT

## 2021-09-03 PROCEDURE — 85025 COMPLETE CBC W/AUTO DIFF WBC: CPT

## 2021-09-03 PROCEDURE — 80048 BASIC METABOLIC PNL TOTAL CA: CPT

## 2021-09-03 PROCEDURE — 84703 CHORIONIC GONADOTROPIN ASSAY: CPT

## 2021-09-03 PROCEDURE — 74011250636 HC RX REV CODE- 250/636: Performed by: STUDENT IN AN ORGANIZED HEALTH CARE EDUCATION/TRAINING PROGRAM

## 2021-09-03 PROCEDURE — 65270000029 HC RM PRIVATE

## 2021-09-03 PROCEDURE — 83735 ASSAY OF MAGNESIUM: CPT

## 2021-09-03 PROCEDURE — 99218 HC RM OBSERVATION: CPT

## 2021-09-03 PROCEDURE — 36415 COLL VENOUS BLD VENIPUNCTURE: CPT

## 2021-09-03 RX ORDER — HYDROMORPHONE HYDROCHLORIDE 1 MG/ML
0.5 INJECTION, SOLUTION INTRAMUSCULAR; INTRAVENOUS; SUBCUTANEOUS
Status: COMPLETED | OUTPATIENT
Start: 2021-09-03 | End: 2021-09-03

## 2021-09-03 RX ORDER — DEXAMETHASONE SODIUM PHOSPHATE 10 MG/ML
6 INJECTION INTRAMUSCULAR; INTRAVENOUS ONCE
Status: COMPLETED | OUTPATIENT
Start: 2021-09-03 | End: 2021-09-03

## 2021-09-03 RX ORDER — ONDANSETRON 2 MG/ML
4 INJECTION INTRAMUSCULAR; INTRAVENOUS
Status: COMPLETED | OUTPATIENT
Start: 2021-09-03 | End: 2021-09-03

## 2021-09-03 RX ADMIN — HYDROMORPHONE HYDROCHLORIDE 0.5 MG: 1 INJECTION, SOLUTION INTRAMUSCULAR; INTRAVENOUS; SUBCUTANEOUS at 22:10

## 2021-09-03 RX ADMIN — DEXAMETHASONE SODIUM PHOSPHATE 6 MG: 10 INJECTION INTRAMUSCULAR; INTRAVENOUS at 22:10

## 2021-09-03 RX ADMIN — ONDANSETRON 4 MG: 2 INJECTION INTRAMUSCULAR; INTRAVENOUS at 22:10

## 2021-09-03 NOTE — TELEPHONE ENCOUNTER
----- Message from Rebecca Velasquez sent at 9/3/2021  3:45 PM EDT -----  Regarding: Non-Urgent Medical Question  Contact: [phone]  I was waiting back to hear of a plan for me today. I have tried to call the neurologist office 5x now and never gets an answer. I just need to know whay to do since I'm having trouble seeing I have pain in my even and a massive headache.

## 2021-09-03 NOTE — TELEPHONE ENCOUNTER
Return call to patient regarding blurred vision and headaches for the past few days. Informed provider who recommended patient contact her Neurologist to manage headaches as we have her scheduled for Diagnostic angiogram already. Patient has had this complaint for the past 2 months. Has been to ED for evaluation previously. Patient stated she was told by Dr Naty Acosta, Neurologist, to go to the ED. Informed patient if she feels she needs to seek urgent care, go to ED for evaluation. That decision was up to her. Patient stated understanding.

## 2021-09-03 NOTE — TELEPHONE ENCOUNTER
Spoke to patient to inform her provider recommends she go t ED for Lumbar puncture since her Neurologist has not returned the call. Patient stated understanding.

## 2021-09-03 NOTE — ED TRIAGE NOTES
Pt arrives to ED for headache and blurred vision x 1 week. Pt has pseudotumor cerebri and spoke with her neurointerventionalist (dr Shayna Martinez) today and is going to be scheduled for stent placement however he would like to speak with her neurologist but can not get in touch with her.

## 2021-09-04 ENCOUNTER — DOCUMENTATION ONLY (OUTPATIENT)
Dept: NEUROSURGERY | Age: 31
End: 2021-09-04

## 2021-09-04 VITALS
WEIGHT: 218 LBS | HEIGHT: 61 IN | OXYGEN SATURATION: 97 % | TEMPERATURE: 98.1 F | SYSTOLIC BLOOD PRESSURE: 126 MMHG | RESPIRATION RATE: 16 BRPM | DIASTOLIC BLOOD PRESSURE: 83 MMHG | BODY MASS INDEX: 41.16 KG/M2 | HEART RATE: 97 BPM

## 2021-09-04 LAB
APPEARANCE UR: ABNORMAL
BACTERIA URNS QL MICRO: NEGATIVE /HPF
BILIRUB UR QL: NEGATIVE
COLOR UR: ABNORMAL
EPITH CASTS URNS QL MICRO: ABNORMAL /LPF
GLUCOSE UR STRIP.AUTO-MCNC: NEGATIVE MG/DL
HGB UR QL STRIP: NEGATIVE
KETONES UR QL STRIP.AUTO: >80 MG/DL
LEUKOCYTE ESTERASE UR QL STRIP.AUTO: NEGATIVE
NITRITE UR QL STRIP.AUTO: NEGATIVE
PH UR STRIP: 6 [PH] (ref 5–8)
PROT UR STRIP-MCNC: NEGATIVE MG/DL
RBC #/AREA URNS HPF: ABNORMAL /HPF (ref 0–5)
SP GR UR REFRACTOMETRY: 1.03 (ref 1–1.03)
UR CULT HOLD, URHOLD: NORMAL
UROBILINOGEN UR QL STRIP.AUTO: 0.2 EU/DL (ref 0.2–1)
WBC URNS QL MICRO: ABNORMAL /HPF (ref 0–4)

## 2021-09-04 PROCEDURE — 99222 1ST HOSP IP/OBS MODERATE 55: CPT | Performed by: PSYCHIATRY & NEUROLOGY

## 2021-09-04 PROCEDURE — 74011250636 HC RX REV CODE- 250/636: Performed by: INTERNAL MEDICINE

## 2021-09-04 PROCEDURE — 81001 URINALYSIS AUTO W/SCOPE: CPT

## 2021-09-04 PROCEDURE — 99218 HC RM OBSERVATION: CPT

## 2021-09-04 PROCEDURE — 96376 TX/PRO/DX INJ SAME DRUG ADON: CPT

## 2021-09-04 PROCEDURE — 96375 TX/PRO/DX INJ NEW DRUG ADDON: CPT

## 2021-09-04 RX ORDER — DEXAMETHASONE SODIUM PHOSPHATE 4 MG/ML
6 INJECTION, SOLUTION INTRA-ARTICULAR; INTRALESIONAL; INTRAMUSCULAR; INTRAVENOUS; SOFT TISSUE ONCE
Status: COMPLETED | OUTPATIENT
Start: 2021-09-04 | End: 2021-09-04

## 2021-09-04 RX ORDER — SODIUM CHLORIDE 0.9 % (FLUSH) 0.9 %
5-40 SYRINGE (ML) INJECTION AS NEEDED
Status: DISCONTINUED | OUTPATIENT
Start: 2021-09-04 | End: 2021-09-04 | Stop reason: HOSPADM

## 2021-09-04 RX ORDER — SODIUM CHLORIDE 0.9 % (FLUSH) 0.9 %
5-40 SYRINGE (ML) INJECTION EVERY 8 HOURS
Status: DISCONTINUED | OUTPATIENT
Start: 2021-09-04 | End: 2021-09-04 | Stop reason: HOSPADM

## 2021-09-04 RX ORDER — OXYCODONE HYDROCHLORIDE 5 MG/1
5 TABLET ORAL
Qty: 20 TABLET | Refills: 0 | Status: SHIPPED | OUTPATIENT
Start: 2021-09-04 | End: 2021-09-04 | Stop reason: SDUPTHER

## 2021-09-04 RX ORDER — DEXAMETHASONE 4 MG/1
4 TABLET ORAL EVERY 12 HOURS
Qty: 14 TABLET | Refills: 0 | Status: SHIPPED | OUTPATIENT
Start: 2021-09-04 | End: 2021-09-11

## 2021-09-04 RX ORDER — HYDROMORPHONE HYDROCHLORIDE 1 MG/ML
1 INJECTION, SOLUTION INTRAMUSCULAR; INTRAVENOUS; SUBCUTANEOUS
Status: DISCONTINUED | OUTPATIENT
Start: 2021-09-04 | End: 2021-09-04 | Stop reason: HOSPADM

## 2021-09-04 RX ORDER — POLYETHYLENE GLYCOL 3350 17 G/17G
17 POWDER, FOR SOLUTION ORAL DAILY PRN
Status: DISCONTINUED | OUTPATIENT
Start: 2021-09-04 | End: 2021-09-04 | Stop reason: HOSPADM

## 2021-09-04 RX ORDER — OXYCODONE HYDROCHLORIDE 5 MG/1
5 TABLET ORAL
Qty: 20 TABLET | Refills: 0 | Status: SHIPPED | OUTPATIENT
Start: 2021-09-04 | End: 2021-09-11

## 2021-09-04 RX ORDER — SENNOSIDES 8.6 MG/1
1 TABLET ORAL DAILY PRN
Status: DISCONTINUED | OUTPATIENT
Start: 2021-09-04 | End: 2021-09-04 | Stop reason: HOSPADM

## 2021-09-04 RX ORDER — ONDANSETRON 2 MG/ML
4 INJECTION INTRAMUSCULAR; INTRAVENOUS
Status: DISCONTINUED | OUTPATIENT
Start: 2021-09-04 | End: 2021-09-04 | Stop reason: HOSPADM

## 2021-09-04 RX ORDER — NALOXONE HYDROCHLORIDE 4 MG/.1ML
SPRAY NASAL
Qty: 2 EACH | Refills: 0 | Status: SHIPPED | OUTPATIENT
Start: 2021-09-04 | End: 2022-02-22

## 2021-09-04 RX ORDER — DEXAMETHASONE 4 MG/1
4 TABLET ORAL EVERY 12 HOURS
Status: DISCONTINUED | OUTPATIENT
Start: 2021-09-04 | End: 2021-09-04 | Stop reason: HOSPADM

## 2021-09-04 RX ORDER — HYDROMORPHONE HYDROCHLORIDE 1 MG/ML
0.5 INJECTION, SOLUTION INTRAMUSCULAR; INTRAVENOUS; SUBCUTANEOUS
Status: DISCONTINUED | OUTPATIENT
Start: 2021-09-04 | End: 2021-09-04

## 2021-09-04 RX ADMIN — HYDROMORPHONE HYDROCHLORIDE 0.5 MG: 1 INJECTION, SOLUTION INTRAMUSCULAR; INTRAVENOUS; SUBCUTANEOUS at 02:16

## 2021-09-04 RX ADMIN — HYDROMORPHONE HYDROCHLORIDE 1 MG: 1 INJECTION, SOLUTION INTRAMUSCULAR; INTRAVENOUS; SUBCUTANEOUS at 09:35

## 2021-09-04 RX ADMIN — FAMOTIDINE 20 MG: 10 INJECTION, SOLUTION INTRAVENOUS at 08:34

## 2021-09-04 RX ADMIN — DEXAMETHASONE 4 MG: 4 TABLET ORAL at 13:05

## 2021-09-04 RX ADMIN — ONDANSETRON 4 MG: 2 INJECTION INTRAMUSCULAR; INTRAVENOUS at 09:35

## 2021-09-04 RX ADMIN — Medication 10 ML: at 05:13

## 2021-09-04 RX ADMIN — HYDROMORPHONE HYDROCHLORIDE 1 MG: 1 INJECTION, SOLUTION INTRAMUSCULAR; INTRAVENOUS; SUBCUTANEOUS at 13:37

## 2021-09-04 RX ADMIN — HYDROMORPHONE HYDROCHLORIDE 1 MG: 1 INJECTION, SOLUTION INTRAMUSCULAR; INTRAVENOUS; SUBCUTANEOUS at 05:28

## 2021-09-04 RX ADMIN — Medication 10 ML: at 02:24

## 2021-09-04 RX ADMIN — Medication 10 ML: at 05:28

## 2021-09-04 RX ADMIN — ONDANSETRON 4 MG: 2 INJECTION INTRAMUSCULAR; INTRAVENOUS at 02:24

## 2021-09-04 RX ADMIN — DEXAMETHASONE SODIUM PHOSPHATE 6 MG: 4 INJECTION, SOLUTION INTRAMUSCULAR; INTRAVENOUS at 05:13

## 2021-09-04 RX ADMIN — Medication 10 ML: at 02:17

## 2021-09-04 NOTE — ROUTINE PROCESS
New admit from ER requesting pain medication for frontal headache. Called  orders written Dilaudid 1 mg iv every 4 hrs.    0520 Pt stated me to call MD to increase pain medication. Called  he change Dilaudid 0.5 mg iv to 1 mg iv every 4 hrs.

## 2021-09-04 NOTE — DISCHARGE SUMMARY
[facility] [place] [facility]  [address]  [phone]    Physician Discharge Summary     Patient ID:  Pollo Monahan  [id number]  28 y.o.  1990    Admit date: 9/3/2021    Discharge date and time: 9/4/2021 12:07 PM    Admission Diagnoses: Pseudotumor cerebri [G93.2]    Discharge Diagnoses:  Principal Diagnosis Pseudotumor cerebri                                            Principal Problem:    Pseudotumor cerebri (9/3/2021)    Active Problems:    GERD (gastroesophageal reflux disease) (11/7/2017)      Class 3 severe obesity in adult [facility]) (8/22/2018)      SLE [facility]ic lupus erythematosus) ([facility] (8/25/2020)      Headache (8/29/2020)      Intracranial hypertension (10/23/2020)      Stenosis of cerebral venous sinus (8/30/2021)           Hospital Course:     25 yo hx of paraesophageal hernia s/p Nissen, SLE, pseudotumor cerebri, presented w HA, visual changes consistent with recurrent pseudotumor cerebri    1) Recurrent and refractory pseudotumor cerebri: headache and visual changes improving. Had 10 lumbar punctures last year and 5 so far this year. Neurology recommended pain meds and steroids, then f/u with patient's Neurologist (Dr. Rob Huerta) for LP. Patient is also seeing Dr. Sandra Schwarz, Neuro interventional, for possible cerebral venous sinus stenting    2) Morbid obesity: f/u with gen surgery for bariatric surgery    3) SLE: cont plaquenil    4) Paraesophageal hernia/GERD: s/p Nissen.   Cont H2     PCP: Sienna Edwards MD     Consults: Neurology    Significant Diagnostic Studies: none    Discharge Exam:  Physical Exam:    Gen: Well-developed, well-nourished, morbidly obese, in no acute distress  HEENT:  Pink conjunctivae, PERRL, hearing intact to voice, moist mucous membranes  Neck: Supple, without masses, thyroid non-tender  Resp: No accessory muscle use, clear breath sounds without wheezes rales or rhonchi  Card: No murmurs, normal S1, S2 without thrills, bruits or peripheral edema  Abd:  Soft, non-tender, non-distended, normoactive bowel sounds are present  Lymph:  No cervical or inguinal adenopathy  Musc: No cyanosis or clubbing  Skin: No rashes   Neuro:  Cranial nerves are grossly intact, no focal motor weakness, follows commands appropriately  Psych:  Good insight, oriented to person, place and time, alert    Disposition: home  Discharge Condition: Stable    Patient Instructions:   Current Discharge Medication List      START taking these medications    Details   dexAMETHasone (DECADRON) 4 mg tablet Take 4 mg by mouth every twelve (12) hours for 7 days. Qty: 14 Tablet, Refills: 0      oxyCODONE IR (Roxicodone) 5 mg immediate release tablet Take 1 Tablet by mouth every six (6) hours as needed for Pain for up to 7 days. Max Daily Amount: 20 mg.  Qty: 20 Tablet, Refills: 0    Associated Diagnoses: Pseudotumor cerebri      naloxone (NARCAN) 4 mg/actuation nasal spray Use 1 spray intranasally, then discard. Repeat with new spray every 2 min as needed for opioid overdose symptoms, alternating nostrils. Indications: decrease in rate & depth of breathing due to opioid drug, opioid overdose  Qty: 2 Each, Refills: 0         CONTINUE these medications which have NOT CHANGED    Details   !! topiramate (Topamax) 200 mg tablet Take 200 mg by mouth two (2) times a day. ondansetron (Zofran ODT) 4 mg disintegrating tablet 1 Tablet by SubLINGual route every eight (8) hours as needed for Nausea or Vomiting. Qty: 20 Tablet, Refills: 0      gabapentin (NEURONTIN) 600 mg tablet TAKE 1 TAB BY MOUTH THREE (3) TIMES DAILY. MAX DAILY AMOUNT: 1,800 MG.   Qty: 90 Tab, Refills: 1    Comments: Not to exceed 4 additional fills before 07/30/2021  Associated Diagnoses: Frequent headaches      hydrOXYchloroQUINE (PLAQUENIL) 200 mg tablet TAKE 1 TABLET BY MOUTH TWICE A DAY PA REQUIRED FOR QTY   16 FAXED MD  Qty: 60 Tab, Refills: 2      cyclobenzaprine (FLEXERIL) 10 mg tablet Take 1 Tab by mouth two (2) times a day. Qty: 180 Tab, Refills: 1      diazePAM (VALIUM) 5 mg tablet TAKE ONE DAILY AS NEEDED FOR ANXIETY      LORazepam (ATIVAN) 1 mg tablet TAKE ONE TABLET AT NIGHT AS NEEDED FOR ANXIETY      biotin 10 mg tab 10 mg = 1 tab each dose, PO, daily      !! topiramate (TOPAMAX) 50 mg tablet Take 1 Tab by mouth daily (with dinner). Qty: 90 Tab, Refills: 1    Associated Diagnoses: Frequent headaches; Obesity (BMI 35.0-39.9 without comorbidity)      phentermine (ADIPEX_P) 15 mg capsule Take 1 Cap by mouth every morning. Max Daily Amount: 15 mg.  Qty: 30 Cap, Refills: 0    Associated Diagnoses: Obesity (BMI 35.0-39.9 without comorbidity)       ! ! - Potential duplicate medications found. Please discuss with provider.         Activity: Activity as tolerated  Diet: Regular Diet  Wound Care: None needed    Follow-up with  Follow-up Information     Follow up With Specialties Details Why Contact Info    Ebonie Marquez MD Endovascular Surgical Neuroradiology Schedule an appointment as soon as possible for a visit in 1 week  [address] [place] [address] [facility]      Nadia Otero MD Neurology Schedule an appointment as soon as possible for a visit in 3 days  [address] [place] [address]  [phone]            Follow-up tests/labs none    Signed:  Claritza Dillon MD  9/4/2021  12:07 PM  **I personally spent 35 min on discharge**

## 2021-09-04 NOTE — DISCHARGE INSTRUCTIONS
HOSPITALIST DISCHARGE INSTRUCTIONS  NAME: Pollo Monahan   :  1990   MRN:  [id number]     Date/Time:  2021 12:06 PM    ADMIT DATE: 9/3/2021     DISCHARGE DATE: 2021     ADMITTING DIAGNOSIS:  Pseudotumor cerebri    DISCHARGE DIAGNOSIS:  same    MEDICATIONS:  See your after visit summary       · It is important that you take the medication exactly as they are prescribed. · Keep your medication in the bottles provided by the pharmacist and keep a list of the medication names, dosages, and times to be taken in your wallet. · Do not take other medications without consulting your doctor     Pain Management: per above medications    What to do at Home    Recommended diet:  Regular Diet    Recommended activity: Activity as tolerated    1) Return to the hospital if you feel worse    2) If you experience any of the following symptoms then please call your primary care physician or return to the emergency room if you cannot get hold of your doctor:  Fever, chills, nausea, vomiting, diarrhea, change in mentation, falling, bleeding, shortness of breath, chest pain, severe headache, severe abdominal pain,     3) Follow up with your Neurologist and Neuro interventionalist     Follow Up: Follow-up Information     Follow up With Specialties Details Why Contact Info    Rox Johnston MD Endovascular Surgical Neuroradiology Schedule an appointment as soon as possible for a visit in 1 week  [address]      Cintia Zamarripa MD Neurology Schedule an appointment as soon as possible for a visit in 3 days  [address]  [phone]              Information obtained by :  I understand that if any problems occur once I am at home I am to contact my physician. I understand and acknowledge receipt of the instructions indicated above. [de-identified] or R.N.'s Signature                                                                  Date/Time                                                                                                                                              Patient or Representative Signature                                                          Date/Time    Patient Education        Learning About Pseudotumor Cerebri  What is pseudotumor cerebri? Pseudotumor cerebri (say \"xjp-upv-GYK-hortencia SARA-uh-almita\") is an increase in pressure of the fluid that surrounds the brain. Your doctor may also call the condition \"idiopathic intracranial hypertension. \" Normally, this clear fluid acts like a buffer to protect the brain. It is called cerebrospinal fluid, or CSF. It's not clear what makes the CSF pressure rise. Some medicines may cause it. Sleep apnea, obesity, and anemia may also play a part. The pressure may build over time. The rising pressure can make the optic nerve swell. The optic nerve is at the back of the eye. It carries visual information from the eye to the brain. The swelling may damage the nerve and lead to permanent loss of some or all of the eyesight. There are several symptoms of rising CSF pressure. Some symptoms may be present all the time. Some might come and go. Symptoms include:  · Severe headaches. They sometimes come with nausea and vomiting. The pain may be centered behind the eyes. · A hissing or roaring sound in the ears that keeps time with your heartbeat. · Problems with vision. You may not be able to see well at the edge of your field of view. You may also lose center vision. It may happen now and then, in one or both eyes, and last for a few seconds at a time. The word \"pseudotumor\" may sound scary. But it's not a brain tumor. The condition gets its name because the pressure inside the skull can mimic what happens when a person has a tumor.   How is it treated? · If you are taking medicines that could be raising your CSF pressure, your doctor may change your medicines. · You may get medicines to help your body make less CSF. This can help lower the pressure around the brain. · Your doctor may also give you medicine for headaches. · If sleep apnea, obesity, or anemia may be causing the CSF pressure to rise, your doctor may treat those problems. · If your vision is getting worse, you may have surgery to make a small opening on the optic nerve to reduce swelling. · Your doctor may implant small tubes inside the skull to drain the extra fluid. This helps lower the pressure around the brain. Follow-up care is a key part of your treatment and safety. Be sure to make and go to all appointments, and call your doctor if you are having problems. It's also a good idea to know your test results and keep a list of the medicines you take. Where can you learn more? Go to http://www.gray.com/  Enter D455 in the search box to learn more about \"Learning About Pseudotumor Cerebri. \"  Current as of: August 31, 2020               Content Version: 12.8  © 2295-9856 [employer]. Care instructions adapted under license by [employer] (which disclaims liability or warranty for this information). If you have questions about a medical condition or this instruction, always ask your healthcare professional. [address] any warranty or liability for your use of this information.

## 2021-09-04 NOTE — PROGRESS NOTES
Problem: Falls - Risk of  Goal: *Absence of Falls  Outcome: Progressing Towards Goal  Note: Fall Risk Interventions:            Medication Interventions: Patient to call before getting OOB, Teach patient to arise slowly                   Problem: Patient Education: Go to Patient Education Activity  Goal: Patient/Family Education  Outcome: Progressing Towards Goal     Problem: Pain  Goal: *Control of Pain  Outcome: Progressing Towards Goal     Problem: Patient Education: Go to Patient Education Activity  Goal: Patient/Family Education  Outcome: Progressing Towards Goal

## 2021-09-04 NOTE — ROUTINE PROCESS
Bedside and Verbal shift change report given to SHANT Montes (oncoming nurse) by Colonel Dill (offgoing nurse). Report included the following information SBAR and Kardex.

## 2021-09-04 NOTE — ROUTINE PROCESS
TRANSFER - OUT REPORT:    Verbal report given to Julio RN(name) on vocaltap  being transferred to medical RN(unit) for routine progression of care       Report consisted of patients Situation, Background, Assessment and   Recommendations(SBAR). Information from the following report(s) SBAR, MAR and Recent Results was reviewed with the receiving nurse. Lines:   Peripheral IV 09/03/21 Left Antecubital (Active)   Site Assessment Clean, dry, & intact 09/03/21 2125   Phlebitis Assessment 0 09/03/21 2125   Infiltration Assessment 0 09/03/21 2125   Dressing Status Clean, dry, & intact 09/03/21 2125        Opportunity for questions and clarification was provided.       Patient transported with:   [employer]

## 2021-09-04 NOTE — PROGRESS NOTES
Discharge reviewed with patient, verbalizes understanding. Peripheral IV removed. Made aware of med changes. Rx's given to patient. Family in to transport home.

## 2021-09-04 NOTE — ROUTINE PROCESS
TRANSFER - IN REPORT:    Verbal report received from SHANT Jurado(name) on Damian Bolds  being received from ER(unit) for routine progression of care      Report consisted of patients Situation, Background, Assessment and   Recommendations(SBAR). Information from the following report(s) SBAR and Kardex was reviewed with the receiving nurse. Opportunity for questions and clarification was provided. Assessment completed upon patients arrival to unit and care assumed.
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
## 2021-09-07 ENCOUNTER — TELEPHONE (OUTPATIENT)
Dept: NEUROSURGERY | Age: 31
End: 2021-09-07

## 2021-09-07 NOTE — ROUTINE PROCESS
Received IR order for Lumbar puncture. Not performed in IR, those are performed in radiology. Order changed.

## 2021-09-07 NOTE — TELEPHONE ENCOUNTER
Message left for patient to return the call regarding her condition, if she went to ED, and if she had a Lumbar puncture with opening pressure.   Date of Diagnostic angiogram was changed to 9/22/2021. 8:00 am arrival.

## 2021-09-08 NOTE — TELEPHONE ENCOUNTER
Spoke to patient who stated she was feeling some what better. Headaches were not as severe. Patient stated she went to [facility] on 9/3/2021. She was seen by Dr Mira Meeks and was told there was no one at that time to perform a Lumbar puncture. The earliest would be Tuesday 9/7/2021. Discharged the same day. Informed patient provider will order a Lumbar puncture with opening pressure. Patient stated she would like to think about it and will call tomorrow with an update on her condition and decision for LP. Informed patient her Diagnostic angiogram was rescheduled to 9/23/2021. Patient stated understanding.

## 2021-09-09 ENCOUNTER — DOCUMENTATION ONLY (OUTPATIENT)
Dept: NEUROSURGERY | Age: 31
End: 2021-09-09

## 2021-09-09 NOTE — PROGRESS NOTES
I spoke to Dr. Dilshad Antoine (general and bariatric surgery) and informed him of air intention to perform intracranial cerebral venous sinus stenting to treat this patient's intracranial hypertension. She has had 2 prior Nissen fundoplication's which would make bariatric surgery more complex.

## 2021-09-13 PROCEDURE — 99284 EMERGENCY DEPT VISIT MOD MDM: CPT

## 2021-09-14 ENCOUNTER — HOSPITAL ENCOUNTER (EMERGENCY)
Age: 31
Discharge: LEFT BEFORE DISCHARGE | End: 2021-09-14
Attending: EMERGENCY MEDICINE
Payer: MEDICAID

## 2021-09-14 VITALS
HEIGHT: 61 IN | SYSTOLIC BLOOD PRESSURE: 118 MMHG | DIASTOLIC BLOOD PRESSURE: 86 MMHG | TEMPERATURE: 98.4 F | RESPIRATION RATE: 16 BRPM | WEIGHT: 212 LBS | HEART RATE: 80 BPM | BODY MASS INDEX: 40.02 KG/M2 | OXYGEN SATURATION: 98 %

## 2021-09-14 DIAGNOSIS — Z53.21 ELOPED FROM EMERGENCY DEPARTMENT: Primary | ICD-10-CM

## 2021-09-14 PROCEDURE — 96375 TX/PRO/DX INJ NEW DRUG ADDON: CPT

## 2021-09-14 PROCEDURE — 96374 THER/PROPH/DIAG INJ IV PUSH: CPT

## 2021-09-14 PROCEDURE — 74011250636 HC RX REV CODE- 250/636: Performed by: EMERGENCY MEDICINE

## 2021-09-14 RX ORDER — ONDANSETRON 2 MG/ML
4 INJECTION INTRAMUSCULAR; INTRAVENOUS
Status: COMPLETED | OUTPATIENT
Start: 2021-09-14 | End: 2021-09-14

## 2021-09-14 RX ORDER — HYDROMORPHONE HYDROCHLORIDE 1 MG/ML
0.5 INJECTION, SOLUTION INTRAMUSCULAR; INTRAVENOUS; SUBCUTANEOUS ONCE
Status: COMPLETED | OUTPATIENT
Start: 2021-09-14 | End: 2021-09-14

## 2021-09-14 RX ADMIN — ONDANSETRON 4 MG: 2 INJECTION INTRAMUSCULAR; INTRAVENOUS at 04:49

## 2021-09-14 RX ADMIN — SODIUM CHLORIDE 1000 ML: 9 INJECTION, SOLUTION INTRAVENOUS at 04:48

## 2021-09-14 RX ADMIN — HYDROMORPHONE HYDROCHLORIDE 0.5 MG: 1 INJECTION, SOLUTION INTRAMUSCULAR; INTRAVENOUS; SUBCUTANEOUS at 04:49

## 2021-09-14 NOTE — ED NOTES
US IV attempted by by Hartford Kussmaul, EMT. Patient states wanting to go home. Advised patient will go to IR to receive IV and LP. Nisha Sal, spoke with IR to update plan.
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
## 2021-09-14 NOTE — PROGRESS NOTES
IR received order for LP. Spoke with Interventional Radiologist, Dr. Timo Guillen who is going to contact Dr. Julieta Zuniga to discuss plan for the patient. Will update ED with plan once physicians have had a plan to talk.

## 2021-09-14 NOTE — ED NOTES
Patient not in ngo bed, not at IR. Nursing supervisor aware, Dr. Saravanan Barrett aware. Patient left AMA. No IV in place prior to elopement.

## 2021-09-14 NOTE — ED TRIAGE NOTES
Patient arrives to ED with complaints of headache and nausea     Patient has been taking oxycodone at home for attempted relief      Per patient she needs to be seen by IR for lumbar puncture?  She is supposed to have her intracranial pressure measured next [facility] at [facility] followed by stent placement by Neurosurgery

## 2021-09-14 NOTE — ED NOTES
7 AM  Change of shift. Care of patient taken over from Dr. Kaykay Lloyd; H&P reviewed, bedside handoff complete. Awaiting IR LP. Patient eloped from the emergency department.     Forest Stephens MD

## 2021-09-14 NOTE — PROGRESS NOTES
Attempt PIV and blood work x 2. Able to establish PIV 24g. Another RN to attempt straight stick for labs.

## 2021-09-20 ENCOUNTER — TELEPHONE (OUTPATIENT)
Dept: NEUROSURGERY | Age: 31
End: 2021-09-20

## 2021-09-20 ENCOUNTER — CLINICAL SUPPORT (OUTPATIENT)
Dept: SURGERY | Age: 31
End: 2021-09-20

## 2021-09-20 ENCOUNTER — TRANSCRIBE ORDER (OUTPATIENT)
Dept: REGISTRATION | Age: 31
End: 2021-09-20

## 2021-09-20 ENCOUNTER — HOSPITAL ENCOUNTER (OUTPATIENT)
Dept: LAB | Age: 31
Discharge: HOME | End: 2021-09-20
Payer: MEDICAID

## 2021-09-20 DIAGNOSIS — Z01.812 PRE-PROCEDURAL LABORATORY EXAMINATIONS: Primary | ICD-10-CM

## 2021-09-20 DIAGNOSIS — E66.01 MORBID OBESITY WITH BMI OF 40.0-44.9, ADULT (HCC): Primary | ICD-10-CM

## 2021-09-20 DIAGNOSIS — Z01.812 PRE-PROCEDURAL LABORATORY EXAMINATIONS: ICD-10-CM

## 2021-09-20 PROCEDURE — U0005 INFEC AGEN DETEC AMPLI PROBE: HCPCS

## 2021-09-20 NOTE — PROGRESS NOTES
[facility] at [facility]  Supervised Weight Loss     Date:   2021    Patient's Name: Soledad Simmonds  : 1990    Insurance:  [facility]                     Session:   Surgery: Gastric Bypass                  Surgeon: Abdelrahman Magallon M.D.      Height: 61\"                 Reported Wt:    212      Lbs.                           RHF: 42             Pounds Lost since last month: 3               Pounds Gained since last month: 0     Starting Weight: 209                         Previous Months Weight: 215  Overall Pounds Lost: 0                    Overall Pounds Gained: 3#     Other Pertinent Information: Today's appointment was completed in a virtual setting d/t COVID-19. Today's wt was pulled from recent wt documented in EMR from 21. Smoking Status:  none  Alcohol Intake: none    I have reviewed with pt the guidelines of the supervised wt loss program.  Pt understands the expectations of some wt loss during the program and that wt gain could delay the process. I have also explained that appointments need to be consecutive and missing an appointment may result in starting over. Pt has received this information in writing. Changes that patient has made since last month include:  Portion control (food scale), drinking more water and less juice. Eating Habits and Behaviors  General healthy eating guidelines were discussed. A nutrition lesson was presented on portion control. Patients were instructed implement portion control now using the balanced plate method (1/2 plate non-starchy vegetables, 1/4 plate lean meat, and 1/4 plate whole grains and to include fruit and/or milk at meals or snack). We discussed measuring meals to 1/2 cup total per meal after surgery and appropriate portion progression long term. Patient's current diet habits include: Pt is eating 2-3 meals per day. Has tried Premier protein shakes and tolerates.  Snack choices include low fat cheese, sugar free popsicles, sugar free applesauce. Pt is eating refined carbohydrate foods (bread, pasta, rice, potatoes) in moderation. Pt is eating sweets/desserts in moderation and using sugar free products. Pt is eating meals prepared outside of the home minimal. Pt is drinking mostly water and limiting juice. Pt reports no to emotional eating. Physical Activity/Exercise  We talked about the importance of increasing daily physical activity and beginning to develop an exercise regimen/routine. We talked about exercise as being an important part of long term weight loss after surgery. Comments:  During class, I discussed with patient the importance of getting into an exercise routine. Behavior Modification       We talked about how to eat more mindfully. Tips and recommendations for how to make these changes were provided. Pt was encouraged to keep a food journal and record what they were taking in daily. Overall Assessment: Pt demonstrates appropriate lifestyle changes evidenced by reported changes and wt loss from previous reported weights. Demonstrates understanding of basic nutrition guidelines. Appears to be an appropriate candidate at this time. Patient-Set Goals:   1. Nutrition - maintain current eating habits and continued to strive for 3 meals a day   2. Exercise - maintain walking   3.  Behavior -continue to review nutrition education materials     Sheela King, THUY  9/20/2021

## 2021-09-20 NOTE — TELEPHONE ENCOUNTER
Spoke to patient to confirm her procedure on 9/23/2021 at [facility]. Arrival time 8:00 am at Patient Registration. No eating after midnight and take morning medications with sips of water the morning of procedure. Patient to have COVID testing done today at [facility]. Patient stated understanding. Patient reports she went to ED on 9/14/2021 with headaches and vomiting. Patient waited in ED/Triage for 13 hours and left without treatment. Patient reports continued daily headaches and vomiting until 9/18/2021. Reports no headache or vomiting the past 2 days.

## 2021-09-21 LAB
SARS-COV-2, XPLCVT: NOT DETECTED
SOURCE, COVRS: NORMAL

## 2021-09-22 ENCOUNTER — TELEPHONE (OUTPATIENT)
Dept: NEUROSURGERY | Age: 31
End: 2021-09-22

## 2021-09-22 NOTE — TELEPHONE ENCOUNTER
Patient instructed to take 650 mg of aspirin tonight. Procedure is diagnostic only (cerebral angiogram, venous sinus pressure manometry, no stent this session, working with neuro-ophtho for definitive diagnosis). MAC anesthesia. Consider decadron. History of anaphylaxis to tylenol and latex. 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      Please obtain bHCG on arrival to holding area

## 2021-09-23 ENCOUNTER — HOSPITAL ENCOUNTER (OUTPATIENT)
Dept: INTERVENTIONAL RADIOLOGY/VASCULAR | Age: 31
Discharge: HOME | End: 2021-09-23
Attending: RADIOLOGY | Admitting: STUDENT IN AN ORGANIZED HEALTH CARE EDUCATION/TRAINING PROGRAM
Payer: MEDICAID

## 2021-09-23 ENCOUNTER — ANESTHESIA EVENT (OUTPATIENT)
Dept: INTERVENTIONAL RADIOLOGY/VASCULAR | Age: 31
End: 2021-09-23
Payer: MEDICAID

## 2021-09-23 ENCOUNTER — ANESTHESIA (OUTPATIENT)
Dept: INTERVENTIONAL RADIOLOGY/VASCULAR | Age: 31
End: 2021-09-23
Payer: MEDICAID

## 2021-09-23 VITALS
HEART RATE: 84 BPM | WEIGHT: 215 LBS | DIASTOLIC BLOOD PRESSURE: 88 MMHG | RESPIRATION RATE: 17 BRPM | HEIGHT: 61 IN | OXYGEN SATURATION: 96 % | SYSTOLIC BLOOD PRESSURE: 128 MMHG | BODY MASS INDEX: 40.59 KG/M2 | TEMPERATURE: 98 F

## 2021-09-23 DIAGNOSIS — I66.9 STENOSIS OF INTRACRANIAL VESSEL: ICD-10-CM

## 2021-09-23 LAB — HCG UR QL: NEGATIVE

## 2021-09-23 PROCEDURE — 74011250636 HC RX REV CODE- 250/636: Performed by: NURSE ANESTHETIST, CERTIFIED REGISTERED

## 2021-09-23 PROCEDURE — C1769 GUIDE WIRE: HCPCS

## 2021-09-23 PROCEDURE — 76060000034 HC ANESTHESIA 1.5 TO 2 HR

## 2021-09-23 PROCEDURE — 36216 PLACE CATHETER IN ARTERY: CPT

## 2021-09-23 PROCEDURE — 77030021533 HC CATH ANGI DX PRFMA MRTM -A

## 2021-09-23 PROCEDURE — C1760 CLOSURE DEV, VASC: HCPCS

## 2021-09-23 PROCEDURE — 77030012468 HC VLV BLEEDBK CNTRL ABBT -B

## 2021-09-23 PROCEDURE — 74011000250 HC RX REV CODE- 250: Performed by: NURSE ANESTHETIST, CERTIFIED REGISTERED

## 2021-09-23 PROCEDURE — C1887 CATHETER, GUIDING: HCPCS

## 2021-09-23 PROCEDURE — 74011000250 HC RX REV CODE- 250: Performed by: RADIOLOGY

## 2021-09-23 PROCEDURE — 2709999900 HC NON-CHARGEABLE SUPPLY

## 2021-09-23 PROCEDURE — 74011250636 HC RX REV CODE- 250/636: Performed by: RADIOLOGY

## 2021-09-23 PROCEDURE — 81025 URINE PREGNANCY TEST: CPT

## 2021-09-23 PROCEDURE — C1894 INTRO/SHEATH, NON-LASER: HCPCS

## 2021-09-23 PROCEDURE — 77030008584 HC TOOL GDWRE DEV TERU -A

## 2021-09-23 PROCEDURE — 74011000636 HC RX REV CODE- 636: Performed by: RADIOLOGY

## 2021-09-23 RX ORDER — DEXAMETHASONE SODIUM PHOSPHATE 4 MG/ML
INJECTION, SOLUTION INTRA-ARTICULAR; INTRALESIONAL; INTRAMUSCULAR; INTRAVENOUS; SOFT TISSUE AS NEEDED
Status: DISCONTINUED | OUTPATIENT
Start: 2021-09-23 | End: 2021-09-23 | Stop reason: HOSPADM

## 2021-09-23 RX ORDER — LIDOCAINE HYDROCHLORIDE 20 MG/ML
20 INJECTION, SOLUTION INFILTRATION; PERINEURAL
Status: COMPLETED | OUTPATIENT
Start: 2021-09-23 | End: 2021-09-23

## 2021-09-23 RX ORDER — FENTANYL CITRATE 50 UG/ML
INJECTION, SOLUTION INTRAMUSCULAR; INTRAVENOUS
Status: DISCONTINUED
Start: 2021-09-23 | End: 2021-09-23 | Stop reason: HOSPADM

## 2021-09-23 RX ORDER — PROPOFOL 10 MG/ML
INJECTION, EMULSION INTRAVENOUS
Status: DISCONTINUED | OUTPATIENT
Start: 2021-09-23 | End: 2021-09-23 | Stop reason: HOSPADM

## 2021-09-23 RX ORDER — ONDANSETRON 2 MG/ML
INJECTION INTRAMUSCULAR; INTRAVENOUS AS NEEDED
Status: DISCONTINUED | OUTPATIENT
Start: 2021-09-23 | End: 2021-09-23 | Stop reason: HOSPADM

## 2021-09-23 RX ORDER — LIDOCAINE HYDROCHLORIDE 20 MG/ML
INJECTION, SOLUTION EPIDURAL; INFILTRATION; INTRACAUDAL; PERINEURAL AS NEEDED
Status: DISCONTINUED | OUTPATIENT
Start: 2021-09-23 | End: 2021-09-23 | Stop reason: HOSPADM

## 2021-09-23 RX ORDER — HEPARIN SODIUM 1000 [USP'U]/ML
INJECTION, SOLUTION INTRAVENOUS; SUBCUTANEOUS AS NEEDED
Status: DISCONTINUED | OUTPATIENT
Start: 2021-09-23 | End: 2021-09-23 | Stop reason: HOSPADM

## 2021-09-23 RX ORDER — FENTANYL CITRATE 50 UG/ML
INJECTION, SOLUTION INTRAMUSCULAR; INTRAVENOUS AS NEEDED
Status: DISCONTINUED | OUTPATIENT
Start: 2021-09-23 | End: 2021-09-23 | Stop reason: HOSPADM

## 2021-09-23 RX ORDER — LIDOCAINE HYDROCHLORIDE AND EPINEPHRINE 10; 10 MG/ML; UG/ML
1.5 INJECTION, SOLUTION INFILTRATION; PERINEURAL ONCE
Status: COMPLETED | OUTPATIENT
Start: 2021-09-23 | End: 2021-09-23

## 2021-09-23 RX ORDER — MIDAZOLAM HYDROCHLORIDE 1 MG/ML
INJECTION, SOLUTION INTRAMUSCULAR; INTRAVENOUS AS NEEDED
Status: DISCONTINUED | OUTPATIENT
Start: 2021-09-23 | End: 2021-09-23 | Stop reason: HOSPADM

## 2021-09-23 RX ORDER — SODIUM CHLORIDE 9 MG/ML
INJECTION, SOLUTION INTRAVENOUS
Status: DISCONTINUED | OUTPATIENT
Start: 2021-09-23 | End: 2021-09-23 | Stop reason: HOSPADM

## 2021-09-23 RX ORDER — PROPOFOL 10 MG/ML
INJECTION, EMULSION INTRAVENOUS AS NEEDED
Status: DISCONTINUED | OUTPATIENT
Start: 2021-09-23 | End: 2021-09-23 | Stop reason: HOSPADM

## 2021-09-23 RX ADMIN — FENTANYL CITRATE 50 MCG: 50 INJECTION, SOLUTION INTRAMUSCULAR; INTRAVENOUS at 12:22

## 2021-09-23 RX ADMIN — HEPARIN SODIUM 4000 UNITS: 5000 INJECTION, SOLUTION INTRAVENOUS; SUBCUTANEOUS at 11:05

## 2021-09-23 RX ADMIN — FENTANYL CITRATE 50 MCG: 50 INJECTION, SOLUTION INTRAMUSCULAR; INTRAVENOUS at 11:52

## 2021-09-23 RX ADMIN — LIDOCAINE HYDROCHLORIDE 15 MG: 20 INJECTION, SOLUTION INFILTRATION; PERINEURAL at 11:42

## 2021-09-23 RX ADMIN — FENTANYL CITRATE 50 MCG: 50 INJECTION, SOLUTION INTRAMUSCULAR; INTRAVENOUS at 12:11

## 2021-09-23 RX ADMIN — PROPOFOL 50 MG: 10 INJECTION, EMULSION INTRAVENOUS at 11:29

## 2021-09-23 RX ADMIN — FENTANYL CITRATE 50 MCG: 50 INJECTION, SOLUTION INTRAMUSCULAR; INTRAVENOUS at 12:35

## 2021-09-23 RX ADMIN — PROPOFOL 50 MG: 10 INJECTION, EMULSION INTRAVENOUS at 11:27

## 2021-09-23 RX ADMIN — HEPARIN SODIUM 4000 UNITS: 5000 INJECTION, SOLUTION INTRAVENOUS; SUBCUTANEOUS at 11:16

## 2021-09-23 RX ADMIN — LIDOCAINE HYDROCHLORIDE AND EPINEPHRINE 15 MG: 10; 10 INJECTION, SOLUTION INFILTRATION; PERINEURAL at 11:42

## 2021-09-23 RX ADMIN — IOPAMIDOL 174 ML: 612 INJECTION, SOLUTION INTRAVENOUS at 12:41

## 2021-09-23 RX ADMIN — HEPARIN SODIUM 4000 UNITS: 5000 INJECTION, SOLUTION INTRAVENOUS; SUBCUTANEOUS at 11:15

## 2021-09-23 RX ADMIN — PROPOFOL 30 MG: 10 INJECTION, EMULSION INTRAVENOUS at 11:37

## 2021-09-23 RX ADMIN — HEPARIN SODIUM 4000 UNITS: 5000 INJECTION, SOLUTION INTRAVENOUS; SUBCUTANEOUS at 11:14

## 2021-09-23 RX ADMIN — SODIUM CHLORIDE: 900 INJECTION, SOLUTION INTRAVENOUS at 10:45

## 2021-09-23 RX ADMIN — LIDOCAINE HYDROCHLORIDE 100 MG: 20 INJECTION, SOLUTION EPIDURAL; INFILTRATION; INTRACAUDAL; PERINEURAL at 11:26

## 2021-09-23 RX ADMIN — PROPOFOL 40 MG: 10 INJECTION, EMULSION INTRAVENOUS at 12:37

## 2021-09-23 RX ADMIN — DEXAMETHASONE SODIUM PHOSPHATE 4 MG: 4 INJECTION, SOLUTION INTRAMUSCULAR; INTRAVENOUS at 11:26

## 2021-09-23 RX ADMIN — MIDAZOLAM HYDROCHLORIDE 5 MG: 1 INJECTION, SOLUTION INTRAMUSCULAR; INTRAVENOUS at 11:00

## 2021-09-23 RX ADMIN — FENTANYL CITRATE 50 MCG: 50 INJECTION, SOLUTION INTRAMUSCULAR; INTRAVENOUS at 11:00

## 2021-09-23 RX ADMIN — HEPARIN SODIUM 4000 UNITS: 5000 INJECTION, SOLUTION INTRAVENOUS; SUBCUTANEOUS at 11:13

## 2021-09-23 RX ADMIN — HEPARIN SODIUM 4000 UNITS: 5000 INJECTION, SOLUTION INTRAVENOUS; SUBCUTANEOUS at 11:00

## 2021-09-23 RX ADMIN — PROPOFOL 30 MG: 10 INJECTION, EMULSION INTRAVENOUS at 11:50

## 2021-09-23 RX ADMIN — HEPARIN SODIUM 3000 UNITS: 1000 INJECTION, SOLUTION INTRAVENOUS; SUBCUTANEOUS at 11:57

## 2021-09-23 RX ADMIN — PROPOFOL 50 MCG/KG/MIN: 10 INJECTION, EMULSION INTRAVENOUS at 11:27

## 2021-09-23 RX ADMIN — ONDANSETRON HYDROCHLORIDE 4 MG: 2 INJECTION, SOLUTION INTRAMUSCULAR; INTRAVENOUS at 11:26

## 2021-09-23 RX ADMIN — FENTANYL CITRATE 50 MCG: 50 INJECTION, SOLUTION INTRAMUSCULAR; INTRAVENOUS at 11:21

## 2021-09-23 NOTE — PROGRESS NOTES
Pt arrives ambulatory to angio department accompanied by  for cerebral angiolography procedure. All assessments completed and consent was reviewed. Education given was regarding procedure, under anesthesia care, post-procedure care and  management/follow-up. Opportunity for questions was provided and all questions and concerns were addressed.    1100: Pt is under the care of the anesthesia team for this procedure

## 2021-09-23 NOTE — ANESTHESIA POSTPROCEDURE EVALUATION
* No procedures listed *. MAC    Anesthesia Post Evaluation        Patient location during evaluation: PACU  Note status: Adequate. Level of consciousness: responsive to verbal stimuli and sleepy but conscious  Pain management: satisfactory to patient  Airway patency: patent  Anesthetic complications: no  Cardiovascular status: acceptable  Respiratory status: acceptable  Hydration status: acceptable  Comments: +Post-Anesthesia Evaluation and Assessment    Patient: Flory Mancera MRN: [id number]  SSN: [id number]   YOB: 1990  Age: 26 y.o. Sex: female          Cardiovascular Function/Vital Signs    /87 (BP 1 Location: Left upper arm, BP Patient Position: Supine)   Pulse 98   Temp 36.7 °C (98 °F)   Resp 16   Ht 5' 1\" (1.549 m)   Wt 97.5 kg (215 lb)   SpO2 95%   Breastfeeding No   BMI 40.62 kg/m²     Patient is status post * No procedures listed *. Nausea/Vomiting: Controlled. Postoperative hydration reviewed and adequate. Pain:  Pain Scale 1: Numeric (0 - 10) (09/23/21 0935)  Pain Intensity 1: 0 (09/23/21 0935)   Managed. Neurological Status: At baseline. Mental Status and Level of Consciousness: Arousable. Pulmonary Status:   O2 Device: None (09/23/21 1257)   Adequate oxygenation and airway patent. Complications related to anesthesia: None    Post-anesthesia assessment completed. No concerns. I have evaluated the patient and the patient is stable and ready to be discharged from PACU .     Signed By: Jarod Hemphill MD    9/23/2021        INITIAL Post-op Vital signs:   Vitals Value Taken Time   /87 09/23/21 1300   Temp 36.7 °C (98 °F) 09/23/21 1300   Pulse 98 09/23/21 1300   Resp 16 09/23/21 1300   SpO2 95 % 09/23/21 1300

## 2021-09-23 NOTE — DISCHARGE INSTRUCTIONS
Patient Education        Brain Angiogram: What to Expect at Home  Your Recovery  A brain angiogram (cerebral angiogram) is a test (also called a procedure) that looks for problems with blood vessels and blood flow in the brain. The doctor inserted a thin, flexible tube (catheter) into a blood vessel in your groin. Or the doctor may have put the catheter in a blood vessel in your arm. Then he or she injected a dye into the catheter. The dye flows into the blood vessel. The dye made the blood vessels show up on a video screen. You may have had this test to see if a blood vessel in the brain is bulging, narrowed, or blocked. The test may also be used to check other symptoms, such as unusual headaches, or to check problems found during a different test.  You may have a bruise where the catheter was put in, and you may feel sore for a day or two. You can do light activities around the house. But don't do anything strenuous for several days. This care sheet gives you a general idea about how long it will take for you to recover. But each person recovers at a different pace. Follow the steps below to feel better as quickly as possible. How can you care for yourself at home? Activity    · Do not do strenuous exercise and do not lift, pull, or push anything heavy until your doctor says it is okay. This may be for a day or two. You can walk around the house and do light activity, such as cooking.     · If the catheter was placed in your groin, try not to walk up stairs for the first couple of days.     · If the catheter was placed in your arm near your wrist, do not bend your wrist deeply for the first couple of days. Be careful using your hand to get into and out of a chair or bed.     · If your doctor recommends it, get more exercise. Walking is a good choice. Bit by bit, increase the amount you walk every day. Try for at least 30 minutes on most days of the week.    Diet    · Drink plenty of fluids to help your body flush out the dye. If you have kidney, heart, or liver disease and have to limit fluids, talk with your doctor before you increase the amount of fluids you drink.     · Keep eating a heart-healthy diet that has lots of fruits, vegetables, and whole grains. If you need help with your diet, talk to your doctor. You also may want to talk to a dietitian. This expert can help you to learn about healthy foods and plan meals. Medicines    · Your doctor will tell you if and when you can restart your medicines. He or she will also give you instructions about taking any new medicines.     · If you take aspirin or some other blood thinner, ask your doctor if and when to start taking it again. Make sure that you understand exactly what your doctor wants you to do.     · Be safe with medicines. Read and follow all instructions on the label. ? If the doctor gave you a prescription medicine for pain, take it as prescribed. ? If you are not taking a prescription pain medicine, ask your doctor if you can take an over-the-counter medicine. Care of the catheter site    · For the first 3 days, keep a bandage over the spot where the catheter was put in.     · Put ice or a cold pack on the area for 10 to 20 minutes at a time. Try to do this every 1 to 2 hours for the next 3 days (when you are awake) or until the swelling goes down. Put a thin cloth between the ice and your skin.     · You may shower 24 to 48 hours after the procedure, if your doctor okays it. Pat the incision dry.     · Do not soak the catheter site until it is healed. Don't take a bath for 1 week, or until your doctor tells you it is okay.     · Watch for bleeding from the site. A small amount of blood (up to the size of a quarter) on the bandage can be normal.     · If you are bleeding, lie down and press on the area for 15 minutes to try to make it stop. If the bleeding does not stop, call your doctor or seek immediate medical care.    Follow-up care is a key part of your treatment and safety. Be sure to make and go to all appointments, and call your doctor if you are having problems. It's also a good idea to know your test results and keep a list of the medicines you take. When should you call for help? Call 911 anytime you think you may need emergency care. For example, call if:    · You passed out (lost consciousness).     · You have severe trouble breathing.     · You have sudden chest pain and shortness of breath, or you cough up blood.     · You have symptoms of a stroke. These may include:  ? Sudden numbness, tingling, weakness, or loss of movement in your face, arm, or leg, especially on only one side of your body. ? Sudden vision changes. ? Sudden trouble speaking. ? Sudden confusion or trouble understanding simple statements. ? Sudden problems with walking or balance. ? A sudden, severe headache that is different from past headaches. Call your doctor now or seek immediate medical care if:    · You are bleeding from the area where the catheter was put in your artery.     · You have a fast-growing, painful lump at the catheter site.     · You have symptoms of infection, such as:  ? Increased pain, swelling, warmth, or redness. ? Red streaks leading from the area. ? Pus draining from the area. ? A fever.     · Your leg, arm, or hand is painful, looks blue, or feels cold, numb, or tingly. Watch closely for any changes in your health, and be sure to contact your doctor if:    · You are not getting better as expected. Where can you learn more? Go to http://www.gray.com/  Enter O249 in the search box to learn more about \"Brain Angiogram: What to Expect at Home. \"  Current as of: July 6, 2021               Content Version: 13.0  © 9417-7745 [employer]. Care instructions adapted under license by [employer] (which disclaims liability or warranty for this information).  If you have questions about a medical condition or this instruction, always ask your healthcare professional. [place] [address] any warranty or liability for your use of this information.

## 2021-09-23 NOTE — BRIEF OP NOTE
NEUROINTERVENTIONAL SURGERY POST-PROCEDURE NOTE    PROCEDURE:  Cerebral angiogram  Cerebral venogram with venous sinus manometry measurements  US guided arterial access:  Left common femoral artery  US guided venous access:  Right common femoral vein    VESSEL(S) STUDIED:  1. LCCA  2. LVA  3. RCCA  4. R subclavian  5. Left transverse venous sinus (intracranial injection)  6. Right transverse venous sinus (intracranial) VESSEL(S) TREATED:  1. None      PRELIMINARY REPORT & DISPOSITION:     Bilateral transverse venous sinus stenosis with 12-13 mmHg intracranial gradient. No fistula. COMPLICATIONS:  None immediate    FOLLOW-UP:  Outpatient to discuss results. Possible stent in future. DATE OF SERVICE:  9/23/2021 12:44 PM     ATTENDING SURGEON(S):  Matilde Vitale MD      ANESTHESIA:   MAC    EBL: 5 mL    MEDICATIONS:   See anesthesia record  3000U heparin IV      PUNCTURE SITE:  Left common femoral artery. Arteriotomy closed with 6F Angioseal.  Right common femoral vein closed with manual compression. HOB 15 degrees with bilateral legs straight x 2 hours.      1L bolus lactated ringers

## 2021-09-23 NOTE — ANESTHESIA PREPROCEDURE EVALUATION
Relevant Problems   NEUROLOGY   (+) Headache      GASTROINTESTINAL   (+) GERD (gastroesophageal reflux disease)   (+) Paraesophageal hernia      ENDOCRINE   (+) Class 3 severe obesity in adult [facility])       Anesthetic History   No history of anesthetic complications            Review of Systems / Medical History  Patient summary reviewed, nursing notes reviewed and pertinent labs reviewed    Pulmonary  Within defined limits                 Neuro/Psych   Within defined limits          Comments: pseudotumor Cardiovascular  Within defined limits                Exercise tolerance: >4 METS     GI/Hepatic/Renal     GERD      PUD     Endo/Other        Obesity     Other Findings   Comments: SLE           Physical Exam    Airway  Mallampati: II  TM Distance: > 6 cm  Neck ROM: normal range of motion   Mouth opening: Normal     Cardiovascular  Regular rate and rhythm,  S1 and S2 normal,  no murmur, click, rub, or gallop             Dental  No notable dental hx       Pulmonary  Breath sounds clear to auscultation               Abdominal  GI exam deferred       Other Findings            Anesthetic Plan    ASA: 3  Anesthesia type: MAC          Induction: Intravenous  Anesthetic plan and risks discussed with: Patient
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
## 2021-09-24 RX ORDER — CYCLOBENZAPRINE HCL 10 MG
TABLET ORAL
Qty: 180 TABLET | Refills: 1 | Status: SHIPPED | OUTPATIENT
Start: 2021-09-24 | End: 2022-02-22

## 2021-09-26 ENCOUNTER — HOSPITAL ENCOUNTER (OUTPATIENT)
Age: 31
Setting detail: OBSERVATION
Discharge: HOME | DRG: 054 | End: 2021-09-27
Attending: EMERGENCY MEDICINE | Admitting: FAMILY MEDICINE
Payer: MEDICAID

## 2021-09-26 DIAGNOSIS — R51.9 NONINTRACTABLE HEADACHE, UNSPECIFIED CHRONICITY PATTERN, UNSPECIFIED HEADACHE TYPE: Primary | ICD-10-CM

## 2021-09-26 LAB
ALBUMIN SERPL-MCNC: 4.2 G/DL (ref 3.5–5)
ALBUMIN/GLOB SERPL: 1.1 {RATIO} (ref 1.1–2.2)
ALP SERPL-CCNC: 96 U/L (ref 45–117)
ALT SERPL-CCNC: 38 U/L (ref 12–78)
ANION GAP SERPL CALC-SCNC: 4 MMOL/L (ref 5–15)
AST SERPL-CCNC: 11 U/L (ref 15–37)
BASOPHILS # BLD: 0.1 K/UL (ref 0–0.1)
BASOPHILS NFR BLD: 1 % (ref 0–1)
BILIRUB SERPL-MCNC: 0.5 MG/DL (ref 0.2–1)
BUN SERPL-MCNC: 11 MG/DL (ref 6–20)
BUN/CREAT SERPL: 13 (ref 12–20)
CALCIUM SERPL-MCNC: 9.2 MG/DL (ref 8.5–10.1)
CHLORIDE SERPL-SCNC: 107 MMOL/L (ref 97–108)
CO2 SERPL-SCNC: 29 MMOL/L (ref 21–32)
COMMENT, HOLDF: NORMAL
CREAT SERPL-MCNC: 0.86 MG/DL (ref 0.55–1.02)
DIFFERENTIAL METHOD BLD: ABNORMAL
EOSINOPHIL # BLD: 0 K/UL (ref 0–0.4)
EOSINOPHIL NFR BLD: 0 % (ref 0–7)
ERYTHROCYTE [DISTWIDTH] IN BLOOD BY AUTOMATED COUNT: 12.5 % (ref 11.5–14.5)
GLOBULIN SER CALC-MCNC: 3.7 G/DL (ref 2–4)
GLUCOSE SERPL-MCNC: 75 MG/DL (ref 65–100)
HCG SERPL QL: NEGATIVE
HCT VFR BLD AUTO: 40.1 % (ref 35–47)
HGB BLD-MCNC: 13.7 G/DL (ref 11.5–16)
IMM GRANULOCYTES # BLD AUTO: 0 K/UL
IMM GRANULOCYTES NFR BLD AUTO: 0 %
LYMPHOCYTES # BLD: 5.1 K/UL (ref 0.8–3.5)
LYMPHOCYTES NFR BLD: 43 % (ref 12–49)
MAGNESIUM SERPL-MCNC: 2 MG/DL (ref 1.6–2.4)
MCH RBC QN AUTO: 31.4 PG (ref 26–34)
MCHC RBC AUTO-ENTMCNC: 34.2 G/DL (ref 30–36.5)
MCV RBC AUTO: 92 FL (ref 80–99)
MONOCYTES # BLD: 0.7 K/UL (ref 0–1)
MONOCYTES NFR BLD: 6 % (ref 5–13)
NEUTS SEG # BLD: 6 K/UL (ref 1.8–8)
NEUTS SEG NFR BLD: 50 % (ref 32–75)
NRBC # BLD: 0 K/UL (ref 0–0.01)
NRBC BLD-RTO: 0 PER 100 WBC
PLATELET # BLD AUTO: 323 K/UL (ref 150–400)
PMV BLD AUTO: 9.3 FL (ref 8.9–12.9)
POTASSIUM SERPL-SCNC: 3.2 MMOL/L (ref 3.5–5.1)
PROT SERPL-MCNC: 7.9 G/DL (ref 6.4–8.2)
RBC # BLD AUTO: 4.36 M/UL (ref 3.8–5.2)
RBC MORPH BLD: ABNORMAL
SAMPLES BEING HELD,HOLD: NORMAL
SODIUM SERPL-SCNC: 140 MMOL/L (ref 136–145)
WBC # BLD AUTO: 11.9 K/UL (ref 3.6–11)

## 2021-09-26 PROCEDURE — 96374 THER/PROPH/DIAG INJ IV PUSH: CPT

## 2021-09-26 PROCEDURE — 85025 COMPLETE CBC W/AUTO DIFF WBC: CPT

## 2021-09-26 PROCEDURE — 83735 ASSAY OF MAGNESIUM: CPT

## 2021-09-26 PROCEDURE — 74011250637 HC RX REV CODE- 250/637: Performed by: FAMILY MEDICINE

## 2021-09-26 PROCEDURE — 99285 EMERGENCY DEPT VISIT HI MDM: CPT

## 2021-09-26 PROCEDURE — 84703 CHORIONIC GONADOTROPIN ASSAY: CPT

## 2021-09-26 PROCEDURE — 93005 ELECTROCARDIOGRAM TRACING: CPT

## 2021-09-26 PROCEDURE — 36415 COLL VENOUS BLD VENIPUNCTURE: CPT

## 2021-09-26 PROCEDURE — 96375 TX/PRO/DX INJ NEW DRUG ADDON: CPT

## 2021-09-26 PROCEDURE — 80053 COMPREHEN METABOLIC PANEL: CPT

## 2021-09-26 PROCEDURE — 74011250636 HC RX REV CODE- 250/636: Performed by: FAMILY MEDICINE

## 2021-09-26 PROCEDURE — 74011250636 HC RX REV CODE- 250/636: Performed by: EMERGENCY MEDICINE

## 2021-09-26 PROCEDURE — 99218 HC RM OBSERVATION: CPT

## 2021-09-26 RX ORDER — DIPHENHYDRAMINE HYDROCHLORIDE 50 MG/ML
50 INJECTION, SOLUTION INTRAMUSCULAR; INTRAVENOUS ONCE
Status: COMPLETED | OUTPATIENT
Start: 2021-09-26 | End: 2021-09-26

## 2021-09-26 RX ORDER — HYDROXYCHLOROQUINE SULFATE 200 MG/1
200 TABLET, FILM COATED ORAL 2 TIMES DAILY WITH MEALS
Status: DISCONTINUED | OUTPATIENT
Start: 2021-09-27 | End: 2021-09-27 | Stop reason: HOSPADM

## 2021-09-26 RX ORDER — CYCLOBENZAPRINE HCL 10 MG
10 TABLET ORAL 2 TIMES DAILY
Status: DISCONTINUED | OUTPATIENT
Start: 2021-09-27 | End: 2021-09-27 | Stop reason: HOSPADM

## 2021-09-26 RX ORDER — SODIUM CHLORIDE 0.9 % (FLUSH) 0.9 %
5-40 SYRINGE (ML) INJECTION AS NEEDED
Status: DISCONTINUED | OUTPATIENT
Start: 2021-09-26 | End: 2021-09-27 | Stop reason: HOSPADM

## 2021-09-26 RX ORDER — LORAZEPAM 0.5 MG/1
1 TABLET ORAL
Status: DISCONTINUED | OUTPATIENT
Start: 2021-09-26 | End: 2021-09-27 | Stop reason: HOSPADM

## 2021-09-26 RX ORDER — MORPHINE SULFATE 4 MG/ML
4 INJECTION INTRAVENOUS
Status: COMPLETED | OUTPATIENT
Start: 2021-09-26 | End: 2021-09-26

## 2021-09-26 RX ORDER — POTASSIUM CHLORIDE 750 MG/1
40 TABLET, FILM COATED, EXTENDED RELEASE ORAL
Status: COMPLETED | OUTPATIENT
Start: 2021-09-26 | End: 2021-09-26

## 2021-09-26 RX ORDER — HYDROMORPHONE HYDROCHLORIDE 1 MG/ML
1 INJECTION, SOLUTION INTRAMUSCULAR; INTRAVENOUS; SUBCUTANEOUS
Status: DISCONTINUED | OUTPATIENT
Start: 2021-09-26 | End: 2021-09-27

## 2021-09-26 RX ORDER — BIOTIN 5 MG
10 TABLET ORAL DAILY
Status: DISCONTINUED | OUTPATIENT
Start: 2021-09-27 | End: 2021-09-27

## 2021-09-26 RX ORDER — HYDROMORPHONE HYDROCHLORIDE 1 MG/ML
0.5 INJECTION, SOLUTION INTRAMUSCULAR; INTRAVENOUS; SUBCUTANEOUS
Status: DISCONTINUED | OUTPATIENT
Start: 2021-09-26 | End: 2021-09-26

## 2021-09-26 RX ORDER — PROCHLORPERAZINE EDISYLATE 5 MG/ML
10 INJECTION INTRAMUSCULAR; INTRAVENOUS
Status: COMPLETED | OUTPATIENT
Start: 2021-09-26 | End: 2021-09-26

## 2021-09-26 RX ORDER — NALOXONE HYDROCHLORIDE 0.4 MG/ML
0.4 INJECTION, SOLUTION INTRAMUSCULAR; INTRAVENOUS; SUBCUTANEOUS
Status: DISCONTINUED | OUTPATIENT
Start: 2021-09-26 | End: 2021-09-27 | Stop reason: HOSPADM

## 2021-09-26 RX ORDER — SODIUM CHLORIDE 0.9 % (FLUSH) 0.9 %
5-40 SYRINGE (ML) INJECTION EVERY 8 HOURS
Status: DISCONTINUED | OUTPATIENT
Start: 2021-09-26 | End: 2021-09-27 | Stop reason: HOSPADM

## 2021-09-26 RX ORDER — ONDANSETRON 2 MG/ML
4 INJECTION INTRAMUSCULAR; INTRAVENOUS
Status: DISCONTINUED | OUTPATIENT
Start: 2021-09-26 | End: 2021-09-27 | Stop reason: HOSPADM

## 2021-09-26 RX ADMIN — Medication 10 ML: at 22:07

## 2021-09-26 RX ADMIN — HYDROMORPHONE HYDROCHLORIDE 0.5 MG: 1 INJECTION, SOLUTION INTRAMUSCULAR; INTRAVENOUS; SUBCUTANEOUS at 22:11

## 2021-09-26 RX ADMIN — PROCHLORPERAZINE EDISYLATE 10 MG: 5 INJECTION INTRAMUSCULAR; INTRAVENOUS at 20:36

## 2021-09-26 RX ADMIN — DIPHENHYDRAMINE HYDROCHLORIDE 50 MG: 50 INJECTION, SOLUTION INTRAMUSCULAR; INTRAVENOUS at 20:33

## 2021-09-26 RX ADMIN — MORPHINE SULFATE 4 MG: 4 INJECTION INTRAVENOUS at 20:35

## 2021-09-26 RX ADMIN — POTASSIUM CHLORIDE 40 MEQ: 750 TABLET, FILM COATED, EXTENDED RELEASE ORAL at 22:06

## 2021-09-26 NOTE — Clinical Note
Patient Class[de-identified] OBSERVATION [104]   Type of Bed: Neuro/Stroke [9]   Cardiac Monitoring Required?: No   Reason for Observation: intractable headache, ICH   Admitting Diagnosis: Intractable headache [[id number]]   Admitting Physician: Chuck Campbell [[id number]]   Attending Physician: Chuck Campbell [[id number]]
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
## 2021-09-26 NOTE — ED TRIAGE NOTES
Pt ambulatory to ED with c/o headache with N/V and blurred vision onset 3 weeks ago. Pt reports hx of pseudo cerebri tumor. \"I'm supposed to get a shunt to treat it in the near future\".

## 2021-09-27 ENCOUNTER — APPOINTMENT (OUTPATIENT)
Dept: GENERAL RADIOLOGY | Age: 31
DRG: 054 | End: 2021-09-27
Attending: HOSPITALIST
Payer: MEDICAID

## 2021-09-27 ENCOUNTER — TELEPHONE (OUTPATIENT)
Dept: NEUROSURGERY | Age: 31
End: 2021-09-27

## 2021-09-27 VITALS
BODY MASS INDEX: 40.02 KG/M2 | HEART RATE: 79 BPM | WEIGHT: 212 LBS | SYSTOLIC BLOOD PRESSURE: 119 MMHG | OXYGEN SATURATION: 98 % | HEIGHT: 61 IN | RESPIRATION RATE: 18 BRPM | TEMPERATURE: 97.7 F | DIASTOLIC BLOOD PRESSURE: 86 MMHG

## 2021-09-27 LAB
ANION GAP SERPL CALC-SCNC: 3 MMOL/L (ref 5–15)
APPEARANCE CSF: CLEAR
ATRIAL RATE: 71 BPM
BUN SERPL-MCNC: 11 MG/DL (ref 6–20)
BUN/CREAT SERPL: 15 (ref 12–20)
CALCIUM SERPL-MCNC: 7.9 MG/DL (ref 8.5–10.1)
CALCULATED P AXIS, ECG09: 57 DEGREES
CALCULATED R AXIS, ECG10: -8 DEGREES
CALCULATED T AXIS, ECG11: 13 DEGREES
CHLORIDE SERPL-SCNC: 109 MMOL/L (ref 97–108)
CO2 SERPL-SCNC: 29 MMOL/L (ref 21–32)
COLOR CSF: COLORLESS
COMMENT, HOLDF: NORMAL
CREAT SERPL-MCNC: 0.73 MG/DL (ref 0.55–1.02)
DIAGNOSIS, 93000: NORMAL
ERYTHROCYTE [DISTWIDTH] IN BLOOD BY AUTOMATED COUNT: 12.4 % (ref 11.5–14.5)
GLUCOSE CSF-MCNC: 54 MG/DL (ref 40–70)
GLUCOSE SERPL-MCNC: 78 MG/DL (ref 65–100)
HCT VFR BLD AUTO: 36.5 % (ref 35–47)
HGB BLD-MCNC: 12.1 G/DL (ref 11.5–16)
MCH RBC QN AUTO: 31.2 PG (ref 26–34)
MCHC RBC AUTO-ENTMCNC: 33.2 G/DL (ref 30–36.5)
MCV RBC AUTO: 94.1 FL (ref 80–99)
NRBC # BLD: 0 K/UL (ref 0–0.01)
NRBC BLD-RTO: 0 PER 100 WBC
P-R INTERVAL, ECG05: 182 MS
PLATELET # BLD AUTO: 279 K/UL (ref 150–400)
PMV BLD AUTO: 9.8 FL (ref 8.9–12.9)
POTASSIUM SERPL-SCNC: 3.4 MMOL/L (ref 3.5–5.1)
PROT CSF-MCNC: 46 MG/DL (ref 15–45)
Q-T INTERVAL, ECG07: 408 MS
QRS DURATION, ECG06: 80 MS
QTC CALCULATION (BEZET), ECG08: 443 MS
RBC # BLD AUTO: 3.88 M/UL (ref 3.8–5.2)
RBC # CSF: 3 /CU MM
SAMPLES BEING HELD,HOLD: NORMAL
SODIUM SERPL-SCNC: 141 MMOL/L (ref 136–145)
TUBE # CSF: 1
TUBE # CSF: 1
TUBE # CSF: 3
VENTRICULAR RATE, ECG03: 71 BPM
WBC # BLD AUTO: 7.7 K/UL (ref 3.6–11)
WBC # CSF: 0 /CU MM (ref 0–5)

## 2021-09-27 PROCEDURE — 85027 COMPLETE CBC AUTOMATED: CPT

## 2021-09-27 PROCEDURE — 74011250637 HC RX REV CODE- 250/637: Performed by: HOSPITALIST

## 2021-09-27 PROCEDURE — 96375 TX/PRO/DX INJ NEW DRUG ADDON: CPT

## 2021-09-27 PROCEDURE — 77030014143 XR SPINAL PUNC LUMB DX W FLUORO

## 2021-09-27 PROCEDURE — 80048 BASIC METABOLIC PNL TOTAL CA: CPT

## 2021-09-27 PROCEDURE — 84157 ASSAY OF PROTEIN OTHER: CPT

## 2021-09-27 PROCEDURE — 36415 COLL VENOUS BLD VENIPUNCTURE: CPT

## 2021-09-27 PROCEDURE — 87015 SPECIMEN INFECT AGNT CONCNTJ: CPT

## 2021-09-27 PROCEDURE — 96376 TX/PRO/DX INJ SAME DRUG ADON: CPT

## 2021-09-27 PROCEDURE — 74011250636 HC RX REV CODE- 250/636: Performed by: FAMILY MEDICINE

## 2021-09-27 PROCEDURE — 74011250636 HC RX REV CODE- 250/636: Performed by: HOSPITALIST

## 2021-09-27 PROCEDURE — 89050 BODY FLUID CELL COUNT: CPT

## 2021-09-27 PROCEDURE — 99218 HC RM OBSERVATION: CPT

## 2021-09-27 PROCEDURE — 82945 GLUCOSE OTHER FLUID: CPT

## 2021-09-27 PROCEDURE — 87070 CULTURE OTHR SPECIMN AEROBIC: CPT

## 2021-09-27 PROCEDURE — 74011250637 HC RX REV CODE- 250/637: Performed by: FAMILY MEDICINE

## 2021-09-27 RX ORDER — POTASSIUM CHLORIDE 750 MG/1
40 TABLET, FILM COATED, EXTENDED RELEASE ORAL
Status: COMPLETED | OUTPATIENT
Start: 2021-09-27 | End: 2021-09-27

## 2021-09-27 RX ORDER — HYDROMORPHONE HYDROCHLORIDE 1 MG/ML
1 INJECTION, SOLUTION INTRAMUSCULAR; INTRAVENOUS; SUBCUTANEOUS
Status: DISCONTINUED | OUTPATIENT
Start: 2021-09-27 | End: 2021-09-27 | Stop reason: HOSPADM

## 2021-09-27 RX ORDER — BISACODYL 5 MG
5 TABLET, DELAYED RELEASE (ENTERIC COATED) ORAL DAILY
Status: DISCONTINUED | OUTPATIENT
Start: 2021-09-27 | End: 2021-09-27 | Stop reason: HOSPADM

## 2021-09-27 RX ADMIN — CYCLOBENZAPRINE 10 MG: 10 TABLET, FILM COATED ORAL at 09:16

## 2021-09-27 RX ADMIN — HYDROMORPHONE HYDROCHLORIDE 1 MG: 1 INJECTION, SOLUTION INTRAMUSCULAR; INTRAVENOUS; SUBCUTANEOUS at 17:02

## 2021-09-27 RX ADMIN — BISACODYL 5 MG: 5 TABLET, COATED ORAL at 09:16

## 2021-09-27 RX ADMIN — POTASSIUM CHLORIDE 40 MEQ: 750 TABLET, FILM COATED, EXTENDED RELEASE ORAL at 09:15

## 2021-09-27 RX ADMIN — HYDROMORPHONE HYDROCHLORIDE 1 MG: 1 INJECTION, SOLUTION INTRAMUSCULAR; INTRAVENOUS; SUBCUTANEOUS at 12:10

## 2021-09-27 RX ADMIN — HYDROMORPHONE HYDROCHLORIDE 1 MG: 1 INJECTION, SOLUTION INTRAMUSCULAR; INTRAVENOUS; SUBCUTANEOUS at 15:20

## 2021-09-27 RX ADMIN — HYDROMORPHONE HYDROCHLORIDE 1 MG: 1 INJECTION, SOLUTION INTRAMUSCULAR; INTRAVENOUS; SUBCUTANEOUS at 01:33

## 2021-09-27 RX ADMIN — CYCLOBENZAPRINE 10 MG: 10 TABLET, FILM COATED ORAL at 17:02

## 2021-09-27 RX ADMIN — HYDROMORPHONE HYDROCHLORIDE 1 MG: 1 INJECTION, SOLUTION INTRAMUSCULAR; INTRAVENOUS; SUBCUTANEOUS at 09:15

## 2021-09-27 RX ADMIN — ONDANSETRON 4 MG: 2 INJECTION INTRAMUSCULAR; INTRAVENOUS at 04:25

## 2021-09-27 RX ADMIN — HYDROMORPHONE HYDROCHLORIDE 1 MG: 1 INJECTION, SOLUTION INTRAMUSCULAR; INTRAVENOUS; SUBCUTANEOUS at 06:04

## 2021-09-27 NOTE — DISCHARGE SUMMARY
Discharge Summary     Patient:  Kavitha Hawkins       MRN: [id number]       YOB: 1990       Age: 26 y.o. Date of admission:  9/26/2021    Date of discharge:  9/27/2021    Primary care provider: Dr. Shanna Salas MD    Admitting provider:  Charlie Sellers MD    Discharging provider:  Nanette Bruner MD - [phone]  If unavailable, call [phone] and ask the  to page the triage hospitalist.    Consultations  · None    Procedures  · * No surgery found *    Discharge destination: home. The patient is stable for discharge. Admission diagnosis  · Intractable headache [R51.9]    Current Discharge Medication List      CONTINUE these medications which have NOT CHANGED    Details   cyclobenzaprine (FLEXERIL) 10 mg tablet TAKE 1 TABLET BY MOUTH TWICE A DAY  Qty: 180 Tablet, Refills: 1      naloxone (NARCAN) 4 mg/actuation nasal spray Use 1 spray intranasally, then discard. Repeat with new spray every 2 min as needed for opioid overdose symptoms, alternating nostrils. Indications: decrease in rate & depth of breathing due to opioid drug, opioid overdose  Qty: 2 Each, Refills: 0      !! topiramate (Topamax) 200 mg tablet Take 200 mg by mouth two (2) times a day. ondansetron (Zofran ODT) 4 mg disintegrating tablet 1 Tablet by SubLINGual route every eight (8) hours as needed for Nausea or Vomiting. Qty: 20 Tablet, Refills: 0      gabapentin (NEURONTIN) 600 mg tablet TAKE 1 TAB BY MOUTH THREE (3) TIMES DAILY. MAX DAILY AMOUNT: 1,800 MG.   Qty: 90 Tab, Refills: 1    Comments: Not to exceed 4 additional fills before 07/30/2021  Associated Diagnoses: Frequent headaches      hydrOXYchloroQUINE (PLAQUENIL) 200 mg tablet TAKE 1 TABLET BY MOUTH TWICE A DAY PA REQUIRED FOR QTY   16 FAXED MD  Qty: 60 Tab, Refills: 2      diazePAM (VALIUM) 5 mg tablet TAKE ONE DAILY AS NEEDED FOR ANXIETY      LORazepam (ATIVAN) 1 mg tablet TAKE ONE TABLET AT NIGHT AS NEEDED FOR ANXIETY      biotin 10 mg tab 10 mg = 1 tab each dose, PO, daily      !! topiramate (TOPAMAX) 50 mg tablet Take 1 Tab by mouth daily (with dinner). Qty: 90 Tab, Refills: 1    Associated Diagnoses: Frequent headaches; Obesity (BMI 35.0-39.9 without comorbidity)      phentermine (ADIPEX_P) 15 mg capsule Take 1 Cap by mouth every morning. Max Daily Amount: 15 mg.  Qty: 30 Cap, Refills: 0    Associated Diagnoses: Obesity (BMI 35.0-39.9 without comorbidity)       ! ! - Potential duplicate medications found. Please discuss with provider. Follow-up Information     Follow up With Specialties Details Why Contact Info    Maldonado Barr MD Family Medicine, Bariatrics In 2 weeks discharge follow up  [place] [address]  [phone]      Jean-Pierre Rodriguez MD Endovascular Surgical Neuroradiology  as scheduled [address] [facility]  [address] [facility] [address] [place] [address]  [phone]            Final discharge diagnoses and brief hospital course  Please also refer to the admission H&P for details on the presenting problem.     Headache due to Increased ICP with Hx of Pseudotumor cerebri  - s/p therapeutic LP, 14ml removed, opening pressure of 49tiI1T and closing pressure 13 cm H2O  - follows with [facility] outpatient , plan for outpatient cerebral venous sinus stenting in 3 weeks     SLE  - c/w Plaquenil     Hypokalemia   - replaced      FOLLOW-UP TESTS recommended:   none     PENDING TEST RESULTS:  At the time of your discharge the following test results are still pending: none  Please make sure you review these results with your outpatient follow-up provider(s).     SYMPTOMS to watch for: chest pain, shortness of breath, fever, chills, nausea, vomiting, diarrhea, change in mentation, falling, weakness, bleeding.     DIET/what to eat:  Regular Diet     ACTIVITY:  Activity as tolerated     WOUND CARE: none     EQUIPMENT needed:  none       Physical examination at discharge  Visit Vitals  /86 (BP 1 Location: Right upper arm)   Pulse 79   Temp 97.7 °F (36.5 °C)   Resp 18   Ht 5' 1\" (1.549 m)   Wt 96.2 kg (212 lb)   SpO2 98%   BMI 40.06 kg/m²     AO3  Lung CLear  CVS: RRR  Abd; soft NT ND  Ext: no edema      Pertinent imaging studies:        Recent Labs     09/27/21  0430 09/26/21 2022   WBC 7.7 11.9*   HGB 12.1 13.7   HCT 36.5 40.1    323     Recent Labs     09/27/21  0430 09/26/21 2022    140   K 3.4* 3.2*   * 107   CO2 29 29   BUN 11 11   CREA 0.73 0.86   GLU 78 75   CA 7.9* 9.2   MG  --  2.0     Recent Labs     09/26/21 2022   AP 96   TP 7.9   ALB 4.2   GLOB 3.7     No results for input(s): INR, PTP, APTT, INREXT in the last 72 hours. No results for input(s): FE, TIBC, PSAT, FERR in the last 72 hours. No results for input(s): PH, PCO2, PO2 in the last 72 hours. No results for input(s): CPK, CKMB in the last 72 hours.     No lab exists for component: TROPONINI  No components found for: [place]    Chronic Diagnoses:    Problem List as of 9/27/2021 Date Reviewed: 9/23/2021        Codes Class Noted - Resolved    Intractable headache ICD-10-CM: R51.9  ICD-9-CM: 784.0  9/26/2021 - Present        Pseudotumor cerebri ICD-10-CM: G93.2  ICD-9-CM: 348.2  9/3/2021 - Present        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  ICD-9-CM: 437.0  8/30/2021 - Present        Papilledema associated with increased intracranial pressure ICD-10-CM: H47.11  ICD-9-CM: 377.01  8/30/2021 - Present        Intracranial hypertension ICD-10-CM: G93.2  ICD-9-CM: 348.2  10/23/2020 - Present        Ataxia ICD-10-CM: R27.0  ICD-9-CM: 781.3  9/14/2020 - Present        Headache ICD-10-CM: R51.9  ICD-9-CM: 784.0  8/29/2020 - Present        IIH (idiopathic intracranial hypertension) ICD-10-CM: G93.2  ICD-9-CM: 348.2  8/25/2020 - Present        SLE (systemic lupus erythematosus) (HCC) (Chronic) ICD-10-CM: M32.9  ICD-9-CM: 710.0  8/25/2020 - Present        Class 3 severe obesity in adult [facility]) ICD-10-CM: E66.01  ICD-9-CM: 278.01  8/22/2018 - Present        S/P repair of paraesophageal hernia ICD-10-CM: Z98.890, Z87.19  ICD-9-CM: V45.89  11/17/2017 - Present        Paraesophageal hernia ICD-10-CM: K44.9  ICD-9-CM: 553.3  11/15/2017 - Present        GERD (gastroesophageal reflux disease) ICD-10-CM: K21.9  ICD-9-CM: 530.81  11/7/2017 - Present        Obesity, Class II, BMI 35-39.9 ICD-10-CM: E66.9  ICD-9-CM: 278.00  3/31/2017 - Present        Sebaceous cyst ICD-10-CM: L72.3  ICD-9-CM: 706.2  3/24/2017 - Present    Overview Addendum 5/1/2017 10:52 AM by Deshawn Rosado MD     S/P excision; Along sternum in cleavage. History of Nissen fundoplication GDB-09-HO: D61.187  ICD-9-CM: V15.29  1/4/2017 - Present    Overview Signed 1/4/2017  1:51 PM by Denver Medico, LPN     4 duodenal ulcers, chronic gastritis, Grade C esophagitis, Chronic GERD, hernia, small tumor. Done August/2016. Chronic migraine without aura without status migrainosus, not intractable ICD-10-CM: M84.852  ICD-9-CM: 346.70  5/18/2016 - Present        Cervical incompetence ICD-10-CM: N88.3  ICD-9-CM: 622.5  4/12/2013 - Present              Time spent on discharge related activities today greater than 30 minutes.       Signed:  Chapo Andrews MD                 Hospitalist, Internal Medicine      Cc: Maldonado Barr MD
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
## 2021-09-27 NOTE — DISCHARGE INSTRUCTIONS
Please bring this form with you to show your primary care provider at your follow-up appointment. Primary care provider:  Dr. Miroslava Dubois MD    Discharging provider:  Mackenzie Pedro MD    You have been admitted to the hospital with the following diagnoses:  · Intractable headache [R51.9]    FOLLOW-UP CARE RECOMMENDATIONS:    APPOINTMENTS:  Follow-up Information     Follow up With Specialties Details Why Contact Info    Miroslava Dubois MD Family Medicine, Bariatrics In 2 weeks discharge follow up  [place] [address]  [phone]      Rubén Bar MD Endovascular Surgical Neuroradiology  as scheduled [address] [facility]  [address] [facility] [address] [facility] [address]  [phone]              FOLLOW-UP TESTS recommended:   none    PENDING TEST RESULTS:  At the time of your discharge the following test results are still pending: none  Please make sure you review these results with your outpatient follow-up provider(s). SYMPTOMS to watch for: chest pain, shortness of breath, fever, chills, nausea, vomiting, diarrhea, change in mentation, falling, weakness, bleeding. DIET/what to eat:  Regular Diet    ACTIVITY:  Activity as tolerated    WOUND CARE: none    EQUIPMENT needed:  none      What to do if new or unexpected symptoms occur? If you experience any of the above symptoms (or should other concerns or questions arise after discharge) please call your primary care physician. Return to the emergency room if you cannot get hold of your doctor. · It is very important that you keep your follow-up appointment(s). · Please bring discharge papers, medication list (and/or medication bottles) to your follow-up appointments for review by your outpatient provider(s). · Please check the list of medications and be sure it includes every medication (even non-prescription medications) that your provider wants you to take.     · It is important that you take the medication exactly as they are prescribed. · Keep your medication in the bottles provided by the pharmacist and keep a list of the medication names, dosages, and times to be taken in your wallet. · Do not take other medications without consulting your doctor. · If you have any questions about your medications or other instructions, please talk to your nurse or care provider before you leave the hospital.    I understand that if any problems occur once I am at home I am to contact my physician. These instructions were explained to me and I had the opportunity to ask questions.

## 2021-09-27 NOTE — TELEPHONE ENCOUNTER
Spoke to patient this morning. Patient reports she was admitted to [facility] yesterday due to headaches, double vision and vomiting. Patient is in ED holding bed 9. Informed patient I would let provider know of admission. Spoke to provider about admission, he had received a call regarding patient and will see her today.

## 2021-09-27 NOTE — ED NOTES
Bedside and Verbal shift change report given to Manuel Ibanez (oncoming nurse) by Haroon Aguilar (offgoing nurse). Report included the following information SBAR, Kardex, ED Summary, Intake/Output, MAR and Recent Results.

## 2021-09-27 NOTE — PROCEDURES
Courtesy visit. Agree with therapeutic LP for IIH exacerbation/worsening vision. Opening and closing pressures please. Intracranial cerebral venous sinus stenting planned ~ 3 weeks.

## 2021-09-27 NOTE — PROGRESS NOTES
[id number] [facility] Adult  Hospitalist Group                                                                                          Hospitalist Progress Note  Sandip Us MD  Answering service: [phone] OR [phone] from in house phone        Date of Service:  2021  NAME:  Colleen Benoit  :  1990  MRN:  [id number]      Admission Summary:   25 yo female with PMhx of Pseudotumor cerebri, GERD, SLE, admitted for intractable headaches for 3-4 days. Patient states she routinely gets LPs for increased ICP. Interval history / Subjective:     Pt seen and examined  Having headaches  For LP today        Assessment & Plan:     Headache due to Increased ICP with Hx of Pseudotumor cerebri  - for Therapeutic LP today  - will get opening and closing pressures  - D/w Radiologist this Am   - pain management  - follows with [facility] outpatient , plan for outpatient cerebral venous sinus stenting in 3 weeks    SLE  - c/w Plaquenil    Hypokalemia   - replace     Code status: FULL   DVT prophylaxis: SCDs    Care Plan discussed with: Patient/Family  Anticipated Disposition: Home w/Family  Anticipated Discharge: Less than 24 hours     Hospital Problems  Date Reviewed: 2021        Codes Class Noted POA    Intractable headache ICD-10-CM: R51.9  ICD-9-CM: 784.0  2021 Unknown                Review of Systems:   A comprehensive review of systems was negative except for that written in the HPI. Vital Signs:    Last 24hrs VS reviewed since prior progress note.  Most recent are:  Visit Vitals  /86 (BP 1 Location: Right upper arm)   Pulse 79   Temp 97.7 °F (36.5 °C)   Resp 18   Ht 5' 1\" (1.549 m)   Wt 96.2 kg (212 lb)   SpO2 98%   BMI 40.06 kg/m²       No intake or output data in the 24 hours ending 21 1411     Physical Examination:     I had a face to face encounter with this patient and independently examined them on 2021 as outlined below:          Constitutional:  No acute distress, cooperative, pleasant    ENT:  Oral mucosa moist, oropharynx benign. Resp:  CTA bilaterally. No wheezing/rhonchi/rales. CV:  Regular rhythm, normal rate, no murmurs, gallops, rubs    GI:  Soft, non distended, non tender. normoactive bowel sounds,      Musculoskeletal:  No edema, warm, 2+ pulses throughout    Neurologic:  Moves all extremities. AAOx3, speech clear,             Data Review:    Review and/or order of clinical lab test  Review and/or order of tests in the radiology section of CPT  Review and/or order of tests in the medicine section of [facility]      Labs:     Recent Labs     09/27/21 0430 09/26/21 2022   WBC 7.7 11.9*   HGB 12.1 13.7   HCT 36.5 40.1    323     Recent Labs     09/27/21 0430 09/26/21 2022    140   K 3.4* 3.2*   * 107   CO2 29 29   BUN 11 11   CREA 0.73 0.86   GLU 78 75   CA 7.9* 9.2   MG  --  2.0     Recent Labs     09/26/21 2022   ALT 38   AP 96   TBILI 0.5   TP 7.9   ALB 4.2   GLOB 3.7     No results for input(s): INR, PTP, APTT, INREXT in the last 72 hours. No results for input(s): FE, TIBC, PSAT, FERR in the last 72 hours. No results found for: FOL, RBCF   No results for input(s): PH, PCO2, PO2 in the last 72 hours. No results for input(s): CPK, CKNDX, TROIQ in the last 72 hours.     No lab exists for component: CPKMB  Lab Results   Component Value Date/Time    Cholesterol, total 177 04/11/2019 11:35 AM    HDL Cholesterol 48 04/11/2019 11:35 AM    LDL, calculated 107 (H) 04/11/2019 11:35 AM    Triglyceride 112 04/11/2019 11:35 AM     Lab Results   Component Value Date/Time    Glucose (POC) 86 09/13/2020 11:03 PM    Glucose POC 82 11/07/2019 02:00 PM     Lab Results   Component Value Date/Time    Color YELLOW/STRAW 09/04/2021 01:06 PM    Appearance CLOUDY (A) 09/04/2021 01:06 PM    Specific gravity 1.028 09/04/2021 01:06 PM    Specific gravity 1.015 01/25/2021 02:30 PM    pH (UA) 6.0 09/04/2021 01:06 PM    Protein Negative 09/04/2021 01:06 PM Glucose Negative 09/04/2021 01:06 PM    Ketone >80 (A) 09/04/2021 01:06 PM    Bilirubin Negative 09/04/2021 01:06 PM    Urobilinogen 0.2 09/04/2021 01:06 PM    Nitrites Negative 09/04/2021 01:06 PM    Leukocyte Esterase Negative 09/04/2021 01:06 PM    Epithelial cells FEW 09/04/2021 01:06 PM    Bacteria Negative 09/04/2021 01:06 PM    WBC 0-4 09/04/2021 01:06 PM    RBC 0-5 09/04/2021 01:06 PM         Medications Reviewed:     Current Facility-Administered Medications   Medication Dose Route Frequency    HYDROmorphone (DILAUDID) injection 1 mg  1 mg IntraVENous Q3H PRN    bisacodyL (DULCOLAX) tablet 5 mg  5 mg Oral DAILY    sodium chloride (NS) flush 5-40 mL  5-40 mL IntraVENous Q8H    sodium chloride (NS) flush 5-40 mL  5-40 mL IntraVENous PRN    ondansetron (ZOFRAN) injection 4 mg  4 mg IntraVENous Q4H PRN    cyclobenzaprine (FLEXERIL) tablet 10 mg  10 mg Oral BID    hydrOXYchloroQUINE (PLAQUENIL) tablet 200 mg  200 mg Oral BID WITH MEALS    LORazepam (ATIVAN) tablet 1 mg  1 mg Oral QHS PRN    naloxone (NARCAN) injection 0.4 mg  0.4 mg IntraVENous EVERY 2 MINUTES AS NEEDED     Current Outpatient Medications   Medication Sig    cyclobenzaprine (FLEXERIL) 10 mg tablet TAKE 1 TABLET BY MOUTH TWICE A DAY    naloxone (NARCAN) 4 mg/actuation nasal spray Use 1 spray intranasally, then discard. Repeat with new spray every 2 min as needed for opioid overdose symptoms, alternating nostrils. Indications: decrease in rate & depth of breathing due to opioid drug, opioid overdose    topiramate (Topamax) 200 mg tablet Take 200 mg by mouth two (2) times a day.  ondansetron (Zofran ODT) 4 mg disintegrating tablet 1 Tablet by SubLINGual route every eight (8) hours as needed for Nausea or Vomiting. (Patient not taking: Reported on 6/16/2021)    gabapentin (NEURONTIN) 600 mg tablet TAKE 1 TAB BY MOUTH THREE (3) TIMES DAILY. MAX DAILY AMOUNT: 1,800 MG.     hydrOXYchloroQUINE (PLAQUENIL) 200 mg tablet TAKE 1 TABLET BY MOUTH TWICE A DAY PA REQUIRED FOR QTY   16 FAXED MD    diazePAM (VALIUM) 5 mg tablet TAKE ONE DAILY AS NEEDED FOR ANXIETY    LORazepam (ATIVAN) 1 mg tablet TAKE ONE TABLET AT NIGHT AS NEEDED FOR ANXIETY    biotin 10 mg tab 10 mg = 1 tab each dose, PO, daily    topiramate (TOPAMAX) 50 mg tablet Take 1 Tab by mouth daily (with dinner). (Patient not taking: Reported on 8/30/2021)    phentermine (ADIPEX_P) 15 mg capsule Take 1 Cap by mouth every morning. Max Daily Amount: 15 mg.  (Patient not taking: Reported on 6/16/2021)     ______________________________________________________________________  EXPECTED LENGTH OF STAY: - - -  ACTUAL LENGTH OF STAY:          0                 Avery Bailey MD

## 2021-09-27 NOTE — PROGRESS NOTES
The following herbal, alternative, and/or nutritional/dietary supplement product(s) has been discontinued  per [facility] approved policy:    Biotin every day     Please reorder upon discharge if appropriate.

## 2021-09-29 ENCOUNTER — HOSPITAL ENCOUNTER (INPATIENT)
Age: 31
LOS: 4 days | Discharge: HOME | DRG: 054 | End: 2021-10-04
Attending: EMERGENCY MEDICINE | Admitting: STUDENT IN AN ORGANIZED HEALTH CARE EDUCATION/TRAINING PROGRAM
Payer: MEDICAID

## 2021-09-29 ENCOUNTER — APPOINTMENT (OUTPATIENT)
Dept: CT IMAGING | Age: 31
DRG: 054 | End: 2021-09-29
Attending: EMERGENCY MEDICINE
Payer: MEDICAID

## 2021-09-29 ENCOUNTER — TELEPHONE (OUTPATIENT)
Dept: NEUROSURGERY | Age: 31
End: 2021-09-29

## 2021-09-29 DIAGNOSIS — R79.89 ELEVATED LIVER FUNCTION TESTS: ICD-10-CM

## 2021-09-29 DIAGNOSIS — R51.9 ACUTE INTRACTABLE HEADACHE, UNSPECIFIED HEADACHE TYPE: ICD-10-CM

## 2021-09-29 DIAGNOSIS — R51.9 NONINTRACTABLE HEADACHE, UNSPECIFIED CHRONICITY PATTERN, UNSPECIFIED HEADACHE TYPE: ICD-10-CM

## 2021-09-29 DIAGNOSIS — I66.9 STENOSIS OF INTRACRANIAL VESSEL: Primary | ICD-10-CM

## 2021-09-29 DIAGNOSIS — R51.9 FREQUENT HEADACHES: ICD-10-CM

## 2021-09-29 DIAGNOSIS — G93.2 INTRACRANIAL HYPERTENSION: Primary | ICD-10-CM

## 2021-09-29 PROCEDURE — 96374 THER/PROPH/DIAG INJ IV PUSH: CPT

## 2021-09-29 PROCEDURE — 99285 EMERGENCY DEPT VISIT HI MDM: CPT

## 2021-09-29 PROCEDURE — 96375 TX/PRO/DX INJ NEW DRUG ADDON: CPT

## 2021-09-29 PROCEDURE — 70450 CT HEAD/BRAIN W/O DYE: CPT

## 2021-09-29 RX ORDER — DIPHENHYDRAMINE HYDROCHLORIDE 50 MG/ML
25 INJECTION, SOLUTION INTRAMUSCULAR; INTRAVENOUS
Status: COMPLETED | OUTPATIENT
Start: 2021-09-29 | End: 2021-09-30

## 2021-09-29 RX ORDER — ASPIRIN 325 MG
325 TABLET ORAL DAILY
Qty: 30 TABLET | Refills: 5 | Status: SHIPPED | OUTPATIENT
Start: 2021-09-29 | End: 2021-10-08

## 2021-09-29 RX ORDER — CLOPIDOGREL BISULFATE 75 MG/1
75 TABLET ORAL DAILY
Qty: 30 TABLET | Refills: 5 | Status: SHIPPED | OUTPATIENT
Start: 2021-09-29 | End: 2021-10-08 | Stop reason: SINTOL

## 2021-09-29 NOTE — TELEPHONE ENCOUNTER
Spoke to patient to confirm treatment date of 10/21/2021 at [facility]. Arrival time 7:00 am at Patient registration. Informed patient scripts for Plavix and Aspirin were escribed to [facility] listed. Informed patient PAT will contact her for testing date. Informed patient do not stop taking Aspirin and Plavix unless she hears from this office. Patient stated understanding. Patient reports she is feeling better since discharge from hospital. Vision back to normal after LP. No acute problems reported.

## 2021-09-30 ENCOUNTER — APPOINTMENT (OUTPATIENT)
Dept: GENERAL RADIOLOGY | Age: 31
DRG: 054 | End: 2021-09-30
Attending: STUDENT IN AN ORGANIZED HEALTH CARE EDUCATION/TRAINING PROGRAM
Payer: MEDICAID

## 2021-09-30 PROBLEM — G43.919 INTRACTABLE MIGRAINE: Status: ACTIVE | Noted: 2021-09-30

## 2021-09-30 LAB
ALBUMIN SERPL-MCNC: 3.7 G/DL (ref 3.5–5)
ALBUMIN/GLOB SERPL: 1 {RATIO} (ref 1.1–2.2)
ALP SERPL-CCNC: 110 U/L (ref 45–117)
ALT SERPL-CCNC: 101 U/L (ref 12–78)
ANION GAP SERPL CALC-SCNC: 5 MMOL/L (ref 5–15)
AST SERPL-CCNC: 19 U/L (ref 15–37)
BASOPHILS # BLD: 0 K/UL (ref 0–0.1)
BASOPHILS NFR BLD: 0 % (ref 0–1)
BILIRUB SERPL-MCNC: 0.5 MG/DL (ref 0.2–1)
BUN SERPL-MCNC: 8 MG/DL (ref 6–20)
BUN/CREAT SERPL: 9 (ref 12–20)
CALCIUM SERPL-MCNC: 8.9 MG/DL (ref 8.5–10.1)
CHLORIDE SERPL-SCNC: 106 MMOL/L (ref 97–108)
CO2 SERPL-SCNC: 28 MMOL/L (ref 21–32)
COMMENT, HOLDF: NORMAL
CREAT SERPL-MCNC: 0.89 MG/DL (ref 0.55–1.02)
DIFFERENTIAL METHOD BLD: ABNORMAL
EOSINOPHIL # BLD: 0.1 K/UL (ref 0–0.4)
EOSINOPHIL NFR BLD: 1 % (ref 0–7)
ERYTHROCYTE [DISTWIDTH] IN BLOOD BY AUTOMATED COUNT: 12.5 % (ref 11.5–14.5)
GLOBULIN SER CALC-MCNC: 3.7 G/DL (ref 2–4)
GLUCOSE SERPL-MCNC: 91 MG/DL (ref 65–100)
HCT VFR BLD AUTO: 39.1 % (ref 35–47)
HGB BLD-MCNC: 12.9 G/DL (ref 11.5–16)
IMM GRANULOCYTES # BLD AUTO: 0 K/UL (ref 0–0.04)
IMM GRANULOCYTES NFR BLD AUTO: 0 % (ref 0–0.5)
LYMPHOCYTES # BLD: 3.7 K/UL (ref 0.8–3.5)
LYMPHOCYTES NFR BLD: 42 % (ref 12–49)
MCH RBC QN AUTO: 31.3 PG (ref 26–34)
MCHC RBC AUTO-ENTMCNC: 33 G/DL (ref 30–36.5)
MCV RBC AUTO: 94.9 FL (ref 80–99)
MONOCYTES # BLD: 0.7 K/UL (ref 0–1)
MONOCYTES NFR BLD: 7 % (ref 5–13)
NEUTS SEG # BLD: 4.4 K/UL (ref 1.8–8)
NEUTS SEG NFR BLD: 50 % (ref 32–75)
NRBC # BLD: 0 K/UL (ref 0–0.01)
NRBC BLD-RTO: 0 PER 100 WBC
PLATELET # BLD AUTO: 321 K/UL (ref 150–400)
PMV BLD AUTO: 9.7 FL (ref 8.9–12.9)
POTASSIUM SERPL-SCNC: 3.4 MMOL/L (ref 3.5–5.1)
PROT SERPL-MCNC: 7.4 G/DL (ref 6.4–8.2)
RBC # BLD AUTO: 4.12 M/UL (ref 3.8–5.2)
SAMPLES BEING HELD,HOLD: NORMAL
SODIUM SERPL-SCNC: 139 MMOL/L (ref 136–145)
WBC # BLD AUTO: 9 K/UL (ref 3.6–11)

## 2021-09-30 PROCEDURE — B01B1ZZ FLUOROSCOPY OF SPINAL CORD USING LOW OSMOLAR CONTRAST: ICD-10-PCS | Performed by: RADIOLOGY

## 2021-09-30 PROCEDURE — 36415 COLL VENOUS BLD VENIPUNCTURE: CPT

## 2021-09-30 PROCEDURE — 96375 TX/PRO/DX INJ NEW DRUG ADDON: CPT

## 2021-09-30 PROCEDURE — 74011250636 HC RX REV CODE- 250/636: Performed by: STUDENT IN AN ORGANIZED HEALTH CARE EDUCATION/TRAINING PROGRAM

## 2021-09-30 PROCEDURE — 97165 OT EVAL LOW COMPLEX 30 MIN: CPT | Performed by: OCCUPATIONAL THERAPIST

## 2021-09-30 PROCEDURE — 009U3ZX DRAINAGE OF SPINAL CANAL, PERCUTANEOUS APPROACH, DIAGNOSTIC: ICD-10-PCS | Performed by: RADIOLOGY

## 2021-09-30 PROCEDURE — 96374 THER/PROPH/DIAG INJ IV PUSH: CPT

## 2021-09-30 PROCEDURE — 97116 GAIT TRAINING THERAPY: CPT

## 2021-09-30 PROCEDURE — 74011250637 HC RX REV CODE- 250/637: Performed by: HOSPITALIST

## 2021-09-30 PROCEDURE — 74011250637 HC RX REV CODE- 250/637: Performed by: NURSE PRACTITIONER

## 2021-09-30 PROCEDURE — 97161 PT EVAL LOW COMPLEX 20 MIN: CPT

## 2021-09-30 PROCEDURE — 97535 SELF CARE MNGMENT TRAINING: CPT | Performed by: OCCUPATIONAL THERAPIST

## 2021-09-30 PROCEDURE — 65270000032 HC RM SEMIPRIVATE

## 2021-09-30 PROCEDURE — 74011250636 HC RX REV CODE- 250/636: Performed by: EMERGENCY MEDICINE

## 2021-09-30 PROCEDURE — 85025 COMPLETE CBC W/AUTO DIFF WBC: CPT

## 2021-09-30 PROCEDURE — 77003 FLUOROGUIDE FOR SPINE INJECT: CPT

## 2021-09-30 PROCEDURE — 80053 COMPREHEN METABOLIC PANEL: CPT

## 2021-09-30 PROCEDURE — 74011250637 HC RX REV CODE- 250/637: Performed by: STUDENT IN AN ORGANIZED HEALTH CARE EDUCATION/TRAINING PROGRAM

## 2021-09-30 PROCEDURE — 74011250636 HC RX REV CODE- 250/636: Performed by: HOSPITALIST

## 2021-09-30 PROCEDURE — 74011000250 HC RX REV CODE- 250: Performed by: EMERGENCY MEDICINE

## 2021-09-30 RX ORDER — AMOXICILLIN 250 MG
2 CAPSULE ORAL DAILY
Status: DISCONTINUED | OUTPATIENT
Start: 2021-09-30 | End: 2021-10-04 | Stop reason: HOSPADM

## 2021-09-30 RX ORDER — CLOPIDOGREL BISULFATE 75 MG/1
75 TABLET ORAL DAILY
Status: DISCONTINUED | OUTPATIENT
Start: 2021-09-30 | End: 2021-10-04 | Stop reason: HOSPADM

## 2021-09-30 RX ORDER — ONDANSETRON 2 MG/ML
4 INJECTION INTRAMUSCULAR; INTRAVENOUS
Status: DISCONTINUED | OUTPATIENT
Start: 2021-09-30 | End: 2021-10-04 | Stop reason: HOSPADM

## 2021-09-30 RX ORDER — ACETAMINOPHEN 650 MG/1
650 SUPPOSITORY RECTAL
Status: DISCONTINUED | OUTPATIENT
Start: 2021-09-30 | End: 2021-09-30

## 2021-09-30 RX ORDER — OXYCODONE HYDROCHLORIDE 5 MG/1
5 TABLET ORAL
COMMUNITY
End: 2021-10-04

## 2021-09-30 RX ORDER — HYDROXYCHLOROQUINE SULFATE 200 MG/1
200 TABLET, FILM COATED ORAL 2 TIMES DAILY WITH MEALS
Status: DISCONTINUED | OUTPATIENT
Start: 2021-09-30 | End: 2021-10-04 | Stop reason: HOSPADM

## 2021-09-30 RX ORDER — SODIUM CHLORIDE 0.9 % (FLUSH) 0.9 %
5-40 SYRINGE (ML) INJECTION AS NEEDED
Status: DISCONTINUED | OUTPATIENT
Start: 2021-09-30 | End: 2021-10-04 | Stop reason: HOSPADM

## 2021-09-30 RX ORDER — OMEPRAZOLE 20 MG/1
20 CAPSULE, DELAYED RELEASE ORAL
Status: ON HOLD | COMMUNITY
End: 2021-11-01

## 2021-09-30 RX ORDER — FUROSEMIDE 20 MG/1
20 TABLET ORAL DAILY
Status: DISCONTINUED | OUTPATIENT
Start: 2021-09-30 | End: 2021-10-04 | Stop reason: HOSPADM

## 2021-09-30 RX ORDER — ACETAZOLAMIDE 250 MG/1
500 TABLET ORAL 2 TIMES DAILY
Status: DISCONTINUED | OUTPATIENT
Start: 2021-09-30 | End: 2021-10-01

## 2021-09-30 RX ORDER — GABAPENTIN 600 MG/1
600 TABLET ORAL
COMMUNITY
End: 2022-01-16 | Stop reason: SDUPTHER

## 2021-09-30 RX ORDER — HYDROMORPHONE HYDROCHLORIDE 1 MG/ML
1 INJECTION, SOLUTION INTRAMUSCULAR; INTRAVENOUS; SUBCUTANEOUS ONCE
Status: COMPLETED | OUTPATIENT
Start: 2021-09-30 | End: 2021-09-30

## 2021-09-30 RX ORDER — HYDROMORPHONE HYDROCHLORIDE 1 MG/ML
1 INJECTION, SOLUTION INTRAMUSCULAR; INTRAVENOUS; SUBCUTANEOUS
Status: DISCONTINUED | OUTPATIENT
Start: 2021-09-30 | End: 2021-09-30

## 2021-09-30 RX ORDER — SODIUM CHLORIDE 0.9 % (FLUSH) 0.9 %
5-40 SYRINGE (ML) INJECTION EVERY 8 HOURS
Status: DISCONTINUED | OUTPATIENT
Start: 2021-09-30 | End: 2021-10-04 | Stop reason: HOSPADM

## 2021-09-30 RX ORDER — LORAZEPAM 1 MG/1
1 TABLET ORAL
Status: DISCONTINUED | OUTPATIENT
Start: 2021-09-30 | End: 2021-10-04 | Stop reason: HOSPADM

## 2021-09-30 RX ORDER — ACETAMINOPHEN 325 MG/1
650 TABLET ORAL
Status: DISCONTINUED | OUTPATIENT
Start: 2021-09-30 | End: 2021-09-30

## 2021-09-30 RX ORDER — GABAPENTIN 600 MG/1
600 TABLET ORAL 3 TIMES DAILY
Status: DISCONTINUED | OUTPATIENT
Start: 2021-09-30 | End: 2021-09-30

## 2021-09-30 RX ORDER — TOPIRAMATE 100 MG/1
200 TABLET, FILM COATED ORAL 2 TIMES DAILY
Status: DISCONTINUED | OUTPATIENT
Start: 2021-09-30 | End: 2021-10-04 | Stop reason: HOSPADM

## 2021-09-30 RX ORDER — HYDROMORPHONE HYDROCHLORIDE 1 MG/ML
1 INJECTION, SOLUTION INTRAMUSCULAR; INTRAVENOUS; SUBCUTANEOUS
Status: DISCONTINUED | OUTPATIENT
Start: 2021-09-30 | End: 2021-10-01

## 2021-09-30 RX ORDER — CYCLOBENZAPRINE HCL 10 MG
10 TABLET ORAL 2 TIMES DAILY
Status: DISCONTINUED | OUTPATIENT
Start: 2021-09-30 | End: 2021-10-04 | Stop reason: HOSPADM

## 2021-09-30 RX ORDER — POLYETHYLENE GLYCOL 3350 17 G/17G
17 POWDER, FOR SOLUTION ORAL DAILY PRN
Status: DISCONTINUED | OUTPATIENT
Start: 2021-09-30 | End: 2021-10-02

## 2021-09-30 RX ORDER — POTASSIUM CHLORIDE 750 MG/1
40 TABLET, FILM COATED, EXTENDED RELEASE ORAL DAILY
Status: COMPLETED | OUTPATIENT
Start: 2021-09-30 | End: 2021-10-02

## 2021-09-30 RX ORDER — ONDANSETRON 4 MG/1
4 TABLET, ORALLY DISINTEGRATING ORAL
Status: DISCONTINUED | OUTPATIENT
Start: 2021-09-30 | End: 2021-10-04 | Stop reason: HOSPADM

## 2021-09-30 RX ORDER — GABAPENTIN 600 MG/1
600 TABLET ORAL
Status: DISCONTINUED | OUTPATIENT
Start: 2021-09-30 | End: 2021-10-04 | Stop reason: HOSPADM

## 2021-09-30 RX ORDER — DIPHENHYDRAMINE HCL 25 MG
25 CAPSULE ORAL
Status: DISCONTINUED | OUTPATIENT
Start: 2021-09-30 | End: 2021-10-04 | Stop reason: HOSPADM

## 2021-09-30 RX ORDER — PROMETHAZINE HYDROCHLORIDE 12.5 MG/1
12.5 TABLET ORAL
COMMUNITY
End: 2021-10-26 | Stop reason: ALTCHOICE

## 2021-09-30 RX ORDER — FUROSEMIDE 40 MG/1
TABLET ORAL DAILY
COMMUNITY
End: 2021-10-04

## 2021-09-30 RX ADMIN — HYDROMORPHONE HYDROCHLORIDE 1 MG: 1 INJECTION, SOLUTION INTRAMUSCULAR; INTRAVENOUS; SUBCUTANEOUS at 17:03

## 2021-09-30 RX ADMIN — ONDANSETRON 4 MG: 2 INJECTION INTRAMUSCULAR; INTRAVENOUS at 17:21

## 2021-09-30 RX ADMIN — SODIUM CHLORIDE 1000 ML: 9 INJECTION, SOLUTION INTRAVENOUS at 05:50

## 2021-09-30 RX ADMIN — Medication 10 ML: at 15:12

## 2021-09-30 RX ADMIN — HYDROMORPHONE HYDROCHLORIDE 1 MG: 1 INJECTION, SOLUTION INTRAMUSCULAR; INTRAVENOUS; SUBCUTANEOUS at 23:36

## 2021-09-30 RX ADMIN — CYCLOBENZAPRINE 10 MG: 10 TABLET, FILM COATED ORAL at 06:07

## 2021-09-30 RX ADMIN — HYDROMORPHONE HYDROCHLORIDE 1 MG: 1 INJECTION, SOLUTION INTRAMUSCULAR; INTRAVENOUS; SUBCUTANEOUS at 06:07

## 2021-09-30 RX ADMIN — DIPHENHYDRAMINE HYDROCHLORIDE 25 MG: 50 INJECTION, SOLUTION INTRAMUSCULAR; INTRAVENOUS at 01:33

## 2021-09-30 RX ADMIN — TOPIRAMATE 200 MG: 100 TABLET, FILM COATED ORAL at 11:01

## 2021-09-30 RX ADMIN — FUROSEMIDE 20 MG: 40 TABLET ORAL at 08:22

## 2021-09-30 RX ADMIN — DIPHENHYDRAMINE HYDROCHLORIDE 25 MG: 25 CAPSULE ORAL at 15:09

## 2021-09-30 RX ADMIN — PROCHLORPERAZINE EDISYLATE 10 MG: 5 INJECTION INTRAMUSCULAR; INTRAVENOUS at 01:33

## 2021-09-30 RX ADMIN — HYDROMORPHONE HYDROCHLORIDE 1 MG: 1 INJECTION, SOLUTION INTRAMUSCULAR; INTRAVENOUS; SUBCUTANEOUS at 08:22

## 2021-09-30 RX ADMIN — HYDROMORPHONE HYDROCHLORIDE 1 MG: 1 INJECTION, SOLUTION INTRAMUSCULAR; INTRAVENOUS; SUBCUTANEOUS at 20:27

## 2021-09-30 RX ADMIN — HYDROMORPHONE HYDROCHLORIDE 1 MG: 1 INJECTION, SOLUTION INTRAMUSCULAR; INTRAVENOUS; SUBCUTANEOUS at 12:43

## 2021-09-30 RX ADMIN — SENNOSIDES AND DOCUSATE SODIUM 2 TABLET: 50; 8.6 TABLET ORAL at 17:03

## 2021-09-30 RX ADMIN — HYDROXYCHLOROQUINE SULFATE 200 MG: 200 TABLET ORAL at 17:03

## 2021-09-30 RX ADMIN — GABAPENTIN 600 MG: 600 TABLET, FILM COATED ORAL at 08:22

## 2021-09-30 RX ADMIN — CYCLOBENZAPRINE 10 MG: 10 TABLET, FILM COATED ORAL at 17:03

## 2021-09-30 RX ADMIN — Medication 10 ML: at 20:30

## 2021-09-30 RX ADMIN — POTASSIUM CHLORIDE 40 MEQ: 750 TABLET, FILM COATED, EXTENDED RELEASE ORAL at 17:03

## 2021-09-30 RX ADMIN — ACETAZOLAMIDE 500 MG: 250 TABLET ORAL at 17:03

## 2021-09-30 NOTE — ROUTINE PROCESS
TRANSFER - OUT REPORT:    Verbal report given to Abhay (name) on Perfecto Serrano  being transferred to [id number] (unit) for routine progression of care       Report consisted of patients Situation, Background, Assessment and   Recommendations(SBAR). Information from the following report(s) SBAR, Kardex, ED Summary, Intake/Output, MAR and Recent Results was reviewed with the receiving nurse. Lines:   Peripheral IV 09/30/21 Left Antecubital (Active)        Opportunity for questions and clarification was provided. Patient transported with:   Tech     Patient currently having LP procedure. Will medicate upon arrival back in ED before requesting transportation to floor. RN Abhay aware of plan.
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
## 2021-09-30 NOTE — PROGRESS NOTES
Patient admitted this morning when she presented with intractable headache. See the H&P for details. I have seen and examined patient in the ED room 8. History, lab and imaging studies reviewed with patient. Patient still with severe headache and requesting to increase the as needed IV Dilaudid from every 4 to every 3. Case discussed with his [facility] Dr. Haja Augustin. Patient is going for LP this afternoon. Dr Haja Augustin recommended acetazolamide, patient said she had tried it before and did not seem to help but open to try again. K 3.4,give klor con 40 meq.     See the H&P for other details

## 2021-09-30 NOTE — CONSULTS
Recurrrent episodes of blurry vision. Known IIH with venous sinus stenosis. Venous pressures of 29 mmHg (13 mmHg gradient) from recent procedure. LP palliation for vision preservation until we can start DAPT and stent. I suspect the previous LP opening pressure on 9/27 was erroneous based on her intra-venous pressures last week. Restart acetazolamide 500 mg BID. The neurologist who manages her medically is Dr. Kyra Rowan at [facility] but the practice is not responding to calls. Patient requesting transfer of her care to [facility] neurology here at [facility]. Will have them see her here and assist with medical management until we can get her stented.
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
## 2021-09-30 NOTE — ED NOTES
Ambulated pt per MD order. Pt stated she still feels dizzy and still has a headache, but she no longer feels nauseous.

## 2021-09-30 NOTE — ED TRIAGE NOTES
Pt arrives ambulatory with CC of headaches and dizziness following a lumbar puncture last weekend. States she has pseudo tumors and follows with Dr. Vivienne Webster.

## 2021-09-30 NOTE — PROGRESS NOTES
Transition of Care Plan  1. RUR- 14%  2. DISPOSITION: TBD/subject to change pending recommendations; pending medical progression. -PT/OT consulted. Awaiting recommendations.   -Neuro-interventional consulted and following  3. F/U with PCP/Specialist    4. Transport: Family    CM will continue to follow and assist with [facility] needs as they arise. Reason for Admission:  Intracranial hypertension                   RUR Score:    14%                 Plan for utilizing home health:   TBD/subject to change pending recommendations. PT/OT consulted. Awaiting recommendations. PCP: YES First and Last name:  Ramirez Zambrano MD     Name of Practice: [address]   Are you a current patient: Yes/No: YES   Approximate date of last visit: 8/27/21   Can you participate in a virtual visit with your PCP: YES                    Current Advanced Directive/Advance Care Plan: Full Code      Healthcare Decision Maker:             Primary Decision Maker: Amara Robi - [phone]                    26year old female, AOx3. PTA independent with ADL's and IADL's with minimal assist only when not feeling. No DME utilized. Resides with spouse, Liss [place] [phone] in their own home. Currently employed with [employer] with no significant financial stressors. Insurance verified: [employer]. Care Management Interventions  PCP Verified by CM: Yes  Last Visit to PCP: 08/27/21  Palliative Care Criteria Met (RRAT>21 & CHF Dx)?: No  Mode of Transport at Discharge:  Other (see comment) (Family)  Transition of Care Consult (CM Consult):  (No CM consults at this time)  Discharge Durable Medical Equipment: No  Physical Therapy Consult: Yes  Occupational Therapy Consult: Yes  Speech Therapy Consult: No  Support Systems: Spouse/Significant Other  Confirm Follow Up Transport: Family  Discharge Location  Discharge Placement: Home with family assistance (TBD/subect to change pending recommendations)    LIANNA Mei/MARILYN  Care Management  11:01 AM

## 2021-09-30 NOTE — DISCHARGE INSTRUCTIONS
We hope that we have addressed all of your medical concerns. The examination and treatment you received in the Emergency Department were for an emergent problem and were not intended as complete care. It is important that you follow up with your healthcare provider(s) for ongoing care. If your symptoms worsen or do not improve as expected, and you are unable to reach your usual health care provider(s), you should return to the Emergency Department. Today's healthcare is undergoing tremendous change, and patient satisfaction surveys are one of the many tools to assess the quality of medical care. You may receive a survey from the [employer] organization regarding your experience in the Emergency Department. I hope that your experience has been completely positive, particularly the medical care that I provided. As such, please participate in the survey; anything less than excellent does not meet my expectations or intentions. [facility] and 8 [facility] participate in nationally recognized quality of care measures. If your blood pressure is greater than 120/80, as reported below, we urge that you seek medical care to address the potential of high blood pressure, commonly known as hypertension. Hypertension can be hereditary or can be caused by certain medical conditions, pain, stress, or \"white coat syndrome. \"       Please make an appointment with your health care provider(s) for follow up of your Emergency Department visit. VITALS:   Patient Vitals for the past 8 hrs:   Temp Pulse Resp BP SpO2   09/30/21 0215 -- -- -- (!) 123/93 98 %   09/29/21 2303 98.5 °F (36.9 °C) (!) 107 18 (!) 140/96 99 %          Thank you for allowing us to provide you with medical care today. We realize that you have many choices for your emergency care needs. Please choose us in the future for any continued health care needs. Mercy Akhtar, MD    [address] [facility].   Office: [phone]            Recent Results (from the past 24 hour(s))   CBC WITH AUTOMATED DIFF    Collection Time: 09/30/21  1:29 AM   Result Value Ref Range    WBC 9.0 3.6 - 11.0 K/uL    RBC 4.12 3.80 - 5.20 M/uL    HGB 12.9 11.5 - 16.0 g/dL    HCT 39.1 35.0 - 47.0 %    MCV 94.9 80.0 - 99.0 FL    MCH 31.3 26.0 - 34.0 PG    MCHC 33.0 30.0 - 36.5 g/dL    RDW 12.5 11.5 - 14.5 %    PLATELET 912 294 - 958 K/uL    MPV 9.7 8.9 - 12.9 FL    NRBC 0.0 0  WBC    ABSOLUTE NRBC 0.00 0.00 - 0.01 K/uL    NEUTROPHILS 50 32 - 75 %    LYMPHOCYTES 42 12 - 49 %    MONOCYTES 7 5 - 13 %    EOSINOPHILS 1 0 - 7 %    BASOPHILS 0 0 - 1 %    IMMATURE GRANULOCYTES 0 0.0 - 0.5 %    ABS. NEUTROPHILS 4.4 1.8 - 8.0 K/UL    ABS. LYMPHOCYTES 3.7 (H) 0.8 - 3.5 K/UL    ABS. MONOCYTES 0.7 0.0 - 1.0 K/UL    ABS. EOSINOPHILS 0.1 0.0 - 0.4 K/UL    ABS. BASOPHILS 0.0 0.0 - 0.1 K/UL    ABS. IMM. GRANS. 0.0 0.00 - 0.04 K/UL    DF AUTOMATED     METABOLIC PANEL, COMPREHENSIVE    Collection Time: 09/30/21  1:29 AM   Result Value Ref Range    Sodium 139 136 - 145 mmol/L    Potassium 3.4 (L) 3.5 - 5.1 mmol/L    Chloride 106 97 - 108 mmol/L    CO2 28 21 - 32 mmol/L    Anion gap 5 5 - 15 mmol/L    Glucose 91 65 - 100 mg/dL    BUN 8 6 - 20 MG/DL    Creatinine 0.89 0.55 - 1.02 MG/DL    BUN/Creatinine ratio 9 (L) 12 - 20      GFR est AA >60 >60 ml/min/1.73m2    GFR est non-AA >60 >60 ml/min/1.73m2    Calcium 8.9 8.5 - 10.1 MG/DL    Bilirubin, total 0.5 0.2 - 1.0 MG/DL    ALT (SGPT) 101 (H) 12 - 78 U/L    AST (SGOT) 19 15 - 37 U/L    Alk.  phosphatase 110 45 - 117 U/L    Protein, total 7.4 6.4 - 8.2 g/dL    Albumin 3.7 3.5 - 5.0 g/dL    Globulin 3.7 2.0 - 4.0 g/dL    A-G Ratio 1.0 (L) 1.1 - 2.2     SAMPLES BEING HELD    Collection Time: 09/30/21  1:29 AM   Result Value Ref Range    SAMPLES BEING HELD 1red,1blu     COMMENT        Add-on orders for these samples will be processed based on acceptable specimen integrity and analyte stability, which may vary by analyte. CT HEAD WO CONT    Result Date: 9/30/2021  EXAM: CT HEAD WO CONT INDICATION: headache, dizziness, recent LP COMPARISON: None. CONTRAST: None. TECHNIQUE: Unenhanced CT of the head was performed using 5 mm images. Brain and bone windows were generated. Coronal and sagittal reformats. CT dose reduction was achieved through use of a standardized protocol tailored for this examination and automatic exposure control for dose modulation. FINDINGS: The ventricles and sulci are normal in size, shape and configuration. . There is no significant white matter disease. There is no intracranial hemorrhage, extra-axial collection, or mass effect. The basilar cisterns are open. No CT evidence of acute infarct. The bone windows demonstrate no abnormalities. The visualized portions of the paranasal sinuses and mastoid air cells are clear. No acute findings. Discharge Instructions       PATIENT ID: Charo Barraza  MRN: [id number]   YOB: 1990    DATE OF ADMISSION: 9/29/2021 11:00 PM    DATE OF DISCHARGE: 10/4/2021    PRIMARY CARE PROVIDER: Tasia Farah MD     ATTENDING PHYSICIAN: Nereida Castaneda MD  DISCHARGING PROVIDER: Erin Kingston NP    To contact this individual call [phone] and ask the  to page. If unavailable ask to be transferred the Adult Hospitalist Department.     DISCHARGE DIAGNOSES intracranial HTN    CONSULTATIONS: IP CONSULT TO NEUROINTERVENTIONAL SURGERY  IP CONSULT TO NEUROLOGY  IP CONSULT TO HOSPITALIST    PROCEDURES/SURGERIES: * No surgery found *    PENDING TEST RESULTS:   At the time of discharge the following test results are still pending: na    FOLLOW UP APPOINTMENTS:   Follow-up Information     Follow up With Specialties Details Why Contact Min Schumacher MD Endovascular Surgical Neuroradiology Schedule an appointment as soon as possible for a visit   [address] [facility]  [address] [facility] [address] Emergency Medicine  If symptoms worsen [address]    Yonatan Meraz MD Family Medicine, [address] [facility] [address]      Porsha Maria MD Neurology In 1 week  [address] [phone]             ADDITIONAL CARE RECOMMENDATIONS:     DIET: Regular Diet      ACTIVITY: Activity as tolerated    WOUND CARE: na    EQUIPMENT needed: na      DISCHARGE MEDICATIONS:   See Medication Reconciliation Form    · It is important that you take the medication exactly as they are prescribed. · Keep your medication in the bottles provided by the pharmacist and keep a list of the medication names, dosages, and times to be taken in your wallet. · Do not take other medications without consulting your doctor. NOTIFY YOUR PHYSICIAN FOR ANY OF THE FOLLOWING:   Fever over 101 degrees for 24 hours. Chest pain, shortness of breath, fever, chills, nausea, vomiting, diarrhea, change in mentation, falling, weakness, bleeding. Severe pain or pain not relieved by medications. Or, any other signs or symptoms that you may have questions about.       DISPOSITION:   x Home With:   OT  PT  [facility]  RN       SNF/Inpatient Rehab/LTAC    Independent/assisted living    Hospice    Other:           Signed:   Eboni Branham NP  10/4/2021  8:57 AM
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
## 2021-09-30 NOTE — PROGRESS NOTES
Pharmacist Admission Medication Reconciliation:     Reviewed PTA medications w/ RxQuery outpatient prescription data and with Ms Nori Suarez in person.  Rx Query data available? ¹ YES   Reviewed active and held orders. YES    Medication changes (since last review):  Takes scheduled medications: Flexeril 10 mg twice a day, Biotin 10,000 mcg (10 mg) daily, and Plaquenil 200 mg twice a day.  States not currently taking aspirin and Plavix, instructed to resume prior to procedure scheduled in October. (Plavix resumed by neuro inpatient.)   Takes gabapentin just as needed, not scheduled. Contacting MD to clarify inpatient orders.  Changed Ativan to 1.5 mg HS PRN. Also continues to take Diazepam PRN.  Adding PRN medications: promethazine, oxycodone, omeprazole   Patient states she has not taken Topamax or Lasix in 2 months because she could not reach her neurologist for a refill, however [facility] does shows both refilled on 8/30/2021. Resumed inpatient.  States she has not taken citalopram in 5-6 months, despite outpatient prescription history showing recent refills.  NOT taking / removed from list: phentermine, Zofran    Thank you for allowing me to participate in this patient's care. Please call [phone] with any questions. Frederic Babinski, Pharmacist          [facility] pharmacy benefit data reflects medications filled and processed through the patient's insurance,   however this data does NOT capture whether the medication was picked up or is currently being taken by the patient. Prior to Admission Medications:   Prior to Admission Medications   Prescriptions Last Dose Informant Taking? LORazepam (ATIVAN) 1 mg tablet   Yes   Sig: Take 1.5 mg by mouth nightly as needed for Anxiety. aspirin (ASPIRIN) 325 mg tablet 9/22/2021  No   Sig: Take 1 Tablet by mouth daily.    biotin 10 mg tab   Yes   Sig: 10 mg = 1 tab each dose, PO, daily   clopidogreL (Plavix) 75 mg tab   No   Sig: Take 1 Tablet by mouth daily.   cyclobenzaprine (FLEXERIL) 10 mg tablet   Yes   Sig: TAKE 1 TABLET BY MOUTH TWICE A DAY   diazePAM (VALIUM) 5 mg tablet   Yes   Sig: TAKE ONE DAILY AS NEEDED FOR ANXIETY   furosemide (Lasix) 40 mg tablet Not Taking at Unknown time  No (ran out of medication)   Sig: Take  by mouth daily. Patient not taking: Reported on 9/30/2021   gabapentin (NEURONTIN) 600 mg tablet 9/23/2021 at Unknown time  Yes   Sig: Take 600 mg by mouth three (3) times daily as needed. hydrOXYchloroQUINE (PLAQUENIL) 200 mg tablet   Yes   Sig: TAKE 1 TABLET BY MOUTH TWICE A DAY PA REQUIRED FOR QTY   16 FAXED MD   naloxone (NARCAN) 4 mg/actuation nasal spray   No   Sig: Use 1 spray intranasally, then discard. Repeat with new spray every 2 min as needed for opioid overdose symptoms, alternating nostrils. Indications: decrease in rate & depth of breathing due to opioid drug, opioid overdose   omeprazole (PRILOSEC) 20 mg capsule   Yes   Sig: Take 20 mg by mouth daily as needed. oxyCODONE IR (ROXICODONE) 5 mg immediate release tablet   Yes   Sig: Take 5 mg by mouth daily as needed for Pain. promethazine (PHENERGAN) 12.5 mg tablet   Yes   Sig: Take 12.5 mg by mouth two (2) times daily as needed for Nausea. topiramate (Topamax) 200 mg tablet Not Taking at Unknown time    No (Ran out of medication)   Sig: Take 200 mg by mouth two (2) times a day.    Patient not taking: Reported on 9/30/2021      Facility-Administered Medications: None

## 2021-09-30 NOTE — ED NOTES
Verbal shift change report given to [facility] (oncoming nurses) by Ivis Duong (offgoing nurse). Report included the following information SBAR, Kardex, ED Summary, Intake/Output, MAR and Recent Results. Per Dasha Mixon MD Kamari to speak to patient when able but recommends proceeding with LP. Patient in agreement with plan and RN Ivis Duong contacted xray for LP.
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
## 2021-09-30 NOTE — ED NOTES
Verbal shift change report given to SHANT Villarreal (oncoming nurse) by Juan Henderson (offgoing nurse). Report included the following information SBAR, ED Summary, MAR and Recent Results.

## 2021-10-01 ENCOUNTER — APPOINTMENT (OUTPATIENT)
Dept: ULTRASOUND IMAGING | Age: 31
DRG: 054 | End: 2021-10-01
Attending: NURSE PRACTITIONER
Payer: MEDICAID

## 2021-10-01 LAB
AMORPH CRY URNS QL MICRO: ABNORMAL
ANION GAP SERPL CALC-SCNC: 6 MMOL/L (ref 5–15)
APPEARANCE UR: ABNORMAL
BACTERIA URNS QL MICRO: ABNORMAL /HPF
BILIRUB UR QL: NEGATIVE
BUN SERPL-MCNC: 9 MG/DL (ref 6–20)
BUN/CREAT SERPL: 10 (ref 12–20)
CALCIUM SERPL-MCNC: 9.6 MG/DL (ref 8.5–10.1)
CHLORIDE SERPL-SCNC: 107 MMOL/L (ref 97–108)
CO2 SERPL-SCNC: 24 MMOL/L (ref 21–32)
COLOR UR: ABNORMAL
CREAT SERPL-MCNC: 0.91 MG/DL (ref 0.55–1.02)
EPITH CASTS URNS QL MICRO: ABNORMAL /LPF
GLUCOSE SERPL-MCNC: 73 MG/DL (ref 65–100)
GLUCOSE UR STRIP.AUTO-MCNC: NEGATIVE MG/DL
HGB UR QL STRIP: NEGATIVE
HYALINE CASTS URNS QL MICRO: ABNORMAL /LPF (ref 0–5)
KETONES UR QL STRIP.AUTO: NEGATIVE MG/DL
LEUKOCYTE ESTERASE UR QL STRIP.AUTO: ABNORMAL
MAGNESIUM SERPL-MCNC: 2.6 MG/DL (ref 1.6–2.4)
NITRITE UR QL STRIP.AUTO: NEGATIVE
PH UR STRIP: 8.5 [PH] (ref 5–8)
POTASSIUM SERPL-SCNC: 4.3 MMOL/L (ref 3.5–5.1)
PROT UR STRIP-MCNC: NEGATIVE MG/DL
RBC #/AREA URNS HPF: ABNORMAL /HPF (ref 0–5)
SODIUM SERPL-SCNC: 137 MMOL/L (ref 136–145)
SP GR UR REFRACTOMETRY: 1.02 (ref 1–1.03)
UROBILINOGEN UR QL STRIP.AUTO: 1 EU/DL (ref 0.2–1)
WBC URNS QL MICRO: ABNORMAL /HPF (ref 0–4)

## 2021-10-01 PROCEDURE — 65270000032 HC RM SEMIPRIVATE

## 2021-10-01 PROCEDURE — 74011250636 HC RX REV CODE- 250/636: Performed by: NURSE PRACTITIONER

## 2021-10-01 PROCEDURE — 74011000250 HC RX REV CODE- 250: Performed by: NURSE PRACTITIONER

## 2021-10-01 PROCEDURE — 36415 COLL VENOUS BLD VENIPUNCTURE: CPT

## 2021-10-01 PROCEDURE — 74011250636 HC RX REV CODE- 250/636: Performed by: STUDENT IN AN ORGANIZED HEALTH CARE EDUCATION/TRAINING PROGRAM

## 2021-10-01 PROCEDURE — 80048 BASIC METABOLIC PNL TOTAL CA: CPT

## 2021-10-01 PROCEDURE — 76770 US EXAM ABDO BACK WALL COMP: CPT

## 2021-10-01 PROCEDURE — 87086 URINE CULTURE/COLONY COUNT: CPT

## 2021-10-01 PROCEDURE — 83735 ASSAY OF MAGNESIUM: CPT

## 2021-10-01 PROCEDURE — 74011250637 HC RX REV CODE- 250/637: Performed by: NURSE PRACTITIONER

## 2021-10-01 PROCEDURE — 81001 URINALYSIS AUTO W/SCOPE: CPT

## 2021-10-01 PROCEDURE — 74011250637 HC RX REV CODE- 250/637: Performed by: HOSPITALIST

## 2021-10-01 PROCEDURE — 74011250637 HC RX REV CODE- 250/637: Performed by: STUDENT IN AN ORGANIZED HEALTH CARE EDUCATION/TRAINING PROGRAM

## 2021-10-01 PROCEDURE — 99223 1ST HOSP IP/OBS HIGH 75: CPT | Performed by: NURSE PRACTITIONER

## 2021-10-01 PROCEDURE — 74011250636 HC RX REV CODE- 250/636: Performed by: HOSPITALIST

## 2021-10-01 RX ORDER — HYDROMORPHONE HYDROCHLORIDE 2 MG/ML
2 INJECTION, SOLUTION INTRAMUSCULAR; INTRAVENOUS; SUBCUTANEOUS
Status: DISCONTINUED | OUTPATIENT
Start: 2021-10-01 | End: 2021-10-02

## 2021-10-01 RX ADMIN — FUROSEMIDE 20 MG: 40 TABLET ORAL at 10:08

## 2021-10-01 RX ADMIN — WATER 1 G: 1 INJECTION INTRAMUSCULAR; INTRAVENOUS; SUBCUTANEOUS at 13:14

## 2021-10-01 RX ADMIN — CYCLOBENZAPRINE 10 MG: 10 TABLET, FILM COATED ORAL at 10:08

## 2021-10-01 RX ADMIN — HYDROXYCHLOROQUINE SULFATE 200 MG: 200 TABLET ORAL at 07:34

## 2021-10-01 RX ADMIN — SENNOSIDES AND DOCUSATE SODIUM 2 TABLET: 50; 8.6 TABLET ORAL at 10:09

## 2021-10-01 RX ADMIN — Medication 10 ML: at 13:14

## 2021-10-01 RX ADMIN — HYDROXYCHLOROQUINE SULFATE 200 MG: 200 TABLET ORAL at 16:14

## 2021-10-01 RX ADMIN — HYDROMORPHONE HYDROCHLORIDE 2 MG: 2 INJECTION INTRAMUSCULAR; INTRAVENOUS; SUBCUTANEOUS at 19:34

## 2021-10-01 RX ADMIN — HYDROMORPHONE HYDROCHLORIDE 2 MG: 2 INJECTION INTRAMUSCULAR; INTRAVENOUS; SUBCUTANEOUS at 16:14

## 2021-10-01 RX ADMIN — HYDROMORPHONE HYDROCHLORIDE 2 MG: 2 INJECTION INTRAMUSCULAR; INTRAVENOUS; SUBCUTANEOUS at 13:14

## 2021-10-01 RX ADMIN — HYDROMORPHONE HYDROCHLORIDE 1 MG: 1 INJECTION, SOLUTION INTRAMUSCULAR; INTRAVENOUS; SUBCUTANEOUS at 07:12

## 2021-10-01 RX ADMIN — DIPHENHYDRAMINE HYDROCHLORIDE 25 MG: 25 CAPSULE ORAL at 15:31

## 2021-10-01 RX ADMIN — TOPIRAMATE 200 MG: 100 TABLET, FILM COATED ORAL at 19:34

## 2021-10-01 RX ADMIN — ACETAZOLAMIDE 500 MG: 250 TABLET ORAL at 10:09

## 2021-10-01 RX ADMIN — TOPIRAMATE 200 MG: 100 TABLET, FILM COATED ORAL at 10:08

## 2021-10-01 RX ADMIN — ONDANSETRON 4 MG: 2 INJECTION INTRAMUSCULAR; INTRAVENOUS at 03:55

## 2021-10-01 RX ADMIN — ONDANSETRON 4 MG: 2 INJECTION INTRAMUSCULAR; INTRAVENOUS at 19:34

## 2021-10-01 RX ADMIN — POTASSIUM CHLORIDE 40 MEQ: 750 TABLET, FILM COATED, EXTENDED RELEASE ORAL at 10:09

## 2021-10-01 RX ADMIN — ONDANSETRON 4 MG: 2 INJECTION INTRAMUSCULAR; INTRAVENOUS at 10:16

## 2021-10-01 RX ADMIN — HYDROMORPHONE HYDROCHLORIDE 1 MG: 1 INJECTION, SOLUTION INTRAMUSCULAR; INTRAVENOUS; SUBCUTANEOUS at 10:09

## 2021-10-01 RX ADMIN — HYDROMORPHONE HYDROCHLORIDE 1 MG: 1 INJECTION, SOLUTION INTRAMUSCULAR; INTRAVENOUS; SUBCUTANEOUS at 03:55

## 2021-10-01 RX ADMIN — CYCLOBENZAPRINE 10 MG: 10 TABLET, FILM COATED ORAL at 19:33
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
## 2021-10-01 NOTE — PROGRESS NOTES
[facility] [place] [facility]                                                                                          Hospitalist Progress Note  Key Laughlin NP  Answering service: [phone] OR [phone] from in house phone        Date of Service:  10/1/2021  NAME:  Carter Forrester  :  1990  MRN:  [id number]      Admission Summary:   Carter Forrester is a 26 y.o. female  with pmhx pseudotumor cerebri, GERD, and SLE who presents with headache. Describes 24 hours of the throbbing, frontal headache with associated phonophobia, photophobia, nausea without emesis as well as blurry vision. Chart review shows recent admission 4 days ago with similar presentation requiring therapeutic lumbar puncture. She had previously seen Doug Rodney for her idiopathic intracranial hypertension, and he advised her to see a specialist at [address] [place] [address] because he no longer had anything to offer. She has since since several neurology in between and which includes [facility] neurology group and lastly Dr. Alice Mandujano. the patient has been seeing Dr. Becca Balderraam for diagnostic LPs, the most recent of which was on 2021, when 14 mL was removed with an opening pressure of 18 cmH2O and a closing pressure of 13 cm H2O. She has had 10 LPs since  and five more in . On 21, she had a diagnostic cerebral angiogram with Dr. Becca Balderrama and is scheduled to have Intracranial cerebral venous sinus stenting with Dr. Becca Balderrama on 10/21/21. Neurology has been consulted. Interval history / Subjective:   I saw the patient this morning on rounds.   Headache is well controlled, does have however plaints of left flank pain     Assessment & Plan:     Intractable headache  Pseudotumor cerebri  Idiopathic intracranial hypertension  Continuing hydromorphone  Has been seen by neurology, thank you for recommendations  Continuing topiramate  Continuing furosemide  Continue aspirin bolus clopidogrel for stent  To follow-up with neuro-ophthalmologist outpatient  To continue follow-up with Dr. Keely Starkey for possible shunt    Left flank pain  Urinalysis was done, does have leukocyte esterase and bacteria, crystals also noted  Urine culture is pending, will give a dose of ceftriaxone however also concern for kidney stones  We will get renal ultrasound     SLE  Stable  Continue hydroxychloroquine     MSK pain  Stable  Continue cyclobenzaprine  Continue gabapentin    Code status: Full    DVT prophylaxis: SCD    Care Plan discussed with: Patient/Family and Nurse  Anticipated Disposition: Home w/Family  Anticipated Discharge: 24 hours to 48 hours     Hospital Problems  Date Reviewed: 9/23/2021        Codes Class Noted POA    Intractable migraine ICD-10-CM: H81.763  ICD-9-CM: 346.91  9/30/2021 Unknown        * (Principal) Intracranial hypertension ICD-10-CM: G93.2  ICD-9-CM: 348.2  10/23/2020 Unknown        SLE (systemic lupus erythematosus) (HCC) (Chronic) ICD-10-CM: M32.9  ICD-9-CM: 710.0  8/25/2020 Yes        Class 3 severe obesity in adult [facility]) ICD-10-CM: E66.01  ICD-9-CM: 278.01  8/22/2018 Yes                Review of Systems:   A comprehensive review of systems was negative except for that written in the HPI. Vital Signs:    Last 24hrs VS reviewed since prior progress note. Most recent are:  Visit Vitals  /68 (BP 1 Location: Right upper arm, BP Patient Position: At rest)   Pulse 93   Temp 99.2 °F (37.3 °C)   Resp 18   Ht 5' 2\" (1.575 m)   Wt 99.8 kg (220 lb 0.3 oz)   SpO2 97%   BMI 40.24 kg/m²         Intake/Output Summary (Last 24 hours) at 10/1/2021 1515  Last data filed at 9/30/2021 2253  Gross per 24 hour   Intake 120 ml   Output --   Net 120 ml        Physical Examination:     I had a face to face encounter with this patient and independently examined them on 10/1/2021 as outlined below:          Constitutional:  No acute distress, cooperative, pleasant    ENT:  Oral mucosa moist, oropharynx benign. Resp:  CTA bilaterally. No wheezing/rhonchi/rales.  No accessory muscle use   CV:  Regular rhythm, normal rate, no murmurs, gallops, rubs    GI:  Soft, non distended, non tender. normoactive bowel sounds, no hepatosplenomegaly     Musculoskeletal:  No edema, warm, 2+ pulses throughout    Neurologic:  Moves all extremities. AAOx3, CN II-XII reviewed            Data Review:    Review and/or order of clinical lab test  Review and/or order of tests in the radiology section of [facility]      Labs:     Recent Labs     09/30/21  0129   WBC 9.0   HGB 12.9   HCT 39.1        Recent Labs     10/01/21  0401 09/30/21  0129    139   K 4.3 3.4*    106   CO2 24 28   BUN 9 8   CREA 0.91 0.89   GLU 73 91   CA 9.6 8.9   MG 2.6*  --      Recent Labs     09/30/21 0129   *      TBILI 0.5   TP 7.4   ALB 3.7   GLOB 3.7     No results for input(s): INR, PTP, APTT, INREXT in the last 72 hours. No results for input(s): FE, TIBC, PSAT, FERR in the last 72 hours. No results found for: FOL, RBCF   No results for input(s): PH, PCO2, PO2 in the last 72 hours. No results for input(s): CPK, CKNDX, TROIQ in the last 72 hours.     No lab exists for component: CPKMB  Lab Results   Component Value Date/Time    Cholesterol, total 177 04/11/2019 11:35 AM    HDL Cholesterol 48 04/11/2019 11:35 AM    LDL, calculated 107 (H) 04/11/2019 11:35 AM    Triglyceride 112 04/11/2019 11:35 AM     Lab Results   Component Value Date/Time    Glucose (POC) 86 09/13/2020 11:03 PM    Glucose POC 82 11/07/2019 02:00 PM     Lab Results   Component Value Date/Time    Color YELLOW/STRAW 10/01/2021 04:56 AM    Appearance TURBID (A) 10/01/2021 04:56 AM    Specific gravity 1.017 10/01/2021 04:56 AM    Specific gravity 1.015 01/25/2021 02:30 PM    pH (UA) 8.5 (H) 10/01/2021 04:56 AM    Protein Negative 10/01/2021 04:56 AM    Glucose Negative 10/01/2021 04:56 AM    Ketone Negative 10/01/2021 04:56 AM    Bilirubin Negative 10/01/2021 04:56 AM    Urobilinogen 1.0 10/01/2021 04:56 AM    Nitrites Negative 10/01/2021 04:56 AM    Leukocyte Esterase SMALL (A) 10/01/2021 04:56 AM    Epithelial cells FEW 10/01/2021 04:56 AM    Bacteria 1+ (A) 10/01/2021 04:56 AM    WBC 0-4 10/01/2021 04:56 AM    RBC 0-5 10/01/2021 04:56 AM         Medications Reviewed:     Current Facility-Administered Medications   Medication Dose Route Frequency    cefTRIAXone (ROCEPHIN) 1 g in sterile water (preservative free) 10 mL IV syringe  1 g IntraVENous Q24H    HYDROmorphone (PF) (DILAUDID) injection 2 mg  2 mg IntraVENous Q3H PRN    [Held by provider] clopidogreL (PLAVIX) tablet 75 mg  75 mg Oral DAILY    cyclobenzaprine (FLEXERIL) tablet 10 mg  10 mg Oral BID    hydrOXYchloroQUINE (PLAQUENIL) tablet 200 mg  200 mg Oral BID WITH MEALS    LORazepam (ATIVAN) tablet 1 mg  1 mg Oral QHS PRN    topiramate (TOPAMAX) tablet 200 mg  200 mg Oral BID    sodium chloride (NS) flush 5-40 mL  5-40 mL IntraVENous Q8H    sodium chloride (NS) flush 5-40 mL  5-40 mL IntraVENous PRN    polyethylene glycol (MIRALAX) packet 17 g  17 g Oral DAILY PRN    ondansetron (ZOFRAN ODT) tablet 4 mg  4 mg Oral Q8H PRN    Or    ondansetron (ZOFRAN) injection 4 mg  4 mg IntraVENous Q6H PRN    furosemide (LASIX) tablet 20 mg  20 mg Oral DAILY    gabapentin (NEURONTIN) tablet 600 mg  600 mg Oral TID PRN    diphenhydrAMINE (BENADRYL) capsule 25 mg  25 mg Oral Q6H PRN    senna-docusate (PERICOLACE) 8.6-50 mg per tablet 2 Tablet  2 Tablet Oral DAILY    potassium chloride SR (KLOR-CON 10) tablet 40 mEq  40 mEq Oral DAILY    acetaZOLAMIDE (DIAMOX) tablet 500 mg  500 mg Oral BID     ______________________________________________________________________  EXPECTED LENGTH OF STAY: 2d 7h  ACTUAL LENGTH OF STAY:          1                 A[address], NP

## 2021-10-01 NOTE — PROGRESS NOTES
Spiritual Care Assessment/Progress Note  [facility]      NAME: Bennie Eugene      MRN: [id number]  AGE: 26 y.o.  SEX: female  Amish Affiliation: Restoration   Language: English     10/1/2021     Total Time (in minutes): 22     Spiritual Assessment begun in [address] through conversation with:         [x]Patient        [] Family    [] Friend(s)        Reason for Consult: Initial/Spiritual assessment, patient floor, Request by patient     Spiritual beliefs: (Please include comment if needed)     [x] Identifies with a zain tradition: Restoration        [] Supported by a zain community:            [] Claims no spiritual orientation:           [] Seeking spiritual identity:                [] Adheres to an individual form of spirituality:           [] Not able to assess:                           Identified resources for coping:      [x] Prayer                               [] Music                  [] Guided Imagery     [x] Family/friends                 [] Pet visits     [] Devotional reading                         [] Unknown     [] Other:                                               Interventions offered during this visit: (See comments for more details)    Patient Interventions: Affirmation of emotions/emotional suffering, Affirmation of zain, Catharsis/review of pertinent events in supportive environment, Coping skills reviewed/reinforced, Iconic (affirming the presence of God/Higher Power), Normalization of emotional/spiritual concerns, Prayer (actual)           Plan of Care:     [] Support spiritual and/or cultural needs    [] Support AMD and/or advance care planning process      [] Support grieving process   [] Coordinate Rites and/or Rituals    [] Coordination with community clergy   [] No spiritual needs identified at this time   [] Detailed Plan of Care below (See Comments)  [] Make referral to Music Therapy  [] Make referral to Pet Therapy     [] Make referral to Addiction services  [] Make referral to [facility]  [] Make referral to Spiritual Care Partner  [] No future visits requested        [x] Follow up upon further referrals     Comments:  visit for initial spiritual assessment. Patient requested  visit. Patient reclining in bed, good eye contact, tearful at times. Says she is feeling a bit overwhelmed. Provided spiritual presence and listening as she spoke of her present thoughts, feelings, and concerns. Expressed feelings of being overwhelmed saying it feels as though it has been one thing after another for some time. Also says she may have been discharged home today, ut has developed another problem and will be in the hospital until this resolves. Has two children at home (7 yrs. And 11 yrs.). Says she misses them as well. Finds strength and comfort in her zain and requested prayer. A prayer was offered and she appeared comforted and encouraged as a result of this prayer and visit. Informed her of availability of  and pastoral care services. Rev.  Nguyễn Fields MDiv, [facility], [facility]   paging service: [phone])

## 2021-10-02 ENCOUNTER — APPOINTMENT (OUTPATIENT)
Dept: CT IMAGING | Age: 31
DRG: 054 | End: 2021-10-02
Attending: NURSE PRACTITIONER
Payer: MEDICAID

## 2021-10-02 LAB
ALBUMIN SERPL-MCNC: 3.6 G/DL (ref 3.5–5)
ALBUMIN/GLOB SERPL: 0.9 {RATIO} (ref 1.1–2.2)
ALP SERPL-CCNC: 136 U/L (ref 45–117)
ALT SERPL-CCNC: 85 U/L (ref 12–78)
ANION GAP SERPL CALC-SCNC: 6 MMOL/L (ref 5–15)
AST SERPL-CCNC: 33 U/L (ref 15–37)
BACTERIA SPEC CULT: NORMAL
BASOPHILS # BLD: 0 K/UL (ref 0–0.1)
BASOPHILS NFR BLD: 1 % (ref 0–1)
BILIRUB SERPL-MCNC: 0.3 MG/DL (ref 0.2–1)
BUN SERPL-MCNC: 10 MG/DL (ref 6–20)
BUN/CREAT SERPL: 10 (ref 12–20)
CALCIUM SERPL-MCNC: 9.2 MG/DL (ref 8.5–10.1)
CHLORIDE SERPL-SCNC: 109 MMOL/L (ref 97–108)
CO2 SERPL-SCNC: 21 MMOL/L (ref 21–32)
CREAT SERPL-MCNC: 1.03 MG/DL (ref 0.55–1.02)
DIFFERENTIAL METHOD BLD: NORMAL
EOSINOPHIL # BLD: 0.2 K/UL (ref 0–0.4)
EOSINOPHIL NFR BLD: 2 % (ref 0–7)
ERYTHROCYTE [DISTWIDTH] IN BLOOD BY AUTOMATED COUNT: 12.7 % (ref 11.5–14.5)
GLOBULIN SER CALC-MCNC: 3.9 G/DL (ref 2–4)
GLUCOSE SERPL-MCNC: 88 MG/DL (ref 65–100)
HCT VFR BLD AUTO: 41.5 % (ref 35–47)
HGB BLD-MCNC: 13.8 G/DL (ref 11.5–16)
IMM GRANULOCYTES # BLD AUTO: 0 K/UL (ref 0–0.04)
IMM GRANULOCYTES NFR BLD AUTO: 0 % (ref 0–0.5)
LYMPHOCYTES # BLD: 3 K/UL (ref 0.8–3.5)
LYMPHOCYTES NFR BLD: 37 % (ref 12–49)
MCH RBC QN AUTO: 31.4 PG (ref 26–34)
MCHC RBC AUTO-ENTMCNC: 33.3 G/DL (ref 30–36.5)
MCV RBC AUTO: 94.5 FL (ref 80–99)
MONOCYTES # BLD: 1 K/UL (ref 0–1)
MONOCYTES NFR BLD: 12 % (ref 5–13)
NEUTS SEG # BLD: 4 K/UL (ref 1.8–8)
NEUTS SEG NFR BLD: 48 % (ref 32–75)
NRBC # BLD: 0 K/UL (ref 0–0.01)
NRBC BLD-RTO: 0 PER 100 WBC
PLATELET # BLD AUTO: 325 K/UL (ref 150–400)
PMV BLD AUTO: 9.6 FL (ref 8.9–12.9)
POTASSIUM SERPL-SCNC: 4.2 MMOL/L (ref 3.5–5.1)
PROT SERPL-MCNC: 7.5 G/DL (ref 6.4–8.2)
RBC # BLD AUTO: 4.39 M/UL (ref 3.8–5.2)
SERVICE CMNT-IMP: NORMAL
SODIUM SERPL-SCNC: 136 MMOL/L (ref 136–145)
WBC # BLD AUTO: 8.2 K/UL (ref 3.6–11)

## 2021-10-02 PROCEDURE — 36415 COLL VENOUS BLD VENIPUNCTURE: CPT

## 2021-10-02 PROCEDURE — 85025 COMPLETE CBC W/AUTO DIFF WBC: CPT

## 2021-10-02 PROCEDURE — 74011250637 HC RX REV CODE- 250/637: Performed by: STUDENT IN AN ORGANIZED HEALTH CARE EDUCATION/TRAINING PROGRAM

## 2021-10-02 PROCEDURE — 74011250636 HC RX REV CODE- 250/636: Performed by: INTERNAL MEDICINE

## 2021-10-02 PROCEDURE — 80053 COMPREHEN METABOLIC PANEL: CPT

## 2021-10-02 PROCEDURE — 74176 CT ABD & PELVIS W/O CONTRAST: CPT

## 2021-10-02 PROCEDURE — 74011250637 HC RX REV CODE- 250/637: Performed by: HOSPITALIST

## 2021-10-02 PROCEDURE — 74011250636 HC RX REV CODE- 250/636: Performed by: NURSE PRACTITIONER

## 2021-10-02 PROCEDURE — 65270000032 HC RM SEMIPRIVATE

## 2021-10-02 PROCEDURE — 74011250637 HC RX REV CODE- 250/637: Performed by: NURSE PRACTITIONER

## 2021-10-02 RX ORDER — DIPHENHYDRAMINE HYDROCHLORIDE 50 MG/ML
25 INJECTION, SOLUTION INTRAMUSCULAR; INTRAVENOUS
Status: DISCONTINUED | OUTPATIENT
Start: 2021-10-02 | End: 2021-10-04 | Stop reason: HOSPADM

## 2021-10-02 RX ORDER — POLYETHYLENE GLYCOL 3350 17 G/17G
17 POWDER, FOR SOLUTION ORAL DAILY
Status: DISCONTINUED | OUTPATIENT
Start: 2021-10-03 | End: 2021-10-04 | Stop reason: HOSPADM

## 2021-10-02 RX ORDER — HYDROMORPHONE HYDROCHLORIDE 2 MG/ML
1.5 INJECTION, SOLUTION INTRAMUSCULAR; INTRAVENOUS; SUBCUTANEOUS
Status: DISCONTINUED | OUTPATIENT
Start: 2021-10-02 | End: 2021-10-04 | Stop reason: HOSPADM

## 2021-10-02 RX ADMIN — ONDANSETRON 4 MG: 4 TABLET, ORALLY DISINTEGRATING ORAL at 17:11

## 2021-10-02 RX ADMIN — Medication 10 ML: at 13:59

## 2021-10-02 RX ADMIN — SENNOSIDES AND DOCUSATE SODIUM 2 TABLET: 50; 8.6 TABLET ORAL at 08:40

## 2021-10-02 RX ADMIN — HYDROMORPHONE HYDROCHLORIDE 2 MG: 2 INJECTION INTRAMUSCULAR; INTRAVENOUS; SUBCUTANEOUS at 11:05

## 2021-10-02 RX ADMIN — CYCLOBENZAPRINE 10 MG: 10 TABLET, FILM COATED ORAL at 08:40

## 2021-10-02 RX ADMIN — HYDROMORPHONE HYDROCHLORIDE 1.5 MG: 2 INJECTION, SOLUTION INTRAMUSCULAR; INTRAVENOUS; SUBCUTANEOUS at 17:11

## 2021-10-02 RX ADMIN — HYDROMORPHONE HYDROCHLORIDE 2 MG: 2 INJECTION INTRAMUSCULAR; INTRAVENOUS; SUBCUTANEOUS at 01:23

## 2021-10-02 RX ADMIN — DIPHENHYDRAMINE HYDROCHLORIDE 25 MG: 50 INJECTION, SOLUTION INTRAMUSCULAR; INTRAVENOUS at 16:25

## 2021-10-02 RX ADMIN — HYDROXYCHLOROQUINE SULFATE 200 MG: 200 TABLET ORAL at 16:25

## 2021-10-02 RX ADMIN — FUROSEMIDE 20 MG: 40 TABLET ORAL at 08:41

## 2021-10-02 RX ADMIN — HYDROMORPHONE HYDROCHLORIDE 2 MG: 2 INJECTION INTRAMUSCULAR; INTRAVENOUS; SUBCUTANEOUS at 04:42

## 2021-10-02 RX ADMIN — TOPIRAMATE 200 MG: 100 TABLET, FILM COATED ORAL at 08:40

## 2021-10-02 RX ADMIN — ONDANSETRON 4 MG: 4 TABLET, ORALLY DISINTEGRATING ORAL at 01:23

## 2021-10-02 RX ADMIN — HYDROMORPHONE HYDROCHLORIDE 1.5 MG: 2 INJECTION, SOLUTION INTRAMUSCULAR; INTRAVENOUS; SUBCUTANEOUS at 19:50

## 2021-10-02 RX ADMIN — HYDROMORPHONE HYDROCHLORIDE 2 MG: 2 INJECTION INTRAMUSCULAR; INTRAVENOUS; SUBCUTANEOUS at 13:59

## 2021-10-02 RX ADMIN — CLOPIDOGREL BISULFATE 75 MG: 75 TABLET ORAL at 08:41

## 2021-10-02 RX ADMIN — HYDROMORPHONE HYDROCHLORIDE 2 MG: 2 INJECTION INTRAMUSCULAR; INTRAVENOUS; SUBCUTANEOUS at 08:02

## 2021-10-02 RX ADMIN — CYCLOBENZAPRINE 10 MG: 10 TABLET, FILM COATED ORAL at 17:11

## 2021-10-02 RX ADMIN — HYDROXYCHLOROQUINE SULFATE 200 MG: 200 TABLET ORAL at 07:07

## 2021-10-02 RX ADMIN — TOPIRAMATE 200 MG: 100 TABLET, FILM COATED ORAL at 17:11

## 2021-10-02 RX ADMIN — POTASSIUM CHLORIDE 40 MEQ: 750 TABLET, FILM COATED, EXTENDED RELEASE ORAL at 08:41

## 2021-10-02 NOTE — PROGRESS NOTES
Problem: Falls - Risk of  Goal: *Absence of Falls  Description: Document Elia Hannon Fall Risk and appropriate interventions in the flowsheet.   Outcome: Progressing Towards Goal  Note: Fall Risk Interventions:            Medication Interventions: Teach patient to arise slowly

## 2021-10-02 NOTE — PROGRESS NOTES
[facility] Adult  Hospitalist Group                                                                                          Hospitalist Progress Note  Nita Cuellar NP  Answering service: [phone] OR [phone] from in house phone        Date of Service:  10/2/2021  NAME:  Ivy Oviedo  :  1990  MRN:  [id number]      Admission Summary:   Ivy Oviedo is a 26 y.o. female  with pmhx pseudotumor cerebri, GERD, and SLE who presents with headache. Describes 24 hours of the throbbing, frontal headache with associated phonophobia, photophobia, nausea without emesis as well as blurry vision. Chart review shows recent admission 4 days ago with similar presentation requiring therapeutic lumbar puncture. She had previously seen [facility] for her idiopathic intracranial hypertension, and he advised her to see a specialist at [facility] because he no longer had anything to offer. She has since since several neurology in between and which includes [facility] neurology group and lastly Dr. Starr Louis. the patient has been seeing Dr. Radha Avila for diagnostic LPs, the most recent of which was on 2021, when 14 mL was removed with an opening pressure of 18 cmH2O and a closing pressure of 13 cm H2O. She has had 10 LPs since  and five more in . On 21, she had a diagnostic cerebral angiogram with Dr. Radha Avila and is scheduled to have Intracranial cerebral venous sinus stenting with Dr. Radha Avila on 10/21/21. Neurology has been consulted. Interval history / Subjective:   Patient seen on morning rounds.   Headache pain is controlled however still with planes of flank pain     Assessment & Plan:     Intractable headache  Pseudotumor cerebri  Idiopathic intracranial hypertension  Continuing hydromorphone  Has been seen by neurology, thank you for recommendations  Continuing topiramate  Continuing furosemide  Continue aspirin bolus clopidogrel for stent  To follow-up with neuro-ophthalmologist outpatient  To continue follow-up with Dr. Eryn Das for possible shunt    Left flank pain  Urine culture was negative, discontinuing ceftriaxone  Renal ultrasound was unremarkable  Still with pain, will proceed with abdomen pelvis CT     SLE  Stable  Continue hydroxychloroquine     MSK pain  Stable  Continue cyclobenzaprine  Continue gabapentin    Code status: Full    DVT prophylaxis: SCD    Care Plan discussed with: Patient/Family and Nurse  Anticipated Disposition: Home w/Family  Anticipated Discharge: 24 hours to 48 hours     Hospital Problems  Date Reviewed: 9/23/2021        Codes Class Noted POA    Intractable migraine ICD-10-CM: R79.994  ICD-9-CM: 346.91  9/30/2021 Unknown        * (Principal) Intracranial hypertension ICD-10-CM: G93.2  ICD-9-CM: 348.2  10/23/2020 Unknown        SLE (systemic lupus erythematosus) (HCC) (Chronic) ICD-10-CM: M32.9  ICD-9-CM: 710.0  8/25/2020 Yes        Class 3 severe obesity in adult [facility]) ICD-10-CM: E66.01  ICD-9-CM: 278.01  8/22/2018 Yes                Review of Systems:   A comprehensive review of systems was negative except for that written in the HPI. Vital Signs:    Last 24hrs VS reviewed since prior progress note. Most recent are:  Visit Vitals  BP (!) 90/59   Pulse 90   Temp 97.9 °F (36.6 °C)   Resp 16   Ht 5' 2\" (1.575 m)   Wt 99.8 kg (220 lb 0.3 oz)   SpO2 98%   BMI 40.24 kg/m²       No intake or output data in the 24 hours ending 10/02/21 1122     Physical Examination:     I had a face to face encounter with this patient and independently examined them on 10/2/2021 as outlined below:          Constitutional:  No acute distress, cooperative, pleasant    ENT:  Oral mucosa moist, oropharynx benign. Resp:  CTA bilaterally. No wheezing/rhonchi/rales. No accessory muscle use   CV:  Regular rhythm, normal rate, no murmurs, gallops, rubs    GI:  Soft, non distended, non tender.  normoactive bowel sounds, no hepatosplenomegaly     Musculoskeletal:  No edema, warm, 2+ pulses throughout    Neurologic: Moves all extremities. AAOx3, CN II-XII reviewed            Data Review:    Review and/or order of clinical lab test  Review and/or order of tests in the radiology section of [facility]      Labs:     Recent Labs     10/02/21  0132 09/30/21  0129   WBC 8.2 9.0   HGB 13.8 12.9   HCT 41.5 39.1    321     Recent Labs     10/02/21  0132 10/01/21  0401 09/30/21 0129    137 139   K 4.2 4.3 3.4*   * 107 106   CO2 21 24 28   BUN 10 9 8   CREA 1.03* 0.91 0.89   GLU 88 73 91   CA 9.2 9.6 8.9   MG  --  2.6*  --      Recent Labs     10/02/21  0132 09/30/21  0129   ALT 85* 101*   * 110   TBILI 0.3 0.5   TP 7.5 7.4   ALB 3.6 3.7   GLOB 3.9 3.7     No results for input(s): INR, PTP, APTT, INREXT, INREXT in the last 72 hours. No results for input(s): FE, TIBC, PSAT, FERR in the last 72 hours. No results found for: FOL, RBCF   No results for input(s): PH, PCO2, PO2 in the last 72 hours. No results for input(s): CPK, CKNDX, TROIQ in the last 72 hours.     No lab exists for component: CPKMB  Lab Results   Component Value Date/Time    Cholesterol, total 177 04/11/2019 11:35 AM    HDL Cholesterol 48 04/11/2019 11:35 AM    LDL, calculated 107 (H) 04/11/2019 11:35 AM    Triglyceride 112 04/11/2019 11:35 AM     Lab Results   Component Value Date/Time    Glucose (POC) 86 09/13/2020 11:03 PM    Glucose POC 82 11/07/2019 02:00 PM     Lab Results   Component Value Date/Time    Color YELLOW/STRAW 10/01/2021 04:56 AM    Appearance TURBID (A) 10/01/2021 04:56 AM    Specific gravity 1.017 10/01/2021 04:56 AM    Specific gravity 1.015 01/25/2021 02:30 PM    pH (UA) 8.5 (H) 10/01/2021 04:56 AM    Protein Negative 10/01/2021 04:56 AM    Glucose Negative 10/01/2021 04:56 AM    Ketone Negative 10/01/2021 04:56 AM    Bilirubin Negative 10/01/2021 04:56 AM    Urobilinogen 1.0 10/01/2021 04:56 AM    Nitrites Negative 10/01/2021 04:56 AM    Leukocyte Esterase SMALL (A) 10/01/2021 04:56 AM    Epithelial cells FEW 10/01/2021 04:56 AM Bacteria 1+ (A) 10/01/2021 04:56 AM    WBC 0-4 10/01/2021 04:56 AM    RBC 0-5 10/01/2021 04:56 AM         Medications Reviewed:     Current Facility-Administered Medications   Medication Dose Route Frequency    HYDROmorphone (PF) (DILAUDID) injection 2 mg  2 mg IntraVENous Q3H PRN    clopidogreL (PLAVIX) tablet 75 mg  75 mg Oral DAILY    cyclobenzaprine (FLEXERIL) tablet 10 mg  10 mg Oral BID    hydrOXYchloroQUINE (PLAQUENIL) tablet 200 mg  200 mg Oral BID WITH MEALS    LORazepam (ATIVAN) tablet 1 mg  1 mg Oral QHS PRN    topiramate (TOPAMAX) tablet 200 mg  200 mg Oral BID    sodium chloride (NS) flush 5-40 mL  5-40 mL IntraVENous Q8H    sodium chloride (NS) flush 5-40 mL  5-40 mL IntraVENous PRN    polyethylene glycol (MIRALAX) packet 17 g  17 g Oral DAILY PRN    ondansetron (ZOFRAN ODT) tablet 4 mg  4 mg Oral Q8H PRN    Or    ondansetron (ZOFRAN) injection 4 mg  4 mg IntraVENous Q6H PRN    furosemide (LASIX) tablet 20 mg  20 mg Oral DAILY    gabapentin (NEURONTIN) tablet 600 mg  600 mg Oral TID PRN    diphenhydrAMINE (BENADRYL) capsule 25 mg  25 mg Oral Q6H PRN    senna-docusate (PERICOLACE) 8.6-50 mg per tablet 2 Tablet  2 Tablet Oral DAILY     ______________________________________________________________________  EXPECTED LENGTH OF STAY: 2d 7h  ACTUAL LENGTH OF STAY:          2                 Raimundo Arce NP

## 2021-10-03 LAB
ALBUMIN SERPL-MCNC: 3.8 G/DL (ref 3.5–5)
ALBUMIN/GLOB SERPL: 0.9 {RATIO} (ref 1.1–2.2)
ALP SERPL-CCNC: 143 U/L (ref 45–117)
ALT SERPL-CCNC: 79 U/L (ref 12–78)
ANION GAP SERPL CALC-SCNC: 7 MMOL/L (ref 5–15)
AST SERPL-CCNC: 34 U/L (ref 15–37)
BASOPHILS # BLD: 0 K/UL (ref 0–0.1)
BASOPHILS NFR BLD: 1 % (ref 0–1)
BILIRUB SERPL-MCNC: 0.4 MG/DL (ref 0.2–1)
BUN SERPL-MCNC: 9 MG/DL (ref 6–20)
BUN/CREAT SERPL: 9 (ref 12–20)
CALCIUM SERPL-MCNC: 9.6 MG/DL (ref 8.5–10.1)
CHLORIDE SERPL-SCNC: 109 MMOL/L (ref 97–108)
CO2 SERPL-SCNC: 20 MMOL/L (ref 21–32)
CREAT SERPL-MCNC: 1.04 MG/DL (ref 0.55–1.02)
DIFFERENTIAL METHOD BLD: ABNORMAL
EOSINOPHIL # BLD: 0.2 K/UL (ref 0–0.4)
EOSINOPHIL NFR BLD: 2 % (ref 0–7)
ERYTHROCYTE [DISTWIDTH] IN BLOOD BY AUTOMATED COUNT: 12.8 % (ref 11.5–14.5)
GLOBULIN SER CALC-MCNC: 4.2 G/DL (ref 2–4)
GLUCOSE SERPL-MCNC: 90 MG/DL (ref 65–100)
HCT VFR BLD AUTO: 45.7 % (ref 35–47)
HGB BLD-MCNC: 15.4 G/DL (ref 11.5–16)
IMM GRANULOCYTES # BLD AUTO: 0 K/UL (ref 0–0.04)
IMM GRANULOCYTES NFR BLD AUTO: 1 % (ref 0–0.5)
LYMPHOCYTES # BLD: 3.1 K/UL (ref 0.8–3.5)
LYMPHOCYTES NFR BLD: 35 % (ref 12–49)
MCH RBC QN AUTO: 31.4 PG (ref 26–34)
MCHC RBC AUTO-ENTMCNC: 33.7 G/DL (ref 30–36.5)
MCV RBC AUTO: 93.3 FL (ref 80–99)
MONOCYTES # BLD: 0.9 K/UL (ref 0–1)
MONOCYTES NFR BLD: 10 % (ref 5–13)
NEUTS SEG # BLD: 4.6 K/UL (ref 1.8–8)
NEUTS SEG NFR BLD: 51 % (ref 32–75)
NRBC # BLD: 0 K/UL (ref 0–0.01)
NRBC BLD-RTO: 0 PER 100 WBC
PLATELET # BLD AUTO: 335 K/UL (ref 150–400)
PMV BLD AUTO: 9.6 FL (ref 8.9–12.9)
POTASSIUM SERPL-SCNC: 4.3 MMOL/L (ref 3.5–5.1)
PROT SERPL-MCNC: 8 G/DL (ref 6.4–8.2)
RBC # BLD AUTO: 4.9 M/UL (ref 3.8–5.2)
SODIUM SERPL-SCNC: 136 MMOL/L (ref 136–145)
WBC # BLD AUTO: 8.8 K/UL (ref 3.6–11)

## 2021-10-03 PROCEDURE — 74011250637 HC RX REV CODE- 250/637: Performed by: HOSPITALIST

## 2021-10-03 PROCEDURE — 74011250637 HC RX REV CODE- 250/637: Performed by: NURSE PRACTITIONER

## 2021-10-03 PROCEDURE — 74011250636 HC RX REV CODE- 250/636: Performed by: NURSE PRACTITIONER

## 2021-10-03 PROCEDURE — 74011250637 HC RX REV CODE- 250/637: Performed by: STUDENT IN AN ORGANIZED HEALTH CARE EDUCATION/TRAINING PROGRAM

## 2021-10-03 PROCEDURE — 65270000032 HC RM SEMIPRIVATE

## 2021-10-03 PROCEDURE — 74011250636 HC RX REV CODE- 250/636: Performed by: INTERNAL MEDICINE

## 2021-10-03 PROCEDURE — 85025 COMPLETE CBC W/AUTO DIFF WBC: CPT

## 2021-10-03 PROCEDURE — 80053 COMPREHEN METABOLIC PANEL: CPT

## 2021-10-03 PROCEDURE — 36415 COLL VENOUS BLD VENIPUNCTURE: CPT

## 2021-10-03 PROCEDURE — 74011000250 HC RX REV CODE- 250: Performed by: NURSE PRACTITIONER

## 2021-10-03 RX ORDER — LIDOCAINE 4 G/100G
2 PATCH TOPICAL EVERY 24 HOURS
Status: DISCONTINUED | OUTPATIENT
Start: 2021-10-03 | End: 2021-10-04 | Stop reason: HOSPADM

## 2021-10-03 RX ADMIN — HYDROMORPHONE HYDROCHLORIDE 1.5 MG: 2 INJECTION, SOLUTION INTRAMUSCULAR; INTRAVENOUS; SUBCUTANEOUS at 17:31

## 2021-10-03 RX ADMIN — TOPIRAMATE 200 MG: 100 TABLET, FILM COATED ORAL at 09:09

## 2021-10-03 RX ADMIN — Medication 10 ML: at 22:24

## 2021-10-03 RX ADMIN — DIPHENHYDRAMINE HYDROCHLORIDE 25 MG: 25 CAPSULE ORAL at 22:01

## 2021-10-03 RX ADMIN — DIPHENHYDRAMINE HYDROCHLORIDE 25 MG: 50 INJECTION, SOLUTION INTRAMUSCULAR; INTRAVENOUS at 09:10

## 2021-10-03 RX ADMIN — POLYETHYLENE GLYCOL 3350 17 G: 17 POWDER, FOR SOLUTION ORAL at 09:08

## 2021-10-03 RX ADMIN — SENNOSIDES AND DOCUSATE SODIUM 2 TABLET: 50; 8.6 TABLET ORAL at 09:09

## 2021-10-03 RX ADMIN — Medication 10 ML: at 13:03

## 2021-10-03 RX ADMIN — HYDROMORPHONE HYDROCHLORIDE 1.5 MG: 2 INJECTION, SOLUTION INTRAMUSCULAR; INTRAVENOUS; SUBCUTANEOUS at 01:06

## 2021-10-03 RX ADMIN — HYDROMORPHONE HYDROCHLORIDE 1.5 MG: 2 INJECTION, SOLUTION INTRAMUSCULAR; INTRAVENOUS; SUBCUTANEOUS at 09:09

## 2021-10-03 RX ADMIN — HYDROXYCHLOROQUINE SULFATE 200 MG: 200 TABLET ORAL at 17:31

## 2021-10-03 RX ADMIN — HYDROMORPHONE HYDROCHLORIDE 1.5 MG: 2 INJECTION, SOLUTION INTRAMUSCULAR; INTRAVENOUS; SUBCUTANEOUS at 13:03

## 2021-10-03 RX ADMIN — CLOPIDOGREL BISULFATE 75 MG: 75 TABLET ORAL at 09:09

## 2021-10-03 RX ADMIN — HYDROMORPHONE HYDROCHLORIDE 1.5 MG: 2 INJECTION, SOLUTION INTRAMUSCULAR; INTRAVENOUS; SUBCUTANEOUS at 04:50

## 2021-10-03 RX ADMIN — TOPIRAMATE 200 MG: 100 TABLET, FILM COATED ORAL at 17:31

## 2021-10-03 RX ADMIN — HYDROMORPHONE HYDROCHLORIDE 1.5 MG: 2 INJECTION, SOLUTION INTRAMUSCULAR; INTRAVENOUS; SUBCUTANEOUS at 22:02

## 2021-10-03 RX ADMIN — FUROSEMIDE 20 MG: 40 TABLET ORAL at 09:09

## 2021-10-03 RX ADMIN — LACTULOSE 30 ML: 20 SOLUTION ORAL at 12:20

## 2021-10-03 RX ADMIN — HYDROXYCHLOROQUINE SULFATE 200 MG: 200 TABLET ORAL at 07:10

## 2021-10-03 RX ADMIN — CYCLOBENZAPRINE 10 MG: 10 TABLET, FILM COATED ORAL at 17:31

## 2021-10-03 RX ADMIN — CYCLOBENZAPRINE 10 MG: 10 TABLET, FILM COATED ORAL at 09:09

## 2021-10-03 NOTE — PROGRESS NOTES
[address] [facility] Adult  Hospitalist Group                                                                                          Hospitalist Progress Note  Isabela Ann NP  Answering service: [phone] OR [phone] from in house phone        Date of Service:  10/3/2021  NAME:  Cruz Ndiaye  :  1990  MRN:  [id number]      Admission Summary:   Cruz Ndiaye is a 26 y.o. female  with pmhx pseudotumor cerebri, GERD, and SLE who presents with headache. Describes 24 hours of the throbbing, frontal headache with associated phonophobia, photophobia, nausea without emesis as well as blurry vision. Chart review shows recent admission 4 days ago with similar presentation requiring therapeutic lumbar puncture. She had previously seen Jesenia Barrett for her idiopathic intracranial hypertension, and he advised her to see a specialist at [facility] because he no longer had anything to offer. She has since since several neurology in between and which includes [facility] neurology group and lastly Dr. Filippo Raymond. the patient has been seeing Dr. Carline Saini for diagnostic LPs, the most recent of which was on 2021, when 14 mL was removed with an opening pressure of 18 cmH2O and a closing pressure of 13 cm H2O. She has had 10 LPs since  and five more in . On 21, she had a diagnostic cerebral angiogram with Dr. Carline Saini and is scheduled to have Intracranial cerebral venous sinus stenting with Dr. Carline Saini on 10/21/21. Neurology has been consulted. Interval history / Subjective:   I saw the patient this morning on rounds. Dosage of hydromorphone has been decreased.   Headache is improved however still with flank pain, also with nausea     Assessment & Plan:     Intractable headache  Pseudotumor cerebri  Idiopathic intracranial hypertension  Continuing hydromorphone  Has been seen by neurology, thank you for recommendations  Continuing topiramate  Continuing furosemide  Continue  clopidogrel for stent, unable to take aspirin  To follow-up with neuro-ophthalmologist outpatient  To continue follow-up with Dr. Charline Harden for possible shunt    Left flank pain  Urine culture was negative, discontinuing ceftriaxone  Renal ultrasound was unremarkable  Persistent pain, CT scan yesterday with spondylosis. Also noted abundant stool burden  Ordering lidocaine patch  MiraLAX was ordered yesterday, first dose today  If no response to the above, I have placed order for lactulose     SLE  Stable  Continue hydroxychloroquine     MSK pain  Stable  Continue cyclobenzaprine  Continue gabapentin    Code status: Full    DVT prophylaxis: SCD    Care Plan discussed with: Patient/Family and Nurse  Anticipated Disposition: Home w/Family  Anticipated Discharge: 24 hours to 48 hours     Hospital Problems  Date Reviewed: 2021        Codes Class Noted POA    Intractable migraine ICD-10-CM: Z96.046  ICD-9-CM: 346.91  2021 Unknown        * (Principal) Intracranial hypertension ICD-10-CM: G93.2  ICD-9-CM: 348.2  10/23/2020 Unknown        SLE (systemic lupus erythematosus) (HCC) (Chronic) ICD-10-CM: M32.9  ICD-9-CM: 710.0  2020 Yes        Class 3 severe obesity in adult [facility]) ICD-10-CM: E66.01  ICD-9-CM: 278.01  2018 Yes                Review of Systems:   A comprehensive review of systems was negative except for that written in the HPI. Vital Signs:    Last 24hrs VS reviewed since prior progress note. Most recent are:  Visit Vitals  /72 (BP 1 Location: Right arm, BP Patient Position: At rest)   Pulse 91   Temp 98 °F (36.7 °C)   Resp 18   Ht 5' 2\" (1.575 m)   Wt 99.8 kg (220 lb 0.3 oz)   SpO2 100%   BMI 40.24 kg/m²       No intake or output data in the 24 hours ending 10/03/21 1024     Physical Examination:     I had a face to face encounter with this patient and independently examined them on 10/3/2021 as outlined below:          Constitutional:  No acute distress, cooperative, pleasant    ENT:  Oral mucosa moist, oropharynx benign.     Resp: CTA bilaterally. No wheezing/rhonchi/rales. No accessory muscle use   CV:  Regular rhythm, normal rate, no murmurs, gallops, rubs    GI:  Soft, non distended, non tender. normoactive bowel sounds, no hepatosplenomegaly     Musculoskeletal:  No edema, warm, 2+ pulses throughout    Neurologic:  Moves all extremities. AAOx3, CN II-XII reviewed            Data Review:    Review and/or order of clinical lab test  Review and/or order of tests in the radiology section of [facility]      Labs:     Recent Labs     10/03/21  0434 10/02/21  0132   WBC 8.8 8.2   HGB 15.4 13.8   HCT 45.7 41.5    325     Recent Labs     10/03/21  0434 10/02/21  0132 10/01/21  0401    136 137   K 4.3 4.2 4.3   * 109* 107   CO2 20* 21 24   BUN 9 10 9   CREA 1.04* 1.03* 0.91   GLU 90 88 73   CA 9.6 9.2 9.6   MG  --   --  2.6*     Recent Labs     10/03/21  0434 10/02/21  0132   ALT 79* 85*   * 136*   TBILI 0.4 0.3   TP 8.0 7.5   ALB 3.8 3.6   GLOB 4.2* 3.9     No results for input(s): INR, PTP, APTT, INREXT, INREXT in the last 72 hours. No results for input(s): FE, TIBC, PSAT, FERR in the last 72 hours. No results found for: FOL, RBCF   No results for input(s): PH, PCO2, PO2 in the last 72 hours. No results for input(s): CPK, CKNDX, TROIQ in the last 72 hours.     No lab exists for component: CPKMB  Lab Results   Component Value Date/Time    Cholesterol, total 177 04/11/2019 11:35 AM    HDL Cholesterol 48 04/11/2019 11:35 AM    LDL, calculated 107 (H) 04/11/2019 11:35 AM    Triglyceride 112 04/11/2019 11:35 AM     Lab Results   Component Value Date/Time    Glucose (POC) 86 09/13/2020 11:03 PM    Glucose POC 82 11/07/2019 02:00 PM     Lab Results   Component Value Date/Time    Color YELLOW/STRAW 10/01/2021 04:56 AM    Appearance TURBID (A) 10/01/2021 04:56 AM    Specific gravity 1.017 10/01/2021 04:56 AM    Specific gravity 1.015 01/25/2021 02:30 PM    pH (UA) 8.5 (H) 10/01/2021 04:56 AM    Protein Negative 10/01/2021 04:56 AM Glucose Negative 10/01/2021 04:56 AM    Ketone Negative 10/01/2021 04:56 AM    Bilirubin Negative 10/01/2021 04:56 AM    Urobilinogen 1.0 10/01/2021 04:56 AM    Nitrites Negative 10/01/2021 04:56 AM    Leukocyte Esterase SMALL (A) 10/01/2021 04:56 AM    Epithelial cells FEW 10/01/2021 04:56 AM    Bacteria 1+ (A) 10/01/2021 04:56 AM    WBC 0-4 10/01/2021 04:56 AM    RBC 0-5 10/01/2021 04:56 AM         Medications Reviewed:     Current Facility-Administered Medications   Medication Dose Route Frequency    lidocaine 4 % patch 2 Patch  2 Patch TransDERmal Q24H    diphenhydrAMINE (BENADRYL) injection 25 mg  25 mg IntraVENous Q6H PRN    HYDROmorphone (PF) (DILAUDID) injection 1.5 mg  1.5 mg IntraVENous Q4H PRN    polyethylene glycol (MIRALAX) packet 17 g  17 g Oral DAILY    clopidogreL (PLAVIX) tablet 75 mg  75 mg Oral DAILY    cyclobenzaprine (FLEXERIL) tablet 10 mg  10 mg Oral BID    hydrOXYchloroQUINE (PLAQUENIL) tablet 200 mg  200 mg Oral BID WITH MEALS    LORazepam (ATIVAN) tablet 1 mg  1 mg Oral QHS PRN    topiramate (TOPAMAX) tablet 200 mg  200 mg Oral BID    sodium chloride (NS) flush 5-40 mL  5-40 mL IntraVENous Q8H    sodium chloride (NS) flush 5-40 mL  5-40 mL IntraVENous PRN    ondansetron (ZOFRAN ODT) tablet 4 mg  4 mg Oral Q8H PRN    Or    ondansetron (ZOFRAN) injection 4 mg  4 mg IntraVENous Q6H PRN    furosemide (LASIX) tablet 20 mg  20 mg Oral DAILY    gabapentin (NEURONTIN) tablet 600 mg  600 mg Oral TID PRN    diphenhydrAMINE (BENADRYL) capsule 25 mg  25 mg Oral Q6H PRN    senna-docusate (PERICOLACE) 8.6-50 mg per tablet 2 Tablet  2 Tablet Oral DAILY     ______________________________________________________________________  EXPECTED LENGTH OF STAY: 2d 7h  ACTUAL LENGTH OF STAY:          3                 Georgette Pittman NP

## 2021-10-04 ENCOUNTER — DOCUMENTATION ONLY (OUTPATIENT)
Dept: NEUROSURGERY | Age: 31
End: 2021-10-04

## 2021-10-04 VITALS
OXYGEN SATURATION: 99 % | HEIGHT: 62 IN | BODY MASS INDEX: 40.49 KG/M2 | RESPIRATION RATE: 16 BRPM | WEIGHT: 220.02 LBS | SYSTOLIC BLOOD PRESSURE: 110 MMHG | DIASTOLIC BLOOD PRESSURE: 70 MMHG | TEMPERATURE: 97.9 F | HEART RATE: 89 BPM

## 2021-10-04 LAB
ALBUMIN SERPL-MCNC: 3.4 G/DL (ref 3.5–5)
ALBUMIN/GLOB SERPL: 0.9 {RATIO} (ref 1.1–2.2)
ALP SERPL-CCNC: 132 U/L (ref 45–117)
ALT SERPL-CCNC: 71 U/L (ref 12–78)
ANION GAP SERPL CALC-SCNC: 7 MMOL/L (ref 5–15)
AST SERPL-CCNC: 29 U/L (ref 15–37)
BACTERIA SPEC CULT: NORMAL
BASOPHILS # BLD: 0.1 K/UL (ref 0–0.1)
BASOPHILS NFR BLD: 1 % (ref 0–1)
BILIRUB SERPL-MCNC: 0.5 MG/DL (ref 0.2–1)
BUN SERPL-MCNC: 8 MG/DL (ref 6–20)
BUN/CREAT SERPL: 9 (ref 12–20)
CALCIUM SERPL-MCNC: 9.2 MG/DL (ref 8.5–10.1)
CHLORIDE SERPL-SCNC: 110 MMOL/L (ref 97–108)
CO2 SERPL-SCNC: 20 MMOL/L (ref 21–32)
CREAT SERPL-MCNC: 0.9 MG/DL (ref 0.55–1.02)
DIFFERENTIAL METHOD BLD: NORMAL
EOSINOPHIL # BLD: 0.2 K/UL (ref 0–0.4)
EOSINOPHIL NFR BLD: 3 % (ref 0–7)
ERYTHROCYTE [DISTWIDTH] IN BLOOD BY AUTOMATED COUNT: 12.6 % (ref 11.5–14.5)
GLOBULIN SER CALC-MCNC: 3.7 G/DL (ref 2–4)
GLUCOSE SERPL-MCNC: 90 MG/DL (ref 65–100)
GRAM STN SPEC: NORMAL
GRAM STN SPEC: NORMAL
HCT VFR BLD AUTO: 39.2 % (ref 35–47)
HGB BLD-MCNC: 13.3 G/DL (ref 11.5–16)
IMM GRANULOCYTES # BLD AUTO: 0 K/UL (ref 0–0.04)
IMM GRANULOCYTES NFR BLD AUTO: 0 % (ref 0–0.5)
LYMPHOCYTES # BLD: 3 K/UL (ref 0.8–3.5)
LYMPHOCYTES NFR BLD: 39 % (ref 12–49)
MCH RBC QN AUTO: 31.4 PG (ref 26–34)
MCHC RBC AUTO-ENTMCNC: 33.9 G/DL (ref 30–36.5)
MCV RBC AUTO: 92.5 FL (ref 80–99)
MONOCYTES # BLD: 0.9 K/UL (ref 0–1)
MONOCYTES NFR BLD: 12 % (ref 5–13)
NEUTS SEG # BLD: 3.5 K/UL (ref 1.8–8)
NEUTS SEG NFR BLD: 45 % (ref 32–75)
NRBC # BLD: 0 K/UL (ref 0–0.01)
NRBC BLD-RTO: 0 PER 100 WBC
PLATELET # BLD AUTO: 302 K/UL (ref 150–400)
PMV BLD AUTO: 9.6 FL (ref 8.9–12.9)
POTASSIUM SERPL-SCNC: 3.4 MMOL/L (ref 3.5–5.1)
PROT SERPL-MCNC: 7.1 G/DL (ref 6.4–8.2)
RBC # BLD AUTO: 4.24 M/UL (ref 3.8–5.2)
SERVICE CMNT-IMP: NORMAL
SODIUM SERPL-SCNC: 137 MMOL/L (ref 136–145)
WBC # BLD AUTO: 7.6 K/UL (ref 3.6–11)

## 2021-10-04 PROCEDURE — 36415 COLL VENOUS BLD VENIPUNCTURE: CPT

## 2021-10-04 PROCEDURE — 74011250637 HC RX REV CODE- 250/637: Performed by: STUDENT IN AN ORGANIZED HEALTH CARE EDUCATION/TRAINING PROGRAM

## 2021-10-04 PROCEDURE — 74011250637 HC RX REV CODE- 250/637: Performed by: NURSE PRACTITIONER

## 2021-10-04 PROCEDURE — 74011250637 HC RX REV CODE- 250/637: Performed by: HOSPITALIST

## 2021-10-04 PROCEDURE — 80053 COMPREHEN METABOLIC PANEL: CPT

## 2021-10-04 PROCEDURE — 85025 COMPLETE CBC W/AUTO DIFF WBC: CPT

## 2021-10-04 PROCEDURE — 74011250636 HC RX REV CODE- 250/636: Performed by: INTERNAL MEDICINE

## 2021-10-04 RX ORDER — TOPIRAMATE 200 MG/1
200 TABLET ORAL 2 TIMES DAILY
Qty: 60 TABLET | Refills: 1 | Status: SHIPPED | OUTPATIENT
Start: 2021-10-04 | End: 2021-10-26 | Stop reason: ALTCHOICE

## 2021-10-04 RX ORDER — FUROSEMIDE 20 MG/1
20 TABLET ORAL DAILY
Qty: 30 TABLET | Refills: 1 | Status: SHIPPED | OUTPATIENT
Start: 2021-10-04 | End: 2021-10-26 | Stop reason: ALTCHOICE

## 2021-10-04 RX ORDER — POLYETHYLENE GLYCOL 3350 17 G/17G
17 POWDER, FOR SOLUTION ORAL DAILY
Qty: 30 PACKET | Refills: 0 | Status: SHIPPED | OUTPATIENT
Start: 2021-10-05 | End: 2021-10-26 | Stop reason: ALTCHOICE

## 2021-10-04 RX ORDER — OXYCODONE HYDROCHLORIDE 5 MG/1
5 TABLET ORAL
Qty: 20 TABLET | Refills: 0 | Status: SHIPPED | OUTPATIENT
Start: 2021-10-04 | End: 2021-10-09

## 2021-10-04 RX ADMIN — POLYETHYLENE GLYCOL 3350 17 G: 17 POWDER, FOR SOLUTION ORAL at 08:12

## 2021-10-04 RX ADMIN — SENNOSIDES AND DOCUSATE SODIUM 2 TABLET: 50; 8.6 TABLET ORAL at 08:11

## 2021-10-04 RX ADMIN — FUROSEMIDE 20 MG: 40 TABLET ORAL at 08:12

## 2021-10-04 RX ADMIN — HYDROMORPHONE HYDROCHLORIDE 1.5 MG: 2 INJECTION, SOLUTION INTRAMUSCULAR; INTRAVENOUS; SUBCUTANEOUS at 05:47

## 2021-10-04 RX ADMIN — HYDROMORPHONE HYDROCHLORIDE 1.5 MG: 2 INJECTION, SOLUTION INTRAMUSCULAR; INTRAVENOUS; SUBCUTANEOUS at 10:10

## 2021-10-04 RX ADMIN — CYCLOBENZAPRINE 10 MG: 10 TABLET, FILM COATED ORAL at 08:11

## 2021-10-04 RX ADMIN — Medication 10 ML: at 05:47

## 2021-10-04 RX ADMIN — HYDROXYCHLOROQUINE SULFATE 200 MG: 200 TABLET ORAL at 07:34

## 2021-10-04 RX ADMIN — TOPIRAMATE 200 MG: 100 TABLET, FILM COATED ORAL at 08:11

## 2021-10-04 RX ADMIN — CLOPIDOGREL BISULFATE 75 MG: 75 TABLET ORAL at 08:11

## 2021-10-04 NOTE — PROGRESS NOTES
Hospital follow-up PCP transitional care appointment has been scheduled with Dr. Tez Robbins, NP for Monday, 10/11/21 at 1:40 p.m. Pending patient discharge.   Judi Pope, Care Management Specialist.

## 2021-10-04 NOTE — PROGRESS NOTES
Transition of Care Plan   RUR- 17%    DISPOSITION: The disposition plan is home with family assistance   Transport: Family   No CM needs at this time     CM: [address] [place],   [phone]'

## 2021-10-04 NOTE — DISCHARGE SUMMARY
Discharge Summary       PATIENT ID: Suzy Lu  MRN: [id number]   YOB: 1990    DATE OF ADMISSION: 9/29/2021 11:00 PM    DATE OF DISCHARGE: 10/4/2021   PRIMARY CARE PROVIDER: Jason Santos MD     ATTENDING PHYSICIAN: Moraima Drake MD  DISCHARGING PROVIDER: Cinthia Mahmood NP    To contact this individual call [phone] and ask the  to page. If unavailable ask to be transferred the Adult Hospitalist Department. CONSULTATIONS: IP CONSULT TO NEUROINTERVENTIONAL SURGERY  IP CONSULT TO NEUROLOGY  IP CONSULT TO HOSPITALIST    PROCEDURES/SURGERIES: * No surgery found *    ADMITTING DIAGNOSES & HOSPITAL COURSE:   Kali Velasquez is a 26 y. o. female  with pmhx pseudotumor cerebri, GERD, and SLE who presents with headache.  Describes 24 hours of the throbbing, frontal headache with associated phonophobia, photophobia, nausea without emesis as well as blurry vision.  Chart review shows recent admission 4 days ago with similar presentation requiring therapeutic lumbar puncture. She had previously seen [facility] for her idiopathic intracranial hypertension, and he advised her to see a specialist at [facility] because he no longer had anything to offer. She has since since several neurology in between and which includes [facility] neurology group and lastly Dr. Chip Mccallum patient has been seeing Dr. Scotty Sheridan for diagnostic LPs, the most recent of which was on 9/27/2021, when 14 mL was removed with an opening pressure of 18 cmH2O and a closing pressure of 13 cm H2O. She has had 10 LPs since 2020 and five more in 2021. On 9/23/21, she had a diagnostic cerebral angiogram with Dr. Scotty Sheridan and is scheduled to have Intracranial cerebral venous sinus stenting with Dr. Scotty Sheridan on 10/21/21. Neurology has been consulted. Medications were adjusted, acetazolamide was discontinued, patient was given furosemide and topiramate with good response.   Patient was noted to have left flank pain, urinalysis was unremarkable, renal ultrasound was normal, CT scan was also unremarkable however there was noted abundant stool burden. She was started on aggressive bowel regimen with good response, pain greatly improved. DISCHARGE DIAGNOSES / PLAN:      Intractable headache  Pseudotumor cerebri  Idiopathic intracranial hypertension  Continuing hydromorphone  Has been seen by neurology, thank you for recommendations  Continuing topiramate  Continuing furosemide  Continue  clopidogrel for stent,   To follow-up with neuro-ophthalmologist outpatient  To continue follow-up with Dr. Herson Johns   Possible shunt placement     Left flank pain  Urine culture was negative, discontinuing ceftriaxone  Renal ultrasound was unremarkable  Persistent pain, CT scan yesterday with spondylosis.   Also noted abundant stool burden  Pain improved with bowel regimen and bowel movement  Continue MiraLAX     SLE  Stable  Continue hydroxychloroquine     MSK pain  Stable  Continue cyclobenzaprine  Continue gabapentin       PENDING TEST RESULTS:   At the time of discharge the following test results are still pending:     FOLLOW UP APPOINTMENTS:    Follow-up Information     Follow up With Specialties Details Why Contact Info    Trevor Martin MD Endovascular Surgical Neuroradiology Schedule an appointment as soon as possible for a visit   [address] [facility] [address], [facility] DEP Emergency Medicine  If symptoms worsen [address] [facility], [address] [facility], [address] [place] [address] [address]      Julianne Neves MD Neurology In 1 week  [address] [phone]             ADDITIONAL CARE RECOMMENDATIONS:     DIET: Regular Diet      ACTIVITY: Activity as tolerated    WOUND CARE: na    EQUIPMENT needed: na      DISCHARGE MEDICATIONS:  Current Discharge Medication List      START taking these medications    Details   polyethylene glycol (MIRALAX) 17 gram packet Take 1 Packet by mouth daily for 30 days. Qty: 30 Packet, Refills: 0  Start date: 10/5/2021, End date: 11/4/2021         CONTINUE these medications which have CHANGED    Details   topiramate (Topamax) 200 mg tablet Take 1 Tablet by mouth two (2) times a day for 30 days. Qty: 60 Tablet, Refills: 1  Start date: 10/4/2021, End date: 11/3/2021      furosemide (LASIX) 20 mg tablet Take 1 Tablet by mouth daily for 30 days. Qty: 30 Tablet, Refills: 1  Start date: 10/4/2021, End date: 11/3/2021      oxyCODONE IR (Roxicodone) 5 mg immediate release tablet Take 1 Tablet by mouth every six (6) hours as needed for Pain for up to 5 days. Max Daily Amount: 20 mg.  Qty: 20 Tablet, Refills: 0  Start date: 10/4/2021, End date: 10/9/2021    Associated Diagnoses: Intracranial hypertension; Acute intractable headache, unspecified headache type         CONTINUE these medications which have NOT CHANGED    Details   gabapentin (NEURONTIN) 600 mg tablet Take 600 mg by mouth three (3) times daily as needed. promethazine (PHENERGAN) 12.5 mg tablet Take 12.5 mg by mouth two (2) times daily as needed for Nausea. omeprazole (PRILOSEC) 20 mg capsule Take 20 mg by mouth daily as needed. cyclobenzaprine (FLEXERIL) 10 mg tablet TAKE 1 TABLET BY MOUTH TWICE A DAY  Qty: 180 Tablet, Refills: 1      hydrOXYchloroQUINE (PLAQUENIL) 200 mg tablet TAKE 1 TABLET BY MOUTH TWICE A DAY PA REQUIRED FOR QTY   16 FAXED MD  Qty: 60 Tab, Refills: 2      diazePAM (VALIUM) 5 mg tablet TAKE ONE DAILY AS NEEDED FOR ANXIETY      LORazepam (ATIVAN) 1 mg tablet Take 1.5 mg by mouth nightly as needed for Anxiety. biotin 10 mg tab 10 mg = 1 tab each dose, PO, daily      clopidogreL (Plavix) 75 mg tab Take 1 Tablet by mouth daily.   Qty: 30 Tablet, Refills: 5  Start date: 9/29/2021    Associated Diagnoses: Stenosis of intracranial vessel      aspirin (ASPIRIN) 325 mg tablet Take 1 Tablet by mouth daily. Qty: 30 Tablet, Refills: 5  Start date: 9/29/2021      naloxone [facility]) 4 mg/actuation nasal spray Use 1 spray intranasally, then discard. Repeat with new spray every 2 min as needed for opioid overdose symptoms, alternating nostrils. Indications: decrease in rate & depth of breathing due to opioid drug, opioid overdose  Qty: 2 Each, Refills: 0               NOTIFY YOUR PHYSICIAN FOR ANY OF THE FOLLOWING:   Fever over 101 degrees for 24 hours. Chest pain, shortness of breath, fever, chills, nausea, vomiting, diarrhea, change in mentation, falling, weakness, bleeding. Severe pain or pain not relieved by medications. Or, any other signs or symptoms that you may have questions about.     DISPOSITION:   x Home With:   OT  PT  HH  RN       Long term SNF/Inpatient Rehab   x Independent/assisted living    Hospice    Other:       PATIENT CONDITION AT DISCHARGE:     Functional status    Poor     Deconditioned    x Independent      Cognition    x Lucid     Forgetful     Dementia      Catheters/lines (plus indication)    [place]     PICC     PEG    x None      Code status     Full code    x DNR      PHYSICAL EXAMINATION AT DISCHARGE:   Refer to Progress Notex      CHRONIC MEDICAL DIAGNOSES:  Problem List as of 10/4/2021 Date Reviewed: 9/23/2021        Codes Class Noted - Resolved    Intractable migraine ICD-10-CM: C13.197  ICD-9-CM: 346.91  9/30/2021 - Present        Intractable headache ICD-10-CM: R51.9  ICD-9-CM: 784.0  9/26/2021 - Present        Pseudotumor cerebri ICD-10-CM: G93.2  ICD-9-CM: 348.2  9/3/2021 - Present        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  ICD-9-CM: 437.0  8/30/2021 - Present        Papilledema associated with increased intracranial pressure ICD-10-CM: H47.11  ICD-9-CM: 377.01  8/30/2021 - Present        * (Principal) Intracranial hypertension ICD-10-CM: G93.2  ICD-9-CM: 348.2  10/23/2020 - Present        Ataxia ICD-10-CM: R27.0  ICD-9-CM: 781.3  9/14/2020 - Present        Headache ICD-10-CM: R51.9  ICD-9-CM: 784.0  8/29/2020 - Present        IIH (idiopathic intracranial hypertension) ICD-10-CM: G93.2  ICD-9-CM: 348.2  8/25/2020 - Present        SLE (systemic lupus erythematosus) (HCC) (Chronic) ICD-10-CM: M32.9  ICD-9-CM: 710.0  8/25/2020 - Present        Class 3 severe obesity in adult [facility]) ICD-10-CM: E66.01  ICD-9-CM: 278.01  8/22/2018 - Present        S/P repair of paraesophageal hernia ICD-10-CM: Z98.890, Z87.19  ICD-9-CM: V45.89  11/17/2017 - Present        Paraesophageal hernia ICD-10-CM: K44.9  ICD-9-CM: 553.3  11/15/2017 - Present        GERD (gastroesophageal reflux disease) ICD-10-CM: K21.9  ICD-9-CM: 530.81  11/7/2017 - Present        Obesity, Class II, BMI 35-39.9 ICD-10-CM: E66.9  ICD-9-CM: 278.00  3/31/2017 - Present        Sebaceous cyst ICD-10-CM: L72.3  ICD-9-CM: 706.2  3/24/2017 - Present    Overview Addendum 5/1/2017 10:52 AM by Shawna Hall., MD     S/P excision; Along sternum in cleavage. History of Nissen fundoplication [id number]: L70.451  ICD-9-CM: V15.29  1/4/2017 - Present    Overview Signed 1/4/2017  1:51 PM by Zoe Waldrop LPN     4 duodenal ulcers, chronic gastritis, Grade C esophagitis, Chronic GERD, hernia, small tumor. Done August/2016.              Chronic migraine without aura without status migrainosus, not intractable ICD-10-CM: S81.481  ICD-9-CM: 346.70  5/18/2016 - Present        Cervical incompetence ICD-10-CM: N88.3  ICD-9-CM: 622.5  4/12/2013 - Present              Greater than 30 minutes were spent with the patient on counseling and coordination of care    Signed:   Flash Pablo NP  10/4/2021  8:59 AM

## 2021-10-04 NOTE — PROGRESS NOTES
Spoke to patient to confirm Procedure on 10/13/2021 at [facility]. Arrival time 7:00 am at Patient registration. Per provider, patient does not need to be seen by PAT as she was just released from [facility]. Appointment with PAT cancelled. Patient stated understanding.

## 2021-10-04 NOTE — PROGRESS NOTES
Problem: Falls - Risk of  Goal: *Absence of Falls  Description: Document Leafy Cutler Fall Risk and appropriate interventions in the flowsheet.   Outcome: Resolved/Met  Note: Fall Risk Interventions:            Medication Interventions: Evaluate medications/consider consulting pharmacy, Teach patient to arise slowly

## 2021-10-05 DIAGNOSIS — I66.9 STENOSIS OF INTRACRANIAL VESSEL: Primary | ICD-10-CM

## 2021-10-07 ENCOUNTER — TELEPHONE (OUTPATIENT)
Dept: NEUROSURGERY | Age: 31
End: 2021-10-07

## 2021-10-07 ENCOUNTER — DOCUMENTATION ONLY (OUTPATIENT)
Dept: NEUROSURGERY | Age: 31
End: 2021-10-07

## 2021-10-07 DIAGNOSIS — I66.9 STENOSIS OF INTRACRANIAL VESSEL: Primary | ICD-10-CM

## 2021-10-08 ENCOUNTER — DOCUMENTATION ONLY (OUTPATIENT)
Dept: NEUROSURGERY | Age: 31
End: 2021-10-08

## 2021-10-08 ENCOUNTER — TELEPHONE (OUTPATIENT)
Dept: NEUROSURGERY | Age: 31
End: 2021-10-08

## 2021-10-08 ENCOUNTER — OFFICE VISIT (OUTPATIENT)
Dept: NEUROSURGERY | Age: 31
End: 2021-10-08
Payer: MEDICAID

## 2021-10-08 ENCOUNTER — HOSPITAL ENCOUNTER (OUTPATIENT)
Dept: LAB | Age: 31
Discharge: HOME | End: 2021-10-08

## 2021-10-08 ENCOUNTER — TELEPHONE (OUTPATIENT)
Dept: FAMILY MEDICINE CLINIC | Age: 31
End: 2021-10-08

## 2021-10-08 VITALS
HEART RATE: 87 BPM | SYSTOLIC BLOOD PRESSURE: 110 MMHG | TEMPERATURE: 97.8 F | DIASTOLIC BLOOD PRESSURE: 60 MMHG | OXYGEN SATURATION: 98 %

## 2021-10-08 DIAGNOSIS — G93.2 IIH (IDIOPATHIC INTRACRANIAL HYPERTENSION): Primary | ICD-10-CM

## 2021-10-08 DIAGNOSIS — T14.8XXA BRUISING: ICD-10-CM

## 2021-10-08 DIAGNOSIS — G43.709 CHRONIC MIGRAINE WITHOUT AURA WITHOUT STATUS MIGRAINOSUS, NOT INTRACTABLE: ICD-10-CM

## 2021-10-08 DIAGNOSIS — I66.9 STENOSIS OF INTRACRANIAL VESSEL: ICD-10-CM

## 2021-10-08 LAB
ASPIRIN TEST, ASPIRN: 378 ARU
P2Y12 PLT RESPONSE,PPPR: 49 PRU (ref 194–418)

## 2021-10-08 PROCEDURE — 99214 OFFICE O/P EST MOD 30 MIN: CPT | Performed by: RADIOLOGY

## 2021-10-08 NOTE — TELEPHONE ENCOUNTER
Spoke to patient to inform her of new orders from provider. P2Y12 to be done today at [facility].  Referral to Neurology, Dr Bimal Bradley noted. Spoke to JEWEL Hood regarding change in provider. Patient requested not to see current Neurologist again.

## 2021-10-08 NOTE — PROGRESS NOTES
Patient came to office to  Lab slip. Patient requested to speak with the nurse. I spoke to the patient who complained of feeling fatigued and not like her normal self for the past 4 days. Reports increased bruising. Small bruising noted on both inner forearms, right calf and left foot. Larger bruise noted to the top left thigh. About 4 cm round. All bruising noted to be fading. Provider made aware and discussed issues with the patient.

## 2021-10-08 NOTE — TELEPHONE ENCOUNTER
Pt scheduled VV         ----- Message from Osmany Paul sent at 10/8/2021  2:54 PM EDT -----  Regarding: Dr. Denny Bob: [phone]  Appointment not available    Caller's first and last name and relationship to patient (if not the patient): N/A      Best contact number: [phone]      Preferred date and time: Preferably after 9:30am       Scheduled appointment date and time: 10/11/21 @ 1:40pm      Reason for appointment: DIPESH KUMAR      Details to clarify the request: PT discharged from [facility] on 10/5/21. Diagnosis: Intracranial Hypertension. Cancelled 10/11 appt with NP Marcelino upon PT's request. PT would like to see Dr. Antonio Cornejo only.        Osmany Paul

## 2021-10-08 NOTE — PROGRESS NOTES
Follow up for increased bruising, fatigue and just not feeling like herself. See other note from today.

## 2021-10-08 NOTE — LETTER
10/8/2021    Patient: Patsy Humphries   YOB: 1990   Date of Visit: 10/8/2021     Andre Murrell MD  [address] [address]  Via In Basket    Dear Andre Murrell MD,      Thank you for referring Ms. Rosie Coleman to [address] [place] [address] for evaluation. My notes for this consultation are attached. If you have questions, please do not hesitate to call me. I look forward to following your patient along with you.       Sincerely,    Moriah Saini MD

## 2021-10-08 NOTE — PATIENT INSTRUCTIONS
Stop taking Plavix and aspirin today due to possible side effects of feeling faint and easy bruising. Dr. Macey Izquierdo will review the blood test performed to evaluate the effects of these medications on your blood. It takes 7 days for Plavix and aspirin to fully leave your system. Unfortunately, we have to cancel your planned procedure on October 14 in order to determine which antiplatelet medication can be utilized and to get to the bottom of your other symptoms. We should not proceed with any elective surgery if you are not feeling well. Fortunately, your recent visit to Dr. Florencia Lyman reassured us that your vision is not at imminent risk from intracranial hypertension. .    Dr. Macey Izquierdo would like for you to see your primary care provider, Dr. Sarah Russell next week to discuss your symptoms. Your comments about Covid should be considered and long Covid syndrome may be a differential consideration for some of the things you are experiencing. We need to know that your general health is good before proceeding with surgery. Dr. Macey Izquierdo discussed your case with Dr. Nidia Sheldon today and he is agreed to allow transfer of your care to one of the [facility] neurologist at [address] [facility] neurology office will contact you for an appointment. I am specifically interested in whether neurology believes that some of your symptoms are related to migraine since your opening pressures were not recently elevated (such as double vision). Dr. Macey Izquierdo will see you back in the office once you have seen both your new neurologist at [facility] and your primary care provider.

## 2021-10-08 NOTE — PROGRESS NOTES
Neurointerventional Surgery  Ambulatory Progress Note      Patient: Perfecto Serrano MRN: [id number]  SSN: [id number]    YOB: 1990  Age: 26 y.o. Sex: female      History of Present Illness:      Gaetano Bowen was seen in the office today with complaints of excessive fatigue, easy bruising and lightheadedness 7 days after starting Plavix and aspirin dual antiplatelet therapy. Upon further interview, she reports the following \"I just do not feel right. It is not pain. I just feel like I could pass out at times\". She reported that she had to lay in bed during her child's birthday party yesterday because of these feelings throughout the evening. After her mother instructed her to read the side effects of Plavix, she noticed that easy bruising was on the list.  She noticed that she had a number of bruises on her arms and legs and this raised concern. She also reported intermittent diplopia and dark peripheral vision, similar to the symptoms that led to an inpatient work-up on September 30, 2021. Imaging and lumbar puncture was performed during that hospitalization. Her opening pressure at that time was 18 cm of CSF (normal). She was seen by her neurology service on the inpatient side and complex migraine was the working diagnosis (rather than intracranial hypertension) for the symptoms. Topamax was prescribed per Ana Mondragon NP who I spoke to personally. The patient states that she did not receive a Topamax prescription and is not currently taking the medication. The symptoms are different in that headache is not a primary component as has been stated in the past.    New symptoms seem to correlate to some degree with the start of dual antiplatelet therapy. The patient has no known allergies to either Plavix or aspirin. She does not endorse rash, shortness of breath, cough, sore throat headache, chest pain, palpitations, abdominal pain or focal neurological deficits.     When asked about hydration, the patient reports that she feels dry most of the time and drinks a lot of water at home. Today we also discussed Covid vaccines. She summarized a serious COVID-19 infection that she and her  suffered in late 2020 that led to the stillbirth of twins. The vaccine is something that she considers daily but so far has not taken it. She understands that reinfection with a delta variant is possible and could still be serious given her autoimmune disorder, lupus. She has discussed this with her rheumatologist also. The COVID-19 discussion brought up the subject of long Covid syndrome and the possibility that some of her diverse symptoms could be related. Patient Active Problem List   Diagnosis Code    Cervical incompetence N88.3    Chronic migraine without aura without status migrainosus, not intractable G43.709    History of Nissen fundoplication S54.502    Sebaceous cyst L72.3    Obesity, Class II, BMI 35-39.9 E66.9    GERD (gastroesophageal reflux disease) K21.9    Paraesophageal hernia K44.9    S/P repair of paraesophageal hernia Z98.890, Z87.19    Class 3 severe obesity in adult ([address] E66.01    IIH (idiopathic intracranial hypertension) G93.2    SLE (systemic lupus erythematosus) ([facility]) M32.9    Headache R51.9    Ataxia R27.0    Intracranial hypertension G93.2    Stenosis of cerebral venous sinus 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 8XXA        Review of Systems    A comprehensive ROS was performed and was negative except for as per HPI. Objective:     Current Outpatient Medications   Medication Sig Dispense Refill    topiramate (Topamax) 200 mg tablet Take 1 Tablet by mouth two (2) times a day for 30 days. 60 Tablet 1    furosemide (LASIX) 20 mg tablet Take 1 Tablet by mouth daily for 30 days.  30 Tablet 1    polyethylene glycol (MIRALAX) 17 gram packet Take 1 Packet by mouth daily for 30 days. 30 Packet 0    oxyCODONE IR (Roxicodone) 5 mg immediate release tablet Take 1 Tablet by mouth every six (6) hours as needed for Pain for up to 5 days. Max Daily Amount: 20 mg. 20 Tablet 0    gabapentin (NEURONTIN) 600 mg tablet Take 600 mg by mouth three (3) times daily as needed.  promethazine (PHENERGAN) 12.5 mg tablet Take 12.5 mg by mouth two (2) times daily as needed for Nausea.  omeprazole (PRILOSEC) 20 mg capsule Take 20 mg by mouth daily as needed.  cyclobenzaprine (FLEXERIL) 10 mg tablet TAKE 1 TABLET BY MOUTH TWICE A  Tablet 1    naloxone (NARCAN) 4 mg/actuation nasal spray Use 1 spray intranasally, then discard. Repeat with new spray every 2 min as needed for opioid overdose symptoms, alternating nostrils. Indications: decrease in rate & depth of breathing due to opioid drug, opioid overdose 2 Each 0    hydrOXYchloroQUINE (PLAQUENIL) 200 mg tablet TAKE 1 TABLET BY MOUTH TWICE A DAY PA REQUIRED FOR QTY   16 FAXED MD 60 Tab 2    diazePAM (VALIUM) 5 mg tablet TAKE ONE DAILY AS NEEDED FOR ANXIETY      LORazepam (ATIVAN) 1 mg tablet Take 1.5 mg by mouth nightly as needed for Anxiety.  biotin 10 mg tab 10 mg = 1 tab each dose, PO, daily          Vital signs: Temperature 97.8, blood pressure 110/60, pulse 87, pulse oximetry 98% on room air    Allergies   Allergen Reactions    Latex Anaphylaxis    Acetaminophen Anaphylaxis    Other Plant, Animal, Environmental Hives     Allergic to everything outside.  Nsaids (Non-Steroidal Anti-Inflammatory Drug) Other (comments)     Advised by her GI doctor not to take till they figure out what is going on with her stomach. Currently has a Nissen-fundiplication. Current Outpatient Medications   Medication Sig    topiramate (Topamax) 200 mg tablet Take 1 Tablet by mouth two (2) times a day for 30 days.     furosemide (LASIX) 20 mg tablet Take 1 Tablet by mouth daily for 30 days.  polyethylene glycol (MIRALAX) 17 gram packet Take 1 Packet by mouth daily for 30 days.  oxyCODONE IR (Roxicodone) 5 mg immediate release tablet Take 1 Tablet by mouth every six (6) hours as needed for Pain for up to 5 days. Max Daily Amount: 20 mg.    gabapentin (NEURONTIN) 600 mg tablet Take 600 mg by mouth three (3) times daily as needed.  promethazine (PHENERGAN) 12.5 mg tablet Take 12.5 mg by mouth two (2) times daily as needed for Nausea.  omeprazole (PRILOSEC) 20 mg capsule Take 20 mg by mouth daily as needed.  cyclobenzaprine (FLEXERIL) 10 mg tablet TAKE 1 TABLET BY MOUTH TWICE A DAY    naloxone (NARCAN) 4 mg/actuation nasal spray Use 1 spray intranasally, then discard. Repeat with new spray every 2 min as needed for opioid overdose symptoms, alternating nostrils. Indications: decrease in rate & depth of breathing due to opioid drug, opioid overdose    hydrOXYchloroQUINE (PLAQUENIL) 200 mg tablet TAKE 1 TABLET BY MOUTH TWICE A DAY PA REQUIRED FOR QTY   16 FAXED MD    diazePAM (VALIUM) 5 mg tablet TAKE ONE DAILY AS NEEDED FOR ANXIETY    LORazepam (ATIVAN) 1 mg tablet Take 1.5 mg by mouth nightly as needed for Anxiety.  biotin 10 mg tab 10 mg = 1 tab each dose, PO, daily     No current facility-administered medications for this visit. Physical Exam:  General: NAD  HEENT: Mucous membranes are moist.  There are no exudates. The palate elevates symmetrically. Lungs: clear to auscultation bilaterally  Heart: regular rate and rhythm, S1, S2 normal, no murmur, click, rub or gallop  Extremities: No cyanosis or edema. Multiple bruises are seen on her arms and legs. 1 prominent ecchymosis on her left anterior thigh has a yellow ring around it and some induration. Neurologic Exam:  Mental Status:  Alert and oriented x 4. Flat affect, mood and behavior. Language:    Normal fluency, repetition, comprehension and naming.     Cranial Nerves: Pupils equal, round and reactive to light. Visual fields full to confrontation. Extraocular movements intact. Facial sensation intact V1 - V3. Full facial strength, no asymmetry. Hearing intact bilaterally. No dysarthria. Tongue protrudes to midline, palate elevates symmetrically. Shoulder shrug 5/5 bilaterally. Motor:    No pronator drift. Bulk and tone normal.      5/5 power in all extremities proximally and distally. No involuntary movements. Sensation:    Sensation intact throughout to light touch. There is no extinction. Romberg is negative. Coordination & Gait: Normal gait. Finger-to-nose, rapid alternating movements are intact. Tandem gait was slow and steady with 1 mistake out of 5 steps. Labs:  Lab Results   Component Value Date/Time    Sodium 137 10/04/2021 04:28 AM    Potassium 3.4 (L) 10/04/2021 04:28 AM    Chloride 110 (H) 10/04/2021 04:28 AM    CO2 20 (L) 10/04/2021 04:28 AM    Anion gap 7 10/04/2021 04:28 AM    Glucose 90 10/04/2021 04:28 AM    BUN 8 10/04/2021 04:28 AM    Creatinine 0.90 10/04/2021 04:28 AM    BUN/Creatinine ratio 9 (L) 10/04/2021 04:28 AM    GFR est AA >60 10/04/2021 04:28 AM    GFR est non-AA >60 10/04/2021 04:28 AM    Calcium 9.2 10/04/2021 04:28 AM     Lab Results   Component Value Date/Time    WBC 7.6 10/04/2021 04:28 AM    Hemoglobin (POC) 12.1 11/07/2019 02:00 PM    HGB 13.3 10/04/2021 04:28 AM    HCT 39.2 10/04/2021 04:28 AM    PLATELET 228 27/80/3075 04:28 AM    MCV 92.5 10/04/2021 04:28 AM     Lab Results   Component Value Date/Time    MCH 31.4 10/04/2021 04:28 AM    MCHC 33.9 10/04/2021 04:28 AM    BASOPHILS 1 10/04/2021 04:28 AM    ABS. LYMPHOCYTES 3.0 10/04/2021 04:28 AM    ABS. MONOCYTES 0.9 10/04/2021 04:28 AM    ABS. EOSINOPHILS 0.2 10/04/2021 04:28 AM    ABS.  BASOPHILS 0.1 10/04/2021 04:28 AM    Phosphorus 2.3 (L) 08/29/2020 03:55 AM    Magnesium 2.6 (H) 10/01/2021 04:01 AM       IMAGING:      No results found.    Assessment/Plan:       ICD-10-CM ICD-9-CM    1. IIH (idiopathic intracranial hypertension)  G93.2 348.2    2. Bruising  T14. 8XXA 924.9      This patient is experiencing possible side effects of Plavix or aspirin antiplatelet therapy. The patient was sent to the laboratory for platelet inhibition testing and instructed to stop both medications immediately. Results are pending. Unfortunately, we cannot proceed with the planned intracranial dural sinus stent until this issue is resolved. In general, the patient does not feel well despite stable vitals and normal temperature. Her symptoms remain somewhat complex involving \"dark\" changes in her peripheral vision, intermittent diplopia, headaches and mainly severe fatique. I was able to discuss her case with Bibi Christian NP who cared for her during the recent hospitalization today and complex migraine is on the differential, especially since her most recent LP opening pressure on September 30, 2021 was normal (18 cm of CSF). Fortunately she has been recently evaluated by Dr. Felicia Lance, neuro-ophthalmology, who saw minimal papilledema with no immediate threat to her vision resulting from her pseudotumor cerebri. Previous measurements of her intracranial venous pressures demonstrated a significant gradient (13 mmHg) that could be eliminated with stenting. However, I am concerned that her symptoms may not resolve with  Mere elimination of a venous gradient. I would very much value ongoing input from the neurology service about complex migraine and undoubtedly she will require medical management of her symptoms after transverse venous sinus stenting is performed if we decide to go that route. The patient had previously requested that her neurology care be moved to [facility] for convenience. I discussed this with Dr. Nash Baird who was agreeable today. He will contact the [facility] [employer] offices at [facility] to authorize transfer of care.     I also instructed the patient to see her primary care provider to discuss long Covid syndrome as a cause of her ongoing fatigue, lightheadedness and possibly other symptoms. This was a question posed by the patient which I think is an interesting possibility but I am not expert in this evolving field. I definitely do not want to proceed with any elective procedures until the patient is feeling well. In addition to discontinuing dual antiplatelet therapy, the patient was instructed to utilize electrolyte drinks for hydration at home. I asked the patient if she felt comfortable with this plan. She is disappointed about delaying the procedure but understands that the procedure cannot be performed safely without dual antiplatelet therapy. At this point time, she does not think that inpatient care is necessary or would be beneficial for any of her current symptoms. 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 Follow-up Disposition:    I have discussed the diagnosis with the patient and the intended plan as seen in the above orders. Patient is in agreement. The patient has received an after-visit summary and questions were answered concerning future plans. I have discussed medication side effects and warnings with the patient as well.       Alexandra Alvarez MD

## 2021-10-08 NOTE — TELEPHONE ENCOUNTER
Patient called doctors exchange at 10:11PM to report bruising. She was instructed to discontinue plavix and aspirin and to come in for platelet inhibition testing tomorrow. No other symptoms reported. Orders written.

## 2021-10-11 ENCOUNTER — VIRTUAL VISIT (OUTPATIENT)
Dept: FAMILY MEDICINE CLINIC | Age: 31
End: 2021-10-11
Payer: MEDICAID

## 2021-10-11 DIAGNOSIS — G93.2 PSEUDOTUMOR CEREBRI SYNDROME: ICD-10-CM

## 2021-10-11 DIAGNOSIS — E66.9 OBESITY (BMI 35.0-39.9 WITHOUT COMORBIDITY): ICD-10-CM

## 2021-10-11 DIAGNOSIS — E87.6 HYPOKALEMIA: Primary | ICD-10-CM

## 2021-10-11 DIAGNOSIS — R93.89 ABNORMAL CXR: ICD-10-CM

## 2021-10-11 PROCEDURE — 99215 OFFICE O/P EST HI 40 MIN: CPT | Performed by: FAMILY MEDICINE

## 2021-10-11 NOTE — PROGRESS NOTES
Bennie Eugene is a 26 y.o. female who was seen by synchronous (real-time) audio-video technology on 10/11/2021 for Hospital Follow Up (Intracranial hypertension) and Discuss Medications (plavix and aspirin discoutiuned )    She has IIH  She was discharged from hospital 10/4/21 and admitted   9/29/21  She has just been in the ER 9/27/21  She went there because she was bleeding in her skin/bruising  due to plavix  She was taking that as a required preoperative regime before inseriing a stent in the intracranial sinus to cure IIH  Dr Felix Ham wanted her to see me before he sees her again to make sure that there is not something else happening. She had c/o feeling poorly before the bleeding was noted under her skin  She feels normal again now. After stopping the plavix all of the fatigue went away and the bruising is mostly gone    After 7 days Dr Felix Ham will start a new blood thinner    On the prior hospital stay she had a cxray that was abn but was not followed up on  Also review of the record shows she has a low potassium that needs a recheck    Assessment & Plan:   Diagnoses and all orders for this visit:    1. Hypokalemia  -     METABOLIC PANEL, COMPREHENSIVE; Future    2. Abnormal CXR  -     XR CHEST PA LAT; Future    3. Pseudotumor cerebri syndrome  Dr Felix Ham has a plan to try another antiplatelet treatment and get her in the OR asap per the patient  I am hopeful this happen soon as she has suffered much w this IIH  4. Obesity (BMI 35.0-39.9 without comorbidity)  She is still working on diet and activity to lose weight  The sleeve will be done after the surgery on the IIH          Subjective:       Prior to Admission medications    Medication Sig Start Date End Date Taking? Authorizing Provider   gabapentin (NEURONTIN) 600 mg tablet Take 600 mg by mouth three (3) times daily as needed. Yes Provider, Historical   promethazine (PHENERGAN) 12.5 mg tablet Take 12.5 mg by mouth two (2) times daily as needed for Nausea.    Yes Provider, Historical   omeprazole (PRILOSEC) 20 mg capsule Take 20 mg by mouth daily as needed. Yes Provider, Historical   hydrOXYchloroQUINE (PLAQUENIL) 200 mg tablet TAKE 1 TABLET BY MOUTH TWICE A DAY PA REQUIRED FOR QTY   16 FAXED MD 3/28/21  Yes Bailey Salas MD   diazePAM (VALIUM) 5 mg tablet TAKE ONE DAILY AS NEEDED FOR ANXIETY 3/4/21  Yes Provider, Historical   LORazepam (ATIVAN) 1 mg tablet Take 1.5 mg by mouth nightly as needed for Anxiety. 3/4/21  Yes Provider, Historical   topiramate (Topamax) 200 mg tablet Take 1 Tablet by mouth two (2) times a day for 30 days. Patient not taking: Reported on 10/11/2021 10/4/21 11/3/21  Krys Kirkland NP   furosemide (LASIX) 20 mg tablet Take 1 Tablet by mouth daily for 30 days. Patient not taking: Reported on 10/11/2021 10/4/21 11/3/21  Krys Kirkland NP   polyethylene glycol (MIRALAX) 17 gram packet Take 1 Packet by mouth daily for 30 days. Patient not taking: Reported on 10/11/2021 10/5/21 11/4/21  Krys Kirkland NP   cyclobenzaprine (FLEXERIL) 10 mg tablet TAKE 1 TABLET BY MOUTH TWICE A DAY  Patient not taking: Reported on 10/8/2021 9/24/21   Bailey Salas MD   naloxone [facility]) 4 mg/actuation nasal spray Use 1 spray intranasally, then discard. Repeat with new spray every 2 min as needed for opioid overdose symptoms, alternating nostrils.   Indications: decrease in rate & depth of breathing due to opioid drug, opioid overdose  Patient not taking: Reported on 10/11/2021 9/4/21   Michelle Wong MD   biotin 10 mg tab 10 mg = 1 tab each dose, PO, daily  Patient not taking: Reported on 10/8/2021 1/22/21   Provider, Historical     Patient Active Problem List    Diagnosis Date Noted    Bruising 10/08/2021    Intractable migraine 09/30/2021    Intractable headache 09/26/2021    Pseudotumor cerebri 09/03/2021    Stenosis of cerebral venous sinus 08/30/2021    Papilledema associated with increased intracranial pressure 08/30/2021    Intracranial hypertension 10/23/2020    Ataxia 09/14/2020    Headache 08/29/2020    IIH (idiopathic intracranial hypertension) 08/25/2020    SLE (systemic lupus erythematosus) ([facility] [address] 08/25/2020    Class 3 severe obesity in adult [facility]) 08/22/2018    S/P repair of paraesophageal hernia 11/17/2017    Paraesophageal hernia 11/15/2017    GERD (gastroesophageal reflux disease) 11/07/2017    Obesity, Class II, BMI 35-39.9 03/31/2017    Sebaceous cyst 03/24/2017    History of Nissen fundoplication 82/20/8470    Chronic migraine without aura without status migrainosus, not intractable 05/18/2016    Cervical incompetence 04/12/2013     Current Outpatient Medications   Medication Sig Dispense Refill    gabapentin (NEURONTIN) 600 mg tablet Take 600 mg by mouth three (3) times daily as needed.  promethazine (PHENERGAN) 12.5 mg tablet Take 12.5 mg by mouth two (2) times daily as needed for Nausea.  omeprazole (PRILOSEC) 20 mg capsule Take 20 mg by mouth daily as needed.  hydrOXYchloroQUINE (PLAQUENIL) 200 mg tablet TAKE 1 TABLET BY MOUTH TWICE A DAY PA REQUIRED FOR QTY   16 FAXED MD 60 Tab 2    diazePAM (VALIUM) 5 mg tablet TAKE ONE DAILY AS NEEDED FOR ANXIETY      LORazepam (ATIVAN) 1 mg tablet Take 1.5 mg by mouth nightly as needed for Anxiety.  topiramate (Topamax) 200 mg tablet Take 1 Tablet by mouth two (2) times a day for 30 days. (Patient not taking: Reported on 10/11/2021) 60 Tablet 1    furosemide (LASIX) 20 mg tablet Take 1 Tablet by mouth daily for 30 days. (Patient not taking: Reported on 10/11/2021) 30 Tablet 1    polyethylene glycol (MIRALAX) 17 gram packet Take 1 Packet by mouth daily for 30 days. (Patient not taking: Reported on 10/11/2021) 30 Packet 0    cyclobenzaprine (FLEXERIL) 10 mg tablet TAKE 1 TABLET BY MOUTH TWICE A DAY (Patient not taking: Reported on 10/8/2021) 180 Tablet 1    naloxone (NARCAN) 4 mg/actuation nasal spray Use 1 spray intranasally, then discard.  Repeat with new spray every 2 min as needed for opioid overdose symptoms, alternating nostrils.   Indications: decrease in rate & depth of breathing due to opioid drug, opioid overdose (Patient not taking: Reported on 10/11/2021) 2 Each 0    biotin 10 mg tab 10 mg = 1 tab each dose, PO, daily (Patient not taking: Reported on 10/8/2021)         ROS    Objective:     Patient-Reported Vitals 10/11/2021   Patient-Reported Weight 217lb   Patient-Reported Height -   Patient-Reported Pulse -   Patient-Reported Temperature -   Patient-Reported SpO2 -   Patient-Reported Systolic  -   Patient-Reported Diastolic -   Patient-Reported LMP -        [INSTRUCTIONS:  \"[x]\" Indicates a positive item  \"[]\" Indicates a negative item  -- DELETE ALL ITEMS NOT EXAMINED]    Constitutional: [x] Appears well-developed and well-nourished [x] No apparent distress      [] Abnormal -     Mental status: [x] Alert and awake  [x] Oriented to person/place/time [x] Able to follow commands    [] Abnormal -     Eyes:   EOM    [x]  Normal    [] Abnormal -   Sclera  [x]  Normal    [] Abnormal -          Discharge [x]  None visible   [] Abnormal -     HENT: [x] Normocephalic, atraumatic  [] Abnormal -   [x] Mouth/Throat: Mucous membranes are moist    External Ears [x] Normal  [] Abnormal -    Neck: [x] No visualized mass [] Abnormal -     Pulmonary/Chest: [x] Respiratory effort normal   [x] No visualized signs of difficulty breathing or respiratory distress        [] Abnormal -      Musculoskeletal:   [x] Normal gait with no signs of ataxia         [x] Normal range of motion of neck        [] Abnormal -     Neurological:        [x] No Facial Asymmetry (Cranial nerve 7 motor function) (limited exam due to video visit)          [x] No gaze palsy        [] Abnormal -          Skin:        [x] No significant exanthematous lesions or discoloration noted on facial skin         [] Abnormal -            Psychiatric:       [x] Normal Affect [] Abnormal -        [x] No Hallucinations    Other pertinent observable physical exam findings:-        We discussed the expected course, resolution and complications of the diagnosis(es) in detail. Medication risks, benefits, costs, interactions, and alternatives were discussed as indicated. I advised her to contact the office if her condition worsens, changes or fails to improve as anticipated. She expressed understanding with the diagnosis(es) and plan. Isiah Garcia, was evaluated through a synchronous (real-time) audio-video encounter. The patient (or guardian if applicable) is aware that this is a billable service. Verbal consent to proceed has been obtained within the past 12 months. The visit was conducted pursuant to the emergency declaration under the [facility], [address] [place] [address] authority and the [employer] Act. Patient identification was verified, and a caregiver was present when appropriate. The patient was located in a state where the provider was credentialed to provide care.       Rose Allan MD

## 2021-10-13 ENCOUNTER — DOCUMENTATION ONLY (OUTPATIENT)
Dept: NEUROSURGERY | Age: 31
End: 2021-10-13

## 2021-10-13 NOTE — PROGRESS NOTES
Progress notes from Dr. Prince Farias (neuro-ophthalmology [facility]) dated 8/2/2021 received, reviewed and scanned into the electronic medical record. Dr. Gene Leone notes \"minimal papilledema. Visual sensory function is well preserved, corroborated by largely reassuring OCT RNFL with preserved GCL volume in both eyes, essentially full visual fields on automated perimetry. \"    He recommended a target of 10 to 12% reduction in body weight. Low risk for vision loss due to her IH condition currently.

## 2021-10-26 ENCOUNTER — OFFICE VISIT (OUTPATIENT)
Dept: NEUROLOGY | Age: 31
End: 2021-10-26
Payer: MEDICAID

## 2021-10-26 ENCOUNTER — DOCUMENTATION ONLY (OUTPATIENT)
Dept: NEUROLOGY | Age: 31
End: 2021-10-26

## 2021-10-26 ENCOUNTER — HOSPITAL ENCOUNTER (OUTPATIENT)
Dept: GENERAL RADIOLOGY | Age: 31
Discharge: HOME | End: 2021-10-26
Payer: MEDICAID

## 2021-10-26 VITALS
WEIGHT: 220 LBS | BODY MASS INDEX: 40.48 KG/M2 | HEART RATE: 84 BPM | HEIGHT: 62 IN | SYSTOLIC BLOOD PRESSURE: 118 MMHG | DIASTOLIC BLOOD PRESSURE: 84 MMHG | RESPIRATION RATE: 20 BRPM | OXYGEN SATURATION: 98 %

## 2021-10-26 DIAGNOSIS — G93.2 PSEUDOTUMOR CEREBRI SYNDROME: Primary | ICD-10-CM

## 2021-10-26 DIAGNOSIS — R93.89 ABNORMAL CXR: ICD-10-CM

## 2021-10-26 PROCEDURE — 71046 X-RAY EXAM CHEST 2 VIEWS: CPT

## 2021-10-26 PROCEDURE — 99215 OFFICE O/P EST HI 40 MIN: CPT | Performed by: PSYCHIATRY & NEUROLOGY

## 2021-10-26 RX ORDER — TOPIRAMATE 200 MG/1
200 CAPSULE, EXTENDED RELEASE ORAL DAILY
Qty: 30 CAPSULE | Refills: 1 | Status: ON HOLD | OUTPATIENT
Start: 2021-10-26 | End: 2021-11-10 | Stop reason: SDUPTHER
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
## 2021-10-26 NOTE — PROGRESS NOTES
Marianne two packs of Trokendi 200mg samples, per Dr. Nery Alford. [facility]#[id number] exp [id number].

## 2021-10-26 NOTE — PATIENT INSTRUCTIONS
[facility]   Statement to Patients  April 1, 2014      In an effort to ensure the large volume of patient prescription refills is processed in the most efficient and expeditious manner, we are asking our patients to assist us by calling your Pharmacy for all prescription refills, this will include also your  Mail Order Pharmacy. The pharmacy will contact our office electronically to continue the refill process. Please do not wait until the last minute to call your pharmacy. We need at least 48 hours (2days) to fill prescriptions. We also encourage you to call your pharmacy before going to  your prescription to make sure it is ready. With regard to controlled substance prescription refill requests (narcotic refills) that need to be picked up at our office, we ask your cooperation by providing us with at least 72 hours (3days) notice that you will need a refill. We will not refill narcotic prescription refill requests after 4:00pm on any weekday, Monday through Thursday, or after 2:00pm on Fridays, or on the weekends. We encourage everyone to explore another way of getting your prescription refill request processed using [email or web address], our patient web portal through our electronic medical record system. [email or web address] is an efficient and effective way to communicate your medication request directly to the office and  downloadable as an genevieve on your smart phone . [email or web address] also features a review functionality that allows you to view your medication list as well as leave messages for your physician. Are you ready to get connected? If so please review the attatched instructions or speak to any of our staff to get you set up right away! Thank you so much for your cooperation. Should you have any questions please contact our Practice Administrator.     The Physicians and Staff,  [facility]

## 2021-10-26 NOTE — PROGRESS NOTES
Ms. Jayda Lange presents today to follow up bilateral occipital neuralgia. She reported having a headache several days per week. Depression screening done on patient.

## 2021-10-27 ENCOUNTER — OFFICE VISIT (OUTPATIENT)
Dept: SURGERY | Age: 31
End: 2021-10-27
Payer: MEDICAID

## 2021-10-27 ENCOUNTER — TELEPHONE (OUTPATIENT)
Dept: NEUROSURGERY | Age: 31
End: 2021-10-27

## 2021-10-27 VITALS
DIASTOLIC BLOOD PRESSURE: 83 MMHG | OXYGEN SATURATION: 98 % | TEMPERATURE: 98.8 F | HEIGHT: 62 IN | SYSTOLIC BLOOD PRESSURE: 123 MMHG | HEART RATE: 89 BPM | RESPIRATION RATE: 18 BRPM | BODY MASS INDEX: 38.72 KG/M2 | WEIGHT: 210.4 LBS

## 2021-10-27 DIAGNOSIS — K21.9 GASTROESOPHAGEAL REFLUX DISEASE, UNSPECIFIED WHETHER ESOPHAGITIS PRESENT: ICD-10-CM

## 2021-10-27 DIAGNOSIS — E66.01 SEVERE OBESITY (BMI 35.0-35.9 WITH COMORBIDITY) (HCC): Primary | ICD-10-CM

## 2021-10-27 DIAGNOSIS — Z98.890 STATUS POST NISSEN FUNDOPLICATION: ICD-10-CM

## 2021-10-27 DIAGNOSIS — G93.2 PSEUDOTUMOR CEREBRI: ICD-10-CM

## 2021-10-27 DIAGNOSIS — G93.2 INTRACRANIAL HYPERTENSION: ICD-10-CM

## 2021-10-27 LAB
ALBUMIN SERPL-MCNC: 4.3 G/DL (ref 3.8–4.8)
ALBUMIN/GLOB SERPL: 2.2 {RATIO} (ref 1.2–2.2)
ALP SERPL-CCNC: 64 IU/L (ref 44–121)
ALT SERPL-CCNC: 21 IU/L (ref 0–32)
AST SERPL-CCNC: 13 IU/L (ref 0–40)
BILIRUB SERPL-MCNC: 0.3 MG/DL (ref 0–1.2)
BUN SERPL-MCNC: 6 MG/DL (ref 6–20)
BUN/CREAT SERPL: 8 (ref 9–23)
CALCIUM SERPL-MCNC: 9.2 MG/DL (ref 8.7–10.2)
CHLORIDE SERPL-SCNC: 106 MMOL/L (ref 96–106)
CO2 SERPL-SCNC: 24 MMOL/L (ref 20–29)
CREAT SERPL-MCNC: 0.78 MG/DL (ref 0.57–1)
GLOBULIN SER CALC-MCNC: 2 G/DL (ref 1.5–4.5)
GLUCOSE SERPL-MCNC: 95 MG/DL (ref 65–99)
POTASSIUM SERPL-SCNC: 3.4 MMOL/L (ref 3.5–5.2)
PROT SERPL-MCNC: 6.3 G/DL (ref 6–8.5)
SODIUM SERPL-SCNC: 143 MMOL/L (ref 134–144)

## 2021-10-27 PROCEDURE — 99215 OFFICE O/P EST HI 40 MIN: CPT | Performed by: SURGERY

## 2021-10-27 NOTE — LETTER
10/27/2021    Patient: Christos Dunn   YOB: 1990   Date of Visit: 10/27/2021     Guanako Carreno MD  [address] [address]  [place] [employer]    Dear Guanako Carreno MD,      Thank you for referring Ms. Susan Lopez to [facility] for evaluation. My notes for this consultation are attached. If you have questions, please do not hesitate to call me. I look forward to following your patient along with you.       Sincerely,    Stacy Nogueira MD

## 2021-10-27 NOTE — Clinical Note
In this patient is scheduled for surgery make sure this is the only surgery scheduled the entire day. The surgery will take somewhere between 4 and 8 hours.   I need Marie to assist me with surgery

## 2021-10-27 NOTE — PROGRESS NOTES
1. Have you been to the ER, urgent care clinic since your last visit? Hospitalized since your last visit? No    2. Have you seen or consulted any other health care providers outside of the [address] [place] [address] since your last visit? Include any pap smears or colon screening.  No
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
## 2021-10-31 ENCOUNTER — HOSPITAL ENCOUNTER (INPATIENT)
Age: 31
LOS: 1 days | Discharge: HOME | DRG: 058 | End: 2021-11-02
Attending: EMERGENCY MEDICINE | Admitting: STUDENT IN AN ORGANIZED HEALTH CARE EDUCATION/TRAINING PROGRAM
Payer: MEDICAID

## 2021-10-31 DIAGNOSIS — R27.0 ATAXIA: ICD-10-CM

## 2021-10-31 DIAGNOSIS — H53.9 VISION CHANGES: ICD-10-CM

## 2021-10-31 DIAGNOSIS — R51.9 INTRACTABLE HEADACHE, UNSPECIFIED CHRONICITY PATTERN, UNSPECIFIED HEADACHE TYPE: Primary | ICD-10-CM

## 2021-10-31 PROCEDURE — 96374 THER/PROPH/DIAG INJ IV PUSH: CPT

## 2021-10-31 PROCEDURE — 99284 EMERGENCY DEPT VISIT MOD MDM: CPT

## 2021-10-31 PROCEDURE — 96375 TX/PRO/DX INJ NEW DRUG ADDON: CPT

## 2021-10-31 RX ORDER — DIPHENHYDRAMINE HYDROCHLORIDE 50 MG/ML
50 INJECTION, SOLUTION INTRAMUSCULAR; INTRAVENOUS
Status: COMPLETED | OUTPATIENT
Start: 2021-10-31 | End: 2021-11-01

## 2021-10-31 RX ORDER — KETOROLAC TROMETHAMINE 30 MG/ML
15 INJECTION, SOLUTION INTRAMUSCULAR; INTRAVENOUS
Status: COMPLETED | OUTPATIENT
Start: 2021-10-31 | End: 2021-11-01

## 2021-11-01 ENCOUNTER — APPOINTMENT (OUTPATIENT)
Dept: GENERAL RADIOLOGY | Age: 31
DRG: 058 | End: 2021-11-01
Attending: NURSE PRACTITIONER
Payer: MEDICAID

## 2021-11-01 ENCOUNTER — APPOINTMENT (OUTPATIENT)
Dept: CT IMAGING | Age: 31
DRG: 058 | End: 2021-11-01
Attending: NURSE PRACTITIONER
Payer: MEDICAID

## 2021-11-01 ENCOUNTER — TELEPHONE (OUTPATIENT)
Dept: NEUROLOGY | Age: 31
End: 2021-11-01

## 2021-11-01 LAB
ALBUMIN SERPL-MCNC: 3.8 G/DL (ref 3.5–5)
ALBUMIN/GLOB SERPL: 1.2 {RATIO} (ref 1.1–2.2)
ALP SERPL-CCNC: 72 U/L (ref 45–117)
ALT SERPL-CCNC: 21 U/L (ref 12–78)
ANION GAP SERPL CALC-SCNC: 4 MMOL/L (ref 5–15)
AST SERPL-CCNC: 10 U/L (ref 15–37)
ATRIAL RATE: 78 BPM
BASOPHILS # BLD: 0.1 K/UL (ref 0–0.1)
BASOPHILS NFR BLD: 1 % (ref 0–1)
BILIRUB SERPL-MCNC: 0.4 MG/DL (ref 0.2–1)
BUN SERPL-MCNC: 10 MG/DL (ref 6–20)
BUN/CREAT SERPL: 13 (ref 12–20)
CALCIUM SERPL-MCNC: 8.8 MG/DL (ref 8.5–10.1)
CALCULATED P AXIS, ECG09: 42 DEGREES
CALCULATED R AXIS, ECG10: -20 DEGREES
CALCULATED T AXIS, ECG11: 4 DEGREES
CHLORIDE SERPL-SCNC: 107 MMOL/L (ref 97–108)
CO2 SERPL-SCNC: 28 MMOL/L (ref 21–32)
COMMENT, HOLDF: NORMAL
CREAT SERPL-MCNC: 0.79 MG/DL (ref 0.55–1.02)
DIAGNOSIS, 93000: NORMAL
DIFFERENTIAL METHOD BLD: ABNORMAL
EOSINOPHIL # BLD: 0.1 K/UL (ref 0–0.4)
EOSINOPHIL NFR BLD: 1 % (ref 0–7)
ERYTHROCYTE [DISTWIDTH] IN BLOOD BY AUTOMATED COUNT: 12.4 % (ref 11.5–14.5)
GLOBULIN SER CALC-MCNC: 3.2 G/DL (ref 2–4)
GLUCOSE SERPL-MCNC: 83 MG/DL (ref 65–100)
HCG SERPL QL: NEGATIVE
HCT VFR BLD AUTO: 36.8 % (ref 35–47)
HGB BLD-MCNC: 12.6 G/DL (ref 11.5–16)
IMM GRANULOCYTES # BLD AUTO: 0 K/UL (ref 0–0.04)
IMM GRANULOCYTES NFR BLD AUTO: 0 % (ref 0–0.5)
LYMPHOCYTES # BLD: 4.6 K/UL (ref 0.8–3.5)
LYMPHOCYTES NFR BLD: 50 % (ref 12–49)
MCH RBC QN AUTO: 31.4 PG (ref 26–34)
MCHC RBC AUTO-ENTMCNC: 34.2 G/DL (ref 30–36.5)
MCV RBC AUTO: 91.8 FL (ref 80–99)
MONOCYTES # BLD: 0.9 K/UL (ref 0–1)
MONOCYTES NFR BLD: 10 % (ref 5–13)
NEUTS SEG # BLD: 3.5 K/UL (ref 1.8–8)
NEUTS SEG NFR BLD: 38 % (ref 32–75)
NRBC # BLD: 0 K/UL (ref 0–0.01)
NRBC BLD-RTO: 0 PER 100 WBC
P-R INTERVAL, ECG05: 162 MS
PLATELET # BLD AUTO: 262 K/UL (ref 150–400)
PMV BLD AUTO: 9.9 FL (ref 8.9–12.9)
POTASSIUM SERPL-SCNC: 3.1 MMOL/L (ref 3.5–5.1)
PROT SERPL-MCNC: 7 G/DL (ref 6.4–8.2)
Q-T INTERVAL, ECG07: 394 MS
QRS DURATION, ECG06: 78 MS
QTC CALCULATION (BEZET), ECG08: 449 MS
RBC # BLD AUTO: 4.01 M/UL (ref 3.8–5.2)
SAMPLES BEING HELD,HOLD: NORMAL
SODIUM SERPL-SCNC: 139 MMOL/L (ref 136–145)
VENTRICULAR RATE, ECG03: 78 BPM
WBC # BLD AUTO: 9.2 K/UL (ref 3.6–11)

## 2021-11-01 PROCEDURE — 74011250636 HC RX REV CODE- 250/636: Performed by: EMERGENCY MEDICINE

## 2021-11-01 PROCEDURE — 74011250636 HC RX REV CODE- 250/636: Performed by: NURSE PRACTITIONER

## 2021-11-01 PROCEDURE — 74011000636 HC RX REV CODE- 636: Performed by: EMERGENCY MEDICINE

## 2021-11-01 PROCEDURE — 74011000250 HC RX REV CODE- 250: Performed by: EMERGENCY MEDICINE

## 2021-11-01 PROCEDURE — 80053 COMPREHEN METABOLIC PANEL: CPT

## 2021-11-01 PROCEDURE — 84703 CHORIONIC GONADOTROPIN ASSAY: CPT

## 2021-11-01 PROCEDURE — 99222 1ST HOSP IP/OBS MODERATE 55: CPT | Performed by: PSYCHIATRY & NEUROLOGY

## 2021-11-01 PROCEDURE — 65410000002 HC RM PRIVATE OB

## 2021-11-01 PROCEDURE — 85025 COMPLETE CBC W/AUTO DIFF WBC: CPT

## 2021-11-01 PROCEDURE — 70498 CT ANGIOGRAPHY NECK: CPT

## 2021-11-01 PROCEDURE — 99232 SBSQ HOSP IP/OBS MODERATE 35: CPT | Performed by: STUDENT IN AN ORGANIZED HEALTH CARE EDUCATION/TRAINING PROGRAM

## 2021-11-01 PROCEDURE — 74011000250 HC RX REV CODE- 250: Performed by: FAMILY MEDICINE

## 2021-11-01 PROCEDURE — 4A03X5D MEASUREMENT OF ARTERIAL FLOW, INTRACRANIAL, EXTERNAL APPROACH: ICD-10-PCS | Performed by: RADIOLOGY

## 2021-11-01 PROCEDURE — 93005 ELECTROCARDIOGRAM TRACING: CPT

## 2021-11-01 PROCEDURE — 009U3ZX DRAINAGE OF SPINAL CANAL, PERCUTANEOUS APPROACH, DIAGNOSTIC: ICD-10-PCS | Performed by: RADIOLOGY

## 2021-11-01 PROCEDURE — 36415 COLL VENOUS BLD VENIPUNCTURE: CPT

## 2021-11-01 PROCEDURE — 74011250637 HC RX REV CODE- 250/637: Performed by: STUDENT IN AN ORGANIZED HEALTH CARE EDUCATION/TRAINING PROGRAM

## 2021-11-01 PROCEDURE — 62270 DX LMBR SPI PNXR: CPT

## 2021-11-01 PROCEDURE — 70450 CT HEAD/BRAIN W/O DYE: CPT

## 2021-11-01 PROCEDURE — 74011250636 HC RX REV CODE- 250/636: Performed by: STUDENT IN AN ORGANIZED HEALTH CARE EDUCATION/TRAINING PROGRAM

## 2021-11-01 RX ORDER — HYDROMORPHONE HYDROCHLORIDE 1 MG/ML
1 INJECTION, SOLUTION INTRAMUSCULAR; INTRAVENOUS; SUBCUTANEOUS
Status: DISCONTINUED | OUTPATIENT
Start: 2021-11-01 | End: 2021-11-02 | Stop reason: HOSPADM

## 2021-11-01 RX ORDER — HYDROMORPHONE HYDROCHLORIDE 1 MG/ML
1 INJECTION, SOLUTION INTRAMUSCULAR; INTRAVENOUS; SUBCUTANEOUS
Status: DISCONTINUED | OUTPATIENT
Start: 2021-11-01 | End: 2021-11-01

## 2021-11-01 RX ORDER — HYDROMORPHONE HYDROCHLORIDE 1 MG/ML
0.5 INJECTION, SOLUTION INTRAMUSCULAR; INTRAVENOUS; SUBCUTANEOUS
Status: DISCONTINUED | OUTPATIENT
Start: 2021-11-01 | End: 2021-11-01

## 2021-11-01 RX ORDER — SODIUM CHLORIDE 0.9 % (FLUSH) 0.9 %
5-40 SYRINGE (ML) INJECTION EVERY 8 HOURS
Status: DISCONTINUED | OUTPATIENT
Start: 2021-11-01 | End: 2021-11-02 | Stop reason: HOSPADM

## 2021-11-01 RX ORDER — SODIUM BICARBONATE 42 MG/ML
2 INJECTION, SOLUTION INTRAVENOUS
Status: COMPLETED | OUTPATIENT
Start: 2021-11-01 | End: 2021-11-01

## 2021-11-01 RX ORDER — ONDANSETRON 4 MG/1
4 TABLET, ORALLY DISINTEGRATING ORAL
Status: DISCONTINUED | OUTPATIENT
Start: 2021-11-01 | End: 2021-11-02 | Stop reason: HOSPADM

## 2021-11-01 RX ORDER — ONDANSETRON 2 MG/ML
4 INJECTION INTRAMUSCULAR; INTRAVENOUS
Status: DISCONTINUED | OUTPATIENT
Start: 2021-11-01 | End: 2021-11-02 | Stop reason: HOSPADM

## 2021-11-01 RX ORDER — OXYCODONE HYDROCHLORIDE 5 MG/1
5 TABLET ORAL
COMMUNITY
End: 2022-03-30

## 2021-11-01 RX ORDER — SODIUM CHLORIDE 0.9 % (FLUSH) 0.9 %
5-40 SYRINGE (ML) INJECTION AS NEEDED
Status: DISCONTINUED | OUTPATIENT
Start: 2021-11-01 | End: 2021-11-02 | Stop reason: HOSPADM

## 2021-11-01 RX ORDER — DIAZEPAM 5 MG/1
5 TABLET ORAL DAILY PRN
Status: DISCONTINUED | OUTPATIENT
Start: 2021-11-01 | End: 2021-11-02 | Stop reason: HOSPADM

## 2021-11-01 RX ORDER — POLYETHYLENE GLYCOL 3350 17 G/17G
17 POWDER, FOR SOLUTION ORAL DAILY PRN
Status: DISCONTINUED | OUTPATIENT
Start: 2021-11-01 | End: 2021-11-02 | Stop reason: HOSPADM

## 2021-11-01 RX ORDER — BIOTIN 10 MG
10000 TABLET ORAL DAILY
COMMUNITY

## 2021-11-01 RX ORDER — ACETAMINOPHEN 650 MG/1
650 SUPPOSITORY RECTAL
Status: DISCONTINUED | OUTPATIENT
Start: 2021-11-01 | End: 2021-11-01 | Stop reason: CLARIF

## 2021-11-01 RX ORDER — CYCLOBENZAPRINE HCL 10 MG
10 TABLET ORAL 2 TIMES DAILY
Status: DISCONTINUED | OUTPATIENT
Start: 2021-11-01 | End: 2021-11-01

## 2021-11-01 RX ORDER — ACETAMINOPHEN 325 MG/1
650 TABLET ORAL
Status: DISCONTINUED | OUTPATIENT
Start: 2021-11-01 | End: 2021-11-01 | Stop reason: CLARIF

## 2021-11-01 RX ORDER — PANTOPRAZOLE SODIUM 40 MG/1
40 TABLET, DELAYED RELEASE ORAL
Status: DISCONTINUED | OUTPATIENT
Start: 2021-11-01 | End: 2021-11-02 | Stop reason: HOSPADM

## 2021-11-01 RX ORDER — CYCLOBENZAPRINE HCL 10 MG
10 TABLET ORAL
Status: DISCONTINUED | OUTPATIENT
Start: 2021-11-01 | End: 2021-11-02 | Stop reason: HOSPADM

## 2021-11-01 RX ORDER — GABAPENTIN 600 MG/1
600 TABLET ORAL 3 TIMES DAILY
Status: DISCONTINUED | OUTPATIENT
Start: 2021-11-01 | End: 2021-11-01

## 2021-11-01 RX ORDER — DIAZEPAM 10 MG/1
10 TABLET ORAL
COMMUNITY
End: 2022-03-30

## 2021-11-01 RX ORDER — GABAPENTIN 600 MG/1
600 TABLET ORAL
Status: DISCONTINUED | OUTPATIENT
Start: 2021-11-01 | End: 2021-11-02 | Stop reason: HOSPADM

## 2021-11-01 RX ADMIN — Medication 10 ML: at 14:00

## 2021-11-01 RX ADMIN — SODIUM BICARBONATE 2 ML: 42 INJECTION, SOLUTION INTRAVENOUS at 14:52

## 2021-11-01 RX ADMIN — KETOROLAC TROMETHAMINE 15 MG: 30 INJECTION, SOLUTION INTRAMUSCULAR; INTRAVENOUS at 01:16

## 2021-11-01 RX ADMIN — PANTOPRAZOLE SODIUM 40 MG: 40 TABLET, DELAYED RELEASE ORAL at 07:36

## 2021-11-01 RX ADMIN — HYDROMORPHONE HYDROCHLORIDE 1 MG: 1 INJECTION, SOLUTION INTRAMUSCULAR; INTRAVENOUS; SUBCUTANEOUS at 20:13

## 2021-11-01 RX ADMIN — DIPHENHYDRAMINE HYDROCHLORIDE 50 MG: 50 INJECTION, SOLUTION INTRAMUSCULAR; INTRAVENOUS at 01:16

## 2021-11-01 RX ADMIN — HYDROMORPHONE HYDROCHLORIDE 1 MG: 1 INJECTION, SOLUTION INTRAMUSCULAR; INTRAVENOUS; SUBCUTANEOUS at 09:48

## 2021-11-01 RX ADMIN — Medication 10 ML: at 22:00

## 2021-11-01 RX ADMIN — HYDROMORPHONE HYDROCHLORIDE 0.5 MG: 1 INJECTION, SOLUTION INTRAMUSCULAR; INTRAVENOUS; SUBCUTANEOUS at 07:36

## 2021-11-01 RX ADMIN — HYDROMORPHONE HYDROCHLORIDE 1 MG: 1 INJECTION, SOLUTION INTRAMUSCULAR; INTRAVENOUS; SUBCUTANEOUS at 15:46

## 2021-11-01 RX ADMIN — PROCHLORPERAZINE EDISYLATE 10 MG: 5 INJECTION, SOLUTION INTRAMUSCULAR; INTRAVENOUS at 01:16

## 2021-11-01 RX ADMIN — IOPAMIDOL 100 ML: 755 INJECTION, SOLUTION INTRAVENOUS at 02:04

## 2021-11-01 NOTE — PROGRESS NOTES
Bedside and Verbal shift change report given to Hong Winn RN  (oncoming nurse) by Jaymie Bañuelos  (offgoing nurse). Report included the following information SBAR, Kardex, ED Summary, Intake/Output and MAR.

## 2021-11-01 NOTE — PROGRESS NOTES
Daniel Requesting post procedure instructions for patient receiving lumbar puncture in Main Radiology. Post procedure instructions relayed to unit RN regarding bedrest with bathroom privileges, adequate po intake to minimize risk of spinal headache, monitoring of puncture site. [facility]iology phone number [phone] )provided for questions or concerns.
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
## 2021-11-01 NOTE — CONSULTS
Consult dictated. 24-year-old with known idiopathic intracranial hypertension and bilateral transverse and sigmoid sinus stenoses. Has tried several different medications without significant benefit to her symptoms. She had LP about 6 weeks ago with an opening pressure of 18 cm of water but it significantly helped her symptoms for several weeks. Now admitted with headache, blurry vision and balance problem. Proceed with LP and if possible reduce the closing pressure to around 10 or below. She has an appointment with [facility] in a couple weeks for venous sinus stenting/angioplasty. May discharge home after LP.      Yuli Harper MD

## 2021-11-01 NOTE — TELEPHONE ENCOUNTER
Re: Beverly Amezquita PA request through [address] [place] [address] and awaiting update.

## 2021-11-01 NOTE — PROGRESS NOTES
Chart reviewed. Patient plan for lumbar puncture today. Will follow up when appropriate for PT evaluation. Thanks! Renetta Ann PT, DPT  Geriatric Clinical Specialist     FUNCTIONAL STATUS PRIOR TO ADMISSION: Patient was independent and active without use of DME. Cares for two young children. Works as a nurse. Driving. Admits that since fall/syncopal episode in the shower in November, she sits on the edge of the tub to bathe, and her  will sometimes assist.     HOME SUPPORT PRIOR TO ADMISSION: The patient lived with  and 2 children (6 y.o and 15 y. o) but did not require assist.    Personal factors and/or comorbidities impacting plan of care: Lupus (bilat hip pain/injections every 4 months), migraines     Home Situation  Home Environment: Private residence  # Steps to Enter: 0  One/Two Story Residence: One story  Living Alone: No  Support Systems: Spouse/Significant Other, Child(jenny) ( and 2 children 12 and 8)  Patient Expects to be Discharged to[de-identified] House  Current DME Used/Available at Home: None  Tub or Shower Type: Tub/Shower combination

## 2021-11-01 NOTE — PROGRESS NOTES
[facility]                                                                                          Hospitalist Progress Note  Bijan Mccallum NP  Answering service: [phone] OR [phone] from in house phone        Date of Service:  2021  NAME:  Shaw Rodriguez  :  1990  MRN:  [id number]      Admission Summary:   Shaw Rodriguez is a 26 y.o. female with pmhx pseudotumor cerebri, GERD, venous sinus thrombosis and SLE who presents with headache, intermittent gait imbalance and distorted vision.   Patient is well-known to hospital service and has had recurrent admission for migraines with complications of pseudotumor cerebri. She was recently evaluated by neurology and deemed an appropriate candidate for surgical intervention. She is scheduled for interventional radiology follow-up in November. Had recent admission 1 month ago for similar concerns and underwent therapeutic LP. The patient denies any fever, chills, chest or abdominal pain, nausea, vomiting, cough, congestion, recent illness, palpitations, or dysuria.     Remarkable vitals on ER Presentations: /97  Labs Remarkable for: Potassium 3.1  ER Images: CT head and neck prelim: No large vessel occlusion    Interval history / Subjective:     Seen and examined patient laying in bed. States that she still has a headache but has improved since LP completed.   Discussed with RN      Assessment & Plan:     Intractable headache  Pseudotumor cerebri  Venous sinus thrombosis  - Had LP 1 month ago  - s/p LP today   - Continue with pain control   - Neurology following- OK to discharge home        SLE  -Continue home Plaquenil     MSK pain  -Continue home Flexeril      Code status: Full   DVT prophylaxis: SCDs    Care Plan discussed with: Patient/Family, Nurse and   Anticipated Disposition: Home w/Family  Anticipated Discharge: Less than 24 hours     Hospital Problems  Date Reviewed: 10/27/2021 Codes Class Noted POA    Intractable migraine ICD-10-CM: L81.956  ICD-9-CM: 346.91  9/30/2021 Unknown                Review of Systems:   A comprehensive review of systems was negative except for that written in the HPI. Vital Signs:    Last 24hrs VS reviewed since prior progress note. Most recent are:  Visit Vitals  /83 (BP 1 Location: Left upper arm, BP Patient Position: At rest)   Pulse 73   Temp 97.6 °F (36.4 °C)   Resp 16   SpO2 99%       No intake or output data in the 24 hours ending 11/01/21 1531     Physical Examination:             Constitutional:  No acute distress, cooperative, pleasant    ENT:  Oral mucosa moist, oropharynx benign. Resp:  CTA bilaterally. No wheezing/rhonchi/rales. No accessory muscle use   CV:  Regular rhythm, normal rate, no murmurs, gallops, rubs    GI:  Soft, non distended, non tender. normoactive bowel sounds, no hepatosplenomegaly     Musculoskeletal:  No edema, warm, 2+ pulses throughout    Neurologic:  Moves all extremities. AAOx3, CN II-XII reviewed     Psych:  Good insight, Not anxious nor agitated. Data Review:    Review and/or order of clinical lab test  Review and/or order of tests in the radiology section of CPT  Review and/or order of tests in the medicine section of CPT      Labs:     Recent Labs     11/01/21 0033   WBC 9.2   HGB 12.6   HCT 36.8        Recent Labs     11/01/21 0033      K 3.1*      CO2 28   BUN 10   CREA 0.79   GLU 83   CA 8.8     Recent Labs     11/01/21 0033   ALT 21   AP 72   TBILI 0.4   TP 7.0   ALB 3.8   GLOB 3.2     No results for input(s): INR, PTP, APTT, INREXT in the last 72 hours. No results for input(s): FE, TIBC, PSAT, FERR in the last 72 hours. No results found for: FOL, RBCF   No results for input(s): PH, PCO2, PO2 in the last 72 hours. No results for input(s): CPK, CKNDX, TROIQ in the last 72 hours.     No lab exists for component: CPKMB  Lab Results   Component Value Date/Time Cholesterol, total 177 04/11/2019 11:35 AM    HDL Cholesterol 48 04/11/2019 11:35 AM    LDL, calculated 107 (H) 04/11/2019 11:35 AM    Triglyceride 112 04/11/2019 11:35 AM     Lab Results   Component Value Date/Time    Glucose (POC) 86 09/13/2020 11:03 PM    Glucose POC 82 11/07/2019 02:00 PM     Lab Results   Component Value Date/Time    Color YELLOW/STRAW 10/01/2021 04:56 AM    Appearance TURBID (A) 10/01/2021 04:56 AM    Specific gravity 1.017 10/01/2021 04:56 AM    Specific gravity 1.015 01/25/2021 02:30 PM    pH (UA) 8.5 (H) 10/01/2021 04:56 AM    Protein Negative 10/01/2021 04:56 AM    Glucose Negative 10/01/2021 04:56 AM    Ketone Negative 10/01/2021 04:56 AM    Bilirubin Negative 10/01/2021 04:56 AM    Urobilinogen 1.0 10/01/2021 04:56 AM    Nitrites Negative 10/01/2021 04:56 AM    Leukocyte Esterase SMALL (A) 10/01/2021 04:56 AM    Epithelial cells FEW 10/01/2021 04:56 AM    Bacteria 1+ (A) 10/01/2021 04:56 AM    WBC 0-4 10/01/2021 04:56 AM    RBC 0-5 10/01/2021 04:56 AM         Medications Reviewed:     Current Facility-Administered Medications   Medication Dose Route Frequency    diazePAM (VALIUM) tablet 5 mg  5 mg Oral DAILY PRN    LORazepam (ATIVAN) tablet 1.5 mg  1.5 mg Oral QHS PRN    pantoprazole (PROTONIX) tablet 40 mg  40 mg Oral ACB    sodium chloride (NS) flush 5-40 mL  5-40 mL IntraVENous Q8H    sodium chloride (NS) flush 5-40 mL  5-40 mL IntraVENous PRN    polyethylene glycol (MIRALAX) packet 17 g  17 g Oral DAILY PRN    ondansetron (ZOFRAN ODT) tablet 4 mg  4 mg Oral Q8H PRN    Or    ondansetron (ZOFRAN) injection 4 mg  4 mg IntraVENous Q6H PRN    HYDROmorphone (DILAUDID) injection 1 mg  1 mg IntraVENous Q4H PRN    cyclobenzaprine (FLEXERIL) tablet 10 mg  10 mg Oral BID PRN    gabapentin (NEURONTIN) tablet 600 mg  600 mg Oral TID PRN     ______________________________________________________________________  EXPECTED LENGTH OF STAY: - - -  ACTUAL LENGTH OF STAY: Callie 84, NP

## 2021-11-01 NOTE — PROGRESS NOTES
Bedside and Verbal shift change report given to ANJALI Aquino RN  (oncoming nurse) by Melissa Dominguez RN (offgoing nurse). Report included the following information SBAR, Kardex and MAR.

## 2021-11-01 NOTE — PROGRESS NOTES
Reason for Readmission:     Headache, intermittent gait imbalance and distorted vision         RUR Score/Risk Level:   13% Low, Level one      PCP: First and Last name:   Dr. Saundra Bustamante   Name of Practice: [facility]   Are you a current patient: Yes/No: Yes   Approximate date of last visit: 10/11/21   Can you participate in a virtual visit with your PCP:  Yes    Is a Care Conference indicated:  No      Did you attend your follow up appointment (s): If not, why not: Yes         Resources/supports as identified by patient/family:   Spouse Jossuemanolo Kareem Vinh facing patient (as identified by patient/family and CM): Finances/Medication cost?  [employer] system or lack thereof? Spouse Mahendra Serna, [phone]  Living arrangements? Lives with spouse and two children      Self-care/ADLs/Cognition? Independent with no DME use          Current Advanced Directive/Advance Care Plan:  NA           Plan for utilizing home health:   TBD-PT/OT consulted             Transition of Care Plan:    Based on readmission, the patient's previous Plan of Care   has been evaluated and/or modified. The current Transition of Care Plan is:         Return home with family. Care Management Interventions  PCP Verified by CM:  Yes  Palliative Care Criteria Met (RRAT>21 & CHF Dx)?: No  Mode of Transport at Discharge:  (Family )  Transition of Care Consult (CM Consult):  (TBD)  Destiny Signup: Yes  Discharge Durable Medical Equipment: No  Physical Therapy Consult: Yes  Occupational Therapy Consult: Yes  Speech Therapy Consult: No  Support Systems: Spouse/Significant Other  Confirm Follow Up Transport: Self  The Plan for Transition of Care is Related to the Following Treatment Goals : [address] Provided?: No  Discharge Location  Discharge Placement: [facility] [place]
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
## 2021-11-01 NOTE — LETTER
Problem: Cardiac Rhythm Disturbances with or without Devices  Goal: Hemodynamic stability achieved/maintained  Outcome: Outcome Not Met, Continue to Monitor  Goal: Anxiety is controlled  Outcome: Outcome Not Met, Continue to Monitor  Goal: Participates in ADL/Activity without s/s of intolerance  Outcome: Outcome Not Met, Continue to Monitor  Goal: Urinary elimination pattern returned to baseline  Description: Patient must be making adequate amounts of urine output (240cc/8 hours) and voiding. If indwelling urinary catheter: Remove asap (no later an Day 2 or provider must specify reason for continued use).  Outcome: Outcome Not Met, Continue to Monitor  Goal: Verbalizes understanding of rhythm disturbance, treatment procedure and pre, post-, and d/c care specific to intervention  Description: Document on Patient Education Activity  Outcome: Outcome Not Met, Continue to Monitor     Problem: Diabetes  Goal: Glycemic balance achieved/maintained  Description: Goal is to maintain blood sugar within range with no episodes of hypoglycemia  Outcome: Outcome Not Met, Continue to Monitor  Goal: Verbalizes/demonstrates understanding of Diabetes  Description: Document on Patient Education Activity  Outcome: Outcome Not Met, Continue to Monitor  Goal: Demonstrates ability to self-administer insulin  Description: Document on Patient Education Activity  Outcome: Outcome Not Met, Continue to Monitor     Problem: At Risk for Falls  Goal: # Patient does not fall  Outcome: Outcome Not Met, Continue to Monitor  Goal: # Takes action to control fall-related risks  Outcome: Outcome Not Met, Continue to Monitor  Goal: # Verbalizes understanding of fall risk/precautions  Description: Document education using the patient education activity  Outcome: Outcome Not Met, Continue to Monitor      6/16/2021 Patient: Dayo Cochran YOB: 1990 Date of Visit: 6/16/2021 Jennifer Parkinson MD 
[address] [place] [id number] [address] [employer] Dear Jennifer Parkinson MD, Thank you for referring Ms. Charley Santana to [facility] for evaluation. My notes for this consultation are attached. If you have questions, please do not hesitate to call me. I look forward to following your patient along with you. Sincerely, Alhaji Valencia MD 
 
 O-T Plasty Text: The defect edges were debeveled with a #15 scalpel blade.  Given the location of the defect, shape of the defect and the proximity to free margins an O-T plasty was deemed most appropriate.  Using a sterile surgical marker, an appropriate O-T plasty was drawn incorporating the defect and placing the expected incisions within the relaxed skin tension lines where possible.    The area thus outlined was incised deep to adipose tissue with a #15 scalpel blade.  The skin margins were undermined to an appropriate distance in all directions utilizing iris scissors.
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
## 2021-11-01 NOTE — PROGRESS NOTES
1246: Bedside and Verbal shift change report given to ANABELLE (oncoming nurse) by LLOYD Resendiz (offgoing nurse). Report included the following information SBAR, Kardex, Intake/Output, MAR, Accordion, Recent Results and Med Rec Status.

## 2021-11-01 NOTE — TELEPHONE ENCOUNTER
Message from Robin 55 \"Pls cl re: severe headache and blurred vision. I am also seeing black spots. \"
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
## 2021-11-01 NOTE — PROGRESS NOTES
1514 Verbal shift change report given to Dewayne Franco RN (oncoming nurse) by Melissa Serrano RN (offgoing nurse). Report included the following information SBAR, Kardex and ED Summary.

## 2021-11-01 NOTE — ED NOTES
TRANSFER - OUT REPORT:    Verbal report given to SHANT Lees on Amanda Sim  being transferred to 3E(unit) for routine progression of care       Report consisted of patients Situation, Background, Assessment and   Recommendations(SBAR). Information from the following report(s) SBAR, ED Summary and MAR was reviewed with the receiving nurse. Lines:   Peripheral IV 11/01/21 Anterior;Proximal;Right Forearm (Active)        Opportunity for questions and clarification was provided.       Patient transported with:   transporter

## 2021-11-01 NOTE — CONSULTS
[address]    Name:  Perfecto Meeks  MR#:  [id number]  :  1990  ACCOUNT #:  [de-identified]  DATE OF SERVICE:  2021      REQUESTING PHYSICIAN:  Maya Clark MD    REASON FOR CONSULTATION:  Pseudotumor cerebri. HISTORY OF PRESENT ILLNESS:  The patient is a 43-year-old female who is known to me from outpatient clinic and has an established diagnosis of pseudotumor cerebri that was diagnosed 2 years ago. She has seen multiple neurologists in this regard and recently started seeing me in the clinic. Has tried multiple different medications including acetazolamide 1500 mg twice daily which did not help. Then, she was tried on Lasix and is currently on extended-release topiramate without much benefit. She has also had MR venogram which revealed bilateral, sagittal, and transverse sinus stenosis and has been following up with Dr. Liam Uribe, who did an angiogram, and she was noted to have 80%-90% stenosis in the transverse and sigmoid sinuses with an elevated intracranial venous flow gradient. Stenting was planned, and she was put on dual antiplatelet therapy, but had problems tolerating Plavix. She has been scheduled for followup to try alternatives prior to angioplasty or stenting. She has been experiencing headache along with seeing dark spots in her visual field and difficulties with her balance for the past 3-4 days, and it was not responding to her typical pain medications. She was brought into the hospital for lumbar puncture. Her last lumbar puncture was about 6 weeks ago when the opening pressure was noted to be at 18 cm of water. However, the drainage of fluid helped her significantly and relieved her symptoms. Denies any nausea, vomiting, focal motor sensory deficits, chest pain, shortness of breath, or palpitations. PAST MEDICAL HISTORY:  As mentioned above. She also has history of lupus, peptic ulcer disease, migraine headaches and anxiety.     HOME MEDICATIONS:  Trokendi 200 mg extended-release daily, gabapentin 600 mg three times daily as needed, cyclobenzaprine, diazepam as needed, hydroxychloroquine, oxycodone as needed. ALLERGIES:  LATEX, ACETAMINOPHEN, PLAVIX, NSAIDS, AND SEASONAL ALLERGIES. SOCIAL HISTORY:  No history of smoking or alcohol use. REVIEW OF SYSTEMS:  Negative except as mentioned in the HPI. PHYSICAL EXAMINATION:  GENERAL:  On examination, the patient is alert, fully oriented. VITAL SIGNS:  Blood pressure 123/83, temperature 97.6, pulse is 73. NEUROLOGIC:  Pupils are equal, round and reactive. Extraocular movements are full. Funduscopic exam revealed blurred disc margins, more so on the left. Face is symmetric. Visual fields are intact. Hearing is baseline. Muscle tone and bulk normal.  Strength normal in both upper and lower extremities. DTRs 2/2 and symmetric. Toes downgoing. Sensation intact. Gait is steady. HEART:  Regular rate and rhythm. CHEST:  Clear. ABDOMEN:  Soft, nontender. Positive bowel sounds. EXTREMITIES:  No edema. LABORATORY DATA:  CBC and chemistry are unremarkable. DIAGNOSTIC DATA:  CT brain did not show any acute abnormalities. CTA did not show any large vessel occlusion. ASSESSMENT AND PLAN:  A 68-year-old female with known history of idiopathic intracranial hypertension and bilateral transverse/sigmoid sinus stenosis. She has tried several different medications without significant benefit and is currently on Trokendi extended release 200 mg daily. She is coming back in with worsening headache, seeing dark spots in her visual field on the right and problems keeping her balance. I suspect this is related to increased intracranial pressure. She had lumbar puncture done about 6 weeks ago with an opening pressure of only at 18 cm of water, but the procedure significantly helped her symptoms for several weeks. She is now admitted for repeat lumbar puncture.   We should attempt to possibly reduce the closing pressure to around 10 cm of water or below. Regarding cerebral sinus stenosis, she has an appointment with Dr. Jennifer Grant in the next couple of weeks for venous sinus stenting/angioplasty. May discharge home after lumbar puncture. Please call with any questions. Thank you for this consultation.       Ren Rebolledo MD      [id number]  D:  11/01/2021 12:40  T:  11/01/2021 13:26  JOB #:  [id number]

## 2021-11-01 NOTE — ED TRIAGE NOTES
Pt arrives with CC of balance difficulties, seeing spots in her R eye, blurred vision in the L eye and headache. She says she feels generally weak but not on one side or the other. She sees her neurologist regularly, Dr. Jovana Storey, and he suggested coming to the ED. These symptoms have been going on for about a week. She denies DM.

## 2021-11-01 NOTE — PROGRESS NOTES
Occupational Therapy    Acknowledge OT order and completed chart review. Discussed patient with nursing. Patient plans for LP today which has resolved her vision and dizziness symptoms in past.  Nursing reports patient ambulating in room with supervision. Will continue to follow up for OT following bed rest after LP or tomorrow depending avaliability.      Thank you,    Anya Huynh, OT

## 2021-11-02 ENCOUNTER — DOCUMENTATION ONLY (OUTPATIENT)
Dept: NEUROSURGERY | Age: 31
End: 2021-11-02

## 2021-11-02 VITALS
SYSTOLIC BLOOD PRESSURE: 118 MMHG | OXYGEN SATURATION: 99 % | TEMPERATURE: 98 F | HEART RATE: 72 BPM | DIASTOLIC BLOOD PRESSURE: 76 MMHG | RESPIRATION RATE: 16 BRPM

## 2021-11-02 PROCEDURE — 74011250637 HC RX REV CODE- 250/637: Performed by: STUDENT IN AN ORGANIZED HEALTH CARE EDUCATION/TRAINING PROGRAM

## 2021-11-02 PROCEDURE — 74011250636 HC RX REV CODE- 250/636: Performed by: NURSE PRACTITIONER

## 2021-11-02 RX ORDER — OMEPRAZOLE 20 MG/1
20 CAPSULE, DELAYED RELEASE ORAL
Qty: 30 CAPSULE | Refills: 0 | Status: SHIPPED | OUTPATIENT
Start: 2021-11-02 | End: 2021-12-05

## 2021-11-02 RX ADMIN — Medication 10 ML: at 06:59

## 2021-11-02 RX ADMIN — HYDROMORPHONE HYDROCHLORIDE 1 MG: 1 INJECTION, SOLUTION INTRAMUSCULAR; INTRAVENOUS; SUBCUTANEOUS at 04:14

## 2021-11-02 RX ADMIN — HYDROMORPHONE HYDROCHLORIDE 1 MG: 1 INJECTION, SOLUTION INTRAMUSCULAR; INTRAVENOUS; SUBCUTANEOUS at 00:03

## 2021-11-02 RX ADMIN — PANTOPRAZOLE SODIUM 40 MG: 40 TABLET, DELAYED RELEASE ORAL at 07:05

## 2021-11-02 RX ADMIN — HYDROMORPHONE HYDROCHLORIDE 1 MG: 1 INJECTION, SOLUTION INTRAMUSCULAR; INTRAVENOUS; SUBCUTANEOUS at 08:22

## 2021-11-02 NOTE — DISCHARGE INSTRUCTIONS
Discharge Instructions       PATIENT ID: Christos Dunn  MRN: [id number]   YOB: 1990    DATE OF ADMISSION: 10/31/2021 10:56 PM    DATE OF DISCHARGE: 11/2/2021    PRIMARY CARE PROVIDER: Louise Roca MD     ATTENDING PHYSICIAN: Eloisa Powers MD  DISCHARGING PROVIDER: Abdulaziz Canales NP    To contact this individual call [phone] and ask the  to page. If unavailable ask to be transferred the Adult Hospitalist Department. DISCHARGE DIAGNOSES Intractable headache due to Pseudotumor cerebri    CONSULTATIONS: IP CONSULT TO NEUROINTERVENTIONAL SURGERY  IP CONSULT TO NEUROLOGY    PROCEDURES/SURGERIES: * No surgery found *    PENDING TEST RESULTS:   At the time of discharge the following test results are still pending: None     FOLLOW UP APPOINTMENTS:   Follow-up Information     Follow up With Specialties Details Why Contact Info    Louise Roca MD Family Medicine, Bariatrics Schedule an appointment as soon as possible for a visit For follow up  [address] [facility]      Gina Sweet MD Endovascular Surgical Neuroradiology Go to  your scheduled appointment [address] [place] [address] [address] [address]  [phone]             ADDITIONAL CARE RECOMMENDATIONS:   Take medications as prescribed. Keep your scheduled appointment with Dr. Kinza Cervantes in one week     DIET: Resume previous diet       ACTIVITY: Activity as tolerated    WOUND CARE: None     EQUIPMENT needed: None      DISCHARGE MEDICATIONS:   See Medication Reconciliation Form    · It is important that you take the medication exactly as they are prescribed. · Keep your medication in the bottles provided by the pharmacist and keep a list of the medication names, dosages, and times to be taken in your wallet. · Do not take other medications without consulting your doctor. NOTIFY YOUR PHYSICIAN FOR ANY OF THE FOLLOWING:   Fever over 101 degrees for 24 hours.    Chest pain, shortness of breath, fever, chills, nausea, vomiting, diarrhea, change in mentation, falling, weakness, bleeding. Severe pain or pain not relieved by medications. Or, any other signs or symptoms that you may have questions about.       DISPOSITION:  X  Home With:   OT  PT  HH  RN       SNF/Inpatient Rehab/LTAC    Independent/assisted living    Hospice    Other:         Signed:   Maggie Manuel NP  11/2/2021  6:04 AM

## 2021-11-02 NOTE — PROGRESS NOTES
0800: Bedside and Verbal shift change report given to ANABELLE (oncoming nurse) by PORSHA (offgoing nurse). Report included the following information SBAR, Kardex, Intake/Output, MAR, Accordion, Recent Results and Med Rec Status. 1045: I have reviewed discharge instructions with the patient. The patient verbalized understanding. Discharge medications reviewed with patient and appropriate educational materials and side effects teaching were provided.

## 2021-11-05 ENCOUNTER — TELEPHONE (OUTPATIENT)
Dept: NEUROLOGY | Age: 31
End: 2021-11-05

## 2021-11-06 NOTE — TELEPHONE ENCOUNTER
Pt called through the answering service. She is c/o ongoing headache since d/c Tuesday. Has known IIH and venous sinus stenting with Dr. Jordan List planned. I told her that she would likely continue to have HAs until this procedure is done. She has oxycodone for pain at home. I told her that I am not certain what else I have to offer tonight. She said ok and ended the call.

## 2021-11-08 ENCOUNTER — HOSPITAL ENCOUNTER (EMERGENCY)
Age: 31
Discharge: ANOTHER HOSPITAL | End: 2021-11-09
Attending: EMERGENCY MEDICINE | Admitting: EMERGENCY MEDICINE
Payer: MEDICAID

## 2021-11-08 DIAGNOSIS — R51.9 NONINTRACTABLE HEADACHE, UNSPECIFIED CHRONICITY PATTERN, UNSPECIFIED HEADACHE TYPE: Primary | ICD-10-CM

## 2021-11-08 PROCEDURE — 99283 EMERGENCY DEPT VISIT LOW MDM: CPT

## 2021-11-08 PROCEDURE — 96372 THER/PROPH/DIAG INJ SC/IM: CPT

## 2021-11-08 RX ORDER — ONDANSETRON 4 MG/1
4 TABLET, ORALLY DISINTEGRATING ORAL
Status: COMPLETED | OUTPATIENT
Start: 2021-11-09 | End: 2021-11-09

## 2021-11-08 RX ORDER — HYDROMORPHONE HYDROCHLORIDE 1 MG/ML
1 INJECTION, SOLUTION INTRAMUSCULAR; INTRAVENOUS; SUBCUTANEOUS ONCE
Status: COMPLETED | OUTPATIENT
Start: 2021-11-09 | End: 2021-11-09

## 2021-11-08 RX ORDER — KETOROLAC TROMETHAMINE 30 MG/ML
60 INJECTION, SOLUTION INTRAMUSCULAR; INTRAVENOUS
Status: COMPLETED | OUTPATIENT
Start: 2021-11-09 | End: 2021-11-09

## 2021-11-08 RX ORDER — DEXAMETHASONE SODIUM PHOSPHATE 10 MG/ML
10 INJECTION INTRAMUSCULAR; INTRAVENOUS ONCE
Status: COMPLETED | OUTPATIENT
Start: 2021-11-09 | End: 2021-11-09

## 2021-11-08 NOTE — TELEPHONE ENCOUNTER
Discussed with patient. She is now having postural headaches that worsen when she gets up and settle down when she lays down. Advised to take Fiorinal 3 times a day for the next 2 days and drink lots of fluids.   Suspect she is now experiencing low pressure headache/post LP

## 2021-11-09 ENCOUNTER — HOSPITAL ENCOUNTER (OUTPATIENT)
Age: 31
Setting detail: OBSERVATION
Discharge: HOME | End: 2021-11-10
Attending: EMERGENCY MEDICINE | Admitting: INTERNAL MEDICINE
Payer: MEDICAID

## 2021-11-09 VITALS
OXYGEN SATURATION: 99 % | TEMPERATURE: 98.1 F | HEART RATE: 87 BPM | HEIGHT: 61 IN | DIASTOLIC BLOOD PRESSURE: 76 MMHG | WEIGHT: 209 LBS | BODY MASS INDEX: 39.46 KG/M2 | SYSTOLIC BLOOD PRESSURE: 115 MMHG | RESPIRATION RATE: 18 BRPM

## 2021-11-09 DIAGNOSIS — G93.2 PSEUDOTUMOR CEREBRI SYNDROME: ICD-10-CM

## 2021-11-09 DIAGNOSIS — G97.1 POST LUMBAR PUNCTURE HEADACHE: Primary | ICD-10-CM

## 2021-11-09 LAB
ANION GAP SERPL CALC-SCNC: 10 MMOL/L (ref 5–15)
APTT PPP: 26.2 SEC (ref 22.1–31)
BASOPHILS # BLD: 0 K/UL (ref 0–0.1)
BASOPHILS NFR BLD: 0 % (ref 0–1)
BUN SERPL-MCNC: 8 MG/DL (ref 6–20)
BUN/CREAT SERPL: 10 (ref 12–20)
CA-I BLD-MCNC: 9 MG/DL (ref 8.5–10.1)
CHLORIDE SERPL-SCNC: 104 MMOL/L (ref 97–108)
CO2 SERPL-SCNC: 28 MMOL/L (ref 21–32)
CREAT SERPL-MCNC: 0.83 MG/DL (ref 0.55–1.02)
DIFFERENTIAL METHOD BLD: NORMAL
EOSINOPHIL # BLD: 0.1 K/UL (ref 0–0.4)
EOSINOPHIL NFR BLD: 1 % (ref 0–7)
ERYTHROCYTE [DISTWIDTH] IN BLOOD BY AUTOMATED COUNT: 12.5 % (ref 11.5–14.5)
GLUCOSE SERPL-MCNC: 101 MG/DL (ref 65–100)
HCT VFR BLD AUTO: 40.5 % (ref 35–47)
HGB BLD-MCNC: 13.8 G/DL (ref 11.5–16)
IMM GRANULOCYTES # BLD AUTO: 0 K/UL (ref 0–0.04)
IMM GRANULOCYTES NFR BLD AUTO: 0 % (ref 0–0.5)
INR PPP: 0.9 (ref 0.9–1.1)
LYMPHOCYTES # BLD: 2.8 K/UL (ref 0.8–3.5)
LYMPHOCYTES NFR BLD: 33 % (ref 12–49)
MCH RBC QN AUTO: 31.9 PG (ref 26–34)
MCHC RBC AUTO-ENTMCNC: 34.1 G/DL (ref 30–36.5)
MCV RBC AUTO: 93.8 FL (ref 80–99)
MONOCYTES # BLD: 0.8 K/UL (ref 0–1)
MONOCYTES NFR BLD: 9 % (ref 5–13)
NEUTS SEG # BLD: 4.8 K/UL (ref 1.8–8)
NEUTS SEG NFR BLD: 57 % (ref 32–75)
PLATELET # BLD AUTO: 263 K/UL (ref 150–400)
PMV BLD AUTO: 9.8 FL (ref 8.9–12.9)
POTASSIUM SERPL-SCNC: 3.9 MMOL/L (ref 3.5–5.1)
PROTHROMBIN TIME: 9.3 SEC (ref 9–11.1)
RBC # BLD AUTO: 4.32 M/UL (ref 3.8–5.2)
SODIUM SERPL-SCNC: 142 MMOL/L (ref 136–145)
THERAPEUTIC RANGE,PTTT: NORMAL SEC (ref 82–109)
WBC # BLD AUTO: 8.6 K/UL (ref 3.6–11)

## 2021-11-09 PROCEDURE — 62273 INJECT EPIDURAL PATCH: CPT

## 2021-11-09 PROCEDURE — 74011250636 HC RX REV CODE- 250/636: Performed by: INTERNAL MEDICINE

## 2021-11-09 PROCEDURE — 74011250636 HC RX REV CODE- 250/636: Performed by: EMERGENCY MEDICINE

## 2021-11-09 PROCEDURE — 85730 THROMBOPLASTIN TIME PARTIAL: CPT

## 2021-11-09 PROCEDURE — 96374 THER/PROPH/DIAG INJ IV PUSH: CPT

## 2021-11-09 PROCEDURE — 85610 PROTHROMBIN TIME: CPT

## 2021-11-09 PROCEDURE — 36415 COLL VENOUS BLD VENIPUNCTURE: CPT

## 2021-11-09 PROCEDURE — 99215 OFFICE O/P EST HI 40 MIN: CPT | Performed by: NURSE PRACTITIONER

## 2021-11-09 PROCEDURE — 96375 TX/PRO/DX INJ NEW DRUG ADDON: CPT

## 2021-11-09 PROCEDURE — 74011250637 HC RX REV CODE- 250/637: Performed by: INTERNAL MEDICINE

## 2021-11-09 PROCEDURE — G0378 HOSPITAL OBSERVATION PER HR: HCPCS

## 2021-11-09 PROCEDURE — 99284 EMERGENCY DEPT VISIT MOD MDM: CPT

## 2021-11-09 PROCEDURE — 74011250637 HC RX REV CODE- 250/637: Performed by: EMERGENCY MEDICINE

## 2021-11-09 PROCEDURE — 85025 COMPLETE CBC W/AUTO DIFF WBC: CPT

## 2021-11-09 PROCEDURE — 96376 TX/PRO/DX INJ SAME DRUG ADON: CPT

## 2021-11-09 PROCEDURE — 80048 BASIC METABOLIC PNL TOTAL CA: CPT

## 2021-11-09 PROCEDURE — 74011250636 HC RX REV CODE- 250/636

## 2021-11-09 RX ORDER — POLYETHYLENE GLYCOL 3350 17 G/17G
17 POWDER, FOR SOLUTION ORAL DAILY PRN
Status: DISCONTINUED | OUTPATIENT
Start: 2021-11-09 | End: 2021-11-10 | Stop reason: HOSPADM

## 2021-11-09 RX ORDER — OXYCODONE HYDROCHLORIDE 5 MG/1
5 TABLET ORAL
Status: DISCONTINUED | OUTPATIENT
Start: 2021-11-09 | End: 2021-11-10 | Stop reason: HOSPADM

## 2021-11-09 RX ORDER — SODIUM CHLORIDE 0.9 % (FLUSH) 0.9 %
5-40 SYRINGE (ML) INJECTION EVERY 8 HOURS
Status: DISCONTINUED | OUTPATIENT
Start: 2021-11-09 | End: 2021-11-10 | Stop reason: HOSPADM

## 2021-11-09 RX ORDER — HYDROMORPHONE HYDROCHLORIDE 2 MG/ML
2 INJECTION, SOLUTION INTRAMUSCULAR; INTRAVENOUS; SUBCUTANEOUS
Status: DISCONTINUED | OUTPATIENT
Start: 2021-11-09 | End: 2021-11-10 | Stop reason: HOSPADM

## 2021-11-09 RX ORDER — HYDROMORPHONE HYDROCHLORIDE 1 MG/ML
1 INJECTION, SOLUTION INTRAMUSCULAR; INTRAVENOUS; SUBCUTANEOUS ONCE
Status: COMPLETED | OUTPATIENT
Start: 2021-11-09 | End: 2021-11-09

## 2021-11-09 RX ORDER — SODIUM CHLORIDE 0.9 % (FLUSH) 0.9 %
5-40 SYRINGE (ML) INJECTION AS NEEDED
Status: DISCONTINUED | OUTPATIENT
Start: 2021-11-09 | End: 2021-11-10 | Stop reason: HOSPADM

## 2021-11-09 RX ORDER — HYDROXYCHLOROQUINE SULFATE 200 MG/1
200 TABLET, FILM COATED ORAL 2 TIMES DAILY WITH MEALS
Status: DISCONTINUED | OUTPATIENT
Start: 2021-11-09 | End: 2021-11-10 | Stop reason: HOSPADM

## 2021-11-09 RX ORDER — ONDANSETRON 2 MG/ML
4 INJECTION INTRAMUSCULAR; INTRAVENOUS
Status: DISCONTINUED | OUTPATIENT
Start: 2021-11-09 | End: 2021-11-10 | Stop reason: HOSPADM

## 2021-11-09 RX ORDER — MIDAZOLAM HYDROCHLORIDE 1 MG/ML
INJECTION, SOLUTION INTRAMUSCULAR; INTRAVENOUS
Status: COMPLETED
Start: 2021-11-09 | End: 2021-11-09

## 2021-11-09 RX ORDER — DIPHENHYDRAMINE HYDROCHLORIDE 50 MG/ML
50 INJECTION, SOLUTION INTRAMUSCULAR; INTRAVENOUS
Status: COMPLETED | OUTPATIENT
Start: 2021-11-09 | End: 2021-11-09

## 2021-11-09 RX ORDER — BIOTIN 5 MG
10 TABLET ORAL DAILY
Status: DISCONTINUED | OUTPATIENT
Start: 2021-11-09 | End: 2021-11-10 | Stop reason: HOSPADM

## 2021-11-09 RX ORDER — GABAPENTIN 600 MG/1
600 TABLET ORAL 3 TIMES DAILY
Status: DISCONTINUED | OUTPATIENT
Start: 2021-11-09 | End: 2021-11-10 | Stop reason: HOSPADM

## 2021-11-09 RX ORDER — CYCLOBENZAPRINE HCL 10 MG
10 TABLET ORAL AS NEEDED
Status: DISCONTINUED | OUTPATIENT
Start: 2021-11-09 | End: 2021-11-10 | Stop reason: HOSPADM

## 2021-11-09 RX ORDER — ONDANSETRON 4 MG/1
4 TABLET, ORALLY DISINTEGRATING ORAL
Status: DISCONTINUED | OUTPATIENT
Start: 2021-11-09 | End: 2021-11-10 | Stop reason: HOSPADM

## 2021-11-09 RX ORDER — DIAZEPAM 5 MG/1
10 TABLET ORAL
Status: DISCONTINUED | OUTPATIENT
Start: 2021-11-09 | End: 2021-11-10 | Stop reason: HOSPADM

## 2021-11-09 RX ORDER — SODIUM CHLORIDE 9 MG/ML
125 INJECTION, SOLUTION INTRAVENOUS CONTINUOUS
Status: DISCONTINUED | OUTPATIENT
Start: 2021-11-09 | End: 2021-11-10 | Stop reason: HOSPADM

## 2021-11-09 RX ADMIN — Medication 10 ML: at 10:02

## 2021-11-09 RX ADMIN — OXYCODONE 5 MG: 5 TABLET ORAL at 19:49

## 2021-11-09 RX ADMIN — ONDANSETRON 4 MG: 4 TABLET, ORALLY DISINTEGRATING ORAL at 00:02

## 2021-11-09 RX ADMIN — OXYCODONE 5 MG: 5 TABLET ORAL at 12:11

## 2021-11-09 RX ADMIN — HYDROXYCHLOROQUINE SULFATE 200 MG: 200 TABLET ORAL at 16:07

## 2021-11-09 RX ADMIN — HYDROMORPHONE HYDROCHLORIDE 2 MG: 2 INJECTION, SOLUTION INTRAMUSCULAR; INTRAVENOUS; SUBCUTANEOUS at 17:09

## 2021-11-09 RX ADMIN — HYDROMORPHONE HYDROCHLORIDE 2 MG: 2 INJECTION, SOLUTION INTRAMUSCULAR; INTRAVENOUS; SUBCUTANEOUS at 23:06

## 2021-11-09 RX ADMIN — KETOROLAC TROMETHAMINE 60 MG: 30 INJECTION, SOLUTION INTRAMUSCULAR at 00:02

## 2021-11-09 RX ADMIN — HYDROMORPHONE HYDROCHLORIDE 1 MG: 1 INJECTION, SOLUTION INTRAMUSCULAR; INTRAVENOUS; SUBCUTANEOUS at 10:02

## 2021-11-09 RX ADMIN — HYDROMORPHONE HYDROCHLORIDE 2 MG: 2 INJECTION, SOLUTION INTRAMUSCULAR; INTRAVENOUS; SUBCUTANEOUS at 12:53

## 2021-11-09 RX ADMIN — HYDROMORPHONE HYDROCHLORIDE 1 MG: 1 INJECTION, SOLUTION INTRAMUSCULAR; INTRAVENOUS; SUBCUTANEOUS at 04:59

## 2021-11-09 RX ADMIN — MIDAZOLAM 2 MG: 1 INJECTION INTRAMUSCULAR; INTRAVENOUS at 05:00

## 2021-11-09 RX ADMIN — DEXAMETHASONE SODIUM PHOSPHATE 10 MG: 10 INJECTION INTRAMUSCULAR; INTRAVENOUS at 00:01

## 2021-11-09 RX ADMIN — Medication 10 MG: at 12:11

## 2021-11-09 RX ADMIN — HYDROMORPHONE HYDROCHLORIDE 1 MG: 1 INJECTION, SOLUTION INTRAMUSCULAR; INTRAVENOUS; SUBCUTANEOUS at 00:01

## 2021-11-09 RX ADMIN — DIPHENHYDRAMINE HYDROCHLORIDE 50 MG: 50 INJECTION, SOLUTION INTRAMUSCULAR; INTRAVENOUS at 05:45

## 2021-11-09 RX ADMIN — Medication 10 ML: at 22:00
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
## 2021-11-09 NOTE — PERIOP NOTES
Patient collected from ED room 5 and brought to PACU via stretcher. ID band and allergy band confirmed with patient. C/o headache 8/10. Patient states she had lumbar puncture last Wednesday. Has had in the past and has also had two blood patches in the past.  Consent for blood patch obtained and then pain med administered per ED MD order. Patient is resting, muching ice chips. Advises that she is prone to \"panic attacks\" and has had these during blood patch procedures in past.  Assured her we will advise Dr. Addi Smith prior to start of procedure. Patient has 20 gauge peripheral IV upper Right arm, excellent blood return. 0549--premedicated with 2 mg Versed IV per Dr. Addi Smith. Blood patch completed without difficulty. Patient sipping soda and states headache better now (4/10). Report phoned to ED RN.

## 2021-11-09 NOTE — CONSULTS
This patient is well-known to our service with medically refractory intracranial hypertension. Her most recent LP demonstrated an opening pressure of 27 cm of CSF. She is back in the hospital now for a CSF leak but appears to be responding to a blood patch. Her Trokendi has been temporarily held because of intracranial hypotension. I spoke to Dr. Shawna Guardado who prefers to hold the Trokendi for 1 week. My plan is to see her in the office after discharge on Friday, November 12 in order to plan intracranial stenting of the dominant right transverse/sigmoid venous sinus. Our plans to perform this procedure earlier were placed on hold due to intolerance to Plavix. We will need to trial Brilinta before proceeding. Appreciate neurology medical management in this case. Treatment plan was discussed with the patient at the bedside. She is neurologically nonfocal and her positional headache is essentially resolved.

## 2021-11-09 NOTE — PROGRESS NOTES
Transition of Care Plan: Home    RUR: N/A    PCP F/U: Dr. Roxy Delgado    Disposition: Home    Transportation: Family    Main Contact: : Elaine Granado [phone]: Met with patient at bedside. A&O X 4. Confirmed demographics. Lives with  and kids. No stairs in the home. No DME used. Independent with ADLs. Uses [facility] pharmacy off of [facility].  to take home at discharge. Marybel Ray RN, CRM    Observation notice provided in writing to patient and/or caregiver as well as verbal explanation of the policy. Patients who are in outpatient status also receive the Observation notice. Patient has received notice and or patient representative has received via secure email, fax, or certified mail based on patient representative's preference. Reason for Readmission:     Severe headache         RUR Score/Risk Level:   N/A      PCP: First and Last name:  Raymond Calderon   Name of Practice:    Are you a current patient: Yes/No: yes   Approximate date of last visit: 1 month ago   Can you participate in a virtual visit with your PCP: no    Is a Care Conference indicated:  no      Did you attend your follow up appointment (s): If not, why not: No: no appointment set up. Resources/supports as identified by patient/family: Spouse        Top Challenges facing patient (as identified by patient/family and CM): None reported       Finances/Medication cost?  [employer]      Family  Support system or lack thereof? Family    Living arrangements? Spouse and kids      Self-care/ADLs/Cognition? A&Ox4. Independent. Current Advanced Directive/Advance Care Plan:  Full code           Plan for utilizing home health:   None indicated at this time             Transition of Care Plan:    Based on readmission, the patient's previous Plan of Care   has been evaluated and/or modified.  The current Transition of Care Plan is: Home with family          Care Management Interventions  PCP Verified by CM: Yes (Dr Ethan Fernandez)  Mode of Transport at Discharge:  Other (see comment) (Family)  Support Systems: Spouse/Significant Other  Confirm Follow Up Transport: Family  Discharge Location  Discharge Placement: Home  Readmission Assessment  Number of days since last admission?: 1-7 days  Previous disposition: Home with Family  Who is being interviewed?: Patient  What was the patient's/caregiver's perception as to why they think they needed to return back to the hospital?: Other (Comment) (worsening condition)  Did you visit your Primary Care Physician after you left the hospital, before you returned this time?: No  Why weren't you able to visit your PCP?: Did not have an appointment  Did you see a specialist, such as Cardiac, Pulmonary, Orthopedic Physician, etc. after you left the hospital?: No (No appointment)  Who advised the patient to return to the hospital?: Self-referral  Does the patient report anything that got in the way of taking their medications?: No  In our efforts to provide the best possible care to you and others like you, can you think of anything that we could have done to help you after you left the hospital the first time, so that you might not have needed to return so soon?: Other (Comment) (worsening condition)

## 2021-11-09 NOTE — PROGRESS NOTES
Epidural Blood Patch Procedure Note      Patient is a 27 yo WF S/P LP. Patient has subsequently developed a positional headache relieved with assuming the recumbent position. No fever or focal neurological deficits. Patient wishes to pursue aggressive treatment with epidural blood patch vs conservative therapy. Risk and benefits were discussed with the patient and plans are to proceed. Patient was placed in the seated position. The back was prepped at the lumbar region and right wrist with betadine. 2% lidocaine was used as local at L3 - L4. A 17 Tuohy needle was passed x 1 attempt(s) at the above stated level. Loss of resistance was obtained using air. 20 mL of sterile blood was withdrawn and placed in the epidural space until 100% resolution of headache and neck stiffness. Patient was instructed to followup emergently in the nearest ER if she experiences signs and symptoms of focal neurological deficits ie. bowel / bladder / motor loss or localized tenderness / erythema at the epidural site associated with fever.

## 2021-11-09 NOTE — ED TRIAGE NOTES
Pt is a transfer from [facility] free standing ER for a headache. Pt has a Hx intercranial hypertension. Pt was sent to [facility] to receive a .
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
## 2021-11-09 NOTE — ROUTINE PROCESS
TRANSFER - OUT REPORT:    Verbal report given to Soraida(name) on Kacie Manning  being transferred to 6S(unit) for routine progression of care       Report consisted of patients Situation, Background, Assessment and   Recommendations(SBAR). Information from the following report(s) SBAR was reviewed with the receiving nurse. Lines:   Peripheral IV 11/09/21 Right; Upper Basilic (Active)   Site Assessment Clean, dry, & intact 11/09/21 0553   Phlebitis Assessment 0 11/09/21 0553   Infiltration Assessment 0 11/09/21 0553   Dressing Status Clean, dry, & intact 11/09/21 0553   Hub Color/Line Status Pink; Flushed; Patent 11/09/21 0553   Alcohol Cap Used Yes 11/09/21 0553        Opportunity for questions and clarification was provided.       Patient transported with:   Aleth

## 2021-11-10 VITALS
HEART RATE: 97 BPM | OXYGEN SATURATION: 99 % | SYSTOLIC BLOOD PRESSURE: 124 MMHG | BODY MASS INDEX: 39.45 KG/M2 | RESPIRATION RATE: 9 BRPM | WEIGHT: 208.78 LBS | DIASTOLIC BLOOD PRESSURE: 84 MMHG | TEMPERATURE: 98.1 F

## 2021-11-10 PROCEDURE — 74011250636 HC RX REV CODE- 250/636: Performed by: INTERNAL MEDICINE

## 2021-11-10 PROCEDURE — 96376 TX/PRO/DX INJ SAME DRUG ADON: CPT

## 2021-11-10 PROCEDURE — G0378 HOSPITAL OBSERVATION PER HR: HCPCS

## 2021-11-10 PROCEDURE — 74011250637 HC RX REV CODE- 250/637: Performed by: INTERNAL MEDICINE

## 2021-11-10 RX ORDER — TOPIRAMATE 200 MG/1
200 CAPSULE, EXTENDED RELEASE ORAL DAILY
Qty: 30 CAPSULE | Refills: 1 | Status: SHIPPED
Start: 2021-11-10 | End: 2022-02-22

## 2021-11-10 RX ADMIN — HYDROMORPHONE HYDROCHLORIDE 2 MG: 2 INJECTION, SOLUTION INTRAMUSCULAR; INTRAVENOUS; SUBCUTANEOUS at 11:00

## 2021-11-10 RX ADMIN — HYDROXYCHLOROQUINE SULFATE 200 MG: 200 TABLET ORAL at 08:43

## 2021-11-10 RX ADMIN — Medication 10 ML: at 06:00

## 2021-11-10 RX ADMIN — Medication 10 MG: at 08:52

## 2021-11-10 RX ADMIN — HYDROMORPHONE HYDROCHLORIDE 2 MG: 2 INJECTION, SOLUTION INTRAMUSCULAR; INTRAVENOUS; SUBCUTANEOUS at 07:21

## 2021-11-10 NOTE — DISCHARGE INSTRUCTIONS
Please bring this form with you to show your primary care provider at your follow-up appointment. Primary care provider:  Dr. Melissa Rosario MD    Discharging provider:  Karen Banks MD    You have been admitted to the hospital with the following diagnoses:  · Headache [R51.9]    FOLLOW-UP CARE RECOMMENDATIONS:    APPOINTMENTS:  Follow-up Information     Follow up With Specialties Details Why Contact Info    Blessing Navarro MD Neurology Schedule an appointment as soon as possible for a visit  [facility] [address] [facility] [address]      Melissa Rosario, [address], Bariatrics In 2 weeks Discharge follow up  [facility] [address]  [phone]      Luciana Junior MD Endovascular Surgical Neuroradiology  follow up as scheduled on 11/12/2021 [address] [facility]  [address] [facility] [address] [facility] [address]  [phone]              FOLLOW-UP TESTS recommended:   - Hold Trokendi for 1 week or until you are asked to restart by  or       PENDING TEST RESULTS:  At the time of your discharge the following test results are still pending: NONE  Please make sure you review these results with your outpatient follow-up provider(s). SYMPTOMS to watch for: chest pain, shortness of breath, fever, chills, nausea, vomiting, diarrhea, change in mentation, falling, weakness, bleeding. DIET/what to eat:  Regular Diet    ACTIVITY:  Activity as tolerated    WOUND CARE: NONE    EQUIPMENT needed:  NONE      What to do if new or unexpected symptoms occur? If you experience any of the above symptoms (or should other concerns or questions arise after discharge) please call your primary care physician. Return to the emergency room if you cannot get hold of your doctor. · It is very important that you keep your follow-up appointment(s).   · Please bring discharge papers, medication list (and/or medication bottles) to your follow-up appointments for review by your outpatient provider(s). · Please check the list of medications and be sure it includes every medication (even non-prescription medications) that your provider wants you to take. · It is important that you take the medication exactly as they are prescribed. · Keep your medication in the bottles provided by the pharmacist and keep a list of the medication names, dosages, and times to be taken in your wallet. · Do not take other medications without consulting your doctor. · If you have any questions about your medications or other instructions, please talk to your nurse or care provider before you leave the hospital.    I understand that if any problems occur once I am at home I am to contact my physician. These instructions were explained to me and I had the opportunity to ask questions.

## 2021-11-10 NOTE — PROGRESS NOTES
Discharge medications reviewed with patient and appropriate educational materials and side effects teaching were provided. Home with  via private transportation, aware of medication changes and follow up appointments.

## 2021-11-10 NOTE — DISCHARGE SUMMARY
Discharge Summary     Patient:  Aisha Mckay       MRN: [id number]       YOB: 1990       Age: 26 y.o. Date of admission:  11/9/2021    Date of discharge:  11/10/2021    Primary care provider: Dr. Patsy Roberson MD    Admitting provider:  [facility]    Discharging provider:  Marito Campos MD - [phone]  If unavailable, call [phone] and ask the  to page the triage hospitalist.    Consultations  · IP CONSULT TO NEUROLOGY  · IP CONSULT TO NEUROINTERVENTIONAL SURGERY  · IP CONSULT TO NEUROLOGY  · IP CONSULT TO HOSPITALIST    Procedures  · Procedure(s):  · EPIDURAL BLOOD PATCH    Discharge destination: HOME. The patient is stable for discharge. Admission diagnosis  · Headache [R51.9]    Current Discharge Medication List      CONTINUE these medications which have CHANGED    Details   Trokendi  mg capsule Take 1 Capsule by mouth daily. HOLD for 1 week  Qty: 30 Capsule, Refills: 1  Start date: 11/10/2021    Associated Diagnoses: Pseudotumor cerebri syndrome         CONTINUE these medications which have NOT CHANGED    Details   omeprazole (PRILOSEC) 20 mg capsule Take 1 Capsule by mouth daily as needed for Pain. Qty: 30 Capsule, Refills: 0      biotin 10 mg tab Take 10 mg by mouth daily. diazePAM (VALIUM) 10 mg tablet Take 10 mg by mouth daily as needed for Anxiety. oxyCODONE IR (ROXICODONE) 5 mg immediate release tablet Take 5 mg by mouth two (2) times daily as needed for Pain. psyllium seed, with sugar, (FIBER PO) Take 1 Tablet by mouth daily. tetrahydrozoline HCl/zinc sulf (EYE DROPS IRRITATION RELIEF OP) Administer 1 Drop to both eyes as needed for PRN Reason (Other) (Eye irritation). OTC eye drops      gabapentin (NEURONTIN) 600 mg tablet Take 600 mg by mouth three (3) times daily as needed.       cyclobenzaprine (FLEXERIL) 10 mg tablet TAKE 1 TABLET BY MOUTH TWICE A DAY  Qty: 180 Tablet, Refills: 1      naloxone (NARCAN) 4 mg/actuation nasal spray Use 1 spray intranasally, then discard. Repeat with new spray every 2 min as needed for opioid overdose symptoms, alternating nostrils. Indications: decrease in rate & depth of breathing due to opioid drug, opioid overdose  Qty: 2 Each, Refills: 0      hydrOXYchloroQUINE (PLAQUENIL) 200 mg tablet TAKE 1 TABLET BY MOUTH TWICE A DAY PA REQUIRED FOR QTY   16 FAXED MD  Qty: 60 Tab, Refills: 2      LORazepam (ATIVAN) 1 mg tablet Take 1.5 mg by mouth nightly as needed for Anxiety. Follow-up Information     Follow up With Specialties Details Why Contact Info    Amanda Abdi MD Neurology Schedule an appointment as soon as possible for a visit  [facility] [address] [facility], [address], [facility] In 2 weeks Discharge follow up  [place] [address]  [phone]      Danielle Martin MD Endovascular Surgical Neuroradiology  follow up as scheduled on 11/12/2021 [address] [facility]  [address] [facility] [address] [facility] [address]  [phone]            Final discharge diagnoses and brief hospital course  Please also refer to the admission H&P for details on the presenting problem. 33-year-old female with past medical history idiopathic intracranial hypertension, bilateral transverse/sigmoid sinus stenosis, presenting to [facility] with worsening headache following lumbar puncture last week. Per patient, she was diagnosed with IIH Feb 2020. She was initially treated with Diamox but discontinued due to no effect. She is now maintained on Trokendi XR. Patient undergoes intermittent lumbar punctures, most previous last week. She was experiences vision changes and headache. Post lumbar puncture, vision improved but then she developed progressive severe headache, that is positional and worse when sitting up.   She has prescription for chronic pain medication but headache persisted she came to the hospital for further evaluation. Anesthesia evaluated in the ED and applied blood patch      Headache, Severe suspect from CSF leak  - s/p blood patch by anesthesia  - hold Trokendi per Neuro and NIS, resume in  1 week   - follow up with  as scheduled on 11/12/21    Refractory Intracranial Hypertension  - plan for Intracranial stenting of dominant right transverse/sigmoid venous sinus   - follow up with  outpatient as scheduled        FOLLOW-UP TESTS recommended:   - Hold Trokendi for 1 week or until you are asked to restart by  or         PENDING TEST RESULTS:  At the time of your discharge the following test results are still pending: NONE  Please make sure you review these results with your outpatient follow-up provider(s).     SYMPTOMS to watch for: chest pain, shortness of breath, fever, chills, nausea, vomiting, diarrhea, change in mentation, falling, weakness, bleeding.     DIET/what to eat:  Regular Diet     ACTIVITY:  Activity as tolerated     WOUND CARE: NONE     EQUIPMENT needed:  NONE    Physical examination at discharge  Visit Vitals  /74 (BP 1 Location: Left upper arm, BP Patient Position: At rest)   Pulse 87   Temp 98.4 °F (36.9 °C)   Resp 19   Wt 94.7 kg (208 lb 12.4 oz)   SpO2 97%   BMI 39.45 kg/m²     AO3  Lung clear  CVS: RRR  Abd: soft NT ND   Ext: no edema     Pertinent imaging studies:      Recent Labs     11/09/21 0045   WBC 8.6   HGB 13.8   HCT 40.5        Recent Labs     11/09/21 0045      K 3.9      CO2 28   BUN 8   CREA 0.83   *   CA 9.0     No results for input(s): AP, TBIL, TP, ALB, GLOB, GGT, AML, LPSE in the last 72 hours. No lab exists for component: SGOT, GPT, AMYP, HLPSE  Recent Labs     11/09/21 0045   INR 0.9   PTP 9.3   APTT 26.2      No results for input(s): FE, TIBC, PSAT, FERR in the last 72 hours.    No results for input(s): PH, PCO2, PO2 in the last 72 hours. No results for input(s): CPK, CKMB in the last 72 hours. No lab exists for component: TROPONINI  No components found for: [place]    Chronic Diagnoses:    Problem List as of 11/10/2021 Date Reviewed: 10/27/2021          Codes Class Noted - Resolved    Bruising ICD-10-CM: T14. 8XXA  ICD-9-CM: 924.9  10/8/2021 - Present        Intractable migraine ICD-10-CM: L91.611  ICD-9-CM: 346.91  9/30/2021 - Present        Intractable headache ICD-10-CM: R51.9  ICD-9-CM: 784.0  9/26/2021 - Present        Pseudotumor cerebri ICD-10-CM: G93.2  ICD-9-CM: 348.2  9/3/2021 - Present        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  ICD-9-CM: 437.0  8/30/2021 - Present        Papilledema associated with increased intracranial pressure ICD-10-CM: H47.11  ICD-9-CM: 377.01  8/30/2021 - Present        Intracranial hypertension ICD-10-CM: G93.2  ICD-9-CM: 348.2  10/23/2020 - Present        Ataxia ICD-10-CM: R27.0  ICD-9-CM: 781.3  9/14/2020 - Present        Headache ICD-10-CM: R51.9  ICD-9-CM: 784.0  8/29/2020 - Present        IIH (idiopathic intracranial hypertension) ICD-10-CM: G93.2  ICD-9-CM: 348.2  8/25/2020 - Present        SLE (systemic lupus erythematosus) (HCC) (Chronic) ICD-10-CM: M32.9  ICD-9-CM: 710.0  8/25/2020 - Present        Class 3 severe obesity in adult [facility]) ICD-10-CM: E66.01  ICD-9-CM: 278.01  8/22/2018 - Present        S/P repair of paraesophageal hernia ICD-10-CM: Z98.890, Z87.19  ICD-9-CM: V45.89  11/17/2017 - Present        Paraesophageal hernia ICD-10-CM: K44.9  ICD-9-CM: 553.3  11/15/2017 - Present        GERD (gastroesophageal reflux disease) ICD-10-CM: K21.9  ICD-9-CM: 530.81  11/7/2017 - Present        Obesity, Class II, BMI 35-39.9 ICD-10-CM: E66.9  ICD-9-CM: 278.00  3/31/2017 - Present        Sebaceous cyst ICD-10-CM: L72.3  ICD-9-CM: 706.2  3/24/2017 - Present    Overview Addendum 5/1/2017 10:52 AM by Michaela Loyd MD     S/P excision; Along sternum in cleavage.              History of Nissen fundoplication DFW-12-: P31.836  ICD-9-CM: V15.29  1/4/2017 - Present    Overview Signed 1/4/2017  1:51 PM by Rosa Birch LPN     4 duodenal ulcers, chronic gastritis, Grade C esophagitis, Chronic GERD, hernia, small tumor. Done August/2016. Chronic migraine without aura without status migrainosus, not intractable ICD-10-CM: J38.327  ICD-9-CM: 346.70  5/18/2016 - Present        Cervical incompetence ICD-10-CM: N88.3  ICD-9-CM: 622.5  4/12/2013 - Present              Time spent on discharge related activities today greater than 30 minutes.       Signed:  Karen Banks MD                 Hospitalist, Internal Medicine      Cc: Melissa Rosario MD

## 2021-11-10 NOTE — PROGRESS NOTES
Bedside and Verbal shift change report given to Rabia RN (oncoming nurse) by Pilar Alicia (offgoing nurse). Report included the following information SBAR, Kardex, Procedure Summary, Intake/Output, MAR, Accordion and Recent Results.

## 2021-11-11 ENCOUNTER — OFFICE VISIT (OUTPATIENT)
Dept: FAMILY MEDICINE CLINIC | Age: 31
End: 2021-11-11
Payer: MEDICAID

## 2021-11-11 VITALS
OXYGEN SATURATION: 99 % | TEMPERATURE: 97.1 F | SYSTOLIC BLOOD PRESSURE: 127 MMHG | RESPIRATION RATE: 16 BRPM | DIASTOLIC BLOOD PRESSURE: 79 MMHG | HEART RATE: 91 BPM | WEIGHT: 208 LBS | BODY MASS INDEX: 39.27 KG/M2 | HEIGHT: 61 IN

## 2021-11-11 DIAGNOSIS — G93.2 PSEUDOTUMOR CEREBRI SYNDROME: Primary | ICD-10-CM

## 2021-11-11 DIAGNOSIS — R51.9 FREQUENT HEADACHES: ICD-10-CM

## 2021-11-11 PROCEDURE — 99215 OFFICE O/P EST HI 40 MIN: CPT | Performed by: FAMILY MEDICINE

## 2021-11-11 NOTE — PROGRESS NOTES
Chief Complaint   Patient presents with   [facility] Follow Up     Patient reports pressure close to 30 and Doctor wanted it below 16. Patient reports Lumbar procedure leakage due to not closed properly the first visit. Patient had to reports back to ER to have Lumbar leak closed.  Abnormal EKG     she is a 26y.o. year old female who presents for evalution. after hospital discharge  Her pressure on recent LP was 30  She was discharged last week Tuesday  She had to be readmiitted 3 days later and discharged nov 9th  She is here for discharge folllow up  She is concerned her EKG was abn on the recent hospital stay  She will be starting brillinta this week to anticoag for the stent procedure  She has to do this process before getting the bariatric surgery      Reviewed PmHx, RxHx, FmHx, SocHx, AllgHx and updated and dated in the chart. Aspirin yes ____   No____ N/A____    Patient Active Problem List    Diagnosis    Bruising    Intractable migraine    Intractable headache    Pseudotumor cerebri    Stenosis of cerebral venous sinus    Papilledema associated with increased intracranial pressure    Intracranial hypertension    Ataxia    Headache    IIH (idiopathic intracranial hypertension)    SLE (systemic lupus erythematosus) ([facility])    Class 3 severe obesity in adult [facility])    S/P repair of paraesophageal hernia    Paraesophageal hernia    GERD (gastroesophageal reflux disease)    Obesity, Class II, BMI 35-39.9    Sebaceous cyst     S/P excision; Along sternum in cleavage.  History of Nissen fundoplication     4 duodenal ulcers, chronic gastritis, Grade C esophagitis, Chronic GERD, hernia, small tumor. Done August/2016.       Chronic migraine without aura without status migrainosus, not intractable    Cervical incompetence       Nurse notes were reviewed and copied and are correct  Review of Systems - negative except as listed above in the HPI    Objective:     Vitals:    11/11/21 0829 BP: 127/79   Pulse: 91   Resp: 16   Temp: 97.1 °F (36.2 °C)   TempSrc: Skin   SpO2: 99%   Weight: 208 lb (94.3 kg)   Height: 5' 1\" (1.549 m)      Physical Examination: General appearance - alert, well appearing, and in no distress  Mental status - alert, oriented to person, place, and time  Eyes - pupils equal and reactive, extraocular eye movements intact  Chest - clear to auscultation, no wheezes, rales or rhonchi, symmetric air entry  Heart - normal rate, regular rhythm, normal S1, S2, no murmurs, rubs, clicks or gallops      Assessment/ Plan:   Diagnoses and all orders for this visit:    1. Pseudotumor cerebri syndrome  Has a stent placement planned soon  Then getting bariatric surgery to lose weight   These procedure should cure the Intracranial hypertension  2. Frequent headaches     due to #1      ICD-10-CM ICD-9-CM    1. Pseudotumor cerebri syndrome  G93.2 348.2    2. Frequent headaches  R51.9 784.0        I have discussed the diagnosis with the patient and the intended plan as seen in the above orders. The patient has received an after-visit summary and questions were answered concerning future plans. There are no Patient Instructions on file for this visit.     The patient verbalizes understanding and agrees with the plan of care

## 2021-11-11 NOTE — PROGRESS NOTES
1. Have you been to the ER, urgent care clinic since your last visit? Hospitalized since your last visit? [place] [facility] transfer to [facility] 11/8-11/9/2021 Intercranial Hypertension    2. Have you seen or consulted any other health care providers outside of the [address] [place] [address] since your last visit? Include any pap smears or colon screening. No  Health Maintenance Due   Topic Date Due    COVID-19 Vaccine (1) Never done    Cervical cancer screen  Never done    Flu Vaccine (1) Never done       Chief Complaint   Patient presents with   [facility] Follow Up     Patient reports pressure close to 30 and Doctor wanted it below 16. Patient reports Lumbar procedure leakage due to not closed properly the first visit. Patient had to reports back to ER to have Lumbar leak closed.  Abnormal EKG     3 most recent PHQ Screens 11/11/2021   PHQ Not Done -   Little interest or pleasure in doing things Not at all   Feeling down, depressed, irritable, or hopeless Not at all   Total Score PHQ 2 0     Visit Vitals  /79 (BP 1 Location: Left arm, BP Patient Position: Sitting, BP Cuff Size: Adult)   Pulse 91   Temp 97.1 °F (36.2 °C) (Skin)   Resp 16   Ht 5' 1\" (1.549 m)   Wt 208 lb (94.3 kg)   SpO2 99%   BMI 39.30 kg/m²     Abuse Screening Questionnaire 11/11/2021   Do you ever feel afraid of your partner? N   Are you in a relationship with someone who physically or mentally threatens you? N   Is it safe for you to go home?  Y     Learning Assessment 8/30/2021   PRIMARY LEARNER Patient   HIGHEST LEVEL OF EDUCATION - PRIMARY LEARNER  -   BARRIERS PRIMARY LEARNER -   CO-LEARNER CAREGIVER -   PRIMARY LANGUAGE ENGLISH    NEED -   LEARNER PREFERENCE PRIMARY READING     -   LEARNING SPECIAL TOPICS -   ANSWERED BY patient   RELATIONSHIP SELF

## 2021-11-12 ENCOUNTER — TELEPHONE (OUTPATIENT)
Dept: NEUROSURGERY | Age: 31
End: 2021-11-12

## 2021-11-12 ENCOUNTER — VIRTUAL VISIT (OUTPATIENT)
Dept: NEUROSURGERY | Age: 31
End: 2021-11-12
Payer: MEDICAID

## 2021-11-12 DIAGNOSIS — I66.9 STENOSIS OF INTRACRANIAL VESSEL: Primary | ICD-10-CM

## 2021-11-12 PROCEDURE — 99215 OFFICE O/P EST HI 40 MIN: CPT | Performed by: RADIOLOGY
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
## 2021-11-12 NOTE — PROGRESS NOTES
Phone call to patient in preparation for Virtual/phone visit with provider. Name and  verified. Patient unable to obtain vital signs at home. Follow up [facility] visit for headaches. .  Patient reports 2 visits to ED. First for Lumbar puncture - opening pressure 30 and next visit for a blood patch. Patient reports no headache today. Reports Emilykendi is on hold for 7 days.  She will restart and add lasix per Neurologist.

## 2021-11-12 NOTE — TELEPHONE ENCOUNTER
Confirmed Stenting date of 12/16/2021 at [facility]. Arrival time 7:00 am at Patient registration. Informed patient PAT will schedule an appointment for labs, xray, covid testing. I will call with start date for Brilinta and Aspirin. Patient stated understanding.

## 2021-11-12 NOTE — PATIENT INSTRUCTIONS
Stenting treatment on 12/16/2021 at [address] time 7:00 am at Patient Registration. Pre admission testing will contact you to schedule an appointment for las, xrays, covid testing. Do not stop taking Brilinta or Aspirin as ordered. No eating or drinking after midnight the night prior to Angiogram and take all morning medications with sips of water the morning of the angiogram.                       Learning About How Hospitals and Clinics Keep You Safe From [address] [place] [address] and clinics now are treating people who are infected with COVID-19. So if you're in the hospital or clinic for any other reason, this may be an unsettling time. It's common to be concerned about becoming infected with the virus. But hospitals and clinics have policies to prevent the spread of infections. For example, doctors and nurses are trained to wash their hands before they treat you. Health care centers have stepped up these policies now. They are taking further steps to protect their patients. As long as GLYLA-57 remains a public health problem, things are going to be different when you go to a health care facility. They may have new rules for your safety. These could include having you wear a cloth face cover, meeting you outside the clinic, and having you sit away from others in the waiting room. What are hospitals and clinics doing to keep you safe? Your care team is very aware of the threat of COVID-19. They are doing everything they can to keep you safe. Hospitals and clinics may have different policies. But in general, you may expect many of these measures:  · You will be screened for COVID-19. When you come to the hospital, you may have your temperature taken. You'll be asked about any symptoms, such as a fever, a cough, or shortness of breath. You may be asked if you've had contact with anyone who's been diagnosed with the virus.  And you may be asked if you've traveled to any place that has had an outbreak. · The staff may try to do as much as possible outside the facility. For example, you may be asked to fill out paperwork online or in your car before you come inside. The person giving you a ride home may be asked to wait outside. These new rules to help protect you may make routine tasks take longer than usual.  · People who have COVID-19 are treated in a separate area. Many hospitals and clinics have staff members who treat only these patients. This helps limit the spread of the infection. · Visitors may be limited. In some cases, no visitors are allowed. In others, only one healthy visitor is allowed. Children may be limited to having only one adult with them. You can connect with family and friends using your phone or computer. If you need something brought from home, such as glasses or a phone , find out where the item can be dropped off. · The hospital or clinic follows guidelines to prevent infection. These include:  ? Washing hands often. ? Disinfecting high-touch surfaces. ? Wearing face masks or other protective equipment. ? Making extra space for social distancing. What can you do to stay safe? We all have a role to play in keeping ourselves safe and preventing the spread of COVID-19. Here are some things you can do while you're receiving care. If you're in a hospital, stay in your room. This will limit your exposure to the virus. It may be boring, but it's the safest place for you. Wash your hands often. Use soap and water, and scrub for 20 seconds. Then rinse and dry them well. Always wash them after you use the bathroom, before you eat, and after you cough, sneeze, or blow your nose. If you can't wash your hands, use hand . Speak up if you have safety concerns. Don't be shy to correct health care workers if they aren't washing their hands properly, wearing face masks, or taking other precautions.  These actions are vital to prevent the spread of infection. Try to be understanding. This is a stressful time for everyone, including your care team. They are doing their best to keep you safe and provide you with good care. Where can you learn more? Go to http://www.gray.com/  Enter A134 in the search box to learn more about \"Learning About How Hospitals and [facility] Safe From COVID-19. \"  Current as of: March 26, 2021               Content Version: 13.0  © 8029-1591 Healthwise, [facility]. Care instructions adapted under license by [employer] (which disclaims liability or warranty for this information). If you have questions about a medical condition or this instruction, always ask your healthcare professional. [address] any warranty or liability for your use of this information.

## 2021-11-23 ENCOUNTER — TELEPHONE (OUTPATIENT)
Dept: NEUROLOGY | Age: 31
End: 2021-11-23

## 2021-11-23 ENCOUNTER — APPOINTMENT (OUTPATIENT)
Dept: CT IMAGING | Age: 31
End: 2021-11-23
Attending: EMERGENCY MEDICINE
Payer: MEDICAID

## 2021-11-23 ENCOUNTER — TELEPHONE (OUTPATIENT)
Dept: NEUROSURGERY | Age: 31
End: 2021-11-23

## 2021-11-23 ENCOUNTER — HOSPITAL ENCOUNTER (EMERGENCY)
Age: 31
Discharge: HOME | End: 2021-11-24
Attending: EMERGENCY MEDICINE
Payer: MEDICAID

## 2021-11-23 DIAGNOSIS — I66.9 STENOSIS OF INTRACRANIAL VESSEL: ICD-10-CM

## 2021-11-23 DIAGNOSIS — G93.2 IIH (IDIOPATHIC INTRACRANIAL HYPERTENSION): Primary | ICD-10-CM

## 2021-11-23 DIAGNOSIS — R51.9 ACUTE INTRACTABLE HEADACHE, UNSPECIFIED HEADACHE TYPE: Primary | ICD-10-CM

## 2021-11-23 LAB
ANION GAP SERPL CALC-SCNC: 11 MMOL/L (ref 5–15)
BASOPHILS # BLD: 0 K/UL (ref 0–0.1)
BASOPHILS NFR BLD: 1 % (ref 0–1)
BUN SERPL-MCNC: 11 MG/DL (ref 6–20)
BUN/CREAT SERPL: 13 (ref 12–20)
CA-I BLD-MCNC: 9.4 MG/DL (ref 8.5–10.1)
CHLORIDE SERPL-SCNC: 103 MMOL/L (ref 97–108)
CO2 SERPL-SCNC: 27 MMOL/L (ref 21–32)
CREAT SERPL-MCNC: 0.87 MG/DL (ref 0.55–1.02)
DIFFERENTIAL METHOD BLD: NORMAL
EOSINOPHIL # BLD: 0.1 K/UL (ref 0–0.4)
EOSINOPHIL NFR BLD: 1 % (ref 0–7)
ERYTHROCYTE [DISTWIDTH] IN BLOOD BY AUTOMATED COUNT: 12.6 % (ref 11.5–14.5)
GLUCOSE SERPL-MCNC: 81 MG/DL (ref 65–100)
HCT VFR BLD AUTO: 39.4 % (ref 35–47)
HGB BLD-MCNC: 13.4 G/DL (ref 11.5–16)
IMM GRANULOCYTES # BLD AUTO: 0 K/UL (ref 0–0.04)
IMM GRANULOCYTES NFR BLD AUTO: 0 % (ref 0–0.5)
LYMPHOCYTES # BLD: 3.2 K/UL (ref 0.8–3.5)
LYMPHOCYTES NFR BLD: 36 % (ref 12–49)
MCH RBC QN AUTO: 31.9 PG (ref 26–34)
MCHC RBC AUTO-ENTMCNC: 34 G/DL (ref 30–36.5)
MCV RBC AUTO: 93.8 FL (ref 80–99)
MONOCYTES # BLD: 0.7 K/UL (ref 0–1)
MONOCYTES NFR BLD: 7 % (ref 5–13)
NEUTS SEG # BLD: 4.9 K/UL (ref 1.8–8)
NEUTS SEG NFR BLD: 55 % (ref 32–75)
PLATELET # BLD AUTO: 301 K/UL (ref 150–400)
PMV BLD AUTO: 9.4 FL (ref 8.9–12.9)
POTASSIUM SERPL-SCNC: 3.7 MMOL/L (ref 3.5–5.1)
RBC # BLD AUTO: 4.2 M/UL (ref 3.8–5.2)
SODIUM SERPL-SCNC: 141 MMOL/L (ref 136–145)
WBC # BLD AUTO: 8.8 K/UL (ref 3.6–11)

## 2021-11-23 PROCEDURE — 70450 CT HEAD/BRAIN W/O DYE: CPT

## 2021-11-23 PROCEDURE — 96376 TX/PRO/DX INJ SAME DRUG ADON: CPT

## 2021-11-23 PROCEDURE — 85025 COMPLETE CBC W/AUTO DIFF WBC: CPT

## 2021-11-23 PROCEDURE — 80048 BASIC METABOLIC PNL TOTAL CA: CPT

## 2021-11-23 PROCEDURE — 74011250636 HC RX REV CODE- 250/636: Performed by: EMERGENCY MEDICINE

## 2021-11-23 PROCEDURE — 96375 TX/PRO/DX INJ NEW DRUG ADDON: CPT

## 2021-11-23 PROCEDURE — 99284 EMERGENCY DEPT VISIT MOD MDM: CPT

## 2021-11-23 PROCEDURE — 36415 COLL VENOUS BLD VENIPUNCTURE: CPT

## 2021-11-23 PROCEDURE — 96374 THER/PROPH/DIAG INJ IV PUSH: CPT

## 2021-11-23 RX ORDER — ASPIRIN 81 MG/1
81 TABLET ORAL DAILY
Qty: 30 TABLET | Refills: 2
Start: 2021-11-23 | End: 2021-11-27

## 2021-11-23 RX ORDER — DIPHENHYDRAMINE HYDROCHLORIDE 50 MG/ML
25 INJECTION, SOLUTION INTRAMUSCULAR; INTRAVENOUS ONCE
Status: COMPLETED | OUTPATIENT
Start: 2021-11-23 | End: 2021-11-23

## 2021-11-23 RX ORDER — KETOROLAC TROMETHAMINE 30 MG/ML
15 INJECTION, SOLUTION INTRAMUSCULAR; INTRAVENOUS ONCE
Status: COMPLETED | OUTPATIENT
Start: 2021-11-23 | End: 2021-11-23

## 2021-11-23 RX ORDER — METOCLOPRAMIDE HYDROCHLORIDE 5 MG/ML
5 INJECTION INTRAMUSCULAR; INTRAVENOUS
Status: COMPLETED | OUTPATIENT
Start: 2021-11-23 | End: 2021-11-23

## 2021-11-23 RX ADMIN — DIPHENHYDRAMINE HYDROCHLORIDE 25 MG: 50 INJECTION, SOLUTION INTRAMUSCULAR; INTRAVENOUS at 22:10

## 2021-11-23 RX ADMIN — KETOROLAC TROMETHAMINE 15 MG: 30 INJECTION, SOLUTION INTRAMUSCULAR at 22:43

## 2021-11-23 RX ADMIN — METOCLOPRAMIDE HYDROCHLORIDE 5 MG: 5 INJECTION INTRAMUSCULAR; INTRAVENOUS at 22:10

## 2021-11-23 NOTE — TELEPHONE ENCOUNTER
Per Dr. Demi Shah, Discussed with patient and Dr. Kim Padilla.    Please inform her that Dr. Moctezuma Males office will very reaching out to her regarding next steps and possibly moving her stenting procedure up

## 2021-11-23 NOTE — TELEPHONE ENCOUNTER
Spoke to provider who requested we move patient's procedure up to next week. New date of 12/3/2021 at [facility]. Arrival time 7:00am.   Patient will have labs done that morning, no PAT appointment needed per provider. Informed patient she will need COVID test on 11/29/2021. Patient stated understanding.

## 2021-11-23 NOTE — TELEPHONE ENCOUNTER
Patient is calling because she has had a headache for three days. Starting yesterday her balance is off. She has intercranial hypertension and the on call told her to call today. Attempted to transfer to the nurse.   Call was lost

## 2021-11-23 NOTE — H&P
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 of fat necrosis. There is no axillary lymphadenopathy identified. Imaging reviewed, including her BI-RADS result. We reviewed that a BI-RADS 3 means that the lesion is 1 of a few findings with a less than 2% chance of being cancer. We discussed options moving forward, including biopsy for definitive diagnosis versus repeat imaging in 4 months. The daughter reports the family has experienced multiple health crisis with in the family and both Michelle and her daughter verbalize desire for biopsy for definitive diagnosis. In view of the skin dimpling overlying the mass and her personal history of malignant melanoma, this is a reasonable request.  Will plan to return with ultrasound-guided biopsy of the left breast.  All questions were answered to their apparent satisfaction.       Plan: Collection Time: 09/13/20 11:17 PM   Result Value Ref Range    HCG, Ql. Negative NEG     HEPATIC FUNCTION PANEL    Collection Time: 09/14/20 12:15 AM   Result Value Ref Range    Protein, total 8.4 (H) 6.4 - 8.2 g/dL    Albumin 4.4 3.5 - 5.0 g/dL    Globulin 4.0 2.0 - 4.0 g/dL    A-G Ratio 1.1 1.1 - 2.2      Bilirubin, total 0.4 0.2 - 1.0 MG/DL    Bilirubin, direct <0.1 0.0 - 0.2 MG/DL    Alk. phosphatase 120 (H) 45 - 117 U/L    AST (SGOT) 18 15 - 37 U/L    ALT (SGPT) 39 12 - 78 U/L       Ct Head Wo Cont    Result Date: 9/14/2020  IMPRESSION: No acute intracranial abnormality. Patient was explained about the risk associated with hospitalization including (and not a complete) risk of falls,fractures,blood clots,Bleeding from medications (including anticoagulants used for DVT ppx), Sepsis,allergic reactions,infections,radiation risk from the imaging studies performed,transfusions of blood products,Renal failure  and also risk of death. Patient also understands and agrees to the treatment plan including medications and also understand the risk with radiation while undergoing imaging studies. The patient  And  family  (after permission given by the patient) understands the need to be hospitalized and follow medical orders, agree with the admission plan. Thank You Dr Casarez for taking care of your patient. Please call if you have any questions.          ____________________________________________________________________  Arsenio Rodriguez MD 1. Continue monthly breast self examination; detailed instructions reviewed today. Bring any changes to your physician's attention. 2. Continue healthy diet and exercise routinely as tolerated. 3. Avoid alcohol. 4. Limit caffeine intake. 5. Left breast ultrasound-guided biopsy of the lesion at the 3 o'clock position, 7 cm from the nipple. 6. Continue follow up with Primary Care, dermatology, oncology, and all specialties as directed. .  7. Further recommendations will be made once biopsy complete. During today's visit, face-to-face time 52 minutes, greater than 50% in counseling education and coordination of care. All questions were answered to her apparent satisfaction, and she is agreeable to the plan as outlined above. This document is generated, in part, by voice recognition software and thus syntax and grammatical errors are possible. November 30, 2021      Aneesh West RN, MSN, APRN-CNP, [address] [place]  Advanced Oncology Certified Nurse Practitioner  Department of Breast Surgery  [facility] [place]/  [facility] in collaboration with Dr. Lizzeth Alvarez.  Darshan/Dr. Patricia Teran

## 2021-11-24 ENCOUNTER — HOSPITAL ENCOUNTER (EMERGENCY)
Age: 31
Discharge: HOME | End: 2021-11-25
Attending: EMERGENCY MEDICINE
Payer: MEDICAID

## 2021-11-24 VITALS
OXYGEN SATURATION: 100 % | TEMPERATURE: 98.9 F | HEIGHT: 61 IN | RESPIRATION RATE: 16 BRPM | DIASTOLIC BLOOD PRESSURE: 76 MMHG | WEIGHT: 208 LBS | SYSTOLIC BLOOD PRESSURE: 120 MMHG | HEART RATE: 87 BPM | BODY MASS INDEX: 39.27 KG/M2

## 2021-11-24 DIAGNOSIS — R51.9 CHRONIC INTRACTABLE HEADACHE, UNSPECIFIED HEADACHE TYPE: Primary | ICD-10-CM

## 2021-11-24 DIAGNOSIS — G89.29 CHRONIC INTRACTABLE HEADACHE, UNSPECIFIED HEADACHE TYPE: Primary | ICD-10-CM

## 2021-11-24 PROCEDURE — 96376 TX/PRO/DX INJ SAME DRUG ADON: CPT

## 2021-11-24 PROCEDURE — 74011250636 HC RX REV CODE- 250/636: Performed by: EMERGENCY MEDICINE

## 2021-11-24 PROCEDURE — 96375 TX/PRO/DX INJ NEW DRUG ADDON: CPT

## 2021-11-24 PROCEDURE — 96374 THER/PROPH/DIAG INJ IV PUSH: CPT

## 2021-11-24 PROCEDURE — 99284 EMERGENCY DEPT VISIT MOD MDM: CPT

## 2021-11-24 RX ORDER — HYDROMORPHONE HYDROCHLORIDE 2 MG/ML
2 INJECTION, SOLUTION INTRAMUSCULAR; INTRAVENOUS; SUBCUTANEOUS ONCE
Status: COMPLETED | OUTPATIENT
Start: 2021-11-24 | End: 2021-11-24

## 2021-11-24 RX ORDER — HYDROMORPHONE HYDROCHLORIDE 1 MG/ML
1 INJECTION, SOLUTION INTRAMUSCULAR; INTRAVENOUS; SUBCUTANEOUS ONCE
Status: COMPLETED | OUTPATIENT
Start: 2021-11-25 | End: 2021-11-24

## 2021-11-24 RX ORDER — HYDROMORPHONE HYDROCHLORIDE 1 MG/ML
0.5 INJECTION, SOLUTION INTRAMUSCULAR; INTRAVENOUS; SUBCUTANEOUS ONCE
Status: COMPLETED | OUTPATIENT
Start: 2021-11-25 | End: 2021-11-24

## 2021-11-24 RX ORDER — KETOROLAC TROMETHAMINE 30 MG/ML
30 INJECTION, SOLUTION INTRAMUSCULAR; INTRAVENOUS
Status: COMPLETED | OUTPATIENT
Start: 2021-11-25 | End: 2021-11-24

## 2021-11-24 RX ORDER — DIPHENHYDRAMINE HYDROCHLORIDE 50 MG/ML
25 INJECTION, SOLUTION INTRAMUSCULAR; INTRAVENOUS ONCE
Status: COMPLETED | OUTPATIENT
Start: 2021-11-25 | End: 2021-11-24

## 2021-11-24 RX ORDER — HYDROMORPHONE HYDROCHLORIDE 1 MG/ML
0.5 INJECTION, SOLUTION INTRAMUSCULAR; INTRAVENOUS; SUBCUTANEOUS ONCE
Status: COMPLETED | OUTPATIENT
Start: 2021-11-24 | End: 2021-11-24

## 2021-11-24 RX ORDER — HYDROMORPHONE HYDROCHLORIDE 1 MG/ML
1 INJECTION, SOLUTION INTRAMUSCULAR; INTRAVENOUS; SUBCUTANEOUS ONCE
Status: DISCONTINUED | OUTPATIENT
Start: 2021-11-25 | End: 2021-11-24

## 2021-11-24 RX ORDER — DEXAMETHASONE SODIUM PHOSPHATE 10 MG/ML
10 INJECTION INTRAMUSCULAR; INTRAVENOUS ONCE
Status: COMPLETED | OUTPATIENT
Start: 2021-11-25 | End: 2021-11-24

## 2021-11-24 RX ORDER — PROMETHAZINE HYDROCHLORIDE 25 MG/ML
25 INJECTION, SOLUTION INTRAMUSCULAR; INTRAVENOUS
Status: COMPLETED | OUTPATIENT
Start: 2021-11-25 | End: 2021-11-24

## 2021-11-24 RX ADMIN — HYDROMORPHONE HYDROCHLORIDE 0.5 MG: 1 INJECTION, SOLUTION INTRAMUSCULAR; INTRAVENOUS; SUBCUTANEOUS at 01:14

## 2021-11-24 RX ADMIN — PROMETHAZINE HYDROCHLORIDE 25 MG: 25 INJECTION INTRAMUSCULAR; INTRAVENOUS at 23:16

## 2021-11-24 RX ADMIN — DIPHENHYDRAMINE HYDROCHLORIDE 25 MG: 50 INJECTION INTRAMUSCULAR; INTRAVENOUS at 23:58

## 2021-11-24 RX ADMIN — SODIUM CHLORIDE 1000 ML: 9 INJECTION, SOLUTION INTRAVENOUS at 23:58

## 2021-11-24 RX ADMIN — DEXAMETHASONE SODIUM PHOSPHATE 10 MG: 10 INJECTION, SOLUTION INTRAMUSCULAR; INTRAVENOUS at 23:23

## 2021-11-24 RX ADMIN — KETOROLAC TROMETHAMINE 30 MG: 30 INJECTION, SOLUTION INTRAMUSCULAR at 23:20

## 2021-11-24 RX ADMIN — HYDROMORPHONE HYDROCHLORIDE 0.5 MG: 1 INJECTION, SOLUTION INTRAMUSCULAR; INTRAVENOUS; SUBCUTANEOUS at 23:59

## 2021-11-24 RX ADMIN — HYDROMORPHONE HYDROCHLORIDE 2 MG: 2 INJECTION, SOLUTION INTRAMUSCULAR; INTRAVENOUS; SUBCUTANEOUS at 01:55

## 2021-11-24 RX ADMIN — HYDROMORPHONE HYDROCHLORIDE 1 MG: 1 INJECTION, SOLUTION INTRAMUSCULAR; INTRAVENOUS; SUBCUTANEOUS at 23:25

## 2021-11-24 NOTE — ED NOTES
Verbal shift change report given to Krystal Espinoza RN (oncoming nurse) by Inocencia Fleming RN (offgoing nurse). Report included the following information SBAR, Kardex, MAR and Recent Results.

## 2021-11-24 NOTE — DISCHARGE INSTRUCTIONS
You were seen in the emergency room for your headache. Thankfully, a CT of your head did not show any acute abnormalities. We were able to control your pain only with IV narcotic pain medications. This is a temporizing measure until you can get your definitive surgery on the 3rd. You should call your neurologist and neurosurgeon tomorrow to make sure that they cannot move the surgery up any further and continue taking your home medications as prescribed for pain and headache. If your symptoms return or are not managable at home or if you develop worsening vision changes and gait instability or any other new or concerning symptoms, please return to the emergency department immediately for evaluation and treatment. Thank you! Thank you for allowing me to care for you in the emergency department. I sincerely hope that you are satisfied with your visit today. It is my goal to provide you with excellent care. Below you will find a list of your labs and imaging from your visit today. Should you have any questions regarding these results please do not hesitate to call the emergency department. Labs -     Recent Results (from the past 12 hour(s))   CBC WITH AUTOMATED DIFF    Collection Time: 11/23/21  9:45 PM   Result Value Ref Range    WBC 8.8 3.6 - 11.0 K/uL    RBC 4.20 3.80 - 5.20 M/uL    HGB 13.4 11.5 - 16.0 g/dL    HCT 39.4 35.0 - 47.0 %    MCV 93.8 80.0 - 99.0 FL    MCH 31.9 26.0 - 34.0 PG    MCHC 34.0 30.0 - 36.5 g/dL    RDW 12.6 11.5 - 14.5 %    PLATELET 732 075 - 366 K/uL    MPV 9.4 8.9 - 12.9 FL    NEUTROPHILS 55 32 - 75 %    LYMPHOCYTES 36 12 - 49 %    MONOCYTES 7 5 - 13 %    EOSINOPHILS 1 0 - 7 %    BASOPHILS 1 0 - 1 %    IMMATURE GRANULOCYTES 0 0.0 - 0.5 %    ABS. NEUTROPHILS 4.9 1.8 - 8.0 K/UL    ABS. LYMPHOCYTES 3.2 0.8 - 3.5 K/UL    ABS. MONOCYTES 0.7 0.0 - 1.0 K/UL    ABS. EOSINOPHILS 0.1 0.0 - 0.4 K/UL    ABS. BASOPHILS 0.0 0.0 - 0.1 K/UL    ABS. IMM.  GRANS. 0.0 0.00 - 0.04 K/UL    DF AUTOMATED     METABOLIC PANEL, BASIC    Collection Time: 11/23/21  9:45 PM   Result Value Ref Range    Sodium 141 136 - 145 mmol/L    Potassium 3.7 3.5 - 5.1 mmol/L    Chloride 103 97 - 108 mmol/L    CO2 27 21 - 32 mmol/L    Anion gap 11 5 - 15 mmol/L    Glucose 81 65 - 100 mg/dL    BUN 11 6 - 20 mg/dL    Creatinine 0.87 0.55 - 1.02 mg/dL    BUN/Creatinine ratio 13 12 - 20      GFR est AA >60 >60 ml/min/1.73m2    GFR est non-AA >60 >60 ml/min/1.73m2    Calcium 9.4 8.5 - 10.1 mg/dL       Radiologic Studies -   CT HEAD WO CONT   Final Result   1. No acute intracranial findings. CT Results  (Last 48 hours)                 11/23/21 2230  CT HEAD WO CONT Final result    Impression:  1. No acute intracranial findings. Narrative:  Headache. Comparison head CT [facility] 11/1/2021 for worsening headache and vision and   balance issues. Technique: Axial images  head without IV contrast. Multiplanar reformatting   performed. Dose reduction: All CT scans at this facility are performed using dose reduction   optimization techniques as appropriate to a performed exam including the   following: Automated exposure control, adjustments of the mA and/or kV according   to patient's size, or use of iterative reconstruction technique. Findings: No abnormal brain density. No mass effect, extra-axial fluid   collection, hemorrhage. CSF-containing structures normal size, shape, position. No calvarial fractures. Included facial sinuses and  mastoid air cells are   unopacified. CXR Results  (Last 48 hours)      None               If you feel that you have not received excellent quality care or timely care, please ask to speak to the nurse manager. Please choose us in the future for your continued health care needs.    ------------------------------------------------------------------------------------------------------------  The exam and treatment you received in the Emergency Department were for an urgent problem and are not intended as complete care. It is important that you follow-up with a doctor, nurse practitioner, or physician assistant to:  (1) confirm your diagnosis,  (2) re-evaluation of changes in your illness and treatment, and  (3) for ongoing care. If your symptoms become worse or you do not improve as expected and you are unable to reach your usual health care provider, you should return to the Emergency Department. We are available 24 hours a day. Please take your discharge instructions with you when you go to your follow-up appointment. If you have any problem arranging a follow-up appointment, contact the Emergency Department immediately. If a prescription has been provided, please have it filled as soon as possible to prevent a delay in treatment. Read the entire medication instruction sheet provided to you by the pharmacy. If you have any questions or reservations about taking the medication due to side effects or interactions with other medications, please call your primary care physician or contact the ER to speak with the charge nurse. Make an appointment with your family doctor or the physician you were referred to for follow-up of this visit as instructed on your discharge paperwork, as this is a mandatory follow-up. Return to the ER if you are unable to be seen or if you are unable to be seen in a timely manner. If you have any problem arranging the follow-up visit, contact the Emergency Department immediately.
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
## 2021-11-25 ENCOUNTER — HOSPITAL ENCOUNTER (OUTPATIENT)
Age: 31
Setting detail: OBSERVATION
Discharge: HOME | End: 2021-11-27
Attending: EMERGENCY MEDICINE | Admitting: STUDENT IN AN ORGANIZED HEALTH CARE EDUCATION/TRAINING PROGRAM
Payer: MEDICAID

## 2021-11-25 VITALS
BODY MASS INDEX: 39.27 KG/M2 | HEIGHT: 61 IN | RESPIRATION RATE: 15 BRPM | HEART RATE: 94 BPM | OXYGEN SATURATION: 99 % | WEIGHT: 208 LBS | TEMPERATURE: 98.4 F | DIASTOLIC BLOOD PRESSURE: 84 MMHG | SYSTOLIC BLOOD PRESSURE: 130 MMHG

## 2021-11-25 DIAGNOSIS — R51.9 CHRONIC INTRACTABLE HEADACHE, UNSPECIFIED HEADACHE TYPE: Primary | ICD-10-CM

## 2021-11-25 DIAGNOSIS — G89.29 CHRONIC INTRACTABLE HEADACHE, UNSPECIFIED HEADACHE TYPE: Primary | ICD-10-CM

## 2021-11-25 DIAGNOSIS — G93.2 IIH (IDIOPATHIC INTRACRANIAL HYPERTENSION): ICD-10-CM

## 2021-11-25 LAB
ANION GAP SERPL CALC-SCNC: 3 MMOL/L (ref 5–15)
BASOPHILS # BLD: 0 K/UL (ref 0–0.1)
BASOPHILS NFR BLD: 0 % (ref 0–1)
BUN SERPL-MCNC: 11 MG/DL (ref 6–20)
BUN/CREAT SERPL: 14 (ref 12–20)
CALCIUM SERPL-MCNC: 8.9 MG/DL (ref 8.5–10.1)
CHLORIDE SERPL-SCNC: 111 MMOL/L (ref 97–108)
CO2 SERPL-SCNC: 23 MMOL/L (ref 21–32)
COMMENT, HOLDF: NORMAL
CREAT SERPL-MCNC: 0.78 MG/DL (ref 0.55–1.02)
DIFFERENTIAL METHOD BLD: ABNORMAL
EOSINOPHIL # BLD: 0 K/UL (ref 0–0.4)
EOSINOPHIL NFR BLD: 0 % (ref 0–7)
ERYTHROCYTE [DISTWIDTH] IN BLOOD BY AUTOMATED COUNT: 12.4 % (ref 11.5–14.5)
GLUCOSE SERPL-MCNC: 102 MG/DL (ref 65–100)
HCT VFR BLD AUTO: 34.6 % (ref 35–47)
HGB BLD-MCNC: 11.8 G/DL (ref 11.5–16)
IMM GRANULOCYTES # BLD AUTO: 0 K/UL (ref 0–0.04)
IMM GRANULOCYTES NFR BLD AUTO: 0 % (ref 0–0.5)
LYMPHOCYTES # BLD: 1.9 K/UL (ref 0.8–3.5)
LYMPHOCYTES NFR BLD: 16 % (ref 12–49)
MCH RBC QN AUTO: 31.2 PG (ref 26–34)
MCHC RBC AUTO-ENTMCNC: 34.1 G/DL (ref 30–36.5)
MCV RBC AUTO: 91.5 FL (ref 80–99)
MONOCYTES # BLD: 1 K/UL (ref 0–1)
MONOCYTES NFR BLD: 8 % (ref 5–13)
NEUTS SEG # BLD: 9.3 K/UL (ref 1.8–8)
NEUTS SEG NFR BLD: 76 % (ref 32–75)
NRBC # BLD: 0 K/UL (ref 0–0.01)
NRBC BLD-RTO: 0 PER 100 WBC
PLATELET # BLD AUTO: 325 K/UL (ref 150–400)
PMV BLD AUTO: 9.7 FL (ref 8.9–12.9)
POTASSIUM SERPL-SCNC: 3.9 MMOL/L (ref 3.5–5.1)
RBC # BLD AUTO: 3.78 M/UL (ref 3.8–5.2)
SAMPLES BEING HELD,HOLD: NORMAL
SODIUM SERPL-SCNC: 137 MMOL/L (ref 136–145)
WBC # BLD AUTO: 12.2 K/UL (ref 3.6–11)

## 2021-11-25 PROCEDURE — 74011250636 HC RX REV CODE- 250/636: Performed by: EMERGENCY MEDICINE

## 2021-11-25 PROCEDURE — 96375 TX/PRO/DX INJ NEW DRUG ADDON: CPT

## 2021-11-25 PROCEDURE — 96374 THER/PROPH/DIAG INJ IV PUSH: CPT

## 2021-11-25 PROCEDURE — 74011000250 HC RX REV CODE- 250: Performed by: EMERGENCY MEDICINE

## 2021-11-25 PROCEDURE — 99283 EMERGENCY DEPT VISIT LOW MDM: CPT

## 2021-11-25 PROCEDURE — 85025 COMPLETE CBC W/AUTO DIFF WBC: CPT

## 2021-11-25 PROCEDURE — 80048 BASIC METABOLIC PNL TOTAL CA: CPT

## 2021-11-25 PROCEDURE — 74011250636 HC RX REV CODE- 250/636: Performed by: STUDENT IN AN ORGANIZED HEALTH CARE EDUCATION/TRAINING PROGRAM

## 2021-11-25 PROCEDURE — 96376 TX/PRO/DX INJ SAME DRUG ADON: CPT

## 2021-11-25 PROCEDURE — 36415 COLL VENOUS BLD VENIPUNCTURE: CPT

## 2021-11-25 RX ORDER — DIPHENHYDRAMINE HYDROCHLORIDE 50 MG/ML
25 INJECTION, SOLUTION INTRAMUSCULAR; INTRAVENOUS ONCE
Status: COMPLETED | OUTPATIENT
Start: 2021-11-25 | End: 2021-11-25

## 2021-11-25 RX ORDER — HYDROMORPHONE HYDROCHLORIDE 2 MG/1
1 TABLET ORAL
Status: DISCONTINUED | OUTPATIENT
Start: 2021-11-25 | End: 2021-11-27 | Stop reason: HOSPADM

## 2021-11-25 RX ORDER — GABAPENTIN 600 MG/1
600 TABLET ORAL
Status: DISCONTINUED | OUTPATIENT
Start: 2021-11-25 | End: 2021-11-27 | Stop reason: HOSPADM

## 2021-11-25 RX ORDER — HYDROMORPHONE HYDROCHLORIDE 1 MG/ML
0.5 INJECTION, SOLUTION INTRAMUSCULAR; INTRAVENOUS; SUBCUTANEOUS ONCE
Status: COMPLETED | OUTPATIENT
Start: 2021-11-25 | End: 2021-11-25

## 2021-11-25 RX ORDER — HYDROMORPHONE HYDROCHLORIDE 1 MG/ML
1 INJECTION, SOLUTION INTRAMUSCULAR; INTRAVENOUS; SUBCUTANEOUS ONCE
Status: COMPLETED | OUTPATIENT
Start: 2021-11-25 | End: 2021-11-25

## 2021-11-25 RX ORDER — TOPIRAMATE 100 MG/1
200 TABLET, FILM COATED ORAL DAILY
Status: DISCONTINUED | OUTPATIENT
Start: 2021-11-26 | End: 2021-11-27

## 2021-11-25 RX ORDER — DIPHENHYDRAMINE HYDROCHLORIDE 50 MG/ML
50 INJECTION, SOLUTION INTRAMUSCULAR; INTRAVENOUS
Status: COMPLETED | OUTPATIENT
Start: 2021-11-25 | End: 2021-11-25

## 2021-11-25 RX ORDER — DIAZEPAM 5 MG/1
10 TABLET ORAL
Status: DISCONTINUED | OUTPATIENT
Start: 2021-11-25 | End: 2021-11-27 | Stop reason: HOSPADM

## 2021-11-25 RX ORDER — ASPIRIN 81 MG/1
81 TABLET ORAL DAILY
Status: DISCONTINUED | OUTPATIENT
Start: 2021-11-26 | End: 2021-11-26

## 2021-11-25 RX ORDER — PROMETHAZINE HYDROCHLORIDE 25 MG/1
25 TABLET ORAL
Qty: 12 TABLET | Refills: 0 | Status: SHIPPED | OUTPATIENT
Start: 2021-11-25 | End: 2021-11-28

## 2021-11-25 RX ORDER — HYDROXYCHLOROQUINE SULFATE 200 MG/1
200 TABLET, FILM COATED ORAL 2 TIMES DAILY WITH MEALS
Status: DISCONTINUED | OUTPATIENT
Start: 2021-11-26 | End: 2021-11-27 | Stop reason: HOSPADM

## 2021-11-25 RX ADMIN — DIPHENHYDRAMINE HYDROCHLORIDE 50 MG: 50 INJECTION INTRAMUSCULAR; INTRAVENOUS at 20:38

## 2021-11-25 RX ADMIN — HYDROMORPHONE HYDROCHLORIDE 1 MG: 1 INJECTION, SOLUTION INTRAMUSCULAR; INTRAVENOUS; SUBCUTANEOUS at 21:15

## 2021-11-25 RX ADMIN — HYDROMORPHONE HYDROCHLORIDE 1 MG: 1 INJECTION, SOLUTION INTRAMUSCULAR; INTRAVENOUS; SUBCUTANEOUS at 23:35

## 2021-11-25 RX ADMIN — PROCHLORPERAZINE EDISYLATE 10 MG: 5 INJECTION INTRAMUSCULAR; INTRAVENOUS at 20:38

## 2021-11-25 RX ADMIN — DIPHENHYDRAMINE HYDROCHLORIDE 25 MG: 50 INJECTION INTRAMUSCULAR; INTRAVENOUS at 01:10

## 2021-11-25 RX ADMIN — HYDROMORPHONE HYDROCHLORIDE 0.5 MG: 1 INJECTION, SOLUTION INTRAMUSCULAR; INTRAVENOUS; SUBCUTANEOUS at 01:14

## 2021-11-25 NOTE — ED NOTES
IV removed. Pt a&ox4. gcs 15. Pt self ambulated out of ED with discharge paperwork and personal belongings with .

## 2021-11-25 NOTE — ED TRIAGE NOTES
Pt has intracranial hypertension due to a pseudotumor, pt will have a venous sinus shunt place on 12/3/2021. Pt has had a continuous headache for 5 days with dizziness and floaters in her R eye; pt fell as work today due to the dizziness. Pt was seen here last night; she denies nausea at this time but states she threw up twice after leaving this facility last night. Pt states the only thing that has helped thus far is a lumbar puncture however, pt cannot have this done because it will delay her surgery on 12/3/21. Pt is nodding off during triage at this time; denies taking medicines before coming in.
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
## 2021-11-26 ENCOUNTER — APPOINTMENT (OUTPATIENT)
Dept: GENERAL RADIOLOGY | Age: 31
End: 2021-11-26
Attending: RADIOLOGY
Payer: MEDICAID

## 2021-11-26 PROBLEM — G43.901 STATUS MIGRAINOSUS: Status: ACTIVE | Noted: 2021-11-26

## 2021-11-26 PROBLEM — G43.901 STATUS MIGRAINOSUS: Status: RESOLVED | Noted: 2021-11-26 | Resolved: 2021-11-26

## 2021-11-26 LAB
ALBUMIN SERPL-MCNC: 3.3 G/DL (ref 3.5–5)
ALBUMIN/GLOB SERPL: 1 {RATIO} (ref 1.1–2.2)
ALP SERPL-CCNC: 86 U/L (ref 45–117)
ALT SERPL-CCNC: 40 U/L (ref 12–78)
ANION GAP SERPL CALC-SCNC: 6 MMOL/L (ref 5–15)
AST SERPL-CCNC: 20 U/L (ref 15–37)
BILIRUB SERPL-MCNC: 0.4 MG/DL (ref 0.2–1)
BUN SERPL-MCNC: 9 MG/DL (ref 6–20)
BUN/CREAT SERPL: 13 (ref 12–20)
CALCIUM SERPL-MCNC: 8.8 MG/DL (ref 8.5–10.1)
CHLORIDE SERPL-SCNC: 110 MMOL/L (ref 97–108)
CO2 SERPL-SCNC: 23 MMOL/L (ref 21–32)
CREAT SERPL-MCNC: 0.69 MG/DL (ref 0.55–1.02)
ERYTHROCYTE [DISTWIDTH] IN BLOOD BY AUTOMATED COUNT: 12.4 % (ref 11.5–14.5)
GLOBULIN SER CALC-MCNC: 3.3 G/DL (ref 2–4)
GLUCOSE SERPL-MCNC: 99 MG/DL (ref 65–100)
HCT VFR BLD AUTO: 35.2 % (ref 35–47)
HGB BLD-MCNC: 11.3 G/DL (ref 11.5–16)
MCH RBC QN AUTO: 31.1 PG (ref 26–34)
MCHC RBC AUTO-ENTMCNC: 32.1 G/DL (ref 30–36.5)
MCV RBC AUTO: 97 FL (ref 80–99)
NRBC # BLD: 0 K/UL (ref 0–0.01)
NRBC BLD-RTO: 0 PER 100 WBC
PLATELET # BLD AUTO: 310 K/UL (ref 150–400)
PMV BLD AUTO: 10 FL (ref 8.9–12.9)
POTASSIUM SERPL-SCNC: 3.7 MMOL/L (ref 3.5–5.1)
PROT SERPL-MCNC: 6.6 G/DL (ref 6.4–8.2)
RBC # BLD AUTO: 3.63 M/UL (ref 3.8–5.2)
SODIUM SERPL-SCNC: 139 MMOL/L (ref 136–145)
WBC # BLD AUTO: 10.5 K/UL (ref 3.6–11)

## 2021-11-26 PROCEDURE — 80053 COMPREHEN METABOLIC PANEL: CPT

## 2021-11-26 PROCEDURE — G0378 HOSPITAL OBSERVATION PER HR: HCPCS

## 2021-11-26 PROCEDURE — 74011250637 HC RX REV CODE- 250/637: Performed by: STUDENT IN AN ORGANIZED HEALTH CARE EDUCATION/TRAINING PROGRAM

## 2021-11-26 PROCEDURE — 85027 COMPLETE CBC AUTOMATED: CPT

## 2021-11-26 PROCEDURE — 96376 TX/PRO/DX INJ SAME DRUG ADON: CPT

## 2021-11-26 PROCEDURE — 62328 DX LMBR SPI PNXR W/FLUOR/CT: CPT

## 2021-11-26 PROCEDURE — 74011250637 HC RX REV CODE- 250/637: Performed by: NURSE PRACTITIONER

## 2021-11-26 PROCEDURE — 65660000000 HC RM CCU STEPDOWN

## 2021-11-26 PROCEDURE — 99223 1ST HOSP IP/OBS HIGH 75: CPT | Performed by: PSYCHIATRY & NEUROLOGY

## 2021-11-26 PROCEDURE — 36415 COLL VENOUS BLD VENIPUNCTURE: CPT

## 2021-11-26 PROCEDURE — 74011250636 HC RX REV CODE- 250/636: Performed by: STUDENT IN AN ORGANIZED HEALTH CARE EDUCATION/TRAINING PROGRAM

## 2021-11-26 RX ORDER — HYDROMORPHONE HYDROCHLORIDE 2 MG/ML
2 INJECTION, SOLUTION INTRAMUSCULAR; INTRAVENOUS; SUBCUTANEOUS
Status: DISCONTINUED | OUTPATIENT
Start: 2021-11-26 | End: 2021-11-27

## 2021-11-26 RX ORDER — POLYETHYLENE GLYCOL 3350 17 G/17G
17 POWDER, FOR SOLUTION ORAL DAILY PRN
Status: DISCONTINUED | OUTPATIENT
Start: 2021-11-25 | End: 2021-11-27 | Stop reason: HOSPADM

## 2021-11-26 RX ORDER — SODIUM CHLORIDE 0.9 % (FLUSH) 0.9 %
5-40 SYRINGE (ML) INJECTION EVERY 8 HOURS
Status: DISCONTINUED | OUTPATIENT
Start: 2021-11-26 | End: 2021-11-27 | Stop reason: HOSPADM

## 2021-11-26 RX ORDER — ACETAMINOPHEN 325 MG/1
650 TABLET ORAL
Status: DISCONTINUED | OUTPATIENT
Start: 2021-11-25 | End: 2021-11-26

## 2021-11-26 RX ORDER — PROMETHAZINE HYDROCHLORIDE 25 MG/1
12.5 TABLET ORAL
Status: DISCONTINUED | OUTPATIENT
Start: 2021-11-26 | End: 2021-11-27 | Stop reason: HOSPADM

## 2021-11-26 RX ORDER — FUROSEMIDE 20 MG/1
20 TABLET ORAL DAILY
Status: DISCONTINUED | OUTPATIENT
Start: 2021-11-26 | End: 2021-11-27 | Stop reason: HOSPADM

## 2021-11-26 RX ORDER — SODIUM CHLORIDE 0.9 % (FLUSH) 0.9 %
5-40 SYRINGE (ML) INJECTION AS NEEDED
Status: DISCONTINUED | OUTPATIENT
Start: 2021-11-25 | End: 2021-11-27 | Stop reason: HOSPADM

## 2021-11-26 RX ORDER — HYDROMORPHONE HYDROCHLORIDE 2 MG/ML
2 INJECTION, SOLUTION INTRAMUSCULAR; INTRAVENOUS; SUBCUTANEOUS
Status: DISCONTINUED | OUTPATIENT
Start: 2021-11-26 | End: 2021-11-26

## 2021-11-26 RX ORDER — ONDANSETRON 2 MG/ML
4 INJECTION INTRAMUSCULAR; INTRAVENOUS
Status: DISCONTINUED | OUTPATIENT
Start: 2021-11-26 | End: 2021-11-27 | Stop reason: HOSPADM

## 2021-11-26 RX ORDER — CALCIUM POLYCARBOPHIL 625 MG
1 TABLET ORAL DAILY
Status: DISCONTINUED | OUTPATIENT
Start: 2021-11-27 | End: 2021-11-27 | Stop reason: HOSPADM

## 2021-11-26 RX ORDER — ACETAMINOPHEN 650 MG/1
650 SUPPOSITORY RECTAL
Status: DISCONTINUED | OUTPATIENT
Start: 2021-11-25 | End: 2021-11-26

## 2021-11-26 RX ADMIN — HYDROMORPHONE HYDROCHLORIDE 2 MG: 2 INJECTION INTRAMUSCULAR; INTRAVENOUS; SUBCUTANEOUS at 09:24

## 2021-11-26 RX ADMIN — HYDROXYCHLOROQUINE SULFATE 200 MG: 200 TABLET ORAL at 07:17

## 2021-11-26 RX ADMIN — Medication 10 ML: at 22:00

## 2021-11-26 RX ADMIN — HYDROXYCHLOROQUINE SULFATE 200 MG: 200 TABLET ORAL at 17:17

## 2021-11-26 RX ADMIN — LORAZEPAM 1.5 MG: 1 TABLET ORAL at 03:02

## 2021-11-26 RX ADMIN — FUROSEMIDE 20 MG: 20 TABLET ORAL at 08:17

## 2021-11-26 RX ADMIN — HYDROMORPHONE HYDROCHLORIDE 2 MG: 2 INJECTION INTRAMUSCULAR; INTRAVENOUS; SUBCUTANEOUS at 21:34

## 2021-11-26 RX ADMIN — HYDROMORPHONE HYDROCHLORIDE 2 MG: 2 INJECTION INTRAMUSCULAR; INTRAVENOUS; SUBCUTANEOUS at 04:39

## 2021-11-26 RX ADMIN — HYDROMORPHONE HYDROCHLORIDE 2 MG: 2 INJECTION INTRAMUSCULAR; INTRAVENOUS; SUBCUTANEOUS at 13:20

## 2021-11-26 RX ADMIN — Medication 10 ML: at 07:17

## 2021-11-26 RX ADMIN — HYDROMORPHONE HYDROCHLORIDE 1 MG: 2 TABLET ORAL at 02:57

## 2021-11-26 RX ADMIN — HYDROMORPHONE HYDROCHLORIDE 2 MG: 2 INJECTION INTRAMUSCULAR; INTRAVENOUS; SUBCUTANEOUS at 17:17
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
## 2021-11-26 NOTE — ROUTINE PROCESS
TRANSFER - OUT REPORT:    Verbal report given to SHANT Magallon(name) on Ethelene Chasten  being transferred to 5E(unit) for routine progression of care       Report consisted of patients Situation, Background, Assessment and   Recommendations(SBAR). Information from the following report(s) SBAR, Kardex, ED Summary and Recent Results was reviewed with the receiving nurse. Lines:   Peripheral IV 11/25/21 Left; Posterior Hand (Active)        Opportunity for questions and clarification was provided.       Patient transported with:   [employer]

## 2021-11-26 NOTE — PROGRESS NOTES
[facility] Adult  Hospitalist Group                                                                                          Hospitalist Progress Note  Manuela Bradshaw MD  Answering service: [phone] OR [phone] from in house phone        Date of Service:  2021  NAME:  Luis Cruz  :  1990  MRN:  [id number]      Admission Summary:   Copied form admit: \" Ashutosh Tellez a very pleasant 26 y. o. female with pmhx pseudotumor cerebri, GERD, venous sinus thrombosis and SLE who presents with headache, intermittent gait imbalance and distorted vision.   Patient is well-known to hospital service and has had recurrent admission for migraines with complications of pseudotumor cerebri. Reportedly scheduled for shunting with neuro interventional surgery next week and was to initiate Brilinta three days prior prior. Has had intermittent analgesia w/ opioids. Nuro recommends admission for further eval.  The patient denies any fever, chills, chest or abdominal pain, nausea, vomiting, cough, congestion, recent illness, palpitations, or dysuria.     Remarkable vitals on ER Presentations: hr 115, bp 150/92  Labs Remarkable for: wbc 12.2,   ER Images: CT Head on  showed no acute process  ER Rx: dilaudid 2mg, compazine, benadryl \"    Interval history / Subjective:     2021 :    Complains of not sufficient pain management last pm    Complains is held NPO. - allowed diet   Franchesca Gonzalez as below        Assessment & Plan:       Intractable headache  Pseudotumor cerebri  Chronic Venous sinus thrombosis  -Migraine cocktail with prn Dilaudid  -Start Brilinta and continue asa, trokendi  -Continue Topamax, gabapentin  -Diamox per neuro  -Neuro interventional surgery consult= seen and for procedure likely early next week      SLE  -Continue home Plaquenil     MSK pain  -Continue home Flexeril           FEN/GI -  p.o. hydration  Activity - as tolerated  DVT prophylaxis - SCDs  GI prophylaxis -  NI  Disposition - Home     CODE STATUS:  Full code             Hospital Problems  Date Reviewed: 10/27/2021          Codes Class Noted POA    Status migrainosus ICD-10-CM: Q70.468  ICD-9-CM: 346.20  11/26/2021 Unknown                  After personally interviewing pt at bedside the following is noted on 11/26/2021 :    Review of Systems   Respiratory: Negative. Cardiovascular: Negative. Gastrointestinal: Negative. Neurological: Positive for headaches. Psychiatric/Behavioral: Negative. All other systems reviewed and are negative. I had a face to face encounter with patient on 11/26/2021 at bedside for the following physical exam:     PHYSICAL EXAM:    Visit Vitals  /82   Pulse 89   Temp 97.8 °F (36.6 °C)   Resp 17   SpO2 100%          Physical Exam  Constitutional:       Appearance: She is obese. HENT:      Head: Normocephalic and atraumatic. Right Ear: External ear normal.      Left Ear: External ear normal.      Nose: Nose normal.      Mouth/Throat:      Mouth: Mucous membranes are moist.      Pharynx: Oropharynx is clear. Eyes:      Extraocular Movements: Extraocular movements intact. Conjunctiva/sclera: Conjunctivae normal.      Pupils: Pupils are equal, round, and reactive to light. Cardiovascular:      Rate and Rhythm: Normal rate and regular rhythm. Heart sounds: Normal heart sounds. Pulmonary:      Effort: Pulmonary effort is normal.      Breath sounds: Normal breath sounds. Abdominal:      General: Abdomen is flat. Bowel sounds are normal.      Palpations: Abdomen is soft. Musculoskeletal:         General: No swelling or deformity. Cervical back: Normal range of motion and neck supple. Skin:     General: Skin is warm and dry. Neurological:      General: No focal deficit present. Mental Status: She is alert and oriented to person, place, and time.    Psychiatric:         Mood and Affect: Mood normal.         Behavior: Behavior normal. Data Review:    Review and/or order of clinical lab test      Labs:     Recent Labs     11/26/21 0352 11/25/21 2125   WBC 10.5 12.2*   HGB 11.3* 11.8   HCT 35.2 34.6*    325     Recent Labs     11/26/21 0352 11/25/21 2125 11/23/21 2145    137 141   K 3.7 3.9 3.7   * 111* 103   CO2 23 23 27   BUN 9 11 11   CREA 0.69 0.78 0.87   GLU 99 102* 81   CA 8.8 8.9 9.4     Recent Labs     11/26/21 0352   ALT 40   AP 86   TBILI 0.4   TP 6.6   ALB 3.3*   GLOB 3.3     No results for input(s): INR, PTP, APTT, INREXT in the last 72 hours. No results for input(s): FE, TIBC, PSAT, FERR in the last 72 hours. No results found for: FOL, RBCF   No results for input(s): PH, PCO2, PO2 in the last 72 hours. No results for input(s): CPK, CKNDX, TROIQ in the last 72 hours.     No lab exists for component: CPKMB  Lab Results   Component Value Date/Time    Cholesterol, total 177 04/11/2019 11:35 AM    HDL Cholesterol 48 04/11/2019 11:35 AM    LDL, calculated 107 (H) 04/11/2019 11:35 AM    Triglyceride 112 04/11/2019 11:35 AM     Lab Results   Component Value Date/Time    Glucose (POC) 86 09/13/2020 11:03 PM    Glucose POC 82 11/07/2019 02:00 PM     Lab Results   Component Value Date/Time    Color YELLOW/STRAW 10/01/2021 04:56 AM    Appearance TURBID (A) 10/01/2021 04:56 AM    Specific gravity 1.017 10/01/2021 04:56 AM    Specific gravity 1.015 01/25/2021 02:30 PM    pH (UA) 8.5 (H) 10/01/2021 04:56 AM    Protein Negative 10/01/2021 04:56 AM    Glucose Negative 10/01/2021 04:56 AM    Ketone Negative 10/01/2021 04:56 AM    Bilirubin Negative 10/01/2021 04:56 AM    Urobilinogen 1.0 10/01/2021 04:56 AM    Nitrites Negative 10/01/2021 04:56 AM    Leukocyte Esterase SMALL (A) 10/01/2021 04:56 AM    Epithelial cells FEW 10/01/2021 04:56 AM    Bacteria 1+ (A) 10/01/2021 04:56 AM    WBC 0-4 10/01/2021 04:56 AM    RBC 0-5 10/01/2021 04:56 AM         Medications Reviewed:     Current Facility-Administered Medications   Medication Dose Route Frequency    sodium chloride (NS) flush 5-40 mL  5-40 mL IntraVENous Q8H    sodium chloride (NS) flush 5-40 mL  5-40 mL IntraVENous PRN    polyethylene glycol (MIRALAX) packet 17 g  17 g Oral DAILY PRN    promethazine (PHENERGAN) tablet 12.5 mg  12.5 mg Oral Q6H PRN    Or    ondansetron (ZOFRAN) injection 4 mg  4 mg IntraVENous Q6H PRN    furosemide (LASIX) tablet 20 mg  20 mg Oral DAILY    HYDROmorphone (PF) (DILAUDID) injection 2 mg  2 mg IntraVENous Q4H PRN    aspirin delayed-release tablet 81 mg  81 mg Oral DAILY    diazePAM (VALIUM) tablet 10 mg  10 mg Oral DAILY PRN    gabapentin (NEURONTIN) tablet 600 mg  600 mg Oral TID PRN    hydrOXYchloroQUINE (PLAQUENIL) tablet 200 mg  200 mg Oral BID WITH MEALS    LORazepam (ATIVAN) tablet 1.5 mg  1.5 mg Oral QHS PRN    ticagrelor (BRILINTA) tablet 45 mg  45 mg Oral BID    topiramate (TOPAMAX) tablet 200 mg  200 mg Oral DAILY    HYDROmorphone (DILAUDID) tablet 1 mg  1 mg Oral Q3H PRN     ______________________________________________________________________  EXPECTED LENGTH OF STAY: - - -  ACTUAL LENGTH OF STAY:          0                 Beverly Madera MD
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
## 2021-11-26 NOTE — PROGRESS NOTES
Physical Therapy 11/26/21    Chart reviewed and discussed with RN, Kavon Barnes - pt is up ad arik with no functional deficits observed by nursing, Kavon Barnes stated pt currently sleeping prior to LP scheduled this pm - will complete PT order -     Blake Gillespie, PT
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
## 2021-11-26 NOTE — ED TRIAGE NOTES
TRIAGE NOTE:  Patient arrives ambulatory with c/o \"intracranial hypertension\" d/t pseudotumor. Patient reports having black spots, and headache for past 6 days.

## 2021-11-26 NOTE — PROGRESS NOTES
Transition of Care Plan   RUR: 19%    Disposition: The disposition plan is pending recommendations    F/U with PCP/Specialist     Transport: spouse     Reason for Admission:   Status migrainosus                     RUR Score:    19%             PCP: First and Last name:   Tylor Bates MD     Name of Practice:    Are you a current patient: Yes/No:    Approximate date of last visit:    Can you participate in a virtual visit if needed:     Do you (patient/family) have any concerns for transition/discharge? None               Plan for utilizing home health:   TBD    Current Advanced Directive/Advance Care Plan:  Full Code      Healthcare Decision Maker:        Primary Decision Maker: Walter Stanley - Spouse - [phone]    Transition of Care Plan:          CRM spoke with patient, introduced self, explained role, verified demographics, and offered assistance. The patient lives with her  and children in a home with no steps to access. The patient is independent in her ADL's/IADL's and does not have any DME. The patient uses [facility] in [address] for her prescriptions. Care Management Interventions  PCP Verified by CM: Yes  Palliative Care Criteria Met (RRAT>21 & CHF Dx)?: No  Mode of Transport at Discharge:  Other (see comment) (spouse)  Transition of Care Consult (CM Consult): Discharge Planning  MyChart Signup: No  Discharge Durable Medical Equipment: No  Physical Therapy Consult: Yes  Occupational Therapy Consult: Yes  Speech Therapy Consult: No  Support Systems: Spouse/Significant Other  Confirm Follow Up Transport: Family  [employer] Information Provided?: No  Discharge Location  Discharge Placement: Home with family assistance    MARILYN Betancourt

## 2021-11-26 NOTE — ROUTINE PROCESS
Bedside shift change report given to Gil Ramirez RN (oncoming nurse) by [facility], RN (offgoing nurse). Report included the following information Kardex, Procedure Summary, Intake/Output, MAR and Recent Results.

## 2021-11-26 NOTE — PROGRESS NOTES
Occupational Therapy    Chart reviewed in prep for skilled OT treatment, and per conversations with PT and RN, pt is up ad arik in room with no functional deficits observed by RN. RN reports pt currently sleeping prior to LP scheduled this PM.  Will complete OT order.     Thank you,  Dawit Rasmussen, OT

## 2021-11-27 VITALS
DIASTOLIC BLOOD PRESSURE: 61 MMHG | TEMPERATURE: 98 F | OXYGEN SATURATION: 96 % | SYSTOLIC BLOOD PRESSURE: 105 MMHG | RESPIRATION RATE: 16 BRPM | HEART RATE: 76 BPM

## 2021-11-27 PROCEDURE — 96376 TX/PRO/DX INJ SAME DRUG ADON: CPT

## 2021-11-27 PROCEDURE — G0378 HOSPITAL OBSERVATION PER HR: HCPCS

## 2021-11-27 PROCEDURE — 74011250637 HC RX REV CODE- 250/637: Performed by: STUDENT IN AN ORGANIZED HEALTH CARE EDUCATION/TRAINING PROGRAM

## 2021-11-27 PROCEDURE — 74011250637 HC RX REV CODE- 250/637: Performed by: INTERNAL MEDICINE

## 2021-11-27 PROCEDURE — 74011250637 HC RX REV CODE- 250/637: Performed by: NURSE PRACTITIONER

## 2021-11-27 PROCEDURE — 74011250636 HC RX REV CODE- 250/636: Performed by: STUDENT IN AN ORGANIZED HEALTH CARE EDUCATION/TRAINING PROGRAM

## 2021-11-27 RX ORDER — FUROSEMIDE 20 MG/1
TABLET ORAL
Qty: 30 TABLET | Refills: 0 | Status: ON HOLD | OUTPATIENT
Start: 2021-11-28 | End: 2021-12-03

## 2021-11-27 RX ORDER — TOPIRAMATE 100 MG/1
100 TABLET, FILM COATED ORAL EVERY 12 HOURS
Status: DISCONTINUED | OUTPATIENT
Start: 2021-11-27 | End: 2021-11-27 | Stop reason: HOSPADM

## 2021-11-27 RX ADMIN — HYDROMORPHONE HYDROCHLORIDE 1 MG: 2 TABLET ORAL at 10:09

## 2021-11-27 RX ADMIN — TOPIRAMATE 100 MG: 100 TABLET, FILM COATED ORAL at 10:09

## 2021-11-27 RX ADMIN — HYDROXYCHLOROQUINE SULFATE 200 MG: 200 TABLET ORAL at 07:06

## 2021-11-27 RX ADMIN — FUROSEMIDE 20 MG: 20 TABLET ORAL at 10:09

## 2021-11-27 RX ADMIN — HYDROMORPHONE HYDROCHLORIDE 2 MG: 2 INJECTION INTRAMUSCULAR; INTRAVENOUS; SUBCUTANEOUS at 06:13

## 2021-11-27 RX ADMIN — Medication 5 ML: at 06:00

## 2021-11-27 RX ADMIN — HYDROMORPHONE HYDROCHLORIDE 2 MG: 2 INJECTION INTRAMUSCULAR; INTRAVENOUS; SUBCUTANEOUS at 01:49

## 2021-11-27 NOTE — DISCHARGE SUMMARY
Discharge Summary       PATIENT ID: Cody Alcantar  MRN: [id number]   YOB: 1990    DATE OF ADMISSION: 11/25/2021  7:58 PM    DATE OF DISCHARGE: 11/27/2021   PRIMARY CARE PROVIDER: Valentín Forde MD     ATTENDING PHYSICIAN: Dr Akla Tovar  DISCHARGING PROVIDER: Alka Tovar MD    To contact this individual call [phone] and ask the  to page. If unavailable ask to be transferred the Adult Hospitalist Department. CONSULTATIONS: IP CONSULT TO NEUROINTERVENTIONAL SURGERY  IP CONSULT TO NEUROLOGY  IP CONSULT TO HOSPITALIST    PROCEDURES/SURGERIES: * No surgery found *    ADMITTING [address] [place] [address] COURSE:   Intractable headache  Pseudotumor cerebri  Chronic Venous sinus thrombosis  -Migraine cocktail with prn Dilaudid  -Brilinta and asa to start 12/1  -Continue trokendi  -Continue Topamax, gabapentin  -Diamox  -s/p therapeutic LP 11/26  -Appreciate Neuro/NIS, ok for discharge with outpatient follow up   -Tried to reach Dr Yifan Perry (Pain management) for her pain meds but couldn't. She has some pain meds at home and will reach Dr Yifan Perry on Monday     SLE-Continue home Plaquenil  MSK pain-Continue home [place] 3 / PLAN:      1.   Headache     ADDITIONAL CARE RECOMMENDATIONS:    Follow up with PMD  Follow up with Pain management  Follow up with NIS      PENDING TEST RESULTS:   At the time of discharge the following test results are still pending: none    FOLLOW UP APPOINTMENTS:    Follow-up Information     Follow up With Specialties Details Why Contact Info    Valentín Forde MD Family Medicine, Bariatrics In 1 week  [place] [address]  [phone]      Antonieta Valera MD Pain Management, Anesthesiology In 2 days  [address] [place] [address]      Becca oGng MD Endovascular Surgical Neuroradiology In 1 week  [address] [facility]  [address] [facility]  [address]  [phone]               DIET: Regular Diet    ACTIVITY: Activity as tolerated    DISCHARGE MEDICATIONS:  Current Discharge Medication List      START taking these medications    Details   furosemide (LASIX) 20 mg tablet 1 tab PO daily  Qty: 30 Tablet, Refills: 0  Start date: 11/28/2021         CONTINUE these medications which have NOT CHANGED    Details   promethazine (PHENERGAN) 25 mg tablet Take 1 Tablet by mouth every six (6) hours as needed for Nausea for up to 3 days. Qty: 12 Tablet, Refills: 0  Start date: 11/25/2021, End date: 11/28/2021      Trokendi  mg capsule Take 1 Capsule by mouth daily. HOLD for 1 week  Qty: 30 Capsule, Refills: 1    Associated Diagnoses: Pseudotumor cerebri syndrome      omeprazole (PRILOSEC) 20 mg capsule Take 1 Capsule by mouth daily as needed for Pain. Qty: 30 Capsule, Refills: 0      biotin 10 mg tab Take 10 mg by mouth daily. diazePAM (VALIUM) 10 mg tablet Take 10 mg by mouth daily as needed for Anxiety. oxyCODONE IR (ROXICODONE) 5 mg immediate release tablet Take 5 mg by mouth two (2) times daily as needed for Pain. psyllium seed, with sugar, (FIBER PO) Take 1 Tablet by mouth daily. tetrahydrozoline HCl/zinc sulf (EYE DROPS IRRITATION RELIEF OP) Administer 1 Drop to both eyes as needed for PRN Reason (Other) (Eye irritation). OTC eye drops      gabapentin (NEURONTIN) 600 mg tablet Take 600 mg by mouth three (3) times daily as needed. cyclobenzaprine (FLEXERIL) 10 mg tablet TAKE 1 TABLET BY MOUTH TWICE A DAY  Qty: 180 Tablet, Refills: 1      naloxone (NARCAN) 4 mg/actuation nasal spray Use 1 spray intranasally, then discard. Repeat with new spray every 2 min as needed for opioid overdose symptoms, alternating nostrils.   Indications: decrease in rate & depth of breathing due to opioid drug, opioid overdose  Qty: 2 Each, Refills: 0      hydrOXYchloroQUINE (PLAQUENIL) 200 mg tablet TAKE 1 TABLET BY MOUTH TWICE A DAY PA REQUIRED FOR QTY   16 FAXED MD  Qty: 60 Tab, Refills: 2 LORazepam (ATIVAN) 1 mg tablet Take 1.5 mg by mouth nightly as needed for Anxiety. STOP taking these medications       ticagrelor (BRILINTA) 90 mg tablet Comments:   Reason for Stopping:         aspirin delayed-release 81 mg tablet Comments:   Reason for Stopping:                 NOTIFY YOUR PHYSICIAN FOR ANY OF THE FOLLOWING:   Fever over 101 degrees for 24 hours. Chest pain, shortness of breath, fever, chills, nausea, vomiting, diarrhea, change in mentation, falling, weakness, bleeding. Severe pain or pain not relieved by medications. Or, any other signs or symptoms that you may have questions about. DISPOSITION:  x  Home With:   OT  PT  HH  RN       Long term SNF/Inpatient Rehab    Independent/assisted living    Hospice    Other:       PATIENT CONDITION AT DISCHARGE:     Functional status    Poor     Deconditioned    x Independent      Cognition    x Lucid     Forgetful     Dementia      Catheters/lines (plus indication)    [place]     PICC     PEG    x None      Code status    x Full code     DNR      PHYSICAL EXAMINATION AT DISCHARGE:  Please see progress note       CHRONIC MEDICAL DIAGNOSES:  Problem List as of 11/27/2021 Date Reviewed: 10/27/2021          Codes Class Noted - Resolved    Bruising ICD-10-CM: T14. 8XXA  ICD-9-CM: 924.9  10/8/2021 - Present        Intractable migraine ICD-10-CM: C15.060  ICD-9-CM: 346.91  9/30/2021 - Present        Intractable headache ICD-10-CM: R51.9  ICD-9-CM: 784.0  9/26/2021 - Present        Pseudotumor cerebri ICD-10-CM: G93.2  ICD-9-CM: 348.2  9/3/2021 - Present        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  ICD-9-CM: 437.0  8/30/2021 - Present        Papilledema associated with increased intracranial pressure ICD-10-CM: H47.11  ICD-9-CM: 377.01  8/30/2021 - Present        Intracranial hypertension ICD-10-CM: G93.2  ICD-9-CM: 348.2  10/23/2020 - Present        Ataxia ICD-10-CM: R27.0  ICD-9-CM: 781.3  9/14/2020 - Present        Headache ICD-10-CM: R51.9  ICD-9-CM: 784.0  8/29/2020 - Present        IIH (idiopathic intracranial hypertension) ICD-10-CM: G93.2  ICD-9-CM: 348.2  8/25/2020 - Present        SLE (systemic lupus erythematosus) (HCC) (Chronic) ICD-10-CM: M32.9  ICD-9-CM: 710.0  8/25/2020 - Present        Class 3 severe obesity in adult [facility]) ICD-10-CM: E66.01  ICD-9-CM: 278.01  8/22/2018 - Present        S/P repair of paraesophageal hernia ICD-10-CM: Z98.890, Z87.19  ICD-9-CM: V45.89  11/17/2017 - Present        Paraesophageal hernia ICD-10-CM: K44.9  ICD-9-CM: 553.3  11/15/2017 - Present        GERD (gastroesophageal reflux disease) ICD-10-CM: K21.9  ICD-9-CM: 530.81  11/7/2017 - Present        Obesity, Class II, BMI 35-39.9 ICD-10-CM: E66.9  ICD-9-CM: 278.00  3/31/2017 - Present        Sebaceous cyst ICD-10-CM: L72.3  ICD-9-CM: 706.2  3/24/2017 - Present    Overview Addendum 5/1/2017 10:52 AM by Sandra Miller., MD     S/P excision; Along sternum in cleavage. History of Nissen fundoplication W-72-LR: X90.831  ICD-9-CM: V15.29  1/4/2017 - Present    Overview Signed 1/4/2017  1:51 PM by Stephan Luis LPN     4 duodenal ulcers, chronic gastritis, Grade C esophagitis, Chronic GERD, hernia, small tumor. Done August/2016.              Chronic migraine without aura without status migrainosus, not intractable ICD-10-CM: F88.395  ICD-9-CM: 346.70  5/18/2016 - Present        Cervical incompetence ICD-10-CM: N88.3  ICD-9-CM: 622.5  4/12/2013 - Present        RESOLVED: Status migrainosus ICD-10-CM: L11.699  ICD-9-CM: 346.20  11/26/2021 - 11/26/2021              Greater than 39 minutes were spent with the patient on counseling and coordination of care    Signed:   Alka Tovar MD  11/27/2021  12:05 PM   .

## 2021-11-27 NOTE — PROGRESS NOTES
[facility] Adult  Hospitalist Group                                                                                          Hospitalist Progress Note  Carol Riggins MD  Answering service: [phone] from in house phone        Date of Service:  2021  NAME:  Kacie Manning  :  1990  MRN:  [id number]      Admission Summary:   Lisbeth Meals a very pleasant 26 y. o. female with pmhx pseudotumor cerebri, GERD, venous sinus thrombosis and SLE who presents with headache, intermittent gait imbalance and distorted vision.   Patient is well-known to hospital service and has had recurrent admission for migraines with complications of pseudotumor cerebri. Reportedly scheduled for shunting with neuro interventional surgery next week and was to initiate Brilinta three days prior prior. Has had intermittent analgesia w/ opioids. Nuro recommends admission for further eval.  The patient denies any fever, chills, chest or abdominal pain, nausea, vomiting, cough, congestion, recent illness, palpitations, or dysuria.     Remarkable vitals on ER Presentations: hr 115, bp 150/92  Labs Remarkable for: wbc 12.2,   ER Images: CT Head on  showed no acute process  ER Rx: dilaudid 2mg, compazine, benadryl    Interval history / Subjective:     F/u headache   Wants to get discharged today  Pain under control  Assessment & Plan:     Intractable headache  Pseudotumor cerebri  Chronic Venous sinus thrombosis  -Migraine cocktail with prn Dilaudid  -Brilinta and asa to start   -Continue trokendi  -Continue Topamax, gabapentin  -Diamox  -s/p therapeutic LP   -Appreciate Neuro/NIS, ok for discharge with outpatient follow up   -Tried to reach Dr Kamila Harper (Pain management) for her pain meds but couldn't.  She has some pain meds at home and will reach Dr Kamila Harper on Monday     SLE-Continue home Plaquenil  MSK pain-Continue home Flexeril     Code status: FULL CODE  DVT prophylaxis: scd    Plan: Discharge home    Care Plan discussed with: Patient/Family  Anticipated Disposition: Home w/Family  Anticipated Discharge: Less than 24 hours     Hospital Problems  Date Reviewed: 10/27/2021          Codes Class Noted POA    IIH (idiopathic intracranial hypertension) ICD-10-CM: G93.2  ICD-9-CM: 348.2  8/25/2020 Yes                Review of Systems:   A comprehensive review of systems was negative except for that written in the HPI. Vital Signs:    Last 24hrs VS reviewed since prior progress note. Most recent are:  Visit Vitals  /61   Pulse 76   Temp 98 °F (36.7 °C)   Resp 16   SpO2 96%         Intake/Output Summary (Last 24 hours) at 11/27/2021 1143  Last data filed at 11/27/2021 5895  Gross per 24 hour   Intake 350 ml   Output --   Net 350 ml        Physical Examination:     I had a face to face encounter with this patient and independently examined them on 11/27/2021 as outlined below:          Constitutional:  No acute distress, cooperative, pleasant    ENT:  Oral mucosa moist, oropharynx benign. Resp:  CTA bilaterally. No wheezing/rhonchi/rales. No accessory muscle use   CV:  Regular rhythm, normal rate, no murmurs, gallops, rubs    GI:  Soft, non distended, non tender. normoactive bowel sounds, no hepatosplenomegaly     Musculoskeletal:  No edema, warm, 2+ pulses throughout    Neurologic:  Moves all extremities. AAOx3, CN II-XII reviewed            Data Review:    Review and/or order of clinical lab test      Labs:     Recent Labs     11/26/21 0352 11/25/21 2125   WBC 10.5 12.2*   HGB 11.3* 11.8   HCT 35.2 34.6*    325     Recent Labs     11/26/21 0352 11/25/21 2125    137   K 3.7 3.9   * 111*   CO2 23 23   BUN 9 11   CREA 0.69 0.78   GLU 99 102*   CA 8.8 8.9     Recent Labs     11/26/21 0352   ALT 40   AP 86   TBILI 0.4   TP 6.6   ALB 3.3*   GLOB 3.3     No results for input(s): INR, PTP, APTT, INREXT in the last 72 hours.    No results for input(s): FE, TIBC, PSAT, FERR in the last 72 hours. No results found for: FOL, RBCF   No results for input(s): PH, PCO2, PO2 in the last 72 hours. No results for input(s): CPK, CKNDX, TROIQ in the last 72 hours.     No lab exists for component: CPKMB  Lab Results   Component Value Date/Time    Cholesterol, total 177 04/11/2019 11:35 AM    HDL Cholesterol 48 04/11/2019 11:35 AM    LDL, calculated 107 (H) 04/11/2019 11:35 AM    Triglyceride 112 04/11/2019 11:35 AM     Lab Results   Component Value Date/Time    Glucose (POC) 86 09/13/2020 11:03 PM    Glucose POC 82 11/07/2019 02:00 PM     Lab Results   Component Value Date/Time    Color YELLOW/STRAW 10/01/2021 04:56 AM    Appearance TURBID (A) 10/01/2021 04:56 AM    Specific gravity 1.017 10/01/2021 04:56 AM    Specific gravity 1.015 01/25/2021 02:30 PM    pH (UA) 8.5 (H) 10/01/2021 04:56 AM    Protein Negative 10/01/2021 04:56 AM    Glucose Negative 10/01/2021 04:56 AM    Ketone Negative 10/01/2021 04:56 AM    Bilirubin Negative 10/01/2021 04:56 AM    Urobilinogen 1.0 10/01/2021 04:56 AM    Nitrites Negative 10/01/2021 04:56 AM    Leukocyte Esterase SMALL (A) 10/01/2021 04:56 AM    Epithelial cells FEW 10/01/2021 04:56 AM    Bacteria 1+ (A) 10/01/2021 04:56 AM    WBC 0-4 10/01/2021 04:56 AM    RBC 0-5 10/01/2021 04:56 AM         Medications Reviewed:     Current Facility-Administered Medications   Medication Dose Route Frequency    topiramate (TOPAMAX) tablet 100 mg  100 mg Oral Q12H    sodium chloride (NS) flush 5-40 mL  5-40 mL IntraVENous Q8H    sodium chloride (NS) flush 5-40 mL  5-40 mL IntraVENous PRN    polyethylene glycol (MIRALAX) packet 17 g  17 g Oral DAILY PRN    promethazine (PHENERGAN) tablet 12.5 mg  12.5 mg Oral Q6H PRN    Or    ondansetron (ZOFRAN) injection 4 mg  4 mg IntraVENous Q6H PRN    furosemide (LASIX) tablet 20 mg  20 mg Oral DAILY    calcium polycarbophiL (FIBERCON) tablet 625 mg  1 Tablet Oral DAILY    diazePAM (VALIUM) tablet 10 mg  10 mg Oral DAILY PRN    gabapentin (NEURONTIN) tablet 600 mg  600 mg Oral TID PRN    hydrOXYchloroQUINE (PLAQUENIL) tablet 200 mg  200 mg Oral BID WITH MEALS    LORazepam (ATIVAN) tablet 1.5 mg  1.5 mg Oral QHS PRN    HYDROmorphone (DILAUDID) tablet 1 mg  1 mg Oral Q3H PRN     ______________________________________________________________________  EXPECTED LENGTH OF STAY: - - -  ACTUAL LENGTH OF STAY:          1                 Noemi Barnett MD

## 2021-11-27 NOTE — DISCHARGE INSTRUCTIONS
Discharge Instructions       PATIENT ID: Ryan Santana  MRN: [id number]   YOB: 1990    DATE OF ADMISSION: 11/25/2021  7:58 PM    DATE OF DISCHARGE: 11/27/2021    PRIMARY CARE PROVIDER: Anna Negro MD     ATTENDING PHYSICIAN: Colleen Cordova MD  DISCHARGING PROVIDER: Noemi Barnett MD    To contact this individual call [phone] and ask the  to page. If unavailable ask to be transferred the Adult Hospitalist Department. DISCHARGE DIAGNOSES   Headache    CONSULTATIONS: IP CONSULT TO NEUROINTERVENTIONAL SURGERY  IP CONSULT TO NEUROLOGY  IP CONSULT TO HOSPITALIST    PROCEDURES/SURGERIES: * No surgery found *    PENDING TEST RESULTS:   At the time of discharge the following test results are still pending: none    FOLLOW UP APPOINTMENTS:   Follow-up Information     Follow up With Specialties Details Why Contact Info    Anna Negro MD Family Medicine, Bariatrics In 1 week  [place] [address]  [phone]      Claudean House, MD Pain Management, Anesthesiology In 2 days  [address]      Smith Alberts MD Endovascular Surgical Neuroradiology In 1 week  . [address] [facility] [address]  [phone]             ADDITIONAL CARE RECOMMENDATIONS:   Follow up with PMD  Follow up with Pain management  Follow up with NIS    DIET: Regular Diet    ACTIVITY: Activity as tolerated      DISCHARGE MEDICATIONS:   See Medication Reconciliation Form    · It is important that you take the medication exactly as they are prescribed. · Keep your medication in the bottles provided by the pharmacist and keep a list of the medication names, dosages, and times to be taken in your wallet. · Do not take other medications without consulting your doctor. NOTIFY YOUR PHYSICIAN FOR ANY OF THE FOLLOWING:   Fever over 101 degrees for 24 hours.    Chest pain, shortness of breath, fever, chills, nausea, vomiting, diarrhea, change in mentation, falling, weakness, bleeding. Severe pain or pain not relieved by medications. Or, any other signs or symptoms that you may have questions about.       DISPOSITION:   x Home With:   OT  PT  HH  RN       SNF/Inpatient Rehab/LTAC    Independent/assisted living    Hospice    Other:     CDMP Checked:   Yes x     PROBLEM LIST Updated:  Yes x       Signed:   Rebecca Brown MD  11/27/2021  12:05 PM

## 2021-11-29 ENCOUNTER — HOSPITAL ENCOUNTER (OUTPATIENT)
Dept: PREADMISSION TESTING | Age: 31
Discharge: HOME | End: 2021-11-29
Payer: MEDICAID

## 2021-11-29 ENCOUNTER — TELEPHONE (OUTPATIENT)
Dept: NEUROSURGERY | Age: 31
End: 2021-11-29

## 2021-11-29 LAB — SARS-COV-2, COV2: NORMAL

## 2021-11-29 PROCEDURE — U0005 INFEC AGEN DETEC AMPLI PROBE: HCPCS

## 2021-11-29 NOTE — TELEPHONE ENCOUNTER
Patient called in stating that when she went to  [facility] she was told that prescription was \"inactive\" she will need another prescription sent to pharmacy for procedure scheduled 12/3/2021.

## 2021-11-30 ENCOUNTER — DOCUMENTATION ONLY (OUTPATIENT)
Dept: NEUROSURGERY | Age: 31
End: 2021-11-30

## 2021-11-30 ENCOUNTER — TELEPHONE (OUTPATIENT)
Dept: NEUROLOGY | Age: 31
End: 2021-11-30

## 2021-11-30 ENCOUNTER — TELEPHONE (OUTPATIENT)
Dept: NEUROSURGERY | Age: 31
End: 2021-11-30

## 2021-11-30 LAB
SARS-COV-2, XPLCVT: NOT DETECTED
SOURCE, COVRS: NORMAL

## 2021-11-30 NOTE — TELEPHONE ENCOUNTER
Patient instructed to start taking 45 mg of ticagrelor BID starting 11/2/2021. Script apparently cancelled but re-ordered now. She recently saw Dr. Jeri Francois who reportedly noted papilledema on Trokendi and Lasix. Advised her to proceed with venous sinus stenting. I discussed her case with Dr. Nery Alford (neurology) who also recommended venous sinus stenting ASAP. Her headaches improved after LP last week. Has recurrent headache since yesterday but denies positional changes. Restarted Trokendi after discussing with Dr. Nery Alford yesterday. We discussed the procedure again today via telephone. She understands the risks which have been discussed multiple times and expressed a strong desire to proceed given failure of medical management and neurology recommendations.     MARI

## 2021-12-02 ENCOUNTER — ANESTHESIA EVENT (OUTPATIENT)
Dept: INTERVENTIONAL RADIOLOGY/VASCULAR | Age: 31
DRG: 951 | End: 2021-12-02
Payer: MEDICAID

## 2021-12-03 ENCOUNTER — HOSPITAL ENCOUNTER (INPATIENT)
Dept: INTERVENTIONAL RADIOLOGY/VASCULAR | Age: 31
LOS: 2 days | Discharge: HOME HEALTH | DRG: 951 | End: 2021-12-05
Attending: RADIOLOGY | Admitting: RADIOLOGY
Payer: MEDICAID

## 2021-12-03 ENCOUNTER — APPOINTMENT (OUTPATIENT)
Dept: CT IMAGING | Age: 31
DRG: 951 | End: 2021-12-03
Attending: NURSE PRACTITIONER
Payer: MEDICAID

## 2021-12-03 ENCOUNTER — ANESTHESIA (OUTPATIENT)
Dept: INTERVENTIONAL RADIOLOGY/VASCULAR | Age: 31
DRG: 951 | End: 2021-12-03
Payer: MEDICAID

## 2021-12-03 DIAGNOSIS — G93.2 PSEUDOTUMOR CEREBRI: ICD-10-CM

## 2021-12-03 DIAGNOSIS — I66.9 STENOSIS OF INTRACRANIAL VESSEL: ICD-10-CM

## 2021-12-03 LAB
ASPIRIN TEST, ASPIRN: 350 ARU
ASPIRIN TEST, ASPIRN: 350 ARU
GLUCOSE BLD STRIP.AUTO-MCNC: 136 MG/DL (ref 65–117)
HCG UR QL: NEGATIVE
P2Y12 PLT RESPONSE,PPPR: 40 PRU (ref 194–418)
P2Y12 PLT RESPONSE,PPPR: 7 PRU (ref 194–418)
SERVICE CMNT-IMP: ABNORMAL

## 2021-12-03 PROCEDURE — 82962 GLUCOSE BLOOD TEST: CPT

## 2021-12-03 PROCEDURE — 2709999900 HC NON-CHARGEABLE SUPPLY

## 2021-12-03 PROCEDURE — 74011250636 HC RX REV CODE- 250/636: Performed by: NURSE PRACTITIONER

## 2021-12-03 PROCEDURE — C1769 GUIDE WIRE: HCPCS

## 2021-12-03 PROCEDURE — 74174 CTA ABD&PLVS W/CONTRAST: CPT

## 2021-12-03 PROCEDURE — 77030021532 HC CATH ANGI DX IMPRS MRTM -B

## 2021-12-03 PROCEDURE — 74011250637 HC RX REV CODE- 250/637: Performed by: NURSE PRACTITIONER

## 2021-12-03 PROCEDURE — 74011000250 HC RX REV CODE- 250: Performed by: NURSE ANESTHETIST, CERTIFIED REGISTERED

## 2021-12-03 PROCEDURE — 74011250636 HC RX REV CODE- 250/636: Performed by: NURSE ANESTHETIST, CERTIFIED REGISTERED

## 2021-12-03 PROCEDURE — C1876 STENT, NON-COA/NON-COV W/DEL: HCPCS

## 2021-12-03 PROCEDURE — 76060000035 HC ANESTHESIA 2 TO 2.5 HR

## 2021-12-03 PROCEDURE — 77030038563 HC TU ART PRESSR ICUM -Z

## 2021-12-03 PROCEDURE — APPNB60 APP NON BILLABLE TIME 46-60 MINS: Performed by: NURSE PRACTITIONER

## 2021-12-03 PROCEDURE — 74011250637 HC RX REV CODE- 250/637: Performed by: RADIOLOGY

## 2021-12-03 PROCEDURE — 85576 BLOOD PLATELET AGGREGATION: CPT

## 2021-12-03 PROCEDURE — 70450 CT HEAD/BRAIN W/O DYE: CPT

## 2021-12-03 PROCEDURE — 77030008584 HC TOOL GDWRE DEV TERU -A

## 2021-12-03 PROCEDURE — 74011000636 HC RX REV CODE- 636: Performed by: RADIOLOGY

## 2021-12-03 PROCEDURE — 77030019940 HC BLNKT HYPOTHRM STRY -B

## 2021-12-03 PROCEDURE — 77030035304 HC CATH ANGI BENCHMARK PENU -G

## 2021-12-03 PROCEDURE — B31R1ZZ FLUOROSCOPY OF INTRACRANIAL ARTERIES USING LOW OSMOLAR CONTRAST: ICD-10-PCS | Performed by: RADIOLOGY

## 2021-12-03 PROCEDURE — C1760 CLOSURE DEV, VASC: HCPCS

## 2021-12-03 PROCEDURE — C1887 CATHETER, GUIDING: HCPCS

## 2021-12-03 PROCEDURE — 74011000250 HC RX REV CODE- 250: Performed by: RADIOLOGY

## 2021-12-03 PROCEDURE — 36415 COLL VENOUS BLD VENIPUNCTURE: CPT

## 2021-12-03 PROCEDURE — 77030013797 HC KT TRNSDUC PRSSR EDWD -A

## 2021-12-03 PROCEDURE — 65610000006 HC RM INTENSIVE CARE

## 2021-12-03 PROCEDURE — C1894 INTRO/SHEATH, NON-LASER: HCPCS

## 2021-12-03 PROCEDURE — 74011250636 HC RX REV CODE- 250/636: Performed by: RADIOLOGY

## 2021-12-03 PROCEDURE — 057L3DZ DILATION OF INTRACRANIAL VEIN WITH INTRALUMINAL DEVICE, PERCUTANEOUS APPROACH: ICD-10-PCS | Performed by: ORTHOPAEDIC SURGERY

## 2021-12-03 PROCEDURE — 72192 CT PELVIS W/O DYE: CPT

## 2021-12-03 PROCEDURE — 77030012468 HC VLV BLEEDBK CNTRL ABBT -B

## 2021-12-03 PROCEDURE — 81025 URINE PREGNANCY TEST: CPT

## 2021-12-03 PROCEDURE — 76937 US GUIDE VASCULAR ACCESS: CPT

## 2021-12-03 PROCEDURE — 77030008684 HC TU ET CUF COVD -B: Performed by: STUDENT IN AN ORGANIZED HEALTH CARE EDUCATION/TRAINING PROGRAM

## 2021-12-03 PROCEDURE — 77030008638 HC TU CONN COOK -A

## 2021-12-03 RX ORDER — ONDANSETRON 2 MG/ML
INJECTION INTRAMUSCULAR; INTRAVENOUS AS NEEDED
Status: DISCONTINUED | OUTPATIENT
Start: 2021-12-03 | End: 2021-12-03 | Stop reason: HOSPADM

## 2021-12-03 RX ORDER — DEXMEDETOMIDINE HYDROCHLORIDE 100 UG/ML
INJECTION, SOLUTION INTRAVENOUS AS NEEDED
Status: DISCONTINUED | OUTPATIENT
Start: 2021-12-03 | End: 2021-12-03 | Stop reason: HOSPADM

## 2021-12-03 RX ORDER — SUCCINYLCHOLINE CHLORIDE 20 MG/ML
INJECTION INTRAMUSCULAR; INTRAVENOUS AS NEEDED
Status: DISCONTINUED | OUTPATIENT
Start: 2021-12-03 | End: 2021-12-03 | Stop reason: HOSPADM

## 2021-12-03 RX ORDER — SODIUM CHLORIDE, SODIUM LACTATE, POTASSIUM CHLORIDE, CALCIUM CHLORIDE 600; 310; 30; 20 MG/100ML; MG/100ML; MG/100ML; MG/100ML
75 INJECTION, SOLUTION INTRAVENOUS CONTINUOUS
Status: DISCONTINUED | OUTPATIENT
Start: 2021-12-03 | End: 2021-12-05 | Stop reason: HOSPADM

## 2021-12-03 RX ORDER — HYDROXYCHLOROQUINE SULFATE 200 MG/1
200 TABLET, FILM COATED ORAL 2 TIMES DAILY WITH MEALS
Status: DISCONTINUED | OUTPATIENT
Start: 2021-12-03 | End: 2021-12-05 | Stop reason: HOSPADM

## 2021-12-03 RX ORDER — ONDANSETRON 2 MG/ML
4 INJECTION INTRAMUSCULAR; INTRAVENOUS
Status: DISCONTINUED | OUTPATIENT
Start: 2021-12-03 | End: 2021-12-05 | Stop reason: HOSPADM

## 2021-12-03 RX ORDER — METOCLOPRAMIDE HYDROCHLORIDE 5 MG/ML
5 INJECTION INTRAMUSCULAR; INTRAVENOUS
Status: DISCONTINUED | OUTPATIENT
Start: 2021-12-03 | End: 2021-12-05 | Stop reason: HOSPADM

## 2021-12-03 RX ORDER — OXYCODONE HYDROCHLORIDE 5 MG/1
5 TABLET ORAL
Status: DISCONTINUED | OUTPATIENT
Start: 2021-12-03 | End: 2021-12-05 | Stop reason: HOSPADM

## 2021-12-03 RX ORDER — MIDAZOLAM HYDROCHLORIDE 1 MG/ML
INJECTION, SOLUTION INTRAMUSCULAR; INTRAVENOUS AS NEEDED
Status: DISCONTINUED | OUTPATIENT
Start: 2021-12-03 | End: 2021-12-03 | Stop reason: HOSPADM

## 2021-12-03 RX ORDER — DEXAMETHASONE SODIUM PHOSPHATE 4 MG/ML
INJECTION, SOLUTION INTRA-ARTICULAR; INTRALESIONAL; INTRAMUSCULAR; INTRAVENOUS; SOFT TISSUE AS NEEDED
Status: DISCONTINUED | OUTPATIENT
Start: 2021-12-03 | End: 2021-12-03 | Stop reason: HOSPADM

## 2021-12-03 RX ORDER — LANOLIN ALCOHOL/MO/W.PET/CERES
400 CREAM (GRAM) TOPICAL DAILY
Status: DISCONTINUED | OUTPATIENT
Start: 2021-12-03 | End: 2021-12-05 | Stop reason: HOSPADM

## 2021-12-03 RX ORDER — ROCURONIUM BROMIDE 10 MG/ML
INJECTION, SOLUTION INTRAVENOUS AS NEEDED
Status: DISCONTINUED | OUTPATIENT
Start: 2021-12-03 | End: 2021-12-03 | Stop reason: HOSPADM

## 2021-12-03 RX ORDER — HEPARIN SODIUM 1000 [USP'U]/ML
INJECTION, SOLUTION INTRAVENOUS; SUBCUTANEOUS AS NEEDED
Status: DISCONTINUED | OUTPATIENT
Start: 2021-12-03 | End: 2021-12-03 | Stop reason: HOSPADM

## 2021-12-03 RX ORDER — LIDOCAINE HYDROCHLORIDE 20 MG/ML
20 INJECTION, SOLUTION INFILTRATION; PERINEURAL
Status: COMPLETED | OUTPATIENT
Start: 2021-12-03 | End: 2021-12-03

## 2021-12-03 RX ORDER — PROPOFOL 10 MG/ML
INJECTION, EMULSION INTRAVENOUS AS NEEDED
Status: DISCONTINUED | OUTPATIENT
Start: 2021-12-03 | End: 2021-12-03 | Stop reason: HOSPADM

## 2021-12-03 RX ORDER — SODIUM CHLORIDE 9 MG/ML
INJECTION, SOLUTION INTRAVENOUS
Status: DISCONTINUED | OUTPATIENT
Start: 2021-12-03 | End: 2021-12-03 | Stop reason: HOSPADM

## 2021-12-03 RX ORDER — FENTANYL CITRATE 50 UG/ML
INJECTION, SOLUTION INTRAMUSCULAR; INTRAVENOUS AS NEEDED
Status: DISCONTINUED | OUTPATIENT
Start: 2021-12-03 | End: 2021-12-03 | Stop reason: HOSPADM

## 2021-12-03 RX ORDER — DEXAMETHASONE SODIUM PHOSPHATE 4 MG/ML
2 INJECTION, SOLUTION INTRA-ARTICULAR; INTRALESIONAL; INTRAMUSCULAR; INTRAVENOUS; SOFT TISSUE EVERY 6 HOURS
Status: DISCONTINUED | OUTPATIENT
Start: 2021-12-03 | End: 2021-12-05 | Stop reason: HOSPADM

## 2021-12-03 RX ORDER — FENTANYL CITRATE 50 UG/ML
25 INJECTION, SOLUTION INTRAMUSCULAR; INTRAVENOUS
Status: DISCONTINUED | OUTPATIENT
Start: 2021-12-03 | End: 2021-12-04

## 2021-12-03 RX ORDER — LIDOCAINE HYDROCHLORIDE AND EPINEPHRINE 10; 10 MG/ML; UG/ML
20 INJECTION, SOLUTION INFILTRATION; PERINEURAL
Status: COMPLETED | OUTPATIENT
Start: 2021-12-03 | End: 2021-12-03

## 2021-12-03 RX ORDER — LORAZEPAM 0.5 MG/1
0.5 TABLET ORAL
Status: DISCONTINUED | OUTPATIENT
Start: 2021-12-03 | End: 2021-12-05 | Stop reason: HOSPADM

## 2021-12-03 RX ORDER — OXYCODONE HYDROCHLORIDE 5 MG/1
5 TABLET ORAL
Status: DISCONTINUED | OUTPATIENT
Start: 2021-12-03 | End: 2021-12-03

## 2021-12-03 RX ORDER — ENOXAPARIN SODIUM 100 MG/ML
40 INJECTION SUBCUTANEOUS EVERY 24 HOURS
Status: DISCONTINUED | OUTPATIENT
Start: 2021-12-03 | End: 2021-12-03

## 2021-12-03 RX ADMIN — LIDOCAINE HYDROCHLORIDE AND EPINEPHRINE 10 ML: 10; 10 INJECTION, SOLUTION INFILTRATION; PERINEURAL at 10:42

## 2021-12-03 RX ADMIN — DEXMEDETOMIDINE HYDROCHLORIDE 6 MCG: 100 INJECTION, SOLUTION, CONCENTRATE INTRAVENOUS at 11:05

## 2021-12-03 RX ADMIN — FENTANYL CITRATE 25 MCG: 0.05 INJECTION, SOLUTION INTRAMUSCULAR; INTRAVENOUS at 20:23

## 2021-12-03 RX ADMIN — SODIUM CHLORIDE: 900 INJECTION, SOLUTION INTRAVENOUS at 09:07

## 2021-12-03 RX ADMIN — ONDANSETRON HYDROCHLORIDE 4 MG: 2 INJECTION, SOLUTION INTRAMUSCULAR; INTRAVENOUS at 09:27

## 2021-12-03 RX ADMIN — METOCLOPRAMIDE 5 MG: 5 INJECTION, SOLUTION INTRAMUSCULAR; INTRAVENOUS at 13:40

## 2021-12-03 RX ADMIN — SUCCINYLCHOLINE CHLORIDE 160 MG: 20 INJECTION, SOLUTION INTRAMUSCULAR; INTRAVENOUS at 09:17

## 2021-12-03 RX ADMIN — ROCURONIUM BROMIDE 30 MG: 10 SOLUTION INTRAVENOUS at 09:30

## 2021-12-03 RX ADMIN — PROPOFOL 150 MG: 10 INJECTION, EMULSION INTRAVENOUS at 09:16

## 2021-12-03 RX ADMIN — HEPARIN SODIUM 6000 UNITS: 1000 INJECTION, SOLUTION INTRAVENOUS; SUBCUTANEOUS at 09:54

## 2021-12-03 RX ADMIN — SODIUM CHLORIDE, POTASSIUM CHLORIDE, SODIUM LACTATE AND CALCIUM CHLORIDE 100 ML/HR: 600; 310; 30; 20 INJECTION, SOLUTION INTRAVENOUS at 13:41

## 2021-12-03 RX ADMIN — LIDOCAINE HYDROCHLORIDE 10 MG: 20 INJECTION, SOLUTION INFILTRATION; PERINEURAL at 10:42

## 2021-12-03 RX ADMIN — HEPARIN SODIUM 4000 UNITS: 5000 INJECTION, SOLUTION INTRAVENOUS; SUBCUTANEOUS at 09:33

## 2021-12-03 RX ADMIN — Medication 400 MG: at 13:40

## 2021-12-03 RX ADMIN — OXYCODONE 5 MG: 5 TABLET ORAL at 17:00

## 2021-12-03 RX ADMIN — DEXMEDETOMIDINE HYDROCHLORIDE 4 MCG: 100 INJECTION, SOLUTION, CONCENTRATE INTRAVENOUS at 11:08

## 2021-12-03 RX ADMIN — FENTANYL CITRATE 100 MCG: 50 INJECTION, SOLUTION INTRAMUSCULAR; INTRAVENOUS at 09:07

## 2021-12-03 RX ADMIN — HEPARIN SODIUM 4000 UNITS: 5000 INJECTION, SOLUTION INTRAVENOUS; SUBCUTANEOUS at 09:31

## 2021-12-03 RX ADMIN — TICAGRELOR 30 MG: 60 TABLET ORAL at 17:00

## 2021-12-03 RX ADMIN — HEPARIN SODIUM 4000 UNITS: 5000 INJECTION, SOLUTION INTRAVENOUS; SUBCUTANEOUS at 09:34

## 2021-12-03 RX ADMIN — ROCURONIUM BROMIDE 10 MG: 10 SOLUTION INTRAVENOUS at 10:11

## 2021-12-03 RX ADMIN — OXYCODONE 5 MG: 5 TABLET ORAL at 23:17

## 2021-12-03 RX ADMIN — MIDAZOLAM HYDROCHLORIDE 2 MG: 1 INJECTION, SOLUTION INTRAMUSCULAR; INTRAVENOUS at 09:07

## 2021-12-03 RX ADMIN — IOPAMIDOL 100 ML: 755 INJECTION, SOLUTION INTRAVENOUS at 18:59

## 2021-12-03 RX ADMIN — DEXMEDETOMIDINE HYDROCHLORIDE 6 MCG: 100 INJECTION, SOLUTION, CONCENTRATE INTRAVENOUS at 10:58

## 2021-12-03 RX ADMIN — HEPARIN SODIUM 4000 UNITS: 5000 INJECTION, SOLUTION INTRAVENOUS; SUBCUTANEOUS at 09:37

## 2021-12-03 RX ADMIN — HEPARIN SODIUM 4000 UNITS: 5000 INJECTION, SOLUTION INTRAVENOUS; SUBCUTANEOUS at 09:30

## 2021-12-03 RX ADMIN — SUGAMMADEX 200 MG: 100 INJECTION, SOLUTION INTRAVENOUS at 10:44

## 2021-12-03 RX ADMIN — DEXAMETHASONE SODIUM PHOSPHATE 2 MG: 4 INJECTION, SOLUTION INTRAMUSCULAR; INTRAVENOUS at 14:21

## 2021-12-03 RX ADMIN — DEXMEDETOMIDINE HYDROCHLORIDE 4 MCG: 100 INJECTION, SOLUTION, CONCENTRATE INTRAVENOUS at 11:01

## 2021-12-03 RX ADMIN — HYDROXYCHLOROQUINE SULFATE 200 MG: 200 TABLET ORAL at 17:00

## 2021-12-03 RX ADMIN — HEPARIN SODIUM 4000 UNITS: 5000 INJECTION, SOLUTION INTRAVENOUS; SUBCUTANEOUS at 09:38

## 2021-12-03 RX ADMIN — HEPARIN SODIUM 4000 UNITS: 5000 INJECTION, SOLUTION INTRAVENOUS; SUBCUTANEOUS at 09:36

## 2021-12-03 RX ADMIN — DEXAMETHASONE SODIUM PHOSPHATE 8 MG: 4 INJECTION, SOLUTION INTRAMUSCULAR; INTRAVENOUS at 09:27

## 2021-12-03 RX ADMIN — IOPAMIDOL 87 ML: 612 INJECTION, SOLUTION INTRAVENOUS at 10:41

## 2021-12-03 RX ADMIN — HEPARIN SODIUM 4000 UNITS: 5000 INJECTION, SOLUTION INTRAVENOUS; SUBCUTANEOUS at 09:32

## 2021-12-03 RX ADMIN — ROCURONIUM BROMIDE 10 MG: 10 SOLUTION INTRAVENOUS at 09:16

## 2021-12-03 RX ADMIN — DEXAMETHASONE SODIUM PHOSPHATE 2 MG: 4 INJECTION, SOLUTION INTRAMUSCULAR; INTRAVENOUS at 20:23
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
## 2021-12-03 NOTE — PROGRESS NOTES
1415 Pt sitting up in bed, still very drowsy,will keep monitoring for early mobility. 1745 pt up in chair eating,complaining of headache and left leg pain. NP Mei Lafleur at bedside. Pt wants to go back to bed after she eats. will continue to monitor pain. [address] [address] with Dr Saint Nipper and notified about pt still complaining of left leg pain and headache.pain is 7 out of 10 after giving Oxycodone. 1930 Bedside shift change report given to PHILIPPE Miles (oncoming nurse) by Manjeet Wong RN (offgoing nurse). Report included the following information SBAR, Kardex, Intake/Output, MAR, Recent Results, Cardiac Rhythm nsr, Alarm Parameters  and Dual Neuro Assessment.

## 2021-12-03 NOTE — ANESTHESIA POSTPROCEDURE EVALUATION
Post-Anesthesia Evaluation and Assessment    Patient: Christos Dunn MRN: [id number]  SSN: [id number]    YOB: 1990  Age: 26 y.o. Sex: female      I have evaluated the patient and they are stable and ready for discharge from the PACU. Cardiovascular Function/Vital Signs  Visit Vitals  /66 (BP 1 Location: Right upper arm, BP Patient Position: Supine)   Pulse 82   Temp 36 °C (96.8 °F)   Resp 13   Ht 5' 1\" (1.549 m)   Wt 94.8 kg (209 lb)   SpO2 99%   BMI 39.49 kg/m²       Patient is status post * No anesthesia type entered * anesthesia for * No procedures listed *. Nausea/Vomiting: None    Postoperative hydration reviewed and adequate. Pain:  Managed    Neurological Status: At baseline    Mental Status, Level of Consciousness: Alert and  oriented to person, place, and time    Pulmonary Status:   O2 device: none   Adequate oxygenation and airway patent    Complications related to anesthesia: None    Post-anesthesia assessment completed.  No concerns    Signed By: Tony Myers DO     December 3, 2021

## 2021-12-03 NOTE — ADVANCED PRACTICE NURSE
NEUROINTERVENTIONAL SURGERY POST-PROCEDURE NOTE    Patient seen and examined s/p right transverse sigmoid cerebral venous sinus stenting today. She is OOB in the chair and complaining of 7/10 headache. We have given her Reglan IV, PO Magnesium oxide, IV steroids which she reports have not helped her headache. She reports that she takes PRN Oxycodone 5 mg at home for headache pain. Will start this now and re-assess pain. P2Y12 s/p procedure is 40---> Brilinta dose decreased to 30 mg BID. New script placed on chart by Dr. Ana Lawson. Patient also complaining of left groin pain which she says is different from the general \"soreness\" she experienced after the first angiogram. I examined and do not observe any drainage, hematoma, or ecchymosis at this time. Will attempt to treat pain with PO meds and reassess this evening. Consider duplex study if pain persists or hematoma develops. Plan:   - Keep HOB elevated 30 degrees   - encourage OOB and early mobilization   - headache management as discussed above   - will need medrol dose pack at discharge for steroid taper   - head CT in AM ordered   - BMP/CBC in AM   - discussed with patient that we will try to avoid her usual PRN QHS dose of Ativan tonight ( 1.5 mg) for sleep as we will be doing q1 hour neuro checks and do not want her to be too drowsy , she is in agreement with this plan and does not feel she will have benzo withdrawal   - IVF @ 100/hr   - q1 hour neuro checks   - call code stroke and notify NIS for any acute neurological changes     JOLANTA Escalona, JAN  NIS

## 2021-12-03 NOTE — PROGRESS NOTES
Reduced brilinta to 30 mg po BID based on P2Y12 results  New outpatient prescription for this dose on chart. Patient will need to  at pharmacy after discharge.

## 2021-12-03 NOTE — INTERVAL H&P NOTE
Update History & Physical    The Patient's History and Physical of November 12, 2021 was reviewed with the patient and I examined the patient. There was no change. The surgical site was confirmed by the patient and me. Plan:  The risk, benefits, expected outcome, and alternative to the recommended procedure have been discussed with the patient. Patient understands and wants to proceed with the procedure. Consent was obtained today prior to the start of the case by Dr. Dereck Maria. Patient did not tolerate Plavix in prior attempts, she developed extensive bruising and light headedness. She was switched to Brilinta and started 45 mg BID on 12/1/21. P2Y12 sent this morning shows a supratherapeutic level of 7. Discussed with Dr. Dereck Maria and patient and decision made to proceed with stenting. Patient does have some increased risk of bleeding. She understands this risk and wants to proceed. We will recheck Brilinta 1 hour after case completed. Patient is reporting 4/10 headache today which started on Monday. General:  Alert, cooperative, no distress, appears stated age. Head:  Normocephalic, without obvious abnormality, atraumatic. Eyes:  Conjunctivae/corneas clear. PERRL, EOMs intact. Throat: Lips, mucosa, and tongue normal. Teeth and gums normal.           Lungs:   Clear to auscultation bilaterally. Chest wall:  No tenderness or deformity. Heart:  Regular rate and rhythm, S1, S2 normal, no murmur, click, rub or gallop. Abdomen:   Soft, non-tender. Bowel sounds normal. No masses,  No organomegaly. Extremities: Extremities normal, atraumatic, no cyanosis or edema. Pulses: 2+ and symmetric all extremities. Skin: Skin color, texture, turgor normal. No rashes or lesions. Some scattered small ecchymosis noted. Neurologic: CNII-XII intact.          Electronically signed by Aydee Crawford NP on 12/3/2021 at 8:57 AM

## 2021-12-03 NOTE — BRIEF OP NOTE
NEUROINTERVENTIONAL SURGERY POST-PROCEDURE NOTE    PROCEDURE:  Right transverse sigmoid cerebral venous sinus stent  Cerebral angiography  US guided arterial access  US guided venous access  Angioseal: left CFA    VESSEL(S) STUDIED:  1. RCCA  2. R transverse venous sinus  3. Right IJ VESSEL(S) TREATED:  1. Right transverse/sigmoid venous sinus stent        PRELIMINARY REPORT & DISPOSITION:     Severe dominant distal transverse sigmoid venous sinus stenosis eliminated with a 7 mm x 40 mm Cordis Precise Pro stent. 13 mm venous gradient (partially responsible for intracranial hypertension/papilledema) reduced to zero. The central venous pressure is still elevated in this patient (17 mmHg) likely from morbid obesity. COMPLICATIONS:  None immediate    FOLLOW-UP:  Both legs straight until 1300 (left arterial, right venous)  SBP   HOB 30 degrees DATE OF SERVICE:  12/3/2021 10:53 AM     ATTENDING SURGEON(S):  MD Lio Duran MD    ANESTHESIA:   General    EBL: 30 mL    MEDICATIONS:   See nursing record    PUNCTURE SITE:  Right common femoral vein; left common femoral artery. Left arteriotomy closed with Angioseal.  Flat with legs straight x 2 hours.   HOB 30 degrees

## 2021-12-03 NOTE — ANESTHESIA PREPROCEDURE EVALUATION
Relevant Problems   NEUROLOGY   (+) Chronic migraine without aura without status migrainosus, not intractable   (+) Headache   (+) Intractable headache   (+) Intractable migraine      GASTROINTESTINAL   (+) GERD (gastroesophageal reflux disease)   (+) Paraesophageal hernia      ENDOCRINE   (+) Class 3 severe obesity in adult [facility])       Anesthetic History   No history of anesthetic complications            Review of Systems / Medical History  Patient summary reviewed, nursing notes reviewed and pertinent labs reviewed    Pulmonary  Within defined limits                 Neuro/Psych   Within defined limits          Comments: pseudotumor Cardiovascular  Within defined limits                Exercise tolerance: >4 METS     GI/Hepatic/Renal     GERD      PUD     Endo/Other        Obesity     Other Findings   Comments: SLE           Physical Exam    Airway  Mallampati: II  TM Distance: > 6 cm  Neck ROM: normal range of motion   Mouth opening: Normal     Cardiovascular  Regular rate and rhythm,  S1 and S2 normal,  no murmur, click, rub, or gallop             Dental  No notable dental hx       Pulmonary  Breath sounds clear to auscultation               Abdominal  GI exam deferred       Other Findings            Anesthetic Plan    ASA: 3  Anesthesia type: MAC          Induction: Intravenous  Anesthetic plan and risks discussed with: Patient

## 2021-12-03 NOTE — PROGRESS NOTES
Pt arrives ambulatory to angio department accompanied by  for venous sinus stenting procedure. All assessments completed and consent was reviewed. Education given was regarding procedure, general anesthesia, post-procedure care and  management/follow-up. Opportunity for questions was provided and all questions and concerns were addressed.

## 2021-12-03 NOTE — ROUTINE PROCESS
TRANSFER - OUT REPORT:    Verbal report given to Thania RN(name) on Suzy Lu  being transferred to ICU(unit) for routine post - op       Report consisted of patients Situation, Background, Assessment and   Recommendations(SBAR). Information from the following report(s) SBAR and Procedure Summary was reviewed with the receiving nurse. Lines:       Opportunity for questions and clarification was provided.       Patient transported with:   Registered Nurse

## 2021-12-04 LAB
ANION GAP SERPL CALC-SCNC: 8 MMOL/L (ref 5–15)
BUN SERPL-MCNC: 8 MG/DL (ref 6–20)
BUN/CREAT SERPL: 11 (ref 12–20)
CALCIUM SERPL-MCNC: 8.7 MG/DL (ref 8.5–10.1)
CHLORIDE SERPL-SCNC: 108 MMOL/L (ref 97–108)
CO2 SERPL-SCNC: 23 MMOL/L (ref 21–32)
CREAT SERPL-MCNC: 0.74 MG/DL (ref 0.55–1.02)
ERYTHROCYTE [DISTWIDTH] IN BLOOD BY AUTOMATED COUNT: 12.4 % (ref 11.5–14.5)
GLUCOSE SERPL-MCNC: 137 MG/DL (ref 65–100)
HCT VFR BLD AUTO: 34.4 % (ref 35–47)
HGB BLD-MCNC: 11.9 G/DL (ref 11.5–16)
MCH RBC QN AUTO: 31.4 PG (ref 26–34)
MCHC RBC AUTO-ENTMCNC: 34.6 G/DL (ref 30–36.5)
MCV RBC AUTO: 90.8 FL (ref 80–99)
NRBC # BLD: 0 K/UL (ref 0–0.01)
NRBC BLD-RTO: 0 PER 100 WBC
PLATELET # BLD AUTO: 336 K/UL (ref 150–400)
PMV BLD AUTO: 9.8 FL (ref 8.9–12.9)
POTASSIUM SERPL-SCNC: 4.1 MMOL/L (ref 3.5–5.1)
RBC # BLD AUTO: 3.79 M/UL (ref 3.8–5.2)
SODIUM SERPL-SCNC: 139 MMOL/L (ref 136–145)
WBC # BLD AUTO: 12.6 K/UL (ref 3.6–11)

## 2021-12-04 PROCEDURE — 74011250636 HC RX REV CODE- 250/636: Performed by: NURSE PRACTITIONER

## 2021-12-04 PROCEDURE — 80048 BASIC METABOLIC PNL TOTAL CA: CPT

## 2021-12-04 PROCEDURE — 85027 COMPLETE CBC AUTOMATED: CPT

## 2021-12-04 PROCEDURE — 74011250637 HC RX REV CODE- 250/637: Performed by: NURSE PRACTITIONER

## 2021-12-04 PROCEDURE — 97530 THERAPEUTIC ACTIVITIES: CPT

## 2021-12-04 PROCEDURE — 74011000250 HC RX REV CODE- 250: Performed by: NURSE PRACTITIONER

## 2021-12-04 PROCEDURE — 74011250637 HC RX REV CODE- 250/637: Performed by: RADIOLOGY

## 2021-12-04 PROCEDURE — 99233 SBSQ HOSP IP/OBS HIGH 50: CPT | Performed by: STUDENT IN AN ORGANIZED HEALTH CARE EDUCATION/TRAINING PROGRAM

## 2021-12-04 PROCEDURE — 97162 PT EVAL MOD COMPLEX 30 MIN: CPT

## 2021-12-04 PROCEDURE — 74011250636 HC RX REV CODE- 250/636: Performed by: RADIOLOGY

## 2021-12-04 PROCEDURE — 36415 COLL VENOUS BLD VENIPUNCTURE: CPT

## 2021-12-04 PROCEDURE — 65660000000 HC RM CCU STEPDOWN

## 2021-12-04 RX ORDER — FENTANYL CITRATE 50 UG/ML
50 INJECTION, SOLUTION INTRAMUSCULAR; INTRAVENOUS
Status: DISCONTINUED | OUTPATIENT
Start: 2021-12-04 | End: 2021-12-04

## 2021-12-04 RX ORDER — GUAIFENESIN 100 MG/5ML
81 LIQUID (ML) ORAL DAILY
Status: DISCONTINUED | OUTPATIENT
Start: 2021-12-04 | End: 2021-12-05 | Stop reason: HOSPADM

## 2021-12-04 RX ORDER — FENTANYL CITRATE 50 UG/ML
50 INJECTION, SOLUTION INTRAMUSCULAR; INTRAVENOUS
Status: DISCONTINUED | OUTPATIENT
Start: 2021-12-04 | End: 2021-12-05 | Stop reason: HOSPADM

## 2021-12-04 RX ORDER — LIDOCAINE 4 G/100G
1 PATCH TOPICAL EVERY 24 HOURS
Status: DISCONTINUED | OUTPATIENT
Start: 2021-12-04 | End: 2021-12-05 | Stop reason: HOSPADM

## 2021-12-04 RX ADMIN — HYDROXYCHLOROQUINE SULFATE 200 MG: 200 TABLET ORAL at 18:07

## 2021-12-04 RX ADMIN — TICAGRELOR 30 MG: 60 TABLET ORAL at 05:12

## 2021-12-04 RX ADMIN — Medication 400 MG: at 08:25

## 2021-12-04 RX ADMIN — FENTANYL CITRATE 50 MCG: 0.05 INJECTION, SOLUTION INTRAMUSCULAR; INTRAVENOUS at 10:41

## 2021-12-04 RX ADMIN — FENTANYL CITRATE 50 MCG: 0.05 INJECTION, SOLUTION INTRAMUSCULAR; INTRAVENOUS at 20:38

## 2021-12-04 RX ADMIN — FENTANYL CITRATE 25 MCG: 0.05 INJECTION, SOLUTION INTRAMUSCULAR; INTRAVENOUS at 08:58

## 2021-12-04 RX ADMIN — OXYCODONE 5 MG: 5 TABLET ORAL at 18:07

## 2021-12-04 RX ADMIN — DEXAMETHASONE SODIUM PHOSPHATE 2 MG: 4 INJECTION, SOLUTION INTRAMUSCULAR; INTRAVENOUS at 08:25

## 2021-12-04 RX ADMIN — DEXAMETHASONE SODIUM PHOSPHATE 2 MG: 4 INJECTION, SOLUTION INTRAMUSCULAR; INTRAVENOUS at 20:38

## 2021-12-04 RX ADMIN — FENTANYL CITRATE 25 MCG: 0.05 INJECTION, SOLUTION INTRAMUSCULAR; INTRAVENOUS at 02:50

## 2021-12-04 RX ADMIN — ASPIRIN 81 MG CHEWABLE TABLET 81 MG: 81 TABLET CHEWABLE at 08:58

## 2021-12-04 RX ADMIN — OXYCODONE 5 MG: 5 TABLET ORAL at 23:23

## 2021-12-04 RX ADMIN — SODIUM CHLORIDE, POTASSIUM CHLORIDE, SODIUM LACTATE AND CALCIUM CHLORIDE 75 ML/HR: 600; 310; 30; 20 INJECTION, SOLUTION INTRAVENOUS at 10:42

## 2021-12-04 RX ADMIN — TICAGRELOR 30 MG: 60 TABLET ORAL at 18:07

## 2021-12-04 RX ADMIN — OXYCODONE 5 MG: 5 TABLET ORAL at 10:40

## 2021-12-04 RX ADMIN — FENTANYL CITRATE 50 MCG: 0.05 INJECTION, SOLUTION INTRAMUSCULAR; INTRAVENOUS at 15:36

## 2021-12-04 RX ADMIN — DEXAMETHASONE SODIUM PHOSPHATE 2 MG: 4 INJECTION, SOLUTION INTRAMUSCULAR; INTRAVENOUS at 02:50

## 2021-12-04 RX ADMIN — DEXAMETHASONE SODIUM PHOSPHATE 2 MG: 4 INJECTION, SOLUTION INTRAMUSCULAR; INTRAVENOUS at 15:36

## 2021-12-04 RX ADMIN — HYDROXYCHLOROQUINE SULFATE 200 MG: 200 TABLET ORAL at 08:25

## 2021-12-04 NOTE — PROGRESS NOTES
Neurocritical Care Brief Progress Note:  Patient with history of refractory intracranial hypertension/pseudotumor cerebri, migraines, SLE, PUD and morbid obesity. Came in to the hospital this morning for planned cerebral angiography and right transverse sigmoid cerebral venous sinus stent placement. Code Stroke called earlier this evening due to left leg numbness, heaviness and pain. Pain is in the left groin and upper thigh and numbness extends to the left foot. Patient had dry CTH which was negative and had CTA abd/pelvis and CT pelvis without which just showed soft tissue stranding in the left groin. No evidence of retroperitoneal hemorrhage. Patient continues with headache which is different from her usual bifrontal headaches and is more so on the right side now. Physical Exam:  Gen: NAD, calm, cooperative  Neuro: A&Ox4. Follows commands. Speech clear. Affect normal. PERRL, 3 mm bilaterally. Blinks to threat. No disconjugate gaze present. EOMI. Face symmetric. Palate symmetric. Tongue midline. Jese spontaneously. Strength 5/5 in UE and LE BL, slightly weaker pedal strength on the left. Negative drift. Bulk and tone normal. No involuntary movements. Gait deferred. Skin: Warm, dry, color appropriate for ethnicity. Bilateral groin venous site on the right and arterial site on the right soft and tender on palpation, dressing is C/D/I, with no active bleeding or drainage noted. Left thigh slightly larger than right. Positive pedal pulses bilaterally. Capillary refill less than 3 seconds in bilateral toes. PLAN:   - HOB elevated 30 degrees  -SCDs while in bed.   -Headache management with Reglan IV,  po mag, IV steroids and low dose roxicodone and low dose IV fentanyl for breakthrough severe pain only. -Okay to give low dose 0.5 mg of Ativan q hs as needed tonight as patient takes 1.5 mg nightly.  Explained to patient that she will have to be awakened every hour tonight and that we cannot give her usual dose of benzos she takes at home. -/hr  -Q hourly neuro checks. -BMP/CBC in am  -Send home with Medrol Dose pack at Discharge    Discussed plan with Dr. Ori Matthews NP and Dr. Charis Tran.      Pierce Martin NP  Neurocritical Care Nurse Practitioner  [phone]

## 2021-12-04 NOTE — PROGRESS NOTES
Neurointerventional Surgery Progress Note  Didier Quispe, [facility]  Neurocritical Care NP      Admit Date: 12/3/2021   LOS: 1 day        Daily Progress Note: 12/4/2021    POD:1 Day Post-Op    S/P: POD 1 cerebral angiogram and right transverse sigmoid cerebral venous sinus stenting on 12/3/2021 by Dr. Ana Lawson. HPI: Jose Arauz is a 49-year-old female with a past medical history significant for migraines, anxiety, nissen fundoplication, GERD, SLE, Pseudotumor cerebri, papilledema, and cerebral venous sinus stenosis who presented to the hospital on 12/3/2021 for a scheduled cerebral angiogram for right transverse sigmoid venous sinus stenting due to severe stenosis. She underwent the the scheduled procedure by Dr. Ana Lawson on 12/3/2021. She is being maintained on aspirin and Brilinta. Subjective:   A Code Stroke was called yesterday evening due to patient noting an acute onset of left leg numbness and heaviness. CT of the head was unremarkable. She also reported some left groin pain extending to her thigh. CTA of the abdomen and pelvis with contrast showed soft tissue stranding in the left groin likely postprocedural.  No evidence of active arterial extravasation. No evidence of retroperitoneal hemorrhage. She reports her headaches have improved this morning. She rates her pain a 3/10, located, in the right frontal area. She describes her headache as constant and dull. She denies any aggravating factors or associated symptoms. When asking her about any alleviating factors, she denies any improvement with current pain regimen, but she reports she just needs time for headache to improve. She also reports some left groin/left thigh pain since yesterday, but it has improved. She rates the left groin/thigh pain a 5/10. She was not able to tell me a specific description, but that it is just \"pain. \"  Movement makes it worse and having the compression device on her leg also makes it worse.   She denies any alleviating factors. She denies any fever, chills, neck pain, back pain, dizziness, vision changes, tinnitus, difficulty swallowing, speech difficulty, chest pain, shortness of breath, cough, nausea, vomiting, tingling, abdominal pain, balance, or coordination issues. She still endorses some  \"heaviness\" in her left leg from her knee to her foot and left leg numbness. The patient feels uncomfortable going home today due to her left groin pain, left leg numbness and heaviness.    Current Facility-Administered Medications   Medication Dose Route Frequency Provider Last Rate Last Admin    lactated Ringers infusion  100 mL/hr IntraVENous CONTINUOUS Teresa SPAIN  mL/hr at 12/03/21 1341 100 mL/hr at 12/03/21 1341    ondansetron (ZOFRAN) injection 4 mg  4 mg IntraVENous Q4H PRN Deidre Thornton NP        hydrOXYchloroQUINE (PLAQUENIL) tablet 200 mg  200 mg Oral BID WITH MEALS Gisselle Lopez NP   200 mg at 12/03/21 1700    topiramate ER (TROKENDI XR) capsule 200 mg  200 mg Oral DAILY Gisselle Lopez NP        metoclopramide HCl (REGLAN) injection 5 mg  5 mg IntraVENous Q6H PRN Gina Sweet MD   5 mg at 12/03/21 1340    dexamethasone (DECADRON) 4 mg/mL injection 2 mg  2 mg IntraVENous Q6H Gina Sweet MD   2 mg at 12/04/21 0250    magnesium oxide (MAG-OX) tablet 400 mg  400 mg Oral DAILY Gina Sweet MD   400 mg at 12/03/21 1340    ticagrelor (BRILINTA) tablet 30 mg  30 mg Oral Q12H Gina Sweet MD   30 mg at 12/04/21 5637    oxyCODONE IR (ROXICODONE) tablet 5 mg  5 mg Oral Q6H PRN Deidre Thornton NP   5 mg at 12/03/21 2317    LORazepam (ATIVAN) tablet 0.5 mg  0.5 mg Oral QHS PRN Neto Ferguson NP        fentaNYL citrate (PF) injection 25 mcg  25 mcg IntraVENous Q6H PRN Neto Ferguson NP   25 mcg at 12/04/21 0250        Allergies   Allergen Reactions    Latex Anaphylaxis    Acetaminophen Anaphylaxis    Other Plant, Animal, Environmental Hives     Allergic to everything outside.  Plavix [Clopidogrel] Other (comments)     Terrible bruising, light headedness    Nsaids (Non-Steroidal Anti-Inflammatory Drug) Other (comments)     Advised by her GI doctor not to take till they figure out what is going on with her stomach. Currently has a Nissen-fundiplication. Review of Systems:  A comprehensive review of systems was negative except for that written in the History of Present Illness/subjective data. Objective:     Vital signs  Temp (24hrs), Av °F (36.7 °C), Min:96.8 °F (36 °C), Max:98.5 °F (36.9 °C)   No intake/output data recorded.  1901 -  0700  In: 1131.7 [I.V.:1131.7]  Out: -     Visit Vitals  /68   Pulse 91   Temp 98.3 °F (36.8 °C)   Resp 20   Ht 5' 1\" (1.549 m)   Wt 209 lb (94.8 kg)   SpO2 95%   BMI 39.49 kg/m²      O2 Device: None (Room air)     Pain control  Pain Assessment  Pain Scale 1: Numeric (0 - 10)  Pain Intensity 1: 0  Pain Location 1: Leg, Groin, Head  Pain Orientation 1: Left  Pain Description 1: Aching  Pain Intervention(s) 1: Emotional support, Repositioned, Rest              Vitals:    21 0400 21 0500 21 0600 21 0700   BP: 109/61 112/62 99/67 111/68   Pulse: 91 87 91 91   Resp: 17 17 18 20   Temp: 98.3 °F (36.8 °C)      SpO2: 94% 92% 95% 95%   Weight:       Height:            Physical Exam:  GENERAL: alert, cooperative, no distress, appears stated age  EYE: conjunctivae/corneas clear. PERRL, EOM's intact. LUNG: clear to auscultation bilaterally  HEART: regular rate and rhythm, S1, S2 normal, no murmur, click, rub or gallop  ABDOMEN: soft, non-tender. Bowel sounds normal. No masses,  no organomegaly  EXTREMITIES:  extremities normal, atraumatic, no cyanosis, trace peripheral edema, no calf tenderness. Left thigh slightly swollen compared to right thigh. No redness noted. Left posterior tibial pulse +1, left pedal pulse +1. Right posterior tibial and pedal pulses +2. SKIN: Skin warm to touch.   Scant amount of bruising in bilateral upper extremities. Skin appropriate for ethnicity. Tattoos present. Right groin site clean, dry, and intact. Non-tender to palpation. Scant amount of bruising at groin puncture site, but no bleeding or hematoma present. Left groin site with mild bruising present. No hematoma or bleeding noted. Some tenderness to palpation and guarding noted. Neurologic Exam:  Mental Status:  Alert and oriented x 4. Flat affect. Language:    Normal fluency, repetition, comprehension and naming. Monotone/soft voice. Cranial Nerves:        Pupils equal, round and reactive to light. Visual fields full to confrontation. Extraocular movements intact. Facial sensation intact. Full facial strength, no asymmetry. Hearing grossly intact bilaterally. No dysarthria. Tongue protrudes to midline, palate elevates symmetrically. Shoulder shrug 5/5 bilaterally. Motor:    Moves all extremities spontaneously. Following commands, Mild left leg drift on exam, but appears secondary to left groin pain. LLE strength 4/5. RUE, LUE, and RLE with 5/5 strength. Bulk and tone normal.      No involuntary movements. Sensation:               Decreased sensation on left leg with light touch and pinprick compared to right leg (left thigh sensory changes is worse than lower left leg), otherwise sensation intact. No neglect. Coordination & Gait: No ataxia with finger-to-nose and heel-to-shin bilaterally. Needs some assistance when ambulating to the bathroom with gait.      24 hour results:    Recent Results (from the past 24 hour(s))   PLATELET FUNCTION, VERIFY NOW P2Y12    Collection Time: 12/03/21  2:17 PM   Result Value Ref Range    P2Y12 Plt response 40 (L) 194 - 418 PRU   ASPIRIN TEST    Collection Time: 12/03/21  2:17 PM   Result Value Ref Range    Aspirin test 350 ARU   GLUCOSE, POC    Collection Time: 12/03/21  6:41 PM   Result Value Ref Range    Glucose (POC) 136 (H) 65 - 117 mg/dL    Performed by [facility]    METABOLIC PANEL, BASIC    Collection Time: 12/04/21  5:20 AM   Result Value Ref Range    Sodium 139 136 - 145 mmol/L    Potassium 4.1 3.5 - 5.1 mmol/L    Chloride 108 97 - 108 mmol/L    CO2 23 21 - 32 mmol/L    Anion gap 8 5 - 15 mmol/L    Glucose 137 (H) 65 - 100 mg/dL    BUN 8 6 - 20 MG/DL    Creatinine 0.74 0.55 - 1.02 MG/DL    BUN/Creatinine ratio 11 (L) 12 - 20      GFR est AA >60 >60 ml/min/1.73m2    GFR est non-AA >60 >60 ml/min/1.73m2    Calcium 8.7 8.5 - 10.1 MG/DL   CBC W/O DIFF    Collection Time: 12/04/21  5:20 AM   Result Value Ref Range    WBC 12.6 (H) 3.6 - 11.0 K/uL    RBC 3.79 (L) 3.80 - 5.20 M/uL    HGB 11.9 11.5 - 16.0 g/dL    HCT 34.4 (L) 35.0 - 47.0 %    MCV 90.8 80.0 - 99.0 FL    MCH 31.4 26.0 - 34.0 PG    MCHC 34.6 30.0 - 36.5 g/dL    RDW 12.4 11.5 - 14.5 %    PLATELET 968 888 - 806 K/uL    MPV 9.8 8.9 - 12.9 FL    NRBC 0.0 0  WBC    ABSOLUTE NRBC 0.00 0.00 - 0.01 K/uL        Imaging:  CT Results  (Last 48 hours)               12/03/21 1907  CT CODE NEURO HEAD WO CONTRAST Final result    Impression:  No acute intracranial abnormality. Narrative:  EXAM: CT CODE NEURO HEAD WO CONTRAST       INDICATION: code stroke       COMPARISON: CT head 11/23/2021. CONTRAST: None. TECHNIQUE: Unenhanced CT of the head was performed using 5 mm images. Brain and   bone windows were generated. Coronal and sagittal reformats. CT dose reduction   was achieved through use of a standardized protocol tailored for this   examination and automatic exposure control for dose modulation. FINDINGS:   The ventricles and sulci are normal in size, shape and configuration. . There is   no significant white matter disease. There is no intracranial hemorrhage,   extra-axial collection, or mass effect. The basilar cisterns are open. No CT   evidence of acute infarct. Right transverse/sigmoid sinus stent.        The bone windows demonstrate no abnormalities. The visualized portions of the   paranasal sinuses and mastoid air cells are clear. 12/03/21 1906  CTA ABDOMEN PELV W CONT Final result    Impression:  Soft tissue stranding in the left groin, likely postprocedural. No   evidence of active arterial extravasation. No evidence of retroperitoneal   hemorrhage. Narrative:  EXAM:  CTA ABDOMEN PELV W CONT,       EXAM: CT PELV WO CONT       INDICATION: Rule out retroperitoneal hemorrhage in the setting of left groin   arteriotomy today       COMPARISON: CT abdomen and pelvis 10/2/2021. TECHNIQUE: Helical noncontrast CT of the pelvis was obtained. Helical CT of the   abdomen and pelvis without oral contrast following the uneventful intravenous   administration of nonionic contrast 100 mL Isovue 370. Coronal and sagittal   reformats were performed. 3-D reconstructions were provided as well. CT dose   reduction was achieved through the use of a standardized protocol tailored for   this examination and automatic exposure control for dose modulation. FINDINGS:   Evaluation of solid organs limited due to arterial only phase of contrast   Lung bases: Linear left basilar atelectasis   Liver: No definite mass or biliary dilatation. Biliary tree: Cholecystectomy. The CBD is not dilated. Spleen: Within normal limits. Pancreas: No inflammation, mass or ductal dilatation. Adrenals: Unremarkable. Kidneys: No mass or hydronephrosis. Stomach: Unremarkable. Small bowel: No dilatation or wall thickening. Colon: No dilatation or wall thickening. Appendix: Normal   Peritoneum: No ascites or pneumoperitoneum. Retroperitoneum: No lymphadenopathy or aortic aneurysm. Reproductive organs: Uterus and adnexa are within normal limits   Urinary bladder: No mass or calculus. Bones: No destructive bone lesion. Abdominal wall: Soft tissue stranding in the left groin. No mass or hernia.    Additional comments: No evidence of active arterial extravasation. No evidence   of retroperitoneal hemorrhage. 12/03/21 1903  CT PELV WO CONT Final result    Impression:  Soft tissue stranding in the left groin, likely postprocedural. No   evidence of active arterial extravasation. No evidence of retroperitoneal   hemorrhage. Narrative:  EXAM:  CTA ABDOMEN PELV W CONT,       EXAM: CT PELV WO CONT       INDICATION: Rule out retroperitoneal hemorrhage in the setting of left groin   arteriotomy today       COMPARISON: CT abdomen and pelvis 10/2/2021. TECHNIQUE: Helical noncontrast CT of the pelvis was obtained. Helical CT of the   abdomen and pelvis without oral contrast following the uneventful intravenous   administration of nonionic contrast 100 mL Isovue 370. Coronal and sagittal   reformats were performed. 3-D reconstructions were provided as well. CT dose   reduction was achieved through the use of a standardized protocol tailored for   this examination and automatic exposure control for dose modulation. FINDINGS:   Evaluation of solid organs limited due to arterial only phase of contrast   Lung bases: Linear left basilar atelectasis   Liver: No definite mass or biliary dilatation. Biliary tree: Cholecystectomy. The CBD is not dilated. Spleen: Within normal limits. Pancreas: No inflammation, mass or ductal dilatation. Adrenals: Unremarkable. Kidneys: No mass or hydronephrosis. Stomach: Unremarkable. Small bowel: No dilatation or wall thickening. Colon: No dilatation or wall thickening. Appendix: Normal   Peritoneum: No ascites or pneumoperitoneum. Retroperitoneum: No lymphadenopathy or aortic aneurysm. Reproductive organs: Uterus and adnexa are within normal limits   Urinary bladder: No mass or calculus. Bones: No destructive bone lesion. Abdominal wall: Soft tissue stranding in the left groin. No mass or hernia.    Additional comments: No evidence of active arterial extravasation. No evidence   of retroperitoneal hemorrhage. Assessment:     Active Problems:    Pseudotumor cerebri (9/3/2021)      Plan:   Pseudotumor cerebri/cerebral venous sinus stenosis  - s/p cerebral angiogram for right transverse sigmoid cerebral venous sinus stenting  - CT of Head negative for any acute process post procedure yesterday evening  - continue aspirin 81 mg daily, ARU therapeutic at 350  - continue Brilinta 30 mg BID, P2Y12 therapeutic at 40  - headaches have improved today  - continue scheduled decadron 2 mg every 6 hours and Magnesium oxide 400 mg daily, PRN fentanyl (increased to 50 mcg every 4 hours), PRN oxycodone, PRN reglan  - continue scheduled Topiramate 200 mg daily  - continue IV fluids for hydration, reduced to LR at 75 ml/hr  - SBP goal  post procedure   - neuro/vascular checks every 4 hours   - patient educated on stroke-like symptoms and to call 911 immediately     Hx of Lupus  - continue Plaquenil 200 mg BID    Left groin pain with associated left leg numbness and left leg weakness   -  CTA of the abdomen and pelvis with contrast showed soft tissue stranding in the left groin likely postprocedural.  No evidence of active arterial extravasation. No evidence of retroperitoneal hemorrhage.  - pain is localized to the left groin area and anterior portion of thigh, left thigh slightly swollen compared to right thigh with decreased pulses in left foot compared to right foot, will obtain SABIHA measurements  - likely pain, swelling, and numbness is nerve irritation  - ordered Lidocaine patch to affected area for groin pain  - PT/OT ordered for evaluation    Anxiety  - low dose ativan PRN      Activity: Up with assistance   DVT ppx: SCDs  Dispo: likely discharge tomorrow, pending PT/OT evals    Plan d/w Dr. Viv Jolly, RN, and patient. Ok to transfer to [facility] today. Ambulate in halls today. Encouraged PO and fluid intake.        Renee Campbell, SAVANNAH

## 2021-12-04 NOTE — PROGRESS NOTES
Care Management:    Transition of Care Plan:     · RUR: 18%  · PT rec IPR vs HH  · Disposition: Pt prefers home with home health (needs order for [facility] if MD agrees)  · Order for rolling walker (needs choice)  · Transportation:     Met with patient and her  Clotilde Calderon in her hospital room. She confirmed her address, phone number and her  as her emergency contact. She lives with her  and her 15and 6year old children in a 1 level home with 1 step to enter the home. DME:  none  ADLs: independent  Pharmacy: [facility] in [place]  Previous HH/SNF/rehab: outpatient therapy for rotator cuff  Insurance: Dannette Gals [facility]    Discussed therapy recommendation for IPR vs HH. Patient does not want to go to [facility], she only wants to go home. She used to work for some [employer]. Offered freedom of choice. [facility] [address] [place] [address] [facility] Will need order from attending. Order for rolling walker noted. CM will speak with patient about this tomorrow. Reason for Admission:   Pseudotumor cerebri                  RUR Score:     18%             PCP: First and Last name:   Corrine Youngblood MD     Name of Practice: [facility]   Are you a current patient: Yes/No: yes   Approximate date of last visit: last month   Can you participate in a virtual visit if needed:     Do you (patient/family) have any concerns for transition/discharge? Difficulty moving leg              Plan for utilizing home health: Therapy recommends IPR vs HH. Patient prefers [facility]. Patient prefers [address] [place] [address] or [facility]    Current Advanced Directive/Advance Care Plan:  Prior      Healthcare Decision Maker:               Primary Decision Shelia Thomas Spouse - [phone]    Care Management Interventions  PCP Verified by CM: Yes (Dr. Hemal Puri)  Last Visit to PCP: 11/01/21  Palliative Care Criteria Met (RRAT>21 & CHF Dx)?: No  Mode of Transport at Discharge:  Other (see comment) ()  Transition of Care Consult (CM Consult): Discharge Planning, DME/Supply Assistance  Discharge Durable Medical Equipment: Yes  Physical Therapy Consult: Yes  Occupational Therapy Consult: Yes  Speech Therapy Consult: No  Support Systems: Spouse/Significant Other  Confirm Follow Up Transport: Family  [employer] Information Provided?: No  Discharge Location  Discharge Placement: Home with home health    LIANNA Apodaca
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
## 2021-12-04 NOTE — PROGRESS NOTES
Transfer to [facility]. Report called to Grady Hayes RN. Reviewed kardex, pain level and medication available. PT assisted patient to wheelchair. Pt transported without incident and moved to recliner with minimal support.

## 2021-12-05 ENCOUNTER — APPOINTMENT (OUTPATIENT)
Dept: VASCULAR SURGERY | Age: 31
DRG: 951 | End: 2021-12-05
Attending: NURSE PRACTITIONER
Payer: MEDICAID

## 2021-12-05 VITALS
BODY MASS INDEX: 39.46 KG/M2 | OXYGEN SATURATION: 98 % | TEMPERATURE: 98.2 F | HEIGHT: 61 IN | RESPIRATION RATE: 18 BRPM | DIASTOLIC BLOOD PRESSURE: 79 MMHG | WEIGHT: 209 LBS | SYSTOLIC BLOOD PRESSURE: 143 MMHG | HEART RATE: 74 BPM

## 2021-12-05 PROCEDURE — 74011250636 HC RX REV CODE- 250/636: Performed by: RADIOLOGY

## 2021-12-05 PROCEDURE — 74011250637 HC RX REV CODE- 250/637: Performed by: NURSE PRACTITIONER

## 2021-12-05 PROCEDURE — 74011000250 HC RX REV CODE- 250: Performed by: NURSE PRACTITIONER

## 2021-12-05 PROCEDURE — 74011250637 HC RX REV CODE- 250/637: Performed by: RADIOLOGY

## 2021-12-05 PROCEDURE — 93922 UPR/L XTREMITY ART 2 LEVELS: CPT

## 2021-12-05 PROCEDURE — 97166 OT EVAL MOD COMPLEX 45 MIN: CPT

## 2021-12-05 PROCEDURE — 99238 HOSP IP/OBS DSCHRG MGMT 30/<: CPT | Performed by: NURSE PRACTITIONER

## 2021-12-05 PROCEDURE — 74011250636 HC RX REV CODE- 250/636: Performed by: NURSE PRACTITIONER

## 2021-12-05 PROCEDURE — 97530 THERAPEUTIC ACTIVITIES: CPT

## 2021-12-05 PROCEDURE — APPSS180 APP SPLIT SHARED TIME > 60 MINUTES: Performed by: NURSE PRACTITIONER

## 2021-12-05 RX ORDER — GABAPENTIN 300 MG/1
300 CAPSULE ORAL 3 TIMES DAILY
Status: DISCONTINUED | OUTPATIENT
Start: 2021-12-05 | End: 2021-12-05 | Stop reason: HOSPADM

## 2021-12-05 RX ORDER — PANTOPRAZOLE SODIUM 40 MG/1
40 TABLET, DELAYED RELEASE ORAL DAILY
Qty: 7 TABLET | Refills: 0 | Status: SHIPPED | OUTPATIENT
Start: 2021-12-05 | End: 2021-12-12

## 2021-12-05 RX ORDER — GUAIFENESIN 100 MG/5ML
81 LIQUID (ML) ORAL DAILY
Qty: 30 TABLET | Refills: 0 | Status: ON HOLD
Start: 2021-12-06 | End: 2022-09-13 | Stop reason: CLARIF

## 2021-12-05 RX ORDER — METHYLPREDNISOLONE 4 MG/1
TABLET ORAL
Qty: 1 DOSE PACK | Refills: 0 | Status: SHIPPED | OUTPATIENT
Start: 2021-12-05 | End: 2022-02-22

## 2021-12-05 RX ORDER — AMOXICILLIN 250 MG
1 CAPSULE ORAL DAILY
Status: DISCONTINUED | OUTPATIENT
Start: 2021-12-05 | End: 2021-12-05 | Stop reason: HOSPADM

## 2021-12-05 RX ORDER — POLYETHYLENE GLYCOL 3350 17 G/17G
17 POWDER, FOR SOLUTION ORAL DAILY
Status: DISCONTINUED | OUTPATIENT
Start: 2021-12-05 | End: 2021-12-05 | Stop reason: HOSPADM

## 2021-12-05 RX ADMIN — OXYCODONE 5 MG: 5 TABLET ORAL at 15:15

## 2021-12-05 RX ADMIN — FENTANYL CITRATE 50 MCG: 0.05 INJECTION, SOLUTION INTRAMUSCULAR; INTRAVENOUS at 03:42

## 2021-12-05 RX ADMIN — FENTANYL CITRATE 50 MCG: 0.05 INJECTION, SOLUTION INTRAMUSCULAR; INTRAVENOUS at 00:06

## 2021-12-05 RX ADMIN — POLYETHYLENE GLYCOL 3350 17 G: 17 POWDER, FOR SOLUTION ORAL at 14:31

## 2021-12-05 RX ADMIN — FENTANYL CITRATE 50 MCG: 0.05 INJECTION, SOLUTION INTRAMUSCULAR; INTRAVENOUS at 13:39

## 2021-12-05 RX ADMIN — GABAPENTIN 300 MG: 300 CAPSULE ORAL at 13:39

## 2021-12-05 RX ADMIN — DEXAMETHASONE SODIUM PHOSPHATE 2 MG: 4 INJECTION, SOLUTION INTRAMUSCULAR; INTRAVENOUS at 14:31

## 2021-12-05 RX ADMIN — TICAGRELOR 30 MG: 60 TABLET ORAL at 06:13

## 2021-12-05 RX ADMIN — DEXAMETHASONE SODIUM PHOSPHATE 2 MG: 4 INJECTION, SOLUTION INTRAMUSCULAR; INTRAVENOUS at 09:36

## 2021-12-05 RX ADMIN — Medication 400 MG: at 09:37

## 2021-12-05 RX ADMIN — ASPIRIN 81 MG CHEWABLE TABLET 81 MG: 81 TABLET CHEWABLE at 09:37

## 2021-12-05 RX ADMIN — HYDROXYCHLOROQUINE SULFATE 200 MG: 200 TABLET ORAL at 07:33

## 2021-12-05 RX ADMIN — DEXAMETHASONE SODIUM PHOSPHATE 2 MG: 4 INJECTION, SOLUTION INTRAMUSCULAR; INTRAVENOUS at 03:37

## 2021-12-05 RX ADMIN — FENTANYL CITRATE 50 MCG: 0.05 INJECTION, SOLUTION INTRAMUSCULAR; INTRAVENOUS at 09:36

## 2021-12-05 RX ADMIN — DOCUSATE SODIUM 50 MG AND SENNOSIDES 8.6 MG 1 TABLET: 8.6; 5 TABLET, FILM COATED ORAL at 13:39

## 2021-12-05 RX ADMIN — OXYCODONE 5 MG: 5 TABLET ORAL at 09:37

## 2021-12-05 RX ADMIN — FENTANYL CITRATE 50 MCG: 0.05 INJECTION, SOLUTION INTRAMUSCULAR; INTRAVENOUS at 16:45

## 2021-12-05 NOTE — PROGRESS NOTES
NeuroInterventional Note  Patient is s/p venous sinus stenting on 12/3/21. She refers that her headaches and leg pain have improved significantly and stated she wants to go home. She will see neuroopthlamology and Dr Kamilah Haji as an outpatient. She will continue Topamax as well as aspirin and Brilinta. Addendum:  Patient's mother discussed with Dr Becca Balderrama and patient wants to stay since she is complaining of groin pain. She will stay one more day.     Rigo Woodson MD  [facility]

## 2021-12-05 NOTE — PROGRESS NOTES
The patient is feeling better at 14:30 and requesting discharge. 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 I examined the patient personally, reviewed all imaging/labs, medications and treatment plans  We discussed the procedure, the expected postoperative course and followup plans. Her  was present and I also discussed her case with her mother per patient request.    OK for discharge at this point    ROS significant for minimal headache, mild left groin pain radiating into the lateral > medial thigh. Subjective sensory changes inferior to the left knee are resolved completely. MARI        Objective:     Patient Vitals for the past 24 hrs:   Temp Pulse Resp BP SpO2   12/05/21 1409 -- 97 -- -- --   12/05/21 1211 -- 90 -- -- 99 %   12/05/21 1000 98.6 °F (37 °C) 93 -- 122/76 100 %   12/05/21 0800 -- -- -- -- 99 %   12/05/21 0600 -- -- -- -- 99 %   12/05/21 0400 -- -- -- 129/89 --   12/05/21 0144 97.8 °F (36.6 °C) 93 23 125/74 99 %   12/05/21 0003 -- 95 12 113/68 --   12/04/21 2148 97.4 °F (36.3 °C) 94 27 113/73 99 %   12/04/21 2039 98 °F (36.7 °C) 88 20 115/76 98 %   12/04/21 2000 -- (!) 102 -- -- --   12/04/21 1800 97.7 °F (36.5 °C) (!) 109 21 104/63 96 %        Intake and Output:  Current Shift: No intake/output data recorded. Last three shifts: 12/03 1901 - 12/05 0700  In: 707.1 [P.O.:240; I.V.:467.1]  Out: -       Lab/Data Review: All lab results for the last 24 hours reviewed.      Imaging Reviewed:   CT head wo, CTA abd/pelvis, SABIHA    Current Facility-Administered Medications   Medication Dose Route Frequency Provider Last Rate Last Admin    senna-docusate (PERICOLACE) 8.6-50 mg per tablet 1 Tablet  1 Tablet Oral DAILY Divina Caballero NP   1 Tablet at 12/05/21 1339    gabapentin (NEURONTIN) capsule 300 mg  300 mg Oral TID Luz Burkett NP   300 mg at 12/05/21 1339    polyethylene glycol (MIRALAX) packet 17 g  17 g Oral DAILY Divina Caballero, SAVANNAH 17 g at 12/05/21 1431    aspirin chewable tablet 81 mg  81 mg Oral DAILY Sammi Caballero NP   81 mg at 12/05/21 0937    lidocaine 4 % patch 1 Patch  1 Patch TransDERmal Q24H Sammi Caballero NP   1 Patch at 12/05/21 1100    fentaNYL citrate (PF) injection 50 mcg  50 mcg IntraVENous Q4H PRN Sammi Caballero NP   50 mcg at 12/05/21 1339    lactated Ringers infusion  75 mL/hr IntraVENous CONTINUOUS Sammi Caballero NP 75 mL/hr at 12/04/21 1042 75 mL/hr at 12/04/21 1042    ondansetron (ZOFRAN) injection 4 mg  4 mg IntraVENous Q4H PRN Zeinab Caraballo NP        hydrOXYchloroQUINE (PLAQUENIL) tablet 200 mg  200 mg Oral BID WITH MEALS Gisselle Lopez NP   200 mg at 12/05/21 2207    topiramate ER (TROKENDI XR) capsule 200 mg  200 mg Oral DAILY Jorgito SPAIN NP   200 mg at 12/05/21 0900    metoclopramide HCl (REGLAN) injection 5 mg  5 mg IntraVENous Q6H PRN Gareld Landau, MD   5 mg at 12/03/21 1340    dexamethasone (DECADRON) 4 mg/mL injection 2 mg  2 mg IntraVENous Q6H Gareld Landau, MD   2 mg at 12/05/21 1431    magnesium oxide (MAG-OX) tablet 400 mg  400 mg Oral DAILY Gareld Landau, MD   400 mg at 12/05/21 9200    ticagrelor (BRILINTA) tablet 30 mg  30 mg Oral Q12H Gareld Landau, MD   30 mg at 12/05/21 5937    oxyCODONE IR (ROXICODONE) tablet 5 mg  5 mg Oral Q6H PRN Zeinab Caraballo NP   5 mg at 12/05/21 2414    LORazepam (ATIVAN) tablet 0.5 mg  0.5 mg Oral QHS PRN Demetrice Wilkerson NP            Review of Systems:  A comprehensive review of systems was negative except for that written in the History of Present Illness. Objective:     Vitals:    12/05/21 0800 12/05/21 1000 12/05/21 1211 12/05/21 1409   BP:  122/76     Pulse:  93 90 97   Resp:       Temp:  98.6 °F (37 °C)     SpO2: 99% 100% 99%    Weight:       Height:            Physical Exam:  GENERAL: alert, cooperative, no distress, appears stated age  THROAT & NECK: normal and no erythema or exudates noted.    LUNG: clear to auscultation bilaterally  HEART: regular rate and rhythm, S1, S2 normal, no murmur, click, rub or gallop  ABDOMEN: minimal distention, non-tender. Bowel sounds normal. No masses,  no organomegaly  EXTREMITIES:  Tender to palpation over the left and right groin puncture site. No hematoma. Minimal right groin bruising. No thigh swelling extremities normal, atraumatic, no cyanosis or edema  SKIN: no rash or abnormalities except for small bruise at the right groin puncture site (< 10 mm)  PSYCHIATRIC: non focal    Neurologic Exam:  Mental Status:  Alert and oriented x 4. Appropriate affect, mood and behavior. Language:    Normal fluency, repetition, comprehension and naming. Cranial Nerves:   Pupils equal, round and reactive to light. Visual fields full to confrontation. Extraocular movements intact. Facial sensation intact V1 - V3. Full facial strength, no asymmetry. Hearing intact bilaterally. No dysarthria. Tongue protrudes to midline, palate elevates symmetrically. Shoulder shrug 5/5 bilaterally. Motor:    No pronator drift. Bulk and tone normal.      5/5 power in all extremities proximally and distally. No involuntary movements. Sensation:    Sensation intact throughout to light touch. No neglect or extinction. Coordination & Gait: Normal. KEHINDE and FTN normal.     Assessment and Plan:     POD2 s/p right trv/sigmoid cerebral venous sinus stenting. HA improving. Restart Trokendi at home until followup with neurology Finesse Mcintosh) and ophthalmology (MAURIZIO Beasley)    Left groin pain. Suspect lateral cutaneous nerve irritation. Vascular imaging all normal.  No physical swelling, bleeding/drainage erythema or warmth. Allergic to tylenol. Patient takes gabapentin 600 mg TID at home for lupus related neuropathy. Advised to restart. Brilinta 30 mg BID and aspiri 81 mg every day. Compliance emphasized. Followup outpatient in 4-6 weeks in [facility]. Follow up with Dr. Mckeon Husbands in 2-4 weeks for fundoscopic evaluation, possible taper of Trokendi. Patient also committed to weaning narcotics that she takes chronically at home. Mobilization/walking emphasized. She is a nurse and understands the importance. Thank you for this consult and participating in the care of this patient. I have discussed the diagnosis with the patient and the intended plan as seen in the above orders. Patient is in agreement.       Signed By: Dodson Saint, MD     December 5, 2021

## 2021-12-05 NOTE — DISCHARGE SUMMARY
Neurointerventional Surgery Discharge Summary        DATE OF ADMISSION: 12/3/2021     DATE OF DISCHARGE: 12/5/2021     ADMITTING PROVIDER: Gloria Hemphill MD    DISCHARGING PHYSICIAN: Dr. Umberto Walker: Inpatient Physical Therapy and Occupational Therapy Consultation    OFFICE NUMBER: [phone] , you can reach the on call physician 24/7 even during non-business hours     PROCEDURES/SURGERIES: Procedure(s)    Cerebral angiogram and right transverse sigmoid cerebral venous sinus stenting on 12/3/2021     [address] [facility] COURSE:   Stenosis of intracranial vessel [I66.9]  Pseudotumor cerebri [G93.2]    Vinayak Coronado is a 68-year-old female with a past medical history significant for migraines, anxiety, nissen fundoplication, GERD, SLE, Pseudotumor cerebri, papilledema, and cerebral venous sinus stenosis who presented to the hospital on 12/3/2021 for a scheduled cerebral angiogram for right transverse sigmoid venous sinus stenting due to severe stenosis. She successfully underwent the scheduled procedure by Dr. Julia Neff on 12/3/2021 under general anesthesia. She was extubated postoperatively and admitted to the ICU post-procedure for close monitoring. She is being maintained on aspirin and Brilinta with therapeutic assays. The Brilinta dose was decreased to 30 mg during her admission due to supratherapeutic levels. On 12/3/2021, a Code Stroke was called due to patient noting an acute onset of left leg numbness and heaviness. CT of the head was unremarkable. She also reported some left groin pain extending to her thigh. CTA of the abdomen and pelvis with contrast showed soft tissue stranding in the left groin likely postprocedural.  No evidence of active arterial extravasation. No evidence of retroperitoneal hemorrhage. It was suspected the patient's left groin pain was associated with left leg numbness was due to cutaneous nerve irritation post procedure.  She has been receiving oxycodone and fentanyl for pain management. Lidocaine was also added in addition to her pain regimen. It was noted that the patient had decreased pulses on the left foot compared to the right foot. An SABIHA index was ordered and the results showed no evidence of hemodynamically significant arterial obstruction in the right or left lower extremity. Due to her left groin pain, the patient was having some left leg weakness. PT/OT were consulted for evaluation due to left leg weakness. PT recommending IPR vs Home health therapy, but patient prefers home health rehab. There was some issues obtaining acceptance from a home health agency during admission per care management. I spoke with care management and they will continue to find a home health agency that will accept the patient post discharge. Plan to discharge home today with home health agency pending. The patient is in stable condition for discharge. Physical Exam: See Dr. Mukul Abdul note for full physical exam at time of discharge. He personally examined the patient this afternoon. Patient is stable for discharge.      DISCHARGE DIAGNOSES:   Hospital Problems as of 12/5/2021 Date Reviewed: 12/3/2021          Codes Class Noted - Resolved POA    Pseudotumor cerebri ICD-10-CM: G93.2  ICD-9-CM: 348.2  9/3/2021 - Present Unknown          Cerebral Venous Sinus Stenosis    FOLLOW UP APPOINTMENTS:     Follow-up Information     Follow up With Specialties Details Why Contact Info    Gibran Jang MD Neurology Schedule an appointment as soon as possible for a visit in 2 weeks follow up 2-4 weeks for Pseudotumor Cerebri [address] [facility] [address]      Jasmeet Doyle MD Endovascular Surgical Neuroradiology Schedule an appointment as soon as possible for a visit in 4 weeks follow up 4-6 weeks for cerebral venous sinus stenosis s/p stenting [address] Joyce Dinero (Neuro-ophthalmologist)  Schedule an appointment as soon as possible for a visit in 2 weeks follow up for pseudotumor cerebri to have fundoscopic evaluation post endovascular intervention            ADDITIONAL CARE RECOMMENDATIONS:   Patient to avoid tub baths, swimming or hot tubs for two weeks. May take showers. Please call 911 and proceed to emergency room for any future difficulties with balance, dizziness, vision, weakness in the face, arm or leg, or speech difficulties. DIET: Normal diet     ACTIVITY: Do not lift anything over 10 lbs for two weeks. Try to limit going up and down stairs as much as possible. Rest and hydrate. May resume all physical activities as tolerated after 2 weeks. WOUND CARE: Monitor for signs and sx of infection including fever, expanding inflammation and discolored drainage. Report any changes in temperature in affected extremity, numbness, weakness or hematoma. If you experience any increased bruising or bleeding at your groin puncture site either outside or under the skin please apply direct, firm pressure for 20 minutes. Keep track of the bruising at the groin site by marking it with a pen or marker. If the bruising continues to be dark purple or blue in color OR is expanding , please call our office to let the on call doctor know. We have someone on call 24/7. Patient educated on stroke-like symptoms and to call 911 immediately. Patient instructed to seek emergency care services if she ever experiences the worst headache of her life. DISCHARGE MEDICATIONS:     1. Aspirin 81mg daily  2. Brilinta 30 mg BID  3. Biotin 10,000 mcg daily  4. Valium 10 mg daily as needed  5. Roxicodone 5 mg BID as needed for pain  6. Psyllium seed with sugar (Fiber) PO  7. Tetrahydrozoline HCl/zinc sulf (eyedrops for irritation relief) 1 drop to both eyes PRN  8. Protonix 40 mg daily for one week while on steroid taper  9. Medrol Dosepak   10.  Neurontin 600 mg TID  11. Naloxone nasal spray PRN  12. Ativan 1.5 mg by mouth nightly as needed   13. Trokendi 200 mg capsule by mouth daily  14. Plaquenil 200 mg BID with meals      Avoid NSAIDS including (Advil, motrin, ibuprofen, Celebrex etc.) while you are taking aspirin and Brilinta. Plan discussed with Dr. Nori Ma and RN. Greater than 70 minutes were spent with the patient on counseling and coordination of care.       Signed:  Daniel Hernandez NP  Neurointerventional Surgery

## 2021-12-05 NOTE — PROGRESS NOTES
Bedside shift change report given to Kassidy (oncoming nurse) by Tammi Curtis (offgoing nurse). Report included the following information SBAR, Intake/Output and Recent Results. Dual neuro assessment completed.   Patient is complaining of pain to hemangioma in left leg - migraine is 4/10

## 2021-12-05 NOTE — PROGRESS NOTES
Rounded on patient this morning patient. She reports that her left groin pain has improved with the combination of lidocaine and IV fentanyl. She reports that her numbness has resolved. She also reports that her left leg weakness is better. She rates her left groin pain a 7 out of 10 and moving makes her symptoms worse. She still complains of a constant headache in the right temporal area described as dull. She reports her intensity of the headache a 5 out of 10. Movement makes it worse, however nothing medication-wise seems to make the headache better. She is reporting some cloudiness in her vision that she noticed this morning after waking up. She reports that her vision is blurry but she can still read. She knows that the vision is worse in the right compared to the left. I spoke with Dr. Haja Augustin regarding this and the patient has had intermittent issues with blurry vision. I was also informed by the RN that the patient has not been able to receive her Trokendi medication due to our pharmacy not having the medication. I spoke with the pharmacist and she informed me that it was asked that the patient's family member bring in her own medicine, however the patient's  did not bring it in yesterday. The patient reported that he had some trouble finding the medicine at home and tried to reach her yesterday, but was unable to on the phone. The patient believed that she was sleeping at the time. The patient's SABIHA index is negative for any abnormality. She denies any numbness, tingling, chest pain, SOB, nausea, vomiting, dizziness, issues with speech, balance, or coordination. She did complain of some lower belly pain last night. She notices at times that she has some left lower abdominal pain near her groin site where the groin pain is located. Neuro exam is stable. She is alert and oriented x 4. She still has some LLE weakness, but it has improved.  Strength 4/5 in LLE, but appears secondary to left groin pain. All other extremities with 5/5 strength. Physical Exam:  GENERAL: alert, cooperative, no distress  EYE: conjunctivae/corneas clear. PERRL, EOM's intact. LUNG: clear to auscultation bilaterally  HEART: regular rate and rhythm, S1, S2 normal, no murmur, click, rub or gallop  ABDOMEN: soft, non-tender. Bowel sounds normal. No masses,  no organomegaly  EXTREMITIES:  extremities normal, atraumatic, no cyanosis, trace peripheral edema, no calf tenderness. Distal pulses 2+ bilaterally   SKIN: Skin warm to touch. Scant amount of bruising in bilateral upper extremities. Skin appropriate for ethnicity. Tattoos present. Right groin site clean, dry, and intact. Non-tender to palpation. Scant amount of bruising at groin puncture site, but no bleeding or hematoma present. Left groin site with mild bruising present. No hematoma or bleeding noted. Some tenderness to palpation.     Neurologic Exam:  Mental Status:             Alert and oriented x 4. Flat affect.      Language:                   Normal fluency, repetition, comprehension and naming. Monotone/soft voice.      Cranial Nerves:                                                 Pupils equal, round and reactive to light. Visual fields full to confrontation. Extraocular movements intact. Facial sensation intact. Full facial strength, no asymmetry. No dysarthria. Tongue protrudes to midline, palate elevates symmetrically. Shoulder shrug 5/5 bilaterally.     Motor:                          Moves all extremities spontaneously. Following commands, Mild left leg drift on exam, but appears secondary to left groin pain. LLE strength 4/5. RUE, LUE, and RLE with 5/5 strength.                                         Bulk and tone normal.                                       No involuntary movements.     Sensation:                   Sensation intact to light touch. No neglect.      Coordination & Gait:   No significant ataxia with finger-to-nose and heel-to-shin bilaterally. Gait not assessed due to left groin pain. Plan:  Continue Aspirin 81 mg daily and Brilinta 30 mg BID.   PT recommending rehab, patient would like home health rehab, there has been some issues with an agency accepting her, home health agency still pending  OT to see patient today   Ambulate in halls today  Patient will likely be discharged today after OT evaluation       [facility]  Neurocritical care NP

## 2021-12-05 NOTE — PROGRESS NOTES
Care Management:    Transition of Care Plan:     RUR: 18%  [facility]  Transportation:       Patient does not live in the [facility] - INPATIENT service area. They declined patient for services. Sent referral to [facility] office. At 11:09 left voicemail for the on call nurse for [facility] ([phone]). Spoke with [facility] ([phone])  weekend liaison. She said they do not contract with any Medicaid products. Sent [place] referral to 9 additional agencies:   [facility] staff   [facility] -    Adarsh Pearson 1949 ([phone])    will run the insurance. They are booked Monday and Tuesday. [facility] - Out of network   [address]    12:00 Spoke with patient. She just worked with OT. OT told  patient she will do better without the walker.  is on his way to pick her up. CM will not order walker. CM will continue to attempt to find [facility] agency after patient is discharged. LIANNA Luna    13:02 Left voicemail for [address].

## 2021-12-05 NOTE — PROGRESS NOTES
I was notified by RN that patient does not need a walker for assistance at home with ambulation. PT is recommending home health rehab vs IPR, but the patient refers home health rehab. There has been some issues with finding a home health agency per care management. Care management is attempting to find a home health agency and will be reaching out to other agencies. The plan was for patient to be discharge home today with home health therapy. However, after updating the patient and her  at the bedside, the patient's mother has some concerns over discharging her. I spoke with her mom directly over the phone and she was concerned that we were discharging her too soon. She was concerned because the patient has not had any bowel movements since she has been in the hospital and she does not feel comfortable with her leaving the hospital without having a bowel movement. Also, she informed me that she has been in and out of the hospital due to the severity of her diagnosis and she does not want that to happen again. She would like to speak with Dr. Kim Padilla directly regarding her concerns on discharging her. I called and spoke with Dr. Kim Padilla and he is going to call the patient's mom Daija Norris) directly to address her concerns. The patient's mother's number [phone]. The patient is tearful at the bedside and she is upset that her pain has not been controlled since her surgery on Friday. Her headaches have improved post surgery when evaluating her this morning, but she still has some left groin pain as known previously. She informed me that she is not worried about not having any bowel movements.  She is more worried about her pain being under control once leaving the hospital. She denies any constipation, but is reporting some left lower abdominal pain near her groin area that is slightly tender with palpation on my exam. It is likely that this is related to her left groin pain that has been ongoing since her surgery. I discussed this with Dr. Vivienne Webster. Discussed with Dr. Vangie Ch (on call for [facility]) and RN. Addendum: 36: Spoke with Dr. Vivienne Webster and we will keep the patient for 1 more day. I ordered dania-colace daily to assist with patient having a bowel movement. I also ordered Miralax daily for bowel regimen. The patient reports she is not passing gas. I added scheduled Gabapentin 300 mg TID for pain control. The patient is tearful and feels that she is being pushed to be discharged. Patient is stating now that she just wants to go home after I explained to her that we will keep her for one more day. She states now that she feels that it is up to her to make the decision so she is saying she wants to go home because if she stays in the hospital she is going to still have uncontrolled pain. She is tearful because her \"pain is not being controlled. \" She states she normally takes Gabapentin at home for nerve pain and it works well for that, but the nerve pain that she normally feels it is not consistent with the same kind of pain in her groin. She is unable to describe her type of pain in her groin at this time. I called Dr. Vivienne Webster to express the patient's concerns and the patient spoke with Dr. Vivienne Webster via phone. He is coming in to see the patient. The patient is agreeable to taking the Gabapentin and the scheduled bowel regimen at this time. I was also informed that the patient has been cleared by PT to be discharged without walker. Addendum: [id number]: Dr. Vivienne Webster came in to assess patient and the patient is stable for discharge. See Dr. Rebecca Gardiner note. Patient is in agreement. I spoke with care management and they will coordinate getting an approve home health agency post discharge. The patient is ok with this. Discussed with Dr. Vivienne Webster, Dr. Vangie Ch, and RN.      Fayne Collet, [facility]  Neurocritical care NP

## 2021-12-05 NOTE — PROGRESS NOTES
Phone call placed to patients spouse to request he bring in Topiramate 200mg medication as this drug is not available at pharmacy. Spouse stated that he is not able to bring in medication because he is not home.

## 2021-12-05 NOTE — DISCHARGE INSTRUCTIONS
Learning About Pseudotumor Cerebri  What is pseudotumor cerebri? Pseudotumor cerebri (say \"dup-fog-BRW-hortencia SAIR-uh-almita\") is an increase in pressure of the fluid that surrounds the brain. Your doctor may also call the condition \"idiopathic intracranial hypertension. \" Normally, this clear fluid acts like a buffer to protect the brain. It is called cerebrospinal fluid, or CSF. It's not clear what makes the CSF pressure rise. Some medicines may cause it. Sleep apnea, obesity, and anemia may also play a part. The pressure may build over time. The rising pressure can make the optic nerve swell. The optic nerve is at the back of the eye. It carries visual information from the eye to the brain. The swelling may damage the nerve and lead to permanent loss of some or all of the eyesight. There are several symptoms of rising CSF pressure. Some symptoms may be present all the time. Some might come and go. Symptoms include:  · Severe headaches. They sometimes come with nausea and vomiting. The pain may be centered behind the eyes. · A hissing or roaring sound in the ears that keeps time with your heartbeat. · Problems with vision. You may not be able to see well at the edge of your field of view. You may also lose center vision. It may happen now and then, in one or both eyes, and last for a few seconds at a time. The word \"pseudotumor\" may sound scary. But it's not a brain tumor. The condition gets its name because the pressure inside the skull can mimic what happens when a person has a tumor. How is it treated? · If you are taking medicines that could be raising your CSF pressure, your doctor may change your medicines. · You may get medicines to help your body make less CSF. This can help lower the pressure around the brain. · Your doctor may also give you medicine for headaches. · If sleep apnea, obesity, or anemia may be causing the CSF pressure to rise, your doctor may treat those problems.   · If your vision is getting worse, you may have surgery to make a small opening on the optic nerve to reduce swelling. · Your doctor may implant small tubes inside the skull to drain the extra fluid. This helps lower the pressure around the brain. Follow-up care is a key part of your treatment and safety. Be sure to make and go to all appointments, and call your doctor if you are having problems. It's also a good idea to know your test results and keep a list of the medicines you take. Where can you learn more? Go to http://www.gray.com/  Enter D455 in the search box to learn more about \"Learning About Pseudotumor Cerebri. \"  Current as of: April 29, 2021               Content Version: 13.0  © 2006-2021 Healthwise[employer]. Care instructions adapted under license by [employer] (which disclaims liability or warranty for this information). If you have questions about a medical condition or this instruction, always ask your healthcare professional. [place] [address] any warranty or liability for your use of this information.
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
## 2021-12-05 NOTE — PROGRESS NOTES
Patient transferred from ICU.        12/04/21 1334   Vital Signs   Temp 98.1 °F (36.7 °C)   Temp Source Oral   Pulse (Heart Rate) 92   Heart Rate Source Monitor   Cardiac Rhythm Sinus Rhythm   Resp Rate 15   O2 Sat (%) 96 %   Level of Consciousness Alert (0)   /70   MAP (Calculated) 89   BP 1 Method Automatic   BP 1 Location Right upper arm   BP Patient Position At rest   MEWS Score 1   Pain 1   Pain Scale 1 Numeric (0 - 10)   Pain Intensity 1 5   Pain Reassessment 1 Yes   Pain Location 1 Leg   Pain Orientation 1 Left

## 2021-12-06 ENCOUNTER — APPOINTMENT (OUTPATIENT)
Dept: CT IMAGING | Age: 31
End: 2021-12-06
Attending: EMERGENCY MEDICINE
Payer: MEDICAID

## 2021-12-06 ENCOUNTER — HOSPITAL ENCOUNTER (OUTPATIENT)
Age: 31
Discharge: LEFT BEFORE DISCHARGE | End: 2021-12-07
Attending: EMERGENCY MEDICINE | Admitting: FAMILY MEDICINE
Payer: MEDICAID

## 2021-12-06 DIAGNOSIS — R53.1 UNILATERAL WEAKNESS: ICD-10-CM

## 2021-12-06 DIAGNOSIS — R47.1 DYSARTHRIA: Primary | ICD-10-CM

## 2021-12-06 PROBLEM — I63.9 CVA (CEREBRAL VASCULAR ACCIDENT) (HCC): Status: ACTIVE | Noted: 2021-12-06

## 2021-12-06 LAB
ABO + RH BLD: NORMAL
ALBUMIN SERPL-MCNC: 2.9 G/DL (ref 3.5–5)
ALBUMIN/GLOB SERPL: 1 {RATIO} (ref 1.1–2.2)
ALP SERPL-CCNC: 93 U/L (ref 45–117)
ALT SERPL-CCNC: 31 U/L (ref 12–78)
ANION GAP SERPL CALC-SCNC: 4 MMOL/L (ref 5–15)
AST SERPL W P-5'-P-CCNC: 27 U/L (ref 15–37)
BASOPHILS # BLD: 0 K/UL (ref 0–0.1)
BASOPHILS NFR BLD: 0 % (ref 0–1)
BILIRUB SERPL-MCNC: 0.3 MG/DL (ref 0.2–1)
BLOOD GROUP ANTIBODIES SERPL: NEGATIVE
BUN SERPL-MCNC: 12 MG/DL (ref 6–20)
BUN/CREAT SERPL: 16 (ref 12–20)
CA-I BLD-MCNC: 8.2 MG/DL (ref 8.5–10.1)
CHLORIDE SERPL-SCNC: 109 MMOL/L (ref 97–108)
CO2 SERPL-SCNC: 27 MMOL/L (ref 21–32)
CREAT SERPL-MCNC: 0.76 MG/DL (ref 0.55–1.02)
DIFFERENTIAL METHOD BLD: ABNORMAL
EOSINOPHIL # BLD: 0 K/UL (ref 0–0.4)
EOSINOPHIL NFR BLD: 0 % (ref 0–7)
ERYTHROCYTE [DISTWIDTH] IN BLOOD BY AUTOMATED COUNT: 12.3 % (ref 11.5–14.5)
GLOBULIN SER CALC-MCNC: 3 G/DL (ref 2–4)
GLUCOSE SERPL-MCNC: 126 MG/DL (ref 65–100)
HCT VFR BLD AUTO: 36.6 % (ref 35–47)
HGB BLD-MCNC: 12.2 G/DL (ref 11.5–16)
IMM GRANULOCYTES # BLD AUTO: 0.1 K/UL (ref 0–0.04)
IMM GRANULOCYTES NFR BLD AUTO: 1 % (ref 0–0.5)
INR PPP: 0.9 (ref 0.9–1.1)
LEFT ABI: 1.24
LEFT ARM BP: 129 MMHG
LEFT POSTERIOR TIBIAL: 160 MMHG
LYMPHOCYTES # BLD: 3.6 K/UL (ref 0.8–3.5)
LYMPHOCYTES NFR BLD: 35 % (ref 12–49)
MCH RBC QN AUTO: 30.8 PG (ref 26–34)
MCHC RBC AUTO-ENTMCNC: 33.3 G/DL (ref 30–36.5)
MCV RBC AUTO: 92.4 FL (ref 80–99)
MONOCYTES # BLD: 1.3 K/UL (ref 0–1)
MONOCYTES NFR BLD: 13 % (ref 5–13)
NEUTS SEG # BLD: 5.4 K/UL (ref 1.8–8)
NEUTS SEG NFR BLD: 51 % (ref 32–75)
NRBC # BLD: 0 K/UL (ref 0–0.01)
NRBC BLD-RTO: 0 PER 100 WBC
PLATELET # BLD AUTO: 291 K/UL (ref 150–400)
PMV BLD AUTO: 9.9 FL (ref 8.9–12.9)
POTASSIUM SERPL-SCNC: 4.7 MMOL/L (ref 3.5–5.1)
PROT SERPL-MCNC: 5.9 G/DL (ref 6.4–8.2)
PROTHROMBIN TIME: 11.8 SEC (ref 11.9–14.7)
RBC # BLD AUTO: 3.96 M/UL (ref 3.8–5.2)
RIGHT ABI: 1.23
RIGHT ARM BP: 124 MMHG
RIGHT POSTERIOR TIBIAL: 159 MMHG
SODIUM SERPL-SCNC: 140 MMOL/L (ref 136–145)
SPECIMEN EXP DATE BLD: NORMAL
VAS LEFT DORSALIS PEDIS BP: 133 MMHG
VAS RIGHT DORSALIS PEDIS BP: 134 MMHG
WBC # BLD AUTO: 10.4 K/UL (ref 3.6–11)

## 2021-12-06 PROCEDURE — 85025 COMPLETE CBC W/AUTO DIFF WBC: CPT

## 2021-12-06 PROCEDURE — 74011250636 HC RX REV CODE- 250/636: Performed by: FAMILY MEDICINE

## 2021-12-06 PROCEDURE — 93005 ELECTROCARDIOGRAM TRACING: CPT

## 2021-12-06 PROCEDURE — 80053 COMPREHEN METABOLIC PANEL: CPT

## 2021-12-06 PROCEDURE — 85610 PROTHROMBIN TIME: CPT

## 2021-12-06 PROCEDURE — 99285 EMERGENCY DEPT VISIT HI MDM: CPT

## 2021-12-06 PROCEDURE — 74011250636 HC RX REV CODE- 250/636: Performed by: EMERGENCY MEDICINE

## 2021-12-06 PROCEDURE — 74011000636 HC RX REV CODE- 636: Performed by: EMERGENCY MEDICINE

## 2021-12-06 PROCEDURE — 70450 CT HEAD/BRAIN W/O DYE: CPT

## 2021-12-06 PROCEDURE — 65270000029 HC RM PRIVATE

## 2021-12-06 PROCEDURE — G0378 HOSPITAL OBSERVATION PER HR: HCPCS

## 2021-12-06 PROCEDURE — 96374 THER/PROPH/DIAG INJ IV PUSH: CPT

## 2021-12-06 PROCEDURE — 86900 BLOOD TYPING SEROLOGIC ABO: CPT

## 2021-12-06 PROCEDURE — 36415 COLL VENOUS BLD VENIPUNCTURE: CPT

## 2021-12-06 PROCEDURE — 70496 CT ANGIOGRAPHY HEAD: CPT

## 2021-12-06 RX ORDER — HYDROXYCHLOROQUINE SULFATE 200 MG/1
200 TABLET, FILM COATED ORAL 2 TIMES DAILY WITH MEALS
Status: DISCONTINUED | OUTPATIENT
Start: 2021-12-07 | End: 2021-12-07 | Stop reason: HOSPADM

## 2021-12-06 RX ORDER — POLYETHYLENE GLYCOL 3350 17 G/17G
17 POWDER, FOR SOLUTION ORAL DAILY PRN
Status: DISCONTINUED | OUTPATIENT
Start: 2021-12-06 | End: 2021-12-07 | Stop reason: HOSPADM

## 2021-12-06 RX ORDER — ONDANSETRON 2 MG/ML
4 INJECTION INTRAMUSCULAR; INTRAVENOUS
Status: DISCONTINUED | OUTPATIENT
Start: 2021-12-06 | End: 2021-12-07 | Stop reason: HOSPADM

## 2021-12-06 RX ORDER — MORPHINE SULFATE 4 MG/ML
4 INJECTION INTRAVENOUS ONCE
Status: COMPLETED | OUTPATIENT
Start: 2021-12-06 | End: 2021-12-06

## 2021-12-06 RX ORDER — SODIUM CHLORIDE 9 MG/ML
75 INJECTION, SOLUTION INTRAVENOUS CONTINUOUS
Status: DISCONTINUED | OUTPATIENT
Start: 2021-12-06 | End: 2021-12-07 | Stop reason: HOSPADM

## 2021-12-06 RX ORDER — GUAIFENESIN 100 MG/5ML
81 LIQUID (ML) ORAL DAILY
Status: DISCONTINUED | OUTPATIENT
Start: 2021-12-07 | End: 2021-12-07 | Stop reason: HOSPADM

## 2021-12-06 RX ORDER — ENOXAPARIN SODIUM 100 MG/ML
40 INJECTION SUBCUTANEOUS DAILY
Status: DISCONTINUED | OUTPATIENT
Start: 2021-12-07 | End: 2021-12-07 | Stop reason: HOSPADM

## 2021-12-06 RX ORDER — GABAPENTIN 300 MG/1
600 CAPSULE ORAL 3 TIMES DAILY
Status: DISCONTINUED | OUTPATIENT
Start: 2021-12-07 | End: 2021-12-07 | Stop reason: HOSPADM

## 2021-12-06 RX ORDER — ONDANSETRON 4 MG/1
4 TABLET, ORALLY DISINTEGRATING ORAL
Status: DISCONTINUED | OUTPATIENT
Start: 2021-12-06 | End: 2021-12-07 | Stop reason: HOSPADM

## 2021-12-06 RX ORDER — PANTOPRAZOLE SODIUM 40 MG/1
40 TABLET, DELAYED RELEASE ORAL DAILY
Status: DISCONTINUED | OUTPATIENT
Start: 2021-12-07 | End: 2021-12-07 | Stop reason: HOSPADM

## 2021-12-06 RX ADMIN — SODIUM CHLORIDE 75 ML/HR: 9 INJECTION, SOLUTION INTRAVENOUS at 23:33

## 2021-12-06 RX ADMIN — IOPAMIDOL 100 ML: 755 INJECTION, SOLUTION INTRAVENOUS at 21:25

## 2021-12-06 RX ADMIN — MORPHINE SULFATE 4 MG: 4 INJECTION INTRAVENOUS at 23:07

## 2021-12-06 NOTE — Clinical Note
Patient Class[de-identified] OBSERVATION [350]   Type of Bed: Medical [8]   Cardiac Monitoring Required?: No   Reason for Observation: Rule out CVA   Admitting Diagnosis: Unilateral weakness [[id number]]   Admitting Physician: Flower Finn [[id number]]   Attending Physician: Flower Finn [[id number]]

## 2021-12-07 VITALS
DIASTOLIC BLOOD PRESSURE: 86 MMHG | HEART RATE: 79 BPM | HEIGHT: 62 IN | TEMPERATURE: 97.7 F | WEIGHT: 209 LBS | BODY MASS INDEX: 38.46 KG/M2 | SYSTOLIC BLOOD PRESSURE: 123 MMHG | RESPIRATION RATE: 20 BRPM | OXYGEN SATURATION: 100 %

## 2021-12-07 LAB
AMPHET UR QL SCN: NEGATIVE
APPEARANCE UR: CLEAR
ATRIAL RATE: 106 BPM
BACTERIA URNS QL MICRO: NEGATIVE /HPF
BARBITURATES UR QL SCN: NEGATIVE
BENZODIAZ UR QL: POSITIVE
BILIRUB UR QL: NEGATIVE
CALCULATED P AXIS, ECG09: 48 DEGREES
CALCULATED R AXIS, ECG10: -42 DEGREES
CALCULATED T AXIS, ECG11: 24 DEGREES
CANNABINOIDS UR QL SCN: NEGATIVE
COCAINE UR QL SCN: NEGATIVE
COLOR UR: ABNORMAL
DIAGNOSIS, 93000: NORMAL
DRUG SCRN COMMENT,DRGCM: ABNORMAL
GLUCOSE UR STRIP.AUTO-MCNC: NEGATIVE MG/DL
HGB UR QL STRIP: NEGATIVE
KETONES UR QL STRIP.AUTO: NEGATIVE MG/DL
LEUKOCYTE ESTERASE UR QL STRIP.AUTO: NEGATIVE
METHADONE UR QL: NEGATIVE
NITRITE UR QL STRIP.AUTO: NEGATIVE
OPIATES UR QL: POSITIVE
P-R INTERVAL, ECG05: 154 MS
PCP UR QL: NEGATIVE
PH UR STRIP: 7 [PH] (ref 5–8)
PROT UR STRIP-MCNC: NEGATIVE MG/DL
Q-T INTERVAL, ECG07: 352 MS
QRS DURATION, ECG06: 78 MS
QTC CALCULATION (BEZET), ECG08: 467 MS
RBC #/AREA URNS HPF: ABNORMAL /HPF (ref 0–5)
SP GR UR REFRACTOMETRY: >1.03 (ref 1–1.03)
UA: UC IF INDICATED,UAUC: ABNORMAL
UROBILINOGEN UR QL STRIP.AUTO: 2 EU/DL (ref 0.1–1)
VENTRICULAR RATE, ECG03: 106 BPM
WBC URNS QL MICRO: ABNORMAL /HPF (ref 0–4)

## 2021-12-07 PROCEDURE — 80307 DRUG TEST PRSMV CHEM ANLYZR: CPT

## 2021-12-07 PROCEDURE — G0378 HOSPITAL OBSERVATION PER HR: HCPCS

## 2021-12-07 PROCEDURE — 74011250636 HC RX REV CODE- 250/636: Performed by: FAMILY MEDICINE

## 2021-12-07 PROCEDURE — 92610 EVALUATE SWALLOWING FUNCTION: CPT

## 2021-12-07 PROCEDURE — 74011250637 HC RX REV CODE- 250/637: Performed by: FAMILY MEDICINE

## 2021-12-07 PROCEDURE — 81001 URINALYSIS AUTO W/SCOPE: CPT

## 2021-12-07 RX ORDER — OXYCODONE HYDROCHLORIDE 10 MG/1
10 TABLET ORAL 3 TIMES DAILY
Status: DISCONTINUED | OUTPATIENT
Start: 2021-12-07 | End: 2021-12-07 | Stop reason: HOSPADM

## 2021-12-07 RX ORDER — MORPHINE SULFATE 2 MG/ML
1 INJECTION, SOLUTION INTRAMUSCULAR; INTRAVENOUS ONCE
Status: COMPLETED | OUTPATIENT
Start: 2021-12-07 | End: 2021-12-07

## 2021-12-07 RX ORDER — GABAPENTIN 300 MG/1
600 CAPSULE ORAL
Status: DISCONTINUED | OUTPATIENT
Start: 2021-12-07 | End: 2021-12-07 | Stop reason: HOSPADM

## 2021-12-07 RX ORDER — TRAMADOL HYDROCHLORIDE 50 MG/1
50 TABLET ORAL
Status: DISCONTINUED | OUTPATIENT
Start: 2021-12-07 | End: 2021-12-07 | Stop reason: HOSPADM

## 2021-12-07 RX ADMIN — MORPHINE SULFATE 1 MG: 2 INJECTION, SOLUTION INTRAMUSCULAR; INTRAVENOUS at 09:16

## 2021-12-07 RX ADMIN — TRAMADOL HYDROCHLORIDE 50 MG: 50 TABLET, COATED ORAL at 01:52

## 2021-12-07 RX ADMIN — ASPIRIN 81 MG CHEWABLE TABLET 81 MG: 81 TABLET CHEWABLE at 09:17

## 2021-12-07 RX ADMIN — SODIUM CHLORIDE 75 ML/HR: 9 INJECTION, SOLUTION INTRAVENOUS at 01:20

## 2021-12-07 RX ADMIN — GABAPENTIN 600 MG: 300 CAPSULE ORAL at 09:16

## 2021-12-07 RX ADMIN — PANTOPRAZOLE SODIUM 40 MG: 40 TABLET, DELAYED RELEASE ORAL at 09:17

## 2021-12-07 RX ADMIN — HYDROXYCHLOROQUINE SULFATE 200 MG: 200 TABLET ORAL at 09:17

## 2021-12-07 NOTE — PROGRESS NOTES
Patient admitted to [facility].Upon arrival patient complains of left side groin pain secondary to surgical site 10/10 on numeric pain scale. Patient is alert and oriented x4 and remains cooperative and compliant. Patient presents as a good historian and able to ambulate with standby assistance. Vitals are stable, clear on RA. Skin assessment completed by writer and,    Upon assessment, the following was noted  1. tattoos to left foot, right pelvic area, left thigh, upper back, and on both arms,   2. Surgical sites to R and L groin that appear to be laparoscopic  3. Significant bruising to both arms and upper shoulders secondary to venipunctures/IV sites   Dr. Roberto Wynn notified of patient pain level x2, orders received and meds administered per these orders. Nursing will continue to monitor.

## 2021-12-07 NOTE — PROGRESS NOTES
Problem: Falls - Risk of  Goal: *Absence of Falls  Description: Document Elise Xavier Fall Risk and appropriate interventions in the flowsheet.   Outcome: Progressing Towards Goal  Note: Fall Risk Interventions:  Mobility Interventions: Bed/chair exit alarm         Medication Interventions: Bed/chair exit alarm                   Problem: Falls - Risk of  Goal: *Ability to perform ADLs and demonstrates progressive mobility and function  Outcome: Progressing Towards Goal

## 2021-12-07 NOTE — PROGRESS NOTES
Patient voices disappointment with regard to her pain management and requested a new provider be assigned to her. Writer explained that such a request could not be evaluated until morning. Writer further explained that Dr. Bobbi Saravia is likely airing on the side of caution with regard to her pain management at this time secondary to him not being available for a face-to-face eval at this time. However, the possibility of a more aggressive form of pain management once she is seen by the MD is plausible. Patient was offered a one time dose of her scheduled Gabapentin 600 mg by mouth per Dr. Jayda Negro order, however patient is visibly agitated and declines at this time. Education provided to patient on a level of teaching that she could understand regarding the importance of pain management and medication compliance as it relates to the pain threshold theory. Patient verbalized understanding of this education but appears adamant about changing providers. Nursing will continue to monitor.

## 2021-12-07 NOTE — ED TRIAGE NOTES
Pt had a venous sinus stent on dec 3rd, today, groin pain left post procedure site, gait has been unsteady, intermittent speech diff, clear at triage, dropping objects with right hand, onst at 1900 tonight , no c/o nausea, reports ha right temporal site
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
## 2021-12-07 NOTE — PROGRESS NOTES
OT eval order received and acknowledged. OT eval attempted at 10:00 AM, however, pt refused stating, \"I am ready to get out of here. As soon as I find my overnight bag I am leaving,\" while packing up belongings. OT will continue to follow and attempt eval at a later date. Thank you.
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
## 2021-12-07 NOTE — ED NOTES
TRANSFER - OUT REPORT:    Verbal report given to clara(name) on [facility]  being transferred to [facility](unit) for routine progression of care       Report consisted of patients Situation, Background, Assessment and   Recommendations(SBAR). Information from the following report(s) SBAR, ED Summary, [facility] and Recent Results was reviewed with the receiving nurse. Lines:   Peripheral IV 12/06/21 Posterior; Right Hand (Active)        Opportunity for questions and clarification was provided.       Patient transported with:   Between

## 2021-12-07 NOTE — ED NOTES
Deanna Brantley in [address] reports patient received contrast and then IV infiltrated while receiving NS. Reports approx 37 mL of NS infiltrated.

## 2021-12-07 NOTE — PROGRESS NOTES
Patient stated she is leaving the hospital because she does not like the doctor and has been in the room all night without pain medication. She pulled off her telemetry leads and stated she will remove her IV too. Made provider aware patient stated she is leaving. Upon returning to patient's room, patient was fully dressed and holding a hand towel over area where IV was. Placed a gauze and bandaid over area. Per provider, patient presented with AMA form at which she refused to sign. Patient refused wheelchair and to be walked out by nursing staff.

## 2021-12-12 RX ORDER — MIDAZOLAM HYDROCHLORIDE 1 MG/ML
2 INJECTION, SOLUTION INTRAMUSCULAR; INTRAVENOUS
Status: CANCELLED | OUTPATIENT
Start: 2021-11-09 | End: 2021-11-09

## 2021-12-13 ENCOUNTER — TELEPHONE (OUTPATIENT)
Dept: NEUROSURGERY | Age: 31
End: 2021-12-13

## 2021-12-13 NOTE — TELEPHONE ENCOUNTER
Return call to patient who reports left leg numbness has resolved since taking Gabapentin TID for 3 days. Denies headaches and blurred vision. Reports SOB since starting Brilinta. Patient reports no respiratory illness. Discussed with provider and informed patient she cannot stop taking Brilinta. Could potentially cause a clot. Check with PCP or if SOB becomes worse, seek urgent care. Patient stated understanding.

## 2021-12-13 NOTE — PERIOP NOTES
Late Entry--As indicated in my note of 11/9/21, patient was pre-medicated with 2 mg Versed IV prior to blood patch, per verbal order of anesthesiologist Dr. Shadia Ye. Patient stated she was prone to panic attacks. IV Versed did not provide any relief to patient; however, blood patch was performed successfully and patient was then medicated with IV Diphenhydramine per ED MD order. Patient stated much relief from her anxiety after that. As indicated in prior note, patient was then returned to ED feelilng much better.

## 2021-12-14 ENCOUNTER — APPOINTMENT (OUTPATIENT)
Dept: GENERAL RADIOLOGY | Age: 31
End: 2021-12-14
Attending: EMERGENCY MEDICINE
Payer: MEDICAID

## 2021-12-14 ENCOUNTER — HOSPITAL ENCOUNTER (EMERGENCY)
Age: 31
Discharge: HOME | End: 2021-12-14
Attending: EMERGENCY MEDICINE
Payer: MEDICAID

## 2021-12-14 VITALS
WEIGHT: 190 LBS | BODY MASS INDEX: 35.87 KG/M2 | RESPIRATION RATE: 16 BRPM | HEIGHT: 61 IN | HEART RATE: 78 BPM | DIASTOLIC BLOOD PRESSURE: 88 MMHG | OXYGEN SATURATION: 98 % | SYSTOLIC BLOOD PRESSURE: 135 MMHG | TEMPERATURE: 98.6 F

## 2021-12-14 DIAGNOSIS — M79.602 LEFT ARM PAIN: ICD-10-CM

## 2021-12-14 DIAGNOSIS — R06.02 SOB (SHORTNESS OF BREATH): Primary | ICD-10-CM

## 2021-12-14 LAB
ANION GAP SERPL CALC-SCNC: 5 MMOL/L (ref 5–15)
BASOPHILS # BLD: 0 K/UL (ref 0–0.1)
BASOPHILS NFR BLD: 0 % (ref 0–1)
BUN SERPL-MCNC: 8 MG/DL (ref 6–20)
BUN/CREAT SERPL: 10 (ref 12–20)
CALCIUM SERPL-MCNC: 8.7 MG/DL (ref 8.5–10.1)
CHLORIDE SERPL-SCNC: 105 MMOL/L (ref 97–108)
CO2 SERPL-SCNC: 29 MMOL/L (ref 21–32)
CREAT SERPL-MCNC: 0.8 MG/DL (ref 0.55–1.02)
D DIMER PPP FEU-MCNC: 0.31 MG/L FEU (ref 0–0.65)
DIFFERENTIAL METHOD BLD: NORMAL
EOSINOPHIL # BLD: 0.1 K/UL (ref 0–0.4)
EOSINOPHIL NFR BLD: 2 % (ref 0–7)
ERYTHROCYTE [DISTWIDTH] IN BLOOD BY AUTOMATED COUNT: 12.8 % (ref 11.5–14.5)
GLUCOSE SERPL-MCNC: 92 MG/DL (ref 65–100)
HCT VFR BLD AUTO: 36.7 % (ref 35–47)
HGB BLD-MCNC: 12.3 G/DL (ref 11.5–16)
IMM GRANULOCYTES # BLD AUTO: 0 K/UL (ref 0–0.04)
IMM GRANULOCYTES NFR BLD AUTO: 0 % (ref 0–0.5)
LYMPHOCYTES # BLD: 2.5 K/UL (ref 0.8–3.5)
LYMPHOCYTES NFR BLD: 32 % (ref 12–49)
MCH RBC QN AUTO: 31.5 PG (ref 26–34)
MCHC RBC AUTO-ENTMCNC: 33.5 G/DL (ref 30–36.5)
MCV RBC AUTO: 93.9 FL (ref 80–99)
MONOCYTES # BLD: 0.9 K/UL (ref 0–1)
MONOCYTES NFR BLD: 11 % (ref 5–13)
NEUTS SEG # BLD: 4.2 K/UL (ref 1.8–8)
NEUTS SEG NFR BLD: 54 % (ref 32–75)
NRBC # BLD: 0 K/UL (ref 0–0.01)
NRBC BLD-RTO: 0 PER 100 WBC
PLATELET # BLD AUTO: 244 K/UL (ref 150–400)
PMV BLD AUTO: 9.8 FL (ref 8.9–12.9)
POTASSIUM SERPL-SCNC: 4.2 MMOL/L (ref 3.5–5.1)
RBC # BLD AUTO: 3.91 M/UL (ref 3.8–5.2)
SARS-COV-2, COV2: NORMAL
SODIUM SERPL-SCNC: 139 MMOL/L (ref 136–145)
TROPONIN-HIGH SENSITIVITY: 5 NG/L (ref 0–51)
WBC # BLD AUTO: 7.7 K/UL (ref 3.6–11)

## 2021-12-14 PROCEDURE — 84484 ASSAY OF TROPONIN QUANT: CPT

## 2021-12-14 PROCEDURE — 80048 BASIC METABOLIC PNL TOTAL CA: CPT

## 2021-12-14 PROCEDURE — 85025 COMPLETE CBC W/AUTO DIFF WBC: CPT

## 2021-12-14 PROCEDURE — 85379 FIBRIN DEGRADATION QUANT: CPT

## 2021-12-14 PROCEDURE — 36415 COLL VENOUS BLD VENIPUNCTURE: CPT

## 2021-12-14 PROCEDURE — 74011250636 HC RX REV CODE- 250/636: Performed by: EMERGENCY MEDICINE

## 2021-12-14 PROCEDURE — 96374 THER/PROPH/DIAG INJ IV PUSH: CPT

## 2021-12-14 PROCEDURE — 71045 X-RAY EXAM CHEST 1 VIEW: CPT

## 2021-12-14 PROCEDURE — 74011250637 HC RX REV CODE- 250/637: Performed by: EMERGENCY MEDICINE

## 2021-12-14 PROCEDURE — U0005 INFEC AGEN DETEC AMPLI PROBE: HCPCS

## 2021-12-14 PROCEDURE — 93005 ELECTROCARDIOGRAM TRACING: CPT

## 2021-12-14 PROCEDURE — 99283 EMERGENCY DEPT VISIT LOW MDM: CPT

## 2021-12-14 RX ORDER — MORPHINE SULFATE 2 MG/ML
2 INJECTION, SOLUTION INTRAMUSCULAR; INTRAVENOUS ONCE
Status: COMPLETED | OUTPATIENT
Start: 2021-12-14 | End: 2021-12-14

## 2021-12-14 RX ORDER — OXYCODONE HYDROCHLORIDE 5 MG/1
5 TABLET ORAL
Status: COMPLETED | OUTPATIENT
Start: 2021-12-14 | End: 2021-12-14

## 2021-12-14 RX ADMIN — SODIUM CHLORIDE 1000 ML: 9 INJECTION, SOLUTION INTRAVENOUS at 11:09

## 2021-12-14 RX ADMIN — MORPHINE SULFATE 2 MG: 2 INJECTION, SOLUTION INTRAMUSCULAR; INTRAVENOUS at 11:09

## 2021-12-14 RX ADMIN — OXYCODONE 5 MG: 5 TABLET ORAL at 13:20
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
## 2021-12-14 NOTE — ED NOTES
Pt discharged after being medicated for pain. Copy of discharge instructions given and reviewed with patient. Pt ambulatory to the [facility]  Offered wheelchair, but declined.

## 2021-12-14 NOTE — ED TRIAGE NOTES
Pt c/o increasing SOB for 3 days. Pt had a venous sinus stent placed on 12/3 and has been experiencing SOB since 12/6. Pt endorses SOB at rest and during activity. Pt noticeably catching her breath while transferring from wheel chair to stretcher. Pt also c/o L arm pain that woke her up from her sleep this AM. The arm pain starts in her L shoulder and radiates down her left arm the pain is severe in nature 8/10 and constant, unrelieved by ice.  Pt ambulatory into ED

## 2021-12-15 ENCOUNTER — PATIENT OUTREACH (OUTPATIENT)
Dept: CASE MANAGEMENT | Age: 31
End: 2021-12-15

## 2021-12-15 ENCOUNTER — TELEPHONE (OUTPATIENT)
Dept: NEUROSURGERY | Age: 31
End: 2021-12-15

## 2021-12-15 LAB
ATRIAL RATE: 94 BPM
CALCULATED P AXIS, ECG09: 56 DEGREES
CALCULATED R AXIS, ECG10: 14 DEGREES
CALCULATED T AXIS, ECG11: 3 DEGREES
DIAGNOSIS, 93000: NORMAL
P-R INTERVAL, ECG05: 160 MS
Q-T INTERVAL, ECG07: 356 MS
QRS DURATION, ECG06: 68 MS
QTC CALCULATION (BEZET), ECG08: 445 MS
SARS-COV-2, XPLCVT: NOT DETECTED
SOURCE, COVRS: NORMAL
VENTRICULAR RATE, ECG03: 94 BPM

## 2021-12-15 NOTE — PROGRESS NOTES
Patient contacted regarding COVID-19 risk. Discussed COVID-19 related testing which was pending at this time. Test results were pending. Patient informed of results, if available? no.     LPN Care Coordinator contacted the patient by telephone to perform post discharge assessment. Call within 2 business days of discharge: Yes Verified name and  with patient as identifiers. Provided introduction to self, and explanation of the CTN/ACM role, and reason for call due to risk factors for infection and/or exposure to COVID-19. Symptoms reviewed with patient who verbalized the following symptoms: pain or aching joints      Due to no new or worsening symptoms encounter was not routed to provider for escalation. Discussed follow-up appointments. If no appointment was previously scheduled, appointment scheduling offered:  no. [facility] follow up appointment(s):   Future Appointments   Date Time Provider Orestes Garcia   2022  9:40 AM Juan Gleason MD [facility]     Non-[facility] follow up appointment(s): d/t continuing arm pain advised patient to contact her surgeon    Interventions to address risk factors: Education of patient/family/caregiver/guardian to support self-management-[facility] and covid numbers given     Advance Care Planning:   Does patient have an Advance Directive: not on file. Educated patient about risk for severe COVID-19 due to risk factors according to CDC guidelines. LPN CC reviewed discharge instructions, medical action plan and red flag symptoms with the patient who verbalized understanding. Discussed COVID vaccination status: yes. Education provided on COVID-19 vaccination as appropriate. Discussed exposure protocols and quarantine with CDC Guidelines. Patient was given an opportunity to verbalize any questions and concerns and agrees to contact LPN CC or health care provider for questions related to their healthcare.     Reviewed and educated patient on any new and changed medications related to discharge diagnosis     Was patient discharged with a pulse oximeter? no   LPN CC provided contact information. Plan for follow-up call in 5-7 days based on severity of symptoms and risk factors.

## 2021-12-21 ENCOUNTER — PATIENT MESSAGE (OUTPATIENT)
Dept: NEUROSURGERY | Age: 31
End: 2021-12-21

## 2021-12-21 DIAGNOSIS — T14.8XXA BRUISING: Primary | ICD-10-CM

## 2021-12-21 NOTE — TELEPHONE ENCOUNTER
From: Bennie Eugene  To: Lc Ellison MD  Sent: 12/21/2021 12:22 PM EST  Subject: Varun Lowe    I saw my PCP today she wanted me to make sure I sent you a picture of the bruise on my foot.  I have smaller ones on my legs arms and chest as expected but she was a little concerned about this one

## 2021-12-29 ENCOUNTER — PATIENT OUTREACH (OUTPATIENT)
Dept: CASE MANAGEMENT | Age: 31
End: 2021-12-29

## 2021-12-29 NOTE — PROGRESS NOTES
Patient resolved from Transition of Care episode on 12/29/21. ACM/CTN was unsuccessful at contacting this patient today. Patient/family was provided the following resources and education related to COVID-19 during the initial call:                         Signs, symptoms and red flags related to COVID-19            CDC exposure and quarantine guidelines            [facility] exposure contact - [phone]            Contact for their local Department of Health                 Patient has not had any additional ED or hospital visits. No further outreach scheduled with this CTN/ACM. Episode of Care resolved. Patient has this CTN/ACM contact information if future needs arise.

## 2022-01-16 DIAGNOSIS — G93.2 PSEUDOTUMOR CEREBRI: Primary | ICD-10-CM

## 2022-01-16 DIAGNOSIS — M25.50 PAIN IN JOINT, MULTIPLE SITES: ICD-10-CM

## 2022-01-16 DIAGNOSIS — G43.709 CHRONIC MIGRAINE WITHOUT AURA WITHOUT STATUS MIGRAINOSUS, NOT INTRACTABLE: ICD-10-CM

## 2022-01-16 RX ORDER — GABAPENTIN 600 MG/1
600 TABLET ORAL
Qty: 90 TABLET | Refills: 0 | Status: SHIPPED | OUTPATIENT
Start: 2022-01-16 | End: 2022-02-17 | Stop reason: SDUPTHER

## 2022-01-16 NOTE — PROGRESS NOTES
Patient with multiple pains from lupus and HA from [address], has been taking gabapentin which has helped, asking for refill.  Refill send at same dose 600 mg tid prn

## 2022-01-24 ENCOUNTER — DOCUMENTATION ONLY (OUTPATIENT)
Dept: NEUROSURGERY | Age: 32
End: 2022-01-24

## 2022-01-24 ENCOUNTER — TELEPHONE (OUTPATIENT)
Dept: NEUROSURGERY | Age: 32
End: 2022-01-24

## 2022-01-25 ENCOUNTER — VIRTUAL VISIT (OUTPATIENT)
Dept: NEUROSURGERY | Age: 32
End: 2022-01-25
Payer: MEDICAID

## 2022-01-25 ENCOUNTER — DOCUMENTATION ONLY (OUTPATIENT)
Dept: NEUROSURGERY | Age: 32
End: 2022-01-25

## 2022-01-25 DIAGNOSIS — I66.9 STENOSIS OF INTRACRANIAL VESSEL: Primary | ICD-10-CM

## 2022-01-25 DIAGNOSIS — H47.11 PAPILLEDEMA ASSOCIATED WITH INCREASED INTRACRANIAL PRESSURE: ICD-10-CM

## 2022-01-25 DIAGNOSIS — G93.2 IIH (IDIOPATHIC INTRACRANIAL HYPERTENSION): ICD-10-CM

## 2022-01-25 DIAGNOSIS — G93.2 PSEUDOTUMOR CEREBRI: ICD-10-CM

## 2022-01-25 PROCEDURE — 99443 PR PHYS/QHP TELEPHONE EVALUATION 21-30 MIN: CPT | Performed by: RADIOLOGY

## 2022-01-25 NOTE — PATIENT INSTRUCTIONS
Continue taking Brilinta 30 mg p.o. twice daily. Stop taking Brilinta on March 3, 2022) 3 months postop). Continue taking 81 mg p.o. daily. It is okay to proceed with bariatric surgery after March 3, 2022 under the care of Dr. Javier Del Rio. Learning About How Hospitals and Clinics Keep You Safe From COVID-19  Overview     Many hospitals and clinics now are treating people who are infected with COVID-19. So if you're in the hospital or clinic for any other reason, this may be an unsettling time. It's common to be concerned about becoming infected with the virus. But hospitals and clinics have policies to prevent the spread of infections. For example, doctors and nurses are trained to wash their hands before they treat you. Health care centers have stepped up these policies now. They are taking further steps to protect their patients. As long as [address] remains a public health problem, things are going to be different when you go to a health care facility. They may have new rules for your safety. These could include having you wear a cloth face cover, meeting you outside the clinic, and having you sit away from others in the waiting room. What are hospitals and clinics doing to keep you safe? Your care team is very aware of the threat of COVID-19. They are doing everything they can to keep you safe. Hospitals and clinics may have different policies. But in general, you may expect many of these measures:  · You will be screened for COVID-19. When you come to the hospital, you may have your temperature taken. You'll be asked about any symptoms, such as a fever, a cough, or shortness of breath. You may be asked if you've had contact with anyone who's been diagnosed with the virus. And you may be asked if you've traveled to any place that has had an outbreak. · The staff may try to do as much as possible outside the facility.  For example, you may be asked to fill out paperwork online or in your car before you come inside. The person giving you a ride home may be asked to wait outside. These new rules to help protect you may make routine tasks take longer than usual.  · People who have COVID-19 are treated in a separate area. Many hospitals and clinics have staff members who treat only these patients. This helps limit the spread of the infection. · Visitors may be limited. In some cases, no visitors are allowed. In others, only one healthy visitor is allowed. Children may be limited to having only one adult with them. You can connect with family and friends using your phone or computer. If you need something brought from home, such as glasses or a phone , find out where the item can be dropped off. · The hospital or clinic follows guidelines to prevent infection. These include:  ? Washing hands often. ? Disinfecting high-touch surfaces. ? Wearing face masks or other protective equipment. ? Making extra space for social distancing. What can you do to stay safe? We all have a role to play in keeping ourselves safe and preventing the spread of COVID-19. Here are some things you can do while you're receiving care. If you're in a hospital, stay in your room. This will limit your exposure to the virus. It may be boring, but it's the safest place for you. Wash your hands often. Use soap and water, and scrub for 20 seconds. Then rinse and dry them well. Always wash them after you use the bathroom, before you eat, and after you cough, sneeze, or blow your nose. If you can't wash your hands, use hand . Speak up if you have safety concerns. Don't be shy to correct health care workers if they aren't washing their hands properly, wearing face masks, or taking other precautions. These actions are vital to prevent the spread of infection. Try to be understanding.    This is a stressful time for everyone, including your care team. They are doing their best to keep you safe and provide you with good care.  Where can you learn more? Go to http://www.gray.com/  Enter A134 in the search box to learn more about \"Learning About How Hospitals and [place] Safe From COVID-19. \"  Current as of: March 26, 2021               Content Version: 13.0  © 5904-2567 [employer], [employer]. Care instructions adapted under license by [employer] (which disclaims liability or warranty for this information). If you have questions about a medical condition or this instruction, always ask your healthcare professional. [place] [address] any warranty or liability for your use of this information.
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
## 2022-01-25 NOTE — PROGRESS NOTES
Phone call to patient in preparation for Virtual/phone visit with provider. Name and  verified. Patient unable to obtain vital signs at home. Procedure follow up for Intracranial stenosis. Patient reports she is doing fine. Reports no headaches or migraines since procedure. No acute problems reported.

## 2022-02-14 ENCOUNTER — TELEPHONE (OUTPATIENT)
Dept: SURGERY | Age: 32
End: 2022-02-14

## 2022-02-14 NOTE — TELEPHONE ENCOUNTER
Pt returned call. Pt stated she was not aware of any of her appts but advised pt that when we last spoke that all of her pre ops were going to get scheduled after her surgery was posted and to look at her mychart if she did not receive her letter on time. Pt was confused on some appts but after giving her detail she was fully aware. Pt stated that her PATs would need to be rescheduled due to work. Pt aware that her PATs were going to be Friday at 2pm instead. Emailed her the new Surgery Letter as requested.

## 2022-02-17 ENCOUNTER — OFFICE VISIT (OUTPATIENT)
Dept: FAMILY MEDICINE CLINIC | Age: 32
End: 2022-02-17
Payer: MEDICARE

## 2022-02-17 VITALS
WEIGHT: 190 LBS | TEMPERATURE: 97.5 F | DIASTOLIC BLOOD PRESSURE: 76 MMHG | BODY MASS INDEX: 35.87 KG/M2 | OXYGEN SATURATION: 99 % | RESPIRATION RATE: 16 BRPM | HEIGHT: 61 IN | HEART RATE: 94 BPM | SYSTOLIC BLOOD PRESSURE: 111 MMHG

## 2022-02-17 DIAGNOSIS — G43.709 CHRONIC MIGRAINE WITHOUT AURA WITHOUT STATUS MIGRAINOSUS, NOT INTRACTABLE: ICD-10-CM

## 2022-02-17 DIAGNOSIS — G93.2 PSEUDOTUMOR CEREBRI: ICD-10-CM

## 2022-02-17 DIAGNOSIS — M25.50 PAIN IN JOINT, MULTIPLE SITES: ICD-10-CM

## 2022-02-17 PROCEDURE — G8510 SCR DEP NEG, NO PLAN REQD: HCPCS | Performed by: FAMILY MEDICINE

## 2022-02-17 PROCEDURE — 99214 OFFICE O/P EST MOD 30 MIN: CPT | Performed by: FAMILY MEDICINE

## 2022-02-17 PROCEDURE — G8417 CALC BMI ABV UP PARAM F/U: HCPCS | Performed by: FAMILY MEDICINE

## 2022-02-17 PROCEDURE — G8427 DOCREV CUR MEDS BY ELIG CLIN: HCPCS | Performed by: FAMILY MEDICINE

## 2022-02-17 RX ORDER — GABAPENTIN 600 MG/1
600 TABLET ORAL
Qty: 90 TABLET | Refills: 1 | Status: SHIPPED | OUTPATIENT
Start: 2022-02-17 | End: 2022-03-30

## 2022-02-17 NOTE — PROGRESS NOTES
Chief Complaint   Patient presents with    Follow-up    Surgical Follow-up     12/2021 venous sinus stent     she is a 33y.o. year old female who presents for evalution. [facility]  The optic nerve and vision  Is  better since the stent was placed in the brain  Her vision is now 20:20 and was 20:60 prior to the stent placement    She will  Be [facility] in an effort to keep the IC pressure down      Reviewed PmHx, RxHx, FmHx, SocHx, AllgHx and updated and dated in the chart. Aspirin yes ____   No____ N/A____    Patient Active Problem List    Diagnosis    Unilateral weakness    CVA (cerebral vascular accident) ([address]    Bruising    Intractable migraine    Intractable headache    Pseudotumor cerebri    Stenosis of cerebral venous sinus    Papilledema associated with increased intracranial pressure    Intracranial hypertension    Ataxia    Headache    IIH (idiopathic intracranial hypertension)    SLE [facility]ic lupus erythematosus) [facility]    Class 3 severe obesity in adult ([address]    S/P repair of paraesophageal hernia    Paraesophageal hernia    GERD (gastroesophageal reflux disease)    Obesity, Class II, BMI 35-39.9    Sebaceous cyst     S/P excision; Along sternum in cleavage.  History of Nissen fundoplication     4 duodenal ulcers, chronic gastritis, Grade C esophagitis, Chronic GERD, hernia, small tumor. Done August/2016.       Chronic migraine without aura without status migrainosus, not intractable    Cervical incompetence       Nurse notes were reviewed and copied and are correct  Review of Systems - negative except as listed above in the HPI    Objective:     Vitals:    02/17/22 1555   BP: 111/76   Pulse: 94   Resp: 16   Temp: 97.5 °F (36.4 °C)   TempSrc: Skin   SpO2: 99%   Weight: 190 lb (86.2 kg)   Height: 5' 1\" (1.549 m)        Physical Examination: General appearance - alert, well appearing, and in no distress  Mental status - alert, oriented to person, place, and time  Chest - clear to auscultation, no wheezes, rales or rhonchi, symmetric air entry  Heart - normal rate, regular rhythm, normal S1, S2, no murmurs, rubs, clicks or gallops  Abdomen - soft, nontender, nondistended, no masses or organomegaly      Assessment/ Plan:   Diagnoses and all orders for this visit:    1. Pseudotumor cerebri  -     gabapentin (NEURONTIN) 600 mg tablet; Take 1 Tablet by mouth three (3) times daily as needed for Pain. Max Daily Amount: 1,800 mg. Stent was successful   now working towards [facility]  2. Chronic migraine without aura without status migrainosus, not intractable  -     gabapentin (NEURONTIN) 600 mg tablet; Take 1 Tablet by mouth three (3) times daily as needed for Pain. Max Daily Amount: 1,800 mg.    3. Pain in joint, multiple sites  -     gabapentin (NEURONTIN) 600 mg tablet; Take 1 Tablet by mouth three (3) times daily as needed for Pain. Max Daily Amount: 1,800 mg. ICD-10-CM ICD-9-CM    1. Pseudotumor cerebri  G93.2 348.2 gabapentin (NEURONTIN) 600 mg tablet   2. Chronic migraine without aura without status migrainosus, not intractable  G43.709 346.70 gabapentin (NEURONTIN) 600 mg tablet   3. Pain in joint, multiple sites  M25.50 719.49 gabapentin (NEURONTIN) 600 mg tablet       I have discussed the diagnosis with the patient and the intended plan as seen in the above orders. The patient has received an after-visit summary  if not on "[employer] and questions were answered concerning future plans. Patients in "[employer] have access to [employer] without printing    Medication Side Effects and Warnings were discussed with patient:   Patient Labs were reviewed and or requested:   Patient Past Records were reviewed and or requested: yes        There are no Patient Instructions on file for this visit.

## 2022-02-17 NOTE — PROGRESS NOTES
1. Have you been to the ER, urgent care clinic since your last visit? Hospitalized since your last visit? No    2. Have you seen or consulted any other health care providers outside of the [address] [place] [address] since your last visit? Include any pap smears or colon screening. No    Chief Complaint   Patient presents with    Follow-up    Surgical Follow-up     12/2021 Sinus stent     Health Maintenance Due   Topic Date Due    COVID-19 Vaccine (1) Never done    Cervical cancer screen  Never done    Flu Vaccine (1) Never done     3 most recent PHQ Screens 2/17/2022   PHQ Not Done -   Little interest or pleasure in doing things Not at all   Feeling down, depressed, irritable, or hopeless Not at all   Total Score PHQ 2 0     Abuse Screening Questionnaire 11/11/2021   Do you ever feel afraid of your partner? N   Are you in a relationship with someone who physically or mentally threatens you? N   Is it safe for you to go home?  Y     Visit Vitals  /76 (BP 1 Location: Left arm, BP Patient Position: Sitting, BP Cuff Size: Adult)   Pulse 94   Temp 97.5 °F (36.4 °C) (Skin)   Resp 16   Ht 5' 1\" (1.549 m)   Wt 190 lb (86.2 kg)   SpO2 99%   BMI 35.90 kg/m²     Learning Assessment 8/30/2021   PRIMARY LEARNER Patient   HIGHEST LEVEL OF EDUCATION - PRIMARY LEARNER  -   BARRIERS PRIMARY LEARNER -   CO-LEARNER CAREGIVER -   PRIMARY LANGUAGE ENGLISH    NEED -   LEARNER PREFERENCE PRIMARY READING     -   LEARNING SPECIAL TOPICS -   ANSWERED BY patient   RELATIONSHIP SELF

## 2022-02-22 ENCOUNTER — DOCUMENTATION ONLY (OUTPATIENT)
Dept: NEUROSURGERY | Age: 32
End: 2022-02-22

## 2022-02-22 NOTE — PROGRESS NOTES
I received office notes from Dr. Ren Robin (neuro-ophthalmology) obtained on 2/16/2022. He documents no papilledema. The optic nerve is documented as \"crowded with minimal obscuration of the nasal border, no elevation, positive SVP. Spontaneous venous pulsations are generally an indication of low intracranial pressure in the subarachnoid space. He does not think that this patient's vision is at risk. She was cleared to proceed with gastric bypass surgery after discontinuation of dual antiplatelet therapy. This patient previously underwent cerebral venous sinus stenting for a cerebral venous congestion syndrome. Overall, during her last visit with me, her symptoms were dramatically improved. These ophthalmologic findings are consistent with overall improvement/resolution of intracranial hypertension in my opinion.

## 2022-02-23 ENCOUNTER — TRANSCRIBE ORDER (OUTPATIENT)
Dept: REGISTRATION | Age: 32
End: 2022-02-23

## 2022-02-23 ENCOUNTER — HOSPITAL ENCOUNTER (OUTPATIENT)
Dept: LAB | Age: 32
Discharge: HOME | End: 2022-02-23

## 2022-02-23 ENCOUNTER — HOSPITAL ENCOUNTER (EMERGENCY)
Age: 32
Discharge: HOME | End: 2022-02-23
Attending: FAMILY MEDICINE
Payer: MEDICAID

## 2022-02-23 VITALS
HEART RATE: 84 BPM | WEIGHT: 209 LBS | OXYGEN SATURATION: 100 % | TEMPERATURE: 98.6 F | HEIGHT: 62 IN | DIASTOLIC BLOOD PRESSURE: 86 MMHG | BODY MASS INDEX: 38.46 KG/M2 | SYSTOLIC BLOOD PRESSURE: 139 MMHG | RESPIRATION RATE: 16 BRPM
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
## 2022-02-23 DIAGNOSIS — Z77.21 EXPOSURE TO BODY FLUIDS BY CONTAMINATED HYPODERMIC NEEDLE STICK: Primary | ICD-10-CM

## 2022-02-23 DIAGNOSIS — Z77.21 EXPOSURE TO BODY FLUIDS BY CONTAMINATED HYPODERMIC NEEDLE STICK: ICD-10-CM
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
## 2022-02-23 PROCEDURE — 99282 EMERGENCY DEPT VISIT SF MDM: CPT
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
## 2022-02-23 NOTE — ED TRIAGE NOTES
33 yr old in with needle stick. Pt works at  pediatrics. She had given a baby a IM injection and the needle stuck her  Left thumb. Tetanus up to date.

## 2022-02-23 NOTE — ED NOTES
33 yr old in for lab work for an exposure from needle stick. There were no gold tops in free standing ED. Pt was ssent to main lab for blood draw.

## 2022-02-28 ENCOUNTER — TELEPHONE (OUTPATIENT)
Dept: SURGERY | Age: 32
End: 2022-02-28

## 2022-02-28 NOTE — TELEPHONE ENCOUNTER
Patient identified with two patient identifiers. Patient informed message received regarding new pregnancy. Patient informed Dr. Teresa Stark made aware, per provider procedure will be scheduled. Follow up after she delivers is recommended. Per patient she has been having some bleeding and spotting and that prompted her to go be evaluated and was then found to be pregnant. Patient states obgyn is watching her closely. Patient states if she miscarry she will call our office to update. Patient informed if this was to happen and or she has any procedure regarding pregnancy there will still be a wait time to proceed with surgery. Patient expressed understanding.

## 2022-02-28 NOTE — TELEPHONE ENCOUNTER
Pt called stating that she is pregnant and wondered how long her [place] was good for. Advised pt that her [place] will be good for 3 months but if no surgery within that shanti auth will . Pt also asked how long is her paperwork good for until she needed to restart from the beginning, advised her paperwork was good for a year. Pt aware I will send a message to Dr Maria T Galindo so he can speak with her in regards to her surgery. Pt is scheduled 3/24/2022 for RY, currently pregnant, please advise.

## 2022-03-02 LAB
ANTIBODY SCREEN, EXTERNAL: NORMAL
CHLAMYDIA, EXTERNAL: NORMAL
HBSAG, EXTERNAL: NORMAL
HIV, EXTERNAL: NON REACTIVE
N. GONORRHEA, EXTERNAL: NORMAL
RUBELLA, EXTERNAL: NORMAL
T. PALLIDUM, EXTERNAL: NON REACTIVE
TYPE, ABO & RH, EXTERNAL: NORMAL

## 2022-03-03 ENCOUNTER — PATIENT MESSAGE (OUTPATIENT)
Dept: NEUROSURGERY | Age: 32
End: 2022-03-03

## 2022-03-04 ENCOUNTER — TELEPHONE (OUTPATIENT)
Dept: NEUROSURGERY | Age: 32
End: 2022-03-04

## 2022-03-04 NOTE — TELEPHONE ENCOUNTER
The patient contacted my office to inform us that she was pregnant (first positive regnancy test on February 28, 2022 per patient report). I spoke to her directly on the telephone and instructed her to discontinue Brilinta immediately. She was scheduled to discontinue Brilinta on March 3, 2021 and had already done so. She was instructed to continue 81 mg of aspirin by her OB/GYN which should be sufficient to protect the intracranial stent at this postoperative interval.    From my standpoint, it is reasonable to proceed with vaginal delivery from a neurological standpoint in this patient, if deemed appropriate by her obstetrician. The patient expressed understanding my instructions. 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 The patient had a negative beta hCG on 12/3/2021 (day of intracranial stenting procedure)    The patient was instructed to call 911 or present to the emergency room immediately if she experiences recurrent vision changes, stroke symptoms or severe headaches.

## 2022-03-15 NOTE — PERIOP NOTES
[facility]  Ambulatory Surgery Unit  Pre-operative Instructions    Surgery/Procedure Date  3/30/2022            Tentative Arrival Time TBD      1. On the day of your surgery/procedure, please report to the Ambulatory Surgery Unit Registration Desk and sign in at your designated time. The Ambulatory Surgery Unit is located in [facility] on the [facility] side of the [facility] across from the [address] [place] [address]. Please have all of your health insurance cards and a photo ID. **Due to current COVID restrictions, only ONE adult may accompany you the day of the procedure. We have limited seating available. If our waiting room is at capacity, your ride may be asked to remain in their vehicle. No children are allowed in the waiting room. 2. You must have someone with you to drive you home, as you should not drive a car for 24 hours following anesthesia. Please make arrangements for a responsible adult friend or family member to stay with you for at least the first 24 hours after your surgery. 3. Do not have anything to eat or drink (including water, gum, mints, coffee, juice) after 11:59 PM  3/29/2022. This may not apply to medications prescribed by your physician. (Please note below the special instructions with medications to take the morning of surgery, if applicable.)    4. We recommend you do not drink any alcoholic beverages for 24 hours before and after your surgery. 5. Contact your surgeons office for instructions on the following medications: non-steroidal anti-inflammatory drugs (i.e. Advil, Aleve), vitamins, and supplements. (Some surgeons will want you to stop these medications prior to surgery and others may allow you to take them)   **If you are currently taking Plavix, Coumadin, Aspirin and/or other blood-thinning agents, contact your surgeon for instructions. ** Your surgeon will partner with the physician prescribing these medications to determine if it is safe to stop or if you need to continue taking. Please do not stop taking these medications without instructions from your surgeon. 6. In an effort to help prevent surgical site infection, we ask that you shower with an anti-bacterial soap (i.e. Dial/Safeguard, or the soap provided to you at your preadmission testing appointment) for 3 days prior to and on the morning of surgery, using a fresh towel after each shower. (Please begin this process with fresh bed linens.) Do not apply any lotions, powders, or deodorants after the shower on the day of your procedure. If applicable, please do not shave the operative site for 48 hours prior to surgery. 7. Wear comfortable clothes. Wear glasses instead of contacts. Do not bring any jewelry or money (other than copays or fees as instructed). Do not wear make-up, particularly mascara, the morning of your surgery. Do not wear nail polish, particularly if you are having foot /hand surgery. Wear your hair loose or down, no ponytails, buns, mil pins or clips. All body piercings must be removed. 8. You should understand that if you do not follow these instructions your surgery may be cancelled. If your physical condition changes (i.e. fever, cold or flu) please contact your surgeon as soon as possible. 9. It is important that you be on time. If a situation occurs where you may be late, or if you have any questions or problems, please call [phone].    10. Your surgery time may be subject to change. You will receive a phone call the day prior to surgery to confirm your arrival time. 11. Pediatric patients: please bring a change of clothes, diapers, bottle/sippy cup, pacifier, etc.      Special Instructions: Take all medications and inhalers, as prescribed, on the morning of surgery with a sip of water EXCEPT: n/a      Insulin Dependent Diabetic patients: Take your diabetic medications as prescribed the day before surgery.   Hold all diabetic medications the day of surgery. If you are scheduled to arrive for surgery after 8:00 AM, and your AM blood sugar is >200, please call Ambulatory Surgery. I understand a pre-operative phone call will be made to verify my surgery time. In the event that I am not available, I give permission for a message to be left on my answering service and/or with another person?       Yes       Reviewed instructions and given Covid test date 3/25/2022 between 7a-1145am. Patient  Able to verbalized understanding.         ___________________      ___________________      ________________  (Signature of Patient)          (Witness)                   (Date and Time)

## 2022-03-18 ENCOUNTER — ANESTHESIA EVENT (OUTPATIENT)
Dept: SURGERY | Age: 32
End: 2022-03-18
Payer: MEDICAID

## 2022-03-18 PROBLEM — R53.1 UNILATERAL WEAKNESS: Status: ACTIVE | Noted: 2021-12-06

## 2022-03-18 PROBLEM — K21.9 GERD (GASTROESOPHAGEAL REFLUX DISEASE): Status: ACTIVE | Noted: 2017-11-07

## 2022-03-19 PROBLEM — E66.812 OBESITY, CLASS II, BMI 35-39.9: Status: ACTIVE | Noted: 2017-03-31

## 2022-03-19 PROBLEM — I66.9 STENOSIS OF INTRACRANIAL VESSEL: Status: ACTIVE | Noted: 2021-08-30

## 2022-03-19 PROBLEM — G93.2 IIH (IDIOPATHIC INTRACRANIAL HYPERTENSION): Status: ACTIVE | Noted: 2020-08-25

## 2022-03-19 PROBLEM — M32.9 SLE (SYSTEMIC LUPUS ERYTHEMATOSUS) (HCC): Status: ACTIVE | Noted: 2020-08-25

## 2022-03-19 PROBLEM — E66.813 CLASS 3 SEVERE OBESITY IN ADULT: Status: ACTIVE | Noted: 2018-08-22

## 2022-03-19 PROBLEM — R51.9 HEADACHE: Status: ACTIVE | Noted: 2020-08-29

## 2022-03-19 PROBLEM — R51.9 INTRACTABLE HEADACHE: Status: ACTIVE | Noted: 2021-09-26

## 2022-03-19 PROBLEM — K44.9 PARAESOPHAGEAL HERNIA: Status: ACTIVE | Noted: 2017-11-15

## 2022-03-19 PROBLEM — E66.01 CLASS 3 SEVERE OBESITY IN ADULT (HCC): Status: ACTIVE | Noted: 2018-08-22

## 2022-03-19 PROBLEM — R27.0 ATAXIA: Status: ACTIVE | Noted: 2020-09-14

## 2022-03-19 PROBLEM — Z98.890 HISTORY OF NISSEN FUNDOPLICATION: Status: ACTIVE | Noted: 2017-01-04

## 2022-03-19 PROBLEM — E66.9 OBESITY, CLASS II, BMI 35-39.9: Status: ACTIVE | Noted: 2017-03-31

## 2022-03-19 PROBLEM — Z98.890 S/P REPAIR OF PARAESOPHAGEAL HERNIA: Status: ACTIVE | Noted: 2017-11-17

## 2022-03-19 PROBLEM — G93.2 INTRACRANIAL HYPERTENSION: Status: ACTIVE | Noted: 2020-10-23

## 2022-03-19 PROBLEM — T14.8XXA BRUISING: Status: ACTIVE | Noted: 2021-10-08

## 2022-03-19 PROBLEM — L72.3 SEBACEOUS CYST: Status: ACTIVE | Noted: 2017-03-24

## 2022-03-19 PROBLEM — Z87.19 S/P REPAIR OF PARAESOPHAGEAL HERNIA: Status: ACTIVE | Noted: 2017-11-17

## 2022-03-20 PROBLEM — H47.11 PAPILLEDEMA ASSOCIATED WITH INCREASED INTRACRANIAL PRESSURE: Status: ACTIVE | Noted: 2021-08-30

## 2022-03-20 PROBLEM — G43.919 INTRACTABLE MIGRAINE: Status: ACTIVE | Noted: 2021-09-30

## 2022-03-20 PROBLEM — I63.9 CVA (CEREBRAL VASCULAR ACCIDENT) (HCC): Status: ACTIVE | Noted: 2021-12-06

## 2022-03-20 PROBLEM — G93.2 PSEUDOTUMOR CEREBRI: Status: ACTIVE | Noted: 2021-09-03

## 2022-03-21 NOTE — PERIOP NOTES
Chart reviewed by Dr. Carlos Alberto Urban, also informed Dr. Carlos Alberto Urban that Haynes Latta has been stopped by Dr. Chad Hillman as noted on [facility] 3/4/2022 notes. As per Dr. Carlos Alberto Urban, patient okay to proceed on planned procedure on 3/30/2022.

## 2022-03-25 ENCOUNTER — HOSPITAL ENCOUNTER (OUTPATIENT)
Dept: PREADMISSION TESTING | Age: 32
Discharge: HOME | End: 2022-03-25

## 2022-03-29 ENCOUNTER — TELEPHONE (OUTPATIENT)
Dept: FAMILY MEDICINE CLINIC | Age: 32
End: 2022-03-29

## 2022-03-29 DIAGNOSIS — M62.838 MUSCLE SPASM: Primary | ICD-10-CM

## 2022-03-29 NOTE — TELEPHONE ENCOUNTER
Patient is coming in for Left knee pain, possible fx. Patient had imaging done,Imaging can be viewed in Epic. Please review imaging, and if further imaging is needed please place Rx .   Future Appointments   Date Time Provider Jessica Hinkle   3/29/2022  9:00 AM [facility] [employer] [id number] Pt call to say she need Flexaril which is not on the list. She need nurse to call.  [phone]

## 2022-03-30 ENCOUNTER — HOSPITAL ENCOUNTER (OUTPATIENT)
Age: 32
Setting detail: OUTPATIENT SURGERY
Discharge: HOME | End: 2022-03-30
Attending: STUDENT IN AN ORGANIZED HEALTH CARE EDUCATION/TRAINING PROGRAM | Admitting: STUDENT IN AN ORGANIZED HEALTH CARE EDUCATION/TRAINING PROGRAM
Payer: MEDICAID

## 2022-03-30 ENCOUNTER — ANESTHESIA (OUTPATIENT)
Dept: SURGERY | Age: 32
End: 2022-03-30
Payer: MEDICAID

## 2022-03-30 VITALS
HEIGHT: 62 IN | DIASTOLIC BLOOD PRESSURE: 91 MMHG | RESPIRATION RATE: 20 BRPM | BODY MASS INDEX: 36.99 KG/M2 | TEMPERATURE: 97 F | HEART RATE: 99 BPM | OXYGEN SATURATION: 100 % | WEIGHT: 201 LBS | SYSTOLIC BLOOD PRESSURE: 130 MMHG

## 2022-03-30 PROCEDURE — 77030002986 HC SUT PROL J&J -A: Performed by: STUDENT IN AN ORGANIZED HEALTH CARE EDUCATION/TRAINING PROGRAM

## 2022-03-30 PROCEDURE — 77030021352 HC CBL LD SYS DISP COVD -B: Performed by: STUDENT IN AN ORGANIZED HEALTH CARE EDUCATION/TRAINING PROGRAM

## 2022-03-30 PROCEDURE — 74011250636 HC RX REV CODE- 250/636: Performed by: NURSE ANESTHETIST, CERTIFIED REGISTERED

## 2022-03-30 PROCEDURE — 76030000000 HC AMB SURG OR TIME 0.5 TO 1: Performed by: STUDENT IN AN ORGANIZED HEALTH CARE EDUCATION/TRAINING PROGRAM

## 2022-03-30 PROCEDURE — 2709999900 HC NON-CHARGEABLE SUPPLY: Performed by: STUDENT IN AN ORGANIZED HEALTH CARE EDUCATION/TRAINING PROGRAM

## 2022-03-30 PROCEDURE — 76060000061 HC AMB SURG ANES 0.5 TO 1 HR: Performed by: STUDENT IN AN ORGANIZED HEALTH CARE EDUCATION/TRAINING PROGRAM

## 2022-03-30 PROCEDURE — 74011250636 HC RX REV CODE- 250/636: Performed by: ANESTHESIOLOGY

## 2022-03-30 PROCEDURE — 76210000056 HC AMBSU PH II REC 1.5 TO 2 HR: Performed by: STUDENT IN AN ORGANIZED HEALTH CARE EDUCATION/TRAINING PROGRAM

## 2022-03-30 PROCEDURE — 76210000034 HC AMBSU PH I REC 0.5 TO 1 HR: Performed by: STUDENT IN AN ORGANIZED HEALTH CARE EDUCATION/TRAINING PROGRAM

## 2022-03-30 RX ORDER — LIDOCAINE HYDROCHLORIDE 10 MG/ML
0.1 INJECTION, SOLUTION EPIDURAL; INFILTRATION; INTRACAUDAL; PERINEURAL AS NEEDED
Status: DISCONTINUED | OUTPATIENT
Start: 2022-03-30 | End: 2022-03-30 | Stop reason: HOSPADM

## 2022-03-30 RX ORDER — SODIUM CHLORIDE 0.9 % (FLUSH) 0.9 %
5-40 SYRINGE (ML) INJECTION AS NEEDED
Status: DISCONTINUED | OUTPATIENT
Start: 2022-03-30 | End: 2022-03-30 | Stop reason: HOSPADM

## 2022-03-30 RX ORDER — CHLOROPROCAINE HYDROCHLORIDE 30 MG/ML
INJECTION, SOLUTION EPIDURAL; INFILTRATION; INTRACAUDAL; PERINEURAL AS NEEDED
Status: DISCONTINUED | OUTPATIENT
Start: 2022-03-30 | End: 2022-03-30 | Stop reason: HOSPADM

## 2022-03-30 RX ORDER — FENTANYL CITRATE 50 UG/ML
INJECTION, SOLUTION INTRAMUSCULAR; INTRAVENOUS
Status: DISCONTINUED
Start: 2022-03-30 | End: 2022-03-30 | Stop reason: HOSPADM

## 2022-03-30 RX ORDER — CHLOROPROCAINE HYDROCHLORIDE 30 MG/ML
INJECTION, SOLUTION EPIDURAL; INFILTRATION; INTRACAUDAL; PERINEURAL
Status: COMPLETED
Start: 2022-03-30 | End: 2022-03-30

## 2022-03-30 RX ORDER — CYCLOBENZAPRINE HCL 10 MG
10 TABLET ORAL
Qty: 90 TABLET | Refills: 1 | Status: SHIPPED | OUTPATIENT
Start: 2022-03-30 | End: 2022-06-19 | Stop reason: SDUPTHER

## 2022-03-30 RX ORDER — DIPHENHYDRAMINE HYDROCHLORIDE 50 MG/ML
12.5 INJECTION, SOLUTION INTRAMUSCULAR; INTRAVENOUS AS NEEDED
Status: DISCONTINUED | OUTPATIENT
Start: 2022-03-30 | End: 2022-03-30 | Stop reason: HOSPADM

## 2022-03-30 RX ORDER — SODIUM CHLORIDE, SODIUM LACTATE, POTASSIUM CHLORIDE, CALCIUM CHLORIDE 600; 310; 30; 20 MG/100ML; MG/100ML; MG/100ML; MG/100ML
25 INJECTION, SOLUTION INTRAVENOUS CONTINUOUS
Status: DISCONTINUED | OUTPATIENT
Start: 2022-03-30 | End: 2022-03-30 | Stop reason: HOSPADM

## 2022-03-30 RX ORDER — FENTANYL CITRATE 50 UG/ML
25 INJECTION, SOLUTION INTRAMUSCULAR; INTRAVENOUS
Status: DISCONTINUED | OUTPATIENT
Start: 2022-03-30 | End: 2022-03-30 | Stop reason: HOSPADM

## 2022-03-30 RX ORDER — SODIUM CHLORIDE 0.9 % (FLUSH) 0.9 %
5-40 SYRINGE (ML) INJECTION EVERY 8 HOURS
Status: DISCONTINUED | OUTPATIENT
Start: 2022-03-30 | End: 2022-03-30 | Stop reason: HOSPADM

## 2022-03-30 RX ORDER — PROPOFOL 10 MG/ML
INJECTION, EMULSION INTRAVENOUS AS NEEDED
Status: DISCONTINUED | OUTPATIENT
Start: 2022-03-30 | End: 2022-03-30 | Stop reason: HOSPADM

## 2022-03-30 RX ORDER — ONDANSETRON 2 MG/ML
4 INJECTION INTRAMUSCULAR; INTRAVENOUS AS NEEDED
Status: DISCONTINUED | OUTPATIENT
Start: 2022-03-30 | End: 2022-03-30 | Stop reason: HOSPADM

## 2022-03-30 RX ADMIN — SODIUM CHLORIDE, POTASSIUM CHLORIDE, SODIUM LACTATE AND CALCIUM CHLORIDE 500 ML: 600; 310; 30; 20 INJECTION, SOLUTION INTRAVENOUS at 11:39

## 2022-03-30 RX ADMIN — FENTANYL CITRATE 25 MCG: 0.05 INJECTION, SOLUTION INTRAMUSCULAR; INTRAVENOUS at 14:12

## 2022-03-30 RX ADMIN — FENTANYL CITRATE 25 MCG: 0.05 INJECTION, SOLUTION INTRAMUSCULAR; INTRAVENOUS at 14:35

## 2022-03-30 RX ADMIN — SODIUM CHLORIDE, POTASSIUM CHLORIDE, SODIUM LACTATE AND CALCIUM CHLORIDE: 600; 310; 30; 20 INJECTION, SOLUTION INTRAVENOUS at 11:59

## 2022-03-30 RX ADMIN — PROPOFOL 10 MG: 10 INJECTION, EMULSION INTRAVENOUS at 12:10

## 2022-03-30 RX ADMIN — PROPOFOL 50 MCG/KG/MIN: 10 INJECTION, EMULSION INTRAVENOUS at 12:14

## 2022-03-30 RX ADMIN — SODIUM CHLORIDE 20 MCG/MIN: 900 INJECTION, SOLUTION INTRAVENOUS at 12:15

## 2022-03-30 RX ADMIN — PROPOFOL 10 MG: 10 INJECTION, EMULSION INTRAVENOUS at 12:05

## 2022-03-30 RX ADMIN — CHLOROPROCAINE HYDROCHLORIDE 45 MG: 30 INJECTION, SOLUTION EPIDURAL; INFILTRATION; INTRACAUDAL; PERINEURAL at 12:12

## 2022-03-30 NOTE — ANESTHESIA PROCEDURE NOTES
Spinal Block    Start time: 3/30/2022 12:00 PM  End time: 3/30/2022 12:12 PM  Performed by: Julian Contreras MD  Authorized by: Julian Contreras MD     Pre-procedure: Indications: at surgeon's request and primary anesthetic  Preanesthetic Checklist: patient identified, risks and benefits discussed, anesthesia consent, site marked, patient being monitored and timeout performed    Timeout Time: 12:00 EDT          Spinal Block:   Patient Position:  Seated  Prep Region:  Lumbar  Prep: DuraPrep      Location:  L3-4  Technique:  Single shot    Local Dose (mL):  1.5    Needle:   Needle Type:   Madelaine  Needle Gauge:  25 G  Attempts:  2      Events: CSF confirmed, no blood with aspiration and no paresthesia        Assessment:  Insertion:  Uncomplicated  Patient tolerance:  Patient tolerated the procedure well with no immediate complications

## 2022-03-30 NOTE — ANESTHESIA PREPROCEDURE EVALUATION
Relevant Problems   NEUROLOGY   (+) CVA (cerebral vascular accident) ([address]   (+) Chronic migraine without aura without status migrainosus, not intractable   (+) Headache   (+) Intractable headache   (+) Intractable migraine      GASTROINTESTINAL   (+) GERD (gastroesophageal reflux disease)   (+) Paraesophageal hernia      ENDOCRINE   (+) Class 3 severe obesity in adult [facility])       Anesthetic History   No history of anesthetic complications            Review of Systems / Medical History  Patient summary reviewed, nursing notes reviewed and pertinent labs reviewed    Pulmonary  Within defined limits                 Neuro/Psych       CVA  Headaches (migraines) and psychiatric history ( anxiety)    Comments: Pseudotumor cerebri/ elevated ICP  Bilateral transverse cerebral sinus stenosis  S/p stenting of cerebral vein 12/21 Cardiovascular  Within defined limits                Exercise tolerance: >4 METS     GI/Hepatic/Renal     GERD: well controlled      PUD    Comments: Had Nissen Fundoplication x2 Endo/Other        Obesity and arthritis     Other Findings   Comments: IUP at 12-13 weeks  Incompetent cervix    SLE         Physical Exam    Airway  Mallampati: III  TM Distance: 4 - 6 cm  Neck ROM: normal range of motion   Mouth opening: Normal     Cardiovascular    Rhythm: regular  Rate: normal         Dental  No notable dental hx       Pulmonary  Breath sounds clear to auscultation               Abdominal  GI exam deferred       Other Findings            Anesthetic Plan    ASA: 3  Anesthesia type: spinal            Anesthetic plan and risks discussed with: Patient      500 ml IVF bolus preop

## 2022-03-30 NOTE — PERIOP NOTES
Pt starting to move legs and complaining of pain in LLQ-not like labor but sharp. Pt had 2 births without medication. 1323 Pt states pain getting worse-sharp and consistant and can not get into a position of relief. Called Dr. Monique Cochran who felt that is bladder and attempt to empty bladder and may medicate. 1400 could not empty bladder so sent for bladder scanner. Registering 282 max. 1410 Gave pt 25mcq Fentanyl with good relief and pt taking snack for oxycodone. 1430 pt states doesn't want to take Oxycodone due to makes nauseated and I expressed concern of Fentanyl making bladder hold on to urine. Pt wants another Fentanyl dose and no oxycodone with pain at 5/10 1500 good relief from Fentanyl and could ambulate to BR without assistance and able to void. 1505 Discharged to car without incident with 2 rings and cell phone and  given instructions. Reviewed with  earlier and answered questions.
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
## 2022-03-30 NOTE — PERIOP NOTES
Sid aKye  1990  [id number]    Situation:  Verbal report given from: RN and CRNA  Procedure: Procedure(s):  CERVICAL CERCLAGE    Background:    Preoperative diagnosis: Cervical incompetence [N88.3]    Postoperative diagnosis: Cervical incompetence [N88.3]    :  Dr. Ivan Segura    Assistant(s): Circ-1: Cristina Hickey RN  Scrub RN-1: Allen Artis RN    Specimens: * No specimens in log *    Assessment:  Intra-procedure medications   Propofol  mg      Anesthesia gave intra-procedure sedation and medications, see anesthesia flow sheet     Intravenous fluids: LR@ KVO     Vital signs stable. Pt denies pain and warmer applied for chill. RN reported that MD did ultrasound at end of case to visual heart beat. Recommendation:    Permission to share finding with  yes    All side rails up, bed in low position, wheels locked. Nurse at bedside.

## 2022-03-30 NOTE — ANESTHESIA POSTPROCEDURE EVALUATION
Procedure(s):  CERVICAL CERCLAGE.    spinal    Anesthesia Post Evaluation      Multimodal analgesia: multimodal analgesia used between 6 hours prior to anesthesia start to PACU discharge  Patient location during evaluation: PACU  Patient participation: complete - patient participated  Level of consciousness: awake and alert  Pain management: satisfactory to patient  Airway patency: patent  Anesthetic complications: no  Cardiovascular status: acceptable  Respiratory status: acceptable  Hydration status: acceptable  Comments: Spinal resolved prior to d/c. Not able to void, but bladder scan only showing 230 ml. LLQ pain; surgeon aware.   Post anesthesia nausea and vomiting:  none  Final Post Anesthesia Temperature Assessment:  Normothermia (36.0-37.5 degrees C)      INITIAL Post-op Vital signs:   Vitals Value Taken Time   /91 03/30/22 1330   Temp 36.1 °C (97 °F) 03/30/22 1247   Pulse 87 03/30/22 1330   Resp 22 03/30/22 1330   SpO2 100 % 03/30/22 1330

## 2022-03-30 NOTE — OP NOTES
OB/GYN OPERATIVE NOTE  Krystal Heart  Date of Surgery: 3/30/2022  Preoperative Diagnosis: CERVICAL INCOMPETENCE, PREGNANT  Postoperative Diagnosis: CERVICAL INCOMPETENCE, PREGNANT    Procedure: Cervical Cerclage    Surgeon: Ruth Verduzco MD  Assistant(s): Nathan Jackson MD  Anesthesia: Other   Estimated Blood Loss:  Minimal  Specimens: * No specimens in log *   Findings: See full operative note. Complications: None  Drains: None    Procedure Detail:      After proper patient identification and consent, the patient was taken to the operating room, where epidural anesthesia was administered and found to be adequate. Red rubber catheter is used to drain the bladder. The patient was prepped and draped in the normal sterile fashion. A sidearm speculum was inserted into the vagina and the cervix identified. A suture of 0 prolene was placed at 12:00 on the cervix 1 cm proximal to the tip and brought out at 9:00. It was replaced at 9:00 and brought out at 6:00, replaced at 6:00 and brought out at 3:00, replaced at 3:00 and brought out again at 12:00 and securely tied. A knot for future identification was placed 1 cm from the original knot and the suture is then held for cervical traction. The cervix was hemostatic at the end of the case and tightly closed on exam.  The speculum is removed and the vagina swiped clear of blood and debris. Instrument, lap and needle counts are correct x 2. The pt is taken to PACU in stable condition.         Noelle Wiggins MD

## 2022-03-30 NOTE — PERIOP NOTES
Permission received to review discharge instructions and discuss private health information with , Naatlia Romeo. Patient states that  will be with them for at least 24 hours following today's procedure. Mistral-Air warming blanket applied at this time. Set to appropriate setting that is comfortable to patient. Will continue to monitor.

## 2022-03-30 NOTE — PERIOP NOTES
Dr. Sandi Birch at bedside with [employer]. Fetal heart tones are 160 bpm. Fetal heart tones found in lower left quadrant.

## 2022-03-30 NOTE — PERIOP NOTES
Patient: Tyronne Spurling MRN: [id number]  SSN: [id number]   YOB: 1990  Age: 33 y.o. Sex: female     Patient is status post Procedure(s):  CERVICAL CERCLAGE. Surgeon(s) and Role:     * Helen Gustafson MD - Primary     Shana Esteves MD - Assisting    Local/Dose/Irrigation:  INTRAOPERATIVE SPINAL PLACED BY DR. Chad Oviedo. Peripheral IV 03/30/22 Anterior;Left;Proximal Forearm (Active)   Site Assessment Clean, dry, & intact 03/30/22 1137   Phlebitis Assessment 0 03/30/22 1137   Dressing Status Clean, dry, & intact 03/30/22 1137   Dressing Type Transparent 03/30/22 1137   Hub Color/Line Status Pink; Infusing 03/30/22 1137                           Dressing/Packing:  Incision 03/30/22 Vagina-Dressing/Treatment: Peripad (03/30/22 1100)    Other:  PATIENT STRAIGHT CATHED AT THE BEGINNING OF THE PROCEDURE BY DR. Oziel Alonso. URINE OUTPUT 50 ML, PER DR. Oziel Alonso.

## 2022-03-30 NOTE — DISCHARGE INSTRUCTIONS
Cervical Cerclage to Prevent  Delivery: What to Expect at Home  Your Recovery  Cervical cerclage (say \"SER-vuh-kul ser-KLAZH\") is a procedure that helps keep your cervix from opening too soon. Your doctor has sewn your cervix shut to help prevent  labor. For the next few days, you may have:  · Cramping. · Spotting. · Pain when you urinate. Your doctor may give you instructions on when you can do your normal activities again, such as driving and going back to work. Your doctor will remove the stitches around your cervix at 37 weeks, or if you go into  labor or show signs of infection. This care sheet gives you a general idea about how long it will take for you to recover. But each person recovers at a different pace. Follow the steps below to get better as quickly as possible. How can you care for yourself at home? Activity    · Rest when you feel tired.     · Ask your doctor when it is okay for you to have sex. Medicines    · Be safe with medicines. Read and follow all instructions on the label.     · If you are not taking a prescription pain medicine, ask your doctor if you can take an over-the-counter medicine.     · Your doctor will tell you if and when you can restart your medicines. He or she will also give you instructions about taking any new medicines. Follow-up care is a key part of your treatment and safety. Be sure to make and go to all appointments, and call your doctor if you are having problems. It's also a good idea to know your test results and keep a list of the medicines you take. When should you call for help? Call 911 anytime you think you may need emergency care. For example, call if:    · You have severe trouble breathing.     · You have sudden, severe pain in your belly.     · You have severe vaginal bleeding.    Call your doctor now or seek immediate medical care if:    · You have new pelvic pain, or the pain in your pelvis gets worse.     · You have a new discharge from your vagina.     · You have a new or higher fever. Watch closely for changes in your health, and be sure to contact your doctor if you have any problems. Where can you learn more? Go to http://www.gray.com/  Enter N936 in the search box to learn more about \"Cervical Cerclage to Prevent  Delivery: What to Expect at Home. \"  Current as of: 2021               Content Version: 13.2   [employer]. Care instructions adapted under license by [employer] (which disclaims liability or warranty for this information). If you have questions about a medical condition or this instruction, always ask your healthcare professional. [place] [address] any warranty or liability for your use of this information. DO NOT TAKE SLEEPING MEDICATIONS OR ANTIANXIETY MEDICATIONS WHILE TAKING NARCOTIC PAIN MEDICATIONS,  ESPECIALLY THE NIGHT OF ANESTHESIA. CPAP PATIENTS BE SURE TO WEAR MACHINE WHENEVER NAPPING OR SLEEPING. DISCHARGE SUMMARY from Nurse    The following personal items collected during your admission are returned to you:   Dental Appliance: Dental Appliances: None  Vision: Visual Aid: None  Hearing Aid:    Jewelry: Jewelry: Ring,With patient (2 rings stapled to pt belonging bag)  Clothing: Clothing: With patient  Other Valuables: Other Valuables: Cell Phone (Cell phone in PACU)  Valuables sent to safe:        PATIENT INSTRUCTIONS:    Anesthesia Discharge Instructions for Procedural Area requiring Sedation (MAC Anesthesia, Cath Lab, Endo and Radiology): You have been given medications during your procedure that may affect your memory and mental judgement for the next 24 hours. During this time frame for your safety, please follow the instructions listed below :    Have a responsible adult to drive you home and be with you for at least 12 hours.  Rest today and resume normal activities tomorrow.  Start with a soft bland diet and advance as tolerated to your recommended diet.  Do not drive any motor vehicle or operate mechanical or electrical equipment prior to [employer].  Avoid making critical decisions or signing legal documents prior to 6am tomorrow.  Do not drink alcohol prior to 6am tomorrow.  If you have sleep apnea and you plan to go home and take a nap, please use your CPAP machine not only at bedtime, but also while napping for 24 hours.  If you notice any redness or swelling on parts of your body where IV medications were given, place a warm wet washcloth over the area for 20 minutes at a time until the redness or swelling goes away. If you still have redness or swelling after 2-3 days, please call us. · You will receive a Post Operative Call from one of the Recovery Room Nurses on the day after your surgery to check on you. It is very important for us to know how you are recovering after your surgery. If you have an issue or need to speak with someone, please call your surgeon, do not wait for the post operative call. · You may receive an e-mail or letter in the mail from [employer] regarding your experience with us in the Ambulatory Surgery Unit. Your feedback is valuable to us and we appreciate your participation in the survey. · We wish you a speedy recovery ? What to do at Home:      *  Please give a list of your current medications to your Primary Care Provider. *  Please update this list whenever your medications are discontinued, doses are      changed, or new medications (including over-the-counter products) are added. *  Please carry medication information at all times in case of emergency situations. If you have not received your influenza and/or pneumococcal vaccine, please follow up with your primary care physician. The discharge information has been reviewed with the caregiver. The caregiver verbalized understanding.

## 2022-03-30 NOTE — DISCHARGE SUMMARY
Discharge Summary     Name: Jose Prabhakar MRN: [id number]  SSN: [id number]    YOB: 1990  Age: 33 y.o. Sex: female      Allergies: Latex; Acetaminophen; Other plant, animal, environmental; Benadryl [diphenhydramine hcl]; Plavix [clopidogrel]; and Nsaids (non-steroidal anti-inflammatory drug)    Admit Date: 3/30/2022    Discharge Date: 3/30/2022      Admitting Physician: Lawyer Madeleine MD     * Admission Diagnoses: Cervical insufficiency    * Discharge Diagnoses:   Hospital Problems as of 3/30/2022 Date Reviewed: 3/30/2022    None           * Procedures: Cerclage    * Discharge Condition: Stable    [facility] Course: Normal hospital course for this procedure. Significant Diagnostic Studies: No results found for this or any previous visit (from the past 24 hour(s)). * Disposition: Home    Discharge Medications:   Current Discharge Medication List           * Follow-up Care/Patient Instructions: Activity: No sex, douching, or tampons for 6 weeks or as directed by your physician. No heavy lifting for 6 weeks. No driving while taking pain medication.   Diet: Resume pre-hospital diet    Follow-up Information    None          Lawyer Madeleine MD

## 2022-03-31 ENCOUNTER — HOSPITAL ENCOUNTER (EMERGENCY)
Age: 32
Discharge: HOME | End: 2022-03-31
Attending: EMERGENCY MEDICINE
Payer: MEDICAID

## 2022-03-31 VITALS
DIASTOLIC BLOOD PRESSURE: 59 MMHG | OXYGEN SATURATION: 98 % | TEMPERATURE: 98.5 F | HEART RATE: 96 BPM | BODY MASS INDEX: 37.19 KG/M2 | RESPIRATION RATE: 17 BRPM | HEIGHT: 61 IN | WEIGHT: 197 LBS | SYSTOLIC BLOOD PRESSURE: 103 MMHG

## 2022-03-31 DIAGNOSIS — Z98.890 BACK PAIN WITH HISTORY OF SPINAL SURGERY: Primary | ICD-10-CM

## 2022-03-31 DIAGNOSIS — M54.9 BACK PAIN WITH HISTORY OF SPINAL SURGERY: Primary | ICD-10-CM

## 2022-03-31 LAB
ANION GAP SERPL CALC-SCNC: 6 MMOL/L (ref 5–15)
APPEARANCE UR: ABNORMAL
BACTERIA URNS QL MICRO: NEGATIVE /HPF
BASOPHILS # BLD: 0 K/UL (ref 0–0.1)
BASOPHILS NFR BLD: 0 % (ref 0–1)
BILIRUB UR QL: NEGATIVE
BUN SERPL-MCNC: 5 MG/DL (ref 6–20)
BUN/CREAT SERPL: 9 (ref 12–20)
CA-I BLD-MCNC: 8.4 MG/DL (ref 8.5–10.1)
CHLORIDE SERPL-SCNC: 107 MMOL/L (ref 97–108)
CO2 SERPL-SCNC: 24 MMOL/L (ref 21–32)
COLOR UR: ABNORMAL
CREAT SERPL-MCNC: 0.56 MG/DL (ref 0.55–1.02)
DIFFERENTIAL METHOD BLD: ABNORMAL
EOSINOPHIL # BLD: 0.1 K/UL (ref 0–0.4)
EOSINOPHIL NFR BLD: 1 % (ref 0–7)
ERYTHROCYTE [DISTWIDTH] IN BLOOD BY AUTOMATED COUNT: 13.4 % (ref 11.5–14.5)
GLUCOSE SERPL-MCNC: 82 MG/DL (ref 65–100)
GLUCOSE UR STRIP.AUTO-MCNC: NEGATIVE MG/DL
HCT VFR BLD AUTO: 29.9 % (ref 35–47)
HGB BLD-MCNC: 10.2 G/DL (ref 11.5–16)
HGB UR QL STRIP: NEGATIVE
IMM GRANULOCYTES # BLD AUTO: 0 K/UL (ref 0–0.04)
IMM GRANULOCYTES NFR BLD AUTO: 0 % (ref 0–0.5)
KETONES UR QL STRIP.AUTO: NEGATIVE MG/DL
LEUKOCYTE ESTERASE UR QL STRIP.AUTO: NEGATIVE
LYMPHOCYTES # BLD: 2.6 K/UL (ref 0.8–3.5)
LYMPHOCYTES NFR BLD: 29 % (ref 12–49)
MCH RBC QN AUTO: 31.4 PG (ref 26–34)
MCHC RBC AUTO-ENTMCNC: 34.1 G/DL (ref 30–36.5)
MCV RBC AUTO: 92 FL (ref 80–99)
MONOCYTES # BLD: 0.7 K/UL (ref 0–1)
MONOCYTES NFR BLD: 8 % (ref 5–13)
MUCOUS THREADS URNS QL MICRO: ABNORMAL /LPF
NEUTS SEG # BLD: 5.6 K/UL (ref 1.8–8)
NEUTS SEG NFR BLD: 62 % (ref 32–75)
NITRITE UR QL STRIP.AUTO: NEGATIVE
NRBC # BLD: 0 K/UL (ref 0–0.01)
NRBC BLD-RTO: 0 PER 100 WBC
PH UR STRIP: 7 [PH] (ref 5–8)
PLATELET # BLD AUTO: 241 K/UL (ref 150–400)
PMV BLD AUTO: 9.8 FL (ref 8.9–12.9)
POTASSIUM SERPL-SCNC: 3.5 MMOL/L (ref 3.5–5.1)
PROT UR STRIP-MCNC: NEGATIVE MG/DL
RBC # BLD AUTO: 3.25 M/UL (ref 3.8–5.2)
RBC #/AREA URNS HPF: ABNORMAL /HPF (ref 0–5)
SODIUM SERPL-SCNC: 137 MMOL/L (ref 136–145)
SP GR UR REFRACTOMETRY: 1.02 (ref 1–1.03)
UROBILINOGEN UR QL STRIP.AUTO: 2 EU/DL (ref 0.1–1)
WBC # BLD AUTO: 9.1 K/UL (ref 3.6–11)
WBC URNS QL MICRO: ABNORMAL /HPF (ref 0–4)

## 2022-03-31 PROCEDURE — 99284 EMERGENCY DEPT VISIT MOD MDM: CPT

## 2022-03-31 PROCEDURE — 81003 URINALYSIS AUTO W/O SCOPE: CPT

## 2022-03-31 PROCEDURE — 96376 TX/PRO/DX INJ SAME DRUG ADON: CPT

## 2022-03-31 PROCEDURE — 36415 COLL VENOUS BLD VENIPUNCTURE: CPT

## 2022-03-31 PROCEDURE — 96374 THER/PROPH/DIAG INJ IV PUSH: CPT

## 2022-03-31 PROCEDURE — 85025 COMPLETE CBC W/AUTO DIFF WBC: CPT

## 2022-03-31 PROCEDURE — 80048 BASIC METABOLIC PNL TOTAL CA: CPT

## 2022-03-31 PROCEDURE — 96375 TX/PRO/DX INJ NEW DRUG ADDON: CPT

## 2022-03-31 PROCEDURE — 74011250636 HC RX REV CODE- 250/636: Performed by: EMERGENCY MEDICINE

## 2022-03-31 RX ORDER — MORPHINE SULFATE 4 MG/ML
4 INJECTION INTRAVENOUS ONCE
Status: COMPLETED | OUTPATIENT
Start: 2022-03-31 | End: 2022-03-31

## 2022-03-31 RX ORDER — ONDANSETRON 2 MG/ML
4 INJECTION INTRAMUSCULAR; INTRAVENOUS
Status: COMPLETED | OUTPATIENT
Start: 2022-03-31 | End: 2022-03-31

## 2022-03-31 RX ADMIN — MORPHINE SULFATE 4 MG: 4 INJECTION INTRAVENOUS at 02:40

## 2022-03-31 RX ADMIN — ONDANSETRON 4 MG: 2 INJECTION INTRAMUSCULAR; INTRAVENOUS at 01:15

## 2022-03-31 RX ADMIN — MORPHINE SULFATE 4 MG: 4 INJECTION INTRAVENOUS at 01:15

## 2022-03-31 NOTE — DISCHARGE INSTRUCTIONS
Thank you! Thank you for allowing me to care for you in the emergency department. I sincerely hope that you are satisfied with your visit today. It is my goal to provide you with excellent care. Below you will find a list of your labs and imaging from your visit today. Should you have any questions regarding these results please do not hesitate to call the emergency department. Labs -     Recent Results (from the past 12 hour(s))   METABOLIC PANEL, BASIC    Collection Time: 03/31/22  1:02 AM   Result Value Ref Range    Sodium 137 136 - 145 mmol/L    Potassium 3.5 3.5 - 5.1 mmol/L    Chloride 107 97 - 108 mmol/L    CO2 24 21 - 32 mmol/L    Anion gap 6 5 - 15 mmol/L    Glucose 82 65 - 100 mg/dL    BUN 5 (L) 6 - 20 mg/dL    Creatinine 0.56 0.55 - 1.02 mg/dL    BUN/Creatinine ratio 9 (L) 12 - 20      GFR est AA >60 >60 ml/min/1.73m2    GFR est non-AA >60 >60 ml/min/1.73m2    Calcium 8.4 (L) 8.5 - 10.1 mg/dL   CBC WITH AUTOMATED DIFF    Collection Time: 03/31/22  1:02 AM   Result Value Ref Range    WBC 9.1 3.6 - 11.0 K/uL    RBC 3.25 (L) 3.80 - 5.20 M/uL    HGB 10.2 (L) 11.5 - 16.0 g/dL    HCT 29.9 (L) 35.0 - 47.0 %    MCV 92.0 80.0 - 99.0 FL    MCH 31.4 26.0 - 34.0 PG    MCHC 34.1 30.0 - 36.5 g/dL    RDW 13.4 11.5 - 14.5 %    PLATELET 331 678 - 709 K/uL    MPV 9.8 8.9 - 12.9 FL    NRBC 0.0 0.0  WBC    ABSOLUTE NRBC 0.00 0.00 - 0.01 K/uL    NEUTROPHILS 62 32 - 75 %    LYMPHOCYTES 29 12 - 49 %    MONOCYTES 8 5 - 13 %    EOSINOPHILS 1 0 - 7 %    BASOPHILS 0 0 - 1 %    IMMATURE GRANULOCYTES 0 0 - 0.5 %    ABS. NEUTROPHILS 5.6 1.8 - 8.0 K/UL    ABS. LYMPHOCYTES 2.6 0.8 - 3.5 K/UL    ABS. MONOCYTES 0.7 0.0 - 1.0 K/UL    ABS. EOSINOPHILS 0.1 0.0 - 0.4 K/UL    ABS. BASOPHILS 0.0 0.0 - 0.1 K/UL    ABS. IMM.  GRANS. 0.0 0.00 - 0.04 K/UL    DF AUTOMATED     URINALYSIS W/ RFLX MICROSCOPIC    Collection Time: 03/31/22  1:02 AM   Result Value Ref Range    Color Yellow/Straw      Appearance Turbid (A) Clear Specific gravity 1.018 1.003 - 1.030      pH (UA) 7.0 5.0 - 8.0      Protein Negative Negative mg/dL    Glucose Negative Negative mg/dL    Ketone Negative Negative mg/dL    Bilirubin Negative Negative      Blood Negative Negative      Urobilinogen 2.0 (H) 0.1 - 1.0 EU/dL    Nitrites Negative Negative      Leukocyte Esterase Negative Negative      WBC 0-4 0 - 4 /hpf    RBC 0-5 0 - 5 /hpf    Bacteria Negative Negative /hpf    Mucus Trace /lpf       Radiologic Studies -   No orders to display     CT Results  (Last 48 hours)      None          CXR Results  (Last 48 hours)      None               If you feel that you have not received excellent quality care or timely care, please ask to speak to the nurse manager. Please choose us in the future for your continued health care needs. ------------------------------------------------------------------------------------------------------------  The exam and treatment you received in the Emergency Department were for an urgent problem and are not intended as complete care. It is important that you follow-up with a doctor, nurse practitioner, or physician assistant to:  (1) confirm your diagnosis,  (2) re-evaluation of changes in your illness and treatment, and  (3) for ongoing care. If your symptoms become worse or you do not improve as expected and you are unable to reach your usual health care provider, you should return to the Emergency Department. We are available 24 hours a day. Please take your discharge instructions with you when you go to your follow-up appointment. If you have any problem arranging a follow-up appointment, contact the Emergency Department immediately. If a prescription has been provided, please have it filled as soon as possible to prevent a delay in treatment. Read the entire medication instruction sheet provided to you by the pharmacy.  If you have any questions or reservations about taking the medication due to side effects or interactions with other medications, please call your primary care physician or contact the ER to speak with the charge nurse. Make an appointment with your family doctor or the physician you were referred to for follow-up of this visit as instructed on your discharge paperwork, as this is a mandatory follow-up. Return to the ER if you are unable to be seen or if you are unable to be seen in a timely manner. If you have any problem arranging the follow-up visit, contact the Emergency Department immediately.

## 2022-03-31 NOTE — Clinical Note
[address] [facility] EMERGENCY DEPT  [address] [facility] [id number]  [phone]    Work/School Note    Date: 3/31/2022    To Whom It May concern:      Damian Pak was seen and treated today in the emergency room by the following provider(s):  Attending Provider: Thad Parker MD.      Damian Pak is excused from work/school on 03/31/22. She is clear to return to work/school on 04/01/22.         Sincerely,          Paulette Vasquez MD

## 2022-03-31 NOTE — ED NOTES
Pt was mediated for pain per order. Vitals obtained and stable. Discharge teaching was performed at bedside with significant other at bedside. Pt was instructed on use of ice and heat along with OTC pain medication use. Pt stated\"what am I supposed to do about my pain\"? Pt stated she wished to speak with the ER physician to discuss her pain control. Pt was instructed by the ED MD Dr. Sindy Pozo at bedside for the same pain management. Pt has been seen and still sees a pain management doctor at home. Pt stated Candace Corona does not want extra medication\" but did not seem to agree with pain management options regarding non pharmacological treatment. Pt was instructed to follow up with her pain management provider and PCP in the morning. Pt was instructed on use of the ice packs and heat packs and use of lidocaine patches was discussed in detail. Pt stable at time of discharge. No further orders at this time.

## 2022-03-31 NOTE — PERIOP NOTES
3/31/22 1015 post op call. Pt no longer having pain at lower quadrant like yesterday. Pain at spinal site 8-9/10. Has used ice and postioning. Told her to stretch back and good alignment and lots of oral water to help headache. If headache becomes severe call MD. Discussed with Dr. Kisha Clarke who stated she used very tiny needle and pt has had several of these in past and had to make 2nd stick to get in. No fluid loss.

## 2022-03-31 NOTE — ED TRIAGE NOTES
13 weeks preg. Ob dr Rigo Chowdhury at [facility], [place], operated by [facility]. Had cervical cerclage today. C/o lower back pain strted 1600. Denies vag bleeding. Denies abd pain or contractions.
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
## 2022-04-01 ENCOUNTER — TELEPHONE (OUTPATIENT)
Dept: NEUROLOGY | Age: 32
End: 2022-04-01

## 2022-04-01 ENCOUNTER — HOSPITAL ENCOUNTER (EMERGENCY)
Age: 32
Discharge: HOME | End: 2022-04-02
Attending: EMERGENCY MEDICINE | Admitting: EMERGENCY MEDICINE
Payer: MEDICAID

## 2022-04-01 DIAGNOSIS — R51.9 NONINTRACTABLE HEADACHE, UNSPECIFIED CHRONICITY PATTERN, UNSPECIFIED HEADACHE TYPE: Primary | ICD-10-CM

## 2022-04-01 DIAGNOSIS — M54.50 ACUTE BILATERAL LOW BACK PAIN, UNSPECIFIED WHETHER SCIATICA PRESENT: ICD-10-CM

## 2022-04-01 LAB
ALBUMIN SERPL-MCNC: 3.6 G/DL (ref 3.5–5)
ALBUMIN/GLOB SERPL: 0.9 {RATIO} (ref 1.1–2.2)
ALP SERPL-CCNC: 79 U/L (ref 45–117)
ALT SERPL-CCNC: 27 U/L (ref 12–78)
ANION GAP SERPL CALC-SCNC: 7 MMOL/L (ref 5–15)
APPEARANCE UR: CLEAR
AST SERPL-CCNC: 18 U/L (ref 15–37)
BACTERIA URNS QL MICRO: NEGATIVE /HPF
BASOPHILS # BLD: 0 K/UL (ref 0–0.1)
BASOPHILS NFR BLD: 0 % (ref 0–1)
BILIRUB SERPL-MCNC: 0.3 MG/DL (ref 0.2–1)
BILIRUB UR QL: NEGATIVE
BUN SERPL-MCNC: 8 MG/DL (ref 6–20)
BUN/CREAT SERPL: 13 (ref 12–20)
CALCIUM SERPL-MCNC: 9.4 MG/DL (ref 8.5–10.1)
CAOX CRY URNS QL MICRO: ABNORMAL
CHLORIDE SERPL-SCNC: 109 MMOL/L (ref 97–108)
CO2 SERPL-SCNC: 22 MMOL/L (ref 21–32)
COLOR UR: ABNORMAL
CREAT SERPL-MCNC: 0.61 MG/DL (ref 0.55–1.02)
DIFFERENTIAL METHOD BLD: ABNORMAL
EOSINOPHIL # BLD: 0.1 K/UL (ref 0–0.4)
EOSINOPHIL NFR BLD: 1 % (ref 0–7)
EPITH CASTS URNS QL MICRO: ABNORMAL /LPF
ERYTHROCYTE [DISTWIDTH] IN BLOOD BY AUTOMATED COUNT: 13.4 % (ref 11.5–14.5)
GLOBULIN SER CALC-MCNC: 3.8 G/DL (ref 2–4)
GLUCOSE SERPL-MCNC: 81 MG/DL (ref 65–100)
GLUCOSE UR STRIP.AUTO-MCNC: NEGATIVE MG/DL
HCG SERPL-ACNC: ABNORMAL MIU/ML (ref 0–6)
HCT VFR BLD AUTO: 33.4 % (ref 35–47)
HGB BLD-MCNC: 11.7 G/DL (ref 11.5–16)
HGB UR QL STRIP: NEGATIVE
IMM GRANULOCYTES # BLD AUTO: 0 K/UL (ref 0–0.04)
IMM GRANULOCYTES NFR BLD AUTO: 0 % (ref 0–0.5)
KETONES UR QL STRIP.AUTO: NEGATIVE MG/DL
LEUKOCYTE ESTERASE UR QL STRIP.AUTO: NEGATIVE
LYMPHOCYTES # BLD: 3.1 K/UL (ref 0.8–3.5)
LYMPHOCYTES NFR BLD: 28 % (ref 12–49)
MAGNESIUM SERPL-MCNC: 1.9 MG/DL (ref 1.6–2.4)
MCH RBC QN AUTO: 31.6 PG (ref 26–34)
MCHC RBC AUTO-ENTMCNC: 35 G/DL (ref 30–36.5)
MCV RBC AUTO: 90.3 FL (ref 80–99)
MONOCYTES # BLD: 0.8 K/UL (ref 0–1)
MONOCYTES NFR BLD: 8 % (ref 5–13)
NEUTS SEG # BLD: 7.1 K/UL (ref 1.8–8)
NEUTS SEG NFR BLD: 63 % (ref 32–75)
NITRITE UR QL STRIP.AUTO: NEGATIVE
NRBC # BLD: 0 K/UL (ref 0–0.01)
NRBC BLD-RTO: 0 PER 100 WBC
PH UR STRIP: 6.5 [PH] (ref 5–8)
PLATELET # BLD AUTO: 284 K/UL (ref 150–400)
PMV BLD AUTO: 9.5 FL (ref 8.9–12.9)
POTASSIUM SERPL-SCNC: 3.8 MMOL/L (ref 3.5–5.1)
PROT SERPL-MCNC: 7.4 G/DL (ref 6.4–8.2)
PROT UR STRIP-MCNC: NEGATIVE MG/DL
RBC # BLD AUTO: 3.7 M/UL (ref 3.8–5.2)
RBC #/AREA URNS HPF: ABNORMAL /HPF (ref 0–5)
SODIUM SERPL-SCNC: 138 MMOL/L (ref 136–145)
SP GR UR REFRACTOMETRY: 1.02 (ref 1–1.03)
UA: UC IF INDICATED,UAUC: ABNORMAL
UROBILINOGEN UR QL STRIP.AUTO: 1 EU/DL (ref 0.2–1)
WBC # BLD AUTO: 11.2 K/UL (ref 3.6–11)
WBC URNS QL MICRO: ABNORMAL /HPF (ref 0–4)

## 2022-04-01 PROCEDURE — 81001 URINALYSIS AUTO W/SCOPE: CPT

## 2022-04-01 PROCEDURE — 85025 COMPLETE CBC W/AUTO DIFF WBC: CPT

## 2022-04-01 PROCEDURE — 99284 EMERGENCY DEPT VISIT MOD MDM: CPT

## 2022-04-01 PROCEDURE — 80053 COMPREHEN METABOLIC PANEL: CPT

## 2022-04-01 PROCEDURE — 96374 THER/PROPH/DIAG INJ IV PUSH: CPT

## 2022-04-01 PROCEDURE — 36415 COLL VENOUS BLD VENIPUNCTURE: CPT

## 2022-04-01 PROCEDURE — 74011250636 HC RX REV CODE- 250/636: Performed by: EMERGENCY MEDICINE

## 2022-04-01 PROCEDURE — 83735 ASSAY OF MAGNESIUM: CPT

## 2022-04-01 PROCEDURE — 74011250637 HC RX REV CODE- 250/637: Performed by: EMERGENCY MEDICINE

## 2022-04-01 PROCEDURE — 84702 CHORIONIC GONADOTROPIN TEST: CPT

## 2022-04-01 RX ORDER — METOCLOPRAMIDE HYDROCHLORIDE 5 MG/ML
10 INJECTION INTRAMUSCULAR; INTRAVENOUS
Status: COMPLETED | OUTPATIENT
Start: 2022-04-01 | End: 2022-04-01

## 2022-04-01 RX ORDER — OXYCODONE HYDROCHLORIDE 5 MG/1
5 TABLET ORAL
Status: COMPLETED | OUTPATIENT
Start: 2022-04-01 | End: 2022-04-01

## 2022-04-01 RX ADMIN — OXYCODONE 5 MG: 5 TABLET ORAL at 22:45

## 2022-04-01 RX ADMIN — METOCLOPRAMIDE 10 MG: 5 INJECTION, SOLUTION INTRAMUSCULAR; INTRAVENOUS at 22:48

## 2022-04-01 RX ADMIN — SODIUM CHLORIDE 1000 ML: 9 INJECTION, SOLUTION INTRAVENOUS at 22:46

## 2022-04-01 NOTE — TELEPHONE ENCOUNTER
Amina Monks     Patient is pregnant, has chronic pain with pain management physician per ER note. Would need to discuss short course of narcotics/fioricet with Ob/gyn, pain management for headache. Alternatively, would have to return to Er/go to pain management for physician for nerve block not much more I can do in pregnancy.  Sparkly lights appear typical migraine phenomenon but if new for her and not improving should see optometrist.     Stephie Paredes

## 2022-04-01 NOTE — TELEPHONE ENCOUNTER
Patient has bad headache sees sparkly things , it has been going on for 2 days.  Please call her at [phone]

## 2022-04-01 NOTE — TELEPHONE ENCOUNTER
Patient stated she contacted her OB/GYN and they advised her to contact her Neurologist, The visual disturbance is a new symptom. Please advise.

## 2022-04-02 VITALS
RESPIRATION RATE: 18 BRPM | HEIGHT: 62 IN | SYSTOLIC BLOOD PRESSURE: 102 MMHG | OXYGEN SATURATION: 100 % | TEMPERATURE: 99 F | DIASTOLIC BLOOD PRESSURE: 64 MMHG | HEART RATE: 88 BPM | WEIGHT: 197.09 LBS | BODY MASS INDEX: 36.27 KG/M2

## 2022-04-02 NOTE — ED NOTES
Pt c/o lower back pain, headache and nausea since Wednesday. Pt is s/p cervical cerclage on Wednesday and pt states that back pain and headache started immediately after she stood up post op. Pt states that she has followed up with OB, Neuro and ED with no improvement of symptoms. Pt does have a hx of intracranial HTN.
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
## 2022-04-02 NOTE — PROGRESS NOTES
Called by ED for consult on a patient with suspected PDPH. She had a spinal on Wednesday for a cerclage. 25 g daniel spinal, uneventful. She states that later that day she had severe lumbar pain that was new to her. She went to the ED the following day because of the back pain and was told it was related to labor and given pain medication. She then states that she went a different ED the following day for back pain plus headache. Pepper Mixon that she spoke with an anesthesiologist that said she needed head imaging and couldn't do an epidural blood patch. She now presents to our ED with similar complaints. She says that her headache does improve when lying down and slightly worsens when sitting up. No neck pain or phono/photophobia. Headache is relatively constant and frontal. But does state that her primary complaint is related to her back pain. Cannot take Tylenol because of an allergy, and most other pain meds/caffeine are restricted due to her pregnancy. She has a hx of pseudotumor cerebri and has had multiple LPs in the past and states that she had a stent placed a year ago and hasn't had any issues since then. Because of this she does require imaging to rule out obstructive physiology that could cause herniation with spinal/epidural. I told the patient that it is unlikely that this pain would be improved by a blood patch and would be too risky without imaging. I recommended her to follow up with her neurologist as it could be related to her [address] or stent malfunction. If her headaches continue to worsen would recommend MRI brain to avoid excessive radiation to baby.

## 2022-04-02 NOTE — ED NOTES
Discharge instructions reviewed with patient. Verbalizes understanding. PIV removed. Pt left ED ambulatory, pt spouse waiting in the car. Pt left with all belongings.

## 2022-04-03 ENCOUNTER — HOSPITAL ENCOUNTER (EMERGENCY)
Age: 32
Discharge: HOME | End: 2022-04-03
Payer: MEDICAID

## 2022-04-03 ENCOUNTER — APPOINTMENT (OUTPATIENT)
Dept: ULTRASOUND IMAGING | Age: 32
End: 2022-04-03
Attending: OBSTETRICS & GYNECOLOGY
Payer: MEDICAID

## 2022-04-03 VITALS
HEIGHT: 62 IN | BODY MASS INDEX: 37.17 KG/M2 | SYSTOLIC BLOOD PRESSURE: 137 MMHG | TEMPERATURE: 98.7 F | HEART RATE: 86 BPM | DIASTOLIC BLOOD PRESSURE: 95 MMHG | RESPIRATION RATE: 18 BRPM | OXYGEN SATURATION: 100 % | WEIGHT: 202 LBS

## 2022-04-03 DIAGNOSIS — N93.9 VAGINAL BLEEDING: Primary | ICD-10-CM

## 2022-04-03 LAB
BASOPHILS # BLD: 0 K/UL (ref 0–0.1)
BASOPHILS NFR BLD: 0 % (ref 0–1)
DIFFERENTIAL METHOD BLD: ABNORMAL
EOSINOPHIL # BLD: 0.1 K/UL (ref 0–0.4)
EOSINOPHIL NFR BLD: 1 % (ref 0–7)
ERYTHROCYTE [DISTWIDTH] IN BLOOD BY AUTOMATED COUNT: 13.7 % (ref 11.5–14.5)
HCT VFR BLD AUTO: 33.4 % (ref 35–47)
HGB BLD-MCNC: 11.2 G/DL (ref 11.5–16)
IMM GRANULOCYTES # BLD AUTO: 0 K/UL (ref 0–0.04)
IMM GRANULOCYTES NFR BLD AUTO: 0 % (ref 0–0.5)
LYMPHOCYTES # BLD: 1.9 K/UL (ref 0.8–3.5)
LYMPHOCYTES NFR BLD: 25 % (ref 12–49)
MCH RBC QN AUTO: 30.9 PG (ref 26–34)
MCHC RBC AUTO-ENTMCNC: 33.5 G/DL (ref 30–36.5)
MCV RBC AUTO: 92.3 FL (ref 80–99)
MONOCYTES # BLD: 0.5 K/UL (ref 0–1)
MONOCYTES NFR BLD: 7 % (ref 5–13)
NEUTS SEG # BLD: 4.9 K/UL (ref 1.8–8)
NEUTS SEG NFR BLD: 67 % (ref 32–75)
NRBC # BLD: 0 K/UL (ref 0–0.01)
NRBC BLD-RTO: 0 PER 100 WBC
PLATELET # BLD AUTO: 272 K/UL (ref 150–400)
PMV BLD AUTO: 9.6 FL (ref 8.9–12.9)
RBC # BLD AUTO: 3.62 M/UL (ref 3.8–5.2)
WBC # BLD AUTO: 7.5 K/UL (ref 3.6–11)

## 2022-04-03 PROCEDURE — 99284 EMERGENCY DEPT VISIT MOD MDM: CPT

## 2022-04-03 PROCEDURE — 76815 OB US LIMITED FETUS(S): CPT

## 2022-04-03 PROCEDURE — 99282 EMERGENCY DEPT VISIT SF MDM: CPT | Performed by: OBSTETRICS & GYNECOLOGY

## 2022-04-03 PROCEDURE — 85025 COMPLETE CBC W/AUTO DIFF WBC: CPT

## 2022-04-03 PROCEDURE — 36415 COLL VENOUS BLD VENIPUNCTURE: CPT
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
## 2022-04-04 ENCOUNTER — TELEPHONE (OUTPATIENT)
Dept: NEUROSURGERY | Age: 32
End: 2022-04-04

## 2022-04-04 ENCOUNTER — NURSE TRIAGE (OUTPATIENT)
Dept: OTHER | Facility: CLINIC | Age: 32
End: 2022-04-04

## 2022-04-04 NOTE — TELEPHONE ENCOUNTER
Received call from Ita at [facility] with Red Flag Complaint. Subjective: Caller states \"Im currently pregnant. I had my cercloge (sutured cervix) done last wednesday. The headache started that same say. progressively getting worse. Called neurologist twice, OB saying i need to call neurology. I have hx intracranial hypertension. Been to ER , don't want to do blood patch without MRI. Left hand numb \"     Current Symptoms: pt just left OB office. Was at [facility] twice last week for the same headache. Pt 14 weeks pregnant. Patient went to ER Friday and yesterday. Pt hx venous stent. Patient reports has headache 8-9 /10 , pounding almost worst headache of life. Pt reports worst headache of life last night. Pt denies chest pains, shortness of breath, weakness or numbness in arm, face, leg  . Pt reports blurry vision peripheral side of both eyes is cloudy ongoing since headache. Pt reports left hand , pinky and ring fingers are numb since yesterday. Pt denies trouble walking or talking. Pt reports /80 today     Onset: 6 days     Associated Symptoms: reduced activity    Pain Severity: headache  8-9/10; pounding ; constant    Temperature: no fever     What has been tried: muscle relaxer, aspirin,     LMP: pt pregnant 14 weeks  Pregnant: Yes    Recommended disposition: Go to ED/UCC Now (Or to Office with PCP Approval)- patient has been to ER multiple times and saw OB today. Care advice provided, patient verbalizes understanding; denies any other questions or concerns; instructed to call back for any new or worsening symptoms. Writer provided warm transfer to gabriele at [facility] for further assessment and resume of care     Attention Provider: Thank you for allowing me to participate in the care of your patient. The patient was connected to triage in response to information provided to the ECC. Please do not respond through this encounter as the response is not directed to a shared pool.       Reason for Disposition   Pregnant   SEVERE headache and 'worst headache' of life     Patient reports worst headache of life last night.     Protocols used: HEADACHE-ADULT-OH, PREGNANCY - HEADACHE-ADULT-OH

## 2022-04-04 NOTE — TELEPHONE ENCOUNTER
Return call from patient regarding a headache for the past 6 days. Patient is 14 weeks pregnant and had Cercloge recently. Headaches started after that. OB/GYN instructed patient to contact Neurology for headaches. Neurology referred patient to ED due to her pregnancy. Message left for patient to return call.

## 2022-04-05 ENCOUNTER — HOSPITAL ENCOUNTER (INPATIENT)
Age: 32
LOS: 3 days | Discharge: HOME | DRG: 566 | End: 2022-04-08
Attending: EMERGENCY MEDICINE | Admitting: INTERNAL MEDICINE
Payer: MEDICAID

## 2022-04-05 ENCOUNTER — APPOINTMENT (OUTPATIENT)
Dept: MRI IMAGING | Age: 32
DRG: 566 | End: 2022-04-05
Attending: EMERGENCY MEDICINE
Payer: MEDICAID

## 2022-04-05 ENCOUNTER — DOCUMENTATION ONLY (OUTPATIENT)
Dept: NEUROSURGERY | Age: 32
End: 2022-04-05

## 2022-04-05 DIAGNOSIS — R51.9 INTRACTABLE EPISODIC HEADACHE, UNSPECIFIED HEADACHE TYPE: Primary | ICD-10-CM

## 2022-04-05 LAB
ALBUMIN SERPL-MCNC: 3.7 G/DL (ref 3.5–5)
ALBUMIN/GLOB SERPL: 1 {RATIO} (ref 1.1–2.2)
ALP SERPL-CCNC: 73 U/L (ref 45–117)
ALT SERPL-CCNC: 17 U/L (ref 12–78)
ANION GAP SERPL CALC-SCNC: 4 MMOL/L (ref 5–15)
APTT PPP: 28.5 SEC (ref 22.1–31)
AST SERPL-CCNC: 11 U/L (ref 15–37)
BASOPHILS # BLD: 0 K/UL (ref 0–0.1)
BASOPHILS NFR BLD: 0 % (ref 0–1)
BILIRUB SERPL-MCNC: 0.3 MG/DL (ref 0.2–1)
BUN SERPL-MCNC: 7 MG/DL (ref 6–20)
BUN/CREAT SERPL: 11 (ref 12–20)
CALCIUM SERPL-MCNC: 9.7 MG/DL (ref 8.5–10.1)
CHLORIDE SERPL-SCNC: 105 MMOL/L (ref 97–108)
CO2 SERPL-SCNC: 25 MMOL/L (ref 21–32)
CREAT SERPL-MCNC: 0.61 MG/DL (ref 0.55–1.02)
DIFFERENTIAL METHOD BLD: ABNORMAL
EOSINOPHIL # BLD: 0.2 K/UL (ref 0–0.4)
EOSINOPHIL NFR BLD: 2 % (ref 0–7)
ERYTHROCYTE [DISTWIDTH] IN BLOOD BY AUTOMATED COUNT: 13.7 % (ref 11.5–14.5)
GLOBULIN SER CALC-MCNC: 3.6 G/DL (ref 2–4)
GLUCOSE SERPL-MCNC: 78 MG/DL (ref 65–100)
HCT VFR BLD AUTO: 33.7 % (ref 35–47)
HGB BLD-MCNC: 11.6 G/DL (ref 11.5–16)
IMM GRANULOCYTES # BLD AUTO: 0 K/UL (ref 0–0.04)
IMM GRANULOCYTES NFR BLD AUTO: 0 % (ref 0–0.5)
INR PPP: 1 (ref 0.9–1.1)
LYMPHOCYTES # BLD: 2.9 K/UL (ref 0.8–3.5)
LYMPHOCYTES NFR BLD: 27 % (ref 12–49)
MCH RBC QN AUTO: 31.8 PG (ref 26–34)
MCHC RBC AUTO-ENTMCNC: 34.4 G/DL (ref 30–36.5)
MCV RBC AUTO: 92.3 FL (ref 80–99)
MONOCYTES # BLD: 0.9 K/UL (ref 0–1)
MONOCYTES NFR BLD: 8 % (ref 5–13)
NEUTS SEG # BLD: 6.9 K/UL (ref 1.8–8)
NEUTS SEG NFR BLD: 63 % (ref 32–75)
NRBC # BLD: 0 K/UL (ref 0–0.01)
NRBC BLD-RTO: 0 PER 100 WBC
PLATELET # BLD AUTO: 292 K/UL (ref 150–400)
PMV BLD AUTO: 9.8 FL (ref 8.9–12.9)
POTASSIUM SERPL-SCNC: 3.5 MMOL/L (ref 3.5–5.1)
PROT SERPL-MCNC: 7.3 G/DL (ref 6.4–8.2)
PROTHROMBIN TIME: 10 SEC (ref 9–11.1)
RBC # BLD AUTO: 3.65 M/UL (ref 3.8–5.2)
SODIUM SERPL-SCNC: 134 MMOL/L (ref 136–145)
THERAPEUTIC RANGE,PTTT: NORMAL SECS (ref 58–77)
WBC # BLD AUTO: 10.9 K/UL (ref 3.6–11)

## 2022-04-05 PROCEDURE — 99285 EMERGENCY DEPT VISIT HI MDM: CPT

## 2022-04-05 PROCEDURE — 96376 TX/PRO/DX INJ SAME DRUG ADON: CPT

## 2022-04-05 PROCEDURE — 85025 COMPLETE CBC W/AUTO DIFF WBC: CPT

## 2022-04-05 PROCEDURE — 65660000000 HC RM CCU STEPDOWN

## 2022-04-05 PROCEDURE — 96374 THER/PROPH/DIAG INJ IV PUSH: CPT

## 2022-04-05 PROCEDURE — 85610 PROTHROMBIN TIME: CPT

## 2022-04-05 PROCEDURE — 80053 COMPREHEN METABOLIC PANEL: CPT

## 2022-04-05 PROCEDURE — 85730 THROMBOPLASTIN TIME PARTIAL: CPT

## 2022-04-05 PROCEDURE — 74011250636 HC RX REV CODE- 250/636: Performed by: EMERGENCY MEDICINE

## 2022-04-05 RX ORDER — LORAZEPAM 2 MG/ML
1 INJECTION INTRAMUSCULAR
Status: DISCONTINUED | OUTPATIENT
Start: 2022-04-05 | End: 2022-04-05

## 2022-04-05 RX ORDER — LORAZEPAM 2 MG/ML
2 INJECTION INTRAMUSCULAR
Status: COMPLETED | OUTPATIENT
Start: 2022-04-05 | End: 2022-04-05

## 2022-04-05 RX ORDER — DIPHENHYDRAMINE HYDROCHLORIDE 50 MG/ML
25 INJECTION, SOLUTION INTRAMUSCULAR; INTRAVENOUS
Status: DISPENSED | OUTPATIENT
Start: 2022-04-05 | End: 2022-04-06

## 2022-04-05 RX ORDER — LORAZEPAM 2 MG/ML
1 INJECTION INTRAMUSCULAR ONCE
Status: COMPLETED | OUTPATIENT
Start: 2022-04-05 | End: 2022-04-05

## 2022-04-05 RX ORDER — METOCLOPRAMIDE HYDROCHLORIDE 5 MG/ML
10 INJECTION INTRAMUSCULAR; INTRAVENOUS
Status: DISPENSED | OUTPATIENT
Start: 2022-04-05 | End: 2022-04-06

## 2022-04-05 RX ADMIN — LORAZEPAM 2 MG: 2 INJECTION INTRAMUSCULAR; INTRAVENOUS at 22:31

## 2022-04-05 RX ADMIN — LORAZEPAM 1 MG: 2 INJECTION INTRAMUSCULAR; INTRAVENOUS at 21:49

## 2022-04-05 NOTE — PROGRESS NOTES
I received a message that the patient called complaining of a severe headache since her cervical cerclage on 3/30/2022. I called the patient today. She stated that she had been doing fine since her venous sinus stenting by Dr. Kurt Watson and that her symptoms had resolved. Subsequently, she became pregnant. She has been maintained on aspirin 81 mg daily. Due to cervical incompetence, she had a cervical cerclage done on 3/30/2022. For the procedure, she had spinal epidural anesthesia. After the procedure, she had severe low back pain and headaches. She complains of headaches every day since the procedure. Her headaches are 8-9 out of 10 in severity, \"pounding\" in character, and located in the frontal region extending across her forehead \"from temple to temple. \"  The headaches are worse when she is upright. Patient had been seen in the emergency department by anesthesiology and was told that she needed head imaging before an epidural blood patch could be performed. However, she was discharged without imaging or blood patch and advised to follow-up with her neurologist due to concern for possible relation of her headaches to [address] or stent malfunction. Given the temporal relation of onset of her headaches immediately after her spinal procedure--and given the character of her headaches being positional in nature--the headaches most likely represent a post dural puncture headache. The likelihood of ICH or stent malfunction would be very low. An MRI and MRV of the brain without contrast can be performed for further evaluation. Imaging evaluation of the venous sinus stent will be suboptimal without contrast, however there is no other option given that gadolinium is contraindicated in pregnancy and there are relative contraindications to CT imaging and iodine-based contrast.  The MRI and MRV will be able to rule out ICH and hopefully also stent malfunction.     I advised the patient to come to the emergency department at [facility].  I called Dr. Jami Hickman to fill him in on the above history and let him know that this patient will be on her way soon. I also advised that an MRI and MRV of the brain without contrast be performed. Patient will need sedation (that is safe in pregnancy) due to her claustrophobia and anxiety. Anesthesiology should be consulted for an epidural blood patch. Patient and Dr. Freida Urbano expressed understanding and are in agreement with this plan.

## 2022-04-05 NOTE — ED TRIAGE NOTES
Pt reports HA x7 days post epidural for a cerclage. Pt was sent by Dr. Julieta Zuniga to have an MRI and then a blood patch placed. Pt is currently 4 months pregnant. [id number]. Pt has hx intercranial hypertension with stent placement in December.
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
## 2022-04-06 ENCOUNTER — ANESTHESIA EVENT (OUTPATIENT)
Dept: MRI IMAGING | Age: 32
DRG: 566 | End: 2022-04-06
Payer: MEDICAID

## 2022-04-06 ENCOUNTER — ANESTHESIA (OUTPATIENT)
Dept: MRI IMAGING | Age: 32
DRG: 566 | End: 2022-04-06
Payer: MEDICAID

## 2022-04-06 ENCOUNTER — APPOINTMENT (OUTPATIENT)
Dept: MRI IMAGING | Age: 32
DRG: 566 | End: 2022-04-06
Attending: INTERNAL MEDICINE
Payer: MEDICAID

## 2022-04-06 LAB
CRP SERPL-MCNC: 1.2 MG/DL (ref 0–0.6)
TSH SERPL DL<=0.05 MIU/L-ACNC: 3.61 UIU/ML (ref 0.36–3.74)

## 2022-04-06 PROCEDURE — 74011000250 HC RX REV CODE- 250: Performed by: NURSE ANESTHETIST, CERTIFIED REGISTERED

## 2022-04-06 PROCEDURE — 86140 C-REACTIVE PROTEIN: CPT

## 2022-04-06 PROCEDURE — 84443 ASSAY THYROID STIM HORMONE: CPT

## 2022-04-06 PROCEDURE — 36415 COLL VENOUS BLD VENIPUNCTURE: CPT

## 2022-04-06 PROCEDURE — 70544 MR ANGIOGRAPHY HEAD W/O DYE: CPT

## 2022-04-06 PROCEDURE — 74011000250 HC RX REV CODE- 250: Performed by: INTERNAL MEDICINE

## 2022-04-06 PROCEDURE — 74011250636 HC RX REV CODE- 250/636: Performed by: INTERNAL MEDICINE

## 2022-04-06 PROCEDURE — 65660000000 HC RM CCU STEPDOWN

## 2022-04-06 PROCEDURE — 74011000258 HC RX REV CODE- 258: Performed by: NURSE ANESTHETIST, CERTIFIED REGISTERED

## 2022-04-06 PROCEDURE — 76210000006 HC OR PH I REC 0.5 TO 1 HR

## 2022-04-06 PROCEDURE — 74011250636 HC RX REV CODE- 250/636: Performed by: NURSE ANESTHETIST, CERTIFIED REGISTERED

## 2022-04-06 PROCEDURE — 70551 MRI BRAIN STEM W/O DYE: CPT

## 2022-04-06 PROCEDURE — 76060000033 HC ANESTHESIA 1 TO 1.5 HR

## 2022-04-06 RX ORDER — ONDANSETRON 2 MG/ML
4 INJECTION INTRAMUSCULAR; INTRAVENOUS AS NEEDED
Status: DISCONTINUED | OUTPATIENT
Start: 2022-04-06 | End: 2022-04-06 | Stop reason: HOSPADM

## 2022-04-06 RX ORDER — FENTANYL CITRATE 50 UG/ML
25 INJECTION, SOLUTION INTRAMUSCULAR; INTRAVENOUS
Status: DISCONTINUED | OUTPATIENT
Start: 2022-04-06 | End: 2022-04-06 | Stop reason: HOSPADM

## 2022-04-06 RX ORDER — SODIUM CHLORIDE, SODIUM LACTATE, POTASSIUM CHLORIDE, CALCIUM CHLORIDE 600; 310; 30; 20 MG/100ML; MG/100ML; MG/100ML; MG/100ML
75 INJECTION, SOLUTION INTRAVENOUS CONTINUOUS
Status: DISCONTINUED | OUTPATIENT
Start: 2022-04-06 | End: 2022-04-08 | Stop reason: HOSPADM

## 2022-04-06 RX ORDER — DEXMEDETOMIDINE HYDROCHLORIDE 100 UG/ML
INJECTION, SOLUTION INTRAVENOUS AS NEEDED
Status: DISCONTINUED | OUTPATIENT
Start: 2022-04-06 | End: 2022-04-06 | Stop reason: HOSPADM

## 2022-04-06 RX ORDER — SODIUM CHLORIDE 0.9 % (FLUSH) 0.9 %
5-40 SYRINGE (ML) INJECTION AS NEEDED
Status: DISCONTINUED | OUTPATIENT
Start: 2022-04-06 | End: 2022-04-08 | Stop reason: HOSPADM

## 2022-04-06 RX ORDER — PROPOFOL 10 MG/ML
INJECTION, EMULSION INTRAVENOUS AS NEEDED
Status: DISCONTINUED | OUTPATIENT
Start: 2022-04-06 | End: 2022-04-06 | Stop reason: HOSPADM

## 2022-04-06 RX ORDER — SODIUM CHLORIDE 9 MG/ML
125 INJECTION, SOLUTION INTRAVENOUS CONTINUOUS
Status: CANCELLED | OUTPATIENT
Start: 2022-04-06 | End: 2022-04-07

## 2022-04-06 RX ORDER — DIPHENHYDRAMINE HYDROCHLORIDE 50 MG/ML
12.5 INJECTION, SOLUTION INTRAMUSCULAR; INTRAVENOUS AS NEEDED
Status: DISCONTINUED | OUTPATIENT
Start: 2022-04-06 | End: 2022-04-06 | Stop reason: HOSPADM

## 2022-04-06 RX ORDER — GUAIFENESIN 100 MG/5ML
81 LIQUID (ML) ORAL DAILY
Status: DISCONTINUED | OUTPATIENT
Start: 2022-04-06 | End: 2022-04-06

## 2022-04-06 RX ORDER — SODIUM CHLORIDE 9 MG/ML
1000 INJECTION, SOLUTION INTRAVENOUS CONTINUOUS
Status: DISCONTINUED | OUTPATIENT
Start: 2022-04-06 | End: 2022-04-06 | Stop reason: HOSPADM

## 2022-04-06 RX ORDER — SODIUM CHLORIDE 0.9 % (FLUSH) 0.9 %
5-40 SYRINGE (ML) INJECTION EVERY 8 HOURS
Status: DISCONTINUED | OUTPATIENT
Start: 2022-04-06 | End: 2022-04-06 | Stop reason: HOSPADM

## 2022-04-06 RX ORDER — SODIUM CHLORIDE, SODIUM LACTATE, POTASSIUM CHLORIDE, CALCIUM CHLORIDE 600; 310; 30; 20 MG/100ML; MG/100ML; MG/100ML; MG/100ML
1000 INJECTION, SOLUTION INTRAVENOUS CONTINUOUS
Status: DISCONTINUED | OUTPATIENT
Start: 2022-04-06 | End: 2022-04-06 | Stop reason: HOSPADM

## 2022-04-06 RX ORDER — SODIUM CHLORIDE 0.9 % (FLUSH) 0.9 %
5-40 SYRINGE (ML) INJECTION AS NEEDED
Status: DISCONTINUED | OUTPATIENT
Start: 2022-04-06 | End: 2022-04-06 | Stop reason: HOSPADM

## 2022-04-06 RX ORDER — SODIUM CHLORIDE 0.9 % (FLUSH) 0.9 %
5-40 SYRINGE (ML) INJECTION EVERY 8 HOURS
Status: CANCELLED | OUTPATIENT
Start: 2022-04-06

## 2022-04-06 RX ORDER — SODIUM CHLORIDE, SODIUM LACTATE, POTASSIUM CHLORIDE, CALCIUM CHLORIDE 600; 310; 30; 20 MG/100ML; MG/100ML; MG/100ML; MG/100ML
125 INJECTION, SOLUTION INTRAVENOUS CONTINUOUS
Status: CANCELLED | OUTPATIENT
Start: 2022-04-06 | End: 2022-04-07

## 2022-04-06 RX ORDER — FENTANYL CITRATE 50 UG/ML
50 INJECTION, SOLUTION INTRAMUSCULAR; INTRAVENOUS AS NEEDED
Status: CANCELLED | OUTPATIENT
Start: 2022-04-06

## 2022-04-06 RX ORDER — EPHEDRINE SULFATE/0.9% NACL/PF 50 MG/5 ML
5 SYRINGE (ML) INTRAVENOUS AS NEEDED
Status: DISCONTINUED | OUTPATIENT
Start: 2022-04-06 | End: 2022-04-06 | Stop reason: HOSPADM

## 2022-04-06 RX ORDER — SODIUM CHLORIDE 0.9 % (FLUSH) 0.9 %
5-40 SYRINGE (ML) INJECTION AS NEEDED
Status: CANCELLED | OUTPATIENT
Start: 2022-04-06

## 2022-04-06 RX ORDER — SODIUM CHLORIDE 0.9 % (FLUSH) 0.9 %
5-40 SYRINGE (ML) INJECTION EVERY 8 HOURS
Status: DISCONTINUED | OUTPATIENT
Start: 2022-04-06 | End: 2022-04-08 | Stop reason: HOSPADM

## 2022-04-06 RX ORDER — SODIUM CHLORIDE, SODIUM LACTATE, POTASSIUM CHLORIDE, CALCIUM CHLORIDE 600; 310; 30; 20 MG/100ML; MG/100ML; MG/100ML; MG/100ML
INJECTION, SOLUTION INTRAVENOUS
Status: DISCONTINUED | OUTPATIENT
Start: 2022-04-06 | End: 2022-04-06 | Stop reason: HOSPADM

## 2022-04-06 RX ORDER — MORPHINE SULFATE 2 MG/ML
2 INJECTION, SOLUTION INTRAMUSCULAR; INTRAVENOUS
Status: DISCONTINUED | OUTPATIENT
Start: 2022-04-06 | End: 2022-04-08 | Stop reason: HOSPADM

## 2022-04-06 RX ORDER — LIDOCAINE HYDROCHLORIDE 10 MG/ML
0.1 INJECTION, SOLUTION EPIDURAL; INFILTRATION; INTRACAUDAL; PERINEURAL AS NEEDED
Status: CANCELLED | OUTPATIENT
Start: 2022-04-06

## 2022-04-06 RX ORDER — MORPHINE SULFATE 2 MG/ML
2 INJECTION, SOLUTION INTRAMUSCULAR; INTRAVENOUS
Status: DISCONTINUED | OUTPATIENT
Start: 2022-04-06 | End: 2022-04-06 | Stop reason: HOSPADM

## 2022-04-06 RX ADMIN — DEXMEDETOMIDINE HYDROCHLORIDE 1 MCG/KG/HR: 100 INJECTION, SOLUTION, CONCENTRATE INTRAVENOUS at 18:50

## 2022-04-06 RX ADMIN — PROPOFOL 25 MG: 10 INJECTION, EMULSION INTRAVENOUS at 18:48

## 2022-04-06 RX ADMIN — MORPHINE SULFATE 2 MG: 2 INJECTION, SOLUTION INTRAMUSCULAR; INTRAVENOUS at 08:35

## 2022-04-06 RX ADMIN — Medication 5 ML: at 22:00

## 2022-04-06 RX ADMIN — MORPHINE SULFATE 2 MG: 2 INJECTION, SOLUTION INTRAMUSCULAR; INTRAVENOUS at 21:31

## 2022-04-06 RX ADMIN — MORPHINE SULFATE 2 MG: 2 INJECTION, SOLUTION INTRAMUSCULAR; INTRAVENOUS at 16:09

## 2022-04-06 RX ADMIN — SODIUM CHLORIDE, POTASSIUM CHLORIDE, SODIUM LACTATE AND CALCIUM CHLORIDE 75 ML/HR: 600; 310; 30; 20 INJECTION, SOLUTION INTRAVENOUS at 20:50

## 2022-04-06 RX ADMIN — MORPHINE SULFATE 2 MG: 2 INJECTION, SOLUTION INTRAMUSCULAR; INTRAVENOUS at 11:39

## 2022-04-06 RX ADMIN — MORPHINE SULFATE 2 MG: 2 INJECTION, SOLUTION INTRAMUSCULAR; INTRAVENOUS at 02:06

## 2022-04-06 RX ADMIN — PROPOFOL 25 MG: 10 INJECTION, EMULSION INTRAVENOUS at 18:50

## 2022-04-06 RX ADMIN — MORPHINE SULFATE 2 MG: 2 INJECTION, SOLUTION INTRAMUSCULAR; INTRAVENOUS at 05:23

## 2022-04-06 RX ADMIN — SODIUM CHLORIDE, POTASSIUM CHLORIDE, SODIUM LACTATE AND CALCIUM CHLORIDE 75 ML/HR: 600; 310; 30; 20 INJECTION, SOLUTION INTRAVENOUS at 07:21

## 2022-04-06 RX ADMIN — SODIUM CHLORIDE, SODIUM LACTATE, POTASSIUM CHLORIDE, AND CALCIUM CHLORIDE: 600; 310; 30; 20 INJECTION, SOLUTION INTRAVENOUS at 18:48

## 2022-04-06 RX ADMIN — PROPOFOL 25 MG: 10 INJECTION, EMULSION INTRAVENOUS at 18:52

## 2022-04-06 RX ADMIN — DEXMEDETOMIDINE HYDROCHLORIDE 8 MCG: 100 INJECTION, SOLUTION, CONCENTRATE INTRAVENOUS at 18:48

## 2022-04-06 RX ADMIN — PROPOFOL 40 MG: 10 INJECTION, EMULSION INTRAVENOUS at 19:08

## 2022-04-06 RX ADMIN — PROPOFOL 50 MCG/KG/MIN: 10 INJECTION, EMULSION INTRAVENOUS at 18:51

## 2022-04-06 NOTE — H&P
[address]  HISTORY AND PHYSICAL    Name:  Yeimy Bullock  MR#:  [id number]  :  1990  ACCOUNT #:  [de-identified]  ADMIT DATE:  2022      The patient was seen, evaluated, and admitted by me on 2022. PRIMARY CARE PHYSICIAN:  Sarah Hough MD    SOURCE OF INFORMATION:  The patient and review of ED and old electronic medical records. CHIEF COMPLAINT:  Headache. HISTORY OF PRESENT ILLNESS:  This 75-year-old woman with past medical history significant for idiopathic intracranial hypertension and systemic lupus erythematosus was in her usual state of health until about a week ago when the patient developed headache. The headache is constant, throbbing, located on the left side of the head, and 8/10 in severity with no known aggravating or relieving factors. The patient is also 4-month pregnant. She stated that she had epidural done a week ago and since then she has been having the headache. The patient was sent to the emergency room to have MRI and blood patch done. When the patient arrived at the emergency room, the patient was unable to tolerate the MRI and anesthesia was not available to provide mild sedation so that the patient can undergo MRI and the blood patch. The patient was subsequently referred to the hospitalist service for evaluation for admission. There was no history of fever, rigors, or chills. The patient stated that she has been having routine followup with OB/GYN in terms of her pregnancy. She stopped taking all her medication for lupus because of the pregnancy. She is only taking prenatal vitamins at present. The headache is associated with blurring of vision. PAST MEDICAL HISTORY:  Idiopathic intracranial hypertension and systemic lupus erythematosus. ALLERGIES:  THE PATIENT IS ALLERGIC TO LATEX, ACETAMINOPHEN, BENADRYL, PLAVIX, AND NONSTEROIDAL ANTI-INFLAMMATORY DRUGS. MEDICATIONS:  1. Aspirin 81 mg daily.   2.  Flexeril 10 mg three times daily as needed for muscle spasm. 3.  Plaquenil 200 mg twice daily. FAMILY HISTORY:  This was reviewed. Her mother has hypertension. PAST SURGICAL HISTORY:  This is significant for cholecystectomy, Nissen fundoplication, and right rotator cuff repair. SOCIAL HISTORY:  No history of alcohol or tobacco abuse. REVIEW OF SYSTEMS:  HEAD, EYES, EARS, NOSE AND THROAT:  This is positive for headache. No dizziness, no blurring of vision, and no photophobia. RESPIRATORY SYSTEM:  No cough, no shortness of breath, and no hemoptysis. CARDIOVASCULAR SYSTEM:  No chest pain, no orthopnea, and no palpitation. GASTROINTESTINAL SYSTEM:  No nausea or vomiting, no diarrhea, and no constipation. GENITOURINARY SYSTEM:  No dysuria, no urgency, and no frequency. All other systems are reviewed and they are negative. PHYSICAL EXAMINATION:  GENERAL APPEARANCE:  The patient appeared ill and in moderate distress. VITAL SIGNS:  On arrival at the emergency room; temperature 98.5, pulse 80, respiratory rate 18, blood pressure 148/90, and oxygen saturation 100%. HEAD:  Normocephalic, atraumatic. EYES:  Normal eye movement. No redness, no drainage, and no discharge. EARS:  Normal external ears with no obvious drainage. NOSE:  No deformity and no drainage. MOUTH AND THROAT:  No visible oral lesion. Dry oral mucosa. NECK:  Neck is supple. No JVD and no thyromegaly. CHEST:  Clear breath sounds. No wheezing and no crackles. HEART:  Normal S1 and S2, regular. No clinically appreciable murmur. ABDOMEN:  Gravid uterus noted. Normal bowel sounds. CNS:  Alert and oriented x3. No gross focal neurological deficit. EXTREMITIES:  No edema. Pulses 2+ bilaterally. MUSCULOSKELETAL SYSTEM:  No obvious joint deformity and swelling. SKIN:  No active skin lesions seen in the exposed part of the body. PSYCHIATRY:  Normal mood and affect. LYMPHATIC SYSTEM:  No cervical lymphadenopathy.     DIAGNOSTIC DATA: None.    LABORATORY DATA:  Hematology; WBC 10.9, hemoglobin 11.6, hematocrit 33.7, and platelets 542. Coagulation profile; INR 1.0, PT 10.0, and PTT 28.5. Chemistry; sodium 134, potassium 3.5, chloride 105, CO2 is 25, glucose 78, BUN 7, creatinine 0.61, calcium 9.4, total bilirubin 0.3, ALT 17, AST 11, alkaline phosphatase 73, total protein 7.3, albumin level 3.7, and globulin 3.6. ASSESSMENT:  1. Intractable headache. 2.  Systemic lupus erythematosus. 3.  Idiopathic intracranial hypertension. 4.  Pregnancy. 5.  Hyponatremia. PLAN:  1. Intractable headache. We will admit the patient for further evaluation and treatment. This is most likely as a result of the patient's idiopathic intracranial hypertension. We will carry out headache control. Neurology consult will be requested to assist in further evaluation and treatment. The patient is scheduled to have a MRI and blood patch done once anesthesia is available to take part in the procedure. We will check C-reactive protein level. We will also check urine drug screen. 2.  Systemic lupus erythematosus. The patient is presently not on any medication because of the pregnancy. We will continue to monitor. We will check C-reactive protein level. 3.  Idiopathic intracranial hypertension. This is the most likely cause of the patient's intractable headache. The patient is awaiting MRI and blood patch. We will await further recommendation from the neurologist.  4.  Pregnancy. The patient is 4-month pregnant. We will continue with prenatal vitamins. OB/GYN consult will be requested for continuation of care of pregnancy during hospitalization. 5.  Hyponatremia. This is mild. May be due to volume depletion. We will carry out fluid therapy and monitor the patient's sodium level. 6.  Other issues. Code status, the patient is a full code. We will request SCD for DVT prophylaxis.     FUNCTIONAL STATUS PRIOR TO ADMISSION:  The patient came from home.  The patient is ambulatory with no assistive device. COVID PRECAUTION:  The patient was wearing a face mask. I was wearing a face mask and gloves for this patient's encounter.       Agustin Foster MD      [id number]  D:  04/06/2022 6:13  T:  04/06/2022 7:12  JOB #:  [id number]  CC:  Jiason Eden MD

## 2022-04-06 NOTE — CONSULTS
Pt not seen but chart reviewed. All provider notes read. Reason for consult stated: \"continuation of care of pregnancy during hospitalization\". There is no specific OB issue to address at this time. Patient is Dr. Levy Head pt @ 14 4/7 wks with recorded [address] of 10/1/2022 who had a cerclage placed under (?)epidural analgesia last week. Since that time she has had an intractable, throbbing HA. She has a h/o pseudotumor cerebri with a shunt but was unable to get imaging done last night in the ER due to pt intolerance. She also has a recorded h/o SLE on plaquenil. At this point, there is no specific pregnancy management that is required at this EGA.       Recommendations:  --re-consult anesthesia to evaluate for consideration of blood patch given timing of onset of HA in relation to epidural/spinal  --consider anxiolytic (ativan is ok) to assist with completion of head MRI to evaluate status of shunt, particularly if anesthesia does not feel this presentation is consistent with a spinal HA  --may consider caffeine to manage spinal HA symptoms  --any category B or C medication would be appropriate for pain management  --no indication for fetal surveillance/monitoring @ this EGA  --continue PNV as tolerated by pt    Please call the [facility] hospitalist directly for any specific questions/concerns @ [phone]    Thank you

## 2022-04-06 NOTE — PROGRESS NOTES
[facility]  Hospitalist Group                                                                                          Hospitalist Progress Note  Candy Miller MD  Answering service: [phone] from in house phone        Date of Service:  2022  NAME:  Johan Galloway  :  1990  MRN:  [id number]      Admission Summary:   Patient is a 76-year-old well-known female with a history of pseudomotor cerebri with stent placement,  currently at 4 months gestation who presents to hospital upon recommendation of outpatient intervention neurology for brain MRI and blood patch. Patient had a cerclage procedure done 1 week ago for which she was given epidural anesthesia. She has had intractable headache since then. She describes 10/10 intermittent left-sided throbbing headache. She was evaluated by neurology who recommended she present to hospital for MRI and blood patch. Per chart review, MRI was attempted overnight and patient was unable to tolerate this. Interval history / Subjective:   Patient admitted overnight. MRI attempted and patient unable to tolerate MRI. Assessment & Plan:       Pseudotumor cerebri   Status migrainosus  -Admitted with plans for mri and blood patch under sedation  -Tylenal, prn morphine, benadryl  -MIVF  -Discussed with anesthesia: plan for blood patch this afternoon  -Will d/c after blood patch if symptoms improving    Intrauterine pregnancy at 16 weeks gestation  -Appreciate ob consult  -No need for fetal monitoring at this time  -Continue home prenatals    SLE  -Therapy held due to IUP    Hyponatremia  -MIld.  resolved      Code status: full code  DVT prophylaxis: scds    Care Plan discussed with: Patient/Family  Anticipated Disposition: Home w/Family  Anticipated Discharge: 24 hours to 48 hours     Hospital Problems  Date Reviewed: 2022          Codes Class Noted POA    * (Principal) Intractable headache ICD-10-CM: R51.9  ICD-9-CM: 784.0  9/26/2021 Yes                Review of Systems:   A comprehensive review of systems was negative except for that written in the HPI. Vital Signs:    Last 24hrs VS reviewed since prior progress note. Most recent are:  Visit Vitals  /63 (BP 1 Location: Right arm, BP Patient Position: At rest)   Pulse 69   Temp 98.5 °F (36.9 °C)   Resp 16   Ht 5' 2\" (1.575 m)   Wt 91.6 kg (202 lb)   SpO2 99%   BMI 36.95 kg/m²       No intake or output data in the 24 hours ending 04/06/22 0810     Physical Examination:     I had a face to face encounter with this patient and independently examined them on 4/6/2022 as outlined below:          Constitutional:  No acute distress, cooperative, pleasant    ENT:  Oral mucosa moist, oropharynx benign. Resp:  CTA bilaterally. No wheezing/rhonchi/rales. No accessory muscle use   CV:  Regular rhythm, normal rate, no murmurs, gallops, rubs    GI:  Soft, non distended, non tender. normoactive bowel sounds, no hepatosplenomegaly     Musculoskeletal:  No edema, warm, 2+ pulses throughout    Neurologic:  Moves all extremities. AAOx3, CN II-XII reviewed     Psych:  Good insight, Not anxious nor agitated. Data Review:    Review and/or order of clinical lab test  Review and/or order of tests in the radiology section of CPT  Review and/or order of tests in the medicine section of CPT         Labs:     Recent Labs     04/05/22 1926 04/03/22  1059   WBC 10.9 7.5   HGB 11.6 11.2*   HCT 33.7* 33.4*    272     Recent Labs     04/05/22 1926   *   K 3.5      CO2 25   BUN 7   CREA 0.61   GLU 78   CA 9.7     Recent Labs     04/05/22 1926   ALT 17   AP 73   TBILI 0.3   TP 7.3   ALB 3.7   GLOB 3.6     Recent Labs     04/05/22 1926   INR 1.0   PTP 10.0   APTT 28.5      No results for input(s): FE, TIBC, PSAT, FERR in the last 72 hours. No results found for: FOL, RBCF   No results for input(s): PH, PCO2, PO2 in the last 72 hours.   No results for input(s): CPK, CKNDX, TROIQ in the last 72 hours. No lab exists for component: CPKMB  Lab Results   Component Value Date/Time    Cholesterol, total 177 04/11/2019 11:35 AM    HDL Cholesterol 48 04/11/2019 11:35 AM    LDL, calculated 107 (H) 04/11/2019 11:35 AM    Triglyceride 112 04/11/2019 11:35 AM     Lab Results   Component Value Date/Time    Glucose (POC) 136 (H) 12/03/2021 06:41 PM    Glucose (POC) 86 09/13/2020 11:03 PM    Glucose POC 82 11/07/2019 02:00 PM     Lab Results   Component Value Date/Time    Color YELLOW/STRAW 04/01/2022 08:59 PM    Appearance CLEAR 04/01/2022 08:59 PM    Specific gravity 1.025 04/01/2022 08:59 PM    Specific gravity 1.018 03/31/2022 01:02 AM    pH (UA) 6.5 04/01/2022 08:59 PM    Protein Negative 04/01/2022 08:59 PM    Glucose Negative 04/01/2022 08:59 PM    Ketone Negative 04/01/2022 08:59 PM    Bilirubin Negative 04/01/2022 08:59 PM    Urobilinogen 1.0 04/01/2022 08:59 PM    Nitrites Negative 04/01/2022 08:59 PM    Leukocyte Esterase Negative 04/01/2022 08:59 PM    Epithelial cells FEW 04/01/2022 08:59 PM    Bacteria Negative 04/01/2022 08:59 PM    WBC 0-4 04/01/2022 08:59 PM    RBC 0-5 04/01/2022 08:59 PM         Medications Reviewed:     Current Facility-Administered Medications   Medication Dose Route Frequency    morphine injection 2 mg  2 mg IntraVENous Q3H PRN    aspirin chewable tablet 81 mg  81 mg Oral DAILY    sodium chloride (NS) flush 5-40 mL  5-40 mL IntraVENous Q8H    sodium chloride (NS) flush 5-40 mL  5-40 mL IntraVENous PRN    lactated Ringers infusion  75 mL/hr IntraVENous CONTINUOUS    metoclopramide HCl (REGLAN) injection 10 mg  10 mg IntraVENous NOW    diphenhydrAMINE (BENADRYL) injection 25 mg  25 mg IntraVENous NOW     Current Outpatient Medications   Medication Sig    cyclobenzaprine (FLEXERIL) 10 mg tablet Take 1 Tablet by mouth three (3) times daily as needed for Muscle Spasm(s).  As needed    PNV/iron/omega3/folic ac/f.a.1 (PRE-AMMY MULTIVITAMINS/MINERALS PO) Take 1 Tablet by mouth daily.  aspirin 81 mg chewable tablet Take 1 Tablet by mouth daily.  biotin 10 mg tab Take 10,000 mcg by mouth daily.  psyllium seed, with sugar, (FIBER PO) Take 1 Tablet by mouth daily.  (Patient not taking: Reported on 3/15/2022)    hydrOXYchloroQUINE (PLAQUENIL) 200 mg tablet TAKE 1 TABLET BY MOUTH TWICE A DAY PA REQUIRED FOR QTY   16 SYDNEE MERA (Patient not taking: Reported on 3/15/2022)     ______________________________________________________________________  EXPECTED LENGTH OF STAY: - - -  ACTUAL LENGTH OF STAY:          Eder Fish MD

## 2022-04-06 NOTE — ED NOTES
Transport at beside to take patient to MRI. Pt premedicated for anxiety/panic attacks with previous MRI. Pt states \"that medication you gave me, did nothing for me\". Pt reports taking 1.5 mg of Ativan at home. Pt states \"I dont want to get over there and have a panic attack. I can feel myself building up\". Eli Ni MD notified. Verbal orders received.

## 2022-04-06 NOTE — ROUTINE PROCESS
TRANSFER - OUT REPORT:    Verbal report given to Simona Galloway (name) on [facility]  being transferred to [facility] [phone] (unit) for routine progression of care       Report consisted of patients Situation, Background, Assessment and   Recommendations(SBAR). Information from the following report(s) SBAR, ED Summary, Intake/Output, MAR, Recent Results and Med Rec Status was reviewed with the receiving nurse. Lines:   Peripheral IV 04/05/22 Right Arm (Active)   Site Assessment Clean, dry, & intact 04/05/22 1926   Phlebitis Assessment 0 04/05/22 1926   Infiltration Assessment 0 04/05/22 1926   Dressing Status Clean, dry, & intact 04/05/22 1926   Dressing Type Transparent 04/05/22 1926   Hub Color/Line Status Pink 04/05/22 1926   Action Taken Blood drawn 04/05/22 1926   Alcohol Cap Used No 04/05/22 1926        Opportunity for questions and clarification was provided.       Patient transported with:   [employer]

## 2022-04-06 NOTE — ANESTHESIA PREPROCEDURE EVALUATION
Relevant Problems   NEUROLOGY   (+) CVA (cerebral vascular accident) ([address]   (+) Chronic migraine without aura without status migrainosus, not intractable   (+) Headache   (+) Intractable headache   (+) Intractable migraine      GASTROINTESTINAL   (+) GERD (gastroesophageal reflux disease)   (+) Paraesophageal hernia      ENDOCRINE   (+) Class 3 severe obesity in adult [facility])       Anesthetic History   No history of anesthetic complications            Review of Systems / Medical History  Patient summary reviewed, nursing notes reviewed and pertinent labs reviewed    Pulmonary  Within defined limits                 Neuro/Psych       CVA  Headaches (migraines) and psychiatric history ( anxiety)    Comments: Pseudotumor cerebri/ elevated ICP  Bilateral transverse cerebral sinus stenosis  S/p stenting of cerebral vein 12/21 Cardiovascular  Within defined limits                Exercise tolerance: >4 METS     GI/Hepatic/Renal     GERD: well controlled      PUD    Comments: Had Nissen Fundoplication x2 Endo/Other        Obesity and arthritis     Other Findings   Comments: 14 weeks gestational age    SLE           Physical Exam    Airway  Mallampati: III  TM Distance: 4 - 6 cm  Neck ROM: normal range of motion   Mouth opening: Normal     Cardiovascular    Rhythm: regular  Rate: normal         Dental  No notable dental hx       Pulmonary  Breath sounds clear to auscultation               Abdominal  GI exam deferred       Other Findings            Anesthetic Plan    ASA: 3  Anesthesia type: MAC          Induction: Intravenous  Anesthetic plan and risks discussed with: Patient

## 2022-04-06 NOTE — PROGRESS NOTES
Consent obtained and signed by pt and Rn for MRI under anesthesia. Pt stated she was not explained the blood patch procedure so was not comfortable signing that consent.

## 2022-04-06 NOTE — ED NOTES
Pt stated she wants something for headache relief.  Hospitalist paged at this time for orders as reflected in the [facility]

## 2022-04-06 NOTE — PROGRESS NOTES
Transition of Care Plan: likely home with spouse     RUR: 17%     PCP F/U: Dr. Symone Cabrales    Disposition: likely home with spouse     Transportation: family    Main Contact: Spouse: Karlos Kothari [phone]: Met with patient at the bedside. Introduced self and role of CM. A&O x 4. States she lives with . Due to receive home health soon for an RN to come give progesterone injections and to monitor fetal heart rate. States home health has not started yet and it was set up by her OBGYN. Plan is to return home. Spouse will transport home when medically ready to be discharged. Alf Sosa RN, CRM    Residency: private, one story residence with one step-no issues   Lives With: spouse  Prior functioning: independent    Prior DME used: none  Rehab history: States history of outpatient therapy. Unable to recall facility  Pharmacy: [facility] [place]    Reason for Admission:   Headache                   RUR Score:  17%                PCP: First and Last name:   Kwesi Lang MD     Name of Practice:    Are you a current patient: Yes/No: yes   Approximate date of last visit: last month   Can you participate in a virtual visit if needed:     Do you (patient/family) have any concerns for transition/discharge? No concerns at this time                Plan for utilizing home health: not indicated at this time      Current Advanced Directive/Advance Care Plan:  Full Code    Healthcare Decision Maker:   Click here to complete [address] including selection of the Healthcare Decision Maker Relationship (ie \"Primary\")            Primary Decision MakerVirl Arnulfo - Spouse - [phone]    Transition of Care Plan:  Likely home with spouse         Care Management Interventions  PCP Verified by CM: Yes (Dr. Symone Cabrales)  Mode of Transport at Discharge:  Other (see comment) (family)  Support Systems: Spouse/Significant Other  Confirm Follow Up Transport: Family  Discharge Location  Patient Expects to be Discharged to[de-identified] Home

## 2022-04-06 NOTE — CONSULTS
Called to evaluate patient for headache. I saw the patient on 4/622 at 12:45 PM. She is currently 15 weeks gestational age with a cerclage placed under spinal anesthesia on 3/30/22. She also has a history of pseudotumor cerebri dx in  2019 and has had a stent placed that improved her symptoms. Since the cerclage, she has a a frontal headache that worsens when she sits up and improves when she lays down. She says the pain has progressed daily. She notes vision changes, almost like tunnel vision, no acoustic changes. She notes her current symptoms are very similar to when her pseudotumor cerebri presented. Her back appeared fine, no erythema or ecchymosis noted, and no tenderness to palpation of the back. Assessment and plan  #headache  The timing and postural headache is suspicious for post-dural puncture headache (PDPH), however it should improve, especially a week from the puncture. The symptoms are suspicious for recurrence of her pseudotumor cerebri, so I would recommend she get her MRI first to assess the primary cause of her headache and whether it is related to her pseudotumor cerebri.     Janet Jacinto DO  Anesthesiology

## 2022-04-06 NOTE — ED NOTES
Care assumed from Dr. Mike Harden. 78-year-old female presents with recent lumbar puncture. Referred to [facility] for MRI and blood patch. Patient just attempted to get an MRI but was unable to tolerate it. Dr. Jagdish Robles, neuro interventional who knows about her and has already discussed her case with anesthesia who agrees to see her in the a.m. for blood patch. She was unable to tolerate the MRI this evening. Will need anesthesias help for sedation for MRI. Perfect Serve Consult for Admission  11:00 PM    ED Room Number: R32/R32  Patient Name and age:  Pollo Monahan 33 y.o.  female  Working Diagnosis:   1. Intractable episodic headache, unspecified headache type        COVID-19 Suspicion:  no  Sepsis present:  no  Reassessment needed: no  Code Status:  Full Code  Readmission: no  Isolation Requirements:  no  Recommended Level of Care:  med/surg  Department:[facility] Adult ED - 21   Other:  78-year-old female presents with recent lumbar puncture. Referred to [facility] for MRI and blood patch. Patient just attempted to get an MRI but was unable to tolerate it. Dr. Jagdish Robles, neuro interventional who knows about her and has already discussed her case with anesthesia who agrees to see her in the a.m. for blood patch. She was unable to tolerate the MRI this evening. Will need anesthesias help for sedation for MRI and blood patch.

## 2022-04-07 PROCEDURE — 74011250637 HC RX REV CODE- 250/637: Performed by: STUDENT IN AN ORGANIZED HEALTH CARE EDUCATION/TRAINING PROGRAM

## 2022-04-07 PROCEDURE — 74011250636 HC RX REV CODE- 250/636: Performed by: STUDENT IN AN ORGANIZED HEALTH CARE EDUCATION/TRAINING PROGRAM

## 2022-04-07 PROCEDURE — 3E0R3GC INTRODUCTION OF OTHER THERAPEUTIC SUBSTANCE INTO SPINAL CANAL, PERCUTANEOUS APPROACH: ICD-10-PCS | Performed by: ANESTHESIOLOGY

## 2022-04-07 PROCEDURE — 74011250636 HC RX REV CODE- 250/636: Performed by: INTERNAL MEDICINE

## 2022-04-07 PROCEDURE — 77010033678 HC OXYGEN DAILY

## 2022-04-07 PROCEDURE — 65660000000 HC RM CCU STEPDOWN

## 2022-04-07 PROCEDURE — 74011000250 HC RX REV CODE- 250: Performed by: INTERNAL MEDICINE

## 2022-04-07 PROCEDURE — 62273 INJECT EPIDURAL PATCH: CPT

## 2022-04-07 PROCEDURE — 94760 N-INVAS EAR/PLS OXIMETRY 1: CPT

## 2022-04-07 RX ORDER — FOLIC ACID/MULTIVIT,IRON,MINER 0.4MG-18MG
1 TABLET ORAL DAILY
Status: DISCONTINUED | OUTPATIENT
Start: 2022-04-07 | End: 2022-04-08 | Stop reason: HOSPADM

## 2022-04-07 RX ORDER — HYDROMORPHONE HYDROCHLORIDE 1 MG/ML
1 INJECTION, SOLUTION INTRAMUSCULAR; INTRAVENOUS; SUBCUTANEOUS ONCE
Status: COMPLETED | OUTPATIENT
Start: 2022-04-07 | End: 2022-04-07

## 2022-04-07 RX ADMIN — HYDROMORPHONE HYDROCHLORIDE 1 MG: 1 INJECTION, SOLUTION INTRAMUSCULAR; INTRAVENOUS; SUBCUTANEOUS at 21:22

## 2022-04-07 RX ADMIN — MORPHINE SULFATE 2 MG: 2 INJECTION, SOLUTION INTRAMUSCULAR; INTRAVENOUS at 18:20

## 2022-04-07 RX ADMIN — MORPHINE SULFATE 2 MG: 2 INJECTION, SOLUTION INTRAMUSCULAR; INTRAVENOUS at 01:57

## 2022-04-07 RX ADMIN — MORPHINE SULFATE 2 MG: 2 INJECTION, SOLUTION INTRAMUSCULAR; INTRAVENOUS at 13:35

## 2022-04-07 RX ADMIN — Medication 10 ML: at 06:09

## 2022-04-07 RX ADMIN — MORPHINE SULFATE 2 MG: 2 INJECTION, SOLUTION INTRAMUSCULAR; INTRAVENOUS at 06:09

## 2022-04-07 RX ADMIN — Medication 10 ML: at 13:36

## 2022-04-07 RX ADMIN — MORPHINE SULFATE 2 MG: 2 INJECTION, SOLUTION INTRAMUSCULAR; INTRAVENOUS at 09:44

## 2022-04-07 RX ADMIN — Medication 1 TABLET: at 14:36

## 2022-04-07 NOTE — ROUTINE PROCESS
Bedside shift change report given to Carline Goins RN (oncoming nurse) by Cuong Matias RN (offgoing nurse). Report included the following information SBAR, Kardex, ED Summary, Procedure Summary, Intake/Output, MAR, Accordion, Recent Results, Med Rec Status, Cardiac Rhythm NSR and Quality Measures. 2200: Per Pt. \" she doesn't require IV fluids\". Hence Iv fluids has turned off.

## 2022-04-07 NOTE — ROUTINE PROCESS
TRANSFER - OUT REPORT:    Verbal report given to YOELRN(name) on [facility]  being transferred to (unit) for routine progression of care       Report consisted of patients Situation, Background, Assessment and   Recommendations(SBAR). Information from the following report(s) Procedure Summary was reviewed with the receiving nurse. Lines:   Peripheral IV 04/05/22 Right Arm (Active)   Site Assessment Clean, dry, & intact 04/07/22 1600   Phlebitis Assessment 0 04/07/22 1600   Infiltration Assessment 0 04/07/22 1600   Dressing Status Clean, dry, & intact 04/07/22 1600   Dressing Type Transparent;Tape 04/07/22 1600   Hub Color/Line Status Pink; Infusing;Flushed 04/07/22 1600   Action Taken Open ports on tubing capped 04/07/22 1600   Alcohol Cap Used Yes 04/07/22 1600       Peripheral IV 04/07/22 Left Antecubital (Active)        Opportunity for questions and clarification was provided.       Patient transported with:   [employer]

## 2022-04-07 NOTE — PROCEDURES
Blood Patch    Date/Time: 4/7/2022 4:37 PM  Performed by: Kaykay Obando MD  Authorized by: Kaykay Obando MD   Start Time:  4/7/2022 4:10 PM  End Time:  4/7/2022 4:26 PM    Pre-procedure: Indication: spinal headache and CSF leak      Preanesthetic Checklist: patient identified, risks and benefits discussed, site marked, anesthesia consent, patient being monitored and timeout performed      Timeout Time:  16:10 EDT    Blood Patch:   Monitoring:  EKG and continuous pulse oximetry    Patient Position:  Left lateral decubitus  Prep Region:  Lumbar  Prep: DuraPrep        Location:  L3-4    Injection Technique:  SITA air  Needle Type:  Tuohy  Needle Gauge:  17 G  Sterile Blood Injected (mL):  18    Assessment:   Attempts:  1      Patient tolerance:  Patient tolerated the procedure well with no immediate complications  Pt had cerclage 8d ago wth spinal H/A, persistent after 1 blood patch. She is here with similar sx, postural h/a.

## 2022-04-07 NOTE — PROGRESS NOTES
Transition of Care Plan: likely home with spouse  *OB patient, lives independently with spouse*      RUR: 17%      PCP F/U: Dr. Itzel Simon     Disposition: likely home with spouse      Transportation: family     Main Contact: Spouse: Indy Lau     0915: This CM will continue to follow for any discharge needs.  Likely to be discharged home with spouse      Quiana Coleman RN, CRM

## 2022-04-07 NOTE — ANESTHESIA POSTPROCEDURE EVALUATION
* No procedures listed *. MAC    Anesthesia Post Evaluation      Multimodal analgesia: multimodal analgesia not used between 6 hours prior to anesthesia start to PACU discharge  Patient location during evaluation: bedside  Patient participation: complete - patient participated  Level of consciousness: awake  Pain score: 0  Pain management: satisfactory to patient  Airway patency: patent  Anesthetic complications: no  Cardiovascular status: acceptable and blood pressure returned to baseline  Respiratory status: acceptable  Hydration status: acceptable  Comments: I have evaluated the patient and meets criteria for discharge from PACU. Cliff Shah DO. Post anesthesia nausea and vomiting:  none  Final Post Anesthesia Temperature Assessment:  Normothermia (36.0-37.5 degrees C)      INITIAL Post-op Vital signs:   Vitals Value Taken Time   BP 96/63 04/06/22 2010   Temp 36.5 °C (97.7 °F) 04/06/22 2008   Pulse 77 04/06/22 2008   Resp 18 04/06/22 2008   SpO2 100 % 04/06/22 2014   Vitals shown include unvalidated device data.

## 2022-04-07 NOTE — PROGRESS NOTES
Problem: Falls - Risk of  Goal: *Absence of Falls  Description: Document Erica Vasquez Fall Risk and appropriate interventions in the flowsheet.   Outcome: Progressing Towards Goal  Note: Fall Risk Interventions:    Medication Interventions: Evaluate medications/consider consulting pharmacy,Patient to call before getting OOB,Teach patient to arise slowly

## 2022-04-08 VITALS
TEMPERATURE: 98.2 F | WEIGHT: 204.6 LBS | BODY MASS INDEX: 37.65 KG/M2 | HEART RATE: 88 BPM | RESPIRATION RATE: 15 BRPM | DIASTOLIC BLOOD PRESSURE: 54 MMHG | HEIGHT: 62 IN | SYSTOLIC BLOOD PRESSURE: 111 MMHG | OXYGEN SATURATION: 10 %

## 2022-04-08 PROCEDURE — 74011250637 HC RX REV CODE- 250/637: Performed by: STUDENT IN AN ORGANIZED HEALTH CARE EDUCATION/TRAINING PROGRAM

## 2022-04-08 PROCEDURE — 74011000250 HC RX REV CODE- 250: Performed by: INTERNAL MEDICINE

## 2022-04-08 PROCEDURE — 74011250636 HC RX REV CODE- 250/636: Performed by: INTERNAL MEDICINE

## 2022-04-08 RX ADMIN — Medication 10 ML: at 00:28

## 2022-04-08 RX ADMIN — MORPHINE SULFATE 2 MG: 2 INJECTION, SOLUTION INTRAMUSCULAR; INTRAVENOUS at 00:28

## 2022-04-08 RX ADMIN — MORPHINE SULFATE 2 MG: 2 INJECTION, SOLUTION INTRAMUSCULAR; INTRAVENOUS at 05:07

## 2022-04-08 RX ADMIN — Medication 1 TABLET: at 09:32

## 2022-04-08 NOTE — PROGRESS NOTES
Hospital follow-up PCP transitional care appointment has been scheduled with Jose Elias Burroughs NP for Thursday, 4/14/22 at 3:00 p.m. Pending patient discharge. Mina Izquierdo, Care Management Assistant.

## 2022-04-08 NOTE — PROGRESS NOTES
Bedside and Verbal shift change report given to [facility] (oncoming nurse) by Yue Hutchinsonch (offgoing nurse). Report included the following information SBAR, Kardex, MAR, Cardiac Rhythm NSR and Dual Neuro Assessment.

## 2022-04-08 NOTE — PROGRESS NOTES
Problem: Falls - Risk of  Goal: *Absence of Falls  Description: Document Joe Grover Fall Risk and appropriate interventions in the flowsheet.   Outcome: Progressing Towards Goal  Note: Fall Risk Interventions:            Medication Interventions: Evaluate medications/consider consulting pharmacy,Patient to call before getting OOB,Teach patient to arise slowly                   Problem: Patient Education: Go to Patient Education Activity  Goal: Patient/Family Education  Outcome: Progressing Towards Goal     Problem: Discharge Planning  Goal: *Discharge to safe environment  Outcome: Progressing Towards Goal  Goal: *Knowledge of medication management  Outcome: Progressing Towards Goal  Goal: *Knowledge of discharge instructions  Outcome: Progressing Towards Goal     Problem: Patient Education: Go to Patient Education Activity  Goal: Patient/Family Education  Outcome: Progressing Towards Goal

## 2022-04-08 NOTE — PROGRESS NOTES
Transition of Care Plan: likely home with spouse  *OB patient, lives independently with spouse*      RUR: 18%      PCP F/U: Dr. Whitney Garcia     Disposition: likely home with spouse      Transportation: family     Main Contact: Spouse: Shiv [phone]     1126: Noted discharge orders.  Patient to be discharged home with spouse      Shayy Padilla RN, CRM

## 2022-04-08 NOTE — DISCHARGE INSTRUCTIONS
HOME DISCHARGE INSTRUCTIONS    Luli Wade / [id number] : 1990    Admission date: 2022 Discharge date: 2022     Please bring this form with you to show your care provider at your follow-up appointment. Primary care provider:  Phyllis Isbell MD    Discharging provider:  Patrick Calderon MD       You have been admitted to the hospital with the following diagnoses:    ACUTE DIAGNOSES:  · Intractable headache [R51.9]  . . . . . . . . . . . . . . . . . . . . . . . . . . . . . . . . . . . . . . . . . . . . . . . . . . . . . . . . . . . . . . . . . . . . . . . Mirna Crespo FOLLOW-UP CARE RECOMMENDATIONS:  Please call and schedule follow-up with Sky Nice. I will reach out to his office and let them know you are discharged. Resume your previously scheduled home medications. Appointments  Follow-up Information    None          Follow-up tests needed: none    Pending test results: At the time of your discharge the following test results are still pending: none. Please make sure you review these results with your outpatient follow-up provider(s). Specific symptoms to watch for: chest pain, shortness of breath, fever, chills, nausea, vomiting, diarrhea, change in mentation, falling, weakness, bleeding. DIET/what to eat:  Regular Diet    ACTIVITY:  Activity as tolerated    Wound care: none    Equipment needed:  none    What to do if new or unexpected symptoms occur? If you experience any of the above symptoms (or should other concerns or questions arise after discharge) please call your primary care physician. Return to the emergency room if you cannot get hold of your doctor. · It is very important that you keep your follow-up appointment(s). · Please bring discharge papers, medication list (and/or medication bottles) to your follow-up appointments for review by your outpatient provider(s).   · Please check the list of medications and be sure it includes every medication (even non-prescription medications) that your provider wants you to take. · It is important that you take the medication exactly as they are prescribed. · Keep your medication in the bottles provided by the pharmacist and keep a list of the medication names, dosages, and times to be taken in your wallet. · Do not take other medications without consulting your doctor. · If you have any questions about your medications or other instructions, please talk to your nurse or care provider before you leave the hospital.     Information obtained by:     I understand that if any problems occur once I am at home I am to contact my physician. These instructions were explained to me and I had the opportunity to ask questions. I understand and acknowledge receipt of the instructions indicated above.                                                                                                                                                Physician's or R.N.'s Signature                                                                  Date/Time                                                                                                                                              Patient or Representative Signature                                                          Date/Time

## 2022-04-08 NOTE — PROGRESS NOTES
[facility] Adult  Hospitalist Group                                                                                          Hospitalist Progress Note  Josee Lemons MD  Answering service: [phone] from in house phone        Date of Service:  2022  NAME:  Jolly Narayan  :  1990  MRN:  [id number]      Admission Summary:   Patient is a 38-year-old well-known female with a history of pseudomotor cerebri with stent placement,  currently at 4 months gestation who presents to hospital upon recommendation of outpatient intervention neurology for brain MRI and blood patch. Patient had a cerclage procedure done 1 week ago for which she was given epidural anesthesia. She has had intractable headache since then. She describes 10/10 intermittent left-sided throbbing headache. She was evaluated by neurology who recommended she present to hospital for MRI and blood patch. Per chart review, MRI was attempted overnight and patient was unable to tolerate this. Interval history / Subjective:   No acute events overnight. Patient seen and examined at bedside this AM.  Still has continued headaches. Assessment & Plan:       Pseudotumor cerebri   Status migrainosus  -Admitted with plans for mri and blood patch under sedation  -MRI brain completed and shows patent right transverse sinus stent  -Blood patch completed  -Tylenal, prn morphine, benadryl  -Dilaudid times one-time dose  -Monitor overnight    Intrauterine pregnancy at 16 weeks gestation  -Appreciate ob consult  -No need for fetal monitoring at this time  -Continue home prenatals    SLE  -Therapy held due to IUP    Hyponatremia  -MIld.  resolved      Code status: full code  DVT prophylaxis: scds    Care Plan discussed with: Patient/Family  Anticipated Disposition: Home w/Family  Anticipated Discharge: 24 hours to 48 hours     Hospital Problems  Date Reviewed: 2022          Codes Class Noted POA    * (Principal) Intractable headache ICD-10-CM: R51.9  ICD-9-CM: 784.0  9/26/2021 Yes                Review of Systems:   A comprehensive review of systems was negative except for that written in the HPI. Vital Signs:    Last 24hrs VS reviewed since prior progress note. Most recent are:  Visit Vitals  /68   Pulse 88   Temp 98.2 °F (36.8 °C)   Resp 20   Ht 5' 2\" (1.575 m)   Wt 91.6 kg (202 lb)   SpO2 99%   BMI 36.95 kg/m²       No intake or output data in the 24 hours ending 04/07/22 2124     Physical Examination:     I had a face to face encounter with this patient and independently examined them on 4/7/2022 as outlined below:          Constitutional:  No acute distress, cooperative, pleasant    ENT:  Oral mucosa moist, oropharynx benign. Resp:  CTA bilaterally. No wheezing/rhonchi/rales. No accessory muscle use   CV:  Regular rhythm, normal rate, no murmurs, gallops, rubs    GI:  Soft, non distended, non tender. normoactive bowel sounds, no hepatosplenomegaly     Musculoskeletal:  No edema, warm, 2+ pulses throughout    Neurologic:  Moves all extremities. AAOx3, CN II-XII reviewed     Psych:  Good insight, Not anxious nor agitated. Data Review:    Review and/or order of clinical lab test  Review and/or order of tests in the radiology section of CPT  Review and/or order of tests in the medicine section of CPT         Labs:     Recent Labs     04/05/22 1926   WBC 10.9   HGB 11.6   HCT 33.7*        Recent Labs     04/05/22 1926   *   K 3.5      CO2 25   BUN 7   CREA 0.61   GLU 78   CA 9.7     Recent Labs     04/05/22 1926   ALT 17   AP 73   TBILI 0.3   TP 7.3   ALB 3.7   GLOB 3.6     Recent Labs     04/05/22 1926   INR 1.0   PTP 10.0   APTT 28.5      No results for input(s): FE, TIBC, PSAT, FERR in the last 72 hours. No results found for: FOL, RBCF   No results for input(s): PH, PCO2, PO2 in the last 72 hours. No results for input(s): CPK, CKNDX, TROIQ in the last 72 hours.     No lab exists for component: CPKMB  Lab Results   Component Value Date/Time    Cholesterol, total 177 04/11/2019 11:35 AM    HDL Cholesterol 48 04/11/2019 11:35 AM    LDL, calculated 107 (H) 04/11/2019 11:35 AM    Triglyceride 112 04/11/2019 11:35 AM     Lab Results   Component Value Date/Time    Glucose (POC) 136 (H) 12/03/2021 06:41 PM    Glucose (POC) 86 09/13/2020 11:03 PM    Glucose POC 82 11/07/2019 02:00 PM     Lab Results   Component Value Date/Time    Color YELLOW/STRAW 04/01/2022 08:59 PM    Appearance CLEAR 04/01/2022 08:59 PM    Specific gravity 1.025 04/01/2022 08:59 PM    Specific gravity 1.018 03/31/2022 01:02 AM    pH (UA) 6.5 04/01/2022 08:59 PM    Protein Negative 04/01/2022 08:59 PM    Glucose Negative 04/01/2022 08:59 PM    Ketone Negative 04/01/2022 08:59 PM    Bilirubin Negative 04/01/2022 08:59 PM    Urobilinogen 1.0 04/01/2022 08:59 PM    Nitrites Negative 04/01/2022 08:59 PM    Leukocyte Esterase Negative 04/01/2022 08:59 PM    Epithelial cells FEW 04/01/2022 08:59 PM    Bacteria Negative 04/01/2022 08:59 PM    WBC 0-4 04/01/2022 08:59 PM    RBC 0-5 04/01/2022 08:59 PM         Medications Reviewed:     Current Facility-Administered Medications   Medication Dose Route Frequency    prenatal vitamin tablet 1 Tablet  1 Tablet Oral DAILY    morphine injection 2 mg  2 mg IntraVENous Q3H PRN    sodium chloride (NS) flush 5-40 mL  5-40 mL IntraVENous Q8H    sodium chloride (NS) flush 5-40 mL  5-40 mL IntraVENous PRN    lactated Ringers infusion  75 mL/hr IntraVENous CONTINUOUS     ______________________________________________________________________  EXPECTED LENGTH OF STAY: 2d 12h  ACTUAL LENGTH OF STAY:          2                 Alfredo Shoemaker MD

## 2022-04-08 NOTE — DISCHARGE SUMMARY
Discharge Summary       PATIENT ID: Hermine Cushing  MRN: [id number]   YOB: 1990    DATE OF ADMISSION: 2022  7:18 PM    DATE OF DISCHARGE: 22     PRIMARY CARE PROVIDER: Waylon Neves MD     ATTENDING PHYSICIAN: Sai Mueller MD    DISCHARGING PROVIDER: Sai Mueller MD    To contact this individual call [phone] and ask the  to page. If unavailable ask to be transferred the Adult Hospitalist Department. CONSULTATIONS: IP CONSULT TO ANESTHESIOLOGY  IP CONSULT TO OB GYN    PROCEDURES/SURGERIES: Procedure(s):  EPIDURAL BLOOD PATCH    ADMITTING [facility] COURSE:   Patient is a 26-year-old well-known female with a history of pseudomotor cerebri with stent placement,  currently at 4 months gestation who presents to hospital upon recommendation of outpatient intervention neurology for brain MRI and blood patch. Patient had a cerclage procedure done 1 week ago for which she was given epidural anesthesia. She has had intractable headache since then. She describes 10/10 intermittent left-sided throbbing headache. She was evaluated by neurology who recommended she present to hospital for MRI and blood patch. Per chart review, MRI was attempted overnight and patient was unable to tolerate this. DISCHARGE DIAGNOSES / PLAN:         Pseudotumor cerebri   Status migrainosus  -MRI brain completed and shows patent right transverse sinus stent  -Blood patch completed and symptoms dramatically improved. -PCP and Neuro f/u on discharge     Intrauterine pregnancy at 16 weeks gestation  -Appreciate ob consult  -No need for fetal monitoring at this time  -Continue home prenatals     SLE  -Therapy held due to IUP     Hyponatremia  -MIld.  resolved     ADDITIONAL CARE RECOMMENDATIONS:   none     PENDING TEST RESULTS:   At the time of discharge the following test results are still pending: none    FOLLOW UP APPOINTMENTS:    Follow-up Information     Follow up With Specialties Details Why Contact Info    Chelsey Viera MD Family Medicine, Bariatrics On 4/14/2022 Hospital follow up PCP appointment Thursday, 4/14/22 at 3:00 p.m. with SAVANNAH Garrettteri Alonso  [phone]               DIET: Regular Diet    ACTIVITY: Activity as tolerated    WOUND CARE: none    EQUIPMENT needed: none      DISCHARGE MEDICATIONS:  Discharge Medication List as of 4/8/2022 11:31 AM      CONTINUE these medications which have NOT CHANGED    Details   cyclobenzaprine (FLEXERIL) 10 mg tablet Take 1 Tablet by mouth three (3) times daily as needed for Muscle Spasm(s). As needed, Normal, Disp-90 Tablet, R-1      PNV/iron/omega3/folic ac/f.a.1 (PRE-AMMY MULTIVITAMINS/MINERALS PO) Take 1 Tablet by mouth daily. , Historical Med      aspirin 81 mg chewable tablet Take 1 Tablet by mouth daily. , No Print, Disp-30 Tablet, R-0      biotin 10 mg tab Take 10,000 mcg by mouth daily. , Historical Med      psyllium seed, with sugar, (FIBER PO) Take 1 Tablet by mouth daily. , Historical Med      hydrOXYchloroQUINE (PLAQUENIL) 200 mg tablet TAKE 1 TABLET BY MOUTH TWICE A DAY PA REQUIRED FOR QTY   16 FAXED MD, Normal, Disp-60 Tab, R-2               NOTIFY YOUR PHYSICIAN FOR ANY OF THE FOLLOWING:   Fever over 101 degrees for 24 hours. Chest pain, shortness of breath, fever, chills, nausea, vomiting, diarrhea, change in mentation, falling, weakness, bleeding. Severe pain or pain not relieved by medications. Or, any other signs or symptoms that you may have questions about.     DISPOSITION:    Home With:   OT  PT  HH  RN       Long term SNF/Inpatient Rehab    Independent/assisted living    Hospice   x Other:       PATIENT CONDITION AT DISCHARGE:     Functional status    Poor     Deconditioned    x Independent      Cognition    x Lucid     Forgetful     Dementia      Catheters/lines (plus indication)    [place]     PICC     PEG    x None      Code status    x Full code     DNR PHYSICAL EXAMINATION AT DISCHARGE:  General:          Alert, cooperative, no distress, appears stated age. HEENT:           Atraumatic, anicteric sclerae, pink conjunctivae                          No oral ulcers, mucosa moist, throat clear, dentition fair  Neck:               Supple, symmetrical  Lungs:             Clear to auscultation bilaterally. No Wheezing or Rhonchi. No rales. Chest wall:      No tenderness  No Accessory muscle use. Heart:              Regular  rhythm,  No  murmur   No edema  Abdomen:        Soft, non-tender. Not distended. Bowel sounds normal  Extremities:     No cyanosis. No clubbing,                            Skin turgor normal, Capillary refill normal  Skin:                Not pale. Not Jaundiced  No rashes   Psych:             Not anxious or agitated. Neurologic:      Alert, moves all extremities, answers questions appropriately and responds to commands       CHRONIC MEDICAL DIAGNOSES:  Problem List as of 4/8/2022 Date Reviewed: 4/6/2022          Codes Class Noted - Resolved    Unilateral weakness ICD-10-CM: R53.1  ICD-9-CM: 780.79  12/6/2021 - Present        CVA (cerebral vascular accident) [facility]) ICD-10-CM: I63.9  ICD-9-CM: 434.91  12/6/2021 - Present        Bruising ICD-10-CM: T14. 8XXA  ICD-9-CM: 924.9  10/8/2021 - Present        Intractable migraine ICD-10-CM: X56.251  ICD-9-CM: 346.91  9/30/2021 - Present        * (Principal) Intractable headache ICD-10-CM: R51.9  ICD-9-CM: 784.0  9/26/2021 - Present        Pseudotumor cerebri ICD-10-CM: G93.2  ICD-9-CM: 348.2  9/3/2021 - Present        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  ICD-9-CM: 437.0  8/30/2021 - Present        Papilledema associated with increased intracranial pressure ICD-10-CM: H47.11  ICD-9-CM: 377.01  8/30/2021 - Present        Intracranial hypertension ICD-10-CM: G93.2  ICD-9-CM: 348.2  10/23/2020 - Present        Ataxia ICD-10-CM: R27.0  ICD-9-CM: 781.3  9/14/2020 - Present        Headache ICD-10-CM: R51.9  ICD-9-CM: 784.0  8/29/2020 - Present        IIH (idiopathic intracranial hypertension) ICD-10-CM: G93.2  ICD-9-CM: 348.2  8/25/2020 - Present        SLE (systemic lupus erythematosus) (HCC) (Chronic) ICD-10-CM: M32.9  ICD-9-CM: 710.0  8/25/2020 - Present        Class 3 severe obesity in adult [facility]) ICD-10-CM: E66.01  ICD-9-CM: 278.01  8/22/2018 - Present        S/P repair of paraesophageal hernia ICD-10-CM: Z98.890, Z87.19  ICD-9-CM: V45.89  11/17/2017 - Present        Paraesophageal hernia ICD-10-CM: K44.9  ICD-9-CM: 553.3  11/15/2017 - Present        GERD (gastroesophageal reflux disease) ICD-10-CM: K21.9  ICD-9-CM: 530.81  11/7/2017 - Present        Obesity, Class II, BMI 35-39.9 ICD-10-CM: E66.9  ICD-9-CM: 278.00  3/31/2017 - Present        Sebaceous cyst ICD-10-CM: L72.3  ICD-9-CM: 706.2  3/24/2017 - Present    Overview Addendum 5/1/2017 10:52 AM by Saturnino Capone MD     S/P excision; Along sternum in cleavage. History of Nissen fundoplication X-19-: K84.954  ICD-9-CM: V15.29  1/4/2017 - Present    Overview Signed 1/4/2017  1:51 PM by Ryan Ricci LPN     4 duodenal ulcers, chronic gastritis, Grade C esophagitis, Chronic GERD, hernia, small tumor. Done August/2016.              Chronic migraine without aura without status migrainosus, not intractable ICD-10-CM: E62.318  ICD-9-CM: 346.70  5/18/2016 - Present        Cervical incompetence ICD-10-CM: N88.3  ICD-9-CM: 622.5  4/12/2013 - Present        RESOLVED: Status migrainosus ICD-10-CM: K53.865  ICD-9-CM: 346.20  11/26/2021 - 11/26/2021              Greater than 30 minutes were spent with the patient on counseling and coordination of care    Signed:   Debo Marshall MD  4/8/2022  3:01 PM

## 2022-04-18 ENCOUNTER — VIRTUAL VISIT (OUTPATIENT)
Dept: FAMILY MEDICINE CLINIC | Age: 32
End: 2022-04-18
Payer: MEDICAID

## 2022-04-18 ENCOUNTER — TELEPHONE (OUTPATIENT)
Dept: FAMILY MEDICINE CLINIC | Age: 32
End: 2022-04-18

## 2022-04-18 DIAGNOSIS — E66.9 OBESITY (BMI 35.0-39.9 WITHOUT COMORBIDITY): ICD-10-CM

## 2022-04-18 DIAGNOSIS — O09.92 HIGH-RISK PREGNANCY, SECOND TRIMESTER: ICD-10-CM

## 2022-04-18 DIAGNOSIS — G93.2 PSEUDOTUMOR CEREBRI: Primary | ICD-10-CM

## 2022-04-18 PROCEDURE — 99213 OFFICE O/P EST LOW 20 MIN: CPT | Performed by: FAMILY MEDICINE

## 2022-04-18 NOTE — PROGRESS NOTES
Kai Mercado is a 33 y.o. female who was seen by synchronous (real-time) audio-video technology on 4/18/2022 for Hospital Follow Up [facility])  recent discharge 4/8/22   No follow up call from nurse was made after discharge  she is pregnant and had a small placental detachment   that is not any bigger but she is still bleeding    The ICH is not going up since her weight has maintained . She weighs herself daily  She had a hA assciated with spinal anestesia  For a cervical cerclage a few weeks ago  She was admitted 4/5/22  had a blood patch done and that resolved the HA a day later. She was discharged 4/8/22    She changes pads 3 times a day -still bleeding        Assessment & Plan:   Diagnoses and all orders for this visit:    1. Pseudotumor cerebri  Stable now. No HA after blood patch done  2. High-risk pregnancy, second trimester  Followed by high risk OB    3. Obesity (BMI 35.0-39.9 without comorbidity)  Doing well controlling weight  She checks weight daily          Subjective:       Prior to Admission medications    Medication Sig Start Date End Date Taking? Authorizing Provider   cyclobenzaprine (FLEXERIL) 10 mg tablet Take 1 Tablet by mouth three (3) times daily as needed for Muscle Spasm(s). As needed 3/30/22  Yes Kwesi Lang MD   aspirin 81 mg chewable tablet Take 1 Tablet by mouth daily. 12/6/21  Yes Joycelyn Caballero NP   biotin 10 mg tab Take 10,000 mcg by mouth daily. Yes Provider, Historical   psyllium seed, with sugar, (FIBER PO) Take 1 Tablet by mouth daily. Yes Provider, Historical   PNV/iron/omega3/folic ac/f.a.1 (PRE-AMMY MULTIVITAMINS/MINERALS PO) Take 1 Tablet by mouth daily.     Provider, Historical   hydrOXYchloroQUINE (PLAQUENIL) 200 mg tablet TAKE 1 TABLET BY MOUTH TWICE A DAY PA REQUIRED FOR QTY   16 FAXED MD  Patient not taking: Reported on 3/15/2022 3/28/21   Kwesi Lang MD     Patient Active Problem List    Diagnosis Date Noted    Unilateral weakness 12/06/2021    CVA (cerebral vascular accident) ([facility] 12/06/2021    Bruising 10/08/2021    Intractable migraine 09/30/2021    Intractable headache 09/26/2021    Pseudotumor cerebri 09/03/2021    Stenosis of cerebral venous sinus 08/30/2021    Papilledema associated with increased intracranial pressure 08/30/2021    Intracranial hypertension 10/23/2020    Ataxia 09/14/2020    Headache 08/29/2020    IIH (idiopathic intracranial hypertension) 08/25/2020    SLE (systemic lupus erythematosus) ([facility] 08/25/2020    Class 3 severe obesity in adult [facility]) 08/22/2018    S/P repair of paraesophageal hernia 11/17/2017    Paraesophageal hernia 11/15/2017    GERD (gastroesophageal reflux disease) 11/07/2017    Obesity, Class II, BMI 35-39.9 03/31/2017    Sebaceous cyst 03/24/2017    History of Nissen fundoplication 89/28/6441    Chronic migraine without aura without status migrainosus, not intractable 05/18/2016    Cervical incompetence 04/12/2013     Current Outpatient Medications   Medication Sig Dispense Refill    cyclobenzaprine (FLEXERIL) 10 mg tablet Take 1 Tablet by mouth three (3) times daily as needed for Muscle Spasm(s). As needed 90 Tablet 1    aspirin 81 mg chewable tablet Take 1 Tablet by mouth daily. 30 Tablet 0    biotin 10 mg tab Take 10,000 mcg by mouth daily.  psyllium seed, with sugar, (FIBER PO) Take 1 Tablet by mouth daily.  PNV/iron/omega3/folic ac/f.a.1 (PRE-AMMY MULTIVITAMINS/MINERALS PO) Take 1 Tablet by mouth daily.       hydrOXYchloroQUINE (PLAQUENIL) 200 mg tablet TAKE 1 TABLET BY MOUTH TWICE A DAY PA REQUIRED FOR QTY   16 FAXED MD (Patient not taking: Reported on 3/15/2022) 60 Tab 2       ROS    Objective:     Patient-Reported Vitals 4/18/2022   Patient-Reported Weight 202lb   Patient-Reported Height -   Patient-Reported Pulse -   Patient-Reported Temperature -   Patient-Reported SpO2 -   Patient-Reported Systolic  -   Patient-Reported Diastolic -   Patient-Reported LMP -        [INSTRUCTIONS:  \"[x]\" Indicates a positive item  \"[]\" Indicates a negative item  -- DELETE ALL ITEMS NOT EXAMINED]    Constitutional: [x] Appears well-developed and well-nourished [x] No apparent distress      [] Abnormal -     Mental status: [x] Alert and awake  [x] Oriented to person/place/time [x] Able to follow commands    [] Abnormal -     Eyes:   EOM    [x]  Normal    [] Abnormal -   Sclera  [x]  Normal    [] Abnormal -          Discharge [x]  None visible   [] Abnormal -     HENT: [x] Normocephalic, atraumatic  [] Abnormal -   [x] Mouth/Throat: Mucous membranes are moist    External Ears [x] Normal  [] Abnormal -    Neck: [x] No visualized mass [] Abnormal -     Pulmonary/Chest: [x] Respiratory effort normal   [x] No visualized signs of difficulty breathing or respiratory distress        [] Abnormal -      Musculoskeletal:   [x] Normal gait with no signs of ataxia         [x] Normal range of motion of neck        [] Abnormal -     Neurological:        [x] No Facial Asymmetry (Cranial nerve 7 motor function) (limited exam due to video visit)          [x] No gaze palsy        [] Abnormal -          Skin:        [x] No significant exanthematous lesions or discoloration noted on facial skin         [] Abnormal -            Psychiatric:       [x] Normal Affect [] Abnormal -        [x] No Hallucinations    Other pertinent observable physical exam findings:-        We discussed the expected course, resolution and complications of the diagnosis(es) in detail. Medication risks, benefits, costs, interactions, and alternatives were discussed as indicated. I advised her to contact the office if her condition worsens, changes or fails to improve as anticipated. She expressed understanding with the diagnosis(es) and plan. Jhonny Lanes, was evaluated through a synchronous (real-time) audio-video encounter. The patient (or guardian if applicable) is aware that this is a billable service, which includes applicable co-pays.  Verbal consent to proceed has been obtained. The visit was conducted pursuant to the emergency declaration under the [address] [place] [address] authority and the [employer] General Act. Patient identification was verified, and a caregiver was present when appropriate. The patient was located at home in a state where the provider was licensed to provide care.       Desmond Ann MD

## 2022-04-18 NOTE — PROGRESS NOTES
1. Have you been to the ER, urgent care clinic since your last visit? Hospitalized since your last visit? Yes When: 4/5/2022 Where: [facility] Reason for visit: Headache    2. Have you seen or consulted any other health care providers outside of the [address] [place] [address] [place] since your last visit? Include any pap smears or colon screening. No     Chief Complaint   Patient presents with   [facility] Follow Up     [facility]     Health Maintenance Due   Topic Date Due    COVID-19 Vaccine (1) Never done    Cervical cancer screen  12/05/2018     3 most recent PHQ Screens 4/18/2022   PHQ Not Done -   Little interest or pleasure in doing things Not at all   Feeling down, depressed, irritable, or hopeless Not at all   Total Score PHQ 2 0     Abuse Screening Questionnaire 11/11/2021   Do you ever feel afraid of your partner? N   Are you in a relationship with someone who physically or mentally threatens you? N   Is it safe for you to go home?  Y     Patient-Reported Vitals 4/18/2022   Patient-Reported Weight 202lb   Patient-Reported Height -   Patient-Reported Pulse -   Patient-Reported Temperature -   Patient-Reported SpO2 -   Patient-Reported Systolic  -   Patient-Reported Diastolic -   Patient-Reported LMP -

## 2022-05-18 ENCOUNTER — HOSPITAL ENCOUNTER (OUTPATIENT)
Age: 32
Setting detail: OBSERVATION
Discharge: LEFT BEFORE DISCHARGE | End: 2022-05-19
Attending: EMERGENCY MEDICINE | Admitting: OBSTETRICS & GYNECOLOGY
Payer: MEDICAID

## 2022-05-18 DIAGNOSIS — Z3A.20 20 WEEKS GESTATION OF PREGNANCY: ICD-10-CM

## 2022-05-18 DIAGNOSIS — G93.2 PSEUDOTUMOR CEREBRI: ICD-10-CM

## 2022-05-18 DIAGNOSIS — R51.9 INTRACTABLE HEADACHE, UNSPECIFIED CHRONICITY PATTERN, UNSPECIFIED HEADACHE TYPE: Primary | ICD-10-CM

## 2022-05-18 PROCEDURE — 96374 THER/PROPH/DIAG INJ IV PUSH: CPT

## 2022-05-18 PROCEDURE — 99285 EMERGENCY DEPT VISIT HI MDM: CPT

## 2022-05-18 RX ORDER — HYDROXYPROGESTERONE CAPROATE 250 MG/ML
INJECTION SUBCUTANEOUS
COMMUNITY
Start: 2022-04-18 | End: 2022-08-21

## 2022-05-18 RX ORDER — ERGOCALCIFEROL 1.25 MG/1
CAPSULE ORAL
COMMUNITY
Start: 2022-05-05 | End: 2022-08-21

## 2022-05-18 RX ORDER — METOCLOPRAMIDE HYDROCHLORIDE 5 MG/ML
10 INJECTION INTRAMUSCULAR; INTRAVENOUS
Status: COMPLETED | OUTPATIENT
Start: 2022-05-19 | End: 2022-05-19

## 2022-05-18 RX ORDER — EPINEPHRINE 0.3 MG/.3ML
INJECTION SUBCUTANEOUS
COMMUNITY
End: 2022-10-30

## 2022-05-18 RX ORDER — OXYCODONE HYDROCHLORIDE 5 MG/1
TABLET ORAL
COMMUNITY
End: 2022-06-14

## 2022-05-19 ENCOUNTER — TELEPHONE (OUTPATIENT)
Dept: NEUROSURGERY | Age: 32
End: 2022-05-19

## 2022-05-19 VITALS
DIASTOLIC BLOOD PRESSURE: 89 MMHG | WEIGHT: 202 LBS | HEIGHT: 62 IN | HEART RATE: 80 BPM | SYSTOLIC BLOOD PRESSURE: 125 MMHG | TEMPERATURE: 98.4 F | BODY MASS INDEX: 37.17 KG/M2 | OXYGEN SATURATION: 96 % | RESPIRATION RATE: 16 BRPM

## 2022-05-19 LAB
ALBUMIN SERPL-MCNC: 2.8 G/DL (ref 3.5–5)
ALBUMIN/GLOB SERPL: 0.7 {RATIO} (ref 1.1–2.2)
ALP SERPL-CCNC: 96 U/L (ref 45–117)
ALT SERPL-CCNC: 15 U/L (ref 12–78)
ANION GAP SERPL CALC-SCNC: 8 MMOL/L (ref 5–15)
AST SERPL-CCNC: 12 U/L (ref 15–37)
BASOPHILS # BLD: 0 K/UL (ref 0–0.1)
BASOPHILS NFR BLD: 0 % (ref 0–1)
BILIRUB SERPL-MCNC: 0.3 MG/DL (ref 0.2–1)
BUN SERPL-MCNC: 5 MG/DL (ref 6–20)
BUN/CREAT SERPL: 9 (ref 12–20)
CALCIUM SERPL-MCNC: 8.2 MG/DL (ref 8.5–10.1)
CHLORIDE SERPL-SCNC: 109 MMOL/L (ref 97–108)
CO2 SERPL-SCNC: 22 MMOL/L (ref 21–32)
CREAT SERPL-MCNC: 0.56 MG/DL (ref 0.55–1.02)
DIFFERENTIAL METHOD BLD: ABNORMAL
EOSINOPHIL # BLD: 0.2 K/UL (ref 0–0.4)
EOSINOPHIL NFR BLD: 2 % (ref 0–7)
ERYTHROCYTE [DISTWIDTH] IN BLOOD BY AUTOMATED COUNT: 13.2 % (ref 11.5–14.5)
GLOBULIN SER CALC-MCNC: 3.9 G/DL (ref 2–4)
GLUCOSE SERPL-MCNC: 76 MG/DL (ref 65–100)
HCT VFR BLD AUTO: 30.2 % (ref 35–47)
HGB BLD-MCNC: 10.3 G/DL (ref 11.5–16)
IMM GRANULOCYTES # BLD AUTO: 0 K/UL (ref 0–0.04)
IMM GRANULOCYTES NFR BLD AUTO: 0 % (ref 0–0.5)
LYMPHOCYTES # BLD: 2.8 K/UL (ref 0.8–3.5)
LYMPHOCYTES NFR BLD: 28 % (ref 12–49)
MCH RBC QN AUTO: 31.7 PG (ref 26–34)
MCHC RBC AUTO-ENTMCNC: 34.1 G/DL (ref 30–36.5)
MCV RBC AUTO: 92.9 FL (ref 80–99)
MONOCYTES # BLD: 0.6 K/UL (ref 0–1)
MONOCYTES NFR BLD: 6 % (ref 5–13)
NEUTS SEG # BLD: 6.4 K/UL (ref 1.8–8)
NEUTS SEG NFR BLD: 64 % (ref 32–75)
NRBC # BLD: 0 K/UL (ref 0–0.01)
NRBC BLD-RTO: 0 PER 100 WBC
PLATELET # BLD AUTO: 229 K/UL (ref 150–400)
PMV BLD AUTO: 10 FL (ref 8.9–12.9)
POTASSIUM SERPL-SCNC: 3 MMOL/L (ref 3.5–5.1)
PROT SERPL-MCNC: 6.7 G/DL (ref 6.4–8.2)
RBC # BLD AUTO: 3.25 M/UL (ref 3.8–5.2)
SODIUM SERPL-SCNC: 139 MMOL/L (ref 136–145)
WBC # BLD AUTO: 9.9 K/UL (ref 3.6–11)

## 2022-05-19 PROCEDURE — 85025 COMPLETE CBC W/AUTO DIFF WBC: CPT

## 2022-05-19 PROCEDURE — 96361 HYDRATE IV INFUSION ADD-ON: CPT

## 2022-05-19 PROCEDURE — 96375 TX/PRO/DX INJ NEW DRUG ADDON: CPT

## 2022-05-19 PROCEDURE — 96372 THER/PROPH/DIAG INJ SC/IM: CPT

## 2022-05-19 PROCEDURE — 80053 COMPREHEN METABOLIC PANEL: CPT

## 2022-05-19 PROCEDURE — 96374 THER/PROPH/DIAG INJ IV PUSH: CPT

## 2022-05-19 PROCEDURE — 65410000002 HC RM PRIVATE OB

## 2022-05-19 PROCEDURE — 36415 COLL VENOUS BLD VENIPUNCTURE: CPT

## 2022-05-19 PROCEDURE — G0378 HOSPITAL OBSERVATION PER HR: HCPCS

## 2022-05-19 PROCEDURE — 74011250636 HC RX REV CODE- 250/636: Performed by: EMERGENCY MEDICINE

## 2022-05-19 RX ORDER — ZOLPIDEM TARTRATE 5 MG/1
5 TABLET ORAL
Status: DISCONTINUED | OUTPATIENT
Start: 2022-05-19 | End: 2022-05-19 | Stop reason: HOSPADM

## 2022-05-19 RX ORDER — MORPHINE SULFATE 2 MG/ML
2 INJECTION, SOLUTION INTRAMUSCULAR; INTRAVENOUS
Status: COMPLETED | OUTPATIENT
Start: 2022-05-19 | End: 2022-05-19

## 2022-05-19 RX ORDER — SODIUM CHLORIDE 0.9 % (FLUSH) 0.9 %
5-40 SYRINGE (ML) INJECTION EVERY 8 HOURS
Status: DISCONTINUED | OUTPATIENT
Start: 2022-05-19 | End: 2022-05-19 | Stop reason: HOSPADM

## 2022-05-19 RX ORDER — DROPERIDOL 2.5 MG/ML
1.25 INJECTION, SOLUTION INTRAMUSCULAR; INTRAVENOUS ONCE
Status: COMPLETED | OUTPATIENT
Start: 2022-05-19 | End: 2022-05-19

## 2022-05-19 RX ORDER — SODIUM CHLORIDE 0.9 % (FLUSH) 0.9 %
5-40 SYRINGE (ML) INJECTION AS NEEDED
Status: DISCONTINUED | OUTPATIENT
Start: 2022-05-19 | End: 2022-05-19 | Stop reason: HOSPADM

## 2022-05-19 RX ORDER — CYCLOBENZAPRINE HCL 10 MG
10 TABLET ORAL
Status: DISCONTINUED | OUTPATIENT
Start: 2022-05-19 | End: 2022-05-19 | Stop reason: HOSPADM

## 2022-05-19 RX ORDER — GUAIFENESIN 100 MG/5ML
81 LIQUID (ML) ORAL DAILY
Status: DISCONTINUED | OUTPATIENT
Start: 2022-05-19 | End: 2022-05-19 | Stop reason: HOSPADM

## 2022-05-19 RX ADMIN — MORPHINE SULFATE 2 MG: 2 INJECTION, SOLUTION INTRAMUSCULAR; INTRAVENOUS at 03:17

## 2022-05-19 RX ADMIN — METHYLPREDNISOLONE SODIUM SUCCINATE 40 MG: 40 INJECTION, POWDER, FOR SOLUTION INTRAMUSCULAR; INTRAVENOUS at 03:17

## 2022-05-19 RX ADMIN — SODIUM CHLORIDE 1000 ML: 9 INJECTION, SOLUTION INTRAVENOUS at 01:06

## 2022-05-19 RX ADMIN — DROPERIDOL 1.25 MG: 2.5 INJECTION, SOLUTION INTRAMUSCULAR; INTRAVENOUS at 03:17

## 2022-05-19 RX ADMIN — METOCLOPRAMIDE HYDROCHLORIDE 10 MG: 5 INJECTION INTRAMUSCULAR; INTRAVENOUS at 01:05

## 2022-05-19 NOTE — PROGRESS NOTES
The following herbal, alternative, and/or nutritional/dietary supplement product(s) has been discontinued  per [employer] approved policy:    Biotin 72AY daily     Please reorder upon discharge if appropriate.
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
## 2022-05-19 NOTE — ED TRIAGE NOTES
Patient transfers to ED from [facility] ER, was given benadryl and compazine for migraine that has lasted about 5 days. Patient has neuro history and had a ventricular stent placed about 1 month ago. Patient is 20 weeks pregnant, sees neuro and OB at [place].
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
## 2022-05-19 NOTE — PROGRESS NOTES
.. TRANSFER - IN REPORT:    Verbal report received from Jerry Marks RN(name) on [employer]  being received from ED(unit) for routine progression of care      Report consisted of patients Situation, Background, Assessment and   Recommendations(SBAR). Information from the following report(s) SBAR, Kardex, ED Summary, Intake/Output, MAR, Accordion, Recent Results and Med Rec Status was reviewed with the receiving nurse. Opportunity for questions and clarification was provided. Pt refused assessment and insisted that she wanted to leave. Writer educated pt on the risk associated with leaving and provided documentation for pt to sign. Pt refused to sign and left AMA.

## 2022-05-19 NOTE — PROGRESS NOTES
Ms. Leeanna Lozano left AMA. I pulled the patient's report in the Prescription Monitoring Program and she's been getting a monthly prescription of 60 tabs of oxycodone for the last 6 months and then additional scripts beyond that. I asked her to clarify how she's taking it and she says maybe 2-3 times a week since finding out she was pregnant in March. I asked what she was doing with the extra pills and she said just keeping them. She said she does have to show what pills she has left when she goes to the pain clinic but she may not take all of them to show. I asked her about the valium and ativan that are noted as being filled monthly for at least the last 9 months. She states that she didn't mention those because she has not been taking them during pregnancy. I asked why she filled the prescription as recently as the end of April if she's not taking them and she said so that she can continue to be seen with the same doctor. We concluded the conversation and I then left the room. Shortly after I left I received a call from the patient's nurse stating that the patient was leaving. I was scrubbed in the OR, so a nurse in the [facility] [address] took the call. Patient was presented with AMA papers by the nurse but would not sign them. Her IV was removed and she left. I was in the OR so was not able to see her before she left. I will notify [facility] doctor on call.

## 2022-05-19 NOTE — TELEPHONE ENCOUNTER
Spoke to patient regarding her ED visit yesterday. States she feels better. Reports headache today is 7/10 pain level with continued throbbing pain across forehead. Day 5 of headache. Patient reports history of migraines with 2 previous pregnancies. Neurology has referred patient to her OB/GYN for headache management. Reports occasional episodes when her peripheral vision is cloudy.

## 2022-05-19 NOTE — TELEPHONE ENCOUNTER
----- Message from Melinda Hill DO sent at 5/19/2022 11:46 AM EDT -----  Wilma Sings,  I talked to her yesterday evening and she reported HA. It looks like maybe she was looking for more narcotic and I told her I couldn't write for that. Could you check on her today and see if HA any better?     Thanks  TD

## 2022-05-19 NOTE — TELEPHONE ENCOUNTER
----- Message from Donaldo Larsen DO sent at 5/19/2022 11:46 AM EDT -----  Te James,  I talked to her yesterday evening and she reported HA. It looks like maybe she was looking for more narcotic and I told her I couldn't write for that. Could you check on her today and see if HA any better?     Thanks  TD

## 2022-05-19 NOTE — ED NOTES
TRANSFER - OUT REPORT:    Verbal report given to SHANT Lazaro(name) on Emmanuel Farmer  being transferred to (unit) for routine progression of care       Report consisted of patients Situation, Background, Assessment and   Recommendations(SBAR). Information from the following report(s) SBAR, Kardex and MAR was reviewed with the receiving nurse. Lines:   Peripheral IV 05/19/22 Distal;Left;Upper Arm (Active)        Opportunity for questions and clarification was provided.       Patient transported with:   [employer]

## 2022-06-14 ENCOUNTER — OFFICE VISIT (OUTPATIENT)
Dept: NEUROLOGY | Age: 32
End: 2022-06-14
Payer: MEDICAID

## 2022-06-14 ENCOUNTER — DOCUMENTATION ONLY (OUTPATIENT)
Dept: NEUROLOGY | Age: 32
End: 2022-06-14

## 2022-06-14 VITALS
OXYGEN SATURATION: 100 % | WEIGHT: 207.2 LBS | BODY MASS INDEX: 38.13 KG/M2 | TEMPERATURE: 99 F | SYSTOLIC BLOOD PRESSURE: 119 MMHG | HEIGHT: 62 IN | HEART RATE: 88 BPM | DIASTOLIC BLOOD PRESSURE: 78 MMHG

## 2022-06-14 DIAGNOSIS — G43.709 CHRONIC MIGRAINE WITHOUT AURA WITHOUT STATUS MIGRAINOSUS, NOT INTRACTABLE: ICD-10-CM

## 2022-06-14 DIAGNOSIS — G93.2 PSEUDOTUMOR CEREBRI SYNDROME: Primary | ICD-10-CM

## 2022-06-14 PROCEDURE — 99215 OFFICE O/P EST HI 40 MIN: CPT | Performed by: PSYCHIATRY & NEUROLOGY

## 2022-06-14 RX ORDER — PROMETHAZINE HYDROCHLORIDE 12.5 MG/1
12.5 TABLET ORAL
Qty: 30 TABLET | Refills: 0 | Status: SHIPPED | OUTPATIENT
Start: 2022-06-14 | End: 2022-08-21

## 2022-06-14 RX ORDER — GABAPENTIN 100 MG/1
100 CAPSULE ORAL 3 TIMES DAILY
Qty: 90 CAPSULE | Refills: 1 | Status: ON HOLD | OUTPATIENT
Start: 2022-06-14 | End: 2022-08-07

## 2022-06-14 RX ORDER — BUTALBITAL, ASPIRIN, CAFFEINE AND CODEINE PHOSPHATE 50; 325; 40; 30 MG/1; MG/1; MG/1; MG/1
1 CAPSULE ORAL
Qty: 30 CAPSULE | Refills: 0 | Status: SHIPPED | OUTPATIENT
Start: 2022-06-14 | End: 2022-06-24

## 2022-06-14 NOTE — PROGRESS NOTES
Chief Complaint   Patient presents with    Follow-up     Pseudotumor    Neurologic Problem       HISTORY OF PRESENT ILLNESS  Lilli Urbano came back for follow-up. She was last seen by me in the hospital back in November when she was admitted for a therapeutic lumbar puncture. Thereafter she ended up having stenting of the right transverse and sigmoid sinus by Dr. Sterling Montero in December 2021. She was doing quite well after the procedure and headaches had nearly resolved. She was on Brilinta initially and then became pregnant. Did not find it until she was 3 months pregnant. Brilinta was stopped immediately and she was switched to aspirin. She is currently 24 weeks pregnant and has had some obstetrical complications including subchorionic hemorrhage and has required cervical cerclage  She has history of headaches/migraines during her previous pregnancies and states that those have returned. Sometimes there will linger on for more than a week and then she will have a good couple of days. Also has lupus causing arthritis and has been on controlled substance/oxycodone as needed but states that she has not been using it much during pregnancy. There has also been a concern regarding regularly feeling her controlled substance prescriptions including oxycodone, Ativan/diazepam over the last several months. Not on gabapentin anymore due to pregnancy, cannot take Fioricet due to allergy but has taken Fiorinal before. Last eye exam was stable, her vision was not threatened and spontaneous venous pulsations were noted which is an indication of low intracranial pressure. RECAP  She was diagnosed with pseudotumor cerebri about 3 years ago and her initial symptoms were headache, congestion mainly in the right eye. She saw an ophthalmologist and was found to have papilledema. Thereafter a lumbar puncture was done which revealed an opening pressure of 38 cm of water.   She started following with neurology and has seen Dr. Leandro Engle on the [facility] side, and then saw Dr. Diaz Lima and then was seeing Dr. Sumit Young. She was initially put on acetazolamide and dose was titrated up to 1500 mg twice daily which was not helping. Then she was tried on now topiramate and Lasix which also did not seem to help her much. She did not have any refills and has not been taking any medications for the past 2 to 3 months. MR venogram revealed bilateral sinus thromboses and she saw neuro interventional surgery, Dr. Huma Roth who did an angiogram and was found to have 80-90% stenosis in bilateral transverse and sigmoid sinuses with an elevated intracranial venous flow gradient. Stenting was planned and she was put on dual antiplatelet therapy but could not tolerate Plavix. She has also been losing weight on her own and has seen bariatric surgeon and the plan is to do bariatric surgery in the near future. Her most recent ophthalmology examination was stable and her vision was not threatened. Papilledema was improving and her a lumbar puncture a few months ago revealed an opening pressure of 18 cm of water. She gets headaches about 4 or 5 days out of the month. She describes them as bifrontal throbbing pain occasionally associated with blurry vision. No nausea or vomiting. She feels off balance during these an episode can last an entire day. She is also seeing an OB/GYN as she was not having menstrual cycles for the past couple years. She recently had a miscarriage but has no plans of pregnancy in the near future. Current Outpatient Medications   Medication Sig    gabapentin (NEURONTIN) 100 mg capsule Take 1 Capsule by mouth three (3) times daily. Max Daily Amount: 300 mg.  promethazine (PHENERGAN) 12.5 mg tablet Take 1 Tablet by mouth every eight (8) hours as needed for Nausea.     codeine-butalbital-aspirin-caffeine (FIORINAL WITH CODEINE) 50--40 mg per capsule Take 1 Capsule by mouth every eight (8) hours as needed for Pain for up to 10 days. Max Daily Amount: 3 Capsules.  EPINEPHrine (EpiPen) 0.3 mg/0.3 mL injection EpiPen 2-Fly 0.3 mg/0.3 mL injection, auto-injector    ergocalciferol (ERGOCALCIFEROL) 1,250 mcg (50,000 unit) capsule TAKE 1 CAPSULE BY MOUTH ONCE A WEEK    Monserrat, PF, 275 mg/1.1 mL atIn subcutaneous injection     cyclobenzaprine (FLEXERIL) 10 mg tablet Take 1 Tablet by mouth three (3) times daily as needed for Muscle Spasm(s). As needed    PNV/iron/omega3/folic ac/f.a.1 (PRE-AMMY MULTIVITAMINS/MINERALS PO) Take 1 Tablet by mouth daily.  aspirin 81 mg chewable tablet Take 1 Tablet by mouth daily.  biotin 10 mg tab Take 10,000 mcg by mouth daily.  psyllium seed, with sugar, (FIBER PO) Take 1 Tablet by mouth daily.  hydrOXYchloroQUINE (PLAQUENIL) 200 mg tablet TAKE 1 TABLET BY MOUTH TWICE A DAY PA REQUIRED FOR QTY   16 FAXED MD (Patient not taking: Reported on 3/15/2022)     No current facility-administered medications for this visit. PHYSICAL EXAMINATION:    Visit Vitals  /78   Pulse 88   Temp 99 °F (37.2 °C) (Oral)   Ht 5' 2\" (1.575 m)   Wt 207 lb 3.2 oz (94 kg)   SpO2 100%   BMI 37.90 kg/m²       NEUROLOGICAL EXAMINATION:     Mental Status:   Alert and oriented to person, place, and time. Attention span and concentration are normal. Speech is fluent . Cranial Nerves:    II, III, IV, VI:  Visual acuity grossly intact. Visual fields are normal.    Pupils are equal, round, and reactive. Extra-ocular movements are full and fluid. No papilledema is noted on fundus exam.  V-XII: Hearing is grossly intact. Facial features are symmetric, with normal sensation and strength. The palate rises symmetrically and the tongue protrudes midline. Motor Examination: Normal tone, bulk, and strength. Sensory exam:  Normal throughout     Coordination:  Finger to nose and rapid arm movement testing was normal.   No resting or intention tremor    Gait and Station:  Steady. Normal arm swing. No muscle wasting or fasiculations noted. LABS / IMAGING    Cerebral angiogram results as discussed above and it showed 80 to 90% bilateral transverse sinus and sigmoid sinus thrombosis  Subsequent angiogram images in December 2021 showed patent stent in the right sigmoid/transverse sinuses. The stent is MRI compatible. MRI brain in the past showed changes suggesting intracranial hypertension    Lab Results   Component Value Date/Time    WBC 9.9 05/19/2022 01:04 AM    Hemoglobin (POC) 12.1 11/07/2019 02:00 PM    HGB 10.3 (L) 05/19/2022 01:04 AM    HCT 30.2 (L) 05/19/2022 01:04 AM    PLATELET 699 30/85/9403 01:04 AM    MCV 92.9 05/19/2022 01:04 AM     Lab Results   Component Value Date/Time    Sodium 139 05/19/2022 01:04 AM    Potassium 3.0 (L) 05/19/2022 01:04 AM    Chloride 109 (H) 05/19/2022 01:04 AM    CO2 22 05/19/2022 01:04 AM    Anion gap 8 05/19/2022 01:04 AM    Glucose 76 05/19/2022 01:04 AM    BUN 5 (L) 05/19/2022 01:04 AM    Creatinine 0.56 05/19/2022 01:04 AM    BUN/Creatinine ratio 9 (L) 05/19/2022 01:04 AM    GFR est AA >60 05/19/2022 01:04 AM    GFR est non-AA >60 05/19/2022 01:04 AM    Calcium 8.2 (L) 05/19/2022 01:04 AM    Bilirubin, total 0.3 05/19/2022 01:04 AM    Alk. phosphatase 96 05/19/2022 01:04 AM    Protein, total 6.7 05/19/2022 01:04 AM    Albumin 2.8 (L) 05/19/2022 01:04 AM    Globulin 3.9 05/19/2022 01:04 AM    A-G Ratio 0.7 (L) 05/19/2022 01:04 AM    ALT (SGPT) 15 05/19/2022 01:04 AM    AST (SGOT) 12 (L) 05/19/2022 01:04 AM         ASSESSMENT    ICD-10-CM ICD-9-CM    1. Pseudotumor cerebri syndrome  G93.2 348.2 gabapentin (NEURONTIN) 100 mg capsule      promethazine (PHENERGAN) 12.5 mg tablet      codeine-butalbital-aspirin-caffeine (FIORINAL WITH CODEINE) 85--40 mg per capsule   2.  Chronic migraine without aura without status migrainosus, not intractable  G43.709 346.70 gabapentin (NEURONTIN) 100 mg capsule      promethazine (PHENERGAN) 12.5 mg tablet codeine-butalbital-aspirin-caffeine (FIORINAL WITH CODEINE) 77--40 mg per capsule       DISCUSSION  Ms. Dawson Larkin has idiopathic intracranial hypertension. She failed extensive medical therapy including topiramate, acetazolamide, furosemide. She was found to have high-grade stenosis of the venous sinuses and underwent stenting of the right transverse and sigmoid sinus by Dr. Yuli Roque in December 2021  She had an excellent response to this procedure  She is currently 24 weeks pregnant and during pregnancy, her headaches have returned associated with visual obscurations generally in the right peripheral visual field. These are episodic in nature with good days in between. I suspect that these are more migraines than headaches related to intracranial pressure  I have recommended trying Fiorinal with codeine sparingly. I also noticed that there has been a concern of regularly filling controlled substances over the past several months but she denies using doses regularly.    May resume low-dose gabapentin 100 mg 3 times daily for prophylaxis  Start magnesium 500 mg daily  If headaches do not improve, we may consider reintroducing topiramate as she is now in later stages of pregnancy  She has ophthalmology follow-up evaluation scheduled this week  Monitor     Time 40 minutes    Maru Murcia MD  Diplomate, American Board of Psychiatry & Neurology (Neurology)  Diplomate, [address] [place] [address] Rd., [address] of Psychiatry & Neurology (Clinical Neurophysiology)  Diplomate, American Board of Electrodiagnostic Medicine

## 2022-06-14 NOTE — PROGRESS NOTES
Patient's BMP shows multiple and consistent fills of oxycodone, lorazepam and diazepam prescriptions over the past several months. Gabapentin and Fiorinal with codeine prescriptions were canceled for now.

## 2022-06-19 DIAGNOSIS — M62.838 MUSCLE SPASM: ICD-10-CM

## 2022-06-20 NOTE — TELEPHONE ENCOUNTER
Chago Abrams MD  4/18/2022  Health Maintenance Due   Topic Date Due    COVID-19 Vaccine (1) Never done    Cervical cancer screen  12/05/2018     Results for orders placed or performed during the hospital encounter of 05/18/22   CBC WITH AUTOMATED DIFF   Result Value Ref Range    WBC 9.9 3.6 - 11.0 K/uL    RBC 3.25 (L) 3.80 - 5.20 M/uL    HGB 10.3 (L) 11.5 - 16.0 g/dL    HCT 30.2 (L) 35.0 - 47.0 %    MCV 92.9 80.0 - 99.0 FL    MCH 31.7 26.0 - 34.0 PG    MCHC 34.1 30.0 - 36.5 g/dL    RDW 13.2 11.5 - 14.5 %    PLATELET 535 379 - 767 K/uL    MPV 10.0 8.9 - 12.9 FL    NRBC 0.0 0  WBC    ABSOLUTE NRBC 0.00 0.00 - 0.01 K/uL    NEUTROPHILS 64 32 - 75 %    LYMPHOCYTES 28 12 - 49 %    MONOCYTES 6 5 - 13 %    EOSINOPHILS 2 0 - 7 %    BASOPHILS 0 0 - 1 %    IMMATURE GRANULOCYTES 0 0.0 - 0.5 %    ABS. NEUTROPHILS 6.4 1.8 - 8.0 K/UL    ABS. LYMPHOCYTES 2.8 0.8 - 3.5 K/UL    ABS. MONOCYTES 0.6 0.0 - 1.0 K/UL    ABS. EOSINOPHILS 0.2 0.0 - 0.4 K/UL    ABS. BASOPHILS 0.0 0.0 - 0.1 K/UL    ABS. IMM. GRANS. 0.0 0.00 - 0.04 K/UL    DF AUTOMATED     METABOLIC PANEL, COMPREHENSIVE   Result Value Ref Range    Sodium 139 136 - 145 mmol/L    Potassium 3.0 (L) 3.5 - 5.1 mmol/L    Chloride 109 (H) 97 - 108 mmol/L    CO2 22 21 - 32 mmol/L    Anion gap 8 5 - 15 mmol/L    Glucose 76 65 - 100 mg/dL    BUN 5 (L) 6 - 20 MG/DL    Creatinine 0.56 0.55 - 1.02 MG/DL    BUN/Creatinine ratio 9 (L) 12 - 20      GFR est AA >60 >60 ml/min/1.73m2    GFR est non-AA >60 >60 ml/min/1.73m2    Calcium 8.2 (L) 8.5 - 10.1 MG/DL    Bilirubin, total 0.3 0.2 - 1.0 MG/DL    ALT (SGPT) 15 12 - 78 U/L    AST (SGOT) 12 (L) 15 - 37 U/L    Alk.  phosphatase 96 45 - 117 U/L    Protein, total 6.7 6.4 - 8.2 g/dL    Albumin 2.8 (L) 3.5 - 5.0 g/dL    Globulin 3.9 2.0 - 4.0 g/dL    A-G Ratio 0.7 (L) 1.1 - 2.2

## 2022-06-22 RX ORDER — CYCLOBENZAPRINE HCL 10 MG
10 TABLET ORAL
Qty: 90 TABLET | Refills: 1 | Status: SHIPPED | OUTPATIENT
Start: 2022-06-22 | End: 2022-09-09

## 2022-06-27 ENCOUNTER — HOSPITAL ENCOUNTER (EMERGENCY)
Age: 32
Discharge: HOME | End: 2022-06-27
Attending: STUDENT IN AN ORGANIZED HEALTH CARE EDUCATION/TRAINING PROGRAM | Admitting: OBSTETRICS & GYNECOLOGY
Payer: MEDICAID

## 2022-06-27 VITALS
WEIGHT: 200 LBS | DIASTOLIC BLOOD PRESSURE: 59 MMHG | SYSTOLIC BLOOD PRESSURE: 104 MMHG | RESPIRATION RATE: 16 BRPM | TEMPERATURE: 98.3 F | HEIGHT: 62 IN | HEART RATE: 91 BPM | BODY MASS INDEX: 36.8 KG/M2 | OXYGEN SATURATION: 100 %

## 2022-06-27 PROCEDURE — 99284 EMERGENCY DEPT VISIT MOD MDM: CPT

## 2022-06-27 NOTE — PROGRESS NOTES
1841. Pt arrived to unit at this time with c/o of decreased fetal movement, last felt baby move at 0200, denies vaginal bleeding, discharge, headache, RUQ pain, pt placed on monitor at this time.   1920. Bedside shift change report given to SOLANGE Allen RN (oncoming nurse) by A. Gearl Heimlich RN (offgoing nurse). Report included the following information SBAR, Kardex, Intake/Output, MAR and Recent Results.

## 2022-06-27 NOTE — PROGRESS NOTES
7:25 PM  Bedside shift change report given to Justine Estes RN (oncoming nurse) by Nic Reinoso RN (offgoing nurse). Report included the following information SBAR and Kardex. 7:53 PM  Informed Dr Eagle Alves of pt. Will be out to see pt shortly.    8:01 PM  Dr Eagle Christiansons in to assess pt.    8:23 PM  Discharge instructions reviewed with pt and her significant other. Discussed  labor and fetal movement precautions. Pt instructed to follow up with her OB provider on  as scheduled. Pt verbalizes understanding of all. Summary signed and witnessed. 8:26 PM  Pt discharged home with her significant other at this time.
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
## 2022-06-28 NOTE — DISCHARGE INSTRUCTIONS
Patient Education        Counting Your Baby's Kicks: Care Instructions  Overview     Counting your baby's kicks is one way your doctor can tell that your baby is healthy. Most women--especially in a first pregnancy--feel their baby move for the first time between 16 and 22 weeks. The movement may feel like flutters rather than kicks. Your baby may move more at certain times of the day. When you are active, you may notice less kicking than when you are resting. At your prenatal visits, your doctor will ask whether the baby is active. In your last trimester, your doctor may ask you to count the number of times you feel your baby move. Follow-up care is a key part of your treatment and safety. Be sure to make and go to all appointments, and call your doctor if you are having problems. It's also a good idea to know your test results and keep a list of the medicines you take. How do you count fetal kicks? · A common method of checking your baby's movement is to note the length of time it takes to count ten movements (such as kicks, flutters, or rolls). · Pick your baby's most active time of day to count. This may be any time from morning to evening. · If you don't feel 10 movements in an hour, have something to eat or drink and count for another hour. If you don't feel at least 10 movements in the 2-hour period, call your doctor. When should you call for help? Call your doctor now or seek immediate medical care if:    · You noticed that your baby has stopped moving or is moving much less than normal.   Watch closely for changes in your health, and be sure to contact your doctor if you have any problems. Where can you learn more? Go to http://www.gray.com/  Enter [id number] in the search box to learn more about \"Counting Your Baby's Kicks: Care Instructions. \"  Current as of: June 16, 2021               Content Version: 13.2  © 1898-1572 Healthwise, Incorporated.    Care instructions adapted under license by [employer] (which disclaims liability or warranty for this information). If you have questions about a medical condition or this instruction, always ask your healthcare professional. [address] any warranty or liability for your use of this information. Patient Education        Weeks 26 to 30 of Your Pregnancy: Care Instructions  Overview     You are now entering your last trimester of pregnancy. Your baby is growing quickly. Claudia Shabazz probably feel your baby moving around more often. Your doctor may ask you to count your baby's kicks. Your back may ache as your body gets used to your baby's size and length. If you haven't already had the Tdap shot during this pregnancy, talk to your doctor about getting it. It will help protect your  against pertussis infection. During this time, it's important to take care of yourself and pay attention to what your body needs. If you feel sexual, you can explore ways to be close with your partner that match your comfort and desire. Follow-up care is a key part of your treatment and safety. Be sure to make and go to all appointments, and call your doctor if you are having problems. It's also a good idea to know your test results and keep a list of the medicines you take. How can you care for yourself at home? Take it easy at work  · Take frequent breaks. If possible, stop working when you are tired, and rest during your lunch hour. · Take bathroom breaks every 2 hours. · Change positions often. If you sit for long periods, stand up and walk around. · When you stand for a long time, keep one foot on a low stool with your knee bent. After standing a lot, sit with your feet up. · Avoid fumes, chemicals, and tobacco smoke. Be sexual in your own way  · Having sex during pregnancy is okay, unless your doctor tells you not to. · You may be very interested in sex, or you may have no interest at all.   · Your growing belly can make it hard to find a good position during intercourse. [place] and explore. · You may get cramps in your uterus when your partner touches your breasts. · A back rub may relieve the backache or cramps that sometimes follow orgasm. Learn about  labor  · Watch for signs of  labor. You may be going into labor if:  ? You have menstrual-like cramps, with or without nausea. ? You have about 6 or more contractions in 1 hour, even after you have had a glass of water and are resting. ? You have a low, dull backache that does not go away when you change your position. ? You have pain or pressure in your pelvis that comes and goes in a pattern. ? You have intestinal cramping or flu-like symptoms, with or without diarrhea.  ? You notice an increase or change in your vaginal discharge. Discharge may be heavy, mucus-like, watery, or streaked with blood. ? Your water breaks. · If you think you have  labor:  ? Drink 2 or 3 glasses of water or juice. Not drinking enough fluids can cause contractions. ? Stop what you are doing, and empty your bladder. Then lie down on your left side for at least 1 hour. ? While lying on your side, find your breast bone. Put your fingers in the soft spot just below it. Move your fingers down toward your belly button to find the top of your uterus. Check to see if it is tight. ? Contractions can be weak or strong. Record your contractions for an hour. Time a contraction from the start of one contraction to the start of the next one.  ? Single or several strong contractions without a pattern are called Taco-Lopez contractions. They are practice contractions but not the start of labor. They often stop if you change what you are doing. ? Call your doctor if you have regular contractions. Where can you learn more?   Go to http://www.gray.com/  Enter R342 in the search box to learn more about \"Weeks 26 to 30 of Your Pregnancy: Care Instructions. \"  Current as of: June 16, 2021               Content Version: 13.2  © 6273-4175 Healthwise, [facility]. Care instructions adapted under license by [employer] (which disclaims liability or warranty for this information). If you have questions about a medical condition or this instruction, always ask your healthcare professional. [place] [address] any warranty or liability for your use of this information.

## 2022-06-28 NOTE — PROCEDURES
NST Procedure Note    Patient: Christen Alford               Sex: female          DOA: 6/27/2022       YOB: 1990      Age:  33 y.o.        LOS:  LOS: 0 days     MRN: [id number]                    CSN: [id number]      Estimated Gestational Age:26w2d     Indication for NST: decreased fetal movement and history of previous fetal demise    NST: 15x15 (despite being 26 weeks)    Fetal Vital Signs:  Mode: External  Fetal Heart Rate:150  Fetal Activity: Present (audible and felt by pt)  Variability: Moderate 6-25 bpm  Decelerations:No  Accelerations:Yes  FHR Interpretations:Category I    Non-Stress Test: obgyn fetal nst findings: reactive    Uterine Activity:  Mode: External  Frequency (min): none       Signed by:Leida Montano MD  6/27/2022 8:32 PM

## 2022-06-29 ENCOUNTER — TELEPHONE (OUTPATIENT)
Dept: NEUROSURGERY | Age: 32
End: 2022-06-29

## 2022-06-29 NOTE — TELEPHONE ENCOUNTER
Return call to patient regarding headache for the past week and vomiting for 4 days. Unable to get help from Neurology. Informed patient provider is out of the office until 7/5/2022. I will send him a message regarding an appointment to see him. Advised patient if symptoms become worse, seek emergency care. Call office with questions.

## 2022-07-08 ENCOUNTER — PATIENT MESSAGE (OUTPATIENT)
Dept: NEUROLOGY | Age: 32
End: 2022-07-08

## 2022-07-12 DIAGNOSIS — G93.2 PSEUDOTUMOR CEREBRI SYNDROME: Primary | ICD-10-CM

## 2022-07-12 DIAGNOSIS — G43.709 CHRONIC MIGRAINE WITHOUT AURA WITHOUT STATUS MIGRAINOSUS, NOT INTRACTABLE: ICD-10-CM

## 2022-07-12 NOTE — TELEPHONE ENCOUNTER
Patient called back, verified and advised per Dr. Ly Laura response, patient stated \" I have not taken any controlled meds since the last time I saw him, but my OB has already filled the gabapentin for me. \"

## 2022-07-12 NOTE — TELEPHONE ENCOUNTER
----- Message from Estefany De La Torre MD sent at 7/8/2022  4:20 PM EDT -----  Regarding: FW: Headaches  I had planned to give her gabapentin and Fiorinal with codeine at the last visit but upon reviewing her  it was found that she has been consistently filling prescriptions for lorazepam, diazepam and oxycodone for the past several months  She is currently 27 weeks pregnant and I am very concerned about giving her any further controlled substances and options are very limited. If she has stopped filling the above prescriptions, I can reconsider giving her gabapentin and Fiorinal with codeine    ----- Message -----  From: Liss Mcdermott  Sent: 7/8/2022   9:13 AM EDT  To: Estefany De La Torre MD  Subject: FW: Headaches                                    I have also responded to the patient, just wanted to forward to you for review. ----- Message -----  From: Rositail Madi  Sent: 7/8/2022   8:23 AM EDT  To: [facility]  Subject: Headaches                                        I have tried multiple times to reach out to this office with absolutely no response from the provider, nurse or practice manager. I'm getting to the point were livid doesn't even describe how I feel! My last headache was 12 days with absolutely no relief and paripheral vision changes. I only went 2 days headache free and now right back into another. I have absolutely no guidance from the Dr, and I do not want to go to the ED!

## 2022-07-29 ENCOUNTER — HOSPITAL ENCOUNTER (EMERGENCY)
Age: 32
Discharge: HOME | End: 2022-07-29
Attending: OBSTETRICS & GYNECOLOGY | Admitting: OBSTETRICS & GYNECOLOGY
Payer: MEDICAID

## 2022-07-29 VITALS
RESPIRATION RATE: 16 BRPM | HEART RATE: 93 BPM | TEMPERATURE: 98.6 F | DIASTOLIC BLOOD PRESSURE: 72 MMHG | SYSTOLIC BLOOD PRESSURE: 124 MMHG

## 2022-07-29 LAB
A1 MICROGLOB PLACENTAL VAG QL: NEGATIVE
CONTROL LINE PRESENT?: NORMAL
DAILY QC (YES/NO)?: YES
EXPIRATION DATE: NORMAL
INTERNAL NEGATIVE CONTROL: NORMAL
KIT LOT NO.: NORMAL
PH, VAGINAL FLUID: 5 (ref 5–6.1)

## 2022-07-29 PROCEDURE — 83986 ASSAY PH BODY FLUID NOS: CPT | Performed by: OBSTETRICS & GYNECOLOGY

## 2022-07-29 PROCEDURE — 75810000275 HC EMERGENCY DEPT VISIT NO LEVEL OF CARE

## 2022-07-29 PROCEDURE — 84112 EVAL AMNIOTIC FLUID PROTEIN: CPT | Performed by: OBSTETRICS & GYNECOLOGY

## 2022-07-30 NOTE — PROGRESS NOTES
2206 Dr Nicholas Kingston notified of pt arrival with complaints leaking fluid since yesterday and a headache for 2 weeks. 2236 Dr Nicholas Kingston in room to talk with pt. Discharge order given  [phone] Discharge instructions given with verbal understanding. No questions at this time.     2252 Pt off the floor in stable condition

## 2022-07-30 NOTE — DISCHARGE INSTRUCTIONS
Gigturn Activation    Thank you for requesting access to Gigturn. Please follow the instructions below to securely access and download your online medical record. Gigturn allows you to send messages to your doctor, view your test results, renew your prescriptions, schedule appointments, and more. How Do I Sign Up? In your internet browser, go to [email or web address]  Click on the First Time User? Click Here link in the Sign In box. You will be redirect to the New Member Sign Up page. Enter your Gigturn Access Code exactly as it appears below. You will not need to use this code after youve completed the sign-up process. If you do not sign up before the expiration date, you must request a new code. Gigturn Access Code: Activation code not generated  Current Gigturn Status: Active (This is the date your Gigturn access code will )    Enter the last four digits of your Social Security Number (xxxx) and Date of Birth (mm/dd/yyyy) as indicated and click Submit. You will be taken to the next sign-up page. Create a Gigturn ID. This will be your Gigturn login ID and cannot be changed, so think of one that is secure and easy to remember. Create a Gigturn password. You can change your password at any time. Enter your Password Reset Question and Answer. This can be used at a later time if you forget your password. Enter your e-mail address. You will receive e-mail notification when new information is available in [address] Click Sign Up. You can now view and download portions of your medical record. Click the [place] link to download a portable copy of your medical information. Additional Information    If you have questions, please visit the Frequently Asked Questions section of the Gigturn website at [email or web address] Remember, Gigturn is NOT to be used for urgent needs. For medical emergencies, dial 911.        Weeks 30 to 32 of Your Pregnancy: Care Instructions  Overview     You've made it to the final months of your pregnancy! By now your baby is really starting to look like a baby, with hair and plump skin. As you enter the final weeks of pregnancy, the reality of having a baby may start to set in. This is a good time to set up a safe nursery and find quality  if needed. Doing this stuff ahead of time will allow you to focus on caring for and enjoying your new baby. You may also want to take a tour of your hospital's labor and delivery unit. This will help you get a better idea of what to expect while you're in the hospital.  During these last months, be sure to take good care of yourself. Pay attention to what your body needs. If your doctor says it's okay for you to work, don't push yourself too hard. If you haven't already had the Tdap shot during this pregnancy, talk to your doctor about getting it. It will help protect your  against pertussis infection. Follow-up care is a key part of your treatment and safety. Be sure to make and go to all appointments, and call your doctor if you are having problems. It's also a good idea to know your test results and keep a list of the medicines you take. How can you care for yourself at home? Pay attention to your baby's movements  You should feel your baby move several times every day. Your baby now turns less, and kicks and jabs more. Your baby sleeps 20 to 45 minutes at a time and is more active at certain times of day. If your doctor wants you to count your baby's kicks:  Empty your bladder, and lie on your side or relax in a comfortable chair. Write down your start time. Pay attention only to your baby's movements. Count any movement except hiccups. After you have counted 10 movements, write down your stop time. Write down how many minutes it took for your baby to move 10 times.   If an hour goes by and you have not recorded 10 movements, have something to eat or drink and then count for another hour. If you don't record at least 10 movements in the 2-hour period, call your doctor. Ease heartburn  Eat small, frequent meals. Do not eat chocolate, peppermint, or very spicy foods. Avoid drinks with caffeine, such as coffee, tea, and sodas. Avoid bending over or lying down after meals. Take a short walk after you eat. If heartburn is a problem at night, do not eat for 2 hours before bedtime. Take antacids like Mylanta, Maalox, Rolaids, or Tums. Do not take antacids that have sodium bicarbonate. Care for varicose veins  Varicose veins are blood vessels that stretch out with the extra blood during pregnancy. Your legs may ache or throb. Most varicose veins will go away after the birth. Avoid standing for long periods of time. Sit with your legs crossed at the ankles, not the knees. Sit with your feet propped up. Avoid tight clothing or stockings. Wear support hose. Exercise regularly. Try walking for at least 30 minutes a day. Where can you learn more? Go to http://www.gray.com/  Enter X471 in the search box to learn more about \"Weeks 30 to 32 of Your Pregnancy: Care Instructions. \"  Current as of: 2021               Content Version: 13.2  © 5550-0014 [employer]. Care instructions adapted under license by [employer] (which disclaims liability or warranty for this information). If you have questions about a medical condition or this instruction, always ask your healthcare professional. [place] [address] any warranty or liability for your use of this information. Pregnancy Precautions: Care Instructions  Your Care Instructions     There is no sure way to prevent labor before your due date ( labor) or to prevent most other pregnancy problems. But there are things you can do to increase your chances of a healthy pregnancy.  Go to your appointments, follow your doctor's advice, and take good care of yourself. Eat well, and exercise (if your doctor agrees). And make sure to drink plenty of water. Follow-up care is a key part of your treatment and safety. Be sure to make and go to all appointments, and call your doctor if you are having problems. It's also a good idea to know your test results and keep a list of the medicines you take. How can you care for yourself at home? Make sure you go to your prenatal appointments. At each visit, your doctor will check your blood pressure. Your doctor will also check to see if you have protein in your urine. High blood pressure and protein in urine are signs of preeclampsia. This condition can be dangerous for you and your baby. Drink plenty of fluids. Dehydration can cause contractions. If you have kidney, heart, or liver disease and have to limit fluids, talk with your doctor before you increase the amount of fluids you drink. Tell your doctor right away if you notice any symptoms of an infection, such as:  Burning when you urinate. A foul-smelling discharge from your vagina. Vaginal itching. Unexplained fever. Unusual pain or soreness in your uterus or lower belly. Eat a balanced diet. Include plenty of foods that are high in calcium and iron. Foods high in calcium include milk, cheese, yogurt, almonds, and broccoli. Foods high in iron include red meat, shellfish, poultry, eggs, beans, raisins, whole-grain bread, and leafy green vegetables. Do not smoke. If you need help quitting, talk to your doctor about stop-smoking programs and medicines. These can increase your chances of quitting for good. Do not drink alcohol or use marijuana or illegal drugs. Follow your doctor's directions about activity. Your doctor will let you know how much, if any, exercise you can do. Ask your doctor if you can have sex. If you are at risk for early labor, your doctor may ask you to not have sex. Take care to prevent falls.  During pregnancy, your joints are loose, and your balance is off. Sports such as bicycling, skiing, or in-line skating can increase your risk of falling. And don't ride horses or motorcycles, dive, water ski, scuba dive, or parachute jump while you are pregnant. Avoid things that can make your body too hot and may be harmful to your baby, such as a hot tub or sauna. Or talk with your doctor before doing anything that raises your body temperature. Your doctor can tell you if it's safe. Do not take any over-the-counter or herbal medicines or supplements without talking to your doctor or pharmacist first.  When should you call for help? Call 911  anytime you think you may need emergency care. For example, call if:    You passed out (lost consciousness). You have a seizure. You have severe vaginal bleeding. You have severe pain in your belly or pelvis. You have had fluid gushing or leaking from your vagina and you know or think the umbilical cord is bulging into your vagina. If this happens, immediately get down on your knees so your rear end (buttocks) is higher than your head. This will decrease the pressure on the cord until help arrives. Call your doctor now or seek immediate medical care if:    You have signs of preeclampsia, such as:  Sudden swelling of your face, hands, or feet. New vision problems (such as dimness, blurring, or seeing spots). A severe headache. You have any vaginal bleeding. You have belly pain or cramping. You have a fever. You have had regular contractions (with or without pain) for an hour. This means that you have 8 or more within 1 hour or 4 or more in 20 minutes after you change your position and drink fluids. You have a sudden release of fluid from your vagina. You have low back pain or pelvic pressure that does not go away.      You notice that your baby has stopped moving or is moving much less than normal.   Watch closely for changes in your health, and be sure to contact your doctor if you have any problems. Where can you learn more? Go to http://www.Let's Talk.com/  Enter Y951 in the search box to learn more about \"Pregnancy Precautions: Care Instructions. \"  Current as of: June 16, 2021               Content Version: 13.2  © 9716-1351 [employer]. Care instructions adapted under license by [employer] (which disclaims liability or warranty for this information). If you have questions about a medical condition or this instruction, always ask your healthcare professional. [place] [address] any warranty or liability for your use of this information. Learning About When to Call Your Doctor During Pregnancy (After 20 Weeks)  Overview  It's common to have concerns about what might be a problem when you're pregnant. Most pregnancies don't have any serious problems. But it's still important to know when to call your doctor if you have certain symptoms or signs of labor. These are general suggestions. Your doctor may give you some more information about when to call. When to call your doctor (after 20 weeks)  Call 911  anytime you think you may need emergency care. For example, call if:  You have severe vaginal bleeding. You have sudden, severe pain in your belly. You passed out (lost consciousness). You have a seizure. You see or feel the umbilical cord. You think you are about to deliver your baby and can't make it safely to the hospital.  Call your doctor now or seek immediate medical care if:  You have vaginal bleeding. You have belly pain. You have a fever. You have symptoms of preeclampsia, such as:  Sudden swelling of your face, hands, or feet. New vision problems (such as dimness, blurring, or seeing spots). A severe headache. You have a sudden release of fluid from your vagina. (You think your water broke.)  You think that you may be in labor. This means that you've had at least 6 contractions in an hour.   You notice that your baby has stopped moving or is moving much less than normal.  You have symptoms of a urinary tract infection. These may include:  Pain or burning when you urinate. A frequent need to urinate without being able to pass much urine. Pain in the flank, which is just below the rib cage and above the waist on either side of the back. Blood in your urine. Watch closely for changes in your health, and be sure to contact your doctor if:  You have vaginal discharge that smells bad. You have skin changes, such as:  A rash. Itching. Yellow color to your skin. You have other concerns about your pregnancy. If you have labor signs at 37 weeks or more  If you have signs of labor at 37 weeks or more, your doctor may tell you to call when your labor becomes more active. Symptoms of active labor include:  Contractions that are regular. Contractions that are less than 5 minutes apart. Contractions that are hard to talk through. Follow-up care is a key part of your treatment and safety. Be sure to make and go to all appointments, and call your doctor if you are having problems. It's also a good idea to know your test results and keep a list of the medicines you take. Where can you learn more? Go to http://www.gray.com/  Enter N531 in the search box to learn more about \"Learning About When to Call Your Doctor During Pregnancy (After 20 Weeks). \"  Current as of: June 16, 2021               Content Version: 13.2  © 2511-7965 Healthwise, Incorporated. Care instructions adapted under license by [employer] (which disclaims liability or warranty for this information). If you have questions about a medical condition or this instruction, always ask your healthcare professional. [place] [address] any warranty or liability for your use of this information.
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
## 2022-08-07 ENCOUNTER — HOSPITAL ENCOUNTER (EMERGENCY)
Age: 32
Discharge: HOME | End: 2022-08-08
Attending: STUDENT IN AN ORGANIZED HEALTH CARE EDUCATION/TRAINING PROGRAM | Admitting: OBSTETRICS & GYNECOLOGY
Payer: MEDICAID

## 2022-08-07 VITALS
HEIGHT: 61 IN | WEIGHT: 201 LBS | HEART RATE: 93 BPM | TEMPERATURE: 98.4 F | RESPIRATION RATE: 18 BRPM | SYSTOLIC BLOOD PRESSURE: 120 MMHG | DIASTOLIC BLOOD PRESSURE: 69 MMHG | BODY MASS INDEX: 37.95 KG/M2

## 2022-08-07 PROBLEM — O47.03 FALSE LABOR BEFORE 37 COMPLETED WEEKS OF GESTATION IN THIRD TRIMESTER: Status: ACTIVE | Noted: 2022-08-07

## 2022-08-07 PROBLEM — Z3A.32 32 WEEKS GESTATION OF PREGNANCY: Status: ACTIVE | Noted: 2022-08-07

## 2022-08-07 PROCEDURE — 75810000275 HC EMERGENCY DEPT VISIT NO LEVEL OF CARE

## 2022-08-08 PROCEDURE — 76817 TRANSVAGINAL US OBSTETRIC: CPT

## 2022-08-08 PROCEDURE — 59025 FETAL NON-STRESS TEST: CPT
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
## 2022-08-08 NOTE — PROGRESS NOTES
Patient c/o HA and pelvic pressure that has been occurring since 1500. Patient states that she took a muscle relaxant around 1700 with no relief. Patient denies medication for HA. Patient states that she has a subchorionic hemorrhage this pregnancy. No VB, LOF, or contractions. EFM applied and VS taken    2250-Dr. Welch Meals at to perform transvaginal US.      2303-Transvaginal US complete    2305-Ok'd by Dr. Welch Meals for d/c in 1 hour. 0005-D/c EFM for discharge home     0015-Patient given discharge instructions and verbalized understanding. Patient ambulated off unit.

## 2022-08-18 ENCOUNTER — OFFICE VISIT (OUTPATIENT)
Dept: FAMILY MEDICINE CLINIC | Age: 32
End: 2022-08-18
Payer: MEDICAID

## 2022-08-18 VITALS
RESPIRATION RATE: 16 BRPM | DIASTOLIC BLOOD PRESSURE: 75 MMHG | OXYGEN SATURATION: 99 % | HEART RATE: 87 BPM | SYSTOLIC BLOOD PRESSURE: 117 MMHG | TEMPERATURE: 98.2 F | HEIGHT: 61 IN | WEIGHT: 201 LBS | BODY MASS INDEX: 37.95 KG/M2

## 2022-08-18 DIAGNOSIS — L93.0 LUPUS ERYTHEMATOSUS, UNSPECIFIED FORM: ICD-10-CM

## 2022-08-18 DIAGNOSIS — R51.9 FREQUENT HEADACHES: ICD-10-CM

## 2022-08-18 DIAGNOSIS — Z09 HOSPITAL DISCHARGE FOLLOW-UP: Primary | ICD-10-CM

## 2022-08-18 DIAGNOSIS — O09.92 HIGH-RISK PREGNANCY, SECOND TRIMESTER: ICD-10-CM

## 2022-08-18 DIAGNOSIS — G93.2 PSEUDOTUMOR CEREBRI: ICD-10-CM

## 2022-08-18 PROCEDURE — 99214 OFFICE O/P EST MOD 30 MIN: CPT | Performed by: FAMILY MEDICINE

## 2022-08-18 NOTE — PROGRESS NOTES
Chief Complaint   Patient presents with    Follow-up     On headaches      1. Have you been to the ER, urgent care clinic since your last visit? Hospitalized since your last visit?  Labor and delivery     2. Have you seen or consulted any other health care providers outside of the [address] [place] [address] since your last visit? Include any pap smears or colon screening.  [facility]

## 2022-08-18 NOTE — PROGRESS NOTES
Chief Complaint   Patient presents with    Follow-up     On headaches      she is a 33y.o. year old female who presents for evalution. She has ICH and is post surgical insertion of stent in central venous sinus( brain) to decrease IC  pressure  She was scheduled  for a GBP then got pregnat so the GBP was put on hold  Now doing well with weight control while pregnant but still having HA  She had a recent visit w MARIANI and noted the left optic disc is swollen    She has a cerclage in now. She will be getting that removed at 36 weeks. The fetus has marginal cord insertion and is small for gestational age  She went to ER on aug 7th for vag bleeding and presumed contractions. It was false labor and the fetus is fine  This is a follow up      Reviewed PmHx, RxHx, FmHx, SocHx, AllgHx and updated and dated in the chart. Aspirin yes ____   No____ N/A____    Patient Active Problem List    Diagnosis    False labor before 40 completed weeks of gestation in third trimester    32 weeks gestation of pregnancy    Unilateral weakness    CVA (cerebral vascular accident) ([address]    Bruising    Intractable migraine    Intractable headache    Pseudotumor cerebri    Stenosis of cerebral venous sinus    Papilledema associated with increased intracranial pressure    Intracranial hypertension    Ataxia    Headache    IIH (idiopathic intracranial hypertension)    SLE (systemic lupus erythematosus) ([facility])    Class 3 severe obesity in adult ([address]    S/P repair of paraesophageal hernia    Paraesophageal hernia    GERD (gastroesophageal reflux disease)    Obesity, Class II, BMI 35-39.9    Sebaceous cyst     S/P excision; Along sternum in cleavage. History of Nissen fundoplication     4 duodenal ulcers, chronic gastritis, Grade C esophagitis, Chronic GERD, hernia, small tumor. Done August/2016.       Chronic migraine without aura without status migrainosus, not intractable    Cervical incompetence       Nurse notes were reviewed and copied and are correct  Review of Systems - negative except as listed above in the HPI    Objective:     Vitals:    08/18/22 1314   BP: 117/75   Pulse: 87   Resp: 16   Temp: 98.2 °F (36.8 °C)   TempSrc: Temporal   SpO2: 99%   Weight: 201 lb (91.2 kg)   Height: 5' 1\" (1.549 m)     Physical Examination: General appearance - alert, well appearing, and in no distress  Mental status - alert, oriented to person, place, and time  Chest - clear to auscultation, no wheezes, rales or rhonchi, symmetric air entry  Heart - normal rate, regular rhythm, normal S1, S2, no murmurs, rubs, clicks or gallops  Abdomen - soft, nontender, nondistended, no masses or organomegaly         Assessment/ Plan:   Diagnoses and all orders for this visit:    1. Hospital discharge follow-up  Doing well now  Still has a mild headache  No contractions   2. High-risk pregnancy, second trimester  No contractions  Has very regular OB visits  3. Pseudotumor cerebri  Has HA and one disc is mildly swollen  4. Frequent headaches    5. Lupus erythematosus, unspecified form   stable      ICD-10-CM ICD-9-CM    1. Hospital discharge follow-up  Z09 V67.59       2. High-risk pregnancy, second trimester  O09.92 V23.9       3. Pseudotumor cerebri  G93.2 348.2       4. Frequent headaches  R51.9 784.0       5. Lupus erythematosus, unspecified form  L93.0 695.4           I have discussed the diagnosis with the patient and the intended plan as seen in the above orders. The patient has received an after-visit summary  if not on [facility] and questions were answered concerning future plans. Patients in [facility] have access to avs without printing    Medication Side Effects and Warnings were discussed with patient:   Patient Labs were reviewed and or requested:   Patient Past Records were reviewed and or requested: yes        There are no Patient Instructions on file for this visit.

## 2022-09-07 LAB — GRBS, EXTERNAL: NORMAL

## 2022-09-08 DIAGNOSIS — M62.838 MUSCLE SPASM: ICD-10-CM

## 2022-09-09 DIAGNOSIS — M62.838 MUSCLE SPASM: ICD-10-CM

## 2022-09-09 RX ORDER — CYCLOBENZAPRINE HCL 10 MG
10 TABLET ORAL
Qty: 90 TABLET | Refills: 1 | OUTPATIENT
Start: 2022-09-09

## 2022-09-09 RX ORDER — CYCLOBENZAPRINE HCL 10 MG
TABLET ORAL
Qty: 90 TABLET | Refills: 1 | Status: SHIPPED | OUTPATIENT
Start: 2022-09-09

## 2022-09-09 NOTE — TELEPHONE ENCOUNTER
Ross Priest MD  8/18/2022  Health Maintenance Due   Topic Date Due    COVID-19 Vaccine (1) Never done    Cervical cancer screen  12/05/2018    OB 3RD TRIMESTER TDAP  07/02/2022    Flu Vaccine (1) Never done     Results for orders placed or performed during the hospital encounter of 07/29/22   PH BY NITRAZINE, POC   Result Value Ref Range    pH-Nitrazine paper 5.0 5.0 - 6.1    Daily QC performed? Yes    RUPTURE OF FETAL MEMBRANES, POC   Result Value Ref Range    Rupture of fetal membrane Negative Negative    Control line present?  Acceptable     Internal negative control Acceptable     Kit Lot No. [id number]     Expiration date 01/31/2024

## 2022-09-13 ENCOUNTER — HOSPITAL ENCOUNTER (EMERGENCY)
Age: 32
Discharge: HOME | End: 2022-09-13
Attending: OBSTETRICS & GYNECOLOGY | Admitting: OBSTETRICS & GYNECOLOGY
Payer: MEDICAID

## 2022-09-13 ENCOUNTER — HOSPITAL ENCOUNTER (EMERGENCY)
Age: 32
Discharge: HOME | End: 2022-09-14
Attending: STUDENT IN AN ORGANIZED HEALTH CARE EDUCATION/TRAINING PROGRAM | Admitting: OBSTETRICS & GYNECOLOGY
Payer: MEDICAID

## 2022-09-13 VITALS
RESPIRATION RATE: 16 BRPM | TEMPERATURE: 98.4 F | HEART RATE: 90 BPM | DIASTOLIC BLOOD PRESSURE: 79 MMHG | BODY MASS INDEX: 36.62 KG/M2 | HEIGHT: 62 IN | SYSTOLIC BLOOD PRESSURE: 131 MMHG | WEIGHT: 199 LBS | OXYGEN SATURATION: 100 %

## 2022-09-13 LAB
ALBUMIN SERPL-MCNC: 2.6 G/DL (ref 3.5–5)
ALBUMIN/GLOB SERPL: 0.7 {RATIO} (ref 1.1–2.2)
ALP SERPL-CCNC: 135 U/L (ref 45–117)
ALT SERPL-CCNC: 19 U/L (ref 12–78)
ANION GAP SERPL CALC-SCNC: 8 MMOL/L (ref 5–15)
AST SERPL-CCNC: 20 U/L (ref 15–37)
BASOPHILS # BLD: 0 K/UL (ref 0–0.1)
BASOPHILS NFR BLD: 0 % (ref 0–1)
BILIRUB SERPL-MCNC: 0.3 MG/DL (ref 0.2–1)
BUN SERPL-MCNC: 4 MG/DL (ref 6–20)
BUN/CREAT SERPL: 6 (ref 12–20)
CALCIUM SERPL-MCNC: 9 MG/DL (ref 8.5–10.1)
CHLORIDE SERPL-SCNC: 110 MMOL/L (ref 97–108)
CO2 SERPL-SCNC: 23 MMOL/L (ref 21–32)
CREAT SERPL-MCNC: 0.63 MG/DL (ref 0.55–1.02)
CREAT UR-MCNC: 123 MG/DL
DIFFERENTIAL METHOD BLD: ABNORMAL
EOSINOPHIL # BLD: 0.1 K/UL (ref 0–0.4)
EOSINOPHIL NFR BLD: 1 % (ref 0–7)
ERYTHROCYTE [DISTWIDTH] IN BLOOD BY AUTOMATED COUNT: 13.2 % (ref 11.5–14.5)
GLOBULIN SER CALC-MCNC: 3.9 G/DL (ref 2–4)
GLUCOSE SERPL-MCNC: 96 MG/DL (ref 65–100)
HCT VFR BLD AUTO: 33.5 % (ref 35–47)
HGB BLD-MCNC: 11.5 G/DL (ref 11.5–16)
IMM GRANULOCYTES # BLD AUTO: 0 K/UL (ref 0–0.04)
IMM GRANULOCYTES NFR BLD AUTO: 0 % (ref 0–0.5)
LYMPHOCYTES # BLD: 1.9 K/UL (ref 0.8–3.5)
LYMPHOCYTES NFR BLD: 25 % (ref 12–49)
MCH RBC QN AUTO: 31.2 PG (ref 26–34)
MCHC RBC AUTO-ENTMCNC: 34.3 G/DL (ref 30–36.5)
MCV RBC AUTO: 90.8 FL (ref 80–99)
MONOCYTES # BLD: 0.5 K/UL (ref 0–1)
MONOCYTES NFR BLD: 6 % (ref 5–13)
NEUTS SEG # BLD: 5.3 K/UL (ref 1.8–8)
NEUTS SEG NFR BLD: 68 % (ref 32–75)
NRBC # BLD: 0 K/UL (ref 0–0.01)
NRBC BLD-RTO: 0 PER 100 WBC
PLATELET # BLD AUTO: 168 K/UL (ref 150–400)
PMV BLD AUTO: 11 FL (ref 8.9–12.9)
POTASSIUM SERPL-SCNC: 3.5 MMOL/L (ref 3.5–5.1)
PROT SERPL-MCNC: 6.5 G/DL (ref 6.4–8.2)
PROT UR-MCNC: 21 MG/DL (ref 0–11.9)
PROT/CREAT UR-RTO: 0.2
RBC # BLD AUTO: 3.69 M/UL (ref 3.8–5.2)
SODIUM SERPL-SCNC: 141 MMOL/L (ref 136–145)
WBC # BLD AUTO: 7.8 K/UL (ref 3.6–11)

## 2022-09-13 PROCEDURE — 36415 COLL VENOUS BLD VENIPUNCTURE: CPT

## 2022-09-13 PROCEDURE — 84156 ASSAY OF PROTEIN URINE: CPT

## 2022-09-13 PROCEDURE — 59025 FETAL NON-STRESS TEST: CPT

## 2022-09-13 PROCEDURE — 85025 COMPLETE CBC W/AUTO DIFF WBC: CPT

## 2022-09-13 PROCEDURE — 74011250636 HC RX REV CODE- 250/636: Performed by: OBSTETRICS & GYNECOLOGY

## 2022-09-13 PROCEDURE — 80053 COMPREHEN METABOLIC PANEL: CPT

## 2022-09-13 RX ORDER — FAMOTIDINE 10 MG/1
10 TABLET ORAL 2 TIMES DAILY
COMMUNITY
End: 2022-10-16

## 2022-09-13 RX ORDER — SODIUM CHLORIDE, SODIUM LACTATE, POTASSIUM CHLORIDE, CALCIUM CHLORIDE 600; 310; 30; 20 MG/100ML; MG/100ML; MG/100ML; MG/100ML
125 INJECTION, SOLUTION INTRAVENOUS CONTINUOUS
Status: DISCONTINUED | OUTPATIENT
Start: 2022-09-14 | End: 2022-09-14 | Stop reason: HOSPADM

## 2022-09-13 RX ORDER — SODIUM CHLORIDE, SODIUM LACTATE, POTASSIUM CHLORIDE, CALCIUM CHLORIDE 600; 310; 30; 20 MG/100ML; MG/100ML; MG/100ML; MG/100ML
1000 INJECTION, SOLUTION INTRAVENOUS CONTINUOUS
Status: DISCONTINUED | OUTPATIENT
Start: 2022-09-13 | End: 2022-09-13 | Stop reason: HOSPADM

## 2022-09-13 RX ADMIN — SODIUM CHLORIDE, POTASSIUM CHLORIDE, SODIUM LACTATE AND CALCIUM CHLORIDE 1000 ML/HR: 600; 310; 30; 20 INJECTION, SOLUTION INTRAVENOUS at 15:51
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
## 2022-09-13 NOTE — DISCHARGE INSTRUCTIONS
call [facility] in am for appointment Wed or Thurs. Counting Your Baby's Kicks: Care Instructions  Overview     Counting your baby's kicks is one way your doctor can tell that your baby is healthy. Most women--especially in a first pregnancy--feel their baby move for the first time between 16 and 22 weeks. The movement may feel like flutters rather than kicks. Your baby may move more at certain times of the day. When you are active, you may notice less kicking than when you are resting. At your prenatal visits, your doctor will ask whether the baby is active. In your last trimester, your doctor may ask you to count the number of times you feel your baby move. Follow-up care is a key part of your treatment and safety. Be sure to make and go to all appointments, and call your doctor if you are having problems. It's also a good idea to know your test results and keep a list of the medicines you take. How do you count fetal kicks? A common method of checking your baby's movement is to note the length of time it takes to count ten movements (such as kicks, flutters, or rolls). Pick your baby's most active time of day to count. This may be any time from morning to evening. If you don't feel 10 movements in an hour, have something to eat or drink and count for another hour. If you don't feel at least 10 movements in the 2-hour period, call your doctor. When should you call for help? Call your doctor now or seek immediate medical care if:    You noticed that your baby has stopped moving or is moving much less than normal.   Watch closely for changes in your health, and be sure to contact your doctor if you have any problems. Where can you learn more? Go to http://www.gray.com/  Enter [id number] in the search box to learn more about \"Counting Your Baby's Kicks: Care Instructions. \"  Current as of: June 16, 2021               Content Version: 13.2  © 9816-9210 Healthwise, [employer]. Care instructions adapted under license by [employer] (which disclaims liability or warranty for this information). If you have questions about a medical condition or this instruction, always ask your healthcare professional. [address] any warranty or liability for your use of this information.

## 2022-09-14 VITALS
WEIGHT: 199 LBS | DIASTOLIC BLOOD PRESSURE: 87 MMHG | TEMPERATURE: 98 F | BODY MASS INDEX: 37.57 KG/M2 | HEIGHT: 61 IN | RESPIRATION RATE: 18 BRPM | HEART RATE: 96 BPM | OXYGEN SATURATION: 97 % | SYSTOLIC BLOOD PRESSURE: 136 MMHG

## 2022-09-14 PROBLEM — O99.891 SYSTEMIC LUPUS ERYTHEMATOSUS AFFECTING PREGNANCY IN THIRD TRIMESTER (HCC): Status: ACTIVE | Noted: 2022-09-14

## 2022-09-14 PROBLEM — M32.9 SYSTEMIC LUPUS ERYTHEMATOSUS AFFECTING PREGNANCY IN THIRD TRIMESTER (HCC): Status: ACTIVE | Noted: 2022-09-14

## 2022-09-14 PROBLEM — O99.213 OBESITY AFFECTING PREGNANCY IN THIRD TRIMESTER: Status: ACTIVE | Noted: 2022-09-14

## 2022-09-14 LAB
ABO + RH BLD: NORMAL
ALBUMIN SERPL-MCNC: 2.4 G/DL (ref 3.5–5)
ALBUMIN/GLOB SERPL: 0.7 {RATIO} (ref 1.1–2.2)
ALP SERPL-CCNC: 129 U/L (ref 45–117)
ALT SERPL-CCNC: 18 U/L (ref 12–78)
AMPHET UR QL SCN: NEGATIVE
ANION GAP SERPL CALC-SCNC: 8 MMOL/L (ref 5–15)
AST SERPL-CCNC: 19 U/L (ref 15–37)
BARBITURATES UR QL SCN: NEGATIVE
BASOPHILS # BLD: 0 K/UL (ref 0–0.1)
BASOPHILS NFR BLD: 0 % (ref 0–1)
BENZODIAZ UR QL: NEGATIVE
BILIRUB SERPL-MCNC: 0.5 MG/DL (ref 0.2–1)
BLOOD GROUP ANTIBODIES SERPL: NORMAL
BUN SERPL-MCNC: 10 MG/DL (ref 6–20)
BUN/CREAT SERPL: 20 (ref 12–20)
CALCIUM SERPL-MCNC: 9 MG/DL (ref 8.5–10.1)
CANNABINOIDS UR QL SCN: NEGATIVE
CHLORIDE SERPL-SCNC: 110 MMOL/L (ref 97–108)
CO2 SERPL-SCNC: 23 MMOL/L (ref 21–32)
COCAINE UR QL SCN: NEGATIVE
CREAT SERPL-MCNC: 0.51 MG/DL (ref 0.55–1.02)
CREAT UR-MCNC: 100 MG/DL
DIFFERENTIAL METHOD BLD: ABNORMAL
DRUG SCRN COMMENT,DRGCM: NORMAL
EOSINOPHIL # BLD: 0.1 K/UL (ref 0–0.4)
EOSINOPHIL NFR BLD: 1 % (ref 0–7)
ERYTHROCYTE [DISTWIDTH] IN BLOOD BY AUTOMATED COUNT: 13.2 % (ref 11.5–14.5)
GLOBULIN SER CALC-MCNC: 3.6 G/DL (ref 2–4)
GLUCOSE SERPL-MCNC: 84 MG/DL (ref 65–100)
HCT VFR BLD AUTO: 31.3 % (ref 35–47)
HGB BLD-MCNC: 10.7 G/DL (ref 11.5–16)
IMM GRANULOCYTES # BLD AUTO: 0 K/UL (ref 0–0.04)
IMM GRANULOCYTES NFR BLD AUTO: 0 % (ref 0–0.5)
LYMPHOCYTES # BLD: 2.4 K/UL (ref 0.8–3.5)
LYMPHOCYTES NFR BLD: 34 % (ref 12–49)
MCH RBC QN AUTO: 31.7 PG (ref 26–34)
MCHC RBC AUTO-ENTMCNC: 34.2 G/DL (ref 30–36.5)
MCV RBC AUTO: 92.6 FL (ref 80–99)
METHADONE UR QL: NEGATIVE
MONOCYTES # BLD: 0.4 K/UL (ref 0–1)
MONOCYTES NFR BLD: 6 % (ref 5–13)
NEUTS SEG # BLD: 4.1 K/UL (ref 1.8–8)
NEUTS SEG NFR BLD: 59 % (ref 32–75)
NRBC # BLD: 0 K/UL (ref 0–0.01)
NRBC BLD-RTO: 0 PER 100 WBC
OPIATES UR QL: NEGATIVE
PCP UR QL: NEGATIVE
PLATELET # BLD AUTO: 178 K/UL (ref 150–400)
PMV BLD AUTO: 11.4 FL (ref 8.9–12.9)
POTASSIUM SERPL-SCNC: 3.6 MMOL/L (ref 3.5–5.1)
PROT SERPL-MCNC: 6 G/DL (ref 6.4–8.2)
PROT UR-MCNC: 21 MG/DL (ref 0–11.9)
PROT/CREAT UR-RTO: 0.2
RBC # BLD AUTO: 3.38 M/UL (ref 3.8–5.2)
SODIUM SERPL-SCNC: 141 MMOL/L (ref 136–145)
SPECIMEN EXP DATE BLD: NORMAL
URATE SERPL-MCNC: 4.1 MG/DL (ref 2.6–6)
WBC # BLD AUTO: 7.1 K/UL (ref 3.6–11)

## 2022-09-14 PROCEDURE — 84550 ASSAY OF BLOOD/URIC ACID: CPT

## 2022-09-14 PROCEDURE — 80307 DRUG TEST PRSMV CHEM ANLYZR: CPT

## 2022-09-14 PROCEDURE — 36415 COLL VENOUS BLD VENIPUNCTURE: CPT

## 2022-09-14 PROCEDURE — 85025 COMPLETE CBC W/AUTO DIFF WBC: CPT

## 2022-09-14 PROCEDURE — 84156 ASSAY OF PROTEIN URINE: CPT

## 2022-09-14 PROCEDURE — 80053 COMPREHEN METABOLIC PANEL: CPT

## 2022-09-14 PROCEDURE — 86900 BLOOD TYPING SEROLOGIC ABO: CPT

## 2022-09-14 RX ORDER — SODIUM CHLORIDE 0.9 % (FLUSH) 0.9 %
5-40 SYRINGE (ML) INJECTION EVERY 8 HOURS
Status: CANCELLED | OUTPATIENT
Start: 2022-09-14

## 2022-09-14 RX ORDER — NALOXONE HYDROCHLORIDE 0.4 MG/ML
0.4 INJECTION, SOLUTION INTRAMUSCULAR; INTRAVENOUS; SUBCUTANEOUS AS NEEDED
Status: DISCONTINUED | OUTPATIENT
Start: 2022-09-14 | End: 2022-09-14 | Stop reason: HOSPADM

## 2022-09-14 RX ORDER — SODIUM CHLORIDE 0.9 % (FLUSH) 0.9 %
5-40 SYRINGE (ML) INJECTION AS NEEDED
Status: CANCELLED | OUTPATIENT
Start: 2022-09-14

## 2022-09-14 RX ORDER — OXYTOCIN/RINGER'S LACTATE 30/500 ML
10 PLASTIC BAG, INJECTION (ML) INTRAVENOUS AS NEEDED
Status: CANCELLED | OUTPATIENT
Start: 2022-09-14

## 2022-09-14 RX ORDER — SODIUM CHLORIDE, SODIUM LACTATE, POTASSIUM CHLORIDE, CALCIUM CHLORIDE 600; 310; 30; 20 MG/100ML; MG/100ML; MG/100ML; MG/100ML
125 INJECTION, SOLUTION INTRAVENOUS CONTINUOUS
Status: CANCELLED | OUTPATIENT
Start: 2022-09-14

## 2022-09-14 RX ORDER — OXYTOCIN/RINGER'S LACTATE 30/500 ML
87.3 PLASTIC BAG, INJECTION (ML) INTRAVENOUS AS NEEDED
Status: CANCELLED | OUTPATIENT
Start: 2022-09-14

## 2022-09-14 NOTE — PROGRESS NOTES
NST:    Baseline: 140    Variability: Moderate    Accelerations: Two 15 x 15 accels in ten minute window    Decelerations: None    Contractions: None    RNST    This test was preformed under my supervision and interpreted by me.     Lorren Libman M.D.
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
## 2022-09-14 NOTE — PROGRESS NOTES
[id number] [facility]  Christos Dunn is a 33 y.o.  [address] at 37w0d patient of Dr Linda Reina at [address] [facility] who presents to L&D triage with c/o high blood pressure at home. She reports Positive FM, denies vaginal bleeding, LOF, and contractions. She also denies Scotoma, RUQ pain, and Edema. Urine sample obtained. EFM and toco placed for initial assessment. 6994. Notified Dr. Karly Kemp of pt arrival, due to previous visit plan to keep patient observation at this time, Dr. Karly Kemp will reassess in a few hours. 0423. Dr. Karly Kemp in room, nst reviewed, discussed plan of care with patient, will plan to admit patient at this time, however will let Dr. Linda Reina decide plan for today. 3169. Bedside shift change report given to ASTER Iniguez RN  (oncoming nurse) by KATELYN Isidro RN (offgoing nurse). Report included the following information SBAR, Kardex, Intake/Output, MAR, and Recent Results.

## 2022-09-14 NOTE — PROGRESS NOTES
0720:  Bedside and Verbal shift change report given to ASTER Esteban RN (oncoming nurse) by KATELYN Pettit RN (offgoing nurse). Report included the following information SBAR, Kardex, Intake/Output, MAR, and Recent Results. 0845:  Dr. Martin Come in with pt. POC discussed with pt, pt prefers to d/c home. 0900:  Discharge instructions reviewed with pt. Pt to follow up in office at scheduled appointment 9/16. Pt states no questions at this time. Pt discharged home.

## 2022-09-14 NOTE — PROGRESS NOTES
Progress Note  Patient seen and evaluated. No new sx. HA at baseline. No vision changes, increased swelling, RUQ pain. Good FM. Occasional ctx. No LOF, VB. Visit Vitals  /87   Pulse 96   Temp 98 °F (36.7 °C)   Resp 18   Ht 5' 1\" (1.549 m)   Wt 90.3 kg (199 lb)   SpO2 97%   BMI 37.60 kg/m²      Discussed with patient that in the setting of normotensive BPs since arrival, she has not yet met criteria for GHTN. Given reactive Cat1 tracing and normal PIH labs, it is reasonable to return home and continue to monitor BPs and sx. Close interval follow up in the office scheduled for the end of this week.      Sofi Mack MD

## 2022-09-14 NOTE — DISCHARGE INSTRUCTIONS
Follow up in office at scheduled appointment 9/16. Week 40 of Your Pregnancy: Care Instructions  Overview     You are near the end of your pregnancy--and you're probably pretty uncomfortable. It may be harder to walk around. Lying down probably isn't comfortable either. You may have trouble getting to sleep or staying asleep. Most babies are born between 40 and 41 weeks. This is a good time to think about packing a bag for the hospital with items you'll need. Then you'll be ready when labor starts. Follow-up care is a key part of your treatment and safety. Be sure to make and go to all appointments, and call your doctor if you are having problems. It's also a good idea to know your test results and keep a list of the medicines you take. How can you care for yourself at home? Learn about breastfeeding  Breastfeeding is best for your baby and good for you. Breast milk has antibodies to help your baby fight infections. If you breastfeed, you may lose weight faster. That's because making milk burns calories. Learning the best ways to hold your baby will make breastfeeding easier. Sometimes breastfeeding can make partners feel left out. If you have a partner, plan how you can care for your baby together. For example, your partner can bathe and diaper the baby. You can snuggle together when you breastfeed. You may want to learn how to use a breast pump and store your milk. If you choose to bottle feed, make the feeding feel like breastfeeding so you can bond with your baby. Always hold your baby and the bottle. Don't prop bottles or let your baby fall asleep with a bottle. Learn about crying  It's common for babies to cry for 1 to 3 hours a day. Some cry more, and some cry less. Babies don't cry to make you upset or because you're a bad parent. Crying is how your baby communicates. Your baby may be hungry; have gas; need a diaper change; or feel cold, warm, tired, lonely, or tense.  Sometimes babies cry for unknown reasons. If you respond to your baby's needs, your baby will learn to trust you. Try to stay calm when your baby cries. Your baby may get more upset if they sense that you are upset. Know how to care for your   Your baby's umbilical cord stump will drop off on its own, usually between 1 and 2 weeks. To care for your baby's umbilical cord area:  Clean the area at the bottom of the cord 2 or 3 times a day. Pay special attention to the area where the cord attaches to the skin. Keep the diaper folded below the cord. Use a damp washcloth or cotton ball to sponge bathe your baby until the stump has come off. Your baby's first dark stool is called meconium. After the meconium is passed, your baby will develop their own bowel pattern. Some babies, especially  babies, have several bowel movements a day. Others have one or two a day, or one every 2 to 3 days.  babies often have loose, yellow stools. Formula-fed babies have more formed stools. If your baby's stools look like little pellets, your baby is constipated. After 2 days of constipation, call your baby's doctor. If your baby will be circumcised, you can care for your baby at home. Gently rinse your baby's penis with warm water after every diaper change. Don't try to remove the film that forms on the penis. This film will go away on its own. Pat dry. Put petroleum ointment, such as Vaseline, on the area of the diaper that will touch your baby's penis. This will keep the diaper from sticking to your baby. Ask the doctor about giving your baby acetaminophen (Tylenol) for pain. Where can you learn more? Go to http://www.gray.com/  Enter N257 in the search box to learn more about \"Week 37 of Your Pregnancy: Care Instructions. \"  Current as of: 2021               Content Version: 13.2  © 1715-2569 [employer].    Care instructions adapted under license by [employer] (which disclaims liability or warranty for this information). If you have questions about a medical condition or this instruction, always ask your healthcare professional. [address] any warranty or liability for your use of this information. Learning About Pregnancy  Your Care Instructions     Your health in the early weeks of your pregnancy is particularly important for your baby's health. Take good care of yourself. Anything you do that harms your body can also harm your baby. Make sure to go to all of your doctor appointments. Regular checkups will help keep you and your baby healthy. How can you care for yourself at home? Diet    Eat a balanced diet. Make sure your diet includes plenty of beans, peas, and leafy green vegetables. Do not skip meals or go for many hours without eating. If you are nauseated, try to eat a small, healthy snack every 2 to 3 hours. Do not eat fish that has a high level of mercury, such as shark, swordfish, or mackerel. Do not eat more than one can of tuna each week. Drink plenty of fluids. If you have kidney, heart, or liver disease and have to limit fluids, talk with your doctor before you increase the amount of fluids you drink. Cut down on caffeine, such as coffee, tea, and cola. Do not drink alcohol, such as beer, wine, or hard liquor. Take a multivitamin that contains at least 400 micrograms (mcg) of folic acid to help prevent birth defects. Fortified cereal and whole wheat bread are good additional sources of folic acid. Increase the calcium in your diet. Try to drink a quart of skim milk each day. You may also take calcium supplements and choose foods such as cheese and yogurt. Lifestyle    Make sure you go to your follow-up appointments. Get plenty of rest. You may be unusually tired while you are pregnant. Get at least 30 minutes of exercise on most days of the week. Walking is a good choice.  If you have not exercised in the past, start out slowly. Take several short walks each day. Do not smoke. If you need help quitting, talk to your doctor about stop-smoking programs. These can increase your chances of quitting for good. Do not touch cat feces or litter boxes. Also, wash your hands after you handle raw meat, and fully cook all meat before you eat it. Wear gloves when you work in the yard or garden, and wash your hands well when you are done. Cat feces, raw or undercooked meat, and contaminated dirt can cause an infection that may harm your baby or lead to a miscarriage. Avoid things that can make your body too hot and may be harmful to your baby, such as a hot tub or sauna. Or talk with your doctor before doing anything that raises your body temperature. Your doctor can tell you if it's safe. Avoid chemical fumes, paint fumes, or poisons. Do not use illegal drugs, marijuana, or alcohol. Medicines    Review all of your medicines with your doctor. Some of your routine medicines may need to be changed to protect your baby. Use acetaminophen (Tylenol) to relieve minor problems, such as a mild headache or backache or a mild fever with cold symptoms. Do not use nonsteroidal anti-inflammatory drugs (NSAIDs), such as ibuprofen (Advil, Motrin) or naproxen (Aleve), unless your doctor says it is okay. Do not take two or more pain medicines at the same time unless the doctor told you to. Many pain medicines have acetaminophen, which is Tylenol. Too much acetaminophen (Tylenol) can be harmful. Take your medicines exactly as prescribed. Call your doctor if you think you are having a problem with your medicine. To manage morning sickness    If you feel sick when you first wake up, try eating a small snack (such as crackers) before you get out of bed. Allow some time to digest the snack, and then get out of bed slowly. Do not skip meals or go for long periods without eating.  An empty stomach can make nausea worse. Eat small, frequent meals instead of three large meals each day. Drink plenty of fluids. Eat foods that are high in protein but low in fat. If you are taking iron supplements, ask your doctor if they are necessary. Iron can make nausea worse. Avoid any smells, such as coffee, that make you feel sick. Get lots of rest. Morning sickness may be worse when you are tired. Follow-up care is a key part of your treatment and safety. Be sure to make and go to all appointments, and call your doctor if you are having problems. It's also a good idea to know your test results and keep a list of the medicines you take. Where can you learn more? Go to http://www.gray.com/  Enter Q858 in the search box to learn more about \"Learning About Pregnancy. \"  Current as of: June 16, 2021               Content Version: 13.2  © 0579-2742 [employer], [facility]. Care instructions adapted under license by [employer] (which disclaims liability or warranty for this information). If you have questions about a medical condition or this instruction, always ask your healthcare professional. [address] any warranty or liability for your use of this information.

## 2022-09-19 ENCOUNTER — HOSPITAL ENCOUNTER (INPATIENT)
Age: 32
LOS: 3 days | Discharge: HOME | DRG: 560 | End: 2022-09-22
Attending: STUDENT IN AN ORGANIZED HEALTH CARE EDUCATION/TRAINING PROGRAM | Admitting: STUDENT IN AN ORGANIZED HEALTH CARE EDUCATION/TRAINING PROGRAM
Payer: MEDICAID

## 2022-09-19 PROBLEM — Z34.90 PREGNANT: Status: ACTIVE | Noted: 2022-09-19

## 2022-09-19 LAB
ABO + RH BLD: NORMAL
AMPHET UR QL SCN: NEGATIVE
BARBITURATES UR QL SCN: NEGATIVE
BASOPHILS # BLD: 0 K/UL (ref 0–0.1)
BASOPHILS NFR BLD: 0 % (ref 0–1)
BENZODIAZ UR QL: NEGATIVE
BLOOD GROUP ANTIBODIES SERPL: NORMAL
CANNABINOIDS UR QL SCN: NEGATIVE
COCAINE UR QL SCN: NEGATIVE
DIFFERENTIAL METHOD BLD: ABNORMAL
DRUG SCRN COMMENT,DRGCM: NORMAL
EOSINOPHIL # BLD: 0.1 K/UL (ref 0–0.4)
EOSINOPHIL NFR BLD: 1 % (ref 0–7)
ERYTHROCYTE [DISTWIDTH] IN BLOOD BY AUTOMATED COUNT: 13.2 % (ref 11.5–14.5)
HCT VFR BLD AUTO: 32.5 % (ref 35–47)
HGB BLD-MCNC: 11.2 G/DL (ref 11.5–16)
IMM GRANULOCYTES # BLD AUTO: 0 K/UL (ref 0–0.04)
IMM GRANULOCYTES NFR BLD AUTO: 0 % (ref 0–0.5)
LYMPHOCYTES # BLD: 1.8 K/UL (ref 0.8–3.5)
LYMPHOCYTES NFR BLD: 27 % (ref 12–49)
MCH RBC QN AUTO: 31.5 PG (ref 26–34)
MCHC RBC AUTO-ENTMCNC: 34.5 G/DL (ref 30–36.5)
MCV RBC AUTO: 91.3 FL (ref 80–99)
METHADONE UR QL: NEGATIVE
MONOCYTES # BLD: 0.5 K/UL (ref 0–1)
MONOCYTES NFR BLD: 8 % (ref 5–13)
NEUTS SEG # BLD: 4.2 K/UL (ref 1.8–8)
NEUTS SEG NFR BLD: 64 % (ref 32–75)
NRBC # BLD: 0 K/UL (ref 0–0.01)
NRBC BLD-RTO: 0 PER 100 WBC
OPIATES UR QL: NEGATIVE
PCP UR QL: NEGATIVE
PLATELET # BLD AUTO: 180 K/UL (ref 150–400)
PMV BLD AUTO: 11.2 FL (ref 8.9–12.9)
RBC # BLD AUTO: 3.56 M/UL (ref 3.8–5.2)
SPECIMEN EXP DATE BLD: NORMAL
WBC # BLD AUTO: 6.6 K/UL (ref 3.6–11)

## 2022-09-19 PROCEDURE — 86900 BLOOD TYPING SEROLOGIC ABO: CPT

## 2022-09-19 PROCEDURE — 65410000002 HC RM PRIVATE OB

## 2022-09-19 PROCEDURE — 74011250637 HC RX REV CODE- 250/637: Performed by: STUDENT IN AN ORGANIZED HEALTH CARE EDUCATION/TRAINING PROGRAM

## 2022-09-19 PROCEDURE — 74011250636 HC RX REV CODE- 250/636: Performed by: STUDENT IN AN ORGANIZED HEALTH CARE EDUCATION/TRAINING PROGRAM

## 2022-09-19 PROCEDURE — 80307 DRUG TEST PRSMV CHEM ANLYZR: CPT

## 2022-09-19 PROCEDURE — 74011000258 HC RX REV CODE- 258: Performed by: STUDENT IN AN ORGANIZED HEALTH CARE EDUCATION/TRAINING PROGRAM

## 2022-09-19 PROCEDURE — 59200 INSERT CERVICAL DILATOR: CPT

## 2022-09-19 PROCEDURE — 4A1HXCZ MONITORING OF PRODUCTS OF CONCEPTION, CARDIAC RATE, EXTERNAL APPROACH: ICD-10-PCS | Performed by: OBSTETRICS & GYNECOLOGY

## 2022-09-19 PROCEDURE — 85025 COMPLETE CBC W/AUTO DIFF WBC: CPT

## 2022-09-19 PROCEDURE — 74011000250 HC RX REV CODE- 250: Performed by: STUDENT IN AN ORGANIZED HEALTH CARE EDUCATION/TRAINING PROGRAM

## 2022-09-19 PROCEDURE — 36415 COLL VENOUS BLD VENIPUNCTURE: CPT

## 2022-09-19 PROCEDURE — 74011250636 HC RX REV CODE- 250/636: Performed by: OBSTETRICS & GYNECOLOGY

## 2022-09-19 RX ORDER — OXYTOCIN/RINGER'S LACTATE 30/500 ML
10 PLASTIC BAG, INJECTION (ML) INTRAVENOUS AS NEEDED
Status: COMPLETED | OUTPATIENT
Start: 2022-09-19 | End: 2022-09-20

## 2022-09-19 RX ORDER — SODIUM CHLORIDE 0.9 % (FLUSH) 0.9 %
5-40 SYRINGE (ML) INJECTION EVERY 8 HOURS
Status: DISCONTINUED | OUTPATIENT
Start: 2022-09-19 | End: 2022-09-21

## 2022-09-19 RX ORDER — SODIUM CHLORIDE, SODIUM LACTATE, POTASSIUM CHLORIDE, CALCIUM CHLORIDE 600; 310; 30; 20 MG/100ML; MG/100ML; MG/100ML; MG/100ML
125 INJECTION, SOLUTION INTRAVENOUS CONTINUOUS
Status: DISCONTINUED | OUTPATIENT
Start: 2022-09-19 | End: 2022-09-20

## 2022-09-19 RX ORDER — BUTORPHANOL TARTRATE 2 MG/ML
1 INJECTION INTRAMUSCULAR; INTRAVENOUS ONCE
Status: COMPLETED | OUTPATIENT
Start: 2022-09-20 | End: 2022-09-19

## 2022-09-19 RX ORDER — NALOXONE HYDROCHLORIDE 0.4 MG/ML
0.4 INJECTION, SOLUTION INTRAMUSCULAR; INTRAVENOUS; SUBCUTANEOUS AS NEEDED
Status: DISCONTINUED | OUTPATIENT
Start: 2022-09-19 | End: 2022-09-20 | Stop reason: HOSPADM

## 2022-09-19 RX ORDER — FAMOTIDINE 20 MG/1
20 TABLET, FILM COATED ORAL 2 TIMES DAILY
Status: DISCONTINUED | OUTPATIENT
Start: 2022-09-19 | End: 2022-09-22 | Stop reason: HOSPADM

## 2022-09-19 RX ORDER — ACETAMINOPHEN 325 MG/1
650 TABLET ORAL
Status: DISCONTINUED | OUTPATIENT
Start: 2022-09-19 | End: 2022-09-19

## 2022-09-19 RX ORDER — SODIUM CHLORIDE 0.9 % (FLUSH) 0.9 %
5-40 SYRINGE (ML) INJECTION AS NEEDED
Status: DISCONTINUED | OUTPATIENT
Start: 2022-09-19 | End: 2022-09-21

## 2022-09-19 RX ORDER — OXYTOCIN/RINGER'S LACTATE 30/500 ML
87.3 PLASTIC BAG, INJECTION (ML) INTRAVENOUS AS NEEDED
Status: DISCONTINUED | OUTPATIENT
Start: 2022-09-19 | End: 2022-09-21

## 2022-09-19 RX ORDER — ONDANSETRON 2 MG/ML
4 INJECTION INTRAMUSCULAR; INTRAVENOUS
Status: DISCONTINUED | OUTPATIENT
Start: 2022-09-19 | End: 2022-09-20 | Stop reason: HOSPADM

## 2022-09-19 RX ORDER — NALBUPHINE HYDROCHLORIDE 10 MG/ML
10 INJECTION, SOLUTION INTRAMUSCULAR; INTRAVENOUS; SUBCUTANEOUS
Status: DISCONTINUED | OUTPATIENT
Start: 2022-09-19 | End: 2022-09-19

## 2022-09-19 RX ADMIN — FAMOTIDINE 20 MG: 20 TABLET, FILM COATED ORAL at 21:32

## 2022-09-19 RX ADMIN — SODIUM CHLORIDE, PRESERVATIVE FREE 10 ML: 5 INJECTION INTRAVENOUS at 23:51

## 2022-09-19 RX ADMIN — SODIUM CHLORIDE 5 MILLION UNITS: 900 INJECTION INTRAVENOUS at 23:22

## 2022-09-19 RX ADMIN — BUTORPHANOL TARTRATE 1 MG: 2 INJECTION, SOLUTION INTRAMUSCULAR; INTRAVENOUS at 23:49

## 2022-09-19 NOTE — PROGRESS NOTES
[place] of care note. MBL-SBAR received from Dr Chadwick Kern. Assuming care of this 32 yo [id number] at 37+6 admitted for IOL due to IUGR (EFW 11%, AC 3%). PNC c/b obesity (BMI 37), GBS pos, SLE and Intracranial HTN with venous stent in place. Cook placed at 36 (cx 1/long). S:  Pt without c/o.      O:  118/76, 93, 16, 98.0, 99%  Gen:  NAD, A&O  FHT:  165 baseline (had been 150), moderate variability, +15x15 accels, no decels, Cat I/reactive  [place]:   uterine irritability  SVE:  deferred (Venora [place] placed at 1730)    A:  32 yo  at 37+6 undergoing IOL due to IUGR. Obese. SLE. Intracranial HTN. P:  1. Cook to be removed at 0530 with transition to pitocin to follow. 2.  PCN to start at [place]  3.   Continue to monitor
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
## 2022-09-20 PROCEDURE — 75410000002 HC LABOR FEE PER 1 HR

## 2022-09-20 PROCEDURE — 75410000000 HC DELIVERY VAGINAL/SINGLE

## 2022-09-20 PROCEDURE — 74011250636 HC RX REV CODE- 250/636: Performed by: OBSTETRICS & GYNECOLOGY

## 2022-09-20 PROCEDURE — 75410000003 HC RECOV DEL/VAG/CSECN EA 0.5 HR

## 2022-09-20 PROCEDURE — 3E033VJ INTRODUCTION OF OTHER HORMONE INTO PERIPHERAL VEIN, PERCUTANEOUS APPROACH: ICD-10-PCS | Performed by: OBSTETRICS & GYNECOLOGY

## 2022-09-20 PROCEDURE — 74011250637 HC RX REV CODE- 250/637: Performed by: OBSTETRICS & GYNECOLOGY

## 2022-09-20 PROCEDURE — 74011250637 HC RX REV CODE- 250/637: Performed by: STUDENT IN AN ORGANIZED HEALTH CARE EDUCATION/TRAINING PROGRAM

## 2022-09-20 PROCEDURE — 74011250636 HC RX REV CODE- 250/636: Performed by: STUDENT IN AN ORGANIZED HEALTH CARE EDUCATION/TRAINING PROGRAM

## 2022-09-20 PROCEDURE — 74011000258 HC RX REV CODE- 258: Performed by: STUDENT IN AN ORGANIZED HEALTH CARE EDUCATION/TRAINING PROGRAM

## 2022-09-20 PROCEDURE — 88307 TISSUE EXAM BY PATHOLOGIST: CPT

## 2022-09-20 PROCEDURE — 10907ZC DRAINAGE OF AMNIOTIC FLUID, THERAPEUTIC FROM PRODUCTS OF CONCEPTION, VIA NATURAL OR ARTIFICIAL OPENING: ICD-10-PCS | Performed by: OBSTETRICS & GYNECOLOGY

## 2022-09-20 PROCEDURE — 65410000002 HC RM PRIVATE OB

## 2022-09-20 RX ORDER — OXYTOCIN/RINGER'S LACTATE 30/500 ML
0-20 PLASTIC BAG, INJECTION (ML) INTRAVENOUS
Status: DISCONTINUED | OUTPATIENT
Start: 2022-09-20 | End: 2022-09-20 | Stop reason: HOSPADM

## 2022-09-20 RX ORDER — SIMETHICONE 80 MG
80 TABLET,CHEWABLE ORAL
Status: DISCONTINUED | OUTPATIENT
Start: 2022-09-20 | End: 2022-09-22 | Stop reason: HOSPADM

## 2022-09-20 RX ORDER — BUPIVACAINE HYDROCHLORIDE AND EPINEPHRINE 2.5; 5 MG/ML; UG/ML
INJECTION, SOLUTION EPIDURAL; INFILTRATION; INTRACAUDAL; PERINEURAL
Status: DISCONTINUED
Start: 2022-09-20 | End: 2022-09-20 | Stop reason: WASHOUT

## 2022-09-20 RX ORDER — CYCLOBENZAPRINE HCL 10 MG
10 TABLET ORAL
Status: COMPLETED | OUTPATIENT
Start: 2022-09-20 | End: 2022-09-20

## 2022-09-20 RX ORDER — ZOLPIDEM TARTRATE 5 MG/1
5 TABLET ORAL
Status: DISCONTINUED | OUTPATIENT
Start: 2022-09-20 | End: 2022-09-22 | Stop reason: HOSPADM

## 2022-09-20 RX ORDER — SODIUM CHLORIDE 0.9 % (FLUSH) 0.9 %
5-40 SYRINGE (ML) INJECTION EVERY 8 HOURS
Status: CANCELLED | OUTPATIENT
Start: 2022-09-20

## 2022-09-20 RX ORDER — SODIUM CHLORIDE 0.9 % (FLUSH) 0.9 %
5-40 SYRINGE (ML) INJECTION AS NEEDED
Status: CANCELLED | OUTPATIENT
Start: 2022-09-20

## 2022-09-20 RX ORDER — ADHESIVE BANDAGE
30 BANDAGE TOPICAL DAILY PRN
Status: DISCONTINUED | OUTPATIENT
Start: 2022-09-20 | End: 2022-09-22 | Stop reason: HOSPADM

## 2022-09-20 RX ORDER — NALOXONE HYDROCHLORIDE 0.4 MG/ML
0.4 INJECTION, SOLUTION INTRAMUSCULAR; INTRAVENOUS; SUBCUTANEOUS AS NEEDED
Status: DISCONTINUED | OUTPATIENT
Start: 2022-09-20 | End: 2022-09-22 | Stop reason: HOSPADM

## 2022-09-20 RX ORDER — OXYCODONE HYDROCHLORIDE 5 MG/1
5 TABLET ORAL
Status: DISCONTINUED | OUTPATIENT
Start: 2022-09-20 | End: 2022-09-22 | Stop reason: HOSPADM

## 2022-09-20 RX ORDER — OXYTOCIN/RINGER'S LACTATE 30/500 ML
87.3 PLASTIC BAG, INJECTION (ML) INTRAVENOUS AS NEEDED
Status: DISCONTINUED | OUTPATIENT
Start: 2022-09-20 | End: 2022-09-21

## 2022-09-20 RX ORDER — PROCHLORPERAZINE MALEATE 10 MG
10 TABLET ORAL
Status: DISCONTINUED | OUTPATIENT
Start: 2022-09-20 | End: 2022-09-22 | Stop reason: HOSPADM

## 2022-09-20 RX ORDER — ONDANSETRON 4 MG/1
4 TABLET, ORALLY DISINTEGRATING ORAL
Status: ACTIVE | OUTPATIENT
Start: 2022-09-20 | End: 2022-09-21

## 2022-09-20 RX ORDER — BUTORPHANOL TARTRATE 2 MG/ML
1 INJECTION INTRAMUSCULAR; INTRAVENOUS ONCE
Status: COMPLETED | OUTPATIENT
Start: 2022-09-20 | End: 2022-09-20

## 2022-09-20 RX ORDER — FENTANYL/BUPIVACAINE/NS/PF 2-1250MCG
PREFILLED PUMP RESERVOIR EPIDURAL
Status: DISCONTINUED
Start: 2022-09-20 | End: 2022-09-20 | Stop reason: WASHOUT

## 2022-09-20 RX ORDER — HYDROCORTISONE ACETATE PRAMOXINE HCL 2.5; 1 G/100G; G/100G
CREAM TOPICAL AS NEEDED
Status: DISCONTINUED | OUTPATIENT
Start: 2022-09-20 | End: 2022-09-22 | Stop reason: HOSPADM

## 2022-09-20 RX ORDER — OXYTOCIN/RINGER'S LACTATE 30/500 ML
10 PLASTIC BAG, INJECTION (ML) INTRAVENOUS AS NEEDED
Status: DISCONTINUED | OUTPATIENT
Start: 2022-09-20 | End: 2022-09-21

## 2022-09-20 RX ADMIN — FAMOTIDINE 20 MG: 20 TABLET, FILM COATED ORAL at 18:00

## 2022-09-20 RX ADMIN — PENICILLIN G POTASSIUM 2.5 MILLION UNITS: 20000000 INJECTION, POWDER, FOR SOLUTION INTRAVENOUS at 03:33

## 2022-09-20 RX ADMIN — FAMOTIDINE 20 MG: 20 TABLET, FILM COATED ORAL at 08:31

## 2022-09-20 RX ADMIN — PENICILLIN G POTASSIUM 2.5 MILLION UNITS: 20000000 INJECTION, POWDER, FOR SOLUTION INTRAVENOUS at 07:20

## 2022-09-20 RX ADMIN — OXYTOCIN 1 MILLI-UNITS/MIN: 10 INJECTION INTRAVENOUS at 07:53

## 2022-09-20 RX ADMIN — CYCLOBENZAPRINE HYDROCHLORIDE 10 MG: 10 TABLET, FILM COATED ORAL at 19:21

## 2022-09-20 RX ADMIN — OXYCODONE 5 MG: 5 TABLET ORAL at 20:51

## 2022-09-20 RX ADMIN — OXYTOCIN 10000 MILLI-UNITS: 10 INJECTION INTRAVENOUS at 14:43

## 2022-09-20 RX ADMIN — OXYCODONE 5 MG: 5 TABLET ORAL at 16:38

## 2022-09-20 RX ADMIN — SODIUM CHLORIDE, POTASSIUM CHLORIDE, SODIUM LACTATE AND CALCIUM CHLORIDE 125 ML/HR: 600; 310; 30; 20 INJECTION, SOLUTION INTRAVENOUS at 10:19

## 2022-09-20 RX ADMIN — PENICILLIN G POTASSIUM 2.5 MILLION UNITS: 20000000 INJECTION, POWDER, FOR SOLUTION INTRAVENOUS at 11:10

## 2022-09-20 RX ADMIN — BUTORPHANOL TARTRATE 1 MG: 2 INJECTION, SOLUTION INTRAMUSCULAR; INTRAVENOUS at 13:27

## 2022-09-20 NOTE — PROGRESS NOTES
Difficultly obtaining CEFM given maternal discomfort. CE performed patient 7/90/-1. FSE placed without issue.

## 2022-09-20 NOTE — PROGRESS NOTES
S:  Pt reporting infrequent ctx's in her abdomen rated 4/10. Tolerated Cook OK (removed at 0530). Intends to not have an epidural.    O:  123/77, 101, 16, 98.2, 97%  Gen:   NAD, A&O  FHT:  140 baseline, moderate variability, +15x15 accels, single late decel immediately after being placed back on the monitor following AM shower, none since. Currently Cat I/reactive  SVE:  5/50/high/post    A:  34 yo [id number] admitted for IOL due to IUGR. Status post Cook overnight. Reassuring fetal status. GBS pos. P:  1.  Start pitocin  2. AROM when/if indicated  3. Pt does not desire epidural  4. Continue PCN  5. Anticipate vaginal delivery.  for standard fetal/maternal indications.

## 2022-09-20 NOTE — L&D DELIVERY NOTE
Delivery Summary  Patient: Julio Espinoza             Additional Delivery Comments - Patient was admitted the afternoon prior to the day of delivery for IOL  A CRB was placed overnight and removed in the morning. Pitocin was started, theh AROM completed at 5 cm with return of clear fluid. Fetal heart tones were Cat 1 and Cat 2 throughout the course of labor. The patient then progressed through the first stage, then progressed through the second stage to a vaginal delivery. When the fetal vertex approached the perinuem with maternal pushing efforts, the patient was prepared for delivery. The baby was delivered without difficulty over intact perineum, bulb suctioned, became active and crying, and was placed on the maternal abdomen. The cord was clamped and cut, and then the placenta delivered spontaneously, intact. The fundus became firm, the cervix and sulci were inspected and appeared to be intact. Vaginal exam revealed no evidence of laceration, hematoma formation or foreign bodies. Sponge and sharps count was correct. The procedure was tolerated adequately by the patient and she is recovering in stable condition. Information for the patient's :  Alannah Evangelistawendy [[id number]]     Delivery Type: Vaginal, Spontaneous   Delivery Date: 2022   Delivery Time: 2:31 PM     Birth Weight: 2.62 kg     Sex:  female  Delivery Clinician:  Dung Mckay   Gestational Age: 42w0d    Presentation: Vertex   Position: Right  Occiput  Anterior     Apgars were 8  and 8      Resuscitation Method: Suctioning-bulb; Tactile Stimulation; Oxygen; Other (Comment); Suctioning-deep  blow by oxygen   Meconium Stained: None    Living Status: Living       Placenta Date/Time:     Placenta Removal: Spontaneous   Placenta Appearance: Intact    Cord Information: 3 Vessels    Cord Events: Other(Comment)       Disposition of Cord Blood: Lab    Blood Gases Sent?:  No     Episiotomy: None   Laceration(s): None     Estimated Blood Loss (ml): No data found    Labor Events  Method:       Augmentation:    Cervical Ripenin2022  5:35 PM  Reina

## 2022-09-20 NOTE — PROGRESS NOTES
Progress Note    Patient: Julio Espinoza MRN: [id number]  SSN: [id number]    YOB: 1990  Age: 33 y.o. Sex: female      Admit Date: 9/19/2022    LOS: 1 day     Subjective:   Patient doing well. Feeling some contractions has a slight headache. Otherwise doing well. Objective:     Vitals:    09/19/22 1647 09/19/22 1648 09/19/22 1922 09/19/22 2348   BP:  118/76 110/68 123/77   Pulse:  93 77 (!) 101   Resp:  16 16    Temp:  98 °F (36.7 °C) 98.2 °F (36.8 °C)    SpO2: 99% 99% 97%    Weight:       Height:              Physical Exam:   Gen: NAD  Resp: Non labored respirations  CV: Regular rate  Abd: Gravid  Ext: Trace edema     CE last 5cm at 725 AM    CEFM:   140s, moderate variability. + accels, - decels  Ctx q3-5min     Lab/Data Review:  CBC:   Lab Results   Component Value Date/Time    WBC 6.6 09/19/2022 05:15 PM    HGB 11.2 (L) 09/19/2022 05:15 PM    HCT 32.5 (L) 09/19/2022 05:15 PM     09/19/2022 05:15 PM        GBS pos    Assessment:     Active Problems:    Pregnant (9/19/2022)        Plan:     32 yo [id number] at 38w0d admitted for IOL for FGR (EFW 11%ile, AC 3%ile).      IOL:   -s/p cook  -Pit started this AM  -Cat 1 FHR tracing, ctx q3-5 min   -will reassess for change around 1130 and assess for amniotomy  -Wishes to naturally labor, would like placenta attached for as long as possible- discussed this with patient this AM   -PCN for GBS ppx  -Marginal cord insertion     SLE:   -plaquenil, gabapentin, flexeril home meds    History of PTB:   -s/p cerclage this pregnancy     History of opiod dependence:   -previously followed by pain clinic  -No meds this pregnancy     Benign intracranial HTN:   -12/2021 venous sinus stent placed  -No lovenox this pregnancy        Josey Austin MD  Obstetrics and Gynecology   [facility]

## 2022-09-20 NOTE — PROGRESS NOTES
[facility] care of pt at this time from [address] [place] [address], KATELYN Elaine RN.    3403 Bedside shift change report given to KATELYN Hartley (oncoming nurse) by KATELYN Lomeli RN (offgoing nurse). Report included the following information SBAR, Kardex, Intake/Output, MAR, and Recent Results.

## 2022-09-20 NOTE — PROGRESS NOTES
TRANSFER - IN REPORT:    Verbal report received from KATELYN Ambrosio RN (name) on Jessica Baeza  being received from L&D (unit) for routine progression of care      Report consisted of patients Situation, Background, Assessment and   Recommendations(SBAR). Information from the following report(s) SBAR, Kardex, Intake/Output, MAR, Accordion, and Recent Results was reviewed with the receiving nurse. Opportunity for questions and clarification was provided. Assessment completed upon patients arrival to unit and care assumed.

## 2022-09-20 NOTE — PROGRESS NOTES
Bedside and Verbal shift change report given to MEL Horton RN (oncoming nurse) by Dorian Ospina RN (offgoing nurse). Report included the following information SBAR, Kardex, Intake/Output, MAR, Accordion, and Recent Results.

## 2022-09-20 NOTE — PROGRESS NOTES
32 yo [id number] at 38w0d admitted for IOL for FGR. S/p theresa, priti. Doing well Naturally laboring. PCN for GBS pos. No with headache.  Allergy to acetaminophen.           22 1318   Cervical Exam   Dilation (cm) 5   Eff 80 %   Station -2   Membrane Status AROM            NST:   Cat 1 FHR tracins, mod jose, + accels, - decels  -ctx q5 min      A&P:   32 yo here for IOL for FGR      Headache:   -discussed options including IVF bolus, compazine   -Patient requesting stadol  -CE unchanged, discussed potential risks of stadol dania delivery patient agreeable for treatment with David White MD  Obstetrics and Gynecology   [facility]

## 2022-09-20 NOTE — PROGRESS NOTES
0710: Bedside and Verbal shift change report given to Chris Lucas RN (oncoming nurse) by Vandana Kemp RN (offgoing nurse). Report included the following information SBAR, Intake/Output, MAR, and Recent Results. 0725: SVE performed by Fatuma Veloz MD; strip reviewed at this time; 5cm, thick, and high    0820: Mirtha Winkler MD at bedside at this time to discuss plan of care    [id number]: Pt up to restroom at this time    [id number]: Difficulty tracing; RN at bedside; charge RN assisting getting pt back on monitor; audible FHR in 140s    [id number]: Difficulty tracing; RN at bedside; charge RN contacted for assistance; audible FHR in 140s    [id number]: Pt off monitor and up to restroom at this time    1129: AROM and SVE performed by Magali Zelaya MD; SVE unchanged from earlier; strip reviewed at this time    1258: Debby Mercedes MD re: pt request for stadol; MD advised SVE to be performed before    1305: SVE performed by Jaret Dubose MD; strip reviewed at this time    1419: SVE performed by Jaret Dubose MD; FSE placed; strip reviewed at this time    1425: Pt yelling \"she's coming\"; SVE performed by Josh Hernández RN; pt is complete; Mirtha Winkler MD off floor and Selene Cash MD notified     [phone]: RN remained at bedside throughout pushing. EFM continuously assessed.  of live baby girl. 1620: Pt up to bedside commode with RN at this time    1720: TRANSFER - OUT REPORT:    Verbal report given to BONNIE Payne RN(name) on [employer]  being transferred to MIU(unit) for routine progression of care       Report consisted of patients Situation, Background, Assessment and   Recommendations(SBAR). Information from the following report(s) SBAR, Intake/Output, MAR, and Recent Results was reviewed with the receiving nurse.     Lines:   Peripheral IV 22 Left;Posterior Hand (Active)   Site Assessment Clean, dry, & intact 22 0800   Phlebitis Assessment 0 22 0800   Infiltration Assessment 0 22 0800   Dressing Status Clean, dry, & intact 22 08 Dressing Type Tape;Transparent 09/20/22 0800   Hub Color/Line Status Pink 09/20/22 0800        Opportunity for questions and clarification was provided.       Patient transported with:   Registered Nurse

## 2022-09-20 NOTE — PROGRESS NOTES
32 yo [id number] at 38w0d admitted for IOL for FGR. S/p priti cardenas. Doing well Naturally laboring.  PCN for GBS pos            22 1206   Cervical Exam   Dilation (cm) 5   Eff 50 %   Station -3   Position Posterior   Membrane Status AROM     NST:   Cat 1 FHR tracins, mod jose, + accels, - decels  -ctx q5 min     A&P:   32 yo here for IOL for FGR  -AROM with clear fluid at 1130  -CEFM  -Continue pitocin per unit protocol  -Reassess for change in 6 hours or sooner if indicated     Laury Tomas MD  Obstetrics and Gynecology   [facility]

## 2022-09-21 PROCEDURE — 74011250637 HC RX REV CODE- 250/637: Performed by: STUDENT IN AN ORGANIZED HEALTH CARE EDUCATION/TRAINING PROGRAM

## 2022-09-21 PROCEDURE — 65410000002 HC RM PRIVATE OB

## 2022-09-21 PROCEDURE — 74011250637 HC RX REV CODE- 250/637: Performed by: OBSTETRICS & GYNECOLOGY

## 2022-09-21 RX ORDER — DOCUSATE SODIUM 100 MG/1
100 CAPSULE, LIQUID FILLED ORAL DAILY
Status: DISCONTINUED | OUTPATIENT
Start: 2022-09-21 | End: 2022-09-22 | Stop reason: HOSPADM

## 2022-09-21 RX ORDER — CYCLOBENZAPRINE HCL 10 MG
10 TABLET ORAL
Status: DISCONTINUED | OUTPATIENT
Start: 2022-09-21 | End: 2022-09-22 | Stop reason: HOSPADM

## 2022-09-21 RX ORDER — GABAPENTIN 300 MG/1
300 CAPSULE ORAL 3 TIMES DAILY
Status: DISCONTINUED | OUTPATIENT
Start: 2022-09-21 | End: 2022-09-22 | Stop reason: HOSPADM

## 2022-09-21 RX ADMIN — FAMOTIDINE 20 MG: 20 TABLET, FILM COATED ORAL at 09:14

## 2022-09-21 RX ADMIN — OXYCODONE 5 MG: 5 TABLET ORAL at 02:43

## 2022-09-21 RX ADMIN — CYCLOBENZAPRINE 10 MG: 10 TABLET, FILM COATED ORAL at 18:17

## 2022-09-21 RX ADMIN — GABAPENTIN 300 MG: 300 CAPSULE ORAL at 16:15

## 2022-09-21 RX ADMIN — FAMOTIDINE 20 MG: 20 TABLET, FILM COATED ORAL at 18:17

## 2022-09-21 RX ADMIN — DOCUSATE SODIUM 100 MG: 100 CAPSULE ORAL at 12:42

## 2022-09-21 RX ADMIN — GABAPENTIN 300 MG: 300 CAPSULE ORAL at 21:38

## 2022-09-21 RX ADMIN — OXYCODONE 5 MG: 5 TABLET ORAL at 14:06

## 2022-09-21 RX ADMIN — OXYCODONE 5 MG: 5 TABLET ORAL at 19:22

## 2022-09-21 RX ADMIN — OXYCODONE 5 MG: 5 TABLET ORAL at 09:13

## 2022-09-21 NOTE — PROGRESS NOTES
Spoke verbally with Galina Rodriguez regarding pt home medication reconciliation concerns. Pt also requested Colace since she has been taking Joselin during this admission. Also mentioned pt's complaint of L hip pain with swelling that she has had since birth. See MAR for orders refecting medication changes.

## 2022-09-21 NOTE — PROGRESS NOTES
Post-Partum Day Number 1 Progress Note    Kali Velasquez     Assessment: Doing well, post partum day 1 from TSVD, pmhx significant for hx of chronic pain, intracranial hypertension vs pseudotumor with venous sinus stents in place    Plan:  - Continue routine postpartum and perineal care as well as maternal education.  - N/A   - Chronic pain - restarted home flexeril and gabapentin   - Left leg pain - suspect nerve related, will continue to monitor, gabapentin restarted this morning, consider LE doppler if not improved   - Plan discharge home [address] Discharge Date: Tomorrow. Information for the patient's :  Etta Singh [[id number]]   Vaginal, Spontaneous  Patient doing well without significant complaint. Voiding without difficulty, normal lochia. Vitals:  Visit Vitals  /79 (BP 1 Location: Left upper arm, BP Patient Position: At rest;Lying)   Pulse 78   Temp 97.7 °F (36.5 °C)   Resp 18   Ht 5' 1\" (1.549 m)   Wt 89.8 kg (198 lb)   SpO2 99%   Breastfeeding Unknown   BMI 37.41 kg/m²     Temp (24hrs), Av.3 °F (36.8 °C), Min:97.7 °F (36.5 °C), Max:98.9 °F (37.2 °C)        Exam:   Patient without distress. Fundus firm, nontender per nursing fundal checks. Perineum with normal lochia noted per nursing assessment. Lower extremities are negative for pathological edema.     Labs:     Lab Results   Component Value Date/Time    WBC 6.6 2022 05:15 PM    WBC 7.1 2022 12:19 AM    WBC 7.8 2022 03:48 PM    WBC 9.9 2022 01:04 AM    WBC 10.9 2022 07:26 PM    WBC 7.5 2022 10:59 AM    WBC 11.2 (H) 2022 08:39 PM    WBC 9.1 2022 01:02 AM    WBC 7.7 2021 11:06 AM    WBC 10.4 2021 09:45 PM    WBC 12.6 (H) 2021 05:20 AM    WBC 10.5 2021 03:52 AM    WBC 12.2 (H) 2021 09:25 PM    WBC 8.8 2021 09:45 PM    WBC 8.6 2021 12:45 AM    WBC 9.2 2021 12:33 AM    WBC 7.6 10/04/2021 04:28 AM WBC 8.8 10/03/2021 04:34 AM    WBC 8.2 10/02/2021 01:32 AM    WBC 9.0 09/30/2021 01:29 AM    WBC 7.7 09/27/2021 04:30 AM    WBC 11.9 (H) 09/26/2021 08:22 PM    WBC 9.4 09/03/2021 09:26 PM    WBC 9.9 06/04/2021 12:23 AM    WBC 9.9 05/21/2021 11:10 PM    WBC 13.8 (H) 03/07/2021 02:49 AM    WBC 6.8 01/25/2021 02:30 PM    WBC 11.3 (H) 12/03/2020 01:26 AM    WBC 7.4 11/18/2020 01:45 PM    WBC 8.8 10/23/2020 12:42 AM    WBC 14.1 (H) 09/15/2020 01:46 AM    WBC 10.2 09/13/2020 11:16 PM    WBC 11.3 (H) 08/29/2020 03:55 AM    WBC 23.9 (H) 08/27/2020 05:43 AM    WBC 10.1 08/26/2020 05:23 AM    WBC 9.0 08/25/2020 01:20 PM    WBC 8.1 06/28/2019 11:15 AM    WBC 10.6 08/21/2018 08:44 PM    WBC 9.9 11/21/2017 09:46 PM    WBC 13.4 (H) 11/16/2017 02:41 AM    WBC 10.3 11/10/2017 02:44 PM    WBC 11.1 (H) 10/23/2017 08:59 PM    WBC 8.7 05/08/2017 02:40 PM    WBC 14.5 (H) 04/28/2017 05:42 PM    WBC 7.6 10/06/2013 05:40 PM    WBC 9.0 02/23/2013 02:53 PM    WBC 7.7 02/21/2012 11:00 AM    WBC 12.3 (H) 02/16/2012 10:22 PM    WBC 18.2 (H) 02/05/2010 12:55 PM    WBC 7.3 04/10/2009 03:10 PM    HGB 11.2 (L) 09/19/2022 05:15 PM    HGB 10.7 (L) 09/14/2022 12:19 AM    HGB 11.5 09/13/2022 03:48 PM    HGB 10.3 (L) 05/19/2022 01:04 AM    HGB 11.6 04/05/2022 07:26 PM    HGB 11.2 (L) 04/03/2022 10:59 AM    HGB 11.7 04/01/2022 08:39 PM    HGB 10.2 (L) 03/31/2022 01:02 AM    HGB 12.3 12/14/2021 11:06 AM    HGB 12.2 12/06/2021 09:45 PM    HGB 11.9 12/04/2021 05:20 AM    HGB 11.3 (L) 11/26/2021 03:52 AM    HGB 11.8 11/25/2021 09:25 PM    HGB 13.4 11/23/2021 09:45 PM    HGB 13.8 11/09/2021 12:45 AM    HGB 12.6 11/01/2021 12:33 AM    HGB 13.3 10/04/2021 04:28 AM    HGB 15.4 10/03/2021 04:34 AM    HGB 13.8 10/02/2021 01:32 AM    HGB 12.9 09/30/2021 01:29 AM    HGB 12.1 09/27/2021 04:30 AM    HGB 13.7 09/26/2021 08:22 PM    HGB 13.6 09/03/2021 09:26 PM    HGB 13.1 06/04/2021 12:23 AM    HGB 13.4 05/21/2021 11:10 PM    HGB 13.9 03/07/2021 02:49 AM    HGB 12.2 01/25/2021 02:30 PM    HGB 12.6 12/03/2020 01:26 AM    HGB 12.8 11/18/2020 01:45 PM    HGB 13.5 10/23/2020 12:42 AM    HGB 13.5 09/15/2020 01:46 AM    HGB 13.6 09/13/2020 11:16 PM    HGB 13.7 08/29/2020 03:55 AM    HGB 14.5 08/27/2020 05:43 AM    HGB 13.9 08/26/2020 05:23 AM    HGB 14.6 08/25/2020 01:20 PM    HGB 13.8 06/28/2019 11:15 AM    HGB 14.2 08/21/2018 08:44 PM    HGB 12.4 11/21/2017 09:46 PM    HGB 11.9 11/16/2017 02:41 AM    HGB 13.1 11/10/2017 02:44 PM    HGB 12.2 10/23/2017 08:59 PM    HGB 13.8 05/08/2017 02:40 PM    HGB 13.2 04/28/2017 05:42 PM    HGB 9.7 (L) 10/06/2013 05:40 PM    HGB 11.4 (L) 04/12/2013 06:55 AM    HGB 11.5 02/23/2013 02:53 PM    HGB 12.0 02/21/2012 11:00 AM    HGB 14.4 02/16/2012 10:22 PM    HGB 13.0 02/05/2010 12:55 PM    HGB 10.3 (L) 04/10/2009 03:10 PM    HCT 32.5 (L) 09/19/2022 05:15 PM    HCT 31.3 (L) 09/14/2022 12:19 AM    HCT 33.5 (L) 09/13/2022 03:48 PM    HCT 30.2 (L) 05/19/2022 01:04 AM    HCT 33.7 (L) 04/05/2022 07:26 PM    HCT 33.4 (L) 04/03/2022 10:59 AM    HCT 33.4 (L) 04/01/2022 08:39 PM    HCT 29.9 (L) 03/31/2022 01:02 AM    HCT 36.7 12/14/2021 11:06 AM    HCT 36.6 12/06/2021 09:45 PM    HCT 34.4 (L) 12/04/2021 05:20 AM    HCT 35.2 11/26/2021 03:52 AM    HCT 34.6 (L) 11/25/2021 09:25 PM    HCT 39.4 11/23/2021 09:45 PM    HCT 40.5 11/09/2021 12:45 AM    HCT 36.8 11/01/2021 12:33 AM    HCT 39.2 10/04/2021 04:28 AM    HCT 45.7 10/03/2021 04:34 AM    HCT 41.5 10/02/2021 01:32 AM    HCT 39.1 09/30/2021 01:29 AM    HCT 36.5 09/27/2021 04:30 AM    HCT 40.1 09/26/2021 08:22 PM    HCT 40.2 09/03/2021 09:26 PM    HCT 40.7 06/04/2021 12:23 AM    HCT 41.4 05/21/2021 11:10 PM    HCT 42.4 03/07/2021 02:49 AM    HCT 35.4 01/25/2021 02:30 PM    HCT 36.8 12/03/2020 01:26 AM    HCT 38.2 11/18/2020 01:45 PM    HCT 40.4 10/23/2020 12:42 AM    HCT 40.1 09/15/2020 01:46 AM    HCT 39.3 09/13/2020 11:16 PM    HCT 40.2 08/29/2020 03:55 AM    HCT 43.6 08/27/2020 05:43 AM    HCT 41.7 08/26/2020 05:23 AM    HCT 42.8 08/25/2020 01:20 PM    HCT 42.5 06/28/2019 11:15 AM    HCT 42.3 08/21/2018 08:44 PM    HCT 36.1 11/21/2017 09:46 PM    HCT 34.9 (L) 11/16/2017 02:41 AM    HCT 39.8 11/10/2017 02:44 PM    HCT 35.2 10/23/2017 08:59 PM    HCT 41.1 05/08/2017 02:40 PM    HCT 38.1 04/28/2017 05:42 PM    HCT 29.7 (L) 10/06/2013 05:40 PM    HCT 33.2 (L) 04/12/2013 06:55 AM    HCT 33.0 (L) 02/23/2013 02:53 PM    HCT 34.9 (L) 02/21/2012 11:00 AM    HCT 41.0 02/16/2012 10:22 PM    HCT 37.3 02/05/2010 12:55 PM    HCT 31.7 (L) 04/10/2009 03:10 PM    PLATELET 027 73/33/4419 05:15 PM    PLATELET 922 50/30/1649 12:19 AM    PLATELET 345 04/13/4680 03:48 PM    PLATELET 943 59/34/7465 01:04 AM    PLATELET 796 62/47/7135 07:26 PM    PLATELET 849 69/05/1381 10:59 AM    PLATELET 832 36/90/9853 08:39 PM    PLATELET 217 80/27/4988 01:02 AM    PLATELET 081 63/84/7817 11:06 AM    PLATELET 057 28/43/0631 09:45 PM    PLATELET 863 94/82/3712 05:20 AM    PLATELET 461 84/92/7001 03:52 AM    PLATELET 662 46/45/2772 09:25 PM    PLATELET 101 65/63/3100 09:45 PM    PLATELET 317 59/65/3239 12:45 AM    PLATELET 746 42/38/1749 12:33 AM    PLATELET 246 80/88/5537 04:28 AM    PLATELET 743 42/22/6370 04:34 AM    PLATELET 909 15/43/9929 01:32 AM    PLATELET 197 68/29/7909 01:29 AM    PLATELET 215 65/32/3946 04:30 AM    PLATELET 446 16/32/1343 08:22 PM    PLATELET 168 14/22/8170 09:26 PM    PLATELET 022 22/28/3842 12:23 AM    PLATELET 768 26/42/4302 11:10 PM    PLATELET 794 54/59/1439 02:49 AM    PLATELET 212 86/93/8340 02:30 PM    PLATELET 236 14/43/1217 01:26 AM    PLATELET 030 24/57/4267 01:45 PM    PLATELET 060 26/86/9239 12:42 AM    PLATELET 549 00/74/3445 01:46 AM    PLATELET 849 92/60/8275 11:16 PM    PLATELET 475 55/78/6141 03:55 AM    PLATELET 796 81/20/4141 05:43 AM    PLATELET 842 83/58/9643 05:23 AM    PLATELET 581 61/14/1333 01:20 PM    PLATELET 303 91/54/1555 11:15 AM    PLATELET 155 93/87/5622 08:44 PM    PLATELET 373 65/30/3001 09:46 PM PLATELET 241 99/68/6193 02:41 AM    PLATELET 402 85/59/9898 02:44 PM    PLATELET 202 79/14/9171 08:59 PM    PLATELET 994 13/28/0750 02:40 PM    PLATELET 596 94/99/1554 05:42 PM    PLATELET 182 (L) 35/94/6154 05:40 PM    PLATELET 269 24/61/5993 06:55 AM    PLATELET 904 41/85/9896 02:53 PM    PLATELET 058 68/98/0650 11:00 AM    PLATELET 369 33/64/8616 10:22 PM    PLATELET 838 73/94/5420 12:55 PM    PLATELET 096 (L) 12/39/1340 03:10 PM       No results found for this or any previous visit (from the past 24 hour(s)).

## 2022-09-21 NOTE — ROUTINE PROCESS
Bedside and Verbal shift change report given to D. Severiano Byers, RN (oncoming nurse) by Martín Youngblood. Lottie Landau, RN (offgoing nurse). Report included the following information SBAR, Kardex, Intake/Output, and MAR.

## 2022-09-21 NOTE — PROGRESS NOTES
Post-Partum Day Number 1 Progress Note    Kali Velasquez     Assessment: Doing well, post partum day 1 from TSVD, pmhx significant for hx of opioid dependence, intracranial hypertension vs pseudotumor with venous sinus stents in place    Plan:  - Continue routine postpartum and perineal care as well as maternal education.  - N/A   - Chronic pain - restarted home flexeril and gabapentin   - Plan discharge home [address] Discharge Date: Tomorrow. Information for the patient's :  Hal Knight [[id number]]   Vaginal, Spontaneous  Patient doing well without significant complaint. Voiding without difficulty, normal lochia. Vitals:  Visit Vitals  /79 (BP 1 Location: Left upper arm, BP Patient Position: At rest;Lying)   Pulse 78   Temp 97.7 °F (36.5 °C)   Resp 18   Ht 5' 1\" (1.549 m)   Wt 89.8 kg (198 lb)   SpO2 99%   Breastfeeding Unknown   BMI 37.41 kg/m²     Temp (24hrs), Av.3 °F (36.8 °C), Min:97.7 °F (36.5 °C), Max:98.9 °F (37.2 °C)        Exam:   Patient without distress. Fundus firm, nontender per nursing fundal checks. Perineum with normal lochia noted per nursing assessment. Lower extremities are negative for pathological edema.     Labs:     Lab Results   Component Value Date/Time    WBC 6.6 2022 05:15 PM    WBC 7.1 2022 12:19 AM    WBC 7.8 2022 03:48 PM    WBC 9.9 2022 01:04 AM    WBC 10.9 2022 07:26 PM    WBC 7.5 2022 10:59 AM    WBC 11.2 (H) 2022 08:39 PM    WBC 9.1 2022 01:02 AM    WBC 7.7 2021 11:06 AM    WBC 10.4 2021 09:45 PM    WBC 12.6 (H) 2021 05:20 AM    WBC 10.5 2021 03:52 AM    WBC 12.2 (H) 2021 09:25 PM    WBC 8.8 2021 09:45 PM    WBC 8.6 2021 12:45 AM    WBC 9.2 2021 12:33 AM    WBC 7.6 10/04/2021 04:28 AM    WBC 8.8 10/03/2021 04:34 AM    WBC 8.2 10/02/2021 01:32 AM    WBC 9.0 2021 01:29 AM    WBC 7.7 2021 04:30 AM    WBC 11.9 (H) 09/26/2021 08:22 PM    WBC 9.4 09/03/2021 09:26 PM    WBC 9.9 06/04/2021 12:23 AM    WBC 9.9 05/21/2021 11:10 PM    WBC 13.8 (H) 03/07/2021 02:49 AM    WBC 6.8 01/25/2021 02:30 PM    WBC 11.3 (H) 12/03/2020 01:26 AM    WBC 7.4 11/18/2020 01:45 PM    WBC 8.8 10/23/2020 12:42 AM    WBC 14.1 (H) 09/15/2020 01:46 AM    WBC 10.2 09/13/2020 11:16 PM    WBC 11.3 (H) 08/29/2020 03:55 AM    WBC 23.9 (H) 08/27/2020 05:43 AM    WBC 10.1 08/26/2020 05:23 AM    WBC 9.0 08/25/2020 01:20 PM    WBC 8.1 06/28/2019 11:15 AM    WBC 10.6 08/21/2018 08:44 PM    WBC 9.9 11/21/2017 09:46 PM    WBC 13.4 (H) 11/16/2017 02:41 AM    WBC 10.3 11/10/2017 02:44 PM    WBC 11.1 (H) 10/23/2017 08:59 PM    WBC 8.7 05/08/2017 02:40 PM    WBC 14.5 (H) 04/28/2017 05:42 PM    WBC 7.6 10/06/2013 05:40 PM    WBC 9.0 02/23/2013 02:53 PM    WBC 7.7 02/21/2012 11:00 AM    WBC 12.3 (H) 02/16/2012 10:22 PM    WBC 18.2 (H) 02/05/2010 12:55 PM    WBC 7.3 04/10/2009 03:10 PM    HGB 11.2 (L) 09/19/2022 05:15 PM    HGB 10.7 (L) 09/14/2022 12:19 AM    HGB 11.5 09/13/2022 03:48 PM    HGB 10.3 (L) 05/19/2022 01:04 AM    HGB 11.6 04/05/2022 07:26 PM    HGB 11.2 (L) 04/03/2022 10:59 AM    HGB 11.7 04/01/2022 08:39 PM    HGB 10.2 (L) 03/31/2022 01:02 AM    HGB 12.3 12/14/2021 11:06 AM    HGB 12.2 12/06/2021 09:45 PM    HGB 11.9 12/04/2021 05:20 AM    HGB 11.3 (L) 11/26/2021 03:52 AM    HGB 11.8 11/25/2021 09:25 PM    HGB 13.4 11/23/2021 09:45 PM    HGB 13.8 11/09/2021 12:45 AM    HGB 12.6 11/01/2021 12:33 AM    HGB 13.3 10/04/2021 04:28 AM    HGB 15.4 10/03/2021 04:34 AM    HGB 13.8 10/02/2021 01:32 AM    HGB 12.9 09/30/2021 01:29 AM    HGB 12.1 09/27/2021 04:30 AM    HGB 13.7 09/26/2021 08:22 PM    HGB 13.6 09/03/2021 09:26 PM    HGB 13.1 06/04/2021 12:23 AM    HGB 13.4 05/21/2021 11:10 PM    HGB 13.9 03/07/2021 02:49 AM    HGB 12.2 01/25/2021 02:30 PM    HGB 12.6 12/03/2020 01:26 AM    HGB 12.8 11/18/2020 01:45 PM    HGB 13.5 10/23/2020 12:42 AM    HGB 13.5 09/15/2020 01:46 AM    HGB 13.6 09/13/2020 11:16 PM    HGB 13.7 08/29/2020 03:55 AM    HGB 14.5 08/27/2020 05:43 AM    HGB 13.9 08/26/2020 05:23 AM    HGB 14.6 08/25/2020 01:20 PM    HGB 13.8 06/28/2019 11:15 AM    HGB 14.2 08/21/2018 08:44 PM    HGB 12.4 11/21/2017 09:46 PM    HGB 11.9 11/16/2017 02:41 AM    HGB 13.1 11/10/2017 02:44 PM    HGB 12.2 10/23/2017 08:59 PM    HGB 13.8 05/08/2017 02:40 PM    HGB 13.2 04/28/2017 05:42 PM    HGB 9.7 (L) 10/06/2013 05:40 PM    HGB 11.4 (L) 04/12/2013 06:55 AM    HGB 11.5 02/23/2013 02:53 PM    HGB 12.0 02/21/2012 11:00 AM    HGB 14.4 02/16/2012 10:22 PM    HGB 13.0 02/05/2010 12:55 PM    HGB 10.3 (L) 04/10/2009 03:10 PM    HCT 32.5 (L) 09/19/2022 05:15 PM    HCT 31.3 (L) 09/14/2022 12:19 AM    HCT 33.5 (L) 09/13/2022 03:48 PM    HCT 30.2 (L) 05/19/2022 01:04 AM    HCT 33.7 (L) 04/05/2022 07:26 PM    HCT 33.4 (L) 04/03/2022 10:59 AM    HCT 33.4 (L) 04/01/2022 08:39 PM    HCT 29.9 (L) 03/31/2022 01:02 AM    HCT 36.7 12/14/2021 11:06 AM    HCT 36.6 12/06/2021 09:45 PM    HCT 34.4 (L) 12/04/2021 05:20 AM    HCT 35.2 11/26/2021 03:52 AM    HCT 34.6 (L) 11/25/2021 09:25 PM    HCT 39.4 11/23/2021 09:45 PM    HCT 40.5 11/09/2021 12:45 AM    HCT 36.8 11/01/2021 12:33 AM    HCT 39.2 10/04/2021 04:28 AM    HCT 45.7 10/03/2021 04:34 AM    HCT 41.5 10/02/2021 01:32 AM    HCT 39.1 09/30/2021 01:29 AM    HCT 36.5 09/27/2021 04:30 AM    HCT 40.1 09/26/2021 08:22 PM    HCT 40.2 09/03/2021 09:26 PM    HCT 40.7 06/04/2021 12:23 AM    HCT 41.4 05/21/2021 11:10 PM    HCT 42.4 03/07/2021 02:49 AM    HCT 35.4 01/25/2021 02:30 PM    HCT 36.8 12/03/2020 01:26 AM    HCT 38.2 11/18/2020 01:45 PM    HCT 40.4 10/23/2020 12:42 AM    HCT 40.1 09/15/2020 01:46 AM    HCT 39.3 09/13/2020 11:16 PM    HCT 40.2 08/29/2020 03:55 AM    HCT 43.6 08/27/2020 05:43 AM    HCT 41.7 08/26/2020 05:23 AM    HCT 42.8 08/25/2020 01:20 PM    HCT 42.5 06/28/2019 11:15 AM    HCT 42.3 08/21/2018 08:44 PM    HCT 36.1 11/21/2017 09:46 PM    HCT 34.9 (L) 11/16/2017 02:41 AM    HCT 39.8 11/10/2017 02:44 PM    HCT 35.2 10/23/2017 08:59 PM    HCT 41.1 05/08/2017 02:40 PM    HCT 38.1 04/28/2017 05:42 PM    HCT 29.7 (L) 10/06/2013 05:40 PM    HCT 33.2 (L) 04/12/2013 06:55 AM    HCT 33.0 (L) 02/23/2013 02:53 PM    HCT 34.9 (L) 02/21/2012 11:00 AM    HCT 41.0 02/16/2012 10:22 PM    HCT 37.3 02/05/2010 12:55 PM    HCT 31.7 (L) 04/10/2009 03:10 PM    PLATELET 631 76/82/3069 05:15 PM    PLATELET 364 25/08/1533 12:19 AM    PLATELET 422 70/63/1906 03:48 PM    PLATELET 778 03/06/7650 01:04 AM    PLATELET 354 35/16/4963 07:26 PM    PLATELET 052 67/88/7255 10:59 AM    PLATELET 970 55/90/9536 08:39 PM    PLATELET 019 41/01/0857 01:02 AM    PLATELET 650 12/16/9463 11:06 AM    PLATELET 650 27/87/6656 09:45 PM    PLATELET 478 12/50/0457 05:20 AM    PLATELET 628 30/60/0834 03:52 AM    PLATELET 662 04/19/9083 09:25 PM    PLATELET 795 29/58/8225 09:45 PM    PLATELET 606 35/80/4123 12:45 AM    PLATELET 975 42/70/9595 12:33 AM    PLATELET 977 29/35/7431 04:28 AM    PLATELET 805 47/65/7951 04:34 AM    PLATELET 323 38/18/5723 01:32 AM    PLATELET 564 46/38/6698 01:29 AM    PLATELET 013 34/98/8414 04:30 AM    PLATELET 877 12/59/7960 08:22 PM    PLATELET 592 56/24/9438 09:26 PM    PLATELET 314 78/59/0425 12:23 AM    PLATELET 907 63/17/5653 11:10 PM    PLATELET 375 31/27/1854 02:49 AM    PLATELET 482 41/23/4180 02:30 PM    PLATELET 186 56/38/3356 01:26 AM    PLATELET 421 46/22/4674 01:45 PM    PLATELET 168 49/56/9379 12:42 AM    PLATELET 252 45/57/3329 01:46 AM    PLATELET 859 22/70/9136 11:16 PM    PLATELET 732 46/02/6478 03:55 AM    PLATELET 479 55/93/3411 05:43 AM    PLATELET 391 94/16/5998 05:23 AM    PLATELET 441 23/00/8815 01:20 PM    PLATELET 639 15/17/9885 11:15 AM    PLATELET 605 92/31/2911 08:44 PM    PLATELET 649 10/01/1716 09:46 PM    PLATELET 505 70/13/7376 02:41 AM    PLATELET 776 76/18/7680 02:44 PM    PLATELET 116 31/33/6287 08:59 PM    PLATELET 822 05/08/2017 02:40 PM    PLATELET 058 72/91/4079 05:42 PM    PLATELET 470 (L) 50/18/3566 05:40 PM    PLATELET 633 95/03/0452 06:55 AM    PLATELET 362 05/10/6233 02:53 PM    PLATELET 325 57/02/9308 11:00 AM    PLATELET 962 31/89/3789 10:22 PM    PLATELET 880 92/59/9047 12:55 PM    PLATELET 177 (L) 41/39/2262 03:10 PM       No results found for this or any previous visit (from the past 24 hour(s)).

## 2022-09-22 ENCOUNTER — APPOINTMENT (OUTPATIENT)
Dept: ULTRASOUND IMAGING | Age: 32
DRG: 560 | End: 2022-09-22
Attending: OBSTETRICS & GYNECOLOGY
Payer: MEDICAID

## 2022-09-22 VITALS
BODY MASS INDEX: 37.38 KG/M2 | HEIGHT: 61 IN | TEMPERATURE: 98.5 F | DIASTOLIC BLOOD PRESSURE: 78 MMHG | OXYGEN SATURATION: 100 % | HEART RATE: 74 BPM | RESPIRATION RATE: 16 BRPM | WEIGHT: 198 LBS | SYSTOLIC BLOOD PRESSURE: 125 MMHG

## 2022-09-22 LAB
ERYTHROCYTE [DISTWIDTH] IN BLOOD BY AUTOMATED COUNT: 13.4 % (ref 11.5–14.5)
HCT VFR BLD AUTO: 32.2 % (ref 35–47)
HGB BLD-MCNC: 10.8 G/DL (ref 11.5–16)
MCH RBC QN AUTO: 31.4 PG (ref 26–34)
MCHC RBC AUTO-ENTMCNC: 33.5 G/DL (ref 30–36.5)
MCV RBC AUTO: 93.6 FL (ref 80–99)
NRBC # BLD: 0 K/UL (ref 0–0.01)
NRBC BLD-RTO: 0 PER 100 WBC
PLATELET # BLD AUTO: 189 K/UL (ref 150–400)
PMV BLD AUTO: 11 FL (ref 8.9–12.9)
RBC # BLD AUTO: 3.44 M/UL (ref 3.8–5.2)
WBC # BLD AUTO: 8.6 K/UL (ref 3.6–11)

## 2022-09-22 PROCEDURE — 36415 COLL VENOUS BLD VENIPUNCTURE: CPT

## 2022-09-22 PROCEDURE — 74011250637 HC RX REV CODE- 250/637: Performed by: STUDENT IN AN ORGANIZED HEALTH CARE EDUCATION/TRAINING PROGRAM

## 2022-09-22 PROCEDURE — 93971 EXTREMITY STUDY: CPT

## 2022-09-22 PROCEDURE — 85027 COMPLETE CBC AUTOMATED: CPT

## 2022-09-22 PROCEDURE — 74011250637 HC RX REV CODE- 250/637: Performed by: OBSTETRICS & GYNECOLOGY

## 2022-09-22 RX ADMIN — FAMOTIDINE 20 MG: 20 TABLET, FILM COATED ORAL at 07:51

## 2022-09-22 RX ADMIN — OXYCODONE 5 MG: 5 TABLET ORAL at 07:52

## 2022-09-22 RX ADMIN — DOCUSATE SODIUM 100 MG: 100 CAPSULE ORAL at 07:51

## 2022-09-22 RX ADMIN — GABAPENTIN 300 MG: 300 CAPSULE ORAL at 07:52

## 2022-09-22 RX ADMIN — CYCLOBENZAPRINE 10 MG: 10 TABLET, FILM COATED ORAL at 10:04

## 2022-09-22 RX ADMIN — OXYCODONE 5 MG: 5 TABLET ORAL at 01:46

## 2022-09-22 RX ADMIN — CYCLOBENZAPRINE 10 MG: 10 TABLET, FILM COATED ORAL at 01:46

## 2022-09-22 NOTE — PROGRESS NOTES
LE doppler normal with no evidence of clot. Area is feeling better with Flexeril  Repeat BP normal. She states she was in pain when it was taken this am and was mildly elevated.    Discharge home today with f/up next week in office for BP check

## 2022-09-22 NOTE — DISCHARGE SUMMARY
Obstetrical Discharge Summary     Name: Christen Alford MRN: [id number]  SSN: [id number]    YOB: 1990  Age: 33 y.o. Sex: female      Admit Date: 2022    Discharge Date: 2022     Admitting Physician: Benjamín Koroma MD     Attending Physician:  Samara Courtney MD     Admission Diagnoses: Pregnant [Z34.90]    Discharge Diagnoses:   Information for the patient's :  Andrez Mills [[id number]]   Delivery of a 2.62 kg female infant via Vaginal, Spontaneous on 2022 at 2:31 PM  by Krunal Witt. Apgars were 8  and 8 . Additional Diagnoses:   Hospital Problems  Date Reviewed: 2022            Codes Class Noted POA    Pregnant ICD-10-CM: Z34.90  ICD-9-CM: V22.2  2022 Unknown          Lab Results   Component Value Date/Time    Rubella, External Immune 2022 12:00 AM    GrBStrep, External pos 2022 12:00 AM       Hospital Course: Normal hospital course following the delivery. She did have some pain and swelling in her left lateral thigh. She had a normal LE doppler with no evidence of clot. Improved with ice/heat and Flexeril    Patient Instructions:   Current Discharge Medication List        CONTINUE these medications which have NOT CHANGED    Details   famotidine (PEPCID) 10 mg tablet Take 10 mg by mouth two (2) times a day. cyclobenzaprine (FLEXERIL) 10 mg tablet TAKE 1 TABLET BY MOUTH THREE (3) TIMES DAILY AS NEEDED FOR MUSCLE SPASMS  Qty: 90 Tablet, Refills: 1    Associated Diagnoses: Muscle spasm      PNV/iron/omega3/folic ac/f.a.1 (PRE-AMMY MULTIVITAMINS/MINERALS PO) Take 1 Tablet by mouth daily. biotin 10 mg tab Take 10,000 mcg by mouth daily. EPINEPHrine (EPIPEN) 0.3 mg/0.3 mL injection EpiPen 2-Fly 0.3 mg/0.3 mL injection, auto-injector             Disposition at Discharge: Home or self care    Condition at Discharge: Stable    Reference my discharge instructions.     Follow-up Appointments   Procedures    FOLLOW UP VISIT Appointment in: One Week For blood pressure check and 6 weeks for postpartum check with [facility]     For blood pressure check and 6 weeks for postpartum check with [facility]     Standing Status:   Standing     Number of Occurrences:   1     Order Specific Question:   Appointment in     Answer:    One Week        Signed By:  Hillary Araujo MD     September 22, 2022

## 2022-09-22 NOTE — PROGRESS NOTES
Post-Partum Day Number 2 Progress Note    Kali Velasquez     Assessment: Doing well, post partum day 2, pmhx significant for hx of chronic pain, intracranial hypertension vs pseudotumor with venous sinus stents in place. She is having left thigh pain and swelling that has not improved since yesterday. No other leg pain or swelling. No redness or warmth    Plan:   -Will order doppler to rule out DVT- will try alternating ice and heat  - Mild range BP this am, patient is asymptomatic, will monitor this morning, she has not had BP issues to date  - Discharge home today if doppler normal and BPs normal  - Follow up in office in 1 week(s) with [facility] for BP check and 6 weeks for postpartum check  - Pain medication prescription(s) sent. - Questions answered. - Chronic pain - continue home flexeril and gabapentin     Information for the patient's :  Kandi Bangura [[id number]]   Vaginal, Spontaneous  Patient doing well without significant complaint. Voiding without difficulty, normal lochia. Ready for discharge home. Vitals:  Visit Vitals  BP (!) 131/99 Comment: pt in pain   Pulse 74   Temp 98.5 °F (36.9 °C)   Resp 16   Ht 5' 1\" (1.549 m)   Wt 89.8 kg (198 lb)   SpO2 100%   Breastfeeding Unknown   BMI 37.41 kg/m²     Temp (24hrs), Av.2 °F (36.8 °C), Min:97.7 °F (36.5 °C), Max:98.5 °F (36.9 °C)      Exam:        Patient without distress.                 Fundus firm, nontender per nursing fundal checks                Perineum with normal lochia noted per nursing assessment                Lower extremities are negative for pathological edema    Labs:     Lab Results   Component Value Date/Time    WBC 8.6 2022 03:41 AM    WBC 6.6 2022 05:15 PM    WBC 7.1 2022 12:19 AM    WBC 7.8 2022 03:48 PM    WBC 9.9 2022 01:04 AM    WBC 10.9 2022 07:26 PM    WBC 7.5 2022 10:59 AM    WBC 11.2 (H) 2022 08:39 PM    WBC 9.1 2022 01:02 AM    WBC 7.7 12/14/2021 11:06 AM    WBC 10.4 12/06/2021 09:45 PM    WBC 12.6 (H) 12/04/2021 05:20 AM    WBC 10.5 11/26/2021 03:52 AM    WBC 12.2 (H) 11/25/2021 09:25 PM    WBC 8.8 11/23/2021 09:45 PM    WBC 8.6 11/09/2021 12:45 AM    WBC 9.2 11/01/2021 12:33 AM    WBC 7.6 10/04/2021 04:28 AM    WBC 8.8 10/03/2021 04:34 AM    WBC 8.2 10/02/2021 01:32 AM    WBC 9.0 09/30/2021 01:29 AM    WBC 7.7 09/27/2021 04:30 AM    WBC 11.9 (H) 09/26/2021 08:22 PM    WBC 9.4 09/03/2021 09:26 PM    WBC 9.9 06/04/2021 12:23 AM    WBC 9.9 05/21/2021 11:10 PM    WBC 13.8 (H) 03/07/2021 02:49 AM    WBC 6.8 01/25/2021 02:30 PM    WBC 11.3 (H) 12/03/2020 01:26 AM    WBC 7.4 11/18/2020 01:45 PM    WBC 8.8 10/23/2020 12:42 AM    WBC 14.1 (H) 09/15/2020 01:46 AM    WBC 10.2 09/13/2020 11:16 PM    WBC 11.3 (H) 08/29/2020 03:55 AM    WBC 23.9 (H) 08/27/2020 05:43 AM    WBC 10.1 08/26/2020 05:23 AM    WBC 9.0 08/25/2020 01:20 PM    WBC 8.1 06/28/2019 11:15 AM    WBC 10.6 08/21/2018 08:44 PM    WBC 9.9 11/21/2017 09:46 PM    WBC 13.4 (H) 11/16/2017 02:41 AM    WBC 10.3 11/10/2017 02:44 PM    WBC 11.1 (H) 10/23/2017 08:59 PM    WBC 8.7 05/08/2017 02:40 PM    WBC 14.5 (H) 04/28/2017 05:42 PM    WBC 7.6 10/06/2013 05:40 PM    WBC 9.0 02/23/2013 02:53 PM    WBC 7.7 02/21/2012 11:00 AM    WBC 12.3 (H) 02/16/2012 10:22 PM    WBC 18.2 (H) 02/05/2010 12:55 PM    WBC 7.3 04/10/2009 03:10 PM    HGB 10.8 (L) 09/22/2022 03:41 AM    HGB 11.2 (L) 09/19/2022 05:15 PM    HGB 10.7 (L) 09/14/2022 12:19 AM    HGB 11.5 09/13/2022 03:48 PM    HGB 10.3 (L) 05/19/2022 01:04 AM    HGB 11.6 04/05/2022 07:26 PM    HGB 11.2 (L) 04/03/2022 10:59 AM    HGB 11.7 04/01/2022 08:39 PM    HGB 10.2 (L) 03/31/2022 01:02 AM    HGB 12.3 12/14/2021 11:06 AM    HGB 12.2 12/06/2021 09:45 PM    HGB 11.9 12/04/2021 05:20 AM    HGB 11.3 (L) 11/26/2021 03:52 AM    HGB 11.8 11/25/2021 09:25 PM    HGB 13.4 11/23/2021 09:45 PM    HGB 13.8 11/09/2021 12:45 AM    HGB 12.6 11/01/2021 12:33 AM    HGB 13.3 10/04/2021 04:28 AM    HGB 15.4 10/03/2021 04:34 AM    HGB 13.8 10/02/2021 01:32 AM    HGB 12.9 09/30/2021 01:29 AM    HGB 12.1 09/27/2021 04:30 AM    HGB 13.7 09/26/2021 08:22 PM    HGB 13.6 09/03/2021 09:26 PM    HGB 13.1 06/04/2021 12:23 AM    HGB 13.4 05/21/2021 11:10 PM    HGB 13.9 03/07/2021 02:49 AM    HGB 12.2 01/25/2021 02:30 PM    HGB 12.6 12/03/2020 01:26 AM    HGB 12.8 11/18/2020 01:45 PM    HGB 13.5 10/23/2020 12:42 AM    HGB 13.5 09/15/2020 01:46 AM    HGB 13.6 09/13/2020 11:16 PM    HGB 13.7 08/29/2020 03:55 AM    HGB 14.5 08/27/2020 05:43 AM    HGB 13.9 08/26/2020 05:23 AM    HGB 14.6 08/25/2020 01:20 PM    HGB 13.8 06/28/2019 11:15 AM    HGB 14.2 08/21/2018 08:44 PM    HGB 12.4 11/21/2017 09:46 PM    HGB 11.9 11/16/2017 02:41 AM    HGB 13.1 11/10/2017 02:44 PM    HGB 12.2 10/23/2017 08:59 PM    HGB 13.8 05/08/2017 02:40 PM    HGB 13.2 04/28/2017 05:42 PM    HGB 9.7 (L) 10/06/2013 05:40 PM    HGB 11.4 (L) 04/12/2013 06:55 AM    HGB 11.5 02/23/2013 02:53 PM    HGB 12.0 02/21/2012 11:00 AM    HGB 14.4 02/16/2012 10:22 PM    HGB 13.0 02/05/2010 12:55 PM    HGB 10.3 (L) 04/10/2009 03:10 PM    HCT 32.2 (L) 09/22/2022 03:41 AM    HCT 32.5 (L) 09/19/2022 05:15 PM    HCT 31.3 (L) 09/14/2022 12:19 AM    HCT 33.5 (L) 09/13/2022 03:48 PM    HCT 30.2 (L) 05/19/2022 01:04 AM    HCT 33.7 (L) 04/05/2022 07:26 PM    HCT 33.4 (L) 04/03/2022 10:59 AM    HCT 33.4 (L) 04/01/2022 08:39 PM    HCT 29.9 (L) 03/31/2022 01:02 AM    HCT 36.7 12/14/2021 11:06 AM    HCT 36.6 12/06/2021 09:45 PM    HCT 34.4 (L) 12/04/2021 05:20 AM    HCT 35.2 11/26/2021 03:52 AM    HCT 34.6 (L) 11/25/2021 09:25 PM    HCT 39.4 11/23/2021 09:45 PM    HCT 40.5 11/09/2021 12:45 AM    HCT 36.8 11/01/2021 12:33 AM    HCT 39.2 10/04/2021 04:28 AM    HCT 45.7 10/03/2021 04:34 AM    HCT 41.5 10/02/2021 01:32 AM    HCT 39.1 09/30/2021 01:29 AM    HCT 36.5 09/27/2021 04:30 AM    HCT 40.1 09/26/2021 08:22 PM    HCT 40.2 09/03/2021 09:26 PM    HCT 40.7 06/04/2021 12:23 AM    HCT 41.4 05/21/2021 11:10 PM    HCT 42.4 03/07/2021 02:49 AM    HCT 35.4 01/25/2021 02:30 PM    HCT 36.8 12/03/2020 01:26 AM    HCT 38.2 11/18/2020 01:45 PM    HCT 40.4 10/23/2020 12:42 AM    HCT 40.1 09/15/2020 01:46 AM    HCT 39.3 09/13/2020 11:16 PM    HCT 40.2 08/29/2020 03:55 AM    HCT 43.6 08/27/2020 05:43 AM    HCT 41.7 08/26/2020 05:23 AM    HCT 42.8 08/25/2020 01:20 PM    HCT 42.5 06/28/2019 11:15 AM    HCT 42.3 08/21/2018 08:44 PM    HCT 36.1 11/21/2017 09:46 PM    HCT 34.9 (L) 11/16/2017 02:41 AM    HCT 39.8 11/10/2017 02:44 PM    HCT 35.2 10/23/2017 08:59 PM    HCT 41.1 05/08/2017 02:40 PM    HCT 38.1 04/28/2017 05:42 PM    HCT 29.7 (L) 10/06/2013 05:40 PM    HCT 33.2 (L) 04/12/2013 06:55 AM    HCT 33.0 (L) 02/23/2013 02:53 PM    HCT 34.9 (L) 02/21/2012 11:00 AM    HCT 41.0 02/16/2012 10:22 PM    HCT 37.3 02/05/2010 12:55 PM    HCT 31.7 (L) 04/10/2009 03:10 PM    PLATELET 085 87/76/4600 03:41 AM    PLATELET 664 81/70/2289 05:15 PM    PLATELET 594 05/15/7732 12:19 AM    PLATELET 100 83/43/5976 03:48 PM    PLATELET 402 38/46/8108 01:04 AM    PLATELET 125 99/64/7732 07:26 PM    PLATELET 065 82/02/7095 10:59 AM    PLATELET 908 78/20/7442 08:39 PM    PLATELET 205 10/05/3068 01:02 AM    PLATELET 461 31/21/1738 11:06 AM    PLATELET 874 35/23/9194 09:45 PM    PLATELET 339 45/31/4510 05:20 AM    PLATELET 790 11/54/0988 03:52 AM    PLATELET 303 26/25/7512 09:25 PM    PLATELET 967 92/60/4193 09:45 PM    PLATELET 465 96/10/0430 12:45 AM    PLATELET 902 40/40/1663 12:33 AM    PLATELET 794 24/47/8028 04:28 AM    PLATELET 524 75/90/8515 04:34 AM    PLATELET 167 01/02/5868 01:32 AM    PLATELET 911 24/07/6411 01:29 AM    PLATELET 870 86/93/7288 04:30 AM    PLATELET 959 33/94/9811 08:22 PM    PLATELET 153 56/94/8688 09:26 PM    PLATELET 710 68/28/0042 12:23 AM    PLATELET 300 68/08/2366 11:10 PM    PLATELET 326 73/84/3988 02:49 AM    PLATELET 562 75/99/5745 02:30 PM    PLATELET 731 67/20/2758 01:26 AM    PLATELET 637 31/25/3539 01:45 PM    PLATELET 635 07/08/7317 12:42 AM    PLATELET 935 07/66/7518 01:46 AM    PLATELET 994 46/48/2490 11:16 PM    PLATELET 930 69/04/5952 03:55 AM    PLATELET 287 76/80/3426 05:43 AM    PLATELET 255 96/43/2762 05:23 AM    PLATELET 151 88/81/2503 01:20 PM    PLATELET 993 31/19/9925 11:15 AM    PLATELET 683 61/56/5530 08:44 PM    PLATELET 222 39/27/8166 09:46 PM    PLATELET 392 20/46/1649 02:41 AM    PLATELET 684 42/30/9868 02:44 PM    PLATELET 538 02/61/7372 08:59 PM    PLATELET 909 69/83/7015 02:40 PM    PLATELET 680 69/82/4777 05:42 PM    PLATELET 300 (L) 39/89/6219 05:40 PM    PLATELET 270 14/95/7177 06:55 AM    PLATELET 849 05/72/5244 02:53 PM    PLATELET 849 19/50/8675 11:00 AM    PLATELET 990 49/11/2469 10:22 PM    PLATELET 987 03/48/7945 12:55 PM    PLATELET 875 (L) 43/50/7378 03:10 PM       Recent Results (from the past 24 hour(s))   CBC W/O DIFF    Collection Time: 09/22/22  3:41 AM   Result Value Ref Range    WBC 8.6 3.6 - 11.0 K/uL    RBC 3.44 (L) 3.80 - 5.20 M/uL    HGB 10.8 (L) 11.5 - 16.0 g/dL    HCT 32.2 (L) 35.0 - 47.0 %    MCV 93.6 80.0 - 99.0 FL    MCH 31.4 26.0 - 34.0 PG    MCHC 33.5 30.0 - 36.5 g/dL    RDW 13.4 11.5 - 14.5 %    PLATELET 394 213 - 108 K/uL    MPV 11.0 8.9 - 12.9 FL    NRBC 0.0 0  WBC    ABSOLUTE NRBC 0.00 0.00 - 0.01 K/uL

## 2022-09-22 NOTE — PROGRESS NOTES
Bedside and Verbal shift change report given to EDWARD Owens RN (oncoming nurse) by JEWEL Belcher RN (offgoing nurse). Report included the following information SBAR, Kardex, Intake/Output, MAR, Accordion, and Recent Results.

## 2022-09-22 NOTE — DISCHARGE INSTRUCTIONS
POST DELIVERY DISCHARGE INSTRUCTIONS    Name: Christen Alford  YOB: 1990  Primary Diagnosis: Active Problems:    Pregnant (2022)        General:     Diet/Diet Restrictions:  Eight 8-ounce glasses of fluid daily (water, juices); avoid excessive caffeine intake. Meals/snacks as desired which are high in fiber and carbohydrates and low in fat and cholesterol. Medications:         Physical Activity / Restrictions / Safety:     Avoid heavy lifting, no more that 8 lbs. For 2-3 weeks;   Limit use of stairs to 2 times daily for the first week home. No driving for one week. Avoid intercourse 4-6 weeks, no douching or tampon use. Check with obstetrician before starting or resuming an exercise program.      Discharge Instructions/Special Treatment/Home Care Needs:     Continue prenatal vitamins. Continue to use squirt bottle with warm water on your episiotomy after each bathroom use until bleeding stops. If steri-strips applied to your incision, remove in 7-10 days. Call your doctor for the following:     Fever over 101 degrees by mouth. Vaginal bleeding heavier than a normal menstrual period or clots larger than a golf ball. Red streaks or increased swelling of legs, painful red streaks on your breast.  Painful urination, constipation and increased pain or swelling or discharge with your incision. If you feel extremely anxious or overwhelmed. If you have thoughts of harming yourself and/or your baby. Pain Management:     Take Acetaminophen (Tylenol) or Ibuprofen (Advil, Motrin), as directed for pain. Use a warm Sitz bath 3 times daily to relieve episiotomy or hemorrhoidal discomfort. For hemorrhoidal discomfort, use Tucks and Anusol cream as needed and directed. Heating pad to  incision as needed.      Follow-Up Care:     Appointment with MD: [unfilled]  Telephone number: [phone]    Signed By: Austen Hickey MD Date: 9/22/2022 Time: 12:39 PM

## 2022-10-01 ENCOUNTER — HOSPITAL ENCOUNTER (EMERGENCY)
Age: 32
Discharge: HOME | End: 2022-10-01
Attending: EMERGENCY MEDICINE
Payer: MEDICAID

## 2022-10-01 ENCOUNTER — APPOINTMENT (OUTPATIENT)
Dept: CT IMAGING | Age: 32
End: 2022-10-01
Attending: EMERGENCY MEDICINE
Payer: MEDICAID

## 2022-10-01 VITALS
OXYGEN SATURATION: 97 % | SYSTOLIC BLOOD PRESSURE: 135 MMHG | BODY MASS INDEX: 34.96 KG/M2 | RESPIRATION RATE: 17 BRPM | WEIGHT: 190 LBS | TEMPERATURE: 98.6 F | HEIGHT: 62 IN | HEART RATE: 94 BPM | DIASTOLIC BLOOD PRESSURE: 90 MMHG

## 2022-10-01 DIAGNOSIS — R51.9 NONINTRACTABLE HEADACHE, UNSPECIFIED CHRONICITY PATTERN, UNSPECIFIED HEADACHE TYPE: Primary | ICD-10-CM

## 2022-10-01 DIAGNOSIS — T14.8XXA MUSCLE STRAIN: ICD-10-CM

## 2022-10-01 LAB
ALBUMIN SERPL-MCNC: 3.3 G/DL (ref 3.5–5)
ALBUMIN/GLOB SERPL: 1 {RATIO} (ref 1.1–2.2)
ALP SERPL-CCNC: 197 U/L (ref 45–117)
ALT SERPL-CCNC: 32 U/L (ref 12–78)
ANION GAP SERPL CALC-SCNC: 10 MMOL/L (ref 5–15)
AST SERPL W P-5'-P-CCNC: 18 U/L (ref 15–37)
BASOPHILS # BLD: 0 K/UL (ref 0–0.1)
BASOPHILS NFR BLD: 1 % (ref 0–1)
BILIRUB SERPL-MCNC: 0.4 MG/DL (ref 0.2–1)
BUN SERPL-MCNC: 10 MG/DL (ref 6–20)
BUN/CREAT SERPL: 12 (ref 12–20)
CA-I BLD-MCNC: 9 MG/DL (ref 8.5–10.1)
CHLORIDE SERPL-SCNC: 105 MMOL/L (ref 97–108)
CO2 SERPL-SCNC: 25 MMOL/L (ref 21–32)
CREAT SERPL-MCNC: 0.86 MG/DL (ref 0.55–1.02)
DIFFERENTIAL METHOD BLD: NORMAL
EOSINOPHIL # BLD: 0.2 K/UL (ref 0–0.4)
EOSINOPHIL NFR BLD: 3 % (ref 0–7)
ERYTHROCYTE [DISTWIDTH] IN BLOOD BY AUTOMATED COUNT: 12.3 % (ref 11.5–14.5)
GLOBULIN SER CALC-MCNC: 3.4 G/DL (ref 2–4)
GLUCOSE SERPL-MCNC: 96 MG/DL (ref 65–100)
HCT VFR BLD AUTO: 38.5 % (ref 35–47)
HGB BLD-MCNC: 13.2 G/DL (ref 11.5–16)
IMM GRANULOCYTES # BLD AUTO: 0 K/UL (ref 0–0.04)
IMM GRANULOCYTES NFR BLD AUTO: 0 % (ref 0–0.5)
LYMPHOCYTES # BLD: 2.5 K/UL (ref 0.8–3.5)
LYMPHOCYTES NFR BLD: 38 % (ref 12–49)
MCH RBC QN AUTO: 31.4 PG (ref 26–34)
MCHC RBC AUTO-ENTMCNC: 34.3 G/DL (ref 30–36.5)
MCV RBC AUTO: 91.4 FL (ref 80–99)
MONOCYTES # BLD: 0.5 K/UL (ref 0–1)
MONOCYTES NFR BLD: 8 % (ref 5–13)
NEUTS SEG # BLD: 3.3 K/UL (ref 1.8–8)
NEUTS SEG NFR BLD: 50 % (ref 32–75)
PLATELET # BLD AUTO: 347 K/UL (ref 150–400)
PMV BLD AUTO: 9.4 FL (ref 8.9–12.9)
POTASSIUM SERPL-SCNC: 3.6 MMOL/L (ref 3.5–5.1)
PROT SERPL-MCNC: 6.7 G/DL (ref 6.4–8.2)
RBC # BLD AUTO: 4.21 M/UL (ref 3.8–5.2)
SODIUM SERPL-SCNC: 140 MMOL/L (ref 136–145)
WBC # BLD AUTO: 6.6 K/UL (ref 3.6–11)

## 2022-10-01 PROCEDURE — 99284 EMERGENCY DEPT VISIT MOD MDM: CPT

## 2022-10-01 PROCEDURE — 70450 CT HEAD/BRAIN W/O DYE: CPT

## 2022-10-01 PROCEDURE — 36415 COLL VENOUS BLD VENIPUNCTURE: CPT

## 2022-10-01 PROCEDURE — 80053 COMPREHEN METABOLIC PANEL: CPT

## 2022-10-01 PROCEDURE — 85025 COMPLETE CBC W/AUTO DIFF WBC: CPT

## 2022-10-01 RX ORDER — LIDOCAINE 4 G/100G
PATCH TOPICAL
Qty: 10 PATCH | Refills: 0 | Status: SHIPPED | OUTPATIENT
Start: 2022-10-01 | End: 2022-10-16

## 2022-10-01 RX ORDER — GABAPENTIN 100 MG/1
100 CAPSULE ORAL ONCE
Status: DISCONTINUED | OUTPATIENT
Start: 2022-10-01 | End: 2022-10-02 | Stop reason: HOSPADM

## 2022-10-02 NOTE — ED NOTES
Pt called requesting to speak with charge nurse. Pt states she wanted to make a note that she was unhappy with her care. Pt states that she has had a headache for a week and leg has been in pain. Charge nurse states to pt that a CT of the head was done and went over results with pt. Pt then states that she was not offered medication for pain and swelling in leg, however, gabapentin was offered and refused by pt prior to pt eloping from ED. Charge nurse apologized to pt and asked what could have been done to better her care while here and pt then proceeded to hang up on charge nurse.

## 2022-10-02 NOTE — ED NOTES
Pt not in room to receive discharge instructions. Pts IV found removed and laying on stretcher in the room.
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
## 2022-10-02 NOTE — ED TRIAGE NOTES
Pt has c/o headache and left upper leg pain and swelling. Pt with recent vaginal birth of child on Tuesday. Pt reports she was induced at 37.6 weeks. Pt reports she developed pre-eclampsia towards the end of the pregnancy. Pt gave birth at [address] is Dr. Theresa Pelletier. Pt reports hx of intracranial hypertension since 2019. Pt had a doppler study done at the hospital prior to discharge. Study was negative for clots.

## 2022-10-06 ENCOUNTER — OFFICE VISIT (OUTPATIENT)
Dept: FAMILY MEDICINE CLINIC | Age: 32
End: 2022-10-06
Payer: MEDICAID

## 2022-10-06 VITALS
SYSTOLIC BLOOD PRESSURE: 123 MMHG | HEART RATE: 73 BPM | TEMPERATURE: 97.5 F | BODY MASS INDEX: 34.41 KG/M2 | WEIGHT: 187 LBS | RESPIRATION RATE: 16 BRPM | HEIGHT: 62 IN | OXYGEN SATURATION: 99 % | DIASTOLIC BLOOD PRESSURE: 85 MMHG

## 2022-10-06 DIAGNOSIS — L93.0 LUPUS ERYTHEMATOSUS, UNSPECIFIED FORM: Primary | ICD-10-CM

## 2022-10-06 PROCEDURE — 99213 OFFICE O/P EST LOW 20 MIN: CPT | Performed by: FAMILY MEDICINE

## 2022-10-06 NOTE — PROGRESS NOTES
Identified pt with two pt identifiers. Reviewed record in preparation for visit and have obtained necessary documentation. All patient medications has been reviewed. Chief Complaint   Patient presents with    Back Pain    Hip Pain     Left     Additional information about chief complaint:    Visit Vitals  /85 (BP 1 Location: Left upper arm, BP Patient Position: Sitting, BP Cuff Size: Large adult)   Pulse 73   Temp 97.5 °F (36.4 °C) (Oral)   Resp 16   Ht 5' 2\" (1.575 m)   Wt 187 lb (84.8 kg)   SpO2 99%   BMI 34.20 kg/m²       Health Maintenance Due   Topic    COVID-19 Vaccine (1)    Cervical cancer screen     Flu Vaccine (1)       1. Have you been to the ER, urgent care clinic since your last visit? Hospitalized since your last visit? Yes seen ER for back pain on Saturday. 2. Have you seen or consulted any other health care providers outside of the [address] [place] [address] since your last visit? Include any pap smears or colon screening.  No

## 2022-10-06 NOTE — PROGRESS NOTES
Chief Complaint   Patient presents with    Back Pain    Hip Pain     Left     she is a 33y.o. year old female who presents for evalution. She has pain on left lower back and left leg  She is taking medrol dose pack from the ER  2 days ago was 8-9 and today is 6      She has a h/o lupus and in the past she had pain in the left hip    She is avoiding sweets except sodas      Reviewed PmHx, RxHx, FmHx, SocHx, AllgHx and updated and dated in the chart. Aspirin yes ____   No____ N/A____    Patient Active Problem List    Diagnosis    Pregnant    Systemic lupus erythematosus affecting pregnancy in third trimester ([facility] [address]    Obesity affecting pregnancy in third trimester    False labor before 37 completed weeks of gestation in third trimester    32 weeks gestation of pregnancy    Unilateral weakness    CVA (cerebral vascular accident) ([facility] [address]    Bruising    Intractable migraine    Intractable headache    Pseudotumor cerebri    Stenosis of cerebral venous sinus    Papilledema associated with increased intracranial pressure    Intracranial hypertension    Ataxia    Headache    IIH (idiopathic intracranial hypertension)    SLE (systemic lupus erythematosus) ([facility])    Class 3 severe obesity in adult ([facility] [address]    S/P repair of paraesophageal hernia    Paraesophageal hernia    GERD (gastroesophageal reflux disease)    Obesity, Class II, BMI 35-39.9    Sebaceous cyst     S/P excision; Along sternum in cleavage. History of Nissen fundoplication     4 duodenal ulcers, chronic gastritis, Grade C esophagitis, Chronic GERD, hernia, small tumor. Done August/2016.       Chronic migraine without aura without status migrainosus, not intractable    Cervical incompetence       Nurse notes were reviewed and copied and are correct  Review of Systems - negative except as listed above in the HPI    Objective:     Vitals:    10/06/22 1252   BP: 123/85   Pulse: 73   Resp: 16   Temp: 97.5 °F (36.4 °C)   TempSrc: Oral   SpO2: 99%   Weight: 187 lb (84.8 kg)   Height: 5' 2\" (1.575 m)     Physical Examination: General appearance - alert, well appearing, and in no distress  Mental status - alert, oriented to person, place, and time  Neck - supple, no significant adenopathy  Chest - clear to auscultation, no wheezes, rales or rhonchi, symmetric air entry  Heart - normal rate, regular rhythm, normal S1, S2, no murmurs, rubs, clicks or gallops  Musculoskeletal - no joint tenderness, deformity or swelling         Assessment/ Plan:   Diagnoses and all orders for this visit:    1. Lupus erythematosus, unspecified form  -     SED RATE (ESR); Future  -     C REACTIVE PROTEIN, QT; Future     finish the steroids  I hope this will get rid of the pain  Also has 100 mg gabapentin from the hospital      ICD-10-CM ICD-9-CM    1. Lupus erythematosus, unspecified form  L93.0 695.4 SED RATE (ESR)      C REACTIVE PROTEIN, QT      SED RATE (ESR)      C REACTIVE PROTEIN, QT          I have discussed the diagnosis with the patient and the intended plan as seen in the above orders. The patient has received an after-visit summary  [facility] and questions were answered concerning future plans. Patients in [facility] have access to avs without printing    Medication Side Effects and Warnings were discussed with patient:   Patient Labs were reviewed and or requested:   Patient Past Records were reviewed and or requested: yes        There are no Patient Instructions on file for this visit.

## 2022-10-07 LAB
CRP SERPL-MCNC: 9 MG/L (ref 0–10)
ERYTHROCYTE [SEDIMENTATION RATE] IN BLOOD BY WESTERGREN METHOD: 12 MM/HR (ref 0–32)

## 2022-10-11 DIAGNOSIS — G43.709 CHRONIC MIGRAINE WITHOUT AURA WITHOUT STATUS MIGRAINOSUS, NOT INTRACTABLE: ICD-10-CM

## 2022-10-11 DIAGNOSIS — M25.50 PAIN IN JOINT, MULTIPLE SITES: ICD-10-CM

## 2022-10-11 DIAGNOSIS — G93.2 PSEUDOTUMOR CEREBRI: ICD-10-CM

## 2022-10-11 RX ORDER — GABAPENTIN 300 MG/1
300 CAPSULE ORAL 3 TIMES DAILY
Qty: 90 CAPSULE | Refills: 0 | Status: SHIPPED | OUTPATIENT
Start: 2022-10-11 | End: 2022-10-30

## 2022-10-11 NOTE — PROGRESS NOTES
Phone call  In severe pain from the back, the hip and often she still gets headaches.  She has rx for 100 mg gabapentin from the hospital. She was taking 600 mg tid before she became pregnant  Plan:Take 300 mg tid using the 100 mg caps she has at home  I am sending a  300 mg dose rx now for tid dosing  If the 300 mg does not help , increase to 600 and call me to let me know  DB

## 2022-10-19 ENCOUNTER — TRANSCRIBE ORDER (OUTPATIENT)
Dept: SCHEDULING | Age: 32
End: 2022-10-19

## 2022-10-19 DIAGNOSIS — G89.29 CHRONIC BACK PAIN: Primary | ICD-10-CM

## 2022-10-19 DIAGNOSIS — M54.9 CHRONIC BACK PAIN: Primary | ICD-10-CM

## 2022-10-26 ENCOUNTER — TELEPHONE (OUTPATIENT)
Dept: SURGERY | Age: 32
End: 2022-10-26

## 2022-10-26 PROCEDURE — 96374 THER/PROPH/DIAG INJ IV PUSH: CPT

## 2022-10-26 PROCEDURE — 96375 TX/PRO/DX INJ NEW DRUG ADDON: CPT

## 2022-10-26 PROCEDURE — 99284 EMERGENCY DEPT VISIT MOD MDM: CPT

## 2022-10-27 ENCOUNTER — HOSPITAL ENCOUNTER (EMERGENCY)
Age: 32
Discharge: HOME | End: 2022-10-27
Attending: STUDENT IN AN ORGANIZED HEALTH CARE EDUCATION/TRAINING PROGRAM
Payer: MEDICAID

## 2022-10-27 VITALS
HEART RATE: 91 BPM | SYSTOLIC BLOOD PRESSURE: 94 MMHG | RESPIRATION RATE: 16 BRPM | BODY MASS INDEX: 34.96 KG/M2 | DIASTOLIC BLOOD PRESSURE: 60 MMHG | WEIGHT: 190 LBS | OXYGEN SATURATION: 91 % | HEIGHT: 62 IN | TEMPERATURE: 98.2 F

## 2022-10-27 DIAGNOSIS — R51.9 INTRACTABLE EPISODIC HEADACHE, UNSPECIFIED HEADACHE TYPE: Primary | ICD-10-CM

## 2022-10-27 DIAGNOSIS — M54.40 ACUTE BILATERAL LOW BACK PAIN WITH SCIATICA, SCIATICA LATERALITY UNSPECIFIED: Primary | ICD-10-CM

## 2022-10-27 PROCEDURE — 96375 TX/PRO/DX INJ NEW DRUG ADDON: CPT

## 2022-10-27 PROCEDURE — 96374 THER/PROPH/DIAG INJ IV PUSH: CPT

## 2022-10-27 PROCEDURE — 74011250636 HC RX REV CODE- 250/636: Performed by: STUDENT IN AN ORGANIZED HEALTH CARE EDUCATION/TRAINING PROGRAM

## 2022-10-27 RX ORDER — PROCHLORPERAZINE EDISYLATE 5 MG/ML
10 INJECTION INTRAMUSCULAR; INTRAVENOUS ONCE
Status: COMPLETED | OUTPATIENT
Start: 2022-10-27 | End: 2022-10-27

## 2022-10-27 RX ORDER — KETOROLAC TROMETHAMINE 30 MG/ML
15 INJECTION, SOLUTION INTRAMUSCULAR; INTRAVENOUS
Status: COMPLETED | OUTPATIENT
Start: 2022-10-27 | End: 2022-10-27

## 2022-10-27 RX ORDER — METHOCARBAMOL 750 MG/1
750 TABLET, FILM COATED ORAL
Qty: 20 TABLET | Refills: 0 | Status: SHIPPED | OUTPATIENT
Start: 2022-10-27 | End: 2022-11-21

## 2022-10-27 RX ADMIN — PROCHLORPERAZINE EDISYLATE 10 MG: 5 INJECTION INTRAMUSCULAR; INTRAVENOUS at 01:15

## 2022-10-27 RX ADMIN — SODIUM CHLORIDE 1000 ML: 9 INJECTION, SOLUTION INTRAVENOUS at 01:15

## 2022-10-27 RX ADMIN — KETOROLAC TROMETHAMINE 15 MG: 30 INJECTION, SOLUTION INTRAMUSCULAR at 01:15
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
## 2022-10-30 ENCOUNTER — HOSPITAL ENCOUNTER (EMERGENCY)
Age: 32
Discharge: HOME | End: 2022-10-30
Attending: EMERGENCY MEDICINE
Payer: MEDICAID

## 2022-10-30 VITALS
HEART RATE: 88 BPM | OXYGEN SATURATION: 98 % | RESPIRATION RATE: 18 BRPM | WEIGHT: 190 LBS | BODY MASS INDEX: 34.96 KG/M2 | TEMPERATURE: 98.1 F | HEIGHT: 62 IN | DIASTOLIC BLOOD PRESSURE: 94 MMHG | SYSTOLIC BLOOD PRESSURE: 137 MMHG

## 2022-10-30 DIAGNOSIS — M54.50 ACUTE BILATERAL LOW BACK PAIN WITHOUT SCIATICA: Primary | ICD-10-CM

## 2022-10-30 PROCEDURE — 99283 EMERGENCY DEPT VISIT LOW MDM: CPT

## 2022-10-30 PROCEDURE — 96372 THER/PROPH/DIAG INJ SC/IM: CPT

## 2022-10-30 PROCEDURE — 74011000250 HC RX REV CODE- 250: Performed by: EMERGENCY MEDICINE

## 2022-10-30 PROCEDURE — 74011636637 HC RX REV CODE- 636/637: Performed by: EMERGENCY MEDICINE

## 2022-10-30 PROCEDURE — 75810000123 HC INJ'S ANES/STEROID AGT PERIPH NERVE

## 2022-10-30 RX ORDER — METHOCARBAMOL 500 MG/1
500 TABLET, FILM COATED ORAL ONCE
Status: DISCONTINUED | OUTPATIENT
Start: 2022-10-30 | End: 2022-10-30 | Stop reason: HOSPADM

## 2022-10-30 RX ORDER — PREDNISONE 20 MG/1
40 TABLET ORAL
Qty: 10 TABLET | Refills: 0 | Status: SHIPPED | OUTPATIENT
Start: 2022-10-30 | End: 2022-11-04

## 2022-10-30 RX ORDER — DICLOFENAC SODIUM 10 MG/G
GEL TOPICAL 4 TIMES DAILY
COMMUNITY
End: 2022-11-21

## 2022-10-30 RX ORDER — OXYCODONE HYDROCHLORIDE 5 MG/1
5 TABLET ORAL
Status: DISCONTINUED | OUTPATIENT
Start: 2022-10-30 | End: 2022-10-30 | Stop reason: HOSPADM

## 2022-10-30 RX ORDER — PREDNISONE 20 MG/1
40 TABLET ORAL
Status: COMPLETED | OUTPATIENT
Start: 2022-10-30 | End: 2022-10-30

## 2022-10-30 RX ORDER — BUPIVACAINE HYDROCHLORIDE 5 MG/ML
10 INJECTION, SOLUTION EPIDURAL; INTRACAUDAL ONCE
Status: COMPLETED | OUTPATIENT
Start: 2022-10-30 | End: 2022-10-30

## 2022-10-30 RX ADMIN — PREDNISONE 40 MG: 20 TABLET ORAL at 01:12

## 2022-10-30 RX ADMIN — BUPIVACAINE HYDROCHLORIDE 50 MG: 5 INJECTION, SOLUTION EPIDURAL; INTRACAUDAL; PERINEURAL at 00:50

## 2022-10-30 NOTE — ED TRIAGE NOTES
Patient to ED for mid to lower back pain x a few weeks. Patient denies any known injury, reports has seen her PCP for this and has an MRI scheduled for 11/10/22. Patient reports taking Flexeril, Oxycodone, and using diclofenac gel w/o relief. Patient reports hx of lupus and intracranial HTN. Currently post partum, 9/20/22.
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
## 2022-10-30 NOTE — ED NOTES
Patient reports she did not have an epidural for her recent delivery and is not currently breast feeding.

## 2022-10-30 NOTE — ED NOTES
Emergency Department Nursing Plan of Care       The Nursing Plan of Care is developed from the Nursing assessment and Emergency Department Attending provider initial evaluation. The plan of care may be reviewed in the ED Provider note.     The Plan of Care was developed with the following considerations:   Patient / Family readiness to learn indicated by:verbalized understanding  Persons(s) to be included in education: patient  Barriers to Learning/Limitations:No    Signed     Estefania Hodges RN    10/30/2022   12:35 AM

## 2022-11-01 DIAGNOSIS — M54.50 ACUTE BILATERAL LOW BACK PAIN WITHOUT SCIATICA: Primary | ICD-10-CM

## 2022-11-01 NOTE — PROGRESS NOTES
Has been to ER several times with back pain.  Was resolved for a few days octavia marcaine was injected in spot on both side of spine on 11/30/22  Now has recurred in same spot  I am referring for PT while waiting to get MRI and full eval

## 2022-11-10 ENCOUNTER — HOSPITAL ENCOUNTER (OUTPATIENT)
Dept: MRI IMAGING | Age: 32
Discharge: HOME | End: 2022-11-10
Attending: FAMILY MEDICINE
Payer: MEDICAID

## 2022-11-10 VITALS
BODY MASS INDEX: 34.96 KG/M2 | HEART RATE: 67 BPM | OXYGEN SATURATION: 98 % | WEIGHT: 190 LBS | RESPIRATION RATE: 12 BRPM | HEIGHT: 62 IN | DIASTOLIC BLOOD PRESSURE: 68 MMHG | SYSTOLIC BLOOD PRESSURE: 119 MMHG

## 2022-11-10 DIAGNOSIS — M54.40 ACUTE BILATERAL LOW BACK PAIN WITH SCIATICA, SCIATICA LATERALITY UNSPECIFIED: ICD-10-CM

## 2022-11-10 PROCEDURE — 74011250636 HC RX REV CODE- 250/636: Performed by: RADIOLOGY

## 2022-11-10 RX ORDER — FENTANYL CITRATE 50 UG/ML
100 INJECTION, SOLUTION INTRAMUSCULAR; INTRAVENOUS
Status: DISCONTINUED | OUTPATIENT
Start: 2022-11-10 | End: 2022-11-14 | Stop reason: HOSPADM

## 2022-11-10 RX ORDER — MIDAZOLAM HYDROCHLORIDE 1 MG/ML
5 INJECTION, SOLUTION INTRAMUSCULAR; INTRAVENOUS
Status: DISCONTINUED | OUTPATIENT
Start: 2022-11-10 | End: 2022-11-14 | Stop reason: HOSPADM

## 2022-11-10 RX ADMIN — MIDAZOLAM 2 MG: 1 INJECTION INTRAMUSCULAR; INTRAVENOUS at 14:14

## 2022-11-10 RX ADMIN — FENTANYL CITRATE 50 MCG: 50 INJECTION, SOLUTION INTRAMUSCULAR; INTRAVENOUS at 14:16

## 2022-11-10 RX ADMIN — MIDAZOLAM 1 MG: 1 INJECTION INTRAMUSCULAR; INTRAVENOUS at 14:18

## 2022-11-10 RX ADMIN — MIDAZOLAM 2 MG: 1 INJECTION INTRAMUSCULAR; INTRAVENOUS at 14:10

## 2022-11-10 RX ADMIN — FENTANYL CITRATE 50 MCG: 50 INJECTION, SOLUTION INTRAMUSCULAR; INTRAVENOUS at 14:12

## 2022-11-10 NOTE — ROUTINE PROCESS
Discharge instructions have been reviewed with patient and present family. Copy given to patient. Pt verbalized understand of instructions and was given  the opportunity to ask questions and receive answers prior to leaving. Pt discharged with family and assisted out by RN. In NAD at time of d/c.

## 2022-11-10 NOTE — DISCHARGE INSTRUCTIONS
[address]  Radiology Department  [phone]    Radiologist: Dr Leonard Hu    Date: 11/10/2022        MRI With Sedation Discharge Instructions      Go home and rest and restrict your activity the next 24 hours. You have been given sedating medications, so do not drive or drink alcohol today. Resume your previous diet and medications. You may return to work and resume normal activities tomorrow. Results of your MRI will be sent to your physician as soon as they become available.

## 2022-11-15 DIAGNOSIS — M54.50 ACUTE BILATERAL LOW BACK PAIN WITHOUT SCIATICA: Primary | ICD-10-CM

## 2022-11-15 NOTE — PROGRESS NOTES
In severe pain in lumbar spine  I reordered the MRI with anesthesia  The sedation did not work for her, she never fell asleep  DB

## 2022-11-21 ENCOUNTER — HOSPITAL ENCOUNTER (OUTPATIENT)
Dept: PREADMISSION TESTING | Age: 32
Discharge: HOME | End: 2022-11-21
Payer: MEDICAID

## 2022-11-21 VITALS
RESPIRATION RATE: 18 BRPM | BODY MASS INDEX: 35.3 KG/M2 | TEMPERATURE: 97.7 F | HEIGHT: 62 IN | OXYGEN SATURATION: 99 % | SYSTOLIC BLOOD PRESSURE: 119 MMHG | DIASTOLIC BLOOD PRESSURE: 78 MMHG | HEART RATE: 80 BPM | WEIGHT: 191.8 LBS

## 2022-11-21 LAB
ANION GAP SERPL CALC-SCNC: 7 MMOL/L (ref 5–15)
BASOPHILS # BLD: 0 K/UL (ref 0–0.1)
BASOPHILS NFR BLD: 1 % (ref 0–1)
BUN SERPL-MCNC: 9 MG/DL (ref 6–20)
BUN/CREAT SERPL: 12 (ref 12–20)
CALCIUM SERPL-MCNC: 9.2 MG/DL (ref 8.5–10.1)
CHLORIDE SERPL-SCNC: 102 MMOL/L (ref 97–108)
CO2 SERPL-SCNC: 30 MMOL/L (ref 21–32)
CREAT SERPL-MCNC: 0.77 MG/DL (ref 0.55–1.02)
DIFFERENTIAL METHOD BLD: NORMAL
EOSINOPHIL # BLD: 0.2 K/UL (ref 0–0.4)
EOSINOPHIL NFR BLD: 3 % (ref 0–7)
ERYTHROCYTE [DISTWIDTH] IN BLOOD BY AUTOMATED COUNT: 12.7 % (ref 11.5–14.5)
GLUCOSE SERPL-MCNC: 82 MG/DL (ref 65–100)
HCT VFR BLD AUTO: 41.2 % (ref 35–47)
HGB BLD-MCNC: 13.7 G/DL (ref 11.5–16)
IMM GRANULOCYTES # BLD AUTO: 0 K/UL (ref 0–0.04)
IMM GRANULOCYTES NFR BLD AUTO: 0 % (ref 0–0.5)
LYMPHOCYTES # BLD: 2.1 K/UL (ref 0.8–3.5)
LYMPHOCYTES NFR BLD: 37 % (ref 12–49)
MCH RBC QN AUTO: 30.3 PG (ref 26–34)
MCHC RBC AUTO-ENTMCNC: 33.3 G/DL (ref 30–36.5)
MCV RBC AUTO: 91.2 FL (ref 80–99)
MONOCYTES # BLD: 0.5 K/UL (ref 0–1)
MONOCYTES NFR BLD: 9 % (ref 5–13)
NEUTS SEG # BLD: 2.9 K/UL (ref 1.8–8)
NEUTS SEG NFR BLD: 50 % (ref 32–75)
NRBC # BLD: 0 K/UL (ref 0–0.01)
NRBC BLD-RTO: 0 PER 100 WBC
PLATELET # BLD AUTO: 307 K/UL (ref 150–400)
PMV BLD AUTO: 9.4 FL (ref 8.9–12.9)
POTASSIUM SERPL-SCNC: 4.1 MMOL/L (ref 3.5–5.1)
RBC # BLD AUTO: 4.52 M/UL (ref 3.8–5.2)
SODIUM SERPL-SCNC: 139 MMOL/L (ref 136–145)
WBC # BLD AUTO: 5.8 K/UL (ref 3.6–11)

## 2022-11-21 PROCEDURE — 80048 BASIC METABOLIC PNL TOTAL CA: CPT

## 2022-11-21 PROCEDURE — 36415 COLL VENOUS BLD VENIPUNCTURE: CPT

## 2022-11-21 PROCEDURE — 85025 COMPLETE CBC W/AUTO DIFF WBC: CPT

## 2022-11-21 RX ORDER — OXYCODONE HYDROCHLORIDE 5 MG/1
5 TABLET ORAL
COMMUNITY

## 2022-11-21 NOTE — PERIOP NOTES
[address] [facility] [address], [address] [facility]   MAIN OR                                  [phone]   MAIN PRE OP                          [phone]                                                                                AMBULATORY PRE OP          [phone]  PRE-ADMISSION TESTING    [phone]   Surgery Date:  Friday 11/25       Is surgery arrival time given by surgeon? NO  If NO, [facility] staff will call you between 3 and 7pm the day before your surgery with your arrival time. (If your surgery is on a Monday, we will call you the Friday before.)    Call [phone] after 7pm Monday-Friday if you did not receive this call. INSTRUCTIONS BEFORE YOUR SURGERY   When You  Arrive Arrive at the [address] [facility] on the day of your surgery  Have your insurance card, photo ID, and any copayment (if needed)   Food   and   Drink NO food or drink after midnight the night before surgery    This means NO water, gum, mints, coffee, juice, etc.  No alcohol (beer, wine, liquor) 24 hours before and after surgery   Medications to   TAKE   Morning of Surgery MEDICATIONS TO TAKE THE MORNING OF SURGERY WITH A SIP OF WATER:   Oxycodone if needed   Medications  To  STOP      7 days before surgery Non-Steroidal anti-inflammatory Drugs (NSAID's): for example, Ibuprofen (Advil, Motrin), Naproxen (Aleve)  Aspirin, if taking for pain   Herbal supplements, vitamins, and fish oil  Other:  (Pain medications not listed above, including Tylenol may be taken)       Bathing Clothing  Jewelry  Valuables     If you shower the morning of surgery, please do not apply anything to your skin (lotions, powders, deodorant, or makeup, especially mascara)  Follow Chlor[employer] body wash instructions provided to you during PAT appointment. Begin 3 days prior to surgery.   Do not shave or trim anywhere 24 hours before surgery  Wear your hair loose or down; no pony-tails, buns, or metal hair clips  Wear loose, comfortable, clean clothes  Wear glasses instead of contacts  Leave money, valuables, and jewelry, including body piercings, at home  If you were given an [employer], bring it on day of surgery. Going Home - or Spending the Night SAME-DAY SURGERY: You must have a responsible adult drive you home and stay with you 24 hours after surgery  ADMITS: If your doctor is keeping you in the hospital after surgery, leave personal belongings/luggage in your car until you have a hospital room number. Hospital discharge time is 12 noon  Drivers must be here before 12 noon unless you are told differently   Special Instructions Automated ROBOCALL for MRI is NOT for you, please listen to [facility] for your time of arrival.       Follow all instructions so your surgery wont be cancelled. Please, be on time. If a situation occurs and you are delayed the day of surgery, call [phone] or [phone]. If your physical condition changes (like a fever, cold, flu, etc.) call your surgeon. Home medication(s) reviewed and verified via     LIST   VERBAL   during PAT appointment. The patient was contacted by     IN-PERSON  The patient verbalizes understanding of all instructions and     DOES NOT   need reinforcement.

## 2022-11-21 NOTE — PERIOP NOTES
[address], [address] [facility]   MRI                                           [phone]                                                                     Date of MRI:          INSTRUCTIONS BEFORE YOUR MRI   What time do I arrive? The MRI department will call you the day prior to your procedure with your arrival time. Keep in mind this will be an automated message and you will need to arrive 2 HOURS prior to your scheduled MRI time. Where do I check in? Arrive at the [address]nd [address] [facility] on the day of your MRI. Can I eat or drink before my procedure? NO food or drink after midnight the night before your procedure. This means NO water, gum, mints, coffee, juice, etc.  No alcohol (beer, wine, liquor) 24 hours before or after. What medications should I take the morning of my procedure? CONTINUE ALL PRESCRIBED MEDICATIONS AS  NORMAL UNLESS INSTRUCTED OTHERWISE. Take Oxycodone if needed   What do I wear? Wear loose, comfortable, clean clothes  Wear glasses instead of contacts  If you shower the morning of surgery, please do not apply anything to your skin (lotions, powders, deodorant, or makeup, especially mascara)  Wear your hair loose or down; no pony-tails, buns, or metal hair clips  Leave money, valuables, and jewelry, including body piercings, at home   What do I bring? Photo ID  Insurance Card  Copayment (if needed)  Implant Cards (pacemaker, defibrillator, cardiac stents, nerve stimulator, neuro stimulator, etc.)   Will I be able to drive myself home? You will not be able to drive for 24 hours after receiving anesthesia. You will need a responsible  to take you home and be with you after your procedure. What if I am delayed or sick on the day of my procedure? If a situation occurs and you are delayed on the day of your procedure call [phone].     If your physical condition changes (like a fever, cold, flu, etc.)  and you need to reschedule call [phone]. Automated ROBOCALL for MRI is NOT for you, please listen to [facility] your [facility] arrival.       Home medication(s) reviewed and verified verbally with list during PAT appointment. The patient was contacted in person. The patient verbalizes understanding of all instructions and does not need reinforcement.

## 2022-11-23 ENCOUNTER — ANESTHESIA EVENT (OUTPATIENT)
Dept: MRI IMAGING | Age: 32
End: 2022-11-23
Payer: MEDICAID

## 2022-11-25 ENCOUNTER — HOSPITAL ENCOUNTER (OUTPATIENT)
Dept: MRI IMAGING | Age: 32
Discharge: HOME | End: 2022-11-25
Attending: FAMILY MEDICINE
Payer: MEDICAID

## 2022-11-25 ENCOUNTER — ANESTHESIA (OUTPATIENT)
Dept: MRI IMAGING | Age: 32
End: 2022-11-25
Payer: MEDICAID

## 2022-11-25 VITALS
HEART RATE: 82 BPM | SYSTOLIC BLOOD PRESSURE: 121 MMHG | OXYGEN SATURATION: 98 % | HEIGHT: 62 IN | RESPIRATION RATE: 13 BRPM | WEIGHT: 190.92 LBS | TEMPERATURE: 97.9 F | DIASTOLIC BLOOD PRESSURE: 79 MMHG | BODY MASS INDEX: 35.13 KG/M2

## 2022-11-25 DIAGNOSIS — M54.50 ACUTE BILATERAL LOW BACK PAIN WITHOUT SCIATICA: ICD-10-CM

## 2022-11-25 LAB — HCG UR QL: NEGATIVE

## 2022-11-25 PROCEDURE — 76210000020 HC REC RM PH II FIRST 0.5 HR

## 2022-11-25 PROCEDURE — 74011000250 HC RX REV CODE- 250: Performed by: NURSE ANESTHETIST, CERTIFIED REGISTERED

## 2022-11-25 PROCEDURE — 76060000032 HC ANESTHESIA 0.5 TO 1 HR

## 2022-11-25 PROCEDURE — 74011250636 HC RX REV CODE- 250/636: Performed by: ANESTHESIOLOGY

## 2022-11-25 PROCEDURE — 74011250636 HC RX REV CODE- 250/636: Performed by: NURSE ANESTHETIST, CERTIFIED REGISTERED

## 2022-11-25 PROCEDURE — 76210000006 HC OR PH I REC 0.5 TO 1 HR

## 2022-11-25 PROCEDURE — 72148 MRI LUMBAR SPINE W/O DYE: CPT

## 2022-11-25 RX ORDER — FENTANYL CITRATE 50 UG/ML
INJECTION, SOLUTION INTRAMUSCULAR; INTRAVENOUS AS NEEDED
Status: DISCONTINUED | OUTPATIENT
Start: 2022-11-25 | End: 2022-11-25 | Stop reason: HOSPADM

## 2022-11-25 RX ORDER — HYDROMORPHONE HYDROCHLORIDE 1 MG/ML
.25-1 INJECTION, SOLUTION INTRAMUSCULAR; INTRAVENOUS; SUBCUTANEOUS
Status: DISCONTINUED | OUTPATIENT
Start: 2022-11-25 | End: 2022-11-29 | Stop reason: HOSPADM

## 2022-11-25 RX ORDER — SODIUM CHLORIDE, SODIUM LACTATE, POTASSIUM CHLORIDE, CALCIUM CHLORIDE 600; 310; 30; 20 MG/100ML; MG/100ML; MG/100ML; MG/100ML
INJECTION, SOLUTION INTRAVENOUS
Status: DISCONTINUED | OUTPATIENT
Start: 2022-11-25 | End: 2022-11-25 | Stop reason: HOSPADM

## 2022-11-25 RX ORDER — ALBUTEROL SULFATE 0.83 MG/ML
2.5 SOLUTION RESPIRATORY (INHALATION) AS NEEDED
Status: DISCONTINUED | OUTPATIENT
Start: 2022-11-25 | End: 2022-11-29 | Stop reason: HOSPADM

## 2022-11-25 RX ORDER — PROPOFOL 10 MG/ML
INJECTION, EMULSION INTRAVENOUS
Status: DISCONTINUED | OUTPATIENT
Start: 2022-11-25 | End: 2022-11-25 | Stop reason: HOSPADM

## 2022-11-25 RX ORDER — FENTANYL CITRATE 50 UG/ML
25 INJECTION, SOLUTION INTRAMUSCULAR; INTRAVENOUS
Status: DISCONTINUED | OUTPATIENT
Start: 2022-11-25 | End: 2022-11-29 | Stop reason: HOSPADM

## 2022-11-25 RX ORDER — SODIUM CHLORIDE 0.9 % (FLUSH) 0.9 %
5-40 SYRINGE (ML) INJECTION AS NEEDED
Status: DISCONTINUED | OUTPATIENT
Start: 2022-11-25 | End: 2022-11-29 | Stop reason: HOSPADM

## 2022-11-25 RX ORDER — SODIUM CHLORIDE, SODIUM LACTATE, POTASSIUM CHLORIDE, CALCIUM CHLORIDE 600; 310; 30; 20 MG/100ML; MG/100ML; MG/100ML; MG/100ML
125 INJECTION, SOLUTION INTRAVENOUS CONTINUOUS
Status: DISCONTINUED | OUTPATIENT
Start: 2022-11-25 | End: 2022-11-29 | Stop reason: HOSPADM

## 2022-11-25 RX ORDER — DIPHENHYDRAMINE HYDROCHLORIDE 50 MG/ML
12.5 INJECTION, SOLUTION INTRAMUSCULAR; INTRAVENOUS AS NEEDED
Status: ACTIVE | OUTPATIENT
Start: 2022-11-25 | End: 2022-11-25

## 2022-11-25 RX ORDER — ONDANSETRON 2 MG/ML
4 INJECTION INTRAMUSCULAR; INTRAVENOUS AS NEEDED
Status: DISCONTINUED | OUTPATIENT
Start: 2022-11-25 | End: 2022-11-29 | Stop reason: HOSPADM

## 2022-11-25 RX ORDER — SODIUM CHLORIDE 0.9 % (FLUSH) 0.9 %
5-40 SYRINGE (ML) INJECTION EVERY 8 HOURS
Status: DISCONTINUED | OUTPATIENT
Start: 2022-11-25 | End: 2022-11-29 | Stop reason: HOSPADM

## 2022-11-25 RX ORDER — MIDAZOLAM HYDROCHLORIDE 1 MG/ML
INJECTION, SOLUTION INTRAMUSCULAR; INTRAVENOUS AS NEEDED
Status: DISCONTINUED | OUTPATIENT
Start: 2022-11-25 | End: 2022-11-25 | Stop reason: HOSPADM

## 2022-11-25 RX ORDER — LIDOCAINE HYDROCHLORIDE 20 MG/ML
INJECTION, SOLUTION INFILTRATION; PERINEURAL AS NEEDED
Status: DISCONTINUED | OUTPATIENT
Start: 2022-11-25 | End: 2022-11-25 | Stop reason: HOSPADM

## 2022-11-25 RX ORDER — PROPOFOL 10 MG/ML
INJECTION, EMULSION INTRAVENOUS AS NEEDED
Status: DISCONTINUED | OUTPATIENT
Start: 2022-11-25 | End: 2022-11-25 | Stop reason: HOSPADM

## 2022-11-25 RX ADMIN — PROPOFOL 150 MCG/KG/MIN: 10 INJECTION, EMULSION INTRAVENOUS at 13:37

## 2022-11-25 RX ADMIN — SODIUM CHLORIDE, SODIUM LACTATE, POTASSIUM CHLORIDE, AND CALCIUM CHLORIDE: 600; 310; 30; 20 INJECTION, SOLUTION INTRAVENOUS at 13:26

## 2022-11-25 RX ADMIN — LIDOCAINE HYDROCHLORIDE 100 MG: 20 INJECTION, SOLUTION INFILTRATION; PERINEURAL at 13:35

## 2022-11-25 RX ADMIN — FENTANYL CITRATE 50 MCG: 50 INJECTION, SOLUTION INTRAMUSCULAR; INTRAVENOUS at 13:35

## 2022-11-25 RX ADMIN — HYDROMORPHONE HYDROCHLORIDE 0.5 MG: 1 INJECTION, SOLUTION INTRAMUSCULAR; INTRAVENOUS; SUBCUTANEOUS at 14:33

## 2022-11-25 RX ADMIN — MIDAZOLAM HYDROCHLORIDE 3 MG: 1 INJECTION, SOLUTION INTRAMUSCULAR; INTRAVENOUS at 13:30

## 2022-11-25 RX ADMIN — PROPOFOL 75 MG: 10 INJECTION, EMULSION INTRAVENOUS at 13:35

## 2022-11-25 NOTE — DISCHARGE INSTRUCTIONS
D    PATIENT INSTRUCTIONS:    After general anesthesia or intravenous sedation, for 24 hours or while taking prescription Narcotics:  Limit your activities  Do not drive and operate hazardous machinery  Do not make important personal or business decisions  Do  not drink alcoholic beverages  If there is redness at the IV site you should apply a warm compress to the area. If redness or soreness persist call your primary care physician. These are general instructions for a healthy lifestyle:    *  Please give a list of your current medications to your Primary Care Provider. *  Please update this list whenever your medications are discontinued, doses are      changed, or new medications (including over-the-counter products) are added. *  Please carry medication information at all times in case of emergency situations. No smoking / No tobacco products / Avoid exposure to second hand smoke    Surgeon General's Warning:  Quitting smoking now greatly reduces serious risk to your health. Obesity, smoking, and sedentary lifestyle greatly increases your risk for illness and disease. A healthy diet, regular physical exercise & weight monitoring are important for maintaining a healthy lifestyle. Congestive Heart Failure  You may be retaining fluid if you have a history of heart failure or if you experience any of the following symptoms:  Weight gain of 3 pounds or more overnight or 5 pounds in a week, increased swelling in our hands or feet or shortness of breath while lying flat in bed. Please call your doctor as soon as you notice any of these symptoms; do not wait until your next office visit. Recognize signs and symptoms of STROKE:  F - face looks uneven  A - arms unable to move or move even  S - speech slurred or non-existent  T - time-call 911 as soon as signs and symptoms begin-DO NOT go         Back to bed or wait to see if you get better-TIME IS BRAIN.        Warning signs of HEART ATTACK Call 911 if you have these symptoms    Chest discomfort. Most heart attacks involve discomfort in the center of the chest that lasts more than a few minutes, or that goes away and comes back. It can feel like uncomfortable pressure, squeezing, fullness, or pain. Discomfort in other areas of the upper body. Symptoms can include pain or discomfort in one or both        Arms, the back, neck, jaw, or stomach. Shortness of breath with or without chest discomfort. Other signs may include breaking out in a cold sweat, nausea, or lightheadedness    Don't wait more than five minutes to call 911 - MINUTES MATTER! Fast action can save your life. Calling 911 is almost always the fastest way to get lifesaving treatment. Emergency Medical Services staff can begin treatment when they arrive - up to an hour sooner than if someone gets to the hospital by car.

## 2022-11-25 NOTE — ROUTINE PROCESS
Patient Fall Protocol  Yellow arm band applied to patient and yellow non skid socks placed on  Bed in low position, all side rails up, call bell in reach  Pt and Family instructed in \"call- don't fall\" protocol   -use your call bell, wait for assistance, staff not family will assist you to get up and move about   Pt and family verbalize understanding of fall precautions and the \"call don't fall\" Protocol'

## 2022-11-25 NOTE — ANESTHESIA POSTPROCEDURE EVALUATION
* No procedures listed *. MAC    Anesthesia Post Evaluation      Multimodal analgesia: multimodal analgesia not used between 6 hours prior to anesthesia start to PACU discharge  Patient location during evaluation: PACU  Patient participation: complete - patient participated  Level of consciousness: awake  Pain management: adequate  Airway patency: patent  Anesthetic complications: no  Cardiovascular status: acceptable, blood pressure returned to baseline and hemodynamically stable  Respiratory status: acceptable  Hydration status: acceptable  Post anesthesia nausea and vomiting:  controlled  Final Post Anesthesia Temperature Assessment:  Normothermia (36.0-37.5 degrees C)      INITIAL Post-op Vital signs:   Vitals Value Taken Time   /79 11/25/22 1430   Temp 36.6 °C (97.9 °F) 11/25/22 1430   Pulse 70 11/25/22 1441   Resp 16 11/25/22 1441   SpO2 98 % 11/25/22 1441   Vitals shown include unvalidated device data.

## 2022-11-25 NOTE — ANESTHESIA PREPROCEDURE EVALUATION
Relevant Problems   NEUROLOGY   (+) CVA (cerebral vascular accident) ([facility] [address]   (+) Chronic migraine without aura without status migrainosus, not intractable   (+) Headache   (+) Intractable headache   (+) Intractable migraine      GASTROINTESTINAL   (+) GERD (gastroesophageal reflux disease)   (+) Paraesophageal hernia      ENDOCRINE   (+) Class 3 severe obesity in adult [facility])   (+) Obesity affecting pregnancy in third trimester       Anesthetic History   No history of anesthetic complications            Review of Systems / Medical History  Patient summary reviewed and pertinent labs reviewed    Pulmonary  Within defined limits                 Neuro/Psych       CVA  Headaches (migraines) and psychiatric history ( anxiety)    Comments: Pseudotumor cerebri/ elevated ICP  Bilateral transverse cerebral sinus stenosis  S/p stenting of cerebral vein 12/21 Cardiovascular  Within defined limits                Exercise tolerance: >4 METS     GI/Hepatic/Renal     GERD: well controlled      PUD    Comments: Had Nissen Fundoplication x2 Endo/Other        Obesity and arthritis     Other Findings   Comments:     SLE           Physical Exam    Airway  Mallampati: III  TM Distance: 4 - 6 cm  Neck ROM: normal range of motion   Mouth opening: Normal     Cardiovascular    Rhythm: regular  Rate: normal         Dental  No notable dental hx       Pulmonary  Breath sounds clear to auscultation               Abdominal  GI exam deferred       Other Findings            Anesthetic Plan    ASA: 3  Anesthesia type: MAC          Induction: Intravenous  Anesthetic plan and risks discussed with: Patient

## 2022-12-15 ENCOUNTER — TELEPHONE (OUTPATIENT)
Dept: NEUROSURGERY | Age: 32
End: 2022-12-15

## 2022-12-15 ENCOUNTER — HOSPITAL ENCOUNTER (EMERGENCY)
Age: 32
Discharge: HOME | End: 2022-12-16
Attending: EMERGENCY MEDICINE
Payer: MEDICAID

## 2022-12-15 VITALS
DIASTOLIC BLOOD PRESSURE: 88 MMHG | HEIGHT: 62 IN | OXYGEN SATURATION: 99 % | WEIGHT: 191 LBS | HEART RATE: 94 BPM | RESPIRATION RATE: 16 BRPM | SYSTOLIC BLOOD PRESSURE: 135 MMHG | BODY MASS INDEX: 35.15 KG/M2 | TEMPERATURE: 98.1 F

## 2022-12-15 DIAGNOSIS — R51.9 ACUTE NONINTRACTABLE HEADACHE, UNSPECIFIED HEADACHE TYPE: Primary | ICD-10-CM

## 2022-12-15 PROCEDURE — 74011250636 HC RX REV CODE- 250/636: Performed by: EMERGENCY MEDICINE

## 2022-12-15 PROCEDURE — 99284 EMERGENCY DEPT VISIT MOD MDM: CPT

## 2022-12-15 PROCEDURE — 96374 THER/PROPH/DIAG INJ IV PUSH: CPT

## 2022-12-15 PROCEDURE — 96375 TX/PRO/DX INJ NEW DRUG ADDON: CPT

## 2022-12-15 RX ORDER — DEXAMETHASONE SODIUM PHOSPHATE 10 MG/ML
6 INJECTION INTRAMUSCULAR; INTRAVENOUS
Status: COMPLETED | OUTPATIENT
Start: 2022-12-15 | End: 2022-12-15

## 2022-12-15 RX ORDER — LORAZEPAM 1 MG/1
TABLET ORAL
COMMUNITY
Start: 2022-10-25

## 2022-12-15 RX ORDER — KETOROLAC TROMETHAMINE 30 MG/ML
30 INJECTION, SOLUTION INTRAMUSCULAR; INTRAVENOUS
Status: COMPLETED | OUTPATIENT
Start: 2022-12-15 | End: 2022-12-15

## 2022-12-15 RX ORDER — DIPHENHYDRAMINE HYDROCHLORIDE 50 MG/ML
25 INJECTION, SOLUTION INTRAMUSCULAR; INTRAVENOUS ONCE
Status: COMPLETED | OUTPATIENT
Start: 2022-12-15 | End: 2022-12-15

## 2022-12-15 RX ORDER — PROCHLORPERAZINE EDISYLATE 5 MG/ML
10 INJECTION INTRAMUSCULAR; INTRAVENOUS
Status: COMPLETED | OUTPATIENT
Start: 2022-12-15 | End: 2022-12-15

## 2022-12-15 RX ADMIN — KETOROLAC TROMETHAMINE 30 MG: 30 INJECTION, SOLUTION INTRAMUSCULAR; INTRAVENOUS at 22:01

## 2022-12-15 RX ADMIN — DIPHENHYDRAMINE HYDROCHLORIDE 25 MG: 50 INJECTION, SOLUTION INTRAMUSCULAR; INTRAVENOUS at 23:29

## 2022-12-15 RX ADMIN — DEXAMETHASONE SODIUM PHOSPHATE 6 MG: 10 INJECTION, SOLUTION INTRAMUSCULAR; INTRAVENOUS at 23:30

## 2022-12-15 RX ADMIN — PROCHLORPERAZINE EDISYLATE 10 MG: 5 INJECTION INTRAMUSCULAR; INTRAVENOUS at 22:01

## 2022-12-15 RX ADMIN — SODIUM CHLORIDE 1000 ML: 9 INJECTION, SOLUTION INTRAVENOUS at 22:01
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
## 2022-12-16 ENCOUNTER — TELEPHONE (OUTPATIENT)
Dept: NEUROSURGERY | Age: 32
End: 2022-12-16
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
## 2022-12-16 NOTE — ED TRIAGE NOTES
Patient here with complaints of headache for about 2 weeks, states she has a hx of idiopathic intracranial headaches. Patient states she sees a neurologist to manage this type of headache.

## 2022-12-16 NOTE — TELEPHONE ENCOUNTER
Return call to patient regarding her visit to ED yesterday and follow up recommendations. Patient reports no change in her symptoms and they have not become worse. Patient spoke to provider who recommended she follow up with Dr Ollie Garcia and [facility]. Advised patient to schedule an appointment with Dr Ollie Garcia today and we will see her in office early next week. Patient requested to continue care with Dr Lesly Beckwith. Informed patient he is out of the office today and will discuss plan with him on Monday. Patient advised if symptoms become worse return to ED for evaluation. Patient stated understanding.
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
## 2022-12-22 ENCOUNTER — TRANSCRIBE ORDER (OUTPATIENT)
Dept: SCHEDULING | Age: 32
End: 2022-12-22

## 2022-12-22 DIAGNOSIS — S75.102A: Primary | ICD-10-CM

## 2023-01-03 ENCOUNTER — HOSPITAL ENCOUNTER (EMERGENCY)
Age: 33
Discharge: HOME | End: 2023-01-03
Attending: EMERGENCY MEDICINE
Payer: MEDICAID

## 2023-01-03 ENCOUNTER — APPOINTMENT (OUTPATIENT)
Dept: GENERAL RADIOLOGY | Age: 33
End: 2023-01-03
Attending: EMERGENCY MEDICINE
Payer: MEDICAID

## 2023-01-03 VITALS
HEIGHT: 62 IN | OXYGEN SATURATION: 99 % | DIASTOLIC BLOOD PRESSURE: 87 MMHG | WEIGHT: 190 LBS | HEART RATE: 89 BPM | BODY MASS INDEX: 34.96 KG/M2 | SYSTOLIC BLOOD PRESSURE: 130 MMHG | RESPIRATION RATE: 16 BRPM | TEMPERATURE: 98.5 F

## 2023-01-03 DIAGNOSIS — R51.9 NONINTRACTABLE EPISODIC HEADACHE, UNSPECIFIED HEADACHE TYPE: Primary | ICD-10-CM

## 2023-01-03 LAB
COMMENT, HOLDF: NORMAL
SAMPLES BEING HELD,HOLD: NORMAL

## 2023-01-03 PROCEDURE — 96375 TX/PRO/DX INJ NEW DRUG ADDON: CPT

## 2023-01-03 PROCEDURE — 70250 X-RAY EXAM OF SKULL: CPT

## 2023-01-03 PROCEDURE — 96374 THER/PROPH/DIAG INJ IV PUSH: CPT

## 2023-01-03 PROCEDURE — 74011250636 HC RX REV CODE- 250/636: Performed by: EMERGENCY MEDICINE

## 2023-01-03 PROCEDURE — 99284 EMERGENCY DEPT VISIT MOD MDM: CPT

## 2023-01-03 RX ORDER — KETOROLAC TROMETHAMINE 30 MG/ML
30 INJECTION, SOLUTION INTRAMUSCULAR; INTRAVENOUS
Status: COMPLETED | OUTPATIENT
Start: 2023-01-03 | End: 2023-01-03

## 2023-01-03 RX ORDER — PROCHLORPERAZINE EDISYLATE 5 MG/ML
10 INJECTION INTRAMUSCULAR; INTRAVENOUS
Status: COMPLETED | OUTPATIENT
Start: 2023-01-03 | End: 2023-01-03

## 2023-01-03 RX ADMIN — PROCHLORPERAZINE EDISYLATE 10 MG: 5 INJECTION INTRAMUSCULAR; INTRAVENOUS at 13:50

## 2023-01-03 RX ADMIN — KETOROLAC TROMETHAMINE 30 MG: 30 INJECTION, SOLUTION INTRAMUSCULAR at 13:46

## 2023-01-03 NOTE — ED NOTES
Attempted to update patient again. Patient is not in slotted chair. RN attempted to call patient this time. No answer.
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
## 2023-01-03 NOTE — ED NOTES
4:15 PM  Change of shift. Care of patient taken over from Dr. Demario Suero; H&P reviewed, bedside handoff complete. Awaiting therapeutic lumbar puncture. To be discharged afterward. Patient can be scheduled for tomorrow for a therapeutic LP. She is amenable to this plan. We will follow-up with neurology as an outpatient. On to inform the patient of her time for her procedure tomorrow. She was no longer in the treatment area. The nurses attempting to locate her.   In the meantime we will communicate the time of her appointment which is scheduled for tomorrow morning at 8 AM.        5:27 PM   01/03/23  Anthony Rios MD   _____________________________

## 2023-01-10 ENCOUNTER — HOSPITAL ENCOUNTER (EMERGENCY)
Age: 33
Discharge: HOME | End: 2023-01-11
Attending: EMERGENCY MEDICINE
Payer: MEDICAID

## 2023-01-10 ENCOUNTER — TELEPHONE (OUTPATIENT)
Dept: NEUROSURGERY | Age: 33
End: 2023-01-10

## 2023-01-10 ENCOUNTER — TELEPHONE (OUTPATIENT)
Dept: SURGERY | Age: 33
End: 2023-01-10

## 2023-01-10 VITALS
HEIGHT: 62 IN | TEMPERATURE: 98.6 F | BODY MASS INDEX: 34.96 KG/M2 | WEIGHT: 190 LBS | RESPIRATION RATE: 16 BRPM | DIASTOLIC BLOOD PRESSURE: 81 MMHG | SYSTOLIC BLOOD PRESSURE: 143 MMHG | HEART RATE: 97 BPM | OXYGEN SATURATION: 99 %

## 2023-01-10 DIAGNOSIS — G44.221 CHRONIC TENSION-TYPE HEADACHE, INTRACTABLE: Primary | ICD-10-CM

## 2023-01-10 PROCEDURE — 99282 EMERGENCY DEPT VISIT SF MDM: CPT

## 2023-01-10 NOTE — TELEPHONE ENCOUNTER
Returned pt call about wanting to know what she needs to do to move forward with surgery. I let her know it has been over a year and she would have to restart. She was very frustrated because she said she was told we would only have to resubmit. So with our discussion I told her I know with insurance we would defiantly need a new psych eval due to her last one was 5/7/21. Once we have that we will schedule a review appointment with Dr Rigo Roman. I will submit with everything I have but to know they could kick it back and she may have to re-do some things. She was good with that. She was going to call and schedule her psych eval and I gave her [facility]'s number to call to schedule review.   I will let Con Abdi know to be on the look out for psych eval

## 2023-01-10 NOTE — TELEPHONE ENCOUNTER
Patient called asking to set up an appointment with Dr. Guanako Wang as she is a former [facility] patient. Patient has already started care with Dr. Juvencio Hill and was told that she would have to continue care with him.

## 2023-01-11 ENCOUNTER — HOSPITAL ENCOUNTER (INPATIENT)
Age: 33
LOS: 1 days | Discharge: LEFT BEFORE DISCHARGE | DRG: 054 | End: 2023-01-11
Attending: STUDENT IN AN ORGANIZED HEALTH CARE EDUCATION/TRAINING PROGRAM | Admitting: HOSPITALIST
Payer: MEDICAID

## 2023-01-11 ENCOUNTER — APPOINTMENT (OUTPATIENT)
Dept: CT IMAGING | Age: 33
DRG: 054 | End: 2023-01-11
Attending: STUDENT IN AN ORGANIZED HEALTH CARE EDUCATION/TRAINING PROGRAM
Payer: MEDICAID

## 2023-01-11 VITALS
OXYGEN SATURATION: 99 % | TEMPERATURE: 98 F | HEART RATE: 98 BPM | DIASTOLIC BLOOD PRESSURE: 78 MMHG | RESPIRATION RATE: 18 BRPM | SYSTOLIC BLOOD PRESSURE: 128 MMHG

## 2023-01-11 DIAGNOSIS — Z86.69 HISTORY OF IDIOPATHIC INTRACRANIAL HYPERTENSION: ICD-10-CM

## 2023-01-11 DIAGNOSIS — R51.9 ACUTE INTRACTABLE HEADACHE, UNSPECIFIED HEADACHE TYPE: Primary | ICD-10-CM

## 2023-01-11 PROBLEM — G93.2 PSEUDOTUMOR CEREBRI SYNDROME: Status: ACTIVE | Noted: 2023-01-11

## 2023-01-11 LAB
ALBUMIN SERPL-MCNC: 4 G/DL (ref 3.5–5)
ALBUMIN/GLOB SERPL: 1 (ref 1.1–2.2)
ALP SERPL-CCNC: 106 U/L (ref 45–117)
ALT SERPL-CCNC: 31 U/L (ref 12–78)
AMPHET UR QL SCN: NEGATIVE
ANION GAP SERPL CALC-SCNC: 5 MMOL/L (ref 5–15)
AST SERPL-CCNC: 19 U/L (ref 15–37)
BARBITURATES UR QL SCN: NEGATIVE
BASOPHILS # BLD: 0 K/UL (ref 0–0.1)
BASOPHILS NFR BLD: 0 % (ref 0–1)
BENZODIAZ UR QL: POSITIVE
BILIRUB SERPL-MCNC: 0.5 MG/DL (ref 0.2–1)
BUN SERPL-MCNC: 13 MG/DL (ref 6–20)
BUN/CREAT SERPL: 16 (ref 12–20)
CALCIUM SERPL-MCNC: 9.3 MG/DL (ref 8.5–10.1)
CANNABINOIDS UR QL SCN: NEGATIVE
CHLORIDE SERPL-SCNC: 110 MMOL/L (ref 97–108)
CO2 SERPL-SCNC: 26 MMOL/L (ref 21–32)
COCAINE UR QL SCN: NEGATIVE
COMMENT, HOLDF: NORMAL
CREAT SERPL-MCNC: 0.8 MG/DL (ref 0.55–1.02)
DIFFERENTIAL METHOD BLD: NORMAL
DRUG SCRN COMMENT,DRGCM: ABNORMAL
EOSINOPHIL # BLD: 0.1 K/UL (ref 0–0.4)
EOSINOPHIL NFR BLD: 1 % (ref 0–7)
ERYTHROCYTE [DISTWIDTH] IN BLOOD BY AUTOMATED COUNT: 13.3 % (ref 11.5–14.5)
FERRITIN SERPL-MCNC: 36 NG/ML (ref 26–388)
GLOBULIN SER CALC-MCNC: 4 G/DL (ref 2–4)
GLUCOSE SERPL-MCNC: 87 MG/DL (ref 65–100)
HCG UR QL: NEGATIVE
HCT VFR BLD AUTO: 41.2 % (ref 35–47)
HGB BLD-MCNC: 13.3 G/DL (ref 11.5–16)
IMM GRANULOCYTES # BLD AUTO: 0 K/UL (ref 0–0.04)
IMM GRANULOCYTES NFR BLD AUTO: 0 % (ref 0–0.5)
IRON SATN MFR SERPL: 11 % (ref 20–50)
IRON SERPL-MCNC: 51 UG/DL (ref 35–150)
LYMPHOCYTES # BLD: 2.4 K/UL (ref 0.8–3.5)
LYMPHOCYTES NFR BLD: 35 % (ref 12–49)
MCH RBC QN AUTO: 30.2 PG (ref 26–34)
MCHC RBC AUTO-ENTMCNC: 32.3 G/DL (ref 30–36.5)
MCV RBC AUTO: 93.4 FL (ref 80–99)
METHADONE UR QL: NEGATIVE
MONOCYTES # BLD: 0.6 K/UL (ref 0–1)
MONOCYTES NFR BLD: 9 % (ref 5–13)
NEUTS SEG # BLD: 3.7 K/UL (ref 1.8–8)
NEUTS SEG NFR BLD: 55 % (ref 32–75)
NRBC # BLD: 0 K/UL (ref 0–0.01)
NRBC BLD-RTO: 0 PER 100 WBC
OPIATES UR QL: POSITIVE
PCP UR QL: NEGATIVE
PLATELET # BLD AUTO: 308 K/UL (ref 150–400)
PMV BLD AUTO: 10 FL (ref 8.9–12.9)
POTASSIUM SERPL-SCNC: 3.7 MMOL/L (ref 3.5–5.1)
PROT SERPL-MCNC: 8 G/DL (ref 6.4–8.2)
RBC # BLD AUTO: 4.41 M/UL (ref 3.8–5.2)
SAMPLES BEING HELD,HOLD: NORMAL
SODIUM SERPL-SCNC: 141 MMOL/L (ref 136–145)
TIBC SERPL-MCNC: 454 UG/DL (ref 250–450)
TROPONIN-HIGH SENSITIVITY: 4 NG/L (ref 0–51)
WBC # BLD AUTO: 6.9 K/UL (ref 3.6–11)

## 2023-01-11 PROCEDURE — 82728 ASSAY OF FERRITIN: CPT

## 2023-01-11 PROCEDURE — 81025 URINE PREGNANCY TEST: CPT

## 2023-01-11 PROCEDURE — 85025 COMPLETE CBC W/AUTO DIFF WBC: CPT

## 2023-01-11 PROCEDURE — 70496 CT ANGIOGRAPHY HEAD: CPT

## 2023-01-11 PROCEDURE — 96375 TX/PRO/DX INJ NEW DRUG ADDON: CPT

## 2023-01-11 PROCEDURE — 80053 COMPREHEN METABOLIC PANEL: CPT

## 2023-01-11 PROCEDURE — 36415 COLL VENOUS BLD VENIPUNCTURE: CPT

## 2023-01-11 PROCEDURE — 99285 EMERGENCY DEPT VISIT HI MDM: CPT

## 2023-01-11 PROCEDURE — 74011250636 HC RX REV CODE- 250/636

## 2023-01-11 PROCEDURE — 80307 DRUG TEST PRSMV CHEM ANLYZR: CPT

## 2023-01-11 PROCEDURE — 74011250637 HC RX REV CODE- 250/637: Performed by: NURSE PRACTITIONER

## 2023-01-11 PROCEDURE — 83540 ASSAY OF IRON: CPT

## 2023-01-11 PROCEDURE — 65270000046 HC RM TELEMETRY

## 2023-01-11 PROCEDURE — 74011000636 HC RX REV CODE- 636: Performed by: RADIOLOGY

## 2023-01-11 PROCEDURE — 70450 CT HEAD/BRAIN W/O DYE: CPT

## 2023-01-11 PROCEDURE — 74011250636 HC RX REV CODE- 250/636: Performed by: STUDENT IN AN ORGANIZED HEALTH CARE EDUCATION/TRAINING PROGRAM

## 2023-01-11 PROCEDURE — 96376 TX/PRO/DX INJ SAME DRUG ADON: CPT

## 2023-01-11 PROCEDURE — 96374 THER/PROPH/DIAG INJ IV PUSH: CPT

## 2023-01-11 PROCEDURE — 84484 ASSAY OF TROPONIN QUANT: CPT

## 2023-01-11 PROCEDURE — G0378 HOSPITAL OBSERVATION PER HR: HCPCS

## 2023-01-11 PROCEDURE — 74011250636 HC RX REV CODE- 250/636: Performed by: NURSE PRACTITIONER

## 2023-01-11 RX ORDER — SODIUM CHLORIDE 0.9 % (FLUSH) 0.9 %
5-40 SYRINGE (ML) INJECTION EVERY 8 HOURS
Status: DISCONTINUED | OUTPATIENT
Start: 2023-01-11 | End: 2023-01-11 | Stop reason: HOSPADM

## 2023-01-11 RX ORDER — MORPHINE SULFATE 4 MG/ML
4 INJECTION INTRAVENOUS ONCE
Status: COMPLETED | OUTPATIENT
Start: 2023-01-11 | End: 2023-01-11

## 2023-01-11 RX ORDER — MORPHINE SULFATE 2 MG/ML
1 INJECTION, SOLUTION INTRAMUSCULAR; INTRAVENOUS
Status: DISCONTINUED | OUTPATIENT
Start: 2023-01-11 | End: 2023-01-11

## 2023-01-11 RX ORDER — METOCLOPRAMIDE HYDROCHLORIDE 5 MG/ML
10 INJECTION INTRAMUSCULAR; INTRAVENOUS EVERY 8 HOURS
Status: DISCONTINUED | OUTPATIENT
Start: 2023-01-11 | End: 2023-01-11

## 2023-01-11 RX ORDER — HYDROMORPHONE HYDROCHLORIDE 1 MG/ML
0.5 INJECTION, SOLUTION INTRAMUSCULAR; INTRAVENOUS; SUBCUTANEOUS
Status: COMPLETED | OUTPATIENT
Start: 2023-01-11 | End: 2023-01-11

## 2023-01-11 RX ORDER — GABAPENTIN 100 MG/1
300 CAPSULE ORAL 3 TIMES DAILY
Status: DISCONTINUED | OUTPATIENT
Start: 2023-01-11 | End: 2023-01-11 | Stop reason: HOSPADM

## 2023-01-11 RX ORDER — MORPHINE SULFATE 2 MG/ML
2 INJECTION, SOLUTION INTRAMUSCULAR; INTRAVENOUS
Status: COMPLETED | OUTPATIENT
Start: 2023-01-11 | End: 2023-01-11

## 2023-01-11 RX ORDER — MORPHINE SULFATE 2 MG/ML
1 INJECTION, SOLUTION INTRAMUSCULAR; INTRAVENOUS
Status: DISCONTINUED | OUTPATIENT
Start: 2023-01-11 | End: 2023-01-11 | Stop reason: HOSPADM

## 2023-01-11 RX ORDER — MORPHINE SULFATE 2 MG/ML
INJECTION, SOLUTION INTRAMUSCULAR; INTRAVENOUS
Status: COMPLETED
Start: 2023-01-11 | End: 2023-01-11

## 2023-01-11 RX ORDER — ONDANSETRON 2 MG/ML
INJECTION INTRAMUSCULAR; INTRAVENOUS
Status: COMPLETED
Start: 2023-01-11 | End: 2023-01-11

## 2023-01-11 RX ORDER — FAMOTIDINE 20 MG/1
20 TABLET, FILM COATED ORAL DAILY
Status: DISCONTINUED | OUTPATIENT
Start: 2023-01-11 | End: 2023-01-11 | Stop reason: HOSPADM

## 2023-01-11 RX ORDER — OXYCODONE HYDROCHLORIDE 5 MG/1
5 TABLET ORAL
Status: DISCONTINUED | OUTPATIENT
Start: 2023-01-11 | End: 2023-01-11

## 2023-01-11 RX ORDER — METOCLOPRAMIDE HYDROCHLORIDE 5 MG/ML
10 INJECTION INTRAMUSCULAR; INTRAVENOUS
Status: DISCONTINUED | OUTPATIENT
Start: 2023-01-11 | End: 2023-01-11 | Stop reason: HOSPADM

## 2023-01-11 RX ORDER — NALOXONE HYDROCHLORIDE 0.4 MG/ML
0.1 INJECTION, SOLUTION INTRAMUSCULAR; INTRAVENOUS; SUBCUTANEOUS AS NEEDED
Status: DISCONTINUED | OUTPATIENT
Start: 2023-01-11 | End: 2023-01-11 | Stop reason: HOSPADM

## 2023-01-11 RX ORDER — DOCUSATE SODIUM 100 MG/1
200 CAPSULE, LIQUID FILLED ORAL DAILY
Status: DISCONTINUED | OUTPATIENT
Start: 2023-01-11 | End: 2023-01-11 | Stop reason: HOSPADM

## 2023-01-11 RX ORDER — OXYCODONE HYDROCHLORIDE 5 MG/1
5 TABLET ORAL
Status: DISCONTINUED | OUTPATIENT
Start: 2023-01-11 | End: 2023-01-11 | Stop reason: HOSPADM

## 2023-01-11 RX ORDER — HYDROXYZINE HYDROCHLORIDE 10 MG/1
10 TABLET, FILM COATED ORAL
Status: DISCONTINUED | OUTPATIENT
Start: 2023-01-11 | End: 2023-01-11 | Stop reason: HOSPADM

## 2023-01-11 RX ORDER — ONDANSETRON 2 MG/ML
4 INJECTION INTRAMUSCULAR; INTRAVENOUS
Status: COMPLETED | OUTPATIENT
Start: 2023-01-11 | End: 2023-01-11

## 2023-01-11 RX ORDER — SODIUM CHLORIDE 0.9 % (FLUSH) 0.9 %
5-40 SYRINGE (ML) INJECTION AS NEEDED
Status: DISCONTINUED | OUTPATIENT
Start: 2023-01-11 | End: 2023-01-11 | Stop reason: HOSPADM

## 2023-01-11 RX ORDER — CYCLOBENZAPRINE HCL 10 MG
10 TABLET ORAL
Status: DISCONTINUED | OUTPATIENT
Start: 2023-01-11 | End: 2023-01-11 | Stop reason: HOSPADM

## 2023-01-11 RX ADMIN — ONDANSETRON HYDROCHLORIDE 4 MG: 2 SOLUTION INTRAMUSCULAR; INTRAVENOUS at 03:18

## 2023-01-11 RX ADMIN — IOPAMIDOL 100 ML: 755 INJECTION, SOLUTION INTRAVENOUS at 03:41

## 2023-01-11 RX ADMIN — ONDANSETRON 4 MG: 2 INJECTION INTRAMUSCULAR; INTRAVENOUS at 03:18

## 2023-01-11 RX ADMIN — MORPHINE SULFATE 2 MG: 2 INJECTION, SOLUTION INTRAMUSCULAR; INTRAVENOUS at 03:19

## 2023-01-11 RX ADMIN — MORPHINE SULFATE 4 MG: 4 INJECTION, SOLUTION INTRAMUSCULAR; INTRAVENOUS at 04:30

## 2023-01-11 RX ADMIN — SODIUM CHLORIDE 1000 ML: 900 INJECTION, SOLUTION INTRAVENOUS at 02:54

## 2023-01-11 RX ADMIN — HYDROMORPHONE HYDROCHLORIDE 0.5 MG: 1 INJECTION, SOLUTION INTRAMUSCULAR; INTRAVENOUS; SUBCUTANEOUS at 05:31

## 2023-01-11 RX ADMIN — MORPHINE SULFATE 1 MG: 2 INJECTION, SOLUTION INTRAMUSCULAR; INTRAVENOUS at 13:45

## 2023-01-11 NOTE — DISCHARGE INSTRUCTIONS
You did not want any medications in the ER today or transfer to [facility] for a lumbar puncture. Please follow up with neuro-interventional surgery and neuro-ophthalmology for further management. Thank you.

## 2023-01-11 NOTE — ED NOTES
Nurse went to start and IV and administer ordered saline. Dr. Pastora Valdovinos was at bedside attempting to explain what options that the pt had. Pt was sitting on the stretcher and would not answer questions.
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
## 2023-01-11 NOTE — ED TRIAGE NOTES
She's complaining of a headache that she has had for over a month. She says she has a visual disturbance of her left eye that is getting worse.

## 2023-01-11 NOTE — Clinical Note
Protocol For Photochemotherapy: Petrolatum And Nbuvb: The patient received Photochemotherapy: Petrolatum and NBUVB (petrolatum applied to all lesions prior to phototherapy). Status[de-identified] INPATIENT [101]   Type of Bed: Remote Telemetry [29]   Cardiac Monitoring Required?: Yes   Inpatient Hospitalization Certified Necessary for the Following Reasons: 1.  Patient Failed outpatient treatment (further clarification in H&P documentation)   Admitting Diagnosis: Pseudotumor cerebri syndrome [289871]   Admitting Physician: Hakeem Elizondo   Attending Physician: Hakeem Elizondo   Estimated Length of Stay: 2 Midnights   Discharge Plan[de-identified] Home with Office Follow-up Protocol: Photochemotherapy: Petrolatum and NBUVB Protocol For Photochemotherapy For Severe Photoresponsive Dermatoses: Petrolatum And Broad Band Uvb: The patient received Photochemotherapyfor severe photoresponsive dermatoses: Petrolatum and Broad Band UVB requiring at least 4 to 8 hours of care under direct physician supervision. Protocol For Photochemotherapy For Severe Photoresponsive Dermatoses: Petrolatum And Nbuvb: The patient received Photochemotherapy for severe photoresponsive dermatoses: Petrolatum and NBUVB requiring at least 4 to 8 hours of care under direct physician supervision. Protocol For Photochemotherapy: Tar And Broad Band Uvb (Goeckerman Treatment): The patient received Photochemotherapy: Tar and Broad Band UVB (Goeckerman treatment). Detail Level: Generalized Changes In Treatment Protocol: None Protocol For Photochemotherapy: Baby Oil And Nbuvb: The patient received Photochemotherapy: Baby Oil and NBUVB (baby oil applied to all lesions prior to phototherapy). Consent: Written consent obtained.  The risks were reviewed with the patient including but not limited to: burn, pigmentary changes, pain, blistering, scabbing, redness, increased risk of skin cancers, and the remote possibility of scarring. Protocol For Puva: The patient received PUVA. Protocol For Nb Uva: The patient received NB UVA. Protocol For Photochemotherapy: Mineral Oil And Nbuvb: The patient received Photochemotherapy: Mineral Oil and NBUVB (mineral oil applied to all lesions prior to phototherapy). Render Post-Care In The Note: yes Name Of Supervising Technician: GUY Protocol For Photochemotherapy: Petrolatum And Broad Band Uvb: The patient received Photochemotherapy: Petrolatum and Broad Band UVB. Protocol For Photochemotherapy For Severe Photoresponsive Dermatoses: Puva: The patient received Photochemotherapy for severe photoresponsive dermatoses: PUVA requiring at least 4 to 8 hours of care under direct physician supervision. Protocol For Uva: The patient received UVA. Protocol For Nbuvb: The patient received NBUVB. Skin Type: I Protocol For Photochemotherapy: Mineral Oil And Broad Band Uvb: The patient received Photochemotherapy: Mineral Oil and Broad Band UVB. Protocol For Bath Puva: The patient received Bath PUVA. Protocol For Photochemotherapy: Triamcinolone Ointment And Nbuvb: The patient received Photochemotherapy: Triamcinolone and NBUVB (triamcinolone ointment applied to all lesions prior to phototherapy). Total Body Energy: 1280 mJ/cm2 Protocol For Uva1: The patient received UVA1. Protocol For Photochemotherapy: Tar And Nbuvb (Goeckerman Treatment): The patient received Photochemotherapy: Tar and NBUVB (Goeckerman treatment). Post-Care Instructions: I reviewed with the patient in detail post-care instructions. Patient is to wear sun protection. Patients may expect sunburn like redness, discomfort and scabbing. Protocol For Photochemotherapy For Severe Photoresponsive Dermatoses: Tar And Broad Band Uvb (Goeckerman Treatment): The patient received Photochemotherapy for severe photoresponsive dermatoses: Tar and Broad Band UVB (Goeckerman treatment) requiring at least 4 to 8 hours of care under direct physician supervision. Protocol For Protocol For Photochemotherapy For Severe Photoresponsive Dermatoses: Bath Puva: The patient received Photochemotherapy for severe photoresponsive dermatoses: Bath PUVA requiring at least 4 to 8 hours of care under direct physician supervision. Comments On Previous Treatment: No symptoms, held at max dosage per protocol. Protocol For Photochemotherapy For Severe Photoresponsive Dermatoses: Tar And Nbuvb (Goeckerman Treatment): The patient received Photochemotherapy for severe photoresponsive dermatoses: Tar and NBUVB (Goeckerman treatment) requiring at least 4 to 8 hours of care under direct physician supervision. Total Body Time: 5:00 minutes Protocol For Broad Band Uvb: The patient received Broad Band UVB.

## 2023-01-11 NOTE — ED NOTES
Pt requested pain medications. When RN brought gabapentin and roxicodone for pain, pt refused and demanded to speak with the doctor. Pt stated \"I am not going to take this. I want to speak with the doctor. \" This RN Perfect Served provider.

## 2023-01-11 NOTE — ED NOTES
Patient requesting IV to be removed so she can leave. Patient is in no acute distress, she has taken her belongings and left, ambulatory, out of the ER. Patient has left, AMA. NP aware.

## 2023-01-11 NOTE — ED NOTES
The patient was discharged home by Dr. Trevor Nance and Emani Bowser rn in stable condition. The patient is alert and oriented, is in no respiratory distress and has vital signs within normal limits . The patient's diagnosis, condition and treatment were explained to patient. The patient expressed understanding. No prescriptions given to pt. No work/school note given to pt. A discharge plan has been developed. A  was not involved in the process. Aftercare instructions were given to the patient. Pt will transport herself home. Pt has left before receiving her discharge instructions.

## 2023-01-11 NOTE — ED NOTES
Patient requesting pain medications and something to drink. Patient offered morphine patient just puts head back under blanket, when nurse asked patient what she wanted to drink patient just states \"whatever\".
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
## 2023-01-11 NOTE — ED TRIAGE NOTES
Pt reports headache for the last 8 days. Pt states that she spoke with both her neurointerventionalist told her to go to [facility] for treatment and her pcp and was told her to come to [facility]. Pt states that because of her blurred vision she was having a hard time driving and she came here.

## 2023-01-12 ENCOUNTER — TELEPHONE (OUTPATIENT)
Dept: NEUROSURGERY | Age: 33
End: 2023-01-12

## 2023-01-20 DIAGNOSIS — G43.709 CHRONIC MIGRAINE WITHOUT AURA WITHOUT STATUS MIGRAINOSUS, NOT INTRACTABLE: ICD-10-CM

## 2023-01-20 DIAGNOSIS — M25.50 PAIN IN JOINT, MULTIPLE SITES: ICD-10-CM

## 2023-01-20 RX ORDER — GABAPENTIN 300 MG/1
300 CAPSULE ORAL 3 TIMES DAILY
Qty: 90 CAPSULE | Refills: 0 | Status: SHIPPED | OUTPATIENT
Start: 2023-01-20

## 2023-01-26 DIAGNOSIS — M62.838 MUSCLE SPASM: ICD-10-CM

## 2023-01-27 RX ORDER — CYCLOBENZAPRINE HCL 10 MG
TABLET ORAL
Qty: 90 TABLET | Refills: 1 | Status: SHIPPED | OUTPATIENT
Start: 2023-01-27

## 2023-02-06 ENCOUNTER — VIRTUAL VISIT (OUTPATIENT)
Dept: FAMILY MEDICINE CLINIC | Age: 33
End: 2023-02-06
Payer: MEDICAID

## 2023-02-06 DIAGNOSIS — L93.0 LUPUS ERYTHEMATOSUS, UNSPECIFIED FORM: ICD-10-CM

## 2023-02-06 DIAGNOSIS — N62 LARGE BREASTS: Primary | ICD-10-CM

## 2023-02-06 NOTE — PROGRESS NOTES
1. Have you been to the ER, urgent care clinic since your last visit? Hospitalized since your last visit? No    2. Have you seen or consulted any other health care providers outside of the [address] [place] [address] since your last visit? Include any pap smears or colon screening. No    Chief Complaint   Patient presents with    Schedule Surgery     Breast reduction    Letter     Health Maintenance Due   Topic Date Due    COVID-19 Vaccine (1) Never done    Cervical cancer screen  05/20/2013    Flu Vaccine (1) Never done     3 most recent PHQ Screens 2/6/2023   PHQ Not Done -   Little interest or pleasure in doing things Not at all   Feeling down, depressed, irritable, or hopeless Not at all   Total Score PHQ 2 0     Abuse Screening Questionnaire 2/6/2023   Do you ever feel afraid of your partner? N   Are you in a relationship with someone who physically or mentally threatens you? N   Is it safe for you to go home?  Y     Learning Assessment 8/30/2021   PRIMARY LEARNER Patient   HIGHEST LEVEL OF EDUCATION - PRIMARY LEARNER  -   BARRIERS PRIMARY LEARNER -   CO-LEARNER CAREGIVER -   PRIMARY LANGUAGE ENGLISH    NEED -   LEARNER PREFERENCE PRIMARY READING     -   LEARNING SPECIAL TOPICS -   ANSWERED BY patient   RELATIONSHIP SELF     Patient-Reported Vitals 2/6/2023   Patient-Reported Weight 193lb   Patient-Reported Height -   Patient-Reported Pulse -   Patient-Reported Temperature -   Patient-Reported SpO2 -   Patient-Reported Systolic  -   Patient-Reported Diastolic -   Patient-Reported LMP -

## 2023-02-06 NOTE — PROGRESS NOTES
Vee Benton is a 27 y.o. female who was seen by synchronous (real-time) audio-video technology on 2/6/2023 for Schedule Surgery (Breast reduction) and Letter  She wants breast reduction  She needs a letter from me to say that the lupus is not a contraindication    She saw the neuro-ophthalmologist and he said the eye looks normal  She has a small blurred pattch on the left eye    She has been able to keep the weight down so she is not sure about getting the sleeve procedure now. She is doing all of the prep work to have it  She is planning to have an IUD placed next month    Assessment & Plan:   Diagnoses and all orders for this visit:    1. Large breasts  I will write the letter to clear for breast reduction surgery  She tolerated the stent placement and the pregnancy  I believe she will heal fine  The neuropthalmologist ivette recently said the pressure in her head is ok    2. Lupus erythematosus, unspecified form  Has some hip pain off and on , doing pretty well now        Subjective:       Prior to Admission medications    Medication Sig Start Date End Date Taking? Authorizing Provider   cyclobenzaprine (FLEXERIL) 10 mg tablet TAKE 1 TABLET BY MOUTH THREE (3) TIMES DAILY AS NEEDED FOR MUSCLE SPASMS 1/27/23  Yes Joli Sacks, MD   gabapentin (NEURONTIN) 300 mg capsule TAKE 1 CAPSULE BY MOUTH THREE (3) TIMES DAILY. MAX DAILY AMOUNT: 900 MG. 1/20/23  Yes Joli Sacks, MD   LORazepam (ATIVAN) 1 mg tablet TAKE ONE AND A HALF TABLETS AT NIGHT AS NEEDED FOR ANXIETY 10/25/22  Yes Other, MD [place]   oxyCODONE IR (ROXICODONE) 5 mg immediate release tablet Take 5 mg by mouth two (2) times daily as needed for Pain. Yes Provider, Historical   biotin 10 mg tab Take 10,000 mcg by mouth daily.    Yes Provider, Historical     Patient Active Problem List    Diagnosis Date Noted    Pseudotumor cerebri syndrome 01/11/2023    Pregnant 09/19/2022    Systemic lupus erythematosus affecting pregnancy in third trimester ([facility] 09/14/2022    Obesity affecting pregnancy in third trimester 09/14/2022    False labor before 37 completed weeks of gestation in third trimester 08/07/2022    32 weeks gestation of pregnancy 08/07/2022    Unilateral weakness 12/06/2021    CVA (cerebral vascular accident) ([facility] 12/06/2021    Bruising 10/08/2021    Intractable migraine 09/30/2021    Intractable headache 09/26/2021    Pseudotumor cerebri 09/03/2021    Stenosis of cerebral venous sinus 08/30/2021    Papilledema associated with increased intracranial pressure 08/30/2021    Intracranial hypertension 10/23/2020    Ataxia 09/14/2020    Headache 08/29/2020    IIH (idiopathic intracranial hypertension) 08/25/2020    SLE (systemic lupus erythematosus) ([facility] 08/25/2020    Class 3 severe obesity in adult [facility]) 08/22/2018    S/P repair of paraesophageal hernia 11/17/2017    Paraesophageal hernia 11/15/2017    GERD (gastroesophageal reflux disease) 11/07/2017    Obesity, Class II, BMI 35-39.9 03/31/2017    Sebaceous cyst 03/24/2017    History of Nissen fundoplication 48/21/3884    Chronic migraine without aura without status migrainosus, not intractable 05/18/2016    Cervical incompetence 04/12/2013     Current Outpatient Medications   Medication Sig Dispense Refill    cyclobenzaprine (FLEXERIL) 10 mg tablet TAKE 1 TABLET BY MOUTH THREE (3) TIMES DAILY AS NEEDED FOR MUSCLE SPASMS 90 Tablet 1    gabapentin (NEURONTIN) 300 mg capsule TAKE 1 CAPSULE BY MOUTH THREE (3) TIMES DAILY. MAX DAILY AMOUNT: 900 MG. 90 Capsule 0    LORazepam (ATIVAN) 1 mg tablet TAKE ONE AND A HALF TABLETS AT NIGHT AS NEEDED FOR ANXIETY      oxyCODONE IR (ROXICODONE) 5 mg immediate release tablet Take 5 mg by mouth two (2) times daily as needed for Pain. biotin 10 mg tab Take 10,000 mcg by mouth daily.          ROS    Objective:     Patient-Reported Vitals 2/6/2023   Patient-Reported Weight 193lb   Patient-Reported Height -   Patient-Reported Pulse -   Patient-Reported Temperature - Patient-Reported SpO2 -   Patient-Reported Systolic  -   Patient-Reported Diastolic -   Patient-Reported LMP -        [INSTRUCTIONS:  \"[x]\" Indicates a positive item  \"[]\" Indicates a negative item  -- DELETE ALL ITEMS NOT EXAMINED]    Constitutional: [x] Appears well-developed and well-nourished [x] No apparent distress      [] Abnormal -     Mental status: [x] Alert and awake  [x] Oriented to person/place/time [x] Able to follow commands    [] Abnormal -     Eyes:   EOM    [x]  Normal    [] Abnormal -   Sclera  [x]  Normal    [] Abnormal -          Discharge [x]  None visible   [] Abnormal -     HENT: [x] Normocephalic, atraumatic  [] Abnormal -   [x] Mouth/Throat: Mucous membranes are moist    External Ears [x] Normal  [] Abnormal -    Neck: [x] No visualized mass [] Abnormal -     Pulmonary/Chest: [x] Respiratory effort normal   [x] No visualized signs of difficulty breathing or respiratory distress        [] Abnormal -      Musculoskeletal:   [x] Normal gait with no signs of ataxia         [x] Normal range of motion of neck        [] Abnormal -     Neurological:        [x] No Facial Asymmetry (Cranial nerve 7 motor function) (limited exam due to video visit)          [x] No gaze palsy        [] Abnormal -          Skin:        [x] No significant exanthematous lesions or discoloration noted on facial skin         [] Abnormal -            Psychiatric:       [x] Normal Affect [] Abnormal -        [x] No Hallucinations    Other pertinent observable physical exam findings:-        We discussed the expected course, resolution and complications of the diagnosis(es) in detail. Medication risks, benefits, costs, interactions, and alternatives were discussed as indicated. I advised her to contact the office if her condition worsens, changes or fails to improve as anticipated. She expressed understanding with the diagnosis(es) and plan.        Carla Haas, was evaluated through a synchronous (real-time) audio-video encounter. The patient (or guardian if applicable) is aware that this is a billable service, which includes applicable co-pays. This Virtual Visit was conducted with patient's (and/or legal guardian's) consent. The visit was conducted pursuant to the emergency declaration under the [address] [place] [address] authority and the [employer] General Act. Patient identification was verified, and a caregiver was present when appropriate.   The patient was located at: Home: [address] [place] [id number]  The provider was located at: Home: Brenda Marshall MD

## 2023-02-06 NOTE — LETTER
2/10/2023 5:35 PM    Ms. Arie Gonzalez  [address] [facility] [address] [address]      Dear Doctor,  Ms Batsheva Beck is under my care as primary care physician. It is my opinion that she is optimized well enough to have breast reduction surgery. If you have additional questions I can be reached at [phone].           Sincerely,      Pia Barbosa MD

## 2023-03-24 ENCOUNTER — TELEPHONE (OUTPATIENT)
Dept: NEUROSURGERY | Age: 33
End: 2023-03-24

## 2023-03-24 NOTE — TELEPHONE ENCOUNTER
Patient called wanting to make an appointment with Dr. Katja Singletary due to vision changes. I offered Wednesday, March 29, 2023. Patient declined this appointment as  is working. I also offered Wednesday, April 12, 2023. She declined this appointment for the same reason.

## 2023-04-07 ENCOUNTER — HOSPITAL ENCOUNTER (OUTPATIENT)
Age: 33
End: 2023-04-07
Attending: STUDENT IN AN ORGANIZED HEALTH CARE EDUCATION/TRAINING PROGRAM

## 2023-04-27 ENCOUNTER — APPOINTMENT (OUTPATIENT)
Dept: CT IMAGING | Age: 33
End: 2023-04-27
Attending: STUDENT IN AN ORGANIZED HEALTH CARE EDUCATION/TRAINING PROGRAM
Payer: MEDICAID

## 2023-04-27 LAB
ALBUMIN SERPL-MCNC: 4.3 G/DL (ref 3.5–5)
ALBUMIN/GLOB SERPL: 1 (ref 1.1–2.2)
ALP SERPL-CCNC: 176 U/L (ref 45–117)
ALT SERPL-CCNC: 324 U/L (ref 12–78)
ANION GAP SERPL CALC-SCNC: 5 MMOL/L (ref 5–15)
AST SERPL-CCNC: 38 U/L (ref 15–37)
BASOPHILS # BLD: 0 K/UL (ref 0–0.1)
BASOPHILS NFR BLD: 0 % (ref 0–1)
BILIRUB SERPL-MCNC: 0.5 MG/DL (ref 0.2–1)
BUN SERPL-MCNC: 8 MG/DL (ref 6–20)
BUN/CREAT SERPL: 9 (ref 12–20)
CALCIUM SERPL-MCNC: 9.6 MG/DL (ref 8.5–10.1)
CHLORIDE SERPL-SCNC: 106 MMOL/L (ref 97–108)
CO2 SERPL-SCNC: 26 MMOL/L (ref 21–32)
COMMENT, HOLDF: NORMAL
CREAT SERPL-MCNC: 0.85 MG/DL (ref 0.55–1.02)
DIFFERENTIAL METHOD BLD: NORMAL
EOSINOPHIL # BLD: 0.2 K/UL (ref 0–0.4)
EOSINOPHIL NFR BLD: 2 % (ref 0–7)
ERYTHROCYTE [DISTWIDTH] IN BLOOD BY AUTOMATED COUNT: 12.9 % (ref 11.5–14.5)
GLOBULIN SER CALC-MCNC: 4.3 G/DL (ref 2–4)
GLUCOSE SERPL-MCNC: 80 MG/DL (ref 65–100)
HCG SERPL QL: NEGATIVE
HCT VFR BLD AUTO: 45.4 % (ref 35–47)
HGB BLD-MCNC: 14.5 G/DL (ref 11.5–16)
IMM GRANULOCYTES # BLD AUTO: 0 K/UL (ref 0–0.04)
IMM GRANULOCYTES NFR BLD AUTO: 0 % (ref 0–0.5)
LYMPHOCYTES # BLD: 2.6 K/UL (ref 0.8–3.5)
LYMPHOCYTES NFR BLD: 28 % (ref 12–49)
MCH RBC QN AUTO: 30.1 PG (ref 26–34)
MCHC RBC AUTO-ENTMCNC: 31.9 G/DL (ref 30–36.5)
MCV RBC AUTO: 94.4 FL (ref 80–99)
MONOCYTES # BLD: 0.7 K/UL (ref 0–1)
MONOCYTES NFR BLD: 8 % (ref 5–13)
NEUTS SEG # BLD: 5.8 K/UL (ref 1.8–8)
NEUTS SEG NFR BLD: 62 % (ref 32–75)
NRBC # BLD: 0 K/UL (ref 0–0.01)
NRBC BLD-RTO: 0 PER 100 WBC
PLATELET # BLD AUTO: 269 K/UL (ref 150–400)
PMV BLD AUTO: 10.4 FL (ref 8.9–12.9)
POTASSIUM SERPL-SCNC: 4 MMOL/L (ref 3.5–5.1)
PROT SERPL-MCNC: 8.6 G/DL (ref 6.4–8.2)
RBC # BLD AUTO: 4.81 M/UL (ref 3.8–5.2)
SAMPLES BEING HELD,HOLD: NORMAL
SODIUM SERPL-SCNC: 137 MMOL/L (ref 136–145)
WBC # BLD AUTO: 9.4 K/UL (ref 3.6–11)

## 2023-04-27 PROCEDURE — 80053 COMPREHEN METABOLIC PANEL: CPT

## 2023-04-27 PROCEDURE — 70450 CT HEAD/BRAIN W/O DYE: CPT

## 2023-04-27 PROCEDURE — 84703 CHORIONIC GONADOTROPIN ASSAY: CPT

## 2023-04-27 PROCEDURE — 36415 COLL VENOUS BLD VENIPUNCTURE: CPT

## 2023-04-27 PROCEDURE — 93005 ELECTROCARDIOGRAM TRACING: CPT

## 2023-04-27 PROCEDURE — 85025 COMPLETE CBC W/AUTO DIFF WBC: CPT

## 2023-04-27 PROCEDURE — 96375 TX/PRO/DX INJ NEW DRUG ADDON: CPT

## 2023-04-27 PROCEDURE — 99284 EMERGENCY DEPT VISIT MOD MDM: CPT

## 2023-04-27 PROCEDURE — 96365 THER/PROPH/DIAG IV INF INIT: CPT

## 2023-04-27 PROCEDURE — 96366 THER/PROPH/DIAG IV INF ADDON: CPT

## 2023-04-27 NOTE — ED TRIAGE NOTES
The pt arrives ambulatory from home after being referred by her PCP. The pt has a hx of intracranial hypertension. Her CC today are a headache for the past 3 days as well as vomiting. The pt says for the last month she has had blurry vision and pressure behind her left eye.

## 2023-04-28 ENCOUNTER — HOSPITAL ENCOUNTER (EMERGENCY)
Age: 33
Discharge: HOME | End: 2023-04-28
Attending: STUDENT IN AN ORGANIZED HEALTH CARE EDUCATION/TRAINING PROGRAM
Payer: MEDICAID

## 2023-04-28 ENCOUNTER — OFFICE VISIT (OUTPATIENT)
Dept: EMERGENCY DEPT | Age: 33
End: 2023-04-28
Attending: STUDENT IN AN ORGANIZED HEALTH CARE EDUCATION/TRAINING PROGRAM
Payer: MEDICAID

## 2023-04-28 VITALS
RESPIRATION RATE: 17 BRPM | BODY MASS INDEX: 36.05 KG/M2 | OXYGEN SATURATION: 98 % | TEMPERATURE: 98.6 F | DIASTOLIC BLOOD PRESSURE: 78 MMHG | SYSTOLIC BLOOD PRESSURE: 125 MMHG | WEIGHT: 197.09 LBS | HEART RATE: 88 BPM

## 2023-04-28 DIAGNOSIS — R51.9 ACUTE NONINTRACTABLE HEADACHE, UNSPECIFIED HEADACHE TYPE: Primary | ICD-10-CM

## 2023-04-28 LAB
ATRIAL RATE: 82 BPM
CALCULATED P AXIS, ECG09: 69 DEGREES
CALCULATED R AXIS, ECG10: 22 DEGREES
CALCULATED T AXIS, ECG11: 39 DEGREES
DIAGNOSIS, 93000: NORMAL
P-R INTERVAL, ECG05: 164 MS
Q-T INTERVAL, ECG07: 360 MS
QRS DURATION, ECG06: 74 MS
QTC CALCULATION (BEZET), ECG08: 420 MS
VENTRICULAR RATE, ECG03: 82 BPM

## 2023-04-28 PROCEDURE — 96365 THER/PROPH/DIAG IV INF INIT: CPT

## 2023-04-28 PROCEDURE — 74011250636 HC RX REV CODE- 250/636: Performed by: STUDENT IN AN ORGANIZED HEALTH CARE EDUCATION/TRAINING PROGRAM

## 2023-04-28 PROCEDURE — 96375 TX/PRO/DX INJ NEW DRUG ADDON: CPT

## 2023-04-28 PROCEDURE — 76512 OPH US DX B-SCAN: CPT

## 2023-04-28 PROCEDURE — 96366 THER/PROPH/DIAG IV INF ADDON: CPT

## 2023-04-28 RX ORDER — DEXAMETHASONE SODIUM PHOSPHATE 10 MG/ML
10 INJECTION INTRAMUSCULAR; INTRAVENOUS ONCE
Status: COMPLETED | OUTPATIENT
Start: 2023-04-28 | End: 2023-04-28

## 2023-04-28 RX ORDER — MORPHINE SULFATE 4 MG/ML
4 INJECTION INTRAVENOUS
Status: COMPLETED | OUTPATIENT
Start: 2023-04-28 | End: 2023-04-28

## 2023-04-28 RX ORDER — PROCHLORPERAZINE EDISYLATE 5 MG/ML
10 INJECTION INTRAMUSCULAR; INTRAVENOUS ONCE
Status: COMPLETED | OUTPATIENT
Start: 2023-04-28 | End: 2023-04-28

## 2023-04-28 RX ORDER — MAGNESIUM SULFATE HEPTAHYDRATE 40 MG/ML
2 INJECTION, SOLUTION INTRAVENOUS ONCE
Status: COMPLETED | OUTPATIENT
Start: 2023-04-28 | End: 2023-04-28

## 2023-04-28 RX ADMIN — MAGNESIUM SULFATE HEPTAHYDRATE 2 G: 40 INJECTION, SOLUTION INTRAVENOUS at 01:55

## 2023-04-28 RX ADMIN — DEXAMETHASONE SODIUM PHOSPHATE 10 MG: 10 INJECTION INTRAMUSCULAR; INTRAVENOUS at 01:54

## 2023-04-28 RX ADMIN — PROCHLORPERAZINE EDISYLATE 10 MG: 5 INJECTION INTRAMUSCULAR; INTRAVENOUS at 01:54

## 2023-04-28 RX ADMIN — MORPHINE SULFATE 4 MG: 4 INJECTION, SOLUTION INTRAMUSCULAR; INTRAVENOUS at 01:55

## 2023-04-28 RX ADMIN — SODIUM CHLORIDE 1000 ML: 9 INJECTION, SOLUTION INTRAVENOUS at 01:54
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
## 2023-06-05 ENCOUNTER — HOSPITAL ENCOUNTER (EMERGENCY)
Facility: HOSPITAL | Age: 33
Discharge: HOME | End: 2023-06-05
Attending: EMERGENCY MEDICINE
Payer: MEDICAID

## 2023-06-05 ENCOUNTER — APPOINTMENT (OUTPATIENT)
Facility: HOSPITAL | Age: 33
End: 2023-06-05
Payer: MEDICAID

## 2023-06-05 VITALS
TEMPERATURE: 97.7 F | RESPIRATION RATE: 16 BRPM | BODY MASS INDEX: 34.96 KG/M2 | OXYGEN SATURATION: 98 % | WEIGHT: 190 LBS | DIASTOLIC BLOOD PRESSURE: 99 MMHG | HEART RATE: 97 BPM | HEIGHT: 62 IN | SYSTOLIC BLOOD PRESSURE: 148 MMHG

## 2023-06-05 DIAGNOSIS — M79.601 RIGHT ARM PAIN: Primary | ICD-10-CM

## 2023-06-05 PROCEDURE — 6370000000 HC RX 637 (ALT 250 FOR IP): Performed by: EMERGENCY MEDICINE

## 2023-06-05 PROCEDURE — 73030 X-RAY EXAM OF SHOULDER: CPT

## 2023-06-05 PROCEDURE — 99283 EMERGENCY DEPT VISIT LOW MDM: CPT

## 2023-06-05 RX ORDER — TRAMADOL HYDROCHLORIDE 50 MG/1
50 TABLET ORAL
Status: COMPLETED | OUTPATIENT
Start: 2023-06-05 | End: 2023-06-05

## 2023-06-05 RX ADMIN — TRAMADOL HYDROCHLORIDE 50 MG: 50 TABLET, COATED ORAL at 04:38

## 2023-06-05 ASSESSMENT — PAIN DESCRIPTION - ORIENTATION
ORIENTATION: LEFT
ORIENTATION: LEFT

## 2023-06-05 ASSESSMENT — PAIN - FUNCTIONAL ASSESSMENT: PAIN_FUNCTIONAL_ASSESSMENT: 0-10

## 2023-06-05 ASSESSMENT — PAIN DESCRIPTION - LOCATION
LOCATION: ARM
LOCATION: ARM

## 2023-06-05 ASSESSMENT — PAIN SCALES - GENERAL
PAINLEVEL_OUTOF10: 10
PAINLEVEL_OUTOF10: 9
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
## 2023-06-05 NOTE — ED TRIAGE NOTES
States \"muscle atrophy\" to left upper arm for a month. States pain in the same area started tonight. Did not medicate PTA. Denies any injury or trauma.

## 2023-06-09 ENCOUNTER — TELEPHONE (OUTPATIENT)
Age: 33
End: 2023-06-09

## 2023-06-09 NOTE — TELEPHONE ENCOUNTER
BERNADINE for pt stating that we have received her updated psych. Eval and asked her to call and schedule an appointment to have her records updated.  HHS

## 2023-07-16 ENCOUNTER — APPOINTMENT (OUTPATIENT)
Facility: HOSPITAL | Age: 33
End: 2023-07-16
Attending: STUDENT IN AN ORGANIZED HEALTH CARE EDUCATION/TRAINING PROGRAM
Payer: MEDICAID

## 2023-07-16 ENCOUNTER — HOSPITAL ENCOUNTER (EMERGENCY)
Facility: HOSPITAL | Age: 33
Discharge: HOME | End: 2023-07-17
Attending: STUDENT IN AN ORGANIZED HEALTH CARE EDUCATION/TRAINING PROGRAM
Payer: MEDICAID

## 2023-07-16 DIAGNOSIS — M25.422 EFFUSION OF LEFT ELBOW: Primary | ICD-10-CM

## 2023-07-16 LAB
ALBUMIN SERPL-MCNC: 4.2 G/DL (ref 3.5–5)
ALBUMIN/GLOB SERPL: 1.2 (ref 1.1–2.2)
ALP SERPL-CCNC: 109 U/L (ref 45–117)
ALT SERPL-CCNC: 41 U/L (ref 12–78)
ANION GAP SERPL CALC-SCNC: 7 MMOL/L (ref 5–15)
AST SERPL W P-5'-P-CCNC: 144 U/L (ref 15–37)
BASOPHILS # BLD: 0.1 K/UL (ref 0–0.1)
BASOPHILS NFR BLD: 1 % (ref 0–1)
BILIRUB SERPL-MCNC: 0.6 MG/DL (ref 0.2–1)
BUN SERPL-MCNC: 6 MG/DL (ref 6–20)
BUN/CREAT SERPL: 6 (ref 12–20)
CA-I BLD-MCNC: 9.3 MG/DL (ref 8.5–10.1)
CHLORIDE SERPL-SCNC: 105 MMOL/L (ref 97–108)
CK SERPL-CCNC: 6132 U/L (ref 26–192)
CO2 SERPL-SCNC: 26 MMOL/L (ref 21–32)
CREAT SERPL-MCNC: 0.93 MG/DL (ref 0.55–1.02)
CRP SERPL-MCNC: 0.68 MG/DL (ref 0–0.6)
DIFFERENTIAL METHOD BLD: ABNORMAL
EOSINOPHIL # BLD: 0.1 K/UL (ref 0–0.4)
EOSINOPHIL NFR BLD: 1 % (ref 0–7)
ERYTHROCYTE [DISTWIDTH] IN BLOOD BY AUTOMATED COUNT: 12.1 % (ref 11.5–14.5)
ERYTHROCYTE [SEDIMENTATION RATE] IN BLOOD: 13 MM/HR (ref 0–20)
GLOBULIN SER CALC-MCNC: 3.6 G/DL (ref 2–4)
GLUCOSE SERPL-MCNC: 97 MG/DL (ref 65–100)
HCT VFR BLD AUTO: 42 % (ref 35–47)
HGB BLD-MCNC: 14.4 G/DL (ref 11.5–16)
IMM GRANULOCYTES # BLD AUTO: 0 K/UL (ref 0–0.04)
IMM GRANULOCYTES NFR BLD AUTO: 0 % (ref 0–0.5)
LYMPHOCYTES # BLD: 3.8 K/UL (ref 0.8–3.5)
LYMPHOCYTES NFR BLD: 40 % (ref 12–49)
MCH RBC QN AUTO: 30.6 PG (ref 26–34)
MCHC RBC AUTO-ENTMCNC: 34.3 G/DL (ref 30–36.5)
MCV RBC AUTO: 89.4 FL (ref 80–99)
MONOCYTES # BLD: 0.7 K/UL (ref 0–1)
MONOCYTES NFR BLD: 8 % (ref 5–13)
NEUTS SEG # BLD: 4.8 K/UL (ref 1.8–8)
NEUTS SEG NFR BLD: 50 % (ref 32–75)
NRBC # BLD: 0 K/UL (ref 0–0.01)
NRBC BLD-RTO: 0 PER 100 WBC
PLATELET # BLD AUTO: 293 K/UL (ref 150–400)
PMV BLD AUTO: 9.5 FL (ref 8.9–12.9)
POTASSIUM SERPL-SCNC: 3.8 MMOL/L (ref 3.5–5.1)
PROT SERPL-MCNC: 7.8 G/DL (ref 6.4–8.2)
RBC # BLD AUTO: 4.7 M/UL (ref 3.8–5.2)
SODIUM SERPL-SCNC: 138 MMOL/L (ref 136–145)
TROPONIN I SERPL HS-MCNC: 6 NG/L (ref 0–51)
WBC # BLD AUTO: 9.6 K/UL (ref 3.6–11)

## 2023-07-16 PROCEDURE — 96374 THER/PROPH/DIAG INJ IV PUSH: CPT

## 2023-07-16 PROCEDURE — 80053 COMPREHEN METABOLIC PANEL: CPT

## 2023-07-16 PROCEDURE — 6360000002 HC RX W HCPCS: Performed by: STUDENT IN AN ORGANIZED HEALTH CARE EDUCATION/TRAINING PROGRAM

## 2023-07-16 PROCEDURE — 82550 ASSAY OF CK (CPK): CPT

## 2023-07-16 PROCEDURE — 85652 RBC SED RATE AUTOMATED: CPT

## 2023-07-16 PROCEDURE — 99284 EMERGENCY DEPT VISIT MOD MDM: CPT

## 2023-07-16 PROCEDURE — 73070 X-RAY EXAM OF ELBOW: CPT

## 2023-07-16 PROCEDURE — 85025 COMPLETE CBC W/AUTO DIFF WBC: CPT

## 2023-07-16 PROCEDURE — 36415 COLL VENOUS BLD VENIPUNCTURE: CPT

## 2023-07-16 PROCEDURE — 73502 X-RAY EXAM HIP UNI 2-3 VIEWS: CPT

## 2023-07-16 PROCEDURE — 84484 ASSAY OF TROPONIN QUANT: CPT

## 2023-07-16 PROCEDURE — 86140 C-REACTIVE PROTEIN: CPT

## 2023-07-16 RX ORDER — OXYCODONE HYDROCHLORIDE 5 MG/1
5 TABLET ORAL
Status: COMPLETED | OUTPATIENT
Start: 2023-07-16 | End: 2023-07-17

## 2023-07-16 RX ORDER — MORPHINE SULFATE 4 MG/ML
2 INJECTION INTRAVENOUS ONCE
OUTPATIENT
Start: 2023-07-16

## 2023-07-16 RX ORDER — PREDNISONE 20 MG/1
60 TABLET ORAL
Status: COMPLETED | OUTPATIENT
Start: 2023-07-16 | End: 2023-07-17

## 2023-07-16 RX ORDER — PREDNISONE 50 MG/1
50 TABLET ORAL DAILY
Qty: 5 TABLET | Refills: 0 | Status: SHIPPED | OUTPATIENT
Start: 2023-07-16 | End: 2023-07-21

## 2023-07-16 RX ORDER — MORPHINE SULFATE 2 MG/ML
2 INJECTION, SOLUTION INTRAMUSCULAR; INTRAVENOUS
Status: COMPLETED | OUTPATIENT
Start: 2023-07-16 | End: 2023-07-16

## 2023-07-16 RX ORDER — OXYCODONE HYDROCHLORIDE 5 MG/1
5 TABLET ORAL EVERY 6 HOURS PRN
Qty: 12 TABLET | Refills: 0 | Status: SHIPPED | OUTPATIENT
Start: 2023-07-16 | End: 2023-07-19

## 2023-07-16 RX ADMIN — MORPHINE SULFATE 2 MG: 2 INJECTION, SOLUTION INTRAMUSCULAR; INTRAVENOUS at 22:28

## 2023-07-16 ASSESSMENT — PAIN - FUNCTIONAL ASSESSMENT
PAIN_FUNCTIONAL_ASSESSMENT: ACTIVITIES ARE NOT PREVENTED
PAIN_FUNCTIONAL_ASSESSMENT: 0-10

## 2023-07-16 ASSESSMENT — PAIN DESCRIPTION - ORIENTATION
ORIENTATION: RIGHT
ORIENTATION: LEFT

## 2023-07-16 ASSESSMENT — PAIN DESCRIPTION - LOCATION: LOCATION: GENERALIZED

## 2023-07-16 ASSESSMENT — PAIN SCALES - GENERAL
PAINLEVEL_OUTOF10: 9
PAINLEVEL_OUTOF10: 10

## 2023-07-16 ASSESSMENT — PAIN DESCRIPTION - DESCRIPTORS: DESCRIPTORS: DISCOMFORT

## 2023-07-17 VITALS
BODY MASS INDEX: 34.96 KG/M2 | HEART RATE: 70 BPM | SYSTOLIC BLOOD PRESSURE: 129 MMHG | HEIGHT: 62 IN | OXYGEN SATURATION: 100 % | RESPIRATION RATE: 20 BRPM | DIASTOLIC BLOOD PRESSURE: 70 MMHG | WEIGHT: 190 LBS | TEMPERATURE: 98.4 F

## 2023-07-17 PROCEDURE — 6370000000 HC RX 637 (ALT 250 FOR IP): Performed by: STUDENT IN AN ORGANIZED HEALTH CARE EDUCATION/TRAINING PROGRAM

## 2023-07-17 RX ADMIN — OXYCODONE HYDROCHLORIDE 5 MG: 5 TABLET ORAL at 00:18

## 2023-07-17 RX ADMIN — PREDNISONE 60 MG: 20 TABLET ORAL at 00:18
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
## 2023-08-03 ENCOUNTER — HOSPITAL ENCOUNTER (OUTPATIENT)
Facility: HOSPITAL | Age: 33
Setting detail: RECURRING SERIES
Discharge: HOME | End: 2023-08-06
Payer: MEDICAID

## 2023-08-03 PROCEDURE — 97161 PT EVAL LOW COMPLEX 20 MIN: CPT

## 2023-08-08 ENCOUNTER — APPOINTMENT (OUTPATIENT)
Facility: HOSPITAL | Age: 33
End: 2023-08-08
Payer: MEDICAID

## 2023-08-08 ENCOUNTER — HOSPITAL ENCOUNTER (INPATIENT)
Facility: HOSPITAL | Age: 33
LOS: 2 days | Discharge: LEFT BEFORE DISCHARGE | DRG: 058 | End: 2023-08-10
Attending: EMERGENCY MEDICINE | Admitting: INTERNAL MEDICINE
Payer: MEDICAID

## 2023-08-08 ENCOUNTER — HOSPITAL ENCOUNTER (EMERGENCY)
Facility: HOSPITAL | Age: 33
Discharge: ANOTHER HOSPITAL | End: 2023-08-08
Attending: STUDENT IN AN ORGANIZED HEALTH CARE EDUCATION/TRAINING PROGRAM
Payer: MEDICAID

## 2023-08-08 VITALS
OXYGEN SATURATION: 100 % | RESPIRATION RATE: 16 BRPM | WEIGHT: 190 LBS | DIASTOLIC BLOOD PRESSURE: 93 MMHG | HEART RATE: 81 BPM | BODY MASS INDEX: 35.87 KG/M2 | HEIGHT: 61 IN | SYSTOLIC BLOOD PRESSURE: 141 MMHG | TEMPERATURE: 98.1 F

## 2023-08-08 DIAGNOSIS — G93.2 IDIOPATHIC INTRACRANIAL HYPERTENSION: ICD-10-CM

## 2023-08-08 DIAGNOSIS — R51.9 INTRACTABLE HEADACHE, UNSPECIFIED CHRONICITY PATTERN, UNSPECIFIED HEADACHE TYPE: Primary | ICD-10-CM

## 2023-08-08 DIAGNOSIS — G93.2 IDIOPATHIC INTRACRANIAL HYPERTENSION: Primary | ICD-10-CM

## 2023-08-08 LAB
ANION GAP SERPL CALC-SCNC: 9 MMOL/L (ref 5–15)
BASOPHILS # BLD: 0 K/UL (ref 0–0.1)
BASOPHILS NFR BLD: 0 % (ref 0–1)
BUN SERPL-MCNC: 8 MG/DL (ref 6–20)
BUN/CREAT SERPL: 8 (ref 12–20)
CA-I BLD-MCNC: 9 MG/DL (ref 8.5–10.1)
CHLORIDE SERPL-SCNC: 104 MMOL/L (ref 97–108)
CO2 SERPL-SCNC: 29 MMOL/L (ref 21–32)
CREAT SERPL-MCNC: 1.03 MG/DL (ref 0.55–1.02)
DIFFERENTIAL METHOD BLD: ABNORMAL
EOSINOPHIL # BLD: 0.1 K/UL (ref 0–0.4)
EOSINOPHIL NFR BLD: 1 % (ref 0–7)
ERYTHROCYTE [DISTWIDTH] IN BLOOD BY AUTOMATED COUNT: 13.1 % (ref 11.5–14.5)
GLUCOSE SERPL-MCNC: 76 MG/DL (ref 65–100)
HCT VFR BLD AUTO: 38.8 % (ref 35–47)
HGB BLD-MCNC: 13.2 G/DL (ref 11.5–16)
IMM GRANULOCYTES # BLD AUTO: 0 K/UL (ref 0–0.04)
IMM GRANULOCYTES NFR BLD AUTO: 0 % (ref 0–0.5)
LYMPHOCYTES # BLD: 3.5 K/UL (ref 0.8–3.5)
LYMPHOCYTES NFR BLD: 20 % (ref 12–49)
MCH RBC QN AUTO: 31.2 PG (ref 26–34)
MCHC RBC AUTO-ENTMCNC: 34 G/DL (ref 30–36.5)
MCV RBC AUTO: 91.7 FL (ref 80–99)
MONOCYTES # BLD: 1.5 K/UL (ref 0–1)
MONOCYTES NFR BLD: 9 % (ref 5–13)
NEUTS SEG # BLD: 12.3 K/UL (ref 1.8–8)
NEUTS SEG NFR BLD: 70 % (ref 32–75)
PLATELET # BLD AUTO: 290 K/UL (ref 150–400)
PMV BLD AUTO: 9.3 FL (ref 8.9–12.9)
POTASSIUM SERPL-SCNC: 3.5 MMOL/L (ref 3.5–5.1)
RBC # BLD AUTO: 4.23 M/UL (ref 3.8–5.2)
SODIUM SERPL-SCNC: 142 MMOL/L (ref 136–145)
WBC # BLD AUTO: 17.5 K/UL (ref 3.6–11)

## 2023-08-08 PROCEDURE — 80048 BASIC METABOLIC PNL TOTAL CA: CPT

## 2023-08-08 PROCEDURE — 6360000002 HC RX W HCPCS: Performed by: EMERGENCY MEDICINE

## 2023-08-08 PROCEDURE — 99285 EMERGENCY DEPT VISIT HI MDM: CPT

## 2023-08-08 PROCEDURE — 96374 THER/PROPH/DIAG INJ IV PUSH: CPT

## 2023-08-08 PROCEDURE — 85025 COMPLETE CBC W/AUTO DIFF WBC: CPT

## 2023-08-08 PROCEDURE — 96375 TX/PRO/DX INJ NEW DRUG ADDON: CPT

## 2023-08-08 PROCEDURE — 70450 CT HEAD/BRAIN W/O DYE: CPT

## 2023-08-08 PROCEDURE — 6370000000 HC RX 637 (ALT 250 FOR IP): Performed by: EMERGENCY MEDICINE

## 2023-08-08 PROCEDURE — 1100000000 HC RM PRIVATE

## 2023-08-08 PROCEDURE — 6360000002 HC RX W HCPCS: Performed by: STUDENT IN AN ORGANIZED HEALTH CARE EDUCATION/TRAINING PROGRAM

## 2023-08-08 RX ORDER — DIPHENHYDRAMINE HYDROCHLORIDE 50 MG/ML
25 INJECTION INTRAMUSCULAR; INTRAVENOUS
Status: COMPLETED | OUTPATIENT
Start: 2023-08-08 | End: 2023-08-08

## 2023-08-08 RX ORDER — FENTANYL CITRATE 50 UG/ML
50 INJECTION, SOLUTION INTRAMUSCULAR; INTRAVENOUS ONCE
Status: COMPLETED | OUTPATIENT
Start: 2023-08-08 | End: 2023-08-08

## 2023-08-08 RX ORDER — DEXAMETHASONE SODIUM PHOSPHATE 10 MG/ML
8 INJECTION, SOLUTION INTRAMUSCULAR; INTRAVENOUS
Status: COMPLETED | OUTPATIENT
Start: 2023-08-08 | End: 2023-08-08

## 2023-08-08 RX ORDER — FENTANYL CITRATE 50 UG/ML
50 INJECTION, SOLUTION INTRAMUSCULAR; INTRAVENOUS
Status: DISCONTINUED | OUTPATIENT
Start: 2023-08-08 | End: 2023-08-08 | Stop reason: SDUPTHER

## 2023-08-08 RX ORDER — ACETAZOLAMIDE 500 MG/5ML
500 INJECTION, POWDER, LYOPHILIZED, FOR SOLUTION INTRAVENOUS ONCE
Status: COMPLETED | OUTPATIENT
Start: 2023-08-08 | End: 2023-08-08

## 2023-08-08 RX ORDER — PROCHLORPERAZINE EDISYLATE 5 MG/ML
10 INJECTION INTRAMUSCULAR; INTRAVENOUS
Status: COMPLETED | OUTPATIENT
Start: 2023-08-08 | End: 2023-08-08

## 2023-08-08 RX ORDER — TOPIRAMATE 25 MG/1
50 TABLET ORAL ONCE
Status: COMPLETED | OUTPATIENT
Start: 2023-08-08 | End: 2023-08-08

## 2023-08-08 RX ADMIN — DIPHENHYDRAMINE HYDROCHLORIDE 25 MG: 50 INJECTION INTRAMUSCULAR; INTRAVENOUS at 12:57

## 2023-08-08 RX ADMIN — HYDROMORPHONE HYDROCHLORIDE 0.5 MG: 1 INJECTION, SOLUTION INTRAMUSCULAR; INTRAVENOUS; SUBCUTANEOUS at 15:19

## 2023-08-08 RX ADMIN — TOPIRAMATE 50 MG: 25 TABLET, FILM COATED ORAL at 20:42

## 2023-08-08 RX ADMIN — ACETAZOLAMIDE SODIUM 500 MG: 500 INJECTION, POWDER, LYOPHILIZED, FOR SOLUTION INTRAVENOUS at 20:43

## 2023-08-08 RX ADMIN — FENTANYL CITRATE 50 MCG: 50 INJECTION INTRAMUSCULAR; INTRAVENOUS at 20:45

## 2023-08-08 RX ADMIN — PROCHLORPERAZINE EDISYLATE 10 MG: 5 INJECTION INTRAMUSCULAR; INTRAVENOUS at 12:55

## 2023-08-08 RX ADMIN — DEXAMETHASONE SODIUM PHOSPHATE 8 MG: 10 INJECTION, SOLUTION INTRAMUSCULAR; INTRAVENOUS at 12:53

## 2023-08-08 ASSESSMENT — PAIN DESCRIPTION - ONSET
ONSET: SUDDEN
ONSET: SUDDEN

## 2023-08-08 ASSESSMENT — ENCOUNTER SYMPTOMS
COLOR CHANGE: 0
BLOOD IN STOOL: 0
VOMITING: 0
SORE THROAT: 0
COUGH: 0
ABDOMINAL PAIN: 0
CHEST TIGHTNESS: 0
TROUBLE SWALLOWING: 0
RHINORRHEA: 0
EYES NEGATIVE: 1
SINUS PAIN: 0
DIARRHEA: 0
BACK PAIN: 0
SINUS PRESSURE: 0
NAUSEA: 0
SHORTNESS OF BREATH: 0
STRIDOR: 0
WHEEZING: 0

## 2023-08-08 ASSESSMENT — PAIN DESCRIPTION - DESCRIPTORS
DESCRIPTORS: ACHING;POUNDING
DESCRIPTORS: ACHING

## 2023-08-08 ASSESSMENT — PAIN DESCRIPTION - LOCATION
LOCATION: HEAD

## 2023-08-08 ASSESSMENT — PAIN DESCRIPTION - FREQUENCY
FREQUENCY: CONTINUOUS
FREQUENCY: CONTINUOUS

## 2023-08-08 ASSESSMENT — PAIN - FUNCTIONAL ASSESSMENT
PAIN_FUNCTIONAL_ASSESSMENT: 0-10
PAIN_FUNCTIONAL_ASSESSMENT: PREVENTS OR INTERFERES SOME ACTIVE ACTIVITIES AND ADLS
PAIN_FUNCTIONAL_ASSESSMENT: 0-10

## 2023-08-08 ASSESSMENT — PAIN DESCRIPTION - PAIN TYPE
TYPE: ACUTE PAIN
TYPE: ACUTE PAIN

## 2023-08-08 ASSESSMENT — PAIN SCALES - GENERAL
PAINLEVEL_OUTOF10: 8
PAINLEVEL_OUTOF10: 10
PAINLEVEL_OUTOF10: 6

## 2023-08-08 ASSESSMENT — PAIN DESCRIPTION - ORIENTATION: ORIENTATION: ANTERIOR

## 2023-08-08 ASSESSMENT — LIFESTYLE VARIABLES
HOW MANY STANDARD DRINKS CONTAINING ALCOHOL DO YOU HAVE ON A TYPICAL DAY: PATIENT DOES NOT DRINK
HOW OFTEN DO YOU HAVE A DRINK CONTAINING ALCOHOL: NEVER

## 2023-08-08 NOTE — ED TRIAGE NOTES
Pt reports headache and dizziness upon waking this morning. Pt states \"I think it is my intracranial hypertension\".

## 2023-08-08 NOTE — ED PROVIDER NOTES
between 730 and 8 in the morning. He can be called at extension [phone] at [facility] or he can call the OR directly to speakwith him otherwise he is planning on contacting the patient's lumbar with updates early tomorrow morning. I have discussed the patient with neurology on call to my and is in agreement with the patient having her MRI in the morning and to treat her in the meantime with Diamox and Topamax. The patient had been treated with this in the past with success using the IV Diamox. The oral Diamox does not seem to work for her. Neurology will see the patient early in the morning. Anesthesia will coordinate with MRI as noted above. We will consult the hospitalist for admission. Perfect Serve Consult for Admission  9:24 PM    ED Room Number: R31/R31  Patient Name and age:  Gerald Walton 27 y.o.  female  Working Diagnosis:   1. Idiopathic intracranial hypertension        COVID-19 Suspicion: No  Sepsis present:  No  Reassessment needed: No  Code Status:  Full Code  Readmission: No  Isolation Requirements: no  Recommended Level of Care: telemetry  Department: [facility] Adult ED - [phone]  Consulting Provider: Anesthesia, interventional neurology and Neurology    CONSULTS:  Neurology    PROCEDURES:  Unless otherwise noted below, none     Procedures      FINAL IMPRESSION    No diagnosis found. DISPOSITION/PLAN   DISPOSITION        PATIENT REFERRED TO:  No follow-up provider specified.     DISCHARGE MEDICATIONS:  New Prescriptions    No medications on file         (Please note that portions of this note were completed with a voice recognition program.  Efforts were made to edit the dictations but occasionally words are mis-transcribed.)    Tosha Hernández MD (electronically signed)  Emergency Attending Physician            Tosha Hernández MD  08/13/23 [phone]

## 2023-08-08 NOTE — ED PROVIDER NOTES
Creatinine 1.03 (H) 0.55 - 1.02 mg/dL    Bun/Cre Ratio 8 (L) 12 - 20      Est, Glom Filt Rate >60 >60 ml/min/1.73m2    Calcium 9.0 8.5 - 10.1 mg/dL   CBC with Auto Differential    Collection Time: 08/08/23 12:50 PM   Result Value Ref Range    WBC 17.5 (H) 3.6 - 11.0 K/uL    RBC 4.23 3.80 - 5.20 M/uL    Hemoglobin 13.2 11.5 - 16.0 g/dL    Hematocrit 38.8 35.0 - 47.0 %    MCV 91.7 80.0 - 99.0 FL    MCH 31.2 26.0 - 34.0 PG    MCHC 34.0 30.0 - 36.5 g/dL    RDW 13.1 11.5 - 14.5 %    Platelets 309 115 - 193 K/uL    MPV 9.3 8.9 - 12.9 FL    Neutrophils % 70 32 - 75 %    Lymphocytes % 20 12 - 49 %    Monocytes % 9 5 - 13 %    Eosinophils % 1 0 - 7 %    Basophils % 0 0 - 1 %    Immature Granulocytes 0 0.0 - 0.5 %    Neutrophils Absolute 12.3 (H) 1.8 - 8.0 K/UL    Lymphocytes Absolute 3.5 0.8 - 3.5 K/UL    Monocytes Absolute 1.5 (H) 0.0 - 1.0 K/UL    Eosinophils Absolute 0.1 0.0 - 0.4 K/UL    Basophils Absolute 0.0 0.0 - 0.1 K/UL    Absolute Immature Granulocyte 0.0 0.00 - 0.04 K/UL    Differential Type AUTOMATED         EKG:. Not Applicable    Radiologic Studies:  Non-plain film images such as CT, Ultrasound and MRI are read by the radiologist. Plain radiographic images are visualized and preliminarily interpreted by the ED Provider with the following findings: See ED Course Below    Interpretation per the Radiologist below, if available at the time of this note:  CT HEAD WO CONTRAST   Final Result      1. No acute intracranial abnormality. 2. Right transverse vascular stent, in a similar position to the recent prior   study. EMERGENCY DEPARTMENT COURSE and DIFFERENTIAL DIAGNOSIS/MDM   CC/HPI Summary, DDx, ED Course, and Reassessment:     27 y.o. female with complex history including idiopathic intracranial hypertension and reported ventricle stents presents with headache and imbalance. Patient reports she woke up this morning with a diffuse headache and intermittent blurry vision.   She has had difficulty

## 2023-08-08 NOTE — ED NOTES
Pt states that she has had loss of peripheral vision since this morning as well as loss of balance. Dr. Raquel Mariee made aware.       Jeannine Grier RN  08/08/23 3531

## 2023-08-08 NOTE — ED NOTES
Report given to [facility] provider for transfer to [facility] ED.       Edwin Roberts RN  08/08/23 9131

## 2023-08-08 NOTE — ED NOTES
Attempted visual acuity test.  Pt groggy and somnolent and unable to get off of the stretcher at this time. DARNELL Madera notified.      Trevor Nettles, RT  08/08/23 1236

## 2023-08-08 NOTE — ED NOTES
Report given to Charge DARNELL Napoles at [facility]. Await [facility] to transport patient.       Lay Costello RN  08/08/23 2040

## 2023-08-08 NOTE — ED TRIAGE NOTES
Pt arrives via [facility] tx from [place] for MRI. Pt has ongoing cranial HTN and needs MRI. Per transport VSS in route.  Pt arrives w/ L AC 20 G placed at previous facility

## 2023-08-08 NOTE — ED NOTES
Verbal shift change report given to Rozanna Sicard, LPN (oncoming nurse) by Thais Wall RN (offgoing nurse). Report included the following information ED Encounter Summary.        Phillip Rust RN  08/08/23 1954

## 2023-08-09 ENCOUNTER — APPOINTMENT (OUTPATIENT)
Facility: HOSPITAL | Age: 33
DRG: 058 | End: 2023-08-09
Payer: MEDICAID

## 2023-08-09 LAB
ALBUMIN SERPL-MCNC: 3.4 G/DL (ref 3.5–5)
ALBUMIN/GLOB SERPL: 0.9 (ref 1.1–2.2)
ALP SERPL-CCNC: 95 U/L (ref 45–117)
ALT SERPL-CCNC: 20 U/L (ref 12–78)
AMPHET UR QL SCN: NEGATIVE
ANION GAP SERPL CALC-SCNC: 9 MMOL/L (ref 5–15)
APPEARANCE UR: ABNORMAL
AST SERPL-CCNC: 9 U/L (ref 15–37)
BACTERIA URNS QL MICRO: NEGATIVE /HPF
BARBITURATES UR QL SCN: NEGATIVE
BASOPHILS # BLD: 0 K/UL (ref 0–0.1)
BASOPHILS NFR BLD: 0 % (ref 0–1)
BENZODIAZ UR QL: POSITIVE
BILIRUB SERPL-MCNC: 0.6 MG/DL (ref 0.2–1)
BILIRUB UR QL: NEGATIVE
BUN SERPL-MCNC: 11 MG/DL (ref 6–20)
BUN/CREAT SERPL: 13 (ref 12–20)
CALCIUM SERPL-MCNC: 9.1 MG/DL (ref 8.5–10.1)
CANNABINOIDS UR QL SCN: NEGATIVE
CHLORIDE SERPL-SCNC: 109 MMOL/L (ref 97–108)
CO2 SERPL-SCNC: 17 MMOL/L (ref 21–32)
COCAINE UR QL SCN: NEGATIVE
COLOR UR: ABNORMAL
COMMENT:: NORMAL
CREAT SERPL-MCNC: 0.83 MG/DL (ref 0.55–1.02)
CRP SERPL-MCNC: 4.58 MG/DL (ref 0–0.6)
DIFFERENTIAL METHOD BLD: ABNORMAL
EOSINOPHIL # BLD: 0 K/UL (ref 0–0.4)
EOSINOPHIL NFR BLD: 0 % (ref 0–7)
EPITH CASTS URNS QL MICRO: ABNORMAL /LPF
ERYTHROCYTE [DISTWIDTH] IN BLOOD BY AUTOMATED COUNT: 12.8 % (ref 11.5–14.5)
ERYTHROCYTE [SEDIMENTATION RATE] IN BLOOD: 5 MM/HR (ref 0–20)
FOLATE SERPL-MCNC: 9.5 NG/ML (ref 5–21)
GLOBULIN SER CALC-MCNC: 3.9 G/DL (ref 2–4)
GLUCOSE SERPL-MCNC: 153 MG/DL (ref 65–100)
GLUCOSE UR STRIP.AUTO-MCNC: NEGATIVE MG/DL
HCT VFR BLD AUTO: 39.7 % (ref 35–47)
HGB BLD-MCNC: 13.1 G/DL (ref 11.5–16)
HGB UR QL STRIP: NEGATIVE
HYALINE CASTS URNS QL MICRO: ABNORMAL /LPF (ref 0–5)
IMM GRANULOCYTES # BLD AUTO: 0.1 K/UL (ref 0–0.04)
IMM GRANULOCYTES NFR BLD AUTO: 1 % (ref 0–0.5)
KETONES UR QL STRIP.AUTO: NEGATIVE MG/DL
LEUKOCYTE ESTERASE UR QL STRIP.AUTO: NEGATIVE
LYMPHOCYTES # BLD: 1.5 K/UL (ref 0.8–3.5)
LYMPHOCYTES NFR BLD: 8 % (ref 12–49)
Lab: ABNORMAL
MAGNESIUM SERPL-MCNC: 2.4 MG/DL (ref 1.6–2.4)
MCH RBC QN AUTO: 30.5 PG (ref 26–34)
MCHC RBC AUTO-ENTMCNC: 33 G/DL (ref 30–36.5)
MCV RBC AUTO: 92.3 FL (ref 80–99)
METHADONE UR QL: NEGATIVE
MONOCYTES # BLD: 0.7 K/UL (ref 0–1)
MONOCYTES NFR BLD: 4 % (ref 5–13)
NEUTS SEG # BLD: 16.5 K/UL (ref 1.8–8)
NEUTS SEG NFR BLD: 87 % (ref 32–75)
NITRITE UR QL STRIP.AUTO: NEGATIVE
NRBC # BLD: 0 K/UL (ref 0–0.01)
NRBC BLD-RTO: 0 PER 100 WBC
OPIATES UR QL: POSITIVE
PCP UR QL: NEGATIVE
PH UR STRIP: 7.5 (ref 5–8)
PHOSPHATE SERPL-MCNC: 2.8 MG/DL (ref 2.6–4.7)
PLATELET # BLD AUTO: 339 K/UL (ref 150–400)
PMV BLD AUTO: 9.4 FL (ref 8.9–12.9)
POTASSIUM SERPL-SCNC: 3.5 MMOL/L (ref 3.5–5.1)
PROT SERPL-MCNC: 7.3 G/DL (ref 6.4–8.2)
PROT UR STRIP-MCNC: NEGATIVE MG/DL
RBC # BLD AUTO: 4.3 M/UL (ref 3.8–5.2)
RBC #/AREA URNS HPF: ABNORMAL /HPF (ref 0–5)
SODIUM SERPL-SCNC: 135 MMOL/L (ref 136–145)
SP GR UR REFRACTOMETRY: 1.02 (ref 1–1.03)
SPECIMEN HOLD: NORMAL
TSH SERPL DL<=0.05 MIU/L-ACNC: 0.2 UIU/ML (ref 0.36–3.74)
URINE CULTURE IF INDICATED: ABNORMAL
UROBILINOGEN UR QL STRIP.AUTO: 1 EU/DL (ref 0.2–1)
VIT B12 SERPL-MCNC: 149 PG/ML (ref 193–986)
WBC # BLD AUTO: 18.8 K/UL (ref 3.6–11)
WBC URNS QL MICRO: ABNORMAL /HPF (ref 0–4)

## 2023-08-09 PROCEDURE — 85652 RBC SED RATE AUTOMATED: CPT

## 2023-08-09 PROCEDURE — 2580000003 HC RX 258: Performed by: INTERNAL MEDICINE

## 2023-08-09 PROCEDURE — 80307 DRUG TEST PRSMV CHEM ANLYZR: CPT

## 2023-08-09 PROCEDURE — 82746 ASSAY OF FOLIC ACID SERUM: CPT

## 2023-08-09 PROCEDURE — 81001 URINALYSIS AUTO W/SCOPE: CPT

## 2023-08-09 PROCEDURE — 85025 COMPLETE CBC W/AUTO DIFF WBC: CPT

## 2023-08-09 PROCEDURE — 6360000002 HC RX W HCPCS: Performed by: INTERNAL MEDICINE

## 2023-08-09 PROCEDURE — 84443 ASSAY THYROID STIM HORMONE: CPT

## 2023-08-09 PROCEDURE — 6370000000 HC RX 637 (ALT 250 FOR IP): Performed by: FAMILY MEDICINE

## 2023-08-09 PROCEDURE — 84100 ASSAY OF PHOSPHORUS: CPT

## 2023-08-09 PROCEDURE — 6360000002 HC RX W HCPCS: Performed by: FAMILY MEDICINE

## 2023-08-09 PROCEDURE — 6370000000 HC RX 637 (ALT 250 FOR IP): Performed by: INTERNAL MEDICINE

## 2023-08-09 PROCEDURE — 80053 COMPREHEN METABOLIC PANEL: CPT

## 2023-08-09 PROCEDURE — 2060000000 HC ICU INTERMEDIATE R&B

## 2023-08-09 PROCEDURE — 82607 VITAMIN B-12: CPT

## 2023-08-09 PROCEDURE — 86140 C-REACTIVE PROTEIN: CPT

## 2023-08-09 PROCEDURE — 99223 1ST HOSP IP/OBS HIGH 75: CPT | Performed by: NURSE PRACTITIONER

## 2023-08-09 PROCEDURE — 83735 ASSAY OF MAGNESIUM: CPT

## 2023-08-09 PROCEDURE — 71045 X-RAY EXAM CHEST 1 VIEW: CPT

## 2023-08-09 RX ORDER — POLYETHYLENE GLYCOL 3350 17 G/17G
17 POWDER, FOR SOLUTION ORAL DAILY PRN
Status: DISCONTINUED | OUTPATIENT
Start: 2023-08-09 | End: 2023-08-11 | Stop reason: HOSPADM

## 2023-08-09 RX ORDER — PREDNISONE 20 MG/1
20 TABLET ORAL DAILY
Status: DISCONTINUED | OUTPATIENT
Start: 2023-08-09 | End: 2023-08-11 | Stop reason: HOSPADM

## 2023-08-09 RX ORDER — GABAPENTIN 600 MG/1
300 TABLET ORAL 3 TIMES DAILY
Status: DISCONTINUED | OUTPATIENT
Start: 2023-08-09 | End: 2023-08-09 | Stop reason: DRUGHIGH

## 2023-08-09 RX ORDER — CYCLOBENZAPRINE HCL 10 MG
10 TABLET ORAL 3 TIMES DAILY PRN
Status: DISCONTINUED | OUTPATIENT
Start: 2023-08-09 | End: 2023-08-11 | Stop reason: HOSPADM

## 2023-08-09 RX ORDER — SODIUM CHLORIDE 0.9 % (FLUSH) 0.9 %
5-40 SYRINGE (ML) INJECTION EVERY 12 HOURS SCHEDULED
Status: DISCONTINUED | OUTPATIENT
Start: 2023-08-09 | End: 2023-08-11 | Stop reason: HOSPADM

## 2023-08-09 RX ORDER — GABAPENTIN 600 MG/1
600 TABLET ORAL 3 TIMES DAILY
Status: DISCONTINUED | OUTPATIENT
Start: 2023-08-09 | End: 2023-08-11 | Stop reason: HOSPADM

## 2023-08-09 RX ORDER — ENOXAPARIN SODIUM 100 MG/ML
40 INJECTION SUBCUTANEOUS DAILY
Status: DISCONTINUED | OUTPATIENT
Start: 2023-08-09 | End: 2023-08-11 | Stop reason: HOSPADM

## 2023-08-09 RX ORDER — ONDANSETRON 2 MG/ML
4 INJECTION INTRAMUSCULAR; INTRAVENOUS EVERY 6 HOURS PRN
Status: DISCONTINUED | OUTPATIENT
Start: 2023-08-09 | End: 2023-08-11 | Stop reason: HOSPADM

## 2023-08-09 RX ORDER — HYDROXYCHLOROQUINE SULFATE 200 MG/1
200 TABLET, FILM COATED ORAL 2 TIMES DAILY
Status: DISCONTINUED | OUTPATIENT
Start: 2023-08-09 | End: 2023-08-11 | Stop reason: HOSPADM

## 2023-08-09 RX ORDER — SODIUM CHLORIDE 9 MG/ML
INJECTION, SOLUTION INTRAVENOUS CONTINUOUS
Status: DISCONTINUED | OUTPATIENT
Start: 2023-08-09 | End: 2023-08-11 | Stop reason: HOSPADM

## 2023-08-09 RX ORDER — SODIUM CHLORIDE 9 MG/ML
INJECTION, SOLUTION INTRAVENOUS PRN
Status: DISCONTINUED | OUTPATIENT
Start: 2023-08-09 | End: 2023-08-11 | Stop reason: HOSPADM

## 2023-08-09 RX ORDER — SODIUM CHLORIDE 0.9 % (FLUSH) 0.9 %
5-40 SYRINGE (ML) INJECTION PRN
Status: DISCONTINUED | OUTPATIENT
Start: 2023-08-09 | End: 2023-08-11 | Stop reason: HOSPADM

## 2023-08-09 RX ORDER — HYDROMORPHONE HYDROCHLORIDE 1 MG/ML
1 INJECTION, SOLUTION INTRAMUSCULAR; INTRAVENOUS; SUBCUTANEOUS EVERY 4 HOURS PRN
Status: DISCONTINUED | OUTPATIENT
Start: 2023-08-09 | End: 2023-08-09

## 2023-08-09 RX ORDER — HYDROMORPHONE HYDROCHLORIDE 2 MG/ML
2 INJECTION, SOLUTION INTRAMUSCULAR; INTRAVENOUS; SUBCUTANEOUS EVERY 4 HOURS PRN
Status: DISCONTINUED | OUTPATIENT
Start: 2023-08-09 | End: 2023-08-10

## 2023-08-09 RX ORDER — ACETAZOLAMIDE 500 MG/1
500 CAPSULE, EXTENDED RELEASE ORAL EVERY 12 HOURS SCHEDULED
Status: DISCONTINUED | OUTPATIENT
Start: 2023-08-09 | End: 2023-08-11 | Stop reason: HOSPADM

## 2023-08-09 RX ORDER — OXYCODONE HYDROCHLORIDE 5 MG/1
5 TABLET ORAL EVERY 4 HOURS PRN
Status: DISCONTINUED | OUTPATIENT
Start: 2023-08-09 | End: 2023-08-11 | Stop reason: HOSPADM

## 2023-08-09 RX ORDER — ONDANSETRON 4 MG/1
4 TABLET, ORALLY DISINTEGRATING ORAL EVERY 8 HOURS PRN
Status: DISCONTINUED | OUTPATIENT
Start: 2023-08-09 | End: 2023-08-11 | Stop reason: HOSPADM

## 2023-08-09 RX ADMIN — PREDNISONE 20 MG: 20 TABLET ORAL at 13:22

## 2023-08-09 RX ADMIN — SODIUM CHLORIDE, PRESERVATIVE FREE 10 ML: 5 INJECTION INTRAVENOUS at 22:10

## 2023-08-09 RX ADMIN — HYDROMORPHONE HYDROCHLORIDE 2 MG: 2 INJECTION, SOLUTION INTRAMUSCULAR; INTRAVENOUS; SUBCUTANEOUS at 17:52

## 2023-08-09 RX ADMIN — HYDROMORPHONE HYDROCHLORIDE 1 MG: 1 INJECTION, SOLUTION INTRAMUSCULAR; INTRAVENOUS; SUBCUTANEOUS at 06:27

## 2023-08-09 RX ADMIN — ACETAZOLAMIDE EXTENDED-RELEASE 500 MG: 500 CAPSULE ORAL at 22:06

## 2023-08-09 RX ADMIN — HYDROXYCHLOROQUINE SULFATE 200 MG: 200 TABLET, FILM COATED ORAL at 22:06

## 2023-08-09 RX ADMIN — HYDROMORPHONE HYDROCHLORIDE 1 MG: 1 INJECTION, SOLUTION INTRAMUSCULAR; INTRAVENOUS; SUBCUTANEOUS at 02:17

## 2023-08-09 RX ADMIN — GABAPENTIN 600 MG: 600 TABLET, FILM COATED ORAL at 08:40

## 2023-08-09 RX ADMIN — HYDROMORPHONE HYDROCHLORIDE 2 MG: 2 INJECTION, SOLUTION INTRAMUSCULAR; INTRAVENOUS; SUBCUTANEOUS at 22:03

## 2023-08-09 RX ADMIN — HYDROMORPHONE HYDROCHLORIDE 2 MG: 2 INJECTION, SOLUTION INTRAMUSCULAR; INTRAVENOUS; SUBCUTANEOUS at 13:22

## 2023-08-09 RX ADMIN — ACETAZOLAMIDE EXTENDED-RELEASE 500 MG: 500 CAPSULE ORAL at 10:47

## 2023-08-09 RX ADMIN — SODIUM CHLORIDE, PRESERVATIVE FREE 10 ML: 5 INJECTION INTRAVENOUS at 09:20

## 2023-08-09 RX ADMIN — HYDROXYCHLOROQUINE SULFATE 200 MG: 200 TABLET, FILM COATED ORAL at 13:22

## 2023-08-09 RX ADMIN — ENOXAPARIN SODIUM 40 MG: 100 INJECTION SUBCUTANEOUS at 08:39

## 2023-08-09 RX ADMIN — HYDROMORPHONE HYDROCHLORIDE 2 MG: 2 INJECTION, SOLUTION INTRAMUSCULAR; INTRAVENOUS; SUBCUTANEOUS at 09:19

## 2023-08-09 ASSESSMENT — PAIN DESCRIPTION - DESCRIPTORS
DESCRIPTORS: POUNDING
DESCRIPTORS: POUNDING;THROBBING
DESCRIPTORS: POUNDING
DESCRIPTORS: POUNDING
DESCRIPTORS: POUNDING;THROBBING
DESCRIPTORS: ACHING

## 2023-08-09 ASSESSMENT — PAIN SCALES - GENERAL
PAINLEVEL_OUTOF10: 3
PAINLEVEL_OUTOF10: 8
PAINLEVEL_OUTOF10: 9
PAINLEVEL_OUTOF10: 8
PAINLEVEL_OUTOF10: 9
PAINLEVEL_OUTOF10: 8
PAINLEVEL_OUTOF10: 3
PAINLEVEL_OUTOF10: 8
PAINLEVEL_OUTOF10: 9
PAINLEVEL_OUTOF10: 9
PAINLEVEL_OUTOF10: 2
PAINLEVEL_OUTOF10: 9

## 2023-08-09 ASSESSMENT — PAIN DESCRIPTION - LOCATION
LOCATION: HEAD

## 2023-08-09 ASSESSMENT — PAIN DESCRIPTION - ORIENTATION: ORIENTATION: ANTERIOR

## 2023-08-09 ASSESSMENT — LIFESTYLE VARIABLES
HOW OFTEN DO YOU HAVE A DRINK CONTAINING ALCOHOL: NEVER
HOW MANY STANDARD DRINKS CONTAINING ALCOHOL DO YOU HAVE ON A TYPICAL DAY: PATIENT DOES NOT DRINK

## 2023-08-09 NOTE — H&P
[facility]  HISTORY AND PHYSICAL    Name:  Nabil Quezada  MR#:  [id number]  :  1990  ACCOUNT #:  [de-identified]  ADMIT DATE:  2023      The patient was seen, evaluated, and admitted by me on 2023. PRIMARY CARE PHYSICIAN:  Itzel Eubanks MD    SOURCE OF INFORMATION:  The patient and review of EMR. CHIEF COMPLAINT:  Headache and dizziness. HISTORY OF PRESENT ILLNESS:  This is a 68-year-old woman with past medical history significant for idiopathic intracranial hypertension and systemic lupus erythematosus, presented at [facility] emergency room with headache and dizziness. The patient's symptoms started on the day of her presentation at the emergency room. The patient described the dizziness as room spinning around her. The patient was also unsteady on her feet. This is similar to whenever the patient has worsening of idiopathic intracranial hypertension. The patient was sent to [facility] for higher level of care. The patient denies associated fever, rigors, or chills. The patient also stated that she has a history of migraine headache but the present presentation is not typical of migraine headache. When the patient arrived at the emergency room, CT scan of the head was obtained. This did not show any acute abnormalities. The emergency room physician consulted neurologist on-call. The patient was started on Diamox and was referred to the hospitalist service for admission. The patient denies associated history of fever, rigors, or chills. PAST MEDICAL HISTORY:  1.  Idiopathic intracranial hypertension. 2.  Systemic lupus erythematosus. 3.  Migraine headache. ALLERGIES:  THE PATIENT IS ALLERGIC TO PLAVIX, ACETAMINOPHEN, BENADRYL, NONSTEROIDAL ANTI-INFLAMMATORY DRUGS. MEDICATIONS:  1. Flexeril 10 mg 3 times daily. 2.  Neurontin 300 mg 3 times daily. 3.  Oxycodone 5 mg twice daily as needed.   4.  Ativan, dosage as

## 2023-08-09 NOTE — ED NOTES
Bedside shift change report given to [address] (oncoming nurse) by Flakita Frazier RN (offgoing nurse). Report included the following information ED Encounter Summary, ED SBAR, MAR, and Recent Results.        Jeferson Nettles RN  08/09/23 0361

## 2023-08-09 NOTE — ED NOTES
Pt assisted to restroom with PCA assistance. Ambulatory WNL.      Izaiah Slider, MARTINA  50/14/06 6012

## 2023-08-09 NOTE — ED NOTES
TRANSFER - OUT REPORT:    Verbal report given to Jeannine Bennett RN on [employer]  being transferred to  for routine progression of patient care       Report consisted of patient's Situation, Background, Assessment and   Recommendations(SBAR). Information from the following report(s) Nurse Handoff Report, Index, ED Encounter Summary, ED SBAR, Adult Overview, Intake/Output, MAR, and Recent Results was reviewed with the receiving nurse. [place] Fall Assessment:    Presents to emergency department  because of falls (Syncope, seizure, or loss of consciousness): No  Age > 70: No  Altered Mental Status, Intoxication with alcohol or substance confusion (Disorientation, impaired judgment, poor safety awaremess, or inability to follow instructions): No  Impaired Mobility: Ambulates or transfers with assistive devices or assistance; Unable to ambulate or transer.: No  Nursing Judgement: No          Lines:   Peripheral IV 08/08/23 Left;Proximal Forearm (Active)        Opportunity for questions and clarification was provided.       Patient transported with:  Gm Carmona RN  08/09/23 9546

## 2023-08-09 NOTE — ED NOTES
MRI called to confirm if pt needs sedation for MRI. Pt reported this morning that she needs to be sedated for MRI. Dr. Aditya Leo for plan of care, awaiting for response. Neuro approved pt needs sedation for MRI. MRI notified & requested pt be NPO right now until MRI time is scheduled in case they are able to have her MRI done today. Pt notified.       Fabien Hanna RN  08/09/23 [address], RN  08/09/23 [address], RN  08/09/23 2268

## 2023-08-09 NOTE — CONSULTS
NeuroInterventional Surgery Consult  Maria Eugenia Landeros, APRN - NP    Patient: Terell Smith MRN: [id number]  SSN: [id number]    YOB: 1990  Age: 27 y.o. Sex: female      Chief Complaint: Headache    HPI:      Terell Smith is a 27 y.o.  F with a pmh of idiopathic intracranial hypertension s/p right transverse sigmoid venous sinus stenting due to severe stenosis 12/3/21, Migraines, obesity & Lupus who is known to our service, is a former patient of Dr. Thompson Sharp. She also follows with Dr Britt Das for neurology, last clinic visit 6/14/22 note reviewed but pt reports she has seen neurology at [facility]. She reports she was taken off Brilinta and ASA when she found out she was pregnant, reports she gave birth last Sept but has not resumed ASA and has not made follow ups with the [facility] office because Dr Thompson Sharp left and she feels anxious about seeing new providers. Pt reports Dr Thompson Sharp recommended she follow up with Dr Kevin Cuellar.      She presented to the ED 8/8/23 reporting headaches and feeling off balance, also with difficulty ambulating. She reports her last LP was over a year ago, was done at [facility] ED. She ran out of refill on her diamox, has not taken it in approx 2-3 months. She reports her headache was initially 8/10 but is now 2/10 after receiving narcotic pain medication. She does endorse continued blurred vision. Past Medical History:   Diagnosis Date    Abnormal Papanicolaou smear of cervix 2018    Anxiety     Arthritis     Cervical cerclage suture present, second trimester 04/2022    placed at 14 weeks during this pregnancy    Fatigue     GERD (gastroesophageal reflux disease) 2016    History of Nissen fundoplication 33/50/3272    4 duodenal ulcers, chronic gastritis, Grade C esophagitis, Chronic GERD, hernia, small tumor. Done August/2016.     Ill-defined condition     Bilateral Sinus Thrombosis stenosis,Bilateral Transverse Sigmoid sinuses with Elevated Intracranial Venous Flow Gradient    Ill-defined
What Is The Reason For Today's Visit?: Preventative Skin Check
bilat.   Eyes: PERRL, EOM's full, no nystagmus, no ptosis. Motor:  5/5 x4. Normal bulk and tone. Reflexes:   DTR 2+/4 and symmetrical.  Toes downgoing on R, neurtral on L. Sensory:   Sensation intact to light touch bilat throughout   Gait:  Deferred   Tremor:   No tremor noted. Cerebellar:  FTN and HTS intact         Imaging  I personally reviewed the following images. CT Results (maximum last 3):  CT Result (most recent):  CT HEAD WO CONTRAST 08/08/2023    Narrative  EXAM: CT HEAD WO CONTRAST    INDICATION: Headache, reports history of IIH and ventricle stents    COMPARISON: CT April 27, 2023. CONTRAST: None. TECHNIQUE: Unenhanced CT of the head was performed using 5 mm images. Brain and  bone windows were generated. Coronal and sagittal reformats. CT dose reduction  was achieved through use of a standardized protocol tailored for this  examination and automatic exposure control for dose modulation. FINDINGS:  The ventricles and sulci are normal in size, shape and configuration. There is  no significant white matter disease. There is no intracranial hemorrhage,  extra-axial collection, or mass effect. The basilar cisterns are open. No CT  evidence of acute infarct. There is a right transverse sinus vascular stent, in similar position to the  prior study. Its patency cannot be evaluated in the absence of contrast  material.    The bone windows demonstrate no abnormalities. The visualized portions of the  paranasal sinuses and mastoid air cells are clear. Impression  1. No acute intracranial abnormality. 2. Right transverse vascular stent, in a similar position to the recent prior  study. Lab Review  I personally reviewed the following labs.   Lab Results   Component Value Date/Time    WBC 18.8 08/09/2023 05:08 AM    HCT 39.7 08/09/2023 05:08 AM    HGB 13.1 08/09/2023 05:08 AM     08/09/2023 05:08 AM     Lab Results   Component Value Date/Time     08/09/2023 05:08 AM    K

## 2023-08-09 NOTE — ED NOTES
Bedside shift change report given to ambika (oncoming nurse) by Tiffanie Ragsdale (offgoing nurse). Report included the following information ED Encounter Summary and ED SBAR.         Fabi Hernandez RN  08/09/23 7362

## 2023-08-09 NOTE — ED NOTES
RN attempted to administer pain medication to patient. Upon entering the room pt expressing her issues as she feels it took too long for RN to medicate patient. RN explained that there were several issues in the ER requiring attention and the medication has only been ordered for 1 hr as the provider was delayed in ordering. Pt states \"you don't need to get smart with me\". RN explained to patient that I felt uncomfortable with the way she was speaking towards RN. Pt states \"send someone else to medicate me if you want to act smart\". RN informed charge nurse to medicate patient.       Fabi Hernandez RN  08/09/23 6481

## 2023-08-09 NOTE — ED NOTES
Pt assisted to restroom with PCA assistance. Ambulatory with stand-by assist precautionary.       Venu Roper LPN  16/17/11 027

## 2023-08-10 ENCOUNTER — ANESTHESIA EVENT (OUTPATIENT)
Facility: HOSPITAL | Age: 33
DRG: 058 | End: 2023-08-10
Payer: MEDICAID

## 2023-08-10 ENCOUNTER — APPOINTMENT (OUTPATIENT)
Facility: HOSPITAL | Age: 33
DRG: 058 | End: 2023-08-10
Payer: MEDICAID

## 2023-08-10 ENCOUNTER — ANESTHESIA (OUTPATIENT)
Facility: HOSPITAL | Age: 33
DRG: 058 | End: 2023-08-10
Payer: MEDICAID

## 2023-08-10 VITALS
DIASTOLIC BLOOD PRESSURE: 60 MMHG | SYSTOLIC BLOOD PRESSURE: 110 MMHG | HEART RATE: 69 BPM | BODY MASS INDEX: 34.74 KG/M2 | OXYGEN SATURATION: 99 % | HEIGHT: 62 IN | RESPIRATION RATE: 17 BRPM | WEIGHT: 188.8 LBS | TEMPERATURE: 97.3 F

## 2023-08-10 PROCEDURE — 99232 SBSQ HOSP IP/OBS MODERATE 35: CPT | Performed by: NURSE PRACTITIONER

## 2023-08-10 PROCEDURE — 6370000000 HC RX 637 (ALT 250 FOR IP): Performed by: NURSE PRACTITIONER

## 2023-08-10 PROCEDURE — 2580000003 HC RX 258: Performed by: NURSE ANESTHETIST, CERTIFIED REGISTERED

## 2023-08-10 PROCEDURE — A9579 GAD-BASE MR CONTRAST NOS,1ML: HCPCS

## 2023-08-10 PROCEDURE — 6360000002 HC RX W HCPCS: Performed by: NURSE ANESTHETIST, CERTIFIED REGISTERED

## 2023-08-10 PROCEDURE — 6360000002 HC RX W HCPCS: Performed by: FAMILY MEDICINE

## 2023-08-10 PROCEDURE — 2500000003 HC RX 250 WO HCPCS: Performed by: NURSE ANESTHETIST, CERTIFIED REGISTERED

## 2023-08-10 PROCEDURE — 70553 MRI BRAIN STEM W/O & W/DYE: CPT

## 2023-08-10 PROCEDURE — 6360000004 HC RX CONTRAST MEDICATION

## 2023-08-10 PROCEDURE — 2580000003 HC RX 258: Performed by: INTERNAL MEDICINE

## 2023-08-10 PROCEDURE — 6360000002 HC RX W HCPCS: Performed by: INTERNAL MEDICINE

## 2023-08-10 PROCEDURE — 70546 MR ANGIOGRAPH HEAD W/O&W/DYE: CPT

## 2023-08-10 PROCEDURE — 6360000002 HC RX W HCPCS: Performed by: HOSPITALIST

## 2023-08-10 RX ORDER — DIPHENHYDRAMINE HCL 25 MG
25 CAPSULE ORAL EVERY 6 HOURS PRN
Status: DISCONTINUED | OUTPATIENT
Start: 2023-08-10 | End: 2023-08-11 | Stop reason: HOSPADM

## 2023-08-10 RX ORDER — ASPIRIN 81 MG/1
81 TABLET, CHEWABLE ORAL DAILY
Status: DISCONTINUED | OUTPATIENT
Start: 2023-08-10 | End: 2023-08-11 | Stop reason: HOSPADM

## 2023-08-10 RX ORDER — PROPOFOL 10 MG/ML
INJECTION, EMULSION INTRAVENOUS
Status: COMPLETED
Start: 2023-08-10 | End: 2023-08-10

## 2023-08-10 RX ORDER — DEXMEDETOMIDINE HYDROCHLORIDE 100 UG/ML
INJECTION, SOLUTION INTRAVENOUS PRN
Status: DISCONTINUED | OUTPATIENT
Start: 2023-08-10 | End: 2023-08-10 | Stop reason: SDUPTHER

## 2023-08-10 RX ORDER — SODIUM CHLORIDE 9 MG/ML
INJECTION, SOLUTION INTRAVENOUS CONTINUOUS PRN
Status: DISCONTINUED | OUTPATIENT
Start: 2023-08-10 | End: 2023-08-10 | Stop reason: SDUPTHER

## 2023-08-10 RX ORDER — MIDAZOLAM HYDROCHLORIDE 1 MG/ML
INJECTION INTRAMUSCULAR; INTRAVENOUS PRN
Status: DISCONTINUED | OUTPATIENT
Start: 2023-08-10 | End: 2023-08-10 | Stop reason: SDUPTHER

## 2023-08-10 RX ORDER — HYDROMORPHONE HYDROCHLORIDE 2 MG/ML
2 INJECTION, SOLUTION INTRAMUSCULAR; INTRAVENOUS; SUBCUTANEOUS EVERY 4 HOURS PRN
Status: DISCONTINUED | OUTPATIENT
Start: 2023-08-10 | End: 2023-08-10

## 2023-08-10 RX ORDER — GLYCOPYRROLATE 0.2 MG/ML
INJECTION INTRAMUSCULAR; INTRAVENOUS PRN
Status: DISCONTINUED | OUTPATIENT
Start: 2023-08-10 | End: 2023-08-10 | Stop reason: SDUPTHER

## 2023-08-10 RX ORDER — HYDROMORPHONE HYDROCHLORIDE 2 MG/ML
2 INJECTION, SOLUTION INTRAMUSCULAR; INTRAVENOUS; SUBCUTANEOUS ONCE
Status: COMPLETED | OUTPATIENT
Start: 2023-08-10 | End: 2023-08-10

## 2023-08-10 RX ADMIN — ASPIRIN 81 MG 81 MG: 81 TABLET ORAL at 17:25

## 2023-08-10 RX ADMIN — HYDROMORPHONE HYDROCHLORIDE 2 MG: 2 INJECTION, SOLUTION INTRAMUSCULAR; INTRAVENOUS; SUBCUTANEOUS at 17:48

## 2023-08-10 RX ADMIN — HYDROMORPHONE HYDROCHLORIDE 2 MG: 2 INJECTION, SOLUTION INTRAMUSCULAR; INTRAVENOUS; SUBCUTANEOUS at 03:20

## 2023-08-10 RX ADMIN — DIPHENHYDRAMINE HYDROCHLORIDE 25 MG: 25 CAPSULE ORAL at 04:35

## 2023-08-10 RX ADMIN — HYDROMORPHONE HYDROCHLORIDE 2 MG: 2 INJECTION, SOLUTION INTRAMUSCULAR; INTRAVENOUS; SUBCUTANEOUS at 11:44

## 2023-08-10 RX ADMIN — PROPOFOL 50 MG: 10 INJECTION, EMULSION INTRAVENOUS at 13:57

## 2023-08-10 RX ADMIN — HYDROMORPHONE HYDROCHLORIDE 2 MG: 2 INJECTION, SOLUTION INTRAMUSCULAR; INTRAVENOUS; SUBCUTANEOUS at 07:27

## 2023-08-10 RX ADMIN — GLYCOPYRROLATE 0.2 MG: 0.2 INJECTION INTRAMUSCULAR; INTRAVENOUS at 14:01

## 2023-08-10 RX ADMIN — MIDAZOLAM 5 MG: 1 INJECTION INTRAMUSCULAR; INTRAVENOUS at 13:53

## 2023-08-10 RX ADMIN — DEXMEDETOMIDINE HYDROCHLORIDE 10 MCG: 100 INJECTION, SOLUTION, CONCENTRATE INTRAVENOUS at 13:57

## 2023-08-10 RX ADMIN — SODIUM CHLORIDE, PRESERVATIVE FREE 10 ML: 5 INJECTION INTRAVENOUS at 08:45

## 2023-08-10 RX ADMIN — DEXMEDETOMIDINE HYDROCHLORIDE 10 MCG: 100 INJECTION, SOLUTION, CONCENTRATE INTRAVENOUS at 13:53

## 2023-08-10 RX ADMIN — ENOXAPARIN SODIUM 40 MG: 100 INJECTION SUBCUTANEOUS at 08:43

## 2023-08-10 RX ADMIN — GADOTERIDOL 18 ML: 279.3 INJECTION, SOLUTION INTRAVENOUS at 15:11

## 2023-08-10 RX ADMIN — SODIUM CHLORIDE: 9 INJECTION, SOLUTION INTRAVENOUS at 13:50

## 2023-08-10 ASSESSMENT — PAIN DESCRIPTION - DESCRIPTORS
DESCRIPTORS: ACHING
DESCRIPTORS: POUNDING;PRESSURE
DESCRIPTORS: ACHING

## 2023-08-10 ASSESSMENT — PAIN DESCRIPTION - LOCATION
LOCATION: HEAD

## 2023-08-10 ASSESSMENT — PAIN SCALES - GENERAL
PAINLEVEL_OUTOF10: 8
PAINLEVEL_OUTOF10: 9
PAINLEVEL_OUTOF10: 2
PAINLEVEL_OUTOF10: 9
PAINLEVEL_OUTOF10: 8
PAINLEVEL_OUTOF10: 7
PAINLEVEL_OUTOF10: 9
PAINLEVEL_OUTOF10: 6

## 2023-08-10 ASSESSMENT — PAIN - FUNCTIONAL ASSESSMENT
PAIN_FUNCTIONAL_ASSESSMENT: NONE - DENIES PAIN
PAIN_FUNCTIONAL_ASSESSMENT: ADULT NONVERBAL PAIN SCALE (NPVS)

## 2023-08-10 ASSESSMENT — PAIN DESCRIPTION - PAIN TYPE
TYPE: ACUTE PAIN
TYPE: ACUTE PAIN

## 2023-08-11 NOTE — ED NOTES
Discharge instructions given by provider. Pt verbalized an understanding.  Agrees to discharge and follow up care no loss of consciousness, no gait abnormality, no headache, no sensory deficits, and no weakness.

## 2023-08-11 NOTE — PLAN OF CARE
Problem: Discharge Planning  Goal: Discharge to home or other facility with appropriate resources  8/10/2023 2311 by Norm Rudolph RN  Outcome: Progressing  8/10/2023 1104 by Kinjal Rushing RN  Outcome: Progressing     Problem: Pain  Goal: Verbalizes/displays adequate comfort level or baseline comfort level  8/10/2023 2311 by Norm Rudolph RN  Outcome: Progressing  8/10/2023 1104 by Kinjal Rushing RN  Outcome: Progressing     Problem: Safety - Adult  Goal: Free from fall injury  8/10/2023 2311 by Norm Rudolph RN  Outcome: Progressing  Flowsheets (Taken 8/10/2023 2000)  Free From Fall Injury:   Instruct family/caregiver on patient safety   Based on caregiver fall risk screen, instruct family/caregiver to ask for assistance with transferring infant if caregiver noted to have fall risk factors  8/10/2023 1104 by Kinjal Rushing RN  Outcome: Progressing     Problem: Chronic Conditions and Co-morbidities  Goal: Patient's chronic conditions and co-morbidity symptoms are monitored and maintained or improved  8/10/2023 2311 by Norm Rudolph RN  Outcome: Progressing  8/10/2023 1104 by Kinjal Rushing RN  Outcome: Progressing

## 2023-08-23 ENCOUNTER — OFFICE VISIT (OUTPATIENT)
Age: 33
End: 2023-08-23
Payer: MEDICAID

## 2023-08-23 ENCOUNTER — HOSPITAL ENCOUNTER (OUTPATIENT)
Facility: HOSPITAL | Age: 33
Setting detail: RECURRING SERIES
Discharge: HOME | End: 2023-08-26
Payer: MEDICAID

## 2023-08-23 VITALS
WEIGHT: 194 LBS | SYSTOLIC BLOOD PRESSURE: 120 MMHG | BODY MASS INDEX: 35.7 KG/M2 | OXYGEN SATURATION: 99 % | DIASTOLIC BLOOD PRESSURE: 82 MMHG | TEMPERATURE: 97.5 F | HEART RATE: 112 BPM | HEIGHT: 62 IN

## 2023-08-23 DIAGNOSIS — G93.2 IIH (IDIOPATHIC INTRACRANIAL HYPERTENSION): Primary | ICD-10-CM

## 2023-08-23 PROCEDURE — G0283 ELEC STIM OTHER THAN WOUND: HCPCS

## 2023-08-23 PROCEDURE — 97110 THERAPEUTIC EXERCISES: CPT

## 2023-08-23 PROCEDURE — 99213 OFFICE O/P EST LOW 20 MIN: CPT | Performed by: RADIOLOGY

## 2023-08-23 RX ORDER — ASPIRIN 81 MG/1
81 TABLET ORAL DAILY
Qty: 30 TABLET | Refills: 5 | Status: SHIPPED | OUTPATIENT
Start: 2023-08-23 | End: 2024-01-16

## 2023-08-23 RX ORDER — HYDROXYCHLOROQUINE SULFATE 200 MG/1
200 TABLET, FILM COATED ORAL 2 TIMES DAILY
COMMUNITY

## 2023-08-23 RX ORDER — PREDNISONE 20 MG/1
20 TABLET ORAL DAILY
COMMUNITY
End: 2024-01-16

## 2023-08-23 NOTE — PROGRESS NOTES
PHYSICAL THERAPY - MEDICARE DAILY TREATMENT NOTE (updated 3/23)      Date: 2023          Patient Name:  Kumar Kebede :  1990   Medical   Diagnosis:  Neck pain [M54.2]  Trapezius muscle spasm [M62.838] Treatment Diagnosis:  M54.2  NECK PAIN    Referral Source:  Coco Kim DO Insurance:   Payor: Landon Fail / Plan: Lulu Fischer / Product Type: *No Product type* /                     Patient  verified yes     Visit #   Current  / Total 2 12   Time   In / Out 1133 1230   Total Treatment Time 57   Total Timed Codes 40   1:1 Treatment Time 40      [facility] Totals Reminder:  bill using total billable   min of TIMED therapeutic procedures and modalities. 8-22 min = 1 unit; 23-37 min = 2 units; 38-52 min = 3 units; 53-67 min = 4 units; 68-82 min = 5 units            SUBJECTIVE    Pain Level (0-10 scale): 4-5 faces pain scale    Any medication changes, allergies to medications, adverse drug reactions, diagnosis change, or new procedure performed?: [x] No    [] Yes (see summary sheet for update)  Medications: Verified on Patient Summary List    Subjective functional status/changes:     Pt reports she is doing better, HEP has really helped. Reports L elbow pain from the accident and that she may need PT for that    OBJECTIVE      Therapeutic Procedures: Tx Min Billable or 1:1 Min (if diff from Tx Min) Procedure, Rationale, Specifics   40  [id number] Therapeutic Exercise (timed):  increase ROM, strength, coordination, balance, and proprioception to improve patient's ability to progress to PLOF and address remaining functional goals.  (see flow sheet as applicable)     Details if applicable:            Details if applicable:           Details if applicable:           Details if applicable:            Details if applicable:     40     Total Total         Modalities Rationale:     decrease inflammation, decrease pain, and increase tissue extensibility to improve patient's ability to

## 2023-08-25 ENCOUNTER — HOSPITAL ENCOUNTER (OUTPATIENT)
Facility: HOSPITAL | Age: 33
Setting detail: RECURRING SERIES
Discharge: HOME | End: 2023-08-28
Payer: MEDICAID

## 2023-08-25 PROCEDURE — 97110 THERAPEUTIC EXERCISES: CPT

## 2023-08-25 PROCEDURE — 97140 MANUAL THERAPY 1/> REGIONS: CPT

## 2023-08-25 PROCEDURE — G0283 ELEC STIM OTHER THAN WOUND: HCPCS

## 2023-08-25 NOTE — PROGRESS NOTES
PHYSICAL THERAPY - MEDICARE DAILY TREATMENT NOTE (updated 3/23)      Date: 2023          Patient Name:  Gaurav Macdonald :  1990   Medical   Diagnosis:  Neck pain [M54.2]  Other muscle spasm [M62.838] Treatment Diagnosis:  M54.2  NECK PAIN    Referral Source:  Chun Cox DO Insurance:   Payor: Macario Marcus / Plan: Linda Genre / Product Type: *No Product type* /                     Patient  verified yes     Visit #   Current  / Total 3 12   Time   In / Out 1133 1233   Total Treatment Time 60   Total Timed Codes 45   1:1 Treatment Time 39      [facility] Totals Reminder:  bill using total billable   min of TIMED therapeutic procedures and modalities. 8-22 min = 1 unit; 23-37 min = 2 units; 38-52 min = 3 units; 53-67 min = 4 units; 68-82 min = 5 units            SUBJECTIVE    Pain Level (0-10 scale): 4 L side of neck 3/10 L elbow pain     Any medication changes, allergies to medications, adverse drug reactions, diagnosis change, or new procedure performed?: [x] No    [] Yes (see summary sheet for update)  Medications: Verified on Patient Summary List    Subjective functional status/changes:     Pt reports her neck was sore after last session but it was better the next day. Had increased L elbow pain yesterday , used Voltaren Gel and compression sleeve to ease the pain and today the elbow feels better. OBJECTIVE      Therapeutic Procedures: Tx Min Billable or 1:1 Min (if diff from Tx Min) Procedure, Rationale, Specifics   30  01872 Therapeutic Exercise (timed):  increase ROM, strength, coordination, balance, and proprioception to improve patient's ability to progress to PLOF and address remaining functional goals.  (see flow sheet as applicable)     Details if applicable:     15   80333 Manual Therapy (timed):  decrease pain, increase ROM, increase tissue extensibility, and decrease trigger points to improve patient's ability to progress to PLOF and address remaining

## 2023-08-29 ENCOUNTER — HOSPITAL ENCOUNTER (OUTPATIENT)
Facility: HOSPITAL | Age: 33
Setting detail: RECURRING SERIES
End: 2023-08-29
Payer: MEDICAID

## 2023-08-31 ENCOUNTER — HOSPITAL ENCOUNTER (OUTPATIENT)
Facility: HOSPITAL | Age: 33
Setting detail: RECURRING SERIES
End: 2023-08-31
Payer: MEDICAID

## 2023-08-31 PROCEDURE — 97110 THERAPEUTIC EXERCISES: CPT

## 2023-08-31 PROCEDURE — 97140 MANUAL THERAPY 1/> REGIONS: CPT

## 2023-08-31 PROCEDURE — G0283 ELEC STIM OTHER THAN WOUND: HCPCS

## 2023-08-31 NOTE — PROGRESS NOTES
PHYSICAL THERAPY - MEDICARE DAILY TREATMENT NOTE (updated 3/23)      Date: 2023          Patient Name:  Brigido Cueva :  1990   Medical   Diagnosis:  Neck pain [M54.2]  Trapezius muscle spasm [M62.838] Treatment Diagnosis:  M54.2  NECK PAIN    Referral Source:  Abdifatah Armendariz DO Insurance:   Payor: Roberta Ortega / Plan: [employer] / Product Type: *No Product type* /                     Patient  verified yes     Visit #   Current  / Total 4 12   Time   In / Out 11:33am 12:36pm   Total Treatment Time 58   Total Timed Codes 43   1:1 Treatment Time 37      [facility] Totals Reminder:  bill using total billable   min of TIMED therapeutic procedures and modalities. 8-22 min = 1 unit; 23-37 min = 2 units; 38-52 min = 3 units; 53-67 min = 4 units; 68-82 min = 5 units            SUBJECTIVE    Pain Level (0-10 scale): 4/10 L side of neck/back    Any medication changes, allergies to medications, adverse drug reactions, diagnosis change, or new procedure performed?: [x] No    [] Yes (see summary sheet for update)  Medications: Verified on Patient Summary List    Subjective functional status/changes:     Pt states she does the HEP because \"it helps the pain\". She reports pain on the R side of her neck is resolved, but the L side is still painful. She has to sit a lot at work, and was encouraged to change positions and stand every 30-45 minutes. She is having a HA this AM.     OBJECTIVE      Therapeutic Procedures: Tx Min Billable or 1:1 Min (if diff from Tx Min) Procedure, Rationale, Specifics   35  96709 Therapeutic Exercise (timed):  increase ROM, strength, coordination, balance, and proprioception to improve patient's ability to progress to PLOF and address remaining functional goals.  (see flow sheet as applicable)     Details if applicable:     8   38101 Manual Therapy (timed):  decrease pain, increase ROM, increase tissue extensibility, and decrease trigger points to improve

## 2023-09-06 ENCOUNTER — HOSPITAL ENCOUNTER (OUTPATIENT)
Facility: HOSPITAL | Age: 33
Setting detail: RECURRING SERIES
Discharge: HOME | End: 2023-09-09
Payer: MEDICAID

## 2023-09-06 PROCEDURE — 97110 THERAPEUTIC EXERCISES: CPT

## 2023-09-06 NOTE — PROGRESS NOTES
[address] [place] [address], [address] [place] [address], [id number] [facility]  Ph: [phone]     Fax: [phone]    PHYSICAL THERAPY PROGRESS NOTE  Patient Name:  Nura Calderon :  1990   Treatment/Medical Diagnosis: Neck pain [M54.2]  Trapezius muscle spasm [M62.838]   Referral Source:  Day Chapman DO     Date of Initial Visit:  8/3/23 Attended Visits:  5 Missed Visits:  -     SUMMARY OF TREATMENT/ASSESSMENT:  Patient has been a total of 5 visits since POC. Pt reports 80% improvement since receiving therapy. She states she has not had a HA since last session 23 ( almost 1 week). Her pain at best 0/10 and worse 1-2/10. She complains more of L elbow pain now. She has been getting up at least every 30 min to stretch. She still feels a tightness to her thoracic spine , especially in the a.m. She states she does not have any pain at present to her cervical spine or L shoulder, only thoracic. She states she is still very guarded with turning her head and picking her daughter up from the floor. Patient progressing well towards goals meeting 4 out 5 STG's and 2 out 5 goals . No change with Am-Pac score. Slight improvement with Tragus from 15 cm to 13 cm. Slight decrease in  strength on R UE and improvement with L UE. Patient would benefit from periscapular strengthening to help progress towards goals. Patient still somewhat guarded picking up daughter and with ROM. Patient will continue to benefit from skilled PT / OT services to modify and progress therapeutic interventions, analyze and address ROM deficits, analyze and address strength deficits, analyze and address soft tissue restrictions, analyze and cue for proper movement patterns, and analyze and modify for postural abnormalities to address functional deficits and attain remaining goals.        CURRENT STATUS  Spine: *normal values in ()  CERVICAL                                                ROM

## 2023-09-06 NOTE — PROGRESS NOTES
PHYSICAL THERAPY - MEDICARE DAILY TREATMENT NOTE (updated 3/23)      Date: 2023          Patient Name:  Mario Stapleton :  1990   Medical   Diagnosis:  Neck pain [M54.2]  Trapezius muscle spasm [M62.838] Treatment Diagnosis:  M54.2  NECK PAIN    Referral Source:  Brinda Goldmann, DO Insurance:   Payor: Adelso Maciel / Plan: [employer] / Product Type: *No Product type* /                     Patient  verified yes     Visit #   Current  / Total 4 12   Time   In / Out 11:33am 12:20 pm   Total Treatment Time 45 min   Total Timed Codes 45 min   1:1 Treatment Time 45 min      [facility] Totals Reminder:  bill using total billable   min of TIMED therapeutic procedures and modalities. 8-22 min = 1 unit; 23-37 min = 2 units; 38-52 min = 3 units; 53-67 min = 4 units; 68-82 min = 5 units            SUBJECTIVE    Pain Level (0-10 scale): 0/10 L side of neck/back    Any medication changes, allergies to medications, adverse drug reactions, diagnosis change, or new procedure performed?: [x] No    [] Yes (see summary sheet for update)  Medications: Verified on Patient Summary List    Subjective functional status/changes:   Patient has been a total of 5 visits since POC. Pt reports 80% improvement since receiving therapy. She states she has not had a HA since last session 23 ( almost 1 week). Her pain at best 0/10 and worse 1-2/10. She complains more of L elbow pain now. She has been getting up at least every 30 min to stretch. She still feels a tightness to her thoracic spine , especially in the a.m. She states she does not have any pain at present to her cervical spine or L shoulder, only thoracic. She states she is still very guarded with turning her head and picking her daughter up from the floor. OBJECTIVE      Therapeutic Procedures:   Tx Min Billable or 1:1 Min (if diff from Tx Min) Procedure, Rationale, Specifics   45  14610 Therapeutic Exercise (timed):  increase ROM, strength,

## 2023-09-08 ENCOUNTER — HOSPITAL ENCOUNTER (OUTPATIENT)
Facility: HOSPITAL | Age: 33
Setting detail: RECURRING SERIES
Discharge: HOME | End: 2023-09-11
Payer: MEDICAID

## 2023-09-08 PROCEDURE — 97110 THERAPEUTIC EXERCISES: CPT

## 2023-09-08 PROCEDURE — 97140 MANUAL THERAPY 1/> REGIONS: CPT

## 2023-09-08 NOTE — PROGRESS NOTES
Cardiology Brief Post Intervention Note    Michael Thompson Patient Status:  Outpatient Procedure    1948 MRN [id number]   Location [facility] CATH LAB Attending Renny Alfredo MD   Hosp Day # 0 PCP Isai Pittman MD       Interventional Cardiology Attending: Renny Alfredo MD    Interventional Cardiology Fellow: Krystyna Joya MD    Pre-Op Diagnosis: Peripheral arterial disease, Left SFA      Post-Op Diagnosis: Same     Procedure:   - Ultrasound guided access to right common femoral artery  - Left common femoral angiogram with runoff  - OCT-guided directional atherectomy of the left superficial femoral artery (SFA)  - Stent implantation of the ostial and proximal left SFA with overlapping 6 mm x 100 mm and 6 x120 mm Tia Stents  - Stent implantation of the distal left SFA with 6 x 60 mm Tia stent  - Drug-coated balloon angioplasty of left SFA    Anesthesia Type: Moderate conscious sedation                                   Complications: None    Estimated Blood Loss: Minimal    Recommedation:  - Continue with Plavix 75 mg daily and Xarelto 2.5 mg BID  - Stop Aspirin       were performed at a separate and distinct time from the therapeutic activities interventions . (see flow sheet as applicable)         Details if applicable:  L  trigger point release scapular vert. border         Details if applicable:     40     Total Total         Modalities Rationale:     decrease inflammation, decrease pain, and increase tissue extensibility to improve patient's ability to progress to PLOF and address remaining functional goals. min [] Estim Unattended,             type/location:B C - Paraspinals        []  w/ice    []  w/heat RSL-to L trap area--tried CP to ease HA    min []  Ultrasound,         settings/location:     10 min  unbilled [x]  Ice     []  Heat            location/position: Thoracic spine    min []  Other:      Skin assessment post-treatment (if applicable):    [x]  intact    []  redness- no adverse reaction                 []redness - adverse reaction:          [x]  Patient Education billed concurrently with other procedures   [x] Review HEP    [] Progressed/Changed HEP, detail:    [] Other detail:         Other Objective/Functional Measures  See exercise flow sheet  Added periscapular exercise see flow sheet    MT with rock tool to vert. Border of scapula                                                       Pain Level at end of session (0-10 scale):0/10    Assessment   Patient was able to tolerate new exercises with no pain or discomfort. She did have weakness to periscapular exercises . She also had a large trigger point along L side of vertebral border. She reports she stretched prior to getting up and helped decrease tightness and pain. She reported no pain to her L elbow with exercises. No pain pre or post treatment session.  Patient will continue to benefit from skilled PT / OT services to modify and progress therapeutic interventions, analyze and address ROM deficits, analyze and address strength deficits, analyze and address soft tissue restrictions, analyze and cue

## 2023-09-12 ENCOUNTER — TELEPHONE (OUTPATIENT)
Age: 33
End: 2023-09-12

## 2023-09-12 NOTE — TELEPHONE ENCOUNTER
Patient called and requested to speak to Yudelka Goddard regarding the appointment she just had with a neuro opthalmologist and what she learned at the appointment.

## 2023-09-13 ENCOUNTER — TELEPHONE (OUTPATIENT)
Age: 33
End: 2023-09-13

## 2023-09-13 NOTE — TELEPHONE ENCOUNTER
Spoke to patient 9/13/2023 and advised he to contact Neurology to request a return to her original provider. Patient stated understanding.

## 2023-09-13 NOTE — TELEPHONE ENCOUNTER
----- Message from [id number] Arden Benites sent at 8/30/2023 10:20 AM EDT -----  Regarding: Neurology appt  Contact: [phone]  Good morning  Medina Sample,    I just wanted to follow up to see if you were able to reach  office to get scheduled?     I have my appt scheduled to see  9/11 @ 3pm    Thank You,

## 2023-09-13 NOTE — TELEPHONE ENCOUNTER
Spoke to patient this morning. Patient stated she has seen Dr Marilou Beasley this week. Patient reports she was told Papilledema was noted, she has no pulse in eyes, and cataracts. Cataracts will not be treated at this time. Reports daily headache. Was told by Ophthalmology that is because she is on steroids for her arthritis. She is having pressure headaches. Patient was started on Diamox BID. Informed patient we would request office notes from Dr Marilou Beasley office. This message sent to provider. Patient stated understanding.

## 2023-10-02 ENCOUNTER — TELEPHONE (OUTPATIENT)
Age: 33
End: 2023-10-02

## 2023-10-02 NOTE — TELEPHONE ENCOUNTER
Patient called to let us know that she is seeing Dr. Rosette Diggs for a second time next week. Patient states that Rosette Diggs didn't like how her eyes looked. I will request the office note. Patient is unable to get into neurology until January/February. Please advise on next steps.

## 2023-10-04 ENCOUNTER — TELEPHONE (OUTPATIENT)
Age: 33
End: 2023-10-04

## 2023-10-09 ENCOUNTER — TELEPHONE (OUTPATIENT)
Age: 33
End: 2023-10-09

## 2023-10-09 ENCOUNTER — TELEPHONE (OUTPATIENT)
Facility: CLINIC | Age: 33
End: 2023-10-09

## 2023-10-09 NOTE — TELEPHONE ENCOUNTER
Pt called to get letters from [employer] in reference to [facility] exemption letter and Flu exemption. Pt requested to have them mailed to address on file. Letters were mailed and I also informed pt that due to letter(s) not being current that I believe that she will need a apt to get updated letter. Pt declined and requested letter(s) to be mailed.

## 2023-10-11 ENCOUNTER — OFFICE VISIT (OUTPATIENT)
Age: 33
End: 2023-10-11
Payer: MEDICAID

## 2023-10-11 VITALS
BODY MASS INDEX: 36.91 KG/M2 | WEIGHT: 200.6 LBS | HEART RATE: 73 BPM | DIASTOLIC BLOOD PRESSURE: 78 MMHG | SYSTOLIC BLOOD PRESSURE: 110 MMHG | OXYGEN SATURATION: 99 % | HEIGHT: 62 IN | TEMPERATURE: 98.8 F

## 2023-10-11 DIAGNOSIS — G93.2 IIH (IDIOPATHIC INTRACRANIAL HYPERTENSION): Primary | ICD-10-CM

## 2023-10-11 PROCEDURE — 99213 OFFICE O/P EST LOW 20 MIN: CPT | Performed by: RADIOLOGY

## 2023-10-11 RX ORDER — ACETAZOLAMIDE 500 MG/1
500 CAPSULE, EXTENDED RELEASE ORAL 2 TIMES DAILY
COMMUNITY
Start: 2023-09-11 | End: 2024-01-16

## 2023-11-01 ENCOUNTER — TELEPHONE (OUTPATIENT)
Age: 33
End: 2023-11-01

## 2023-11-01 NOTE — TELEPHONE ENCOUNTER
Patient called wanting to speak to [facility]. She did not want to leave reason.

## 2023-11-03 NOTE — TELEPHONE ENCOUNTER
Return call to patient. Patient wanted provider to be aware that she has seen Dr Liliana Nolan. He still cannot find a pulse in her eyes. Patient had a LP last week. She continues with blurred vision. No acute problems reported.

## 2023-12-01 ENCOUNTER — TELEPHONE (OUTPATIENT)
Age: 33
End: 2023-12-01

## 2023-12-01 ENCOUNTER — HOSPITAL ENCOUNTER (EMERGENCY)
Facility: HOSPITAL | Age: 33
Discharge: HOME | End: 2023-12-01
Attending: EMERGENCY MEDICINE
Payer: MEDICAID

## 2023-12-01 ENCOUNTER — APPOINTMENT (OUTPATIENT)
Facility: HOSPITAL | Age: 33
End: 2023-12-01
Payer: MEDICAID

## 2023-12-01 VITALS
BODY MASS INDEX: 36.07 KG/M2 | HEIGHT: 62 IN | SYSTOLIC BLOOD PRESSURE: 121 MMHG | TEMPERATURE: 98.7 F | DIASTOLIC BLOOD PRESSURE: 79 MMHG | HEART RATE: 96 BPM | OXYGEN SATURATION: 100 % | WEIGHT: 196 LBS | RESPIRATION RATE: 18 BRPM

## 2023-12-01 DIAGNOSIS — R51.9 NONINTRACTABLE HEADACHE, UNSPECIFIED CHRONICITY PATTERN, UNSPECIFIED HEADACHE TYPE: Primary | ICD-10-CM

## 2023-12-01 DIAGNOSIS — R51.9 INTRACTABLE HEADACHE, UNSPECIFIED CHRONICITY PATTERN, UNSPECIFIED HEADACHE TYPE: ICD-10-CM

## 2023-12-01 PROCEDURE — 70450 CT HEAD/BRAIN W/O DYE: CPT

## 2023-12-01 PROCEDURE — 96372 THER/PROPH/DIAG INJ SC/IM: CPT

## 2023-12-01 PROCEDURE — 6370000000 HC RX 637 (ALT 250 FOR IP): Performed by: EMERGENCY MEDICINE

## 2023-12-01 PROCEDURE — 99284 EMERGENCY DEPT VISIT MOD MDM: CPT

## 2023-12-01 PROCEDURE — 2500000003 HC RX 250 WO HCPCS: Performed by: EMERGENCY MEDICINE

## 2023-12-01 RX ORDER — OXYCODONE HYDROCHLORIDE 5 MG/1
5 TABLET ORAL EVERY 6 HOURS PRN
Qty: 6 TABLET | Refills: 0 | Status: SHIPPED | OUTPATIENT
Start: 2023-12-01 | End: 2023-12-04

## 2023-12-01 RX ORDER — ONDANSETRON 4 MG/1
4 TABLET, ORALLY DISINTEGRATING ORAL 3 TIMES DAILY PRN
Qty: 21 TABLET | Refills: 0 | Status: SHIPPED | OUTPATIENT
Start: 2023-12-01

## 2023-12-01 RX ORDER — ONDANSETRON 4 MG/1
4 TABLET, ORALLY DISINTEGRATING ORAL
Status: COMPLETED | OUTPATIENT
Start: 2023-12-01 | End: 2023-12-01

## 2023-12-01 RX ADMIN — HYDROMORPHONE HYDROCHLORIDE 1 MG: 1 INJECTION, SOLUTION INTRAMUSCULAR; INTRAVENOUS; SUBCUTANEOUS at 21:19

## 2023-12-01 RX ADMIN — ONDANSETRON 4 MG: 4 TABLET, ORALLY DISINTEGRATING ORAL at 21:19

## 2023-12-01 ASSESSMENT — PAIN - FUNCTIONAL ASSESSMENT: PAIN_FUNCTIONAL_ASSESSMENT: 0-10

## 2023-12-01 ASSESSMENT — PAIN SCALES - GENERAL: PAINLEVEL_OUTOF10: 8

## 2023-12-01 NOTE — TELEPHONE ENCOUNTER
I called patient to request notes from Dr. Johanny Sanabria (Rhematology) in advance of her appt with Dr. Tad Carreon on 12/6/23. She said she'd call them to have them fax over. Patient stated that she was about to call us anyway because she has had a headache for 3 days. Aditi Johansen told her to call Neurology. Patient said she is not yet established with Dr. Bea Arechiga, as her appt is in January. Aditi Johansen advised patient to call PCP until she is established with Neurology.

## 2023-12-02 NOTE — ED PROVIDER NOTES
EMERGENCY DEPARTMENT HISTORY AND PHYSICAL EXAM    Date: 12/1/2023  Patient Name: Rosaura Macias    History of Presenting Illness     Chief Complaint   Patient presents with    Headache       History Provided By: Patient    HPI: Rosaura Macias, 27 y.o. female   presents to the ED with cc of headache. Patient with history of idiopathic intracranial hypertension with a stent placement presents with the complaint of generalized headache. Headache has been constant in moderate to severe degree without obvious aggravating or alleviating factors. Patient has taking dose of oxycodone prior to arrival with temporary improvement. Blurred vision and feeling off balance as per patient. No syncopal episode. No motor dysfunction. No history of head injury. No URI symptoms. No fever or chills. Patient is followed by neurosurgeon and rheumatologist for this chronically recurring headache. Patient has been noncompliant with diamox because \"it doesn't work. \"      PCP: Adrienne Leo MD    No current facility-administered medications on file prior to encounter.      Current Outpatient Medications on File Prior to Encounter   Medication Sig Dispense Refill    acetaZOLAMIDE (DIAMOX) 500 MG extended release capsule Take 1 capsule by mouth in the morning and at bedtime      hydroxychloroquine (PLAQUENIL) 200 MG tablet Take 1 tablet by mouth 2 times daily      predniSONE (DELTASONE) 20 MG tablet Take 1 tablet by mouth daily      aspirin 81 MG EC tablet Take 1 tablet by mouth daily 30 tablet 5    Biotin 10 MG tablet Take by mouth daily      cyclobenzaprine (FLEXERIL) 10 MG tablet TAKE 1 TABLET BY MOUTH THREE (3) TIMES DAILY AS NEEDED FOR MUSCLE SPASMS      gabapentin (NEURONTIN) 300 MG capsule TAKE 1 CAPSULE BY MOUTH 3 TIMES A DAY      LORazepam (ATIVAN) 1 MG tablet TAKE ONE AND A HALF TABLETS AT NIGHT AS NEEDED FOR ANXIETY (Patient not taking: Reported on 8/23/2023)      oxyCODONE (ROXICODONE) 5 MG immediate release tablet Take

## 2023-12-02 NOTE — ED TRIAGE NOTES
Migraine x5days, states she has intercranial HTN and has a neurologist. States she just feels \"off\"

## 2024-01-03 ENCOUNTER — HOSPITAL ENCOUNTER (EMERGENCY)
Facility: HOSPITAL | Age: 34
Discharge: HOME | End: 2024-01-03
Payer: MEDICAID

## 2024-01-03 ENCOUNTER — APPOINTMENT (OUTPATIENT)
Facility: HOSPITAL | Age: 34
End: 2024-01-03
Payer: MEDICAID

## 2024-01-03 VITALS
BODY MASS INDEX: 39.66 KG/M2 | WEIGHT: 202 LBS | HEIGHT: 60 IN | TEMPERATURE: 98 F | DIASTOLIC BLOOD PRESSURE: 68 MMHG | SYSTOLIC BLOOD PRESSURE: 106 MMHG | RESPIRATION RATE: 24 BRPM | HEART RATE: 100 BPM | OXYGEN SATURATION: 100 %

## 2024-01-03 DIAGNOSIS — J11.1 INFLUENZA: Primary | ICD-10-CM

## 2024-01-03 DIAGNOSIS — E87.6 HYPOKALEMIA: ICD-10-CM

## 2024-01-03 LAB
ALBUMIN SERPL-MCNC: 4.6 G/DL (ref 3.5–5)
ALBUMIN/GLOB SERPL: 1.2 (ref 1.1–2.2)
ALP SERPL-CCNC: 133 U/L (ref 45–117)
ALT SERPL-CCNC: 63 U/L (ref 12–78)
ANION GAP SERPL CALC-SCNC: 10 MMOL/L (ref 5–15)
AST SERPL W P-5'-P-CCNC: 42 U/L (ref 15–37)
BASOPHILS # BLD: 0 K/UL (ref 0–0.1)
BASOPHILS NFR BLD: 1 % (ref 0–1)
BILIRUB SERPL-MCNC: 0.8 MG/DL (ref 0.2–1)
BUN SERPL-MCNC: 6 MG/DL (ref 6–20)
BUN/CREAT SERPL: 6 (ref 12–20)
CA-I BLD-MCNC: 9.9 MG/DL (ref 8.5–10.1)
CHLORIDE SERPL-SCNC: 104 MMOL/L (ref 97–108)
CO2 SERPL-SCNC: 23 MMOL/L (ref 21–32)
CREAT SERPL-MCNC: 1.02 MG/DL (ref 0.55–1.02)
DIFFERENTIAL METHOD BLD: ABNORMAL
EOSINOPHIL # BLD: 0 K/UL (ref 0–0.4)
EOSINOPHIL NFR BLD: 0 % (ref 0–7)
ERYTHROCYTE [DISTWIDTH] IN BLOOD BY AUTOMATED COUNT: 12 % (ref 11.5–14.5)
FLUAV AG NPH QL IA: POSITIVE
FLUBV AG NOSE QL IA: NEGATIVE
GLOBULIN SER CALC-MCNC: 4 G/DL (ref 2–4)
GLUCOSE SERPL-MCNC: 95 MG/DL (ref 65–100)
HCT VFR BLD AUTO: 41.8 % (ref 35–47)
HGB BLD-MCNC: 14.7 G/DL (ref 11.5–16)
IMM GRANULOCYTES # BLD AUTO: 0 K/UL (ref 0–0.04)
IMM GRANULOCYTES NFR BLD AUTO: 0 % (ref 0–0.5)
LYMPHOCYTES # BLD: 1.2 K/UL (ref 0.8–3.5)
LYMPHOCYTES NFR BLD: 19 % (ref 12–49)
MCH RBC QN AUTO: 31 PG (ref 26–34)
MCHC RBC AUTO-ENTMCNC: 35.2 G/DL (ref 30–36.5)
MCV RBC AUTO: 88.2 FL (ref 80–99)
MONOCYTES # BLD: 1.4 K/UL (ref 0–1)
MONOCYTES NFR BLD: 22 % (ref 5–13)
NEUTS SEG # BLD: 3.6 K/UL (ref 1.8–8)
NEUTS SEG NFR BLD: 58 % (ref 32–75)
NRBC # BLD: 0 K/UL (ref 0–0.01)
NRBC BLD-RTO: 0 PER 100 WBC
PLATELET # BLD AUTO: 241 K/UL (ref 150–400)
PMV BLD AUTO: 9.9 FL (ref 8.9–12.9)
POTASSIUM SERPL-SCNC: 3 MMOL/L (ref 3.5–5.1)
PROT SERPL-MCNC: 8.6 G/DL (ref 6.4–8.2)
RBC # BLD AUTO: 4.74 M/UL (ref 3.8–5.2)
SARS-COV-2 RDRP RESP QL NAA+PROBE: NOT DETECTED
SODIUM SERPL-SCNC: 137 MMOL/L (ref 136–145)
WBC # BLD AUTO: 6.1 K/UL (ref 3.6–11)

## 2024-01-03 PROCEDURE — 80053 COMPREHEN METABOLIC PANEL: CPT

## 2024-01-03 PROCEDURE — 99284 EMERGENCY DEPT VISIT MOD MDM: CPT

## 2024-01-03 PROCEDURE — 87804 INFLUENZA ASSAY W/OPTIC: CPT

## 2024-01-03 PROCEDURE — 85025 COMPLETE CBC W/AUTO DIFF WBC: CPT

## 2024-01-03 PROCEDURE — 96375 TX/PRO/DX INJ NEW DRUG ADDON: CPT

## 2024-01-03 PROCEDURE — 2580000003 HC RX 258

## 2024-01-03 PROCEDURE — 87635 SARS-COV-2 COVID-19 AMP PRB: CPT

## 2024-01-03 PROCEDURE — 96374 THER/PROPH/DIAG INJ IV PUSH: CPT

## 2024-01-03 PROCEDURE — 93005 ELECTROCARDIOGRAM TRACING: CPT

## 2024-01-03 PROCEDURE — 6370000000 HC RX 637 (ALT 250 FOR IP)

## 2024-01-03 PROCEDURE — 96361 HYDRATE IV INFUSION ADD-ON: CPT

## 2024-01-03 PROCEDURE — 6360000002 HC RX W HCPCS

## 2024-01-03 PROCEDURE — 70450 CT HEAD/BRAIN W/O DYE: CPT

## 2024-01-03 RX ORDER — POTASSIUM CHLORIDE 750 MG/1
20 TABLET, EXTENDED RELEASE ORAL 2 TIMES DAILY
Qty: 12 TABLET | Refills: 0 | Status: SHIPPED | OUTPATIENT
Start: 2024-01-03 | End: 2024-01-03

## 2024-01-03 RX ORDER — KETOROLAC TROMETHAMINE 15 MG/ML
15 INJECTION, SOLUTION INTRAMUSCULAR; INTRAVENOUS
Status: DISCONTINUED | OUTPATIENT
Start: 2024-01-03 | End: 2024-01-03

## 2024-01-03 RX ORDER — 0.9 % SODIUM CHLORIDE 0.9 %
1000 INTRAVENOUS SOLUTION INTRAVENOUS ONCE
Status: COMPLETED | OUTPATIENT
Start: 2024-01-03 | End: 2024-01-03

## 2024-01-03 RX ORDER — KETOROLAC TROMETHAMINE 15 MG/ML
15 INJECTION, SOLUTION INTRAMUSCULAR; INTRAVENOUS
Status: COMPLETED | OUTPATIENT
Start: 2024-01-03 | End: 2024-01-03

## 2024-01-03 RX ORDER — MORPHINE SULFATE 2 MG/ML
2 INJECTION, SOLUTION INTRAMUSCULAR; INTRAVENOUS
Status: COMPLETED | OUTPATIENT
Start: 2024-01-03 | End: 2024-01-03

## 2024-01-03 RX ORDER — POTASSIUM CHLORIDE 750 MG/1
40 TABLET, FILM COATED, EXTENDED RELEASE ORAL ONCE
Status: COMPLETED | OUTPATIENT
Start: 2024-01-03 | End: 2024-01-03

## 2024-01-03 RX ORDER — OSELTAMIVIR PHOSPHATE 75 MG/1
75 CAPSULE ORAL 2 TIMES DAILY
Qty: 10 CAPSULE | Refills: 0 | Status: SHIPPED | OUTPATIENT
Start: 2024-01-03 | End: 2024-01-08

## 2024-01-03 RX ORDER — PROCHLORPERAZINE EDISYLATE 5 MG/ML
10 INJECTION INTRAMUSCULAR; INTRAVENOUS ONCE
Status: COMPLETED | OUTPATIENT
Start: 2024-01-03 | End: 2024-01-03

## 2024-01-03 RX ORDER — POTASSIUM CHLORIDE 750 MG/1
20 TABLET, EXTENDED RELEASE ORAL 2 TIMES DAILY
Qty: 12 TABLET | Refills: 0 | Status: SHIPPED | OUTPATIENT
Start: 2024-01-03 | End: 2024-01-06

## 2024-01-03 RX ORDER — OSELTAMIVIR PHOSPHATE 75 MG/1
75 CAPSULE ORAL 2 TIMES DAILY
Qty: 10 CAPSULE | Refills: 0 | Status: SHIPPED | OUTPATIENT
Start: 2024-01-03 | End: 2024-01-03

## 2024-01-03 RX ADMIN — KETOROLAC TROMETHAMINE 15 MG: 15 INJECTION, SOLUTION INTRAMUSCULAR; INTRAVENOUS at 20:46

## 2024-01-03 RX ADMIN — POTASSIUM CHLORIDE 40 MEQ: 750 TABLET, EXTENDED RELEASE ORAL at 20:43

## 2024-01-03 RX ADMIN — PROCHLORPERAZINE EDISYLATE 10 MG: 5 INJECTION INTRAMUSCULAR; INTRAVENOUS at 18:17

## 2024-01-03 RX ADMIN — MORPHINE SULFATE 2 MG: 2 INJECTION, SOLUTION INTRAMUSCULAR; INTRAVENOUS at 18:17

## 2024-01-03 RX ADMIN — SODIUM CHLORIDE 1000 ML: 9 INJECTION, SOLUTION INTRAVENOUS at 18:15

## 2024-01-03 ASSESSMENT — PAIN SCALES - GENERAL
PAINLEVEL_OUTOF10: 10
PAINLEVEL_OUTOF10: 9

## 2024-01-03 ASSESSMENT — PAIN DESCRIPTION - LOCATION: LOCATION: HEAD

## 2024-01-03 NOTE — ED PROVIDER NOTES
auscultation.  Baselines were benign for any other infectious process.  Patient's symptoms improved after medication here in emergency department.  Also hypokalemia noted.  Repleted here and given 3 days of potassium.  Encouraged follow-up PCP.  All questions answered she agrees to plan of action.    Clinical Management Tools:  Not Applicable    Records Reviewed (source and summary of external notes): Prior medical records and Nursing notes    Vitals:    Vitals:    01/03/24 1754 01/03/24 2100   BP: 135/80 106/68   Pulse: (!) 117 100   Resp: 24    Temp: 97.9 °F (36.6 °C) 98 °F (36.7 °C)   TempSrc: Oral    SpO2: 100% 100%   Weight: 91.6 kg (202 lb)    Height: 1.524 m (5')         ED COURSE  ED Course as of 01/04/24 0118 Wed Jan 03, 2024 1807 Patient was called sepsis alert.  Afebrile on the abnormal vital tachycardia.  Ordered basic labs morphine as patient reports having generalized pain. [JT]   1851 Potassium low at 3.0.  Likely cause of cramping and she states that she has cramping in her extremities and has a headache.  Resolved.  Complaint of cramping after morphine.  Will replete potassium here. [JT]   1920 Influenza positive.  Will give dose of Toradol here in emergency department for cramping.  Also given potassium repletion.  Patient feeling some improvement after bag of fluids.  EKG showed sinus tachycardia heart rate of 108 bpm.  No STEMI.  EKG was interpreted by Dr. Bañuelos and myself. [JT]   1923 CT showed no acute intracranial abnormality. [JT]   1936 Patient resting comfortably in bed.  Headache is improved.  Symptoms are consistent to mixture of hypokalemia and influenza. [JT]   2103 Reevaluated patient at bedside.  Feeling okay and states he wants to go home.  Symptoms likely caused by flu.  Patient confirms history of lupus which could cause exacerbation.  Will discharge with Tamiflu and potassium. [JT]      ED Course User Index  [JT] Dre Norman PA-C       Sepsis Reassessment: Sepsis

## 2024-01-04 LAB
EKG ATRIAL RATE: 108 BPM
EKG DIAGNOSIS: NORMAL
EKG Q-T INTERVAL: 506 MS
EKG QRS DURATION: 86 MS
EKG QTC CALCULATION (BAZETT): 678 MS
EKG R AXIS: -15 DEGREES
EKG T AXIS: 41 DEGREES
EKG VENTRICULAR RATE: 108 BPM

## 2024-01-04 NOTE — DISCHARGE INSTRUCTIONS
Thank you!  Thank you for allowing me to care for you in the emergency department. It is my goal to provide you with excellent care. If you have not received excellent quality care, please ask to speak to the nurse manager. Please fill out the survey that will come to you by mail or email since we listen to your feedback!     Below you will find a list of your tests from today's visit.  Should you have any questions, please do not hesitate to call the emergency department.    Labs  Recent Results (from the past 12 hour(s))   CBC with Auto Differential    Collection Time: 01/03/24  6:11 PM   Result Value Ref Range    WBC 6.1 3.6 - 11.0 K/uL    RBC 4.74 3.80 - 5.20 M/uL    Hemoglobin 14.7 11.5 - 16.0 g/dL    Hematocrit 41.8 35.0 - 47.0 %    MCV 88.2 80.0 - 99.0 FL    MCH 31.0 26.0 - 34.0 PG    MCHC 35.2 30.0 - 36.5 g/dL    RDW 12.0 11.5 - 14.5 %    Platelets 241 150 - 400 K/uL    MPV 9.9 8.9 - 12.9 FL    Nucleated RBCs 0.0 0.0  WBC    nRBC 0.00 0.00 - 0.01 K/uL    Neutrophils % 58 32 - 75 %    Lymphocytes % 19 12 - 49 %    Monocytes % 22 (H) 5 - 13 %    Eosinophils % 0 0 - 7 %    Basophils % 1 0 - 1 %    Immature Granulocytes 0 0 - 0.5 %    Neutrophils Absolute 3.6 1.8 - 8.0 K/UL    Lymphocytes Absolute 1.2 0.8 - 3.5 K/UL    Monocytes Absolute 1.4 (H) 0.0 - 1.0 K/UL    Eosinophils Absolute 0.0 0.0 - 0.4 K/UL    Basophils Absolute 0.0 0.0 - 0.1 K/UL    Absolute Immature Granulocyte 0.0 0.00 - 0.04 K/UL    Differential Type AUTOMATED     COVID-19, Rapid    Collection Time: 01/03/24  6:11 PM    Specimen: Nasopharyngeal   Result Value Ref Range    SARS-CoV-2, Rapid Not Detected Not Detected     Comprehensive Metabolic Panel    Collection Time: 01/03/24  6:11 PM   Result Value Ref Range    Sodium 137 136 - 145 mmol/L    Potassium 3.0 (L) 3.5 - 5.1 mmol/L    Chloride 104 97 - 108 mmol/L    CO2 23 21 - 32 mmol/L    Anion Gap 10 5 - 15 mmol/L    Glucose 95 65 - 100 mg/dL    BUN 6 6 - 20 mg/dL    Creatinine 1.02

## 2024-01-16 ENCOUNTER — OFFICE VISIT (OUTPATIENT)
Age: 34
End: 2024-01-16
Payer: MEDICAID

## 2024-01-16 VITALS
BODY MASS INDEX: 34.78 KG/M2 | TEMPERATURE: 97.5 F | SYSTOLIC BLOOD PRESSURE: 132 MMHG | RESPIRATION RATE: 18 BRPM | WEIGHT: 189 LBS | HEIGHT: 62 IN | DIASTOLIC BLOOD PRESSURE: 78 MMHG | HEART RATE: 78 BPM | OXYGEN SATURATION: 99 %

## 2024-01-16 DIAGNOSIS — G93.2 BENIGN INTRACRANIAL HYPERTENSION: Primary | ICD-10-CM

## 2024-01-16 DIAGNOSIS — M54.81 BILATERAL OCCIPITAL NEURALGIA: ICD-10-CM

## 2024-01-16 DIAGNOSIS — G43.709 CHRONIC MIGRAINE WITHOUT AURA, NOT INTRACTABLE, WITHOUT STATUS MIGRAINOSUS: ICD-10-CM

## 2024-01-16 PROCEDURE — 99215 OFFICE O/P EST HI 40 MIN: CPT | Performed by: PSYCHIATRY & NEUROLOGY

## 2024-01-16 RX ORDER — RIMEGEPANT SULFATE 75 MG/75MG
75 TABLET, ORALLY DISINTEGRATING ORAL EVERY OTHER DAY
Qty: 8 TABLET | Refills: 0 | COMMUNITY
Start: 2024-01-16

## 2024-01-16 RX ORDER — KETOROLAC TROMETHAMINE 10 MG/1
10 TABLET, FILM COATED ORAL EVERY 6 HOURS
Qty: 20 TABLET | Refills: 0 | Status: SHIPPED | OUTPATIENT
Start: 2024-01-16 | End: 2024-01-21

## 2024-01-16 ASSESSMENT — PATIENT HEALTH QUESTIONNAIRE - PHQ9
SUM OF ALL RESPONSES TO PHQ QUESTIONS 1-9: 0
SUM OF ALL RESPONSES TO PHQ9 QUESTIONS 1 & 2: 0
2. FEELING DOWN, DEPRESSED OR HOPELESS: 0
SUM OF ALL RESPONSES TO PHQ QUESTIONS 1-9: 0
1. LITTLE INTEREST OR PLEASURE IN DOING THINGS: 0
SUM OF ALL RESPONSES TO PHQ QUESTIONS 1-9: 0
SUM OF ALL RESPONSES TO PHQ QUESTIONS 1-9: 0

## 2024-01-16 NOTE — PROGRESS NOTES
Status: Oriented to time, place and person. Fluent, no aphasia or dysarthria. Mood and affect appropriate.   Cranial Nerves:   II - XII were intact.   Motor:  5/5 strength.    Reflexes:   Deep tendon reflexes were symmetrical.   Sensory:   Normal.   Gait:  Normal gait.    Tremor:   No tremor noted.   Cerebellar:  Intact FTN/RAVINDER/HTS.     Anterior head posture  (+) tenderness bilateral occiput    Imaging  CT Head, brain MRI: reviewed    Labs Reviewed      Assessment:      Diagnosis Orders   1. Benign intracranial hypertension  External Referral To Neurosurgery    ketorolac (TORADOL) 10 MG tablet      2. Chronic migraine without aura, not intractable, without status migrainosus  Rimegepant Sulfate (NURTEC) 75 MG TBDP    ketorolac (TORADOL) 10 MG tablet      3. Bilateral occipital neuralgia  ketorolac (TORADOL) 10 MG tablet           Plan:   Patient with known history of benign intracranial hypertension confirmed by multiple lumbar puncture and most recent opening pressure done 12/29/2023 was elevated at 26.  Previous multiple brain MRI and MRV's have been done.  Seen by neurointerventional service who on further evaluation by angiogram revealed 80 to 90% stenosis in bilateral transverse and sigmoid sinuses with an elevated intracranial venous flow gradient.  Patient underwent right transverse sigmoid cerebral venous sinus stenting on 12/3/2021.  It only offered temporary relief of her headaches.  Patient has failed multiple medication therapies for pseudotumor cerebri.  Patient referred to [facility] at [facility] for evaluation for possible shunting.    Need to consider migrainous component to her headaches.  Patient has failed multiple maintenance and abortive therapies for migraine headaches.  Trial of Nurtec 75 mg ODT every other day for maintenance therapy.  Samples were provided.  Prescription to follow if benefit is seen.  Trial of ketorolac 10 mg every 6 hours x 5 days to break the current cycle of her headaches.

## 2024-02-13 ENCOUNTER — TELEPHONE (OUTPATIENT)
Age: 34
End: 2024-02-13

## 2024-02-21 ENCOUNTER — TELEPHONE (OUTPATIENT)
Age: 34
End: 2024-02-21

## 2024-02-21 NOTE — TELEPHONE ENCOUNTER
RE: PRIMO      Contacted patients insurance and spoke with Ralph to verify CPT codes: 68181,24265 does require authorization case has been started     Pending ref# [id number]      Nurse notified

## 2024-02-22 ENCOUNTER — HOSPITAL ENCOUNTER (EMERGENCY)
Facility: HOSPITAL | Age: 34
Discharge: HOME | End: 2024-02-22
Attending: EMERGENCY MEDICINE
Payer: MEDICAID

## 2024-02-22 VITALS
TEMPERATURE: 97.9 F | HEIGHT: 62 IN | DIASTOLIC BLOOD PRESSURE: 81 MMHG | HEART RATE: 78 BPM | BODY MASS INDEX: 34.96 KG/M2 | RESPIRATION RATE: 20 BRPM | SYSTOLIC BLOOD PRESSURE: 118 MMHG | WEIGHT: 190 LBS | OXYGEN SATURATION: 100 %

## 2024-02-22 DIAGNOSIS — R51.9 ACUTE INTRACTABLE HEADACHE, UNSPECIFIED HEADACHE TYPE: Primary | ICD-10-CM

## 2024-02-22 LAB
ANION GAP SERPL CALC-SCNC: 8 MMOL/L (ref 5–15)
BASOPHILS # BLD: 0 K/UL (ref 0–0.1)
BASOPHILS NFR BLD: 0 % (ref 0–1)
BUN SERPL-MCNC: 15 MG/DL (ref 6–20)
BUN/CREAT SERPL: 19 (ref 12–20)
CA-I BLD-MCNC: 9.2 MG/DL (ref 8.5–10.1)
CHLORIDE SERPL-SCNC: 105 MMOL/L (ref 97–108)
CO2 SERPL-SCNC: 29 MMOL/L (ref 21–32)
CREAT SERPL-MCNC: 0.79 MG/DL (ref 0.55–1.02)
DIFFERENTIAL METHOD BLD: NORMAL
EOSINOPHIL # BLD: 0.1 K/UL (ref 0–0.4)
EOSINOPHIL NFR BLD: 1 % (ref 0–7)
ERYTHROCYTE [DISTWIDTH] IN BLOOD BY AUTOMATED COUNT: 12.7 % (ref 11.5–14.5)
GLUCOSE SERPL-MCNC: 86 MG/DL (ref 65–100)
HCT VFR BLD AUTO: 36.5 % (ref 35–47)
HGB BLD-MCNC: 12.5 G/DL (ref 11.5–16)
IMM GRANULOCYTES # BLD AUTO: 0 K/UL (ref 0–0.04)
IMM GRANULOCYTES NFR BLD AUTO: 0 % (ref 0–0.5)
LYMPHOCYTES # BLD: 2.7 K/UL (ref 0.8–3.5)
LYMPHOCYTES NFR BLD: 43 % (ref 12–49)
MCH RBC QN AUTO: 31.5 PG (ref 26–34)
MCHC RBC AUTO-ENTMCNC: 34.2 G/DL (ref 30–36.5)
MCV RBC AUTO: 91.9 FL (ref 80–99)
MONOCYTES # BLD: 0.4 K/UL (ref 0–1)
MONOCYTES NFR BLD: 7 % (ref 5–13)
NEUTS SEG # BLD: 3 K/UL (ref 1.8–8)
NEUTS SEG NFR BLD: 49 % (ref 32–75)
PLATELET # BLD AUTO: 291 K/UL (ref 150–400)
PMV BLD AUTO: 8.9 FL (ref 8.9–12.9)
POTASSIUM SERPL-SCNC: 3.7 MMOL/L (ref 3.5–5.1)
RBC # BLD AUTO: 3.97 M/UL (ref 3.8–5.2)
SODIUM SERPL-SCNC: 142 MMOL/L (ref 136–145)
WBC # BLD AUTO: 6.2 K/UL (ref 3.6–11)

## 2024-02-22 PROCEDURE — 96374 THER/PROPH/DIAG INJ IV PUSH: CPT

## 2024-02-22 PROCEDURE — 36415 COLL VENOUS BLD VENIPUNCTURE: CPT

## 2024-02-22 PROCEDURE — 96375 TX/PRO/DX INJ NEW DRUG ADDON: CPT

## 2024-02-22 PROCEDURE — 85025 COMPLETE CBC W/AUTO DIFF WBC: CPT

## 2024-02-22 PROCEDURE — 99284 EMERGENCY DEPT VISIT MOD MDM: CPT

## 2024-02-22 PROCEDURE — 80048 BASIC METABOLIC PNL TOTAL CA: CPT

## 2024-02-22 PROCEDURE — 6360000002 HC RX W HCPCS: Performed by: EMERGENCY MEDICINE

## 2024-02-22 RX ORDER — METOCLOPRAMIDE HYDROCHLORIDE 5 MG/ML
10 INJECTION INTRAMUSCULAR; INTRAVENOUS ONCE
Status: COMPLETED | OUTPATIENT
Start: 2024-02-22 | End: 2024-02-22

## 2024-02-22 RX ORDER — DIPHENHYDRAMINE HYDROCHLORIDE 50 MG/ML
25 INJECTION INTRAMUSCULAR; INTRAVENOUS
Status: COMPLETED | OUTPATIENT
Start: 2024-02-22 | End: 2024-02-22

## 2024-02-22 RX ORDER — METOCLOPRAMIDE 10 MG/1
10 TABLET ORAL 4 TIMES DAILY PRN
Qty: 20 TABLET | Refills: 0 | Status: SHIPPED | OUTPATIENT
Start: 2024-02-22 | End: 2024-02-23

## 2024-02-22 RX ORDER — HALOPERIDOL 5 MG/ML
5 INJECTION INTRAMUSCULAR
Status: DISCONTINUED | OUTPATIENT
Start: 2024-02-22 | End: 2024-02-22 | Stop reason: HOSPADM

## 2024-02-22 RX ORDER — DEXAMETHASONE SODIUM PHOSPHATE 10 MG/ML
10 INJECTION, SOLUTION INTRAMUSCULAR; INTRAVENOUS
Status: COMPLETED | OUTPATIENT
Start: 2024-02-22 | End: 2024-02-22

## 2024-02-22 RX ORDER — KETOROLAC TROMETHAMINE 15 MG/ML
15 INJECTION, SOLUTION INTRAMUSCULAR; INTRAVENOUS
Status: COMPLETED | OUTPATIENT
Start: 2024-02-22 | End: 2024-02-22

## 2024-02-22 RX ORDER — ONDANSETRON 4 MG/1
4 TABLET, ORALLY DISINTEGRATING ORAL EVERY 8 HOURS PRN
Qty: 20 TABLET | Refills: 0 | Status: ON HOLD | OUTPATIENT
Start: 2024-02-22

## 2024-02-22 RX ADMIN — METOCLOPRAMIDE HYDROCHLORIDE 10 MG: 5 INJECTION INTRAMUSCULAR; INTRAVENOUS at 04:06

## 2024-02-22 RX ADMIN — KETOROLAC TROMETHAMINE 15 MG: 15 INJECTION, SOLUTION INTRAMUSCULAR; INTRAVENOUS at 04:05

## 2024-02-22 RX ADMIN — DIPHENHYDRAMINE HYDROCHLORIDE 25 MG: 50 INJECTION INTRAMUSCULAR; INTRAVENOUS at 04:06

## 2024-02-22 RX ADMIN — DEXAMETHASONE SODIUM PHOSPHATE 10 MG: 10 INJECTION INTRAMUSCULAR; INTRAVENOUS at 04:05

## 2024-02-22 ASSESSMENT — PAIN - FUNCTIONAL ASSESSMENT: PAIN_FUNCTIONAL_ASSESSMENT: 0-10

## 2024-02-22 ASSESSMENT — PAIN SCALES - GENERAL
PAINLEVEL_OUTOF10: 8
PAINLEVEL_OUTOF10: 8

## 2024-02-22 ASSESSMENT — PAIN DESCRIPTION - LOCATION: LOCATION: HEAD

## 2024-02-22 NOTE — ED TRIAGE NOTES
Pt arrives to ED via POV complaining of constant headache x4 days. Pt denies associated visual changes, sensitivity to light, n/v or weakness. Pt states she has hx of intracranial hypertension, followed by neurosurgery seen last week for possible shunt placement. Pt is a&ox4, BFAST negative.

## 2024-02-22 NOTE — DISCHARGE INSTRUCTIONS
Thank you!  Thank you for allowing me to care for you in the emergency department. It is my goal to provide you with excellent care.  Please fill out the survey that will come to you by mail or email since we listen to your feedback!     Below you will find a list of your tests from today's visit.  Should you have any questions, please do not hesitate to call the emergency department.    Labs  Recent Results (from the past 12 hour(s))   BMP    Collection Time: 02/22/24  3:45 AM   Result Value Ref Range    Sodium 142 136 - 145 mmol/L    Potassium 3.7 3.5 - 5.1 mmol/L    Chloride 105 97 - 108 mmol/L    CO2 29 21 - 32 mmol/L    Anion Gap 8 5 - 15 mmol/L    Glucose 86 65 - 100 mg/dL    BUN 15 6 - 20 mg/dL    Creatinine 0.79 0.55 - 1.02 mg/dL    Bun/Cre Ratio 19 12 - 20      Est, Glom Filt Rate >60 >60 ml/min/1.73m2    Calcium 9.2 8.5 - 10.1 mg/dL   CBC with Auto Differential    Collection Time: 02/22/24  3:45 AM   Result Value Ref Range    WBC 6.2 3.6 - 11.0 K/uL    RBC 3.97 3.80 - 5.20 M/uL    Hemoglobin 12.5 11.5 - 16.0 g/dL    Hematocrit 36.5 35.0 - 47.0 %    MCV 91.9 80.0 - 99.0 FL    MCH 31.5 26.0 - 34.0 PG    MCHC 34.2 30.0 - 36.5 g/dL    RDW 12.7 11.5 - 14.5 %    Platelets 291 150 - 400 K/uL    MPV 8.9 8.9 - 12.9 FL    Neutrophils % 49 32 - 75 %    Lymphocytes % 43 12 - 49 %    Monocytes % 7 5 - 13 %    Eosinophils % 1 0 - 7 %    Basophils % 0 0 - 1 %    Immature Granulocytes 0 0.0 - 0.5 %    Neutrophils Absolute 3.0 1.8 - 8.0 K/UL    Lymphocytes Absolute 2.7 0.8 - 3.5 K/UL    Monocytes Absolute 0.4 0.0 - 1.0 K/UL    Eosinophils Absolute 0.1 0.0 - 0.4 K/UL    Basophils Absolute 0.0 0.0 - 0.1 K/UL    Absolute Immature Granulocyte 0.0 0.00 - 0.04 K/UL    Differential Type AUTOMATED         Radiologic Studies  No orders to display     ------------------------------------------------------------------------------------------------------------  The exam and treatment you received in the Emergency Department were

## 2024-02-22 NOTE — ED PROVIDER NOTES
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    ROTATOR CUFF REPAIR Right        Family History:  Family History   Problem Relation Age of Onset    Hypertension Mother     Hypertension Sister     No Known Problems Other         Reviewed, patient did not know       Social History:  Social History     Tobacco Use    Smoking status: Never    Smokeless tobacco: Never   Vaping Use    Vaping Use: Unknown   Substance Use Topics    Alcohol use: Not Currently    Drug use: No       Allergies:  Allergies   Allergen Reactions    Latex Anaphylaxis    Acetaminophen Anaphylaxis    Clopidogrel Other (See Comments)     Terrible bruising, light headedness    Ceftriaxone Hives    Nsaids Other (See Comments)     Advised by her GI doctor not to take till they figure out what is going on with her stomach. Currently has a Nissen-fundiplication.       PCP: Nicolasa Joyce MD    Current Meds:   Current Facility-Administered Medications   Medication Dose Route Frequency Provider Last Rate Last Admin    haloperidol lactate (HALDOL) injection 5 mg  5 mg IntraVENous NOW Orlando Bañuelos MD         Current Outpatient Medications   Medication Sig Dispense Refill    ondansetron (ZOFRAN-ODT) 4 MG disintegrating tablet Place 1 tablet under the tongue every 8 hours as needed for Nausea or Vomiting 20 tablet 0    metoclopramide (REGLAN) 10 MG tablet Take 1 tablet by mouth 4 times daily as needed (Headache) 20 tablet 0    Rimegepant Sulfate (NURTEC) 75 MG TBDP Take 75 mg by mouth every other day Take 1 at onset of severe headache (max 1/day) 8 tablet 0    ketorolac (TORADOL) 10 MG tablet Take 1 tablet by mouth every 6 hours for 5 days 20 tablet 0    potassium chloride (KLOR-CON M) 10 MEQ extended release

## 2024-02-23 ENCOUNTER — HOSPITAL ENCOUNTER (INPATIENT)
Facility: HOSPITAL | Age: 34
LOS: 3 days | Discharge: LEFT BEFORE DISCHARGE | DRG: 058 | End: 2024-02-26
Attending: EMERGENCY MEDICINE | Admitting: FAMILY MEDICINE
Payer: MEDICAID

## 2024-02-23 DIAGNOSIS — G93.2 IIH (IDIOPATHIC INTRACRANIAL HYPERTENSION): ICD-10-CM

## 2024-02-23 DIAGNOSIS — G89.29 CHRONIC INTRACTABLE HEADACHE, UNSPECIFIED HEADACHE TYPE: Primary | ICD-10-CM

## 2024-02-23 DIAGNOSIS — R51.9 CHRONIC INTRACTABLE HEADACHE, UNSPECIFIED HEADACHE TYPE: Primary | ICD-10-CM

## 2024-02-23 LAB
ALBUMIN SERPL-MCNC: 4 G/DL (ref 3.5–5)
ALBUMIN/GLOB SERPL: 1.1 (ref 1.1–2.2)
ALP SERPL-CCNC: 103 U/L (ref 45–117)
ALT SERPL-CCNC: 22 U/L (ref 12–78)
ANION GAP SERPL CALC-SCNC: 0 MMOL/L (ref 5–15)
AST SERPL-CCNC: 11 U/L (ref 15–37)
BASOPHILS # BLD: 0 K/UL (ref 0–0.1)
BASOPHILS NFR BLD: 0 % (ref 0–1)
BILIRUB SERPL-MCNC: 0.4 MG/DL (ref 0.2–1)
BUN SERPL-MCNC: 13 MG/DL (ref 6–20)
BUN/CREAT SERPL: 14 (ref 12–20)
CALCIUM SERPL-MCNC: 9.3 MG/DL (ref 8.5–10.1)
CHLORIDE SERPL-SCNC: 110 MMOL/L (ref 97–108)
CO2 SERPL-SCNC: 27 MMOL/L (ref 21–32)
COMMENT:: NORMAL
CREAT SERPL-MCNC: 0.96 MG/DL (ref 0.55–1.02)
DIFFERENTIAL METHOD BLD: ABNORMAL
EOSINOPHIL # BLD: 0 K/UL (ref 0–0.4)
EOSINOPHIL NFR BLD: 0 % (ref 0–7)
ERYTHROCYTE [DISTWIDTH] IN BLOOD BY AUTOMATED COUNT: 12.8 % (ref 11.5–14.5)
GLOBULIN SER CALC-MCNC: 3.6 G/DL (ref 2–4)
GLUCOSE SERPL-MCNC: 128 MG/DL (ref 65–100)
HCT VFR BLD AUTO: 37.8 % (ref 35–47)
HGB BLD-MCNC: 12.8 G/DL (ref 11.5–16)
IMM GRANULOCYTES # BLD AUTO: 0 K/UL (ref 0–0.04)
IMM GRANULOCYTES NFR BLD AUTO: 0 % (ref 0–0.5)
LYMPHOCYTES # BLD: 3.9 K/UL (ref 0.8–3.5)
LYMPHOCYTES NFR BLD: 41 % (ref 12–49)
MCH RBC QN AUTO: 31 PG (ref 26–34)
MCHC RBC AUTO-ENTMCNC: 33.9 G/DL (ref 30–36.5)
MCV RBC AUTO: 91.5 FL (ref 80–99)
MONOCYTES # BLD: 0.6 K/UL (ref 0–1)
MONOCYTES NFR BLD: 7 % (ref 5–13)
NEUTS SEG # BLD: 5 K/UL (ref 1.8–8)
NEUTS SEG NFR BLD: 52 % (ref 32–75)
NRBC # BLD: 0 K/UL (ref 0–0.01)
NRBC BLD-RTO: 0 PER 100 WBC
PLATELET # BLD AUTO: 330 K/UL (ref 150–400)
PMV BLD AUTO: 9.1 FL (ref 8.9–12.9)
POTASSIUM SERPL-SCNC: 3 MMOL/L (ref 3.5–5.1)
PROT SERPL-MCNC: 7.6 G/DL (ref 6.4–8.2)
RBC # BLD AUTO: 4.13 M/UL (ref 3.8–5.2)
SODIUM SERPL-SCNC: 137 MMOL/L (ref 136–145)
SPECIMEN HOLD: NORMAL
WBC # BLD AUTO: 9.7 K/UL (ref 3.6–11)

## 2024-02-23 PROCEDURE — 6360000002 HC RX W HCPCS: Performed by: EMERGENCY MEDICINE

## 2024-02-23 PROCEDURE — 2580000003 HC RX 258: Performed by: FAMILY MEDICINE

## 2024-02-23 PROCEDURE — 99285 EMERGENCY DEPT VISIT HI MDM: CPT

## 2024-02-23 PROCEDURE — 36415 COLL VENOUS BLD VENIPUNCTURE: CPT

## 2024-02-23 PROCEDURE — 96374 THER/PROPH/DIAG INJ IV PUSH: CPT

## 2024-02-23 PROCEDURE — 6360000002 HC RX W HCPCS

## 2024-02-23 PROCEDURE — 1100000000 HC RM PRIVATE

## 2024-02-23 PROCEDURE — 96375 TX/PRO/DX INJ NEW DRUG ADDON: CPT

## 2024-02-23 PROCEDURE — 80053 COMPREHEN METABOLIC PANEL: CPT

## 2024-02-23 PROCEDURE — 85025 COMPLETE CBC W/AUTO DIFF WBC: CPT

## 2024-02-23 RX ORDER — HYDROMORPHONE HYDROCHLORIDE 1 MG/ML
0.5 INJECTION, SOLUTION INTRAMUSCULAR; INTRAVENOUS; SUBCUTANEOUS
Status: COMPLETED | OUTPATIENT
Start: 2024-02-23 | End: 2024-02-23

## 2024-02-23 RX ORDER — ONDANSETRON 2 MG/ML
4 INJECTION INTRAMUSCULAR; INTRAVENOUS EVERY 6 HOURS PRN
Status: DISCONTINUED | OUTPATIENT
Start: 2024-02-23 | End: 2024-02-26 | Stop reason: HOSPADM

## 2024-02-23 RX ORDER — MAGNESIUM SULFATE IN WATER 40 MG/ML
2000 INJECTION, SOLUTION INTRAVENOUS PRN
Status: DISCONTINUED | OUTPATIENT
Start: 2024-02-23 | End: 2024-02-24

## 2024-02-23 RX ORDER — DIPHENHYDRAMINE HYDROCHLORIDE 50 MG/ML
25 INJECTION INTRAMUSCULAR; INTRAVENOUS
Status: COMPLETED | OUTPATIENT
Start: 2024-02-23 | End: 2024-02-23

## 2024-02-23 RX ORDER — POTASSIUM CHLORIDE 7.45 MG/ML
10 INJECTION INTRAVENOUS PRN
Status: DISCONTINUED | OUTPATIENT
Start: 2024-02-23 | End: 2024-02-24

## 2024-02-23 RX ORDER — DIAZEPAM 10 MG/1
10 TABLET ORAL DAILY PRN
COMMUNITY

## 2024-02-23 RX ORDER — NALOXONE HYDROCHLORIDE 0.4 MG/ML
0.4 INJECTION, SOLUTION INTRAMUSCULAR; INTRAVENOUS; SUBCUTANEOUS PRN
Status: DISCONTINUED | OUTPATIENT
Start: 2024-02-23 | End: 2024-02-26 | Stop reason: HOSPADM

## 2024-02-23 RX ORDER — HYDROMORPHONE HYDROCHLORIDE 2 MG/ML
1.5 INJECTION, SOLUTION INTRAMUSCULAR; INTRAVENOUS; SUBCUTANEOUS
Status: COMPLETED | OUTPATIENT
Start: 2024-02-23 | End: 2024-02-23

## 2024-02-23 RX ORDER — DIPHENHYDRAMINE HYDROCHLORIDE 50 MG/ML
25 INJECTION INTRAMUSCULAR; INTRAVENOUS EVERY 6 HOURS PRN
Status: DISCONTINUED | OUTPATIENT
Start: 2024-02-23 | End: 2024-02-26 | Stop reason: HOSPADM

## 2024-02-23 RX ORDER — SODIUM CHLORIDE 9 MG/ML
INJECTION, SOLUTION INTRAVENOUS PRN
Status: DISCONTINUED | OUTPATIENT
Start: 2024-02-23 | End: 2024-02-26 | Stop reason: HOSPADM

## 2024-02-23 RX ORDER — SODIUM CHLORIDE 0.9 % (FLUSH) 0.9 %
5-40 SYRINGE (ML) INJECTION EVERY 12 HOURS SCHEDULED
Status: DISCONTINUED | OUTPATIENT
Start: 2024-02-23 | End: 2024-02-26 | Stop reason: HOSPADM

## 2024-02-23 RX ORDER — DIPHENHYDRAMINE HYDROCHLORIDE 50 MG/ML
12.5 INJECTION INTRAMUSCULAR; INTRAVENOUS EVERY 6 HOURS PRN
Status: DISCONTINUED | OUTPATIENT
Start: 2024-02-23 | End: 2024-02-23

## 2024-02-23 RX ORDER — OXYCODONE HYDROCHLORIDE 10 MG/1
10 TABLET ORAL EVERY 6 HOURS PRN
COMMUNITY
Start: 2024-02-08

## 2024-02-23 RX ORDER — SODIUM CHLORIDE 0.9 % (FLUSH) 0.9 %
5-40 SYRINGE (ML) INJECTION PRN
Status: DISCONTINUED | OUTPATIENT
Start: 2024-02-23 | End: 2024-02-26 | Stop reason: HOSPADM

## 2024-02-23 RX ORDER — HYDROXYCHLOROQUINE SULFATE 200 MG/1
200 TABLET, FILM COATED ORAL 2 TIMES DAILY
Status: DISCONTINUED | OUTPATIENT
Start: 2024-02-24 | End: 2024-02-26 | Stop reason: HOSPADM

## 2024-02-23 RX ORDER — ONDANSETRON 2 MG/ML
4 INJECTION INTRAMUSCULAR; INTRAVENOUS ONCE
Status: COMPLETED | OUTPATIENT
Start: 2024-02-23 | End: 2024-02-23

## 2024-02-23 RX ORDER — POLYETHYLENE GLYCOL 3350 17 G/17G
17 POWDER, FOR SOLUTION ORAL DAILY PRN
Status: DISCONTINUED | OUTPATIENT
Start: 2024-02-23 | End: 2024-02-26 | Stop reason: HOSPADM

## 2024-02-23 RX ORDER — LORAZEPAM 1 MG/1
1.5 TABLET ORAL NIGHTLY PRN
COMMUNITY
Start: 2024-01-31

## 2024-02-23 RX ORDER — HYDROMORPHONE HYDROCHLORIDE 1 MG/ML
0.5 INJECTION, SOLUTION INTRAMUSCULAR; INTRAVENOUS; SUBCUTANEOUS EVERY 4 HOURS PRN
Status: DISCONTINUED | OUTPATIENT
Start: 2024-02-23 | End: 2024-02-24

## 2024-02-23 RX ORDER — ONDANSETRON 4 MG/1
4 TABLET, ORALLY DISINTEGRATING ORAL EVERY 8 HOURS PRN
Status: DISCONTINUED | OUTPATIENT
Start: 2024-02-23 | End: 2024-02-26 | Stop reason: HOSPADM

## 2024-02-23 RX ORDER — POTASSIUM CHLORIDE 750 MG/1
40 TABLET, FILM COATED, EXTENDED RELEASE ORAL PRN
Status: DISCONTINUED | OUTPATIENT
Start: 2024-02-23 | End: 2024-02-24

## 2024-02-23 RX ADMIN — SODIUM CHLORIDE, PRESERVATIVE FREE 10 ML: 5 INJECTION INTRAVENOUS at 22:19

## 2024-02-23 RX ADMIN — HYDROMORPHONE HYDROCHLORIDE 0.5 MG: 1 INJECTION, SOLUTION INTRAMUSCULAR; INTRAVENOUS; SUBCUTANEOUS at 18:33

## 2024-02-23 RX ADMIN — DIPHENHYDRAMINE HYDROCHLORIDE 25 MG: 50 INJECTION INTRAMUSCULAR; INTRAVENOUS at 18:33

## 2024-02-23 RX ADMIN — HYDROMORPHONE HYDROCHLORIDE 1.5 MG: 2 INJECTION, SOLUTION INTRAMUSCULAR; INTRAVENOUS; SUBCUTANEOUS at 20:08

## 2024-02-23 RX ADMIN — ONDANSETRON 4 MG: 2 INJECTION INTRAMUSCULAR; INTRAVENOUS at 18:33

## 2024-02-23 ASSESSMENT — PAIN SCALES - GENERAL
PAINLEVEL_OUTOF10: 9
PAINLEVEL_OUTOF10: 4
PAINLEVEL_OUTOF10: 5
PAINLEVEL_OUTOF10: 5
PAINLEVEL_OUTOF10: 9
PAINLEVEL_OUTOF10: 8
PAINLEVEL_OUTOF10: 8
PAINLEVEL_OUTOF10: 7

## 2024-02-23 ASSESSMENT — PAIN DESCRIPTION - LOCATION
LOCATION: HEAD

## 2024-02-23 ASSESSMENT — PAIN DESCRIPTION - DESCRIPTORS
DESCRIPTORS: SHARP
DESCRIPTORS: SHARP

## 2024-02-23 ASSESSMENT — PAIN - FUNCTIONAL ASSESSMENT: PAIN_FUNCTIONAL_ASSESSMENT: ACTIVITIES ARE NOT PREVENTED

## 2024-02-23 ASSESSMENT — PAIN DESCRIPTION - PAIN TYPE: TYPE: ACUTE PAIN

## 2024-02-23 NOTE — ED TRIAGE NOTES
Pt arrives from home referred by her neurologist for MRV, LP, and Iv meds for headache.     Seen at different ED yesterday and had no symptom relief.

## 2024-02-23 NOTE — PROGRESS NOTES
Problem Relation Age of Onset    Hypertension Mother     Hypertension Sister     No Known Problems Other         Reviewed, patient did not know     Social History     Tobacco Use    Smoking status: Never    Smokeless tobacco: Never   Substance Use Topics    Alcohol use: Not Currently      Current Outpatient Medications   Medication Sig Dispense Refill    hydroxychloroquine (PLAQUENIL) 200 MG tablet Take 1 tablet by mouth 2 times daily      Biotin 10 MG tablet Take by mouth daily      cyclobenzaprine (FLEXERIL) 10 MG tablet TAKE 1 TABLET BY MOUTH THREE (3) TIMES DAILY AS NEEDED FOR MUSCLE SPASMS      gabapentin (NEURONTIN) 300 MG capsule Take 600 mg 3 times daily as needed      ondansetron (ZOFRAN-ODT) 4 MG disintegrating tablet Place 1 tablet under the tongue every 8 hours as needed for Nausea or Vomiting 20 tablet 0    metoclopramide (REGLAN) 10 MG tablet Take 1 tablet by mouth 4 times daily as needed (Headache) 20 tablet 0    Rimegepant Sulfate (NURTEC) 75 MG TBDP Take 75 mg by mouth every other day Take 1 at onset of severe headache (max 1/day) 8 tablet 0    ketorolac (TORADOL) 10 MG tablet Take 1 tablet by mouth every 6 hours for 5 days 20 tablet 0    potassium chloride (KLOR-CON M) 10 MEQ extended release tablet Take 2 tablets by mouth 2 times daily for 3 days 12 tablet 0    ondansetron (ZOFRAN-ODT) 4 MG disintegrating tablet Take 1 tablet by mouth 3 times daily as needed for Nausea or Vomiting 21 tablet 0     No current facility-administered medications for this visit.        Allergies   Allergen Reactions    Latex Anaphylaxis    Acetaminophen Anaphylaxis    Clopidogrel Other (See Comments)     Terrible bruising, light headedness    Ceftriaxone Hives    Nsaids Other (See Comments)     Advised by her GI doctor not to take till they figure out what is going on with her stomach. Currently has a Nissen-fundiplication.       Review of Systems:  Pertinent items are noted in the History of Present

## 2024-02-24 LAB
ANION GAP SERPL CALC-SCNC: 1 MMOL/L (ref 5–15)
BASOPHILS # BLD: 0 K/UL (ref 0–0.1)
BASOPHILS NFR BLD: 1 % (ref 0–1)
BUN SERPL-MCNC: 12 MG/DL (ref 6–20)
BUN/CREAT SERPL: 15 (ref 12–20)
CALCIUM SERPL-MCNC: 8.5 MG/DL (ref 8.5–10.1)
CHLORIDE SERPL-SCNC: 109 MMOL/L (ref 97–108)
CO2 SERPL-SCNC: 28 MMOL/L (ref 21–32)
CREAT SERPL-MCNC: 0.82 MG/DL (ref 0.55–1.02)
DIFFERENTIAL METHOD BLD: ABNORMAL
EOSINOPHIL # BLD: 0 K/UL (ref 0–0.4)
EOSINOPHIL NFR BLD: 1 % (ref 0–7)
ERYTHROCYTE [DISTWIDTH] IN BLOOD BY AUTOMATED COUNT: 13 % (ref 11.5–14.5)
GLUCOSE SERPL-MCNC: 128 MG/DL (ref 65–100)
HCT VFR BLD AUTO: 34.5 % (ref 35–47)
HGB BLD-MCNC: 11.8 G/DL (ref 11.5–16)
IMM GRANULOCYTES # BLD AUTO: 0 K/UL (ref 0–0.04)
IMM GRANULOCYTES NFR BLD AUTO: 0 % (ref 0–0.5)
LYMPHOCYTES # BLD: 4.1 K/UL (ref 0.8–3.5)
LYMPHOCYTES NFR BLD: 51 % (ref 12–49)
MAGNESIUM SERPL-MCNC: 2.1 MG/DL (ref 1.6–2.4)
MCH RBC QN AUTO: 31.4 PG (ref 26–34)
MCHC RBC AUTO-ENTMCNC: 34.2 G/DL (ref 30–36.5)
MCV RBC AUTO: 91.8 FL (ref 80–99)
MONOCYTES # BLD: 0.6 K/UL (ref 0–1)
MONOCYTES NFR BLD: 7 % (ref 5–13)
NEUTS SEG # BLD: 3.1 K/UL (ref 1.8–8)
NEUTS SEG NFR BLD: 40 % (ref 32–75)
NRBC # BLD: 0 K/UL (ref 0–0.01)
NRBC BLD-RTO: 0 PER 100 WBC
PLATELET # BLD AUTO: 298 K/UL (ref 150–400)
PMV BLD AUTO: 9 FL (ref 8.9–12.9)
POTASSIUM SERPL-SCNC: 3 MMOL/L (ref 3.5–5.1)
RBC # BLD AUTO: 3.76 M/UL (ref 3.8–5.2)
SODIUM SERPL-SCNC: 138 MMOL/L (ref 136–145)
WBC # BLD AUTO: 7.8 K/UL (ref 3.6–11)

## 2024-02-24 PROCEDURE — 2580000003 HC RX 258: Performed by: FAMILY MEDICINE

## 2024-02-24 PROCEDURE — 6370000000 HC RX 637 (ALT 250 FOR IP): Performed by: FAMILY MEDICINE

## 2024-02-24 PROCEDURE — 96376 TX/PRO/DX INJ SAME DRUG ADON: CPT

## 2024-02-24 PROCEDURE — 99222 1ST HOSP IP/OBS MODERATE 55: CPT | Performed by: PSYCHIATRY & NEUROLOGY

## 2024-02-24 PROCEDURE — 6370000000 HC RX 637 (ALT 250 FOR IP): Performed by: INTERNAL MEDICINE

## 2024-02-24 PROCEDURE — 1100000000 HC RM PRIVATE

## 2024-02-24 PROCEDURE — 83735 ASSAY OF MAGNESIUM: CPT

## 2024-02-24 PROCEDURE — 6360000002 HC RX W HCPCS: Performed by: FAMILY MEDICINE

## 2024-02-24 PROCEDURE — 80048 BASIC METABOLIC PNL TOTAL CA: CPT

## 2024-02-24 PROCEDURE — 85025 COMPLETE CBC W/AUTO DIFF WBC: CPT

## 2024-02-24 PROCEDURE — 36415 COLL VENOUS BLD VENIPUNCTURE: CPT

## 2024-02-24 RX ORDER — HYDROMORPHONE HYDROCHLORIDE 2 MG/ML
1.5 INJECTION, SOLUTION INTRAMUSCULAR; INTRAVENOUS; SUBCUTANEOUS EVERY 4 HOURS PRN
Status: DISCONTINUED | OUTPATIENT
Start: 2024-02-24 | End: 2024-02-25

## 2024-02-24 RX ORDER — POTASSIUM CHLORIDE 750 MG/1
40 TABLET, FILM COATED, EXTENDED RELEASE ORAL EVERY 4 HOURS
Status: COMPLETED | OUTPATIENT
Start: 2024-02-24 | End: 2024-02-24

## 2024-02-24 RX ADMIN — HYDROMORPHONE HYDROCHLORIDE 1.5 MG: 2 INJECTION, SOLUTION INTRAMUSCULAR; INTRAVENOUS; SUBCUTANEOUS at 21:46

## 2024-02-24 RX ADMIN — HYDROMORPHONE HYDROCHLORIDE 1.5 MG: 2 INJECTION, SOLUTION INTRAMUSCULAR; INTRAVENOUS; SUBCUTANEOUS at 12:47

## 2024-02-24 RX ADMIN — HYDROMORPHONE HYDROCHLORIDE 1.5 MG: 2 INJECTION, SOLUTION INTRAMUSCULAR; INTRAVENOUS; SUBCUTANEOUS at 08:24

## 2024-02-24 RX ADMIN — DIPHENHYDRAMINE HYDROCHLORIDE 25 MG: 50 INJECTION INTRAMUSCULAR; INTRAVENOUS at 00:08

## 2024-02-24 RX ADMIN — POTASSIUM CHLORIDE 40 MEQ: 750 TABLET, EXTENDED RELEASE ORAL at 15:26

## 2024-02-24 RX ADMIN — HYDROMORPHONE HYDROCHLORIDE 1.5 MG: 2 INJECTION, SOLUTION INTRAMUSCULAR; INTRAVENOUS; SUBCUTANEOUS at 04:02

## 2024-02-24 RX ADMIN — DIPHENHYDRAMINE HYDROCHLORIDE 25 MG: 50 INJECTION INTRAMUSCULAR; INTRAVENOUS at 17:02

## 2024-02-24 RX ADMIN — SODIUM CHLORIDE, PRESERVATIVE FREE 10 ML: 5 INJECTION INTRAVENOUS at 08:24

## 2024-02-24 RX ADMIN — HYDROMORPHONE HYDROCHLORIDE 1.5 MG: 2 INJECTION, SOLUTION INTRAMUSCULAR; INTRAVENOUS; SUBCUTANEOUS at 17:02

## 2024-02-24 RX ADMIN — HYDROMORPHONE HYDROCHLORIDE 0.5 MG: 1 INJECTION, SOLUTION INTRAMUSCULAR; INTRAVENOUS; SUBCUTANEOUS at 00:08

## 2024-02-24 RX ADMIN — HYDROXYCHLOROQUINE SULFATE 200 MG: 200 TABLET ORAL at 08:48

## 2024-02-24 RX ADMIN — DIPHENHYDRAMINE HYDROCHLORIDE 25 MG: 50 INJECTION INTRAMUSCULAR; INTRAVENOUS at 08:24

## 2024-02-24 RX ADMIN — POTASSIUM CHLORIDE 10 MEQ: 7.46 INJECTION, SOLUTION INTRAVENOUS at 11:04

## 2024-02-24 RX ADMIN — HYDROXYCHLOROQUINE SULFATE 200 MG: 200 TABLET ORAL at 21:45

## 2024-02-24 RX ADMIN — SODIUM CHLORIDE, PRESERVATIVE FREE 10 ML: 5 INJECTION INTRAVENOUS at 21:46

## 2024-02-24 RX ADMIN — POTASSIUM CHLORIDE 40 MEQ: 750 TABLET, EXTENDED RELEASE ORAL at 11:48

## 2024-02-24 ASSESSMENT — PAIN SCALES - GENERAL
PAINLEVEL_OUTOF10: 8
PAINLEVEL_OUTOF10: 4
PAINLEVEL_OUTOF10: 7
PAINLEVEL_OUTOF10: 9
PAINLEVEL_OUTOF10: 9
PAINLEVEL_OUTOF10: 7
PAINLEVEL_OUTOF10: 8

## 2024-02-24 ASSESSMENT — PAIN DESCRIPTION - DESCRIPTORS
DESCRIPTORS: ACHING
DESCRIPTORS: POUNDING
DESCRIPTORS: ACHING
DESCRIPTORS: POUNDING
DESCRIPTORS: POUNDING

## 2024-02-24 ASSESSMENT — PAIN DESCRIPTION - ORIENTATION
ORIENTATION: MID

## 2024-02-24 ASSESSMENT — PAIN DESCRIPTION - LOCATION
LOCATION: HEAD

## 2024-02-24 NOTE — ED NOTES
3:46 PM    I have evaluated the patient as the Provider in Rapid Medical Evaluation (RME). I have reviewed her vital signs and the triage nurse assessment. I have talked with the patient and any available family and advised that I am the provider in triage and have ordered the appropriate study to initiate their work up based on the clinical presentation during my assessment. I have advised that the patient will be accommodated in the Main ED as soon as possible. I have also requested to contact the triage nurse or myself immediately if the patient experiences any changes in their condition during this brief waiting period.    Hx venous stenting, pseudotumor cerebri. Seen at ER yesterday for headache, given medications without much relief. Spoke to neuro today who referred her here. Per neuro, LP, MRV and aggressive IV treatment. Initially dull and throbbing, now sharp and pounding. Last LP 2-2.5 months ago with opening pressure nearing 30.     RASHAD An Mikayla L, PA-C  02/23/24 2623    
MRI checklist completed with assistance from patient - list will be scanned into EPIC;; pt reports needing anesthesia for MRI / MRV imaging due to long standing history of anxiety / panic attacks;;   
Patient refuse cardiac monitor leads.  
Patient refused cardiac leads. Patient agreed to wearing pulse ox and BP cuff.   
Report given to DARNELL Forrester  
logical, and able to concentrate and follow conversation  Orientation Level: Orientation Level: Oriented to place, Oriented to person, Oriented to time, Oriented to situation  NIH Score: Total: 0  C-SSRS: Risk of Suicide: No Risk  Bedside swallow: 3 oz Water Swallow Screen: Pass  Ferrum Coma Scale (GCS): John Paul Coma Scale  Eye Opening: Spontaneous  Best Verbal Response: Oriented  Best Motor Response: Obeys commands  John Paul Coma Scale Score: 15  Active LDA's:   Peripheral IV 02/23/24 Right Antecubital (Active)   Site Assessment Clean, dry & intact 02/23/24 1639   Line Status Blood return noted 02/23/24 1639   Line Care Connections checked and tightened 02/23/24 1639   Phlebitis Assessment No symptoms 02/23/24 1639   Infiltration Assessment 0 02/23/24 1639   Alcohol Cap Used Yes 02/23/24 1639   Dressing Status New dressing applied 02/23/24 1639   Dressing Type Transparent 02/23/24 1639   Dressing Intervention New 02/23/24 1639     PO Status: Regular  Pertinent or High Risk Medications/Drips: no   If Yes, please provide details:   Titratable drips: no   Pending Blood Product Administration: no   Mobility: no current mobility problem   ED Fall Risk: Presents to emergency department  because of falls (Syncope, seizure, or loss of consciousness): No, Age > 70: No, Altered Mental Status, Intoxication with alcohol or substance confusion (Disorientation, impaired judgment, poor safety awaremess, or inability to follow instructions): No, Impaired Mobility: Ambulates or transfers with assistive devices or assistance; Unable to ambulate or transer.: No, Nursing Judgement: Yes     You may also review the ED PT Care Timeline found under the Summary Nursing Index tab.    Recommendation    Pending orders LP and MRI with sedation, Neurology needs to see her  Consults ordered: IP CONSULT TO INTERVENTIONAL RADIOLOGY  IP CONSULT TO NEUROLOGY    Consulted provider:      Plan for next 24 hours: LP, neurology consult, MRI  Additional 
cbc, cmp, hgbA1c, t&s - results pending

## 2024-02-24 NOTE — CONSULTS
Neurology Consult Note     NAME:  Vinny Benites   :  1990   MRN:  [id number]   DATE:  2024       HPI:  Pt is a 32yo RH female admitted 24 with c/o 7 day h/o severe HA and blurry vision. She has h/o intractable chronic migraines and IIH s/p cerebral venous stenting with Dr. Ferrara 2021, not on any medical therapy for her migraines.  She was seen in ED 24 and given migraine cocktail without relief. She contacted Dr. Salinas, her primary neurologist, who advised she come to [facility] ED since [facility] team is here, for MRV to check patency of stent and IV treatment, suggested she might need an LP.  She was admitted in August for HA and had MRI brain/MRV under anesthesia which were unremarkable. She was to have an LP then, but left AMA after IV dilaudid was stopped due to rebound HA's. She was restarted on ASA 81mg daily on that admission.  She is not on currently on an APT for her venous sinus stent, states she cannot take NSAIDS due to h/o Nissen fundoplication, her PCP told her to stop the ASA. She never called [facility] to get their advice regarding alternative tx. She states she cannot take Plavix due to a side effect.  She was taking Brilinta after stent placement, but had to stop this when she became pregnant and she never restarted it. She is not on Diamox for IIH due to allergic reaction of rash. Not on Topamax b/c it did not help her HA's.  She was given Nurtec samples by her neurologist to take as HA prevention every other day, but she has not done this. She has tried other MHA prevention meds in the past, believes elavil may have helped the most.  Currently, HA is 6-7/10, blurry vision, +N, +photo, no phonophobia.  She sees Dr. Espino at [facility] every 6 months, she had an appt on Friday, but missed it b/c Dr. Salinas told her to come here.  The last time she

## 2024-02-24 NOTE — H&P
Automatic Reconciliation, Ar   gabapentin (NEURONTIN) 300 MG capsule Take 600 mg 3 times daily as needed 2/27/23   Automatic Reconciliation, Ar     Allergies   Allergen Reactions    Latex Anaphylaxis    Acetaminophen Anaphylaxis    Diamox [Acetazolamide] Anaphylaxis     Throat swelling    Clopidogrel Other (See Comments)     Terrible bruising, light headedness    Ceftriaxone Hives    Nsaids Other (See Comments)     Advised by her GI doctor not to take till they figure out what is going on with her stomach. Currently has a Nissen-fundiplication.      Family History   Problem Relation Age of Onset    Hypertension Mother     Hypertension Sister     No Known Problems Other         Reviewed, patient did not know      Social History:  reports that she has never smoked. She has never used smokeless tobacco. She reports that she does not currently use alcohol. She reports that she does not use drugs.   Social Determinants of Health     Tobacco Use: Low Risk  (12/1/2023)    Patient History     Smoking Tobacco Use: Never     Smokeless Tobacco Use: Never     Passive Exposure: Not on file   Alcohol Use: Not At Risk (2/22/2024)    AUDIT-C     Frequency of Alcohol Consumption: Never     Average Number of Drinks: Patient does not drink     Frequency of Binge Drinking: Never   Financial Resource Strain: Not on file   Food Insecurity: Not on file   Transportation Needs: Not on file   Physical Activity: Not on file   Stress: Not on file   Social Connections: Not on file   Intimate Partner Violence: Not on file   Depression: Not at risk (1/16/2024)    PHQ-2     PHQ-2 Score: 0   Housing Stability: Not on file   Interpersonal Safety: Not on file   Utilities: Not on file        Medications were reconciled to the best of my ability given all available resources at the time of admission. Route is PO if not otherwise noted.     Family and social history were personally reviewed, all pertinent and relevant details are outlined as

## 2024-02-25 LAB
ANION GAP SERPL CALC-SCNC: ABNORMAL MMOL/L (ref 5–15)
BUN SERPL-MCNC: 12 MG/DL (ref 6–20)
BUN/CREAT SERPL: 14 (ref 12–20)
CALCIUM SERPL-MCNC: 8.5 MG/DL (ref 8.5–10.1)
CHLORIDE SERPL-SCNC: 107 MMOL/L (ref 97–108)
CO2 SERPL-SCNC: 30 MMOL/L (ref 21–32)
COMMENT:: NORMAL
CREAT SERPL-MCNC: 0.85 MG/DL (ref 0.55–1.02)
GLUCOSE SERPL-MCNC: 85 MG/DL (ref 65–100)
PHOSPHATE SERPL-MCNC: 3.1 MG/DL (ref 2.6–4.7)
POTASSIUM SERPL-SCNC: 4.1 MMOL/L (ref 3.5–5.1)
SODIUM SERPL-SCNC: 135 MMOL/L (ref 136–145)
SPECIMEN HOLD: NORMAL

## 2024-02-25 PROCEDURE — 1100000000 HC RM PRIVATE

## 2024-02-25 PROCEDURE — 6370000000 HC RX 637 (ALT 250 FOR IP)

## 2024-02-25 PROCEDURE — 84100 ASSAY OF PHOSPHORUS: CPT

## 2024-02-25 PROCEDURE — 6360000002 HC RX W HCPCS: Performed by: INTERNAL MEDICINE

## 2024-02-25 PROCEDURE — 6370000000 HC RX 637 (ALT 250 FOR IP): Performed by: PSYCHIATRY & NEUROLOGY

## 2024-02-25 PROCEDURE — 6370000000 HC RX 637 (ALT 250 FOR IP): Performed by: FAMILY MEDICINE

## 2024-02-25 PROCEDURE — 36415 COLL VENOUS BLD VENIPUNCTURE: CPT

## 2024-02-25 PROCEDURE — 2580000003 HC RX 258: Performed by: FAMILY MEDICINE

## 2024-02-25 PROCEDURE — 6360000002 HC RX W HCPCS: Performed by: FAMILY MEDICINE

## 2024-02-25 PROCEDURE — 80048 BASIC METABOLIC PNL TOTAL CA: CPT

## 2024-02-25 PROCEDURE — 6370000000 HC RX 637 (ALT 250 FOR IP): Performed by: INTERNAL MEDICINE

## 2024-02-25 RX ORDER — AMITRIPTYLINE HYDROCHLORIDE 10 MG/1
10 TABLET, FILM COATED ORAL NIGHTLY
Status: DISCONTINUED | OUTPATIENT
Start: 2024-02-25 | End: 2024-02-26 | Stop reason: HOSPADM

## 2024-02-25 RX ORDER — KETOROLAC TROMETHAMINE 30 MG/ML
15 INJECTION, SOLUTION INTRAMUSCULAR; INTRAVENOUS EVERY 6 HOURS PRN
Status: DISCONTINUED | OUTPATIENT
Start: 2024-02-25 | End: 2024-02-26 | Stop reason: HOSPADM

## 2024-02-25 RX ORDER — GABAPENTIN 300 MG/1
600 CAPSULE ORAL 3 TIMES DAILY
Status: DISCONTINUED | OUTPATIENT
Start: 2024-02-25 | End: 2024-02-26 | Stop reason: HOSPADM

## 2024-02-25 RX ORDER — DIAZEPAM 10 MG/1
10 TABLET ORAL DAILY PRN
Status: DISCONTINUED | OUTPATIENT
Start: 2024-02-25 | End: 2024-02-26 | Stop reason: HOSPADM

## 2024-02-25 RX ORDER — HYDROMORPHONE HYDROCHLORIDE 2 MG/1
1 TABLET ORAL
Status: COMPLETED | OUTPATIENT
Start: 2024-02-25 | End: 2024-02-25

## 2024-02-25 RX ORDER — OXYCODONE HYDROCHLORIDE 5 MG/1
10 TABLET ORAL EVERY 6 HOURS PRN
Status: DISCONTINUED | OUTPATIENT
Start: 2024-02-25 | End: 2024-02-26 | Stop reason: HOSPADM

## 2024-02-25 RX ADMIN — HYDROMORPHONE HYDROCHLORIDE 1.5 MG: 2 INJECTION, SOLUTION INTRAMUSCULAR; INTRAVENOUS; SUBCUTANEOUS at 14:05

## 2024-02-25 RX ADMIN — KETOROLAC TROMETHAMINE 15 MG: 30 INJECTION, SOLUTION INTRAMUSCULAR; INTRAVENOUS at 20:18

## 2024-02-25 RX ADMIN — GABAPENTIN 600 MG: 300 CAPSULE ORAL at 20:11

## 2024-02-25 RX ADMIN — HYDROXYCHLOROQUINE SULFATE 200 MG: 200 TABLET ORAL at 08:31

## 2024-02-25 RX ADMIN — HYDROMORPHONE HYDROCHLORIDE 1.5 MG: 2 INJECTION, SOLUTION INTRAMUSCULAR; INTRAVENOUS; SUBCUTANEOUS at 06:50

## 2024-02-25 RX ADMIN — DIPHENHYDRAMINE HYDROCHLORIDE 25 MG: 50 INJECTION INTRAMUSCULAR; INTRAVENOUS at 14:05

## 2024-02-25 RX ADMIN — SODIUM CHLORIDE, PRESERVATIVE FREE 10 ML: 5 INJECTION INTRAVENOUS at 08:31

## 2024-02-25 RX ADMIN — AMITRIPTYLINE HYDROCHLORIDE 10 MG: 10 TABLET, FILM COATED ORAL at 20:11

## 2024-02-25 RX ADMIN — SODIUM CHLORIDE, PRESERVATIVE FREE 10 ML: 5 INJECTION INTRAVENOUS at 20:11

## 2024-02-25 RX ADMIN — GABAPENTIN 600 MG: 300 CAPSULE ORAL at 14:02

## 2024-02-25 RX ADMIN — DIPHENHYDRAMINE HYDROCHLORIDE 25 MG: 50 INJECTION INTRAMUSCULAR; INTRAVENOUS at 08:34

## 2024-02-25 RX ADMIN — OXYCODONE 10 MG: 5 TABLET ORAL at 18:19

## 2024-02-25 RX ADMIN — DIPHENHYDRAMINE HYDROCHLORIDE 25 MG: 50 INJECTION INTRAMUSCULAR; INTRAVENOUS at 02:09

## 2024-02-25 RX ADMIN — HYDROXYCHLOROQUINE SULFATE 200 MG: 200 TABLET ORAL at 20:11

## 2024-02-25 RX ADMIN — HYDROMORPHONE HYDROCHLORIDE 1.5 MG: 2 INJECTION, SOLUTION INTRAMUSCULAR; INTRAVENOUS; SUBCUTANEOUS at 02:10

## 2024-02-25 RX ADMIN — GABAPENTIN 600 MG: 300 CAPSULE ORAL at 08:31

## 2024-02-25 RX ADMIN — DIPHENHYDRAMINE HYDROCHLORIDE 25 MG: 50 INJECTION INTRAMUSCULAR; INTRAVENOUS at 19:45

## 2024-02-25 RX ADMIN — HYDROMORPHONE HYDROCHLORIDE 1 MG: 2 TABLET ORAL at 22:33

## 2024-02-25 RX ADMIN — HYDROMORPHONE HYDROCHLORIDE 1.5 MG: 2 INJECTION, SOLUTION INTRAMUSCULAR; INTRAVENOUS; SUBCUTANEOUS at 10:24

## 2024-02-25 ASSESSMENT — PAIN DESCRIPTION - LOCATION
LOCATION: HEAD

## 2024-02-25 ASSESSMENT — PAIN SCALES - GENERAL
PAINLEVEL_OUTOF10: 8
PAINLEVEL_OUTOF10: 9
PAINLEVEL_OUTOF10: 7
PAINLEVEL_OUTOF10: 8

## 2024-02-25 ASSESSMENT — PAIN DESCRIPTION - DESCRIPTORS
DESCRIPTORS: ACHING

## 2024-02-26 ENCOUNTER — APPOINTMENT (OUTPATIENT)
Facility: HOSPITAL | Age: 34
DRG: 058 | End: 2024-02-26
Payer: MEDICAID

## 2024-02-26 ENCOUNTER — ANESTHESIA (OUTPATIENT)
Facility: HOSPITAL | Age: 34
DRG: 058 | End: 2024-02-26
Payer: MEDICAID

## 2024-02-26 ENCOUNTER — ANESTHESIA EVENT (OUTPATIENT)
Facility: HOSPITAL | Age: 34
DRG: 058 | End: 2024-02-26
Payer: MEDICAID

## 2024-02-26 VITALS
TEMPERATURE: 97.9 F | SYSTOLIC BLOOD PRESSURE: 116 MMHG | OXYGEN SATURATION: 98 % | DIASTOLIC BLOOD PRESSURE: 85 MMHG | HEART RATE: 75 BPM | RESPIRATION RATE: 16 BRPM

## 2024-02-26 PROCEDURE — A9579 GAD-BASE MR CONTRAST NOS,1ML: HCPCS | Performed by: FAMILY MEDICINE

## 2024-02-26 PROCEDURE — 2500000003 HC RX 250 WO HCPCS: Performed by: NURSE ANESTHETIST, CERTIFIED REGISTERED

## 2024-02-26 PROCEDURE — 6360000002 HC RX W HCPCS: Performed by: INTERNAL MEDICINE

## 2024-02-26 PROCEDURE — 6360000002 HC RX W HCPCS: Performed by: FAMILY MEDICINE

## 2024-02-26 PROCEDURE — 70553 MRI BRAIN STEM W/O & W/DYE: CPT

## 2024-02-26 PROCEDURE — 3700000000 HC ANESTHESIA ATTENDED CARE

## 2024-02-26 PROCEDURE — 6360000002 HC RX W HCPCS: Performed by: NURSE ANESTHETIST, CERTIFIED REGISTERED

## 2024-02-26 PROCEDURE — 6360000002 HC RX W HCPCS

## 2024-02-26 PROCEDURE — 3700000001 HC ADD 15 MINUTES (ANESTHESIA)

## 2024-02-26 PROCEDURE — 7100000000 HC PACU RECOVERY - FIRST 15 MIN

## 2024-02-26 PROCEDURE — 2580000003 HC RX 258: Performed by: FAMILY MEDICINE

## 2024-02-26 PROCEDURE — 7100000001 HC PACU RECOVERY - ADDTL 15 MIN

## 2024-02-26 PROCEDURE — 6360000002 HC RX W HCPCS: Performed by: ANESTHESIOLOGY

## 2024-02-26 PROCEDURE — 6360000004 HC RX CONTRAST MEDICATION: Performed by: FAMILY MEDICINE

## 2024-02-26 PROCEDURE — 70546 MR ANGIOGRAPH HEAD W/O&W/DYE: CPT

## 2024-02-26 RX ORDER — FENTANYL CITRATE 50 UG/ML
25 INJECTION, SOLUTION INTRAMUSCULAR; INTRAVENOUS EVERY 5 MIN PRN
Status: COMPLETED | OUTPATIENT
Start: 2024-02-26 | End: 2024-02-26

## 2024-02-26 RX ORDER — MIDAZOLAM HYDROCHLORIDE 1 MG/ML
INJECTION INTRAMUSCULAR; INTRAVENOUS PRN
Status: DISCONTINUED | OUTPATIENT
Start: 2024-02-26 | End: 2024-02-26 | Stop reason: SDUPTHER

## 2024-02-26 RX ORDER — DEXMEDETOMIDINE HYDROCHLORIDE 100 UG/ML
INJECTION, SOLUTION INTRAVENOUS PRN
Status: DISCONTINUED | OUTPATIENT
Start: 2024-02-26 | End: 2024-02-26 | Stop reason: SDUPTHER

## 2024-02-26 RX ORDER — DIPHENHYDRAMINE HYDROCHLORIDE 50 MG/ML
INJECTION INTRAMUSCULAR; INTRAVENOUS
Status: COMPLETED
Start: 2024-02-26 | End: 2024-02-26

## 2024-02-26 RX ORDER — DIPHENHYDRAMINE HYDROCHLORIDE 50 MG/ML
12.5 INJECTION INTRAMUSCULAR; INTRAVENOUS ONCE
Status: COMPLETED | OUTPATIENT
Start: 2024-02-26 | End: 2024-02-26

## 2024-02-26 RX ORDER — FENTANYL CITRATE 50 UG/ML
INJECTION, SOLUTION INTRAMUSCULAR; INTRAVENOUS
Status: COMPLETED
Start: 2024-02-26 | End: 2024-02-26

## 2024-02-26 RX ADMIN — PROPOFOL 50 MCG/KG/MIN: 10 INJECTION, EMULSION INTRAVENOUS at 14:54

## 2024-02-26 RX ADMIN — DEXMEDETOMIDINE HYDROCHLORIDE 10 MCG: 100 INJECTION, SOLUTION, CONCENTRATE INTRAVENOUS at 14:51

## 2024-02-26 RX ADMIN — DIPHENHYDRAMINE HYDROCHLORIDE 12.5 MG: 50 INJECTION INTRAMUSCULAR; INTRAVENOUS at 16:51

## 2024-02-26 RX ADMIN — DEXMEDETOMIDINE HYDROCHLORIDE 10 MCG: 100 INJECTION, SOLUTION, CONCENTRATE INTRAVENOUS at 14:54

## 2024-02-26 RX ADMIN — FENTANYL CITRATE 25 MCG: 50 INJECTION INTRAMUSCULAR; INTRAVENOUS at 16:45

## 2024-02-26 RX ADMIN — FENTANYL CITRATE 25 MCG: 50 INJECTION INTRAMUSCULAR; INTRAVENOUS at 16:30

## 2024-02-26 RX ADMIN — DIPHENHYDRAMINE HYDROCHLORIDE 25 MG: 50 INJECTION INTRAMUSCULAR; INTRAVENOUS at 04:31

## 2024-02-26 RX ADMIN — FENTANYL CITRATE 25 MCG: 50 INJECTION INTRAMUSCULAR; INTRAVENOUS at 16:35

## 2024-02-26 RX ADMIN — GADOTERIDOL 18 ML: 279.3 INJECTION, SOLUTION INTRAVENOUS at 16:12

## 2024-02-26 RX ADMIN — KETOROLAC TROMETHAMINE 15 MG: 30 INJECTION, SOLUTION INTRAMUSCULAR; INTRAVENOUS at 04:30

## 2024-02-26 RX ADMIN — KETOROLAC TROMETHAMINE 15 MG: 30 INJECTION, SOLUTION INTRAMUSCULAR; INTRAVENOUS at 11:36

## 2024-02-26 RX ADMIN — MIDAZOLAM 3 MG: 1 INJECTION INTRAMUSCULAR; INTRAVENOUS at 14:54

## 2024-02-26 RX ADMIN — MIDAZOLAM 2 MG: 1 INJECTION INTRAMUSCULAR; INTRAVENOUS at 14:56

## 2024-02-26 RX ADMIN — SODIUM CHLORIDE: 9 INJECTION, SOLUTION INTRAVENOUS at 14:51

## 2024-02-26 RX ADMIN — DIPHENHYDRAMINE HYDROCHLORIDE 25 MG: 50 INJECTION INTRAMUSCULAR; INTRAVENOUS at 11:36

## 2024-02-26 RX ADMIN — FENTANYL CITRATE 25 MCG: 50 INJECTION INTRAMUSCULAR; INTRAVENOUS at 16:40

## 2024-02-26 ASSESSMENT — PAIN DESCRIPTION - DESCRIPTORS
DESCRIPTORS: ACHING

## 2024-02-26 ASSESSMENT — PAIN SCALES - GENERAL
PAINLEVEL_OUTOF10: 8
PAINLEVEL_OUTOF10: 0
PAINLEVEL_OUTOF10: 10
PAINLEVEL_OUTOF10: 7
PAINLEVEL_OUTOF10: 8
PAINLEVEL_OUTOF10: 6

## 2024-02-26 ASSESSMENT — PAIN DESCRIPTION - LOCATION
LOCATION: HEAD

## 2024-02-26 ASSESSMENT — PAIN DESCRIPTION - ORIENTATION
ORIENTATION: ANTERIOR;UPPER
ORIENTATION: ANTERIOR

## 2024-02-26 NOTE — PLAN OF CARE
Notified by nursing that patient desires to leave AMA.     Discussed with patient; she states she wants to go home because her pain is not controlled. Discussed with her that she has not taken her gabapentin doses this morning and has not had an oral oxycodone. Advised her that we can work together to adjust her pain medication regimen but we must start with oral medications. Offered patient her oral oxycodone, oral gabapentin, IV toradol, IVF bolus, IV magnesium. Pt declined and expressed desire to leave.     Advised patient that leaving would be AMA. Counseled on risks of leaving before results of MRI could result in a missing diagnosis that may result in serious bodily harm or death. Patient continued to repeat \"I want to go home.\"     Pt will sign AMA paperwork prior to leaving.     Addendum: nursing informed attending that patient refused to sign AMA paperwork. She has been counseled on the risks of leaving AMA and is competent to make her medical decisions.     Jacqueline Paniagua MD

## 2024-02-26 NOTE — ANESTHESIA PRE PROCEDURE
Department of Anesthesiology  Preprocedure Note       Name:  Vinny Benites   Age:  33 y.o.  :  1990                                          MRN:  [id number]         Date:  2024      Surgeon: * No surgeons listed *    Procedure: * No procedures listed *    Medications prior to admission:   Prior to Admission medications    Medication Sig Start Date End Date Taking? Authorizing Provider   LORazepam (ATIVAN) 1 MG tablet Take 1.5 tablets by mouth nightly as needed for Anxiety. Max Daily Amount: 1.5 mg 24  Yes Deja Abreu MD   oxyCODONE HCl (OXY-IR) 10 MG immediate release tablet Take 1 tablet by mouth every 6 hours as needed for Pain. Max Daily Amount: 40 mg 24  Yes Deja Abreu MD   diazePAM (VALIUM) 10 MG tablet Take 1 tablet by mouth daily as needed for Anxiety. Max Daily Amount: 10 mg    Deja Abreu MD   docusate sodium (COLACE) 50 MG capsule Take 1 capsule by mouth daily    Deja Abreu MD   ondansetron (ZOFRAN-ODT) 4 MG disintegrating tablet Place 1 tablet under the tongue every 8 hours as needed for Nausea or Vomiting 24   Orlando Bañuelos MD   ondansetron (ZOFRAN-ODT) 4 MG disintegrating tablet Take 1 tablet by mouth 3 times daily as needed for Nausea or Vomiting 23   Amber Morillo MD   hydroxychloroquine (PLAQUENIL) 200 MG tablet Take 1 tablet by mouth 2 times daily    Deja Abreu MD   Biotin 10 MG tablet Take by mouth daily Pt state \"10,000 Units\"    Automatic Reconciliation, Ar   cyclobenzaprine (FLEXERIL) 10 MG tablet TAKE 1 TABLET BY MOUTH THREE (3) TIMES DAILY AS NEEDED FOR MUSCLE SPASMS 23   Automatic Reconciliation, Ar   gabapentin (NEURONTIN) 300 MG capsule Take 600 mg 3 times daily as needed 23   Automatic Reconciliation, Ar       Current medications:    Current Facility-Administered Medications   Medication Dose Route Frequency Provider Last Rate Last Admin    gabapentin (NEURONTIN) capsule 600 mg  600

## 2024-02-26 NOTE — ANESTHESIA POSTPROCEDURE EVALUATION
Department of Anesthesiology  Postprocedure Note    Patient: Vinny Benites  MRN: [id number]  YOB: 1990  Date of evaluation: 2/26/2024    Procedure Summary       Date: 02/26/24 Room / Location: [facility]    Anesthesia Start: 1451 Anesthesia Stop: 1618    Procedure: MRV HEAD W WO CONTRAST Diagnosis: (intractable headache , hx ICH)    Scheduled Providers: Darrian Harvey MD Responsible Provider: Jacqueline Kaye DO    Anesthesia Type: MAC ASA Status: 3            Anesthesia Type: MAC    Jose Phase I: Jose Score: 8    Jose Phase II:      Anesthesia Post Evaluation    Patient location during evaluation: PACU  Level of consciousness: awake  Airway patency: patent  Nausea & Vomiting: no nausea  Cardiovascular status: hemodynamically stable  Respiratory status: acceptable  Hydration status: stable  Multimodal analgesia pain management approach  Pain management: adequate    No notable events documented.

## 2024-02-26 NOTE — PROGRESS NOTES
[facility]                                                                               Hospitalist Progress Note  Kylie Sanabria MD  Answering service: [phone] OR [phone] from in house phone        Date of Service:  2024  NAME:  Vinny Benites  :  1990  MRN:  [id number]       Admission Summary:   Vinny Benites is a 33 y.o. female with apmhx idiopathic intracranial hypertension, bilateral transverse and sigmoid sinus stenosis s/p right stent placement, and lupus who presents with intractable headache and blurred vision for several days. She presented to [facility] on yesterday and received migraine cocktail, but her pain returned, and she was advised by her neurologist Dr. Ye to present to the ED for brain imaging, and LP.  She reports following up with neurosurgery at [facility] to discuss  shunt placement, but has not made a decision yet     In the ED, she was tachycardic to 105.  Other VSS.  Labs showed hypokalemia with K+ 3.0.     In the ED,  she received dilaudid, benadryl, and zofran     Interval history / Subjective:   Source of information: patient  HA improved s/p benadryl and dilaudid  Vision still blurred  NAD  AFVSS     Assessment & Plan:     #idiopathic intracranial hypertension  #bilateral transverse venous sinus stenosis s/p stent on the right  -MRI and MRV with contrast, pt requires MRI with sedation/anesthesia  -IR consulted for LP  -prn benadryl, dilaudid, zofran  -neurology consulted     #lupus  -continue home plaquenil    #hypokalemia, replete  Mag wnl    Care affected by social determinants of health: n/a    remain in same level of care    Code status: Full Code No additional code details  Prophylaxis: SCDs  Care Plan discussed with: Patient/Family, Nurse, and Consultant neurology  Anticipated Disposition: Home  Anticipated Discharge: 24-48h  Admission Status: Inpatient  Cardiac monitoring: None  Central Line:          Social Determinants of 
    [facility]                                                                               Hospitalist Progress Note  Jacqueline Paniagua MD  Answering service: [phone] OR [phone] from in house phone        Date of Service:  2024  NAME:  Vinny Benites  :  1990  MRN:  [id number]       Admission Summary:   Vinny Benites is a 33 y.o. female with apmhx idiopathic intracranial hypertension, bilateral transverse and sigmoid sinus stenosis s/p right stent placement, and lupus who presents with intractable headache and blurred vision for several days. She presented to [facility] on yesterday and received migraine cocktail, but her pain returned, and she was advised by her neurologist Dr. Ye to present to the ED for brain imaging, and LP.  She reports following up with neurosurgery at [facility] to discuss  shunt placement, but has not made a decision yet     In the ED, she was tachycardic to 105.  Other VSS.  Labs showed hypokalemia with K+ 3.0.     In the ED,  she received dilaudid, benadryl, and zofran     Interval history / Subjective:   Source of information: patient  Mild THOMPSON still present though patient declined her gabapentin this morning, no oxy since last night   Reports she \"wants to leave\" but does not desire to leave AMA at this time   NAD  AFVSS   Awaiting MRI/MRV, will get done today d/t needing anesthesia    Assessment & Plan:     #idiopathic intracranial hypertension  #chronic intractable migraine  #bilateral transverse venous sinus stenosis s/p stent on the right  -MRI and MRV with contrast, pt requires MRI with sedation/anesthesia  -appreciate neurology; suspect rebound HA; takes chronic oxycodone and valium  -neurology started amitriptyline, cont home gabapentin; oxy prn   Pleas avoid dilaudid at night time if pt has not tried all oral medications  Continue toradol prn   Follow-up MRI and MRV today   Will need NIS evaluation of stent      #lupus  -continue home 
    [facility]                                                                               Hospitalist Progress Note  Kylie Sanabria MD  Answering service: [phone] OR [phone] from in house phone        Date of Service:  2024  NAME:  Vinny Benites  :  1990  MRN:  [id number]       Admission Summary:   Vinny Benites is a 33 y.o. female with apmhx idiopathic intracranial hypertension, bilateral transverse and sigmoid sinus stenosis s/p right stent placement, and lupus who presents with intractable headache and blurred vision for several days. She presented to [facility] yesterday and received migraine cocktail, but her pain returned, and she was advised by her neurologist Dr. Ye to present to the ED for brain imaging, and LP.  She reports following up with neurosurgery at [facility] to discuss  shunt placement, but has not made a decision yet     In the ED, she was tachycardic to 105.  Other VSS.  Labs showed hypokalemia with K+ 3.0.     In the ED,  she received dilaudid, benadryl, and zofran     Interval history / Subjective:   Source of information: patient  Mild HA still present  NAD  AFVSS   Awaiting MRI/MRV, will get done tomorrow d/t needing anesthesia    Assessment & Plan:     #idiopathic intracranial hypertension  #chronic intractable migraine  #bilateral transverse venous sinus stenosis s/p stent on the right  -MRI and MRV with contrast, pt requires MRI with sedation/anesthesia  -appreciate neurology; suspect rebound HA; takes chronic oxycodone and valium  -neurology started amitriptyline, cont home gabapentin     #lupus  -continue home plaquenil    #hypokalemia, repleted  Mag wnl    Care affected by social determinants of health: n/a    remain in same level of care    Code status: Full Code No additional code details  Prophylaxis: SCDs  Care Plan discussed with: Patient/Family, Nurse, and Consultant neurology  Anticipated Disposition: Home  Anticipated 
Eucharistic [facility] visit attempted.  Mrs. Benites is [employer]. She was asleep at the time of the visit.  Prayer for spiritual communion offered outside her room.     Sr. RUBIA Stinson, RN, ACSW, LCSW   Page:  [phone]  
Patient's nurse called back said that she spoke with patient and the patient said she had not eaten anything after midnight and that the night nurse had told her not to for the possibility of having an MRI with anesthesia today.   Will call anesthesia to see if they can do it today.  
Pt decided to leave AMA after talking to MD. RN asked her to sign the AMA paper, but Pt. Stated \"I am not signing this paper, just take out my IV.\" MD made aware. Nursing supervisors made aware as well.   
Spoke with patient's nurse concerning ordered MRI with anesthesia.  Nurse informed me that patient has not been NPO, she ate breakfast.   Told nurse that patient needed to be NPO for anesthesia.  Will try to schedule MRI with anesthesia for tomorrow, 2/27/2024.    
    Lab Review No results found for this or any previous visit (from the past 24 hour(s)).    Imaging Review   MRI Result (most recent):  MRI BRAIN W WO CONTRAST 08/10/2023    Narrative  EXAM:  MRI BRAIN W WO CONTRAST    INDICATION:    visual field deficit    COMPARISON:  April 8, 2022.    CONTRAST: 18 ml ProHance.    TECHNIQUE:  Multiplanar multisequence acquisition without and with contrast of the brain.    FINDINGS:  Diffusion imaging does not show acute ischemic changes.  There is no extra-axial fluid collection hemorrhage or shift.  Normal size ventricles for age.  Flow voids in major vessels at the base of the brain are present.  Possible minimal nonspecific bifrontal white matter changes. Those cannot be  compared due to motion artifact on the prior examination.  Orbits do appear unremarkable.  There is no enhancing lesion or masses.    Impression  1. No acute findings no mass.  2. Possible minimal frontal nonspecific white matter disease.    CT Result (most recent):  CT HEAD WO CONTRAST 01/03/2024    Narrative  EXAM: CT HEAD WO CONTRAST    INDICATION: headache    COMPARISON: CT head December 1, 2023.    CONTRAST: None.    TECHNIQUE: Unenhanced CT of the head was performed using 5 mm images. Brain and  bone windows were generated. Coronal and sagittal reformats. CT dose reduction  was achieved through use of a standardized protocol tailored for this  examination and automatic exposure control for dose modulation.    FINDINGS:  The ventricles and sulci are normal in size, shape and configuration. There is  no significant white matter disease. There is no intracranial hemorrhage,  extra-axial collection, or mass effect. The basilar cisterns are open. No CT  evidence of acute infarct. There is a RIGHT transverse sinus stent.    The bone windows demonstrate no abnormalities. The visualized portions of the  paranasal sinuses and mastoid air cells are clear.    Impression  No acute intracranial

## 2024-02-27 NOTE — PROGRESS NOTES
Vitals:    08/23/23 1426   BP: 120/82   Site: Left Upper Arm   Position: Sitting   Pulse: (!) 112   Temp: 97.5 °F (36.4 °C)   TempSrc: Temporal   SpO2: 99%   Weight: 88 kg (194 lb)   Height: 1.575 m (5' 2\")        Physical Exam:  GENERAL: alert, cooperative, no distress, appears stated age  LUNG: clear to auscultation bilaterally  HEART: regular rate and rhythm  EXTREMITIES:  extremities normal, atraumatic, no cyanosis or edema    Neurologic Exam:  Mental Status:  Alert and oriented x 4.  Appropriate affect, mood and behavior.       Language:    Normal fluency, repetition, comprehension and naming.    Cranial Nerves:   EOMI, PERRLA      Facial sensation intact V1 - V3.     Full facial strength, no asymmetry.      Hearing intact bilaterally.     No dysarthria. Tongue protrudes to midline  symmetrically.      Shoulder shrug 5/5 bilaterally.    Motor:    No pronator drift.      Bulk and tone normal.      5/5 power in all extremities proximally and distally.     No involuntary movements.    Sensation:    Sensation intact throughout to light touch    Reflexes:    Deferred    Coordination & Gait: Normal      My interpretation of Imaging:       I reviewed patient's recently performed CT head, MRI and MR venogram of the brain.  No acute intracranial finding noted on the imaging studies.  The previously placed right transverse/sigmoid sinus stent appears patent.    Assessment:     Given the continued symptoms but negative imaging studies, I recommend interval new assessment by neurology to better manage her clinical symptoms and ophthalmology to reassess her new baseline ophthalmology exam.    If patient demonstrates persistent findings of intracranial hypertension/optic edema, I will consider catheter cerebral arteriogram and venous manometry to better assess the intracranial arterial and venous vasculature.  Patient to follow-up after neurology and ophthalmology appointments are completed.    Plan:     Ophthalmology

## 2024-02-27 NOTE — DISCHARGE SUMMARY
Discharge Summary       PATIENT ID: Vinny Benites  MRN: [id number]   YOB: 1990    DATE OF ADMISSION: 2/23/2024  6:10 PM    DATE OF DISCHARGE: 02/26/24    PRIMARY CARE PROVIDER: Nicolasa Joyce MD     ATTENDING PHYSICIAN: Jacqueline Paniagua MD   DISCHARGING PROVIDER: Jacqueline Paniagua MD    To contact this individual call [phone] and ask the  to page.  If unavailable ask to be transferred the Adult Hospitalist Department.    CONSULTATIONS: IP CONSULT TO NEUROLOGY    PROCEDURES/SURGERIES: * No surgery found *     ADMITTING DIAGNOSES & HOSPITAL COURSE:   Vinny Benites is a 33 y.o. female who presented with intractable headache and vision changes. She was admitted for further work-up. MRI and MRV were ordered; patient required anesthesia for MRIs and therefore they were not obtained until 2/26/24. On the evening of 02/26/24, patient told nursing that she wanted to leave. Attending spoke with patient, encouraged her to stay. Patient requested IV dilaudid for her headache; it was discussed that patient has not tried first line therapies including her home pain medications (which she declined twice day of discharge) and oral oxycodone, and therefore IV dilaudid is not yet indicated. Patient was offered a variety of other pain medication options, all of which she declined. Patient opted to leave AMA. Counseled on risks of leaving prior to MRI results and further work-up/treatment, included risk of serious bodily injury or death. Patient acknowledged risks and insisted on leaving. Patient refused to sign AMA paperwork. Nursing supervisors notified of patient's refusal to sign documentation. Encourage patient to follow-up with her neurologist.         DISCHARGE DIAGNOSES / PLAN:      #idiopathic intracranial hypertension  #chronic intractable migraine  #bilateral transverse venous sinus stenosis s/p stent on the right  - MRI and MRV pending; patient left AMA prior to results  - Recommend outpatient

## 2024-03-06 NOTE — TELEPHONE ENCOUNTER
RE: PRIMO  Case [id number]         [facility] Notice [facility] Denial from [facility] states that:      This type of treatment is not approvable under the plan clinical criteria because there is not proof or not enough proof it improves health outcomes for the treatment of headaches. For this reason, the request is denied as not medically necessary. It may help your doctor to know that we reviewed this request using the health plan guidelines called Occipital and Sphenopalatine Ganglion Nerve Block Therapy for the Treatment of Headache and Neuralgia Document#[id number].[id number]        Letter scanned in  Nurse notified

## 2024-04-29 ENCOUNTER — TELEPHONE (OUTPATIENT)
Age: 34
End: 2024-04-29

## 2024-05-02 ENCOUNTER — HOSPITAL ENCOUNTER (EMERGENCY)
Facility: HOSPITAL | Age: 34
Discharge: HOME | End: 2024-05-02
Attending: FAMILY MEDICINE
Payer: MEDICAID

## 2024-05-02 VITALS
BODY MASS INDEX: 32.76 KG/M2 | RESPIRATION RATE: 18 BRPM | SYSTOLIC BLOOD PRESSURE: 138 MMHG | DIASTOLIC BLOOD PRESSURE: 78 MMHG | HEART RATE: 76 BPM | WEIGHT: 178 LBS | TEMPERATURE: 97.6 F | HEIGHT: 62 IN | OXYGEN SATURATION: 100 %

## 2024-05-02 DIAGNOSIS — R51.9 NONINTRACTABLE EPISODIC HEADACHE, UNSPECIFIED HEADACHE TYPE: Primary | ICD-10-CM

## 2024-05-02 PROCEDURE — 99284 EMERGENCY DEPT VISIT MOD MDM: CPT

## 2024-05-02 PROCEDURE — 2500000003 HC RX 250 WO HCPCS: Performed by: FAMILY MEDICINE

## 2024-05-02 PROCEDURE — 96374 THER/PROPH/DIAG INJ IV PUSH: CPT

## 2024-05-02 RX ORDER — HYDROMORPHONE HYDROCHLORIDE 1 MG/ML
1 INJECTION, SOLUTION INTRAMUSCULAR; INTRAVENOUS; SUBCUTANEOUS ONCE
Status: COMPLETED | OUTPATIENT
Start: 2024-05-02 | End: 2024-05-02

## 2024-05-02 RX ADMIN — HYDROMORPHONE HYDROCHLORIDE 1 MG: 1 INJECTION, SOLUTION INTRAMUSCULAR; INTRAVENOUS; SUBCUTANEOUS at 03:07

## 2024-05-02 ASSESSMENT — PAIN - FUNCTIONAL ASSESSMENT: PAIN_FUNCTIONAL_ASSESSMENT: 0-10

## 2024-05-02 ASSESSMENT — LIFESTYLE VARIABLES
HOW OFTEN DO YOU HAVE A DRINK CONTAINING ALCOHOL: MONTHLY OR LESS
HOW MANY STANDARD DRINKS CONTAINING ALCOHOL DO YOU HAVE ON A TYPICAL DAY: 1 OR 2

## 2024-05-02 ASSESSMENT — PAIN SCALES - GENERAL: PAINLEVEL_OUTOF10: 8

## 2024-05-02 NOTE — ED TRIAGE NOTES
Pt states admitted to [facility] for headache (headache x 13 days) had lumbar puncture yesterday while inpatient and then discharged yesterday, given dilaudid benadryl and compazine. Pt states hx of ICP HTN, pt states with headaches has \"floaters and flashing and blurred vision\"

## 2024-05-06 NOTE — ED PROVIDER NOTES
EMERGENCY DEPARTMENT HISTORY AND PHYSICAL EXAM       Date: 5/2/2024  Patient Name: Vinny Benites    History of Presenting Illness     Chief Complaint   Patient presents with    Headache       History Provided By:     HPI: Vinny Benites, is an extremely pleasant 33 y.o. female presenting to the ED with a chief complaint of headache.  Patient endorses gradual onset of headache that started 13 days ago.  Patient characterizes pain as achy over top of head.  States she has an extensive history of idiopathic intracranial hypertension for which she follows with neurology very closely.  States she has to undergo frequent therapeutic lumbar punctures and often experiences headaches afterwards.  This feels similar.  States Dilaudid helps best.  Denies abrupt nor worst headache of life.  No identifiable alleviating or aggravating factors.  No neck stiffness nor fevers.  No visual changes.  No temporal pain.  No numbness, tingling nor weakness in extremities.  Patient declines any recent head nor neck injuries.    There are no other complaints, changes, or physical findings at this time.    PCP: Nicolasa Joyce MD    No current facility-administered medications on file prior to encounter.     Current Outpatient Medications on File Prior to Encounter   Medication Sig Dispense Refill    diazePAM (VALIUM) 10 MG tablet Take 1 tablet by mouth daily as needed for Anxiety.      LORazepam (ATIVAN) 1 MG tablet Take 1.5 tablets by mouth nightly as needed for Anxiety.      oxyCODONE HCl (OXY-IR) 10 MG immediate release tablet Take 1 tablet by mouth every 6 hours as needed for Pain.      Biotin 10 MG tablet Take by mouth daily Pt state \"10,000 Units\"      cyclobenzaprine (FLEXERIL) 10 MG tablet TAKE 1 TABLET BY MOUTH THREE (3) TIMES DAILY AS NEEDED FOR MUSCLE SPASMS         Past History     Past Medical History:  Past Medical History:   Diagnosis Date    Abnormal Papanicolaou smear of cervix 2018    Anxiety     Arthritis     Cervical

## 2024-05-20 ENCOUNTER — TELEPHONE (OUTPATIENT)
Age: 34
End: 2024-05-20

## 2024-05-20 ENCOUNTER — OFFICE VISIT (OUTPATIENT)
Age: 34
End: 2024-05-20
Payer: MEDICAID

## 2024-05-20 VITALS
DIASTOLIC BLOOD PRESSURE: 86 MMHG | RESPIRATION RATE: 14 BRPM | HEART RATE: 95 BPM | SYSTOLIC BLOOD PRESSURE: 126 MMHG | OXYGEN SATURATION: 99 %

## 2024-05-20 DIAGNOSIS — G43.709 CHRONIC MIGRAINE WITHOUT AURA, NOT INTRACTABLE, WITHOUT STATUS MIGRAINOSUS: ICD-10-CM

## 2024-05-20 DIAGNOSIS — G93.2 BENIGN INTRACRANIAL HYPERTENSION: Primary | ICD-10-CM

## 2024-05-20 DIAGNOSIS — M54.81 BILATERAL OCCIPITAL NEURALGIA: ICD-10-CM

## 2024-05-20 PROCEDURE — 99215 OFFICE O/P EST HI 40 MIN: CPT | Performed by: PSYCHIATRY & NEUROLOGY

## 2024-05-20 RX ORDER — FREMANEZUMAB-VFRM 225 MG/1.5ML
225 INJECTION SUBCUTANEOUS ONCE
Qty: 1 ADJUSTABLE DOSE PRE-FILLED PEN SYRINGE | Refills: 0 | COMMUNITY
Start: 2024-05-20 | End: 2024-05-20

## 2024-05-20 RX ORDER — HYDROXYCHLOROQUINE SULFATE 200 MG/1
200 TABLET, FILM COATED ORAL 2 TIMES DAILY
COMMUNITY

## 2024-05-20 RX ORDER — FREMANEZUMAB-VFRM 225 MG/1.5ML
225 INJECTION SUBCUTANEOUS
Qty: 1 ADJUSTABLE DOSE PRE-FILLED PEN SYRINGE | Refills: 11 | Status: SHIPPED | OUTPATIENT
Start: 2024-05-20

## 2024-05-20 RX ORDER — GABAPENTIN 600 MG/1
600 TABLET ORAL 3 TIMES DAILY
COMMUNITY

## 2024-05-20 NOTE — TELEPHONE ENCOUNTER
Patient received 1 subcutaneous injection (ajovy 225mg) on the left arm. Patient tolerated the medication well. No adverse effects. Patient administration directions given to patient.     LOT: [id number]  EXP: 05/2026  Jermaine: [facility]U  NDC: 36868-903-86    Needs a PA on Ajovy  225mg/1.5mL and zavegepant nasal spray PRN

## 2024-05-20 NOTE — PROGRESS NOTES
Chief Complaint   Patient presents with    Headache     Has had a headache since Friday, headache 3 last month      Vitals:    05/20/24 1042   BP: 126/86   Pulse: 95   Resp: 14   SpO2: 99%

## 2024-05-20 NOTE — PROGRESS NOTES
NEUROLOGY CLINIC NOTE    Patient ID:  Vinny Benites  [id number]  33 y.o.  1990    Date of Visit:  May 20, 2024    Reason for Visit:  headaches    Chief Complaint   Patient presents with    Headache     Has had a headache since Friday, headache 3 last month        History of Present Illness:     Patient Active Problem List    Diagnosis Date Noted    Idiopathic intracranial hypertension 08/08/2023    Pseudotumor cerebri syndrome 01/11/2023    Pregnant 09/19/2022    Systemic lupus erythematosus affecting pregnancy in third trimester ([facility]) 09/14/2022    Obesity affecting pregnancy in third trimester 09/14/2022    False labor before 37 completed weeks of gestation in third trimester 08/07/2022    32 weeks gestation of pregnancy 08/07/2022    Unilateral weakness 12/06/2021    CVA (cerebral vascular accident) ([facility]) 12/06/2021    Bruising 10/08/2021    Intractable migraine 09/30/2021    Intractable headache 09/26/2021    Pseudotumor cerebri 09/03/2021    Stenosis of intracranial vessel 08/30/2021    Papilledema associated with increased intracranial pressure 08/30/2021    Intracranial hypertension 10/23/2020    Ataxia 09/14/2020    Headache 08/29/2020    IIH (idiopathic intracranial hypertension) 08/25/2020    SLE (systemic lupus erythematosus) ([facility]) 08/25/2020    Class 3 severe obesity in adult ([facility]) 08/22/2018    S/P repair of paraesophageal hernia 11/17/2017    Paraesophageal hernia 11/15/2017    GERD (gastroesophageal reflux disease) 11/07/2017    Obesity, Class II, BMI 35-39.9 03/31/2017    Sebaceous cyst 03/24/2017    History of Nissen fundoplication 01/04/2017    Chronic migraine without aura without status migrainosus, not intractable 05/18/2016    Cervical incompetence 04/12/2013     Past Medical History:   Diagnosis Date    Abnormal Papanicolaou smear of cervix 2018    Anxiety     Arthritis     Cervical cerclage suture present, second trimester 04/2022    placed at 14 weeks during this pregnancy    Fatigue

## 2024-05-28 NOTE — TELEPHONE ENCOUNTER
RE:Ajovy  Key: [id number]       Submitted via CMM now approved  Approvedtoday  PA Case: [id number], Status: Approved, Coverage Starts on: 5/28/2024 12:00:00 AM, Coverage Ends on: 5/28/2025 12:00:00 AM.        RE:Zavzpret 10MG/ACT solution   Key: [id number]       Approvedtoday PA Case: [id number], Status: Approved, Coverage Starts on: 5/28/2024 12:00:00 AM, Coverage Ends on: 11/24/2024 12:00:00 AM.        Both medications approved  Nurse notified

## 2024-06-10 ENCOUNTER — HOSPITAL ENCOUNTER (EMERGENCY)
Facility: HOSPITAL | Age: 34
Discharge: HOME | End: 2024-06-11
Payer: MEDICAID

## 2024-06-10 DIAGNOSIS — R51.9 CHRONIC INTRACTABLE HEADACHE, UNSPECIFIED HEADACHE TYPE: Primary | ICD-10-CM

## 2024-06-10 DIAGNOSIS — G89.29 CHRONIC INTRACTABLE HEADACHE, UNSPECIFIED HEADACHE TYPE: Primary | ICD-10-CM

## 2024-06-10 PROCEDURE — 6360000002 HC RX W HCPCS: Performed by: NURSE PRACTITIONER

## 2024-06-10 PROCEDURE — 99284 EMERGENCY DEPT VISIT MOD MDM: CPT

## 2024-06-10 PROCEDURE — 96374 THER/PROPH/DIAG INJ IV PUSH: CPT

## 2024-06-10 PROCEDURE — 2580000003 HC RX 258: Performed by: NURSE PRACTITIONER

## 2024-06-10 PROCEDURE — 96375 TX/PRO/DX INJ NEW DRUG ADDON: CPT

## 2024-06-10 RX ORDER — 0.9 % SODIUM CHLORIDE 0.9 %
500 INTRAVENOUS SOLUTION INTRAVENOUS ONCE
Status: COMPLETED | OUTPATIENT
Start: 2024-06-10 | End: 2024-06-11

## 2024-06-10 RX ORDER — DIPHENHYDRAMINE HYDROCHLORIDE 50 MG/ML
25 INJECTION INTRAMUSCULAR; INTRAVENOUS
Status: COMPLETED | OUTPATIENT
Start: 2024-06-10 | End: 2024-06-10

## 2024-06-10 RX ORDER — KETOROLAC TROMETHAMINE 15 MG/ML
15 INJECTION, SOLUTION INTRAMUSCULAR; INTRAVENOUS ONCE
Status: COMPLETED | OUTPATIENT
Start: 2024-06-10 | End: 2024-06-10

## 2024-06-10 RX ORDER — PROCHLORPERAZINE EDISYLATE 5 MG/ML
5 INJECTION INTRAMUSCULAR; INTRAVENOUS ONCE
Status: COMPLETED | OUTPATIENT
Start: 2024-06-10 | End: 2024-06-10

## 2024-06-10 RX ADMIN — KETOROLAC TROMETHAMINE 15 MG: 15 INJECTION, SOLUTION INTRAMUSCULAR; INTRAVENOUS at 23:18

## 2024-06-10 RX ADMIN — DIPHENHYDRAMINE HYDROCHLORIDE 25 MG: 50 INJECTION INTRAMUSCULAR; INTRAVENOUS at 23:20

## 2024-06-10 RX ADMIN — PROCHLORPERAZINE EDISYLATE 5 MG: 5 INJECTION INTRAMUSCULAR; INTRAVENOUS at 23:15

## 2024-06-10 RX ADMIN — SODIUM CHLORIDE 500 ML: 9 INJECTION, SOLUTION INTRAVENOUS at 23:22

## 2024-06-10 ASSESSMENT — PAIN SCALES - GENERAL
PAINLEVEL_OUTOF10: 8
PAINLEVEL_OUTOF10: 8

## 2024-06-10 ASSESSMENT — PAIN DESCRIPTION - LOCATION: LOCATION: HEAD

## 2024-06-10 ASSESSMENT — PAIN - FUNCTIONAL ASSESSMENT: PAIN_FUNCTIONAL_ASSESSMENT: 0-10

## 2024-06-11 VITALS
HEART RATE: 59 BPM | RESPIRATION RATE: 16 BRPM | DIASTOLIC BLOOD PRESSURE: 79 MMHG | HEIGHT: 61 IN | OXYGEN SATURATION: 98 % | TEMPERATURE: 97.7 F | WEIGHT: 170 LBS | SYSTOLIC BLOOD PRESSURE: 120 MMHG | BODY MASS INDEX: 32.1 KG/M2

## 2024-06-11 PROCEDURE — 6360000002 HC RX W HCPCS: Performed by: NURSE PRACTITIONER

## 2024-06-11 RX ADMIN — HYDROMORPHONE HYDROCHLORIDE 1 MG: 1 INJECTION, SOLUTION INTRAMUSCULAR; INTRAVENOUS; SUBCUTANEOUS at 01:17

## 2024-06-11 ASSESSMENT — PAIN - FUNCTIONAL ASSESSMENT: PAIN_FUNCTIONAL_ASSESSMENT: 0-10

## 2024-06-11 ASSESSMENT — PAIN SCALES - GENERAL
PAINLEVEL_OUTOF10: 9
PAINLEVEL_OUTOF10: 9

## 2024-06-11 ASSESSMENT — PAIN DESCRIPTION - LOCATION: LOCATION: HEAD

## 2024-06-13 ASSESSMENT — VISUAL ACUITY: OU: 1

## 2024-06-13 NOTE — ED PROVIDER NOTES
the patient’s best interest not to do additional emergent testing or hospitalize the patient for subarachnoid hemorrhage at this time.    I considered, but did not perform, additional testing such as CT Angiogram or Lumbar Puncture, as well as admission or transfer to a higher level of care.     I utilized an evidence-based risk rating tool (CMT) along with my training and experience to weigh the risk of discharge against the risks of further testing, imaging, or hospitalization. At this time the risk of SAH is remote and the risk of further testing/imaging or hospitalization is most likely higher than the risk of having SAH. [id number]    SHARED DECISION MAKING:   I discussed my risk assessment with the patient. The patient understands and consents to the risk of disposition/plan, as well as the risk of uncertainty in estimating outcomes. [id number]             Records Reviewed (source and summary of external notes): Prior medical records and Nursing notes.    Vitals:    Vitals:    06/10/24 2003 06/11/24 0154   BP: 130/84 120/79   Pulse: 76 59   Resp: 16 16   Temp: 98.8 °F (37.1 °C) 97.7 °F (36.5 °C)   TempSrc: Oral Oral   SpO2: 97% 98%   Weight: 77.1 kg (170 lb)    Height: 1.549 m (5' 1\")         ED COURSE  ED Course as of 06/13/24 0116 Wed Jun 12, 2024   0000 Patient resting in bed with her eyes closed.  She reports no pain relief at all from medications given.  When asked what medications typically work for her chronic headaches she states \"the same medicines take at home but in IV form\".   reviewed noting patient takes Dilaudid 2 mg tablets, last dispensed 5/30/2024, #150 [LP]   0030 Will give single dose of IV dilaudid and plan for discharge home with pain management/neurology follow-up.  Patient stable condition with no focal deficits, tolerating p.o. intake at time of discharge. [LP]      ED Course User Index  [LP] Marilu Crane, ANJMA - NP       SEPSIS Reassessment: Sepsis

## 2024-06-19 ENCOUNTER — ANESTHESIA EVENT (OUTPATIENT)
Facility: HOSPITAL | Age: 34
End: 2024-06-19
Payer: MEDICAID

## 2024-06-19 ENCOUNTER — ANESTHESIA (OUTPATIENT)
Facility: HOSPITAL | Age: 34
End: 2024-06-19
Payer: MEDICAID

## 2024-06-19 ENCOUNTER — HOSPITAL ENCOUNTER (EMERGENCY)
Facility: HOSPITAL | Age: 34
Discharge: HOME | End: 2024-06-19
Attending: EMERGENCY MEDICINE
Payer: MEDICAID

## 2024-06-19 VITALS
SYSTOLIC BLOOD PRESSURE: 108 MMHG | DIASTOLIC BLOOD PRESSURE: 71 MMHG | RESPIRATION RATE: 18 BRPM | OXYGEN SATURATION: 99 % | HEIGHT: 61 IN | WEIGHT: 170 LBS | BODY MASS INDEX: 32.1 KG/M2 | TEMPERATURE: 98.2 F | HEART RATE: 89 BPM

## 2024-06-19 DIAGNOSIS — G89.29 CHRONIC INTRACTABLE HEADACHE, UNSPECIFIED HEADACHE TYPE: ICD-10-CM

## 2024-06-19 DIAGNOSIS — R51.9 CHRONIC INTRACTABLE HEADACHE, UNSPECIFIED HEADACHE TYPE: ICD-10-CM

## 2024-06-19 DIAGNOSIS — G97.1 POST-LUMBAR PUNCTURE HEADACHE: Primary | ICD-10-CM

## 2024-06-19 PROCEDURE — 62273 INJECT EPIDURAL PATCH: CPT | Performed by: ANESTHESIOLOGY

## 2024-06-19 PROCEDURE — 6360000002 HC RX W HCPCS: Performed by: EMERGENCY MEDICINE

## 2024-06-19 PROCEDURE — 96374 THER/PROPH/DIAG INJ IV PUSH: CPT

## 2024-06-19 PROCEDURE — 96375 TX/PRO/DX INJ NEW DRUG ADDON: CPT

## 2024-06-19 PROCEDURE — 6360000002 HC RX W HCPCS: Performed by: NURSE PRACTITIONER

## 2024-06-19 PROCEDURE — 99284 EMERGENCY DEPT VISIT MOD MDM: CPT

## 2024-06-19 RX ORDER — DROPERIDOL 2.5 MG/ML
1.25 INJECTION, SOLUTION INTRAMUSCULAR; INTRAVENOUS ONCE
Status: COMPLETED | OUTPATIENT
Start: 2024-06-19 | End: 2024-06-19

## 2024-06-19 RX ORDER — PROCHLORPERAZINE EDISYLATE 5 MG/ML
10 INJECTION INTRAMUSCULAR; INTRAVENOUS ONCE
Status: COMPLETED | OUTPATIENT
Start: 2024-06-19 | End: 2024-06-19

## 2024-06-19 RX ORDER — DEXAMETHASONE SODIUM PHOSPHATE 10 MG/ML
10 INJECTION, SOLUTION INTRAMUSCULAR; INTRAVENOUS ONCE
Status: COMPLETED | OUTPATIENT
Start: 2024-06-19 | End: 2024-06-19

## 2024-06-19 RX ORDER — METOCLOPRAMIDE HYDROCHLORIDE 5 MG/ML
10 INJECTION INTRAMUSCULAR; INTRAVENOUS ONCE
Status: COMPLETED | OUTPATIENT
Start: 2024-06-19 | End: 2024-06-19

## 2024-06-19 RX ORDER — DIPHENHYDRAMINE HYDROCHLORIDE 50 MG/ML
25 INJECTION INTRAMUSCULAR; INTRAVENOUS
Status: COMPLETED | OUTPATIENT
Start: 2024-06-19 | End: 2024-06-19

## 2024-06-19 RX ADMIN — METOCLOPRAMIDE 10 MG: 5 INJECTION, SOLUTION INTRAMUSCULAR; INTRAVENOUS at 17:17

## 2024-06-19 RX ADMIN — DIPHENHYDRAMINE HYDROCHLORIDE 25 MG: 50 INJECTION INTRAMUSCULAR; INTRAVENOUS at 19:07

## 2024-06-19 RX ADMIN — PROCHLORPERAZINE EDISYLATE 10 MG: 5 INJECTION INTRAMUSCULAR; INTRAVENOUS at 19:07

## 2024-06-19 RX ADMIN — DEXAMETHASONE SODIUM PHOSPHATE 10 MG: 10 INJECTION, SOLUTION INTRAMUSCULAR; INTRAVENOUS at 17:17

## 2024-06-19 RX ADMIN — DROPERIDOL 1.25 MG: 2.5 INJECTION, SOLUTION INTRAMUSCULAR; INTRAVENOUS at 19:57

## 2024-06-19 ASSESSMENT — PAIN - FUNCTIONAL ASSESSMENT
PAIN_FUNCTIONAL_ASSESSMENT: 0-10

## 2024-06-19 ASSESSMENT — PAIN SCALES - GENERAL
PAINLEVEL_OUTOF10: 9
PAINLEVEL_OUTOF10: 9
PAINLEVEL_OUTOF10: 10

## 2024-06-19 ASSESSMENT — PAIN DESCRIPTION - DESCRIPTORS: DESCRIPTORS: THROBBING

## 2024-06-19 ASSESSMENT — PAIN DESCRIPTION - LOCATION
LOCATION: HEAD
LOCATION: HEAD

## 2024-06-19 NOTE — ANESTHESIA PROCEDURE NOTES
Epidural Blood Patch    Patient location during procedure: ED  Start time: 6/19/2024 7:46 PM  End time: 6/19/2024 7:46 PM  Staffing  Performed: anesthesiologist   Anesthesiologist: Darrian Flood MD  Performed by: Darrian Flood MD  Authorized by: Darrian Flood MD    Epidural  Patient position: sitting  Prep: Betadine  Patient monitoring: continuous pulse ox  Approach: midline  Injection technique: DOMENIC saline  Provider prep: mask and sterile gloves  Needle  Needle type: Tuohy   Needle gauge: 17 G  Needle length: 3.5 in  Needle insertion depth: 7 cm  Assessment  Attempts: 1  Additional Notes  Patient was in the sitting position and agreed to receive a lumbar epidural blood patch for what was presumed to be a postdural puncture headache.  20 mL of blood was drawn from a peripheral 20-gauge IV placed in the left hand.  IV was placed under sterile conditions.  Once the epidural space was accessed via the lumbar position 10 mL of blood was placed into the epidural space.  The patient complained of back pain and paresthesias and at that time the epidural blood patch was aborted.  The patient stated that her headache did not resolve.  However the patient's paresthesias and pain in the right leg was resolved once the epidural Touhy needle was removed and the blood was not being injected into the epidural space.  I counseled the patient regarding what to expect if the blood patch were to be successful and the patient agreed.  However at the time the patient was still having severe headache as well as lumbar back pain.  Preanesthetic Checklist  Completed: patient identified, IV checked, site marked, risks and benefits discussed, surgical/procedural consents, equipment checked, pre-op evaluation, timeout performed, anesthesia consent given, oxygen available, monitors applied/VS acknowledged, fire risk safety assessment completed and verbalized and blood product R/B/A discussed and consented

## 2024-06-19 NOTE — ANESTHESIA PRE PROCEDURE
Department of Anesthesiology  Preprocedure Note       Name:  Vinny Benites   Age:  33 y.o.  :  1990                                          MRN:  [id number]         Date:  2024      Surgeon: * No surgeons listed *    Procedure: * No procedures listed *    Medications prior to admission:   Prior to Admission medications    Medication Sig Start Date End Date Taking? Authorizing Provider   gabapentin (NEURONTIN) 600 MG tablet Take 1 tablet by mouth 3 times daily. Max Daily Amount: 1,800 mg    Deja Abreu MD   hydroxychloroquine (PLAQUENIL) 200 MG tablet Take 1 tablet by mouth 2 times daily    Deja Abreu MD   Fremanezumab-vfrm (AJOVY) 225 MG/1.5ML SOAJ Inject 225 mg into the skin every 30 days 24   Marcelino Salinas Jr., MD   diazePAM (VALIUM) 10 MG tablet Take 1 tablet by mouth daily as needed for Anxiety.    Deja Abreu MD   LORazepam (ATIVAN) 1 MG tablet Take 1.5 tablets by mouth nightly as needed for Anxiety. 24   Deja Abreu MD   oxyCODONE HCl (OXY-IR) 10 MG immediate release tablet Take 1 tablet by mouth every 6 hours as needed for Pain.  Patient not taking: Reported on 2024   Deja Abreu MD   Biotin 10 MG tablet Take by mouth daily Pt state \"10,000 Units\"    Automatic Reconciliation, Ar   cyclobenzaprine (FLEXERIL) 10 MG tablet TAKE 1 TABLET BY MOUTH THREE (3) TIMES DAILY AS NEEDED FOR MUSCLE SPASMS 23   Automatic Reconciliation, Ar       Current medications:    No current facility-administered medications for this encounter.     Current Outpatient Medications   Medication Sig Dispense Refill    gabapentin (NEURONTIN) 600 MG tablet Take 1 tablet by mouth 3 times daily. Max Daily Amount: 1,800 mg      hydroxychloroquine (PLAQUENIL) 200 MG tablet Take 1 tablet by mouth 2 times daily      Fremanezumab-vfrm (AJOVY) 225 MG/1.5ML SOAJ Inject 225 mg into the skin every 30 days 1 Adjustable Dose Pre-filled Pen Syringe 11

## 2024-06-19 NOTE — ED NOTES
Report called to charge nurse, Jeni PATRICK, at [facility].  [facility] ETA to [facility] is [phone].

## 2024-06-19 NOTE — ANESTHESIA PROCEDURE NOTES
Epidural Block    Patient location during procedure: ED  Start time: 6/19/2024 7:15 PM  End time: 6/19/2024 7:35 PM  Reason for block: procedure for pain  Staffing  Performed: anesthesiologist   Anesthesiologist: Darrian Flood MD  Performed by: Darrian Flood MD  Authorized by: Darrian Flood MD    Epidural  Patient position: sitting  Prep: Betadine  Patient monitoring: frequent blood pressure checks and continuous pulse ox  Approach: midline  Location: L3-4  Injection technique: DOMENIC saline  Provider prep: sterile gloves and mask  Needle  Needle type: Tuohy   Needle gauge: 17 G  Needle length: 3.5 in  Needle insertion depth: 7 cm  Catheter type: multi-orifice  Catheter size: 19 G  Catheter at skin depth: 12 cmCatheter Secured: tegaderm and tape  Assessment  Hemodynamics: stable  Attempts: 1  Outcomes: uncomplicated  Preanesthetic Checklist  Completed: patient identified, IV checked, site marked, risks and benefits discussed, surgical/procedural consents, equipment checked, pre-op evaluation, timeout performed, anesthesia consent given, oxygen available, monitors applied/VS acknowledged, fire risk safety assessment completed and verbalized and blood product R/B/A discussed and consented

## 2024-06-19 NOTE — ED TRIAGE NOTES
Patient had lumbar puncture 4 days ago, and arrives today with migraine and is requesting a blood patch.     Patients headache improves when laying flat.    History of intracranial hypertension.
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
## 2024-06-19 NOTE — ED NOTES
Pt arrived via ems as a transfer from the [facility] for complaints of a migraine since having a lumbar puncture 4 days ago. Sent here for a blood patch.

## 2024-08-22 ENCOUNTER — HOSPITAL ENCOUNTER (EMERGENCY)
Facility: HOSPITAL | Age: 34
Discharge: HOME | End: 2024-08-22
Attending: STUDENT IN AN ORGANIZED HEALTH CARE EDUCATION/TRAINING PROGRAM
Payer: MEDICAID

## 2024-08-22 VITALS
TEMPERATURE: 98.8 F | SYSTOLIC BLOOD PRESSURE: 104 MMHG | HEIGHT: 61 IN | DIASTOLIC BLOOD PRESSURE: 70 MMHG | OXYGEN SATURATION: 100 % | BODY MASS INDEX: 34.09 KG/M2 | WEIGHT: 180.56 LBS | RESPIRATION RATE: 16 BRPM | HEART RATE: 94 BPM

## 2024-08-22 DIAGNOSIS — G89.18 POST-OP PAIN: Primary | ICD-10-CM

## 2024-08-22 PROCEDURE — 6370000000 HC RX 637 (ALT 250 FOR IP): Performed by: STUDENT IN AN ORGANIZED HEALTH CARE EDUCATION/TRAINING PROGRAM

## 2024-08-22 PROCEDURE — 99283 EMERGENCY DEPT VISIT LOW MDM: CPT

## 2024-08-22 RX ORDER — HYDROMORPHONE HYDROCHLORIDE 2 MG/1
2 TABLET ORAL
Status: COMPLETED | OUTPATIENT
Start: 2024-08-22 | End: 2024-08-22

## 2024-08-22 RX ADMIN — HYDROMORPHONE HYDROCHLORIDE 2 MG: 2 TABLET ORAL at 07:01

## 2024-08-22 ASSESSMENT — PAIN SCALES - GENERAL
PAINLEVEL_OUTOF10: 9
PAINLEVEL_OUTOF10: 9

## 2024-08-22 ASSESSMENT — PAIN DESCRIPTION - PAIN TYPE: TYPE: ACUTE PAIN;SURGICAL PAIN

## 2024-08-22 ASSESSMENT — PAIN DESCRIPTION - DESCRIPTORS
DESCRIPTORS: ACHING;SORE
DESCRIPTORS: SORE

## 2024-08-22 ASSESSMENT — PAIN DESCRIPTION - ORIENTATION: ORIENTATION: LEFT;RIGHT

## 2024-08-22 ASSESSMENT — PAIN - FUNCTIONAL ASSESSMENT
PAIN_FUNCTIONAL_ASSESSMENT: PREVENTS OR INTERFERES SOME ACTIVE ACTIVITIES AND ADLS
PAIN_FUNCTIONAL_ASSESSMENT: 0-10

## 2024-08-22 ASSESSMENT — PAIN DESCRIPTION - LOCATION
LOCATION: BREAST
LOCATION: BREAST
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
## 2024-08-22 NOTE — ED NOTES
Patient stated she finally got to call her Dr to send her prescription. Requesting to be discharged at this time    Patient was reviewed by the provider and was seen fit for discharge. Discharge plan discussed to patient and patient verbalized understanding.     Discharged accordingly.

## 2024-08-22 NOTE — ED TRIAGE NOTES
Pt arrived to ED via POV complaining of post op pain.     Pt states she had breast reduction yesterday and was unable to fill Rx for pain medicine because she is under the care of pain management.

## 2024-08-22 NOTE — DISCHARGE INSTRUCTIONS
Thank you!    Thank you for allowing me to care for you in the emergency department.  I sincerely hope that you are satisfied with your visit today.  It is my goal to provide you with excellent care.    Below you will find a list of your labs and imaging from your visit today if applicable. Should you have any questions regarding these results please do not hesitate to call the emergency department. Please review [employer] for a more detailed result list since the below list may not be comprehensive. Instructions on how to sign up to [employer] should be provided in this packet.    Labs -   No results found for this or any previous visit (from the past 12 hour(s)).    Radiologic Studies -   No orders to display     [unfilled]  [unfilled]       If you feel that you have not received excellent quality care or timely care, please ask to speak to the nurse manager. Please choose us in the future for your continued health care needs.   ------------------------------------------------------------------------------------------------------------  The exam and treatment you received in the Emergency Department were for an urgent problem and are not intended as complete care. It is very important that you follow-up with a doctor, nurse practitioner, or physician assistant in a timely manner to:  (1) confirm your diagnosis and review all imaging and lab results,  (2) re-evaluation of changes in your illness and treatment, and  (3) for ongoing care.  If your symptoms become worse or you do not improve as expected and you are unable to reach your usual health care provider, you should return to the Emergency Department. We are available 24 hours a day.     Please take your discharge instructions with you when you go to your follow-up appointment.     If a prescription has been provided, please have it filled as soon as possible to prevent a delay in treatment. Read the entire medication instruction sheet provided to you by the pharmacy. If

## 2024-11-13 ENCOUNTER — PATIENT MESSAGE (OUTPATIENT)
Age: 34
End: 2024-11-13

## 2024-11-13 DIAGNOSIS — G43.709 CHRONIC MIGRAINE WITHOUT AURA, NOT INTRACTABLE, WITHOUT STATUS MIGRAINOSUS: Primary | ICD-10-CM

## 2024-11-13 RX ORDER — METHYLPREDNISOLONE 4 MG/1
TABLET ORAL
Qty: 1 KIT | Refills: 0 | Status: SHIPPED | OUTPATIENT
Start: 2024-11-13 | End: 2024-11-19

## 2024-12-21 NOTE — ED TRIAGE NOTES
Pt has intracranial hypertension, this is day 4 of a headache, pt feels like her balance is off, vomiting and she's seeing floaters in her R eye. Pt is going to get a venous sinus shut placed 12/3/2021 due to both vessels in the back of pt neck are 80-90% blocked. Yes-Patient/Caregiver accepts free interpretation services...

## 2025-04-04 ENCOUNTER — OFFICE VISIT (OUTPATIENT)
Age: 35
End: 2025-04-04
Payer: MEDICAID

## 2025-04-04 VITALS
OXYGEN SATURATION: 99 % | SYSTOLIC BLOOD PRESSURE: 126 MMHG | RESPIRATION RATE: 16 BRPM | WEIGHT: 190 LBS | HEIGHT: 61 IN | TEMPERATURE: 98.2 F | HEART RATE: 84 BPM | BODY MASS INDEX: 35.87 KG/M2 | DIASTOLIC BLOOD PRESSURE: 78 MMHG

## 2025-04-04 DIAGNOSIS — G43.709 CHRONIC MIGRAINE WITHOUT AURA, NOT INTRACTABLE, WITHOUT STATUS MIGRAINOSUS: Primary | ICD-10-CM

## 2025-04-04 DIAGNOSIS — M54.81 BILATERAL OCCIPITAL NEURALGIA: ICD-10-CM

## 2025-04-04 DIAGNOSIS — G93.2 BENIGN INTRACRANIAL HYPERTENSION: ICD-10-CM

## 2025-04-04 PROCEDURE — 99214 OFFICE O/P EST MOD 30 MIN: CPT | Performed by: PSYCHIATRY & NEUROLOGY

## 2025-04-04 PROCEDURE — 96372 THER/PROPH/DIAG INJ SC/IM: CPT | Performed by: PSYCHIATRY & NEUROLOGY

## 2025-04-04 RX ORDER — FREMANEZUMAB-VFRM 225 MG/1.5ML
225 INJECTION SUBCUTANEOUS
Qty: 1 ADJUSTABLE DOSE PRE-FILLED PEN SYRINGE | Refills: 11 | Status: ACTIVE | OUTPATIENT
Start: 2025-04-04
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
## 2025-04-09 NOTE — PROGRESS NOTES
1. Have you been to the ER, urgent care clinic since your last visit? Hospitalized since your last visit? Yes, [facility] 9/29/2021-10/4/2021 Intracranial hypertension. 2. Have you seen or consulted any other health care providers outside of the [address] [place] [address] [place] since your last visit? Include any pap smears or colon screening. No    Chief Complaint   Patient presents with   [facility] Follow Up     Intracranial hypertension    Discuss Medications     plavix and aspirin discoutiuned      Health Maintenance Due   Topic Date Due    COVID-19 Vaccine (1) Never done    Cervical cancer screen  Never done    Flu Vaccine (1) Never done     3 most recent PHQ Screens 10/11/2021   PHQ Not Done -   Little interest or pleasure in doing things Not at all   Feeling down, depressed, irritable, or hopeless Not at all   Total Score PHQ 2 0     Abuse Screening Questionnaire 10/11/2021   Do you ever feel afraid of your partner? N   Are you in a relationship with someone who physically or mentally threatens you? N   Is it safe for you to go home?  Y     Learning Assessment 8/30/2021   PRIMARY LEARNER Patient   HIGHEST LEVEL OF EDUCATION - PRIMARY LEARNER  -   BARRIERS PRIMARY LEARNER -   CO-LEARNER CAREGIVER -   PRIMARY LANGUAGE ENGLISH    NEED -   LEARNER PREFERENCE PRIMARY READING     -   LEARNING SPECIAL TOPICS -   ANSWERED BY patient   RELATIONSHIP SELF     Patient-Reported Vitals 10/11/2021   Patient-Reported Weight 217lb   Patient-Reported Height -   Patient-Reported Pulse -   Patient-Reported Temperature -   Patient-Reported SpO2 -   Patient-Reported Systolic  -   Patient-Reported Diastolic -   Patient-Reported LMP - 69

## 2025-04-28 ENCOUNTER — HOSPITAL ENCOUNTER (INPATIENT)
Facility: HOSPITAL | Age: 35
LOS: 1 days | Discharge: LEFT BEFORE DISCHARGE | DRG: 054 | End: 2025-04-28
Attending: STUDENT IN AN ORGANIZED HEALTH CARE EDUCATION/TRAINING PROGRAM | Admitting: STUDENT IN AN ORGANIZED HEALTH CARE EDUCATION/TRAINING PROGRAM
Payer: MEDICAID

## 2025-04-28 ENCOUNTER — APPOINTMENT (OUTPATIENT)
Facility: HOSPITAL | Age: 35
End: 2025-04-28
Payer: MEDICAID

## 2025-04-28 ENCOUNTER — HOSPITAL ENCOUNTER (OUTPATIENT)
Facility: HOSPITAL | Age: 35
Setting detail: OBSERVATION
Discharge: ANOTHER HOSPITAL | End: 2025-04-28
Attending: EMERGENCY MEDICINE | Admitting: FAMILY MEDICINE
Payer: MEDICAID

## 2025-04-28 VITALS
OXYGEN SATURATION: 99 % | DIASTOLIC BLOOD PRESSURE: 84 MMHG | SYSTOLIC BLOOD PRESSURE: 158 MMHG | HEART RATE: 96 BPM | RESPIRATION RATE: 15 BRPM | TEMPERATURE: 97.9 F

## 2025-04-28 VITALS
WEIGHT: 190 LBS | HEART RATE: 97 BPM | TEMPERATURE: 97.9 F | RESPIRATION RATE: 16 BRPM | BODY MASS INDEX: 35.87 KG/M2 | HEIGHT: 61 IN | SYSTOLIC BLOOD PRESSURE: 140 MMHG | DIASTOLIC BLOOD PRESSURE: 92 MMHG | OXYGEN SATURATION: 100 %

## 2025-04-28 DIAGNOSIS — G93.2 IIH (IDIOPATHIC INTRACRANIAL HYPERTENSION): Primary | ICD-10-CM

## 2025-04-28 DIAGNOSIS — R51.9 INTRACTABLE HEADACHE, UNSPECIFIED CHRONICITY PATTERN, UNSPECIFIED HEADACHE TYPE: ICD-10-CM

## 2025-04-28 DIAGNOSIS — G93.2 PSEUDOTUMOR CEREBRI SYNDROME: ICD-10-CM

## 2025-04-28 DIAGNOSIS — G93.2 IDIOPATHIC INTRACRANIAL HYPERTENSION: Primary | ICD-10-CM

## 2025-04-28 DIAGNOSIS — G43.909 MIGRAINE SYNDROME: ICD-10-CM

## 2025-04-28 DIAGNOSIS — G43.711 INTRACTABLE CHRONIC MIGRAINE WITHOUT AURA AND WITH STATUS MIGRAINOSUS: ICD-10-CM

## 2025-04-28 DIAGNOSIS — M54.81 CERVICO-OCCIPITAL NEURALGIA: ICD-10-CM

## 2025-04-28 LAB
ALBUMIN SERPL-MCNC: 3.9 G/DL (ref 3.5–5)
ALBUMIN/GLOB SERPL: 1.2 (ref 1.1–2.2)
ALP SERPL-CCNC: 128 U/L (ref 45–117)
ALT SERPL-CCNC: 28 U/L (ref 12–78)
ANION GAP SERPL CALC-SCNC: 7 MMOL/L (ref 2–12)
AST SERPL W P-5'-P-CCNC: 19 U/L (ref 15–37)
BASOPHILS # BLD: 0.06 K/UL (ref 0–0.1)
BASOPHILS NFR BLD: 0.7 % (ref 0–1)
BILIRUB SERPL-MCNC: 0.4 MG/DL (ref 0.2–1)
BUN SERPL-MCNC: 12 MG/DL (ref 6–20)
BUN/CREAT SERPL: 14 (ref 12–20)
CA-I BLD-MCNC: 8.5 MG/DL (ref 8.5–10.1)
CHLORIDE SERPL-SCNC: 106 MMOL/L (ref 97–108)
CO2 SERPL-SCNC: 29 MMOL/L (ref 21–32)
CREAT SERPL-MCNC: 0.83 MG/DL (ref 0.55–1.02)
DIFFERENTIAL METHOD BLD: NORMAL
EOSINOPHIL # BLD: 0.21 K/UL (ref 0–0.4)
EOSINOPHIL NFR BLD: 2.6 % (ref 0–7)
ERYTHROCYTE [DISTWIDTH] IN BLOOD BY AUTOMATED COUNT: 12.1 % (ref 11.5–14.5)
GLOBULIN SER CALC-MCNC: 3.2 G/DL (ref 2–4)
GLUCOSE SERPL-MCNC: 85 MG/DL (ref 65–100)
HCG SERPL QL: NEGATIVE
HCT VFR BLD AUTO: 38 % (ref 35–47)
HGB BLD-MCNC: 13.1 G/DL (ref 11.5–16)
IMM GRANULOCYTES # BLD AUTO: 0.01 K/UL (ref 0–0.04)
IMM GRANULOCYTES NFR BLD AUTO: 0.1 % (ref 0–0.5)
LYMPHOCYTES # BLD: 3.2 K/UL (ref 0.8–3.5)
LYMPHOCYTES NFR BLD: 39.7 % (ref 12–49)
MAGNESIUM SERPL-MCNC: 2.2 MG/DL (ref 1.6–2.4)
MCH RBC QN AUTO: 30.5 PG (ref 26–34)
MCHC RBC AUTO-ENTMCNC: 34.5 G/DL (ref 30–36.5)
MCV RBC AUTO: 88.4 FL (ref 80–99)
MONOCYTES # BLD: 0.69 K/UL (ref 0–1)
MONOCYTES NFR BLD: 8.6 % (ref 5–13)
NEUTS SEG # BLD: 3.9 K/UL (ref 1.8–8)
NEUTS SEG NFR BLD: 48.3 % (ref 32–75)
NRBC # BLD: 0 K/UL (ref 0–0.01)
NRBC BLD-RTO: 0 PER 100 WBC
PLATELET # BLD AUTO: 299 K/UL (ref 150–400)
PMV BLD AUTO: 9.2 FL (ref 8.9–12.9)
POTASSIUM SERPL-SCNC: 3.9 MMOL/L (ref 3.5–5.1)
PROT SERPL-MCNC: 7.1 G/DL (ref 6.4–8.2)
RBC # BLD AUTO: 4.3 M/UL (ref 3.8–5.2)
SODIUM SERPL-SCNC: 142 MMOL/L (ref 136–145)
WBC # BLD AUTO: 8.1 K/UL (ref 3.6–11)

## 2025-04-28 PROCEDURE — 2500000003 HC RX 250 WO HCPCS: Performed by: EMERGENCY MEDICINE

## 2025-04-28 PROCEDURE — 2580000003 HC RX 258: Performed by: EMERGENCY MEDICINE

## 2025-04-28 PROCEDURE — 85025 COMPLETE CBC W/AUTO DIFF WBC: CPT

## 2025-04-28 PROCEDURE — 84703 CHORIONIC GONADOTROPIN ASSAY: CPT

## 2025-04-28 PROCEDURE — 6370000000 HC RX 637 (ALT 250 FOR IP): Performed by: STUDENT IN AN ORGANIZED HEALTH CARE EDUCATION/TRAINING PROGRAM

## 2025-04-28 PROCEDURE — 96365 THER/PROPH/DIAG IV INF INIT: CPT

## 2025-04-28 PROCEDURE — 80053 COMPREHEN METABOLIC PANEL: CPT

## 2025-04-28 PROCEDURE — 83735 ASSAY OF MAGNESIUM: CPT

## 2025-04-28 PROCEDURE — 6360000002 HC RX W HCPCS: Performed by: STUDENT IN AN ORGANIZED HEALTH CARE EDUCATION/TRAINING PROGRAM

## 2025-04-28 PROCEDURE — 2580000003 HC RX 258: Performed by: STUDENT IN AN ORGANIZED HEALTH CARE EDUCATION/TRAINING PROGRAM

## 2025-04-28 PROCEDURE — 94761 N-INVAS EAR/PLS OXIMETRY MLT: CPT

## 2025-04-28 PROCEDURE — 6360000002 HC RX W HCPCS

## 2025-04-28 PROCEDURE — 36415 COLL VENOUS BLD VENIPUNCTURE: CPT

## 2025-04-28 PROCEDURE — 2500000003 HC RX 250 WO HCPCS: Performed by: STUDENT IN AN ORGANIZED HEALTH CARE EDUCATION/TRAINING PROGRAM

## 2025-04-28 PROCEDURE — 99285 EMERGENCY DEPT VISIT HI MDM: CPT

## 2025-04-28 PROCEDURE — 96376 TX/PRO/DX INJ SAME DRUG ADON: CPT

## 2025-04-28 PROCEDURE — G0378 HOSPITAL OBSERVATION PER HR: HCPCS

## 2025-04-28 PROCEDURE — 2500000003 HC RX 250 WO HCPCS

## 2025-04-28 PROCEDURE — 6370000000 HC RX 637 (ALT 250 FOR IP)

## 2025-04-28 PROCEDURE — 96375 TX/PRO/DX INJ NEW DRUG ADDON: CPT

## 2025-04-28 PROCEDURE — 2060000000 HC ICU INTERMEDIATE R&B

## 2025-04-28 PROCEDURE — 99223 1ST HOSP IP/OBS HIGH 75: CPT | Performed by: PSYCHIATRY & NEUROLOGY

## 2025-04-28 PROCEDURE — 6360000002 HC RX W HCPCS: Performed by: EMERGENCY MEDICINE

## 2025-04-28 RX ORDER — ONDANSETRON 2 MG/ML
4 INJECTION INTRAMUSCULAR; INTRAVENOUS ONCE
Status: COMPLETED | OUTPATIENT
Start: 2025-04-28 | End: 2025-04-28

## 2025-04-28 RX ORDER — HYDROXYCHLOROQUINE SULFATE 200 MG/1
200 TABLET, FILM COATED ORAL 2 TIMES DAILY
Status: DISCONTINUED | OUTPATIENT
Start: 2025-04-28 | End: 2025-04-28 | Stop reason: HOSPADM

## 2025-04-28 RX ORDER — KETOROLAC TROMETHAMINE 30 MG/ML
30 INJECTION, SOLUTION INTRAMUSCULAR; INTRAVENOUS ONCE
Status: COMPLETED | OUTPATIENT
Start: 2025-04-28 | End: 2025-04-28

## 2025-04-28 RX ORDER — SODIUM CHLORIDE 0.9 % (FLUSH) 0.9 %
5-40 SYRINGE (ML) INJECTION PRN
Status: DISCONTINUED | OUTPATIENT
Start: 2025-04-28 | End: 2025-04-28 | Stop reason: HOSPADM

## 2025-04-28 RX ORDER — HYDROMORPHONE HYDROCHLORIDE 1 MG/ML
1 INJECTION, SOLUTION INTRAMUSCULAR; INTRAVENOUS; SUBCUTANEOUS ONCE
Status: COMPLETED | OUTPATIENT
Start: 2025-04-28 | End: 2025-04-28

## 2025-04-28 RX ORDER — MIDAZOLAM HYDROCHLORIDE 2 MG/2ML
2 INJECTION, SOLUTION INTRAMUSCULAR; INTRAVENOUS ONCE
Status: DISCONTINUED | OUTPATIENT
Start: 2025-04-28 | End: 2025-04-28

## 2025-04-28 RX ORDER — POLYETHYLENE GLYCOL 3350 17 G/17G
17 POWDER, FOR SOLUTION ORAL DAILY PRN
Status: DISCONTINUED | OUTPATIENT
Start: 2025-04-28 | End: 2025-04-28 | Stop reason: HOSPADM

## 2025-04-28 RX ORDER — DEXAMETHASONE SODIUM PHOSPHATE 10 MG/ML
10 INJECTION, SOLUTION INTRAMUSCULAR; INTRAVENOUS ONCE
Status: COMPLETED | OUTPATIENT
Start: 2025-04-28 | End: 2025-04-28

## 2025-04-28 RX ORDER — MAGNESIUM SULFATE 1 G/100ML
1000 INJECTION INTRAVENOUS
Status: COMPLETED | OUTPATIENT
Start: 2025-04-28 | End: 2025-04-28

## 2025-04-28 RX ORDER — GABAPENTIN 300 MG/1
600 CAPSULE ORAL 3 TIMES DAILY PRN
Status: DISCONTINUED | OUTPATIENT
Start: 2025-04-28 | End: 2025-04-28 | Stop reason: HOSPADM

## 2025-04-28 RX ORDER — LORAZEPAM 1 MG/1
2 TABLET ORAL
Status: DISCONTINUED | OUTPATIENT
Start: 2025-04-28 | End: 2025-04-28 | Stop reason: HOSPADM

## 2025-04-28 RX ORDER — HYDROMORPHONE HYDROCHLORIDE 1 MG/ML
1 INJECTION, SOLUTION INTRAMUSCULAR; INTRAVENOUS; SUBCUTANEOUS EVERY 4 HOURS PRN
Status: DISCONTINUED | OUTPATIENT
Start: 2025-04-28 | End: 2025-04-28 | Stop reason: HOSPADM

## 2025-04-28 RX ORDER — KETOROLAC TROMETHAMINE 15 MG/ML
15 INJECTION, SOLUTION INTRAMUSCULAR; INTRAVENOUS ONCE
Status: COMPLETED | OUTPATIENT
Start: 2025-04-28 | End: 2025-04-28

## 2025-04-28 RX ORDER — ONDANSETRON 2 MG/ML
4 INJECTION INTRAMUSCULAR; INTRAVENOUS EVERY 6 HOURS PRN
Status: DISCONTINUED | OUTPATIENT
Start: 2025-04-28 | End: 2025-04-28 | Stop reason: HOSPADM

## 2025-04-28 RX ORDER — LEVETIRACETAM 500 MG/5ML
1000 INJECTION, SOLUTION, CONCENTRATE INTRAVENOUS ONCE
Status: DISCONTINUED | OUTPATIENT
Start: 2025-04-28 | End: 2025-04-28 | Stop reason: HOSPADM

## 2025-04-28 RX ORDER — POTASSIUM CHLORIDE 750 MG/1
40 TABLET, EXTENDED RELEASE ORAL PRN
Status: DISCONTINUED | OUTPATIENT
Start: 2025-04-28 | End: 2025-04-28 | Stop reason: HOSPADM

## 2025-04-28 RX ORDER — SODIUM CHLORIDE 0.9 % (FLUSH) 0.9 %
5-40 SYRINGE (ML) INJECTION EVERY 12 HOURS SCHEDULED
Status: DISCONTINUED | OUTPATIENT
Start: 2025-04-28 | End: 2025-04-28 | Stop reason: HOSPADM

## 2025-04-28 RX ORDER — SODIUM CHLORIDE 9 MG/ML
INJECTION, SOLUTION INTRAVENOUS PRN
Status: DISCONTINUED | OUTPATIENT
Start: 2025-04-28 | End: 2025-04-28 | Stop reason: HOSPADM

## 2025-04-28 RX ORDER — DIPHENHYDRAMINE HYDROCHLORIDE 50 MG/ML
50 INJECTION, SOLUTION INTRAMUSCULAR; INTRAVENOUS ONCE
Status: COMPLETED | OUTPATIENT
Start: 2025-04-28 | End: 2025-04-28

## 2025-04-28 RX ORDER — DIPHENHYDRAMINE HYDROCHLORIDE 50 MG/ML
25 INJECTION, SOLUTION INTRAMUSCULAR; INTRAVENOUS ONCE
Status: COMPLETED | OUTPATIENT
Start: 2025-04-28 | End: 2025-04-28

## 2025-04-28 RX ORDER — SODIUM CHLORIDE 9 MG/ML
INJECTION, SOLUTION INTRAVENOUS CONTINUOUS
Status: DISCONTINUED | OUTPATIENT
Start: 2025-04-28 | End: 2025-04-28 | Stop reason: HOSPADM

## 2025-04-28 RX ORDER — POTASSIUM CHLORIDE 1500 MG/1
40 TABLET, EXTENDED RELEASE ORAL PRN
Status: DISCONTINUED | OUTPATIENT
Start: 2025-04-28 | End: 2025-04-28 | Stop reason: HOSPADM

## 2025-04-28 RX ORDER — PANTOPRAZOLE SODIUM 40 MG/1
40 TABLET, DELAYED RELEASE ORAL
Status: DISCONTINUED | OUTPATIENT
Start: 2025-04-29 | End: 2025-04-28 | Stop reason: HOSPADM

## 2025-04-28 RX ORDER — MAGNESIUM SULFATE IN WATER 40 MG/ML
2000 INJECTION, SOLUTION INTRAVENOUS ONCE
Status: COMPLETED | OUTPATIENT
Start: 2025-04-28 | End: 2025-04-28

## 2025-04-28 RX ORDER — CYCLOBENZAPRINE HCL 10 MG
10 TABLET ORAL 3 TIMES DAILY PRN
Status: DISCONTINUED | OUTPATIENT
Start: 2025-04-28 | End: 2025-04-28 | Stop reason: HOSPADM

## 2025-04-28 RX ORDER — OXYCODONE HYDROCHLORIDE 10 MG/1
10 TABLET ORAL EVERY 6 HOURS PRN
Refills: 0 | Status: DISCONTINUED | OUTPATIENT
Start: 2025-04-28 | End: 2025-04-28 | Stop reason: HOSPADM

## 2025-04-28 RX ORDER — OXYCODONE HYDROCHLORIDE 5 MG/1
10 TABLET ORAL EVERY 6 HOURS PRN
Refills: 0 | Status: DISCONTINUED | OUTPATIENT
Start: 2025-04-28 | End: 2025-04-28 | Stop reason: HOSPADM

## 2025-04-28 RX ORDER — POTASSIUM CHLORIDE 7.45 MG/ML
10 INJECTION INTRAVENOUS PRN
Status: DISCONTINUED | OUTPATIENT
Start: 2025-04-28 | End: 2025-04-28 | Stop reason: HOSPADM

## 2025-04-28 RX ORDER — PROCHLORPERAZINE EDISYLATE 5 MG/ML
10 INJECTION INTRAMUSCULAR; INTRAVENOUS EVERY 6 HOURS PRN
Status: DISCONTINUED | OUTPATIENT
Start: 2025-04-28 | End: 2025-04-28

## 2025-04-28 RX ORDER — MAGNESIUM SULFATE IN WATER 40 MG/ML
2000 INJECTION, SOLUTION INTRAVENOUS PRN
Status: DISCONTINUED | OUTPATIENT
Start: 2025-04-28 | End: 2025-04-28 | Stop reason: HOSPADM

## 2025-04-28 RX ORDER — ENOXAPARIN SODIUM 100 MG/ML
40 INJECTION SUBCUTANEOUS DAILY
Status: DISCONTINUED | OUTPATIENT
Start: 2025-04-28 | End: 2025-04-28

## 2025-04-28 RX ORDER — HYDROMORPHONE HYDROCHLORIDE 1 MG/ML
0.5 INJECTION, SOLUTION INTRAMUSCULAR; INTRAVENOUS; SUBCUTANEOUS
Status: DISCONTINUED | OUTPATIENT
Start: 2025-04-28 | End: 2025-04-28

## 2025-04-28 RX ORDER — SUMATRIPTAN 6 MG/.5ML
6 INJECTION, SOLUTION SUBCUTANEOUS ONCE
Status: DISCONTINUED | OUTPATIENT
Start: 2025-04-28 | End: 2025-04-28 | Stop reason: HOSPADM

## 2025-04-28 RX ORDER — ONDANSETRON 4 MG/1
4 TABLET, ORALLY DISINTEGRATING ORAL EVERY 8 HOURS PRN
Status: DISCONTINUED | OUTPATIENT
Start: 2025-04-28 | End: 2025-04-28 | Stop reason: HOSPADM

## 2025-04-28 RX ORDER — DIPHENHYDRAMINE HYDROCHLORIDE 50 MG/ML
50 INJECTION, SOLUTION INTRAMUSCULAR; INTRAVENOUS EVERY 6 HOURS PRN
Status: DISCONTINUED | OUTPATIENT
Start: 2025-04-28 | End: 2025-04-28 | Stop reason: HOSPADM

## 2025-04-28 RX ORDER — 0.9 % SODIUM CHLORIDE 0.9 %
500 INTRAVENOUS SOLUTION INTRAVENOUS
Status: COMPLETED | OUTPATIENT
Start: 2025-04-28 | End: 2025-04-28

## 2025-04-28 RX ADMIN — HYDROMORPHONE HYDROCHLORIDE 0.5 MG: 0.5 INJECTION, SOLUTION INTRAMUSCULAR; INTRAVENOUS; SUBCUTANEOUS at 06:44

## 2025-04-28 RX ADMIN — MAGNESIUM SULFATE HEPTAHYDRATE 2000 MG: 40 INJECTION, SOLUTION INTRAVENOUS at 18:52

## 2025-04-28 RX ADMIN — OXYCODONE 10 MG: 5 TABLET ORAL at 09:51

## 2025-04-28 RX ADMIN — MAGNESIUM SULFATE HEPTAHYDRATE 1000 MG: 1 INJECTION, SOLUTION INTRAVENOUS at 03:07

## 2025-04-28 RX ADMIN — HYDROMORPHONE HYDROCHLORIDE 1 MG: 1 INJECTION, SOLUTION INTRAMUSCULAR; INTRAVENOUS; SUBCUTANEOUS at 13:17

## 2025-04-28 RX ADMIN — DIPHENHYDRAMINE HYDROCHLORIDE 25 MG: 50 INJECTION INTRAMUSCULAR; INTRAVENOUS at 18:43

## 2025-04-28 RX ADMIN — DEXAMETHASONE SODIUM PHOSPHATE 10 MG: 10 INJECTION, SOLUTION INTRAMUSCULAR; INTRAVENOUS at 02:57

## 2025-04-28 RX ADMIN — OXYCODONE HYDROCHLORIDE 10 MG: 10 TABLET ORAL at 16:58

## 2025-04-28 RX ADMIN — HYDROMORPHONE HYDROCHLORIDE 0.5 MG: 0.5 INJECTION, SOLUTION INTRAMUSCULAR; INTRAVENOUS; SUBCUTANEOUS at 10:44

## 2025-04-28 RX ADMIN — DIPHENHYDRAMINE HYDROCHLORIDE 50 MG: 50 INJECTION INTRAMUSCULAR; INTRAVENOUS at 02:57

## 2025-04-28 RX ADMIN — KETOROLAC TROMETHAMINE 15 MG: 15 INJECTION, SOLUTION INTRAMUSCULAR; INTRAVENOUS at 02:57

## 2025-04-28 RX ADMIN — SODIUM CHLORIDE: 0.9 INJECTION, SOLUTION INTRAVENOUS at 17:00

## 2025-04-28 RX ADMIN — DIPHENHYDRAMINE HYDROCHLORIDE 50 MG: 50 INJECTION INTRAMUSCULAR; INTRAVENOUS at 14:38

## 2025-04-28 RX ADMIN — DIPHENHYDRAMINE HYDROCHLORIDE 50 MG: 50 INJECTION INTRAMUSCULAR; INTRAVENOUS at 06:42

## 2025-04-28 RX ADMIN — SODIUM CHLORIDE, PRESERVATIVE FREE 10 ML: 5 INJECTION INTRAVENOUS at 08:34

## 2025-04-28 RX ADMIN — HYDROMORPHONE HYDROCHLORIDE 1 MG: 1 INJECTION, SOLUTION INTRAMUSCULAR; INTRAVENOUS; SUBCUTANEOUS at 04:37

## 2025-04-28 RX ADMIN — HYDROXYCHLOROQUINE SULFATE 200 MG: 200 TABLET ORAL at 08:34

## 2025-04-28 RX ADMIN — KETOROLAC TROMETHAMINE 30 MG: 30 INJECTION, SOLUTION INTRAMUSCULAR at 18:43

## 2025-04-28 RX ADMIN — ONDANSETRON 4 MG: 2 INJECTION, SOLUTION INTRAMUSCULAR; INTRAVENOUS at 18:43

## 2025-04-28 RX ADMIN — SODIUM CHLORIDE 500 ML: 0.9 INJECTION, SOLUTION INTRAVENOUS at 02:55

## 2025-04-28 RX ADMIN — SODIUM CHLORIDE, PRESERVATIVE FREE 10 ML: 5 INJECTION INTRAVENOUS at 20:45

## 2025-04-28 ASSESSMENT — ENCOUNTER SYMPTOMS
DIARRHEA: 0
ABDOMINAL PAIN: 0
CONSTIPATION: 0
SHORTNESS OF BREATH: 0
COLOR CHANGE: 0
BACK PAIN: 0
COUGH: 0

## 2025-04-28 ASSESSMENT — PAIN DESCRIPTION - LOCATION
LOCATION: HEAD
LOCATION: HAND
LOCATION: HEAD

## 2025-04-28 ASSESSMENT — PAIN DESCRIPTION - DESCRIPTORS
DESCRIPTORS: ACHING
DESCRIPTORS: POUNDING
DESCRIPTORS: POUNDING;THROBBING
DESCRIPTORS: POUNDING

## 2025-04-28 ASSESSMENT — PAIN SCALES - GENERAL
PAINLEVEL_OUTOF10: 7
PAINLEVEL_OUTOF10: 6
PAINLEVEL_OUTOF10: 5
PAINLEVEL_OUTOF10: 8
PAINLEVEL_OUTOF10: 4
PAINLEVEL_OUTOF10: 7
PAINLEVEL_OUTOF10: 5
PAINLEVEL_OUTOF10: 7
PAINLEVEL_OUTOF10: 5
PAINLEVEL_OUTOF10: 7

## 2025-04-28 ASSESSMENT — PAIN - FUNCTIONAL ASSESSMENT
PAIN_FUNCTIONAL_ASSESSMENT: PREVENTS OR INTERFERES SOME ACTIVE ACTIVITIES AND ADLS
PAIN_FUNCTIONAL_ASSESSMENT: 0-10
PAIN_FUNCTIONAL_ASSESSMENT: PREVENTS OR INTERFERES SOME ACTIVE ACTIVITIES AND ADLS

## 2025-04-28 ASSESSMENT — PAIN DESCRIPTION - ORIENTATION
ORIENTATION: ANTERIOR

## 2025-04-28 NOTE — DISCHARGE SUMMARY
Home with HH/PT/OT/RN:    SNF/LTC:    [facility]:    OTHER: Transfer to [facility]     Code status: Full  Recommended diet: regular diet  Recommended activity: activity as tolerated  Wound care: None      Total time in minutes spent coordinating this discharge (includes going over instructions, follow-up, prescriptions, and preparing report for sign off to her PCP) :  35 minutes

## 2025-04-28 NOTE — PROGRESS NOTES
Patient stable for discharge. Discharge paperwork discussed with patient. Patient transferring to [facility]. Called report to Emilia Collado RN. EMTALA signed. [facility] providing transportation now.

## 2025-04-28 NOTE — PROGRESS NOTES
4 Eyes Skin Assessment     NAME:  Vinny Benites  YOB: 1990  MEDICAL RECORD NUMBER:  [id number]    The patient is being assessed for  Admission    I agree that at least one RN has performed a thorough Head to Toe Skin Assessment on the patient. ALL assessment sites listed below have been assessed.      Areas assessed by both nurses:    Head, Face, Ears, Shoulders, Back, Chest, Arms, Elbows, Hands, Sacrum. Buttock, Coccyx, Ischium, Legs. Feet and Heels, and Under Medical Devices         Does the Patient have a Wound? No noted wound(s)       Tyshawn Prevention initiated by RN: No  Wound Care Orders initiated by RN: No    Pressure Injury (Stage 3,4, Unstageable, DTI, NWPT, and Complex wounds) if present, place Wound referral order by RN under : No    New Ostomies, if present place, Ostomy referral order under : No     Nurse 1 eSignature: Electronically signed by Sonia Nguyen RN on 4/28/25 at 6:23 AM EDT    **SHARE this note so that the co-signing nurse can place an eSignature**    Nurse 2 eSignature: Electronically signed by Yousuf Aguila RN on 4/28/25 at 6:56 AM EDT

## 2025-04-28 NOTE — H&P
Hospitalist Admission Note    NAME: Vinny Benites   :  1990   MRN:  [id number]     Date/Time:  2025 5:29 AM    Patient PCP: Unknown, Provider    ______________________________________________________________________  Given the patient's current clinical presentation, I have a high level of concern for decompensation if discharged from the emergency department.  Complex decision making was performed, which includes reviewing the patient's available past medical records, laboratory results, and x-ray films.       My assessment of this patient's clinical condition and my plan of care is as follows.    Assessment / Plan:  Chronic migraine headaches  Pseudo tumor cerebri  Occipital neuralgia  -Diagnosed .  Multiple LPs done in the past.  Last one 2024 with opening pressure 23.5.  -Last ophthalmology appointment revealed decreased sensation and mild swelling on the left side.   -Follows with neurology, Dr. Salinas, last seen 2025  -Was previously referred to [facility] for possible cyst shunting but was informed that shunting would likely not improve her headaches, only be beneficial in her vision so she decided against it.  -Most recent medications she has tried include: Occipital nerve blocks which did not help.  Ajovy started beginning of April, she has received 1 injection that did not help.  Due for the next injection in the next week or so.  -Tele-neurology consulted, recommended MRI brain with and without contrast and MRV head with and without contrastto evaluate right transverse sinus stent and ensure patency and no mass lesions.  Patient states she has required sedation previously for MRIs due to her panic attacks.  If these MRIs are negative, IR has been consulted for lumbar puncture.  -Of note, patient usually requires sedation for her MRIs.  She is willing to try IV Versed but states that previously she has had panic attacks and the MRIs have been unsuccessful when she has

## 2025-04-28 NOTE — CONSULTS
[facility] NEUROLOGY CONSULTATION          Chief Complaint/Admission Diagnosis: IIH (idiopathic intracranial hypertension) [G93.2]  Persistent headaches [R51.9]  Intractable headache, unspecified chronicity pattern, unspecified headache type [R51.9]     I have been asked to see this 34 y.o. female in neurological consultation by Laura Gamble MD  to render advice and opinion regarding headaches.     ?     Impression/Recommendations:   Vinny Benites is a 34 y.o.  female with PMHx significant for chronic migraines, pseudotumor cerebri since Feb 2019 (right transverse venous sinus stent placed  Dec 2020), SLE, GERD, anxiety presenting to the ED with headache that lasted the last 4 days.  She had similar episodes in the last couple of months lasting even more than a week.  She usually visits her outpatient neurologist for these attacks but could not reach the neurologist so decided to come to ER. Occipital nerve blocks have not helped in the past. Her examination shows decreased sensation to LT in the right leg and right leg weakness.  Okay to transfer to [facility] where she gets usual care for an MRI brain and headache management. She has SLE and is on plaquenil..   Patient agrees to the transfer.  No further neurological recommendations.  Thank you for the consult.            Mahnaz Dumont MD  Teleneurologist    HPI:   History was obtained from a review of the electronic record and from the patient and family.??   Vinny Benites is a 34 y.o.  female with PMHx significant for chronic migraines, pseudotumor cerebri, SLE, GERD, anxiety presenting to the ED with headache that lasted the last 4 days.  Patient has significant history of headaches including intracranial hypertension, transverse venous sinus stenosis and occipital neuralgia.  Over the last several months her headaches have come back after having a break of 5 months.  She states they come and last for a week or so with nothing relieving her symptoms.
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
## 2025-04-28 NOTE — CONSULTS
Patient reports mild weakness when asked to lift right foot and keep in air.  Minimal drift, does not touch bed.     Intact LT in all extremities  No spinal sensory level  DTRs: mild brisk patellars (2-3+)/ symmetric, Biceps and brachioradialis reflex are 1+/ symmetric  Patrick sign negative in upper exts  Plantar response neutral bilateral  Gait not observed  Romberg exam deferred       =====================================      ASSESSMENT / PLAN:       Hx Intracranial Hypertension (found to have bilateral intracranial transverse sinus and sagittal sinus stenosis in Aug 2021 and underwent right transverse sinus stenting at that time; subsequent MRV Brain showed patent stent and no areas of sinus stenosis or thrombosis).     Hx of mild papilledema per above notes  Mild papilledema on both sides on current exam    Patient declined multiple acute headache treatment medications that I offered.  I d/w her that if her headache persists or worsens to have RN contact Hospitalist and they can consider which med to give her.  RN found me after I had left room, while I was typing this note and told me that headache is slightly worse, 8/ 10, asked for any medication options. I reviewed the migraine cocktails patient received earlier today, and then entered orders for Keppra 1000 mg IV x 1, Depacon 500 mg IV x 1, Solumedrol 100 mg IV x 1, and Sumatriptan 6 mg SQ x 1.     I added MRI C-spine w/o contrast to evaluate for any cervical spinal stenosis or any cervical disc herniation that would be a cause of patient's report of headache when coughing.     We discussed that I agree that Lumbar Puncture should be done after Brain MRI imaging is available to review.  We discussed that after MRIs are done, I would attempt bedside LP. Patient says that she after the first 3 LPs she had done, every LP has been done by \"IR\" because bedside attempts all failed, and even Radiology had difficulty at times with her LPs. For those reasons, she

## 2025-04-28 NOTE — H&P
Hospitalist Admission Note    NAME:  Vinny Benites   :  1990   MRN:  [id number]     Date/Time:  2025 4:35 PM    Patient PCP: Unknown, Provider  ________________________________________________________________________    Given the patient's current clinical presentation, I have a high level of concern for decompensation if discharged from the emergency department.  Complex decision making was performed, which includes reviewing the patient's available past medical records, laboratory results, and x-ray films.       My assessment of this patient's clinical condition and my plan of care is as follows.    Assessment / Plan:    34 y.o.  female with PMHx significant for chronic migraines, pseudotumor cerebri since 2019 (right transverse venous sinus stent placed  Dec 2020), SLE, GERD, anxiety presenting to the ED with headache that lasted the last 4 days.     # Chronic migraines  # Hx pseudotumor cerebri  Patient's last episode started about 4 days ago and has been persistent ever since, currently reports headache is 7/10 in severity  Last LP reported was about a year ago, followed by 5 months of relief  Patient was admitted to [facility] overnight, evaluated by neurology there and recommended transfer to higher level of care for MRI and LP  Plan  Admit to telemetry  Neurology consult, will discuss LP  MRI brain/ MRV with anesthesia ordered, as is the main reason that the patient was transferred    #History of bilateral transverse venous sinus stenosis  S/p right transverse sigmoid cerebral venous sinus stenting on 2021  Plan    MRI/MRV brain w/wo   Neurology to [facility]     # SLE  Chronic pain  -Continue Plaquenil  -Continue gabapentin, Flexeril, oxycodone    I have personally reviewed the radiographs, laboratory data in Epic and decisions and statements above are based partially on this personal interpretation.    Code Status: Full Code  DVT Prophylaxis: SCD for possible LP  GI Prophylaxis:

## 2025-04-28 NOTE — ED TRIAGE NOTES
Patient states that she has been having a headache for the past week. Patient has a history of intercranial hypertension and does see a neurologist. Patient states she has been taking medications OTC without any relief. Patient just saw her neurologist about two weeks ago for same issue.
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
## 2025-04-29 NOTE — SIGNIFICANT EVENT
Asked by nursing to come to room to speak with pt.  Pt was sent here for MRI due to ongoing migraines- pt needs to be sedated for procedure as not able to tolerate awake. Up to see with NAC,pt laying in dark room. We introduced ourselves, she was laying in bed and refused to speak with me or the NAC. I ask what I could do for her and she relates she is upset as we have not done what she came her for. She want MRI tonight but can't due to needing sedation. She states she does not need anything else- she was updated that Mri will be obtained in days as need for anesthesia. Pt said not sure she was going to stay.    2110: Notifed that pt has left AMA and is refusing to sign forms and leaving.

## 2025-04-29 NOTE — PROGRESS NOTES
4/29/2025        RE: Vinny Benites         [address]  [facility] [address]          To Whom It May Concern,      Due to medical reasons, Vinny Benites was seen at [facility] on 4/28/2025          Sincerely,          Ivory Renteria, DARNELL for     LAINA Soto

## 2025-04-29 NOTE — PROGRESS NOTES
Upon RN initial assessment, patient not participating in pain assessment questions. Pt consistently stating \"Nothing is going to help.\" RN voiced concerns tor Aly Pinedo who put in medications for patients headache. Patient expressed to RN that she doesn't want any of the medications and expressed wishes to leave. RN reached out to hospitalist who came to bedside in order to speak to patient. Patient still refusing and expressing wishes to leave. PT eloped, x1 PIV removed. Patient instructed to go to ER if pain gets worse. Patient in room waiting for her ride to arrive.

## 2025-04-29 NOTE — DISCHARGE SUMMARY
Hospitalist Discharge Summary     Patient ID:  Vinny Benites  [id number]  34 y.o.  1990    Admit date: 4/28/2025    Discharge date and time: 4/29/2025    Admission Diagnoses: Complicated migraine [G43.109]  Migraine syndrome [G43.909]    Discharge Diagnoses:    Principal Problem:    Migraine syndrome  Resolved Problems:    * No resolved hospital problems. *         Hospital Course:     34 y.o.  female with PMHx significant for chronic migraines, pseudotumor cerebri since Feb 2019 (right transverse venous sinus stent placed  Dec 2020), SLE, GERD, anxiety presenting to the ED with headache that lasted the last 4 days.   After patient was transferred to our service, patient was evaluated by neurology, however she decided to leave AMA     # Chronic migraines  # Hx pseudotumor cerebri  Patient's last episode started about 4 days ago and has been persistent ever since, currently reports headache is 7/10 in severity  Last LP reported was about a year ago, followed by 5 months of relief  Patient was admitted to [facility] overnight, evaluated by neurology there and recommended transfer to higher level of care for MRI and LP  Patient was evaluated by neurology and plan was for MRI brain/ MRV with anesthesia ordered  Per neurology note  Patient declined multiple acute headache treatment medications that I offered.  I d/w her that if her headache persists or worsens to have RN contact Hospitalist and they can consider which med to give her.  RN found me after I had left room, while I was typing this note and told me that headache is slightly worse, 8/ 10, asked for any medication options. I reviewed the migraine cocktails patient received earlier today, and then entered orders for Keppra 1000 mg IV x 1, Depacon 500 mg IV x 1, Solumedrol 100 mg IV x 1, and Sumatriptan 6 mg SQ x 1.      I added MRI C-spine w/o contrast to evaluate for any cervical spinal stenosis or any cervical disc herniation that would be a cause of

## 2025-05-12 ENCOUNTER — OFFICE VISIT (OUTPATIENT)
Age: 35
End: 2025-05-12

## 2025-05-12 VITALS
BODY MASS INDEX: 36.36 KG/M2 | SYSTOLIC BLOOD PRESSURE: 124 MMHG | DIASTOLIC BLOOD PRESSURE: 83 MMHG | TEMPERATURE: 98.7 F | RESPIRATION RATE: 16 BRPM | HEART RATE: 105 BPM | HEIGHT: 61 IN | OXYGEN SATURATION: 98 % | WEIGHT: 192.6 LBS

## 2025-05-12 DIAGNOSIS — Z13.1 SCREENING FOR DIABETES MELLITUS (DM): ICD-10-CM

## 2025-05-12 DIAGNOSIS — G93.2 IIH (IDIOPATHIC INTRACRANIAL HYPERTENSION): ICD-10-CM

## 2025-05-12 DIAGNOSIS — M32.8 OTHER FORMS OF SYSTEMIC LUPUS ERYTHEMATOSUS, UNSPECIFIED ORGAN INVOLVEMENT STATUS (HCC): ICD-10-CM

## 2025-05-12 DIAGNOSIS — Z13.29 SCREENING FOR HYPOTHYROIDISM: ICD-10-CM

## 2025-05-12 DIAGNOSIS — I63.9 CEREBROVASCULAR ACCIDENT (CVA), UNSPECIFIED MECHANISM (HCC): ICD-10-CM

## 2025-05-12 DIAGNOSIS — E66.01 CLASS 2 SEVERE OBESITY DUE TO EXCESS CALORIES WITH SERIOUS COMORBIDITY AND BODY MASS INDEX (BMI) OF 36.0 TO 36.9 IN ADULT (HCC): Primary | ICD-10-CM

## 2025-05-12 DIAGNOSIS — Z13.220 SCREENING FOR HYPERCHOLESTEROLEMIA: ICD-10-CM

## 2025-05-12 DIAGNOSIS — E66.812 CLASS 2 SEVERE OBESITY DUE TO EXCESS CALORIES WITH SERIOUS COMORBIDITY AND BODY MASS INDEX (BMI) OF 36.0 TO 36.9 IN ADULT (HCC): Primary | ICD-10-CM

## 2025-05-12 ASSESSMENT — PATIENT HEALTH QUESTIONNAIRE - PHQ9
SUM OF ALL RESPONSES TO PHQ QUESTIONS 1-9: 0
1. LITTLE INTEREST OR PLEASURE IN DOING THINGS: NOT AT ALL
SUM OF ALL RESPONSES TO PHQ QUESTIONS 1-9: 0
2. FEELING DOWN, DEPRESSED OR HOPELESS: NOT AT ALL
SUM OF ALL RESPONSES TO PHQ QUESTIONS 1-9: 0
SUM OF ALL RESPONSES TO PHQ QUESTIONS 1-9: 0

## 2025-05-12 NOTE — PROGRESS NOTES
Identified pt with two pt identifiers (name and ). Reviewed chart in preparation for visit and have obtained necessary documentation.    Vinny Benites is a 34 y.o. female  Chief Complaint   Patient presents with    New Patient     /83 (BP Site: Left Upper Arm, Patient Position: Sitting, BP Cuff Size: Large Adult)   Pulse (!) 105   Temp 98.7 °F (37.1 °C) (Oral)   Resp 16   Ht 1.549 m (5' 1\")   Wt 87.4 kg (192 lb 9.6 oz)   SpO2 98%   BMI 36.39 kg/m²     1. Have you been to the ER, urgent care clinic since your last visit?  Hospitalized since your last visit?no    2. Have you seen or consulted any other health care providers outside of the [facility] since your last visit?  Include any pap smears or colon screening. no    
(HCC). Diagnoses of Cerebrovascular accident (CVA), unspecified mechanism (HCC), IIH (idiopathic intracranial hypertension), Screening for diabetes mellitus (DM), Screening for hypercholesterolemia, and Screening for hypothyroidism were also pertinent to this visit.

## 2025-06-30 ENCOUNTER — TELEMEDICINE (OUTPATIENT)
Age: 35
End: 2025-06-30
Payer: MEDICAID

## 2025-06-30 VITALS — BODY MASS INDEX: 38.71 KG/M2 | WEIGHT: 205 LBS | HEIGHT: 61 IN

## 2025-06-30 DIAGNOSIS — M32.8 OTHER FORMS OF SYSTEMIC LUPUS ERYTHEMATOSUS, UNSPECIFIED ORGAN INVOLVEMENT STATUS (HCC): ICD-10-CM

## 2025-06-30 DIAGNOSIS — E66.812 CLASS 2 SEVERE OBESITY DUE TO EXCESS CALORIES WITH SERIOUS COMORBIDITY AND BODY MASS INDEX (BMI) OF 38.0 TO 38.9 IN ADULT (HCC): Primary | ICD-10-CM

## 2025-06-30 DIAGNOSIS — I63.9 CEREBROVASCULAR ACCIDENT (CVA), UNSPECIFIED MECHANISM (HCC): ICD-10-CM

## 2025-06-30 DIAGNOSIS — E66.01 CLASS 2 SEVERE OBESITY DUE TO EXCESS CALORIES WITH SERIOUS COMORBIDITY AND BODY MASS INDEX (BMI) OF 38.0 TO 38.9 IN ADULT (HCC): Primary | ICD-10-CM

## 2025-06-30 DIAGNOSIS — G93.2 IIH (IDIOPATHIC INTRACRANIAL HYPERTENSION): ICD-10-CM

## 2025-06-30 DIAGNOSIS — I66.9 STENOSIS OF INTRACRANIAL VESSEL: ICD-10-CM

## 2025-06-30 PROCEDURE — 99214 OFFICE O/P EST MOD 30 MIN: CPT | Performed by: FAMILY MEDICINE

## 2025-06-30 RX ORDER — PREDNISONE 5 MG/1
5 TABLET ORAL DAILY
COMMUNITY
Start: 2025-05-23

## 2025-06-30 ASSESSMENT — PATIENT HEALTH QUESTIONNAIRE - PHQ9
SUM OF ALL RESPONSES TO PHQ QUESTIONS 1-9: 0
SUM OF ALL RESPONSES TO PHQ QUESTIONS 1-9: 0
2. FEELING DOWN, DEPRESSED OR HOPELESS: NOT AT ALL
SUM OF ALL RESPONSES TO PHQ QUESTIONS 1-9: 0
SUM OF ALL RESPONSES TO PHQ QUESTIONS 1-9: 0
1. LITTLE INTEREST OR PLEASURE IN DOING THINGS: NOT AT ALL

## 2025-06-30 NOTE — PROGRESS NOTES
Identified pt with two pt identifiers (name and ). Reviewed chart in preparation for visit and have obtained necessary documentation.    Vinny Benites is a 34 y.o. female  Chief Complaint   Patient presents with    Weight Management     LCD/ 1 month      Ht 1.549 m (5' 1\")   Wt 93 kg (205 lb)   BMI 38.73 kg/m²     1. Have you been to the ER, urgent care clinic since your last visit?  Hospitalized since your last visit?no    2. Have you seen or consulted any other health care providers outside of the [facility] since your last visit?  Include any pap smears or colon screening. Yes, Rheumatology- last week

## 2025-06-30 NOTE — PROGRESS NOTES
[facility] Progress Note:   F/up Physician Visit    Vinny Benites is a 34 y.o. female who was seen by synchronous (real-time) audio-video technology on 6/30/2025.      Consent:  She and/or her healthcare decision maker is aware that this patient-initiated Telehealth encounter is a billable service, with coverage as determined by her insurance carrier. She is aware that she may receive a bill and has provided verbal consent to proceed: Yes    Vinny Benites, was evaluated through a synchronous (real-time) audio-video encounter. The patient (or guardian if applicable) is aware that this is a billable service, which includes applicable co-pays. This Virtual Visit was conducted with patient's (and/or legal guardian's) consent. Patient identification was verified, and a caregiver was present when appropriate.   The patient was located at Home: [address] [place] [id number]  Provider was located at Home (Appt Dept State[place]  Confirm you are appropriately licensed, registered, or certified to deliver care in the state where the patient is located as indicated above. If you are not or unsure, please re-schedule the visit: Yes, I confirm.          --Nicolasa Joyce MD on 7/2/2025 at 8:01 PM           [phone]  Subjective:   Vinny Benites was seen for Weight Management (LCD/ 1 month )    f/up physician visit for the  LCD Program.    May [birth year]  Now 89    She has intracranial hypertension and also has lupus  The lupus and the inflammation associated with it causes worsening of the ICH  The inflammation is one exacerbating factor and then the prednisone causes weight gain which then makes the intracranial pressure worse  She had monthly hospitalizations last year and most of those months she had one or two spinal taps to decrease the intracranial pressure  When the pressure goes up she has blurred vision. 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

## 2025-08-13 ENCOUNTER — OFFICE VISIT (OUTPATIENT)
Age: 35
End: 2025-08-13
Payer: MEDICAID

## 2025-08-13 VITALS
WEIGHT: 202.8 LBS | TEMPERATURE: 98 F | OXYGEN SATURATION: 98 % | DIASTOLIC BLOOD PRESSURE: 63 MMHG | SYSTOLIC BLOOD PRESSURE: 132 MMHG | BODY MASS INDEX: 38.29 KG/M2 | HEIGHT: 61 IN | HEART RATE: 74 BPM | RESPIRATION RATE: 16 BRPM

## 2025-08-13 DIAGNOSIS — I66.9 STENOSIS OF INTRACRANIAL VESSEL: ICD-10-CM

## 2025-08-13 DIAGNOSIS — I63.9 CEREBROVASCULAR ACCIDENT (CVA), UNSPECIFIED MECHANISM (HCC): ICD-10-CM

## 2025-08-13 DIAGNOSIS — E66.812 CLASS 2 SEVERE OBESITY DUE TO EXCESS CALORIES WITH SERIOUS COMORBIDITY AND BODY MASS INDEX (BMI) OF 38.0 TO 38.9 IN ADULT (HCC): Primary | ICD-10-CM

## 2025-08-13 DIAGNOSIS — E66.01 CLASS 2 SEVERE OBESITY DUE TO EXCESS CALORIES WITH SERIOUS COMORBIDITY AND BODY MASS INDEX (BMI) OF 38.0 TO 38.9 IN ADULT (HCC): Primary | ICD-10-CM

## 2025-08-13 DIAGNOSIS — M32.8 OTHER FORMS OF SYSTEMIC LUPUS ERYTHEMATOSUS, UNSPECIFIED ORGAN INVOLVEMENT STATUS (HCC): ICD-10-CM

## 2025-08-13 DIAGNOSIS — G93.2 IIH (IDIOPATHIC INTRACRANIAL HYPERTENSION): ICD-10-CM

## 2025-08-13 DIAGNOSIS — F41.0 PANIC DISORDER: ICD-10-CM

## 2025-08-13 PROCEDURE — 99214 OFFICE O/P EST MOD 30 MIN: CPT | Performed by: FAMILY MEDICINE

## 2025-08-13 RX ORDER — ERGOCALCIFEROL 1.25 MG/1
50000 CAPSULE, LIQUID FILLED ORAL WEEKLY
COMMUNITY
Start: 2025-07-17

## 2025-08-13 RX ORDER — CLOBETASOL PROPIONATE 0.5 MG/G
CREAM TOPICAL 2 TIMES DAILY
COMMUNITY
Start: 2025-07-30

## 2025-08-25 ENCOUNTER — APPOINTMENT (OUTPATIENT)
Facility: HOSPITAL | Age: 35
End: 2025-08-25
Payer: MEDICAID

## 2025-08-25 ENCOUNTER — HOSPITAL ENCOUNTER (EMERGENCY)
Facility: HOSPITAL | Age: 35
Discharge: HOME | End: 2025-08-25
Attending: STUDENT IN AN ORGANIZED HEALTH CARE EDUCATION/TRAINING PROGRAM
Payer: MEDICAID

## 2025-08-25 VITALS
HEART RATE: 87 BPM | BODY MASS INDEX: 37 KG/M2 | HEIGHT: 61 IN | WEIGHT: 196 LBS | TEMPERATURE: 97.9 F | OXYGEN SATURATION: 100 % | RESPIRATION RATE: 16 BRPM | SYSTOLIC BLOOD PRESSURE: 112 MMHG | DIASTOLIC BLOOD PRESSURE: 73 MMHG

## 2025-08-25 DIAGNOSIS — R51.9 ACUTE INTRACTABLE HEADACHE, UNSPECIFIED HEADACHE TYPE: ICD-10-CM

## 2025-08-25 DIAGNOSIS — G93.2 IDIOPATHIC INTRACRANIAL HYPERTENSION: Primary | ICD-10-CM

## 2025-08-25 LAB
ANION GAP SERPL CALC-SCNC: 4 MMOL/L (ref 2–12)
APPEARANCE CSF: CLEAR
APPEARANCE CSF: CLEAR
BASOPHILS # BLD: 0.05 K/UL (ref 0–0.1)
BASOPHILS NFR BLD: 0.6 % (ref 0–1)
BUN SERPL-MCNC: 7 MG/DL (ref 6–20)
BUN/CREAT SERPL: 9 (ref 12–20)
CA-I BLD-MCNC: 9.2 MG/DL (ref 8.5–10.1)
CHLORIDE SERPL-SCNC: 107 MMOL/L (ref 97–108)
CO2 SERPL-SCNC: 29 MMOL/L (ref 21–32)
COLOR CSF: COLORLESS
COLOR CSF: COLORLESS
COLOR SPUN CSF: CLEAR
COLOR SPUN CSF: COLORLESS
CREAT SERPL-MCNC: 0.8 MG/DL (ref 0.55–1.02)
DIFFERENTIAL METHOD BLD: NORMAL
EOSINOPHIL # BLD: 0.36 K/UL (ref 0–0.4)
EOSINOPHIL NFR BLD: 4.6 % (ref 0–7)
ERYTHROCYTE [DISTWIDTH] IN BLOOD BY AUTOMATED COUNT: 12 % (ref 11.5–14.5)
GLUCOSE CSF-MCNC: 49 MG/DL (ref 40–70)
GLUCOSE SERPL-MCNC: 81 MG/DL (ref 65–100)
HCT VFR BLD AUTO: 36.6 % (ref 35–47)
HGB BLD-MCNC: 12.6 G/DL (ref 11.5–16)
IMM GRANULOCYTES # BLD AUTO: 0.01 K/UL (ref 0–0.04)
IMM GRANULOCYTES NFR BLD AUTO: 0.1 % (ref 0–0.5)
LYMPHOCYTES # BLD: 2.69 K/UL (ref 0.8–3.5)
LYMPHOCYTES NFR BLD: 34.5 % (ref 12–49)
MCH RBC QN AUTO: 30.7 PG (ref 26–34)
MCHC RBC AUTO-ENTMCNC: 34.4 G/DL (ref 30–36.5)
MCV RBC AUTO: 89.3 FL (ref 80–99)
MONOCYTES # BLD: 0.54 K/UL (ref 0–1)
MONOCYTES NFR BLD: 6.9 % (ref 5–13)
NEUTS SEG # BLD: 4.15 K/UL (ref 1.8–8)
NEUTS SEG NFR BLD: 53.3 % (ref 32–75)
NRBC # BLD: 0 K/UL (ref 0–0.01)
NRBC BLD-RTO: 0 PER 100 WBC
PLATELET # BLD AUTO: 246 K/UL (ref 150–400)
POTASSIUM SERPL-SCNC: 3.7 MMOL/L (ref 3.5–5.1)
PROT CSF-MCNC: 49 MG/DL (ref 15–45)
RBC # BLD AUTO: 4.1 M/UL (ref 3.8–5.2)
RBC # CSF: 0 /CU MM
RBC # CSF: 0 /CU MM
RBC MORPH BLD: NORMAL
SODIUM SERPL-SCNC: 140 MMOL/L (ref 136–145)
TUBE # CSF: 1
TUBE # CSF: 1
TUBE # CSF: 4
TUBE # CSF: NORMAL
WBC # BLD AUTO: 7.8 K/UL (ref 3.6–11)
WBC # CSF: 0 /CU MM (ref 0–5)
WBC # CSF: 0 /CU MM (ref 0–5)

## 2025-08-25 PROCEDURE — 2500000003 HC RX 250 WO HCPCS: Performed by: EMERGENCY MEDICINE

## 2025-08-25 PROCEDURE — 96375 TX/PRO/DX INJ NEW DRUG ADDON: CPT

## 2025-08-25 PROCEDURE — 80048 BASIC METABOLIC PNL TOTAL CA: CPT

## 2025-08-25 PROCEDURE — 36415 COLL VENOUS BLD VENIPUNCTURE: CPT

## 2025-08-25 PROCEDURE — 84157 ASSAY OF PROTEIN OTHER: CPT

## 2025-08-25 PROCEDURE — 62328 DX LMBR SPI PNXR W/FLUOR/CT: CPT

## 2025-08-25 PROCEDURE — 96374 THER/PROPH/DIAG INJ IV PUSH: CPT

## 2025-08-25 PROCEDURE — 87205 SMEAR GRAM STAIN: CPT

## 2025-08-25 PROCEDURE — 85025 COMPLETE CBC W/AUTO DIFF WBC: CPT

## 2025-08-25 PROCEDURE — 6360000002 HC RX W HCPCS: Performed by: EMERGENCY MEDICINE

## 2025-08-25 PROCEDURE — 89050 BODY FLUID CELL COUNT: CPT

## 2025-08-25 PROCEDURE — 96376 TX/PRO/DX INJ SAME DRUG ADON: CPT

## 2025-08-25 PROCEDURE — 6360000002 HC RX W HCPCS: Performed by: STUDENT IN AN ORGANIZED HEALTH CARE EDUCATION/TRAINING PROGRAM

## 2025-08-25 PROCEDURE — 70450 CT HEAD/BRAIN W/O DYE: CPT

## 2025-08-25 PROCEDURE — 99284 EMERGENCY DEPT VISIT MOD MDM: CPT

## 2025-08-25 PROCEDURE — 82945 GLUCOSE OTHER FLUID: CPT

## 2025-08-25 RX ORDER — DIPHENHYDRAMINE HYDROCHLORIDE 50 MG/ML
25 INJECTION, SOLUTION INTRAMUSCULAR; INTRAVENOUS
Status: COMPLETED | OUTPATIENT
Start: 2025-08-25 | End: 2025-08-25

## 2025-08-25 RX ORDER — MORPHINE SULFATE 4 MG/ML
4 INJECTION, SOLUTION INTRAMUSCULAR; INTRAVENOUS
Status: COMPLETED | OUTPATIENT
Start: 2025-08-25 | End: 2025-08-25

## 2025-08-25 RX ORDER — SODIUM CHLORIDE 0.9 % (FLUSH) 0.9 %
5-40 SYRINGE (ML) INJECTION EVERY 12 HOURS SCHEDULED
Status: DISCONTINUED | OUTPATIENT
Start: 2025-08-25 | End: 2025-08-25 | Stop reason: HOSPADM

## 2025-08-25 RX ORDER — SODIUM CHLORIDE 0.9 % (FLUSH) 0.9 %
5-40 SYRINGE (ML) INJECTION PRN
Status: DISCONTINUED | OUTPATIENT
Start: 2025-08-25 | End: 2025-08-25 | Stop reason: HOSPADM

## 2025-08-25 RX ORDER — SODIUM CHLORIDE 9 MG/ML
INJECTION, SOLUTION INTRAVENOUS PRN
Status: DISCONTINUED | OUTPATIENT
Start: 2025-08-25 | End: 2025-08-25 | Stop reason: HOSPADM

## 2025-08-25 RX ORDER — MORPHINE SULFATE 4 MG/ML
4 INJECTION, SOLUTION INTRAMUSCULAR; INTRAVENOUS
Refills: 0 | Status: COMPLETED | OUTPATIENT
Start: 2025-08-25 | End: 2025-08-25

## 2025-08-25 RX ORDER — METOCLOPRAMIDE HYDROCHLORIDE 5 MG/ML
10 INJECTION INTRAMUSCULAR; INTRAVENOUS ONCE
Status: COMPLETED | OUTPATIENT
Start: 2025-08-25 | End: 2025-08-25

## 2025-08-25 RX ADMIN — METOCLOPRAMIDE 10 MG: 5 INJECTION, SOLUTION INTRAMUSCULAR; INTRAVENOUS at 04:55

## 2025-08-25 RX ADMIN — MORPHINE SULFATE 4 MG: 4 INJECTION, SOLUTION INTRAMUSCULAR; INTRAVENOUS at 13:36

## 2025-08-25 RX ADMIN — DIPHENHYDRAMINE HYDROCHLORIDE 25 MG: 50 INJECTION INTRAMUSCULAR; INTRAVENOUS at 04:55

## 2025-08-25 RX ADMIN — MORPHINE SULFATE 4 MG: 4 INJECTION, SOLUTION INTRAMUSCULAR; INTRAVENOUS at 09:40

## 2025-08-25 RX ADMIN — MORPHINE SULFATE 4 MG: 4 INJECTION, SOLUTION INTRAMUSCULAR; INTRAVENOUS at 06:27

## 2025-08-25 RX ADMIN — SODIUM CHLORIDE, PRESERVATIVE FREE 10 ML: 5 INJECTION INTRAVENOUS at 09:40

## 2025-08-25 ASSESSMENT — PAIN SCALES - GENERAL
PAINLEVEL_OUTOF10: 8
PAINLEVEL_OUTOF10: 8

## 2025-08-25 ASSESSMENT — PAIN - FUNCTIONAL ASSESSMENT
PAIN_FUNCTIONAL_ASSESSMENT: 0-10
PAIN_FUNCTIONAL_ASSESSMENT: 0-10

## 2025-08-27 LAB
BACTERIA SPEC CULT: NORMAL
GRAM STN SPEC: NORMAL
GRAM STN SPEC: NORMAL
Lab: NORMAL

## 2025-08-31 ENCOUNTER — HOSPITAL ENCOUNTER (EMERGENCY)
Facility: HOSPITAL | Age: 35
Discharge: HOME | End: 2025-08-31
Attending: EMERGENCY MEDICINE
Payer: MEDICAID

## 2025-08-31 VITALS
DIASTOLIC BLOOD PRESSURE: 73 MMHG | HEART RATE: 87 BPM | HEIGHT: 61 IN | OXYGEN SATURATION: 100 % | RESPIRATION RATE: 16 BRPM | BODY MASS INDEX: 37 KG/M2 | TEMPERATURE: 98.2 F | WEIGHT: 196 LBS | SYSTOLIC BLOOD PRESSURE: 126 MMHG

## 2025-08-31 DIAGNOSIS — G44.89 OTHER HEADACHE SYNDROME: Primary | ICD-10-CM

## 2025-08-31 PROCEDURE — 6360000002 HC RX W HCPCS: Performed by: EMERGENCY MEDICINE

## 2025-08-31 PROCEDURE — 96375 TX/PRO/DX INJ NEW DRUG ADDON: CPT

## 2025-08-31 PROCEDURE — 2500000003 HC RX 250 WO HCPCS: Performed by: EMERGENCY MEDICINE

## 2025-08-31 PROCEDURE — 96374 THER/PROPH/DIAG INJ IV PUSH: CPT

## 2025-08-31 PROCEDURE — 99284 EMERGENCY DEPT VISIT MOD MDM: CPT

## 2025-08-31 RX ORDER — DROPERIDOL 2.5 MG/ML
1.25 INJECTION, SOLUTION INTRAMUSCULAR; INTRAVENOUS ONCE
Status: COMPLETED | OUTPATIENT
Start: 2025-08-31 | End: 2025-08-31

## 2025-08-31 RX ORDER — PROCHLORPERAZINE EDISYLATE 5 MG/ML
10 INJECTION INTRAMUSCULAR; INTRAVENOUS ONCE
Status: COMPLETED | OUTPATIENT
Start: 2025-08-31 | End: 2025-08-31

## 2025-08-31 RX ORDER — DIPHENHYDRAMINE HYDROCHLORIDE 50 MG/ML
25 INJECTION, SOLUTION INTRAMUSCULAR; INTRAVENOUS
Status: COMPLETED | OUTPATIENT
Start: 2025-08-31 | End: 2025-08-31

## 2025-08-31 RX ORDER — KETAMINE HYDROCHLORIDE 10 MG/ML
0.1 INJECTION, SOLUTION INTRAMUSCULAR; INTRAVENOUS ONCE
Status: COMPLETED | OUTPATIENT
Start: 2025-08-31 | End: 2025-08-31

## 2025-08-31 RX ORDER — KETOROLAC TROMETHAMINE 15 MG/ML
15 INJECTION, SOLUTION INTRAMUSCULAR; INTRAVENOUS
Status: COMPLETED | OUTPATIENT
Start: 2025-08-31 | End: 2025-08-31

## 2025-08-31 RX ORDER — ACETAZOLAMIDE 500 MG/5ML
500 INJECTION, POWDER, LYOPHILIZED, FOR SOLUTION INTRAVENOUS ONCE
Status: COMPLETED | OUTPATIENT
Start: 2025-08-31 | End: 2025-08-31

## 2025-08-31 RX ADMIN — ACETAZOLAMIDE SODIUM 500 MG: 500 INJECTION, POWDER, LYOPHILIZED, FOR SOLUTION INTRAVENOUS at 08:28

## 2025-08-31 RX ADMIN — PROCHLORPERAZINE EDISYLATE 10 MG: 5 INJECTION INTRAMUSCULAR; INTRAVENOUS at 07:35

## 2025-08-31 RX ADMIN — DIPHENHYDRAMINE HYDROCHLORIDE 25 MG: 50 INJECTION INTRAMUSCULAR; INTRAVENOUS at 07:30

## 2025-08-31 RX ADMIN — KETOROLAC TROMETHAMINE 15 MG: 15 INJECTION, SOLUTION INTRAMUSCULAR; INTRAVENOUS at 07:32

## 2025-08-31 RX ADMIN — DROPERIDOL 1.25 MG: 2.5 INJECTION, SOLUTION INTRAMUSCULAR; INTRAVENOUS at 09:23

## 2025-08-31 RX ADMIN — KETAMINE HYDROCHLORIDE 8.9 MG: 10 INJECTION INTRAMUSCULAR; INTRAVENOUS at 10:41

## 2025-08-31 ASSESSMENT — PAIN - FUNCTIONAL ASSESSMENT
PAIN_FUNCTIONAL_ASSESSMENT: 0-10
PAIN_FUNCTIONAL_ASSESSMENT: 0-10

## 2025-08-31 ASSESSMENT — PAIN SCALES - GENERAL
PAINLEVEL_OUTOF10: 8
PAINLEVEL_OUTOF10: 5

## 2025-09-01 LAB
BACTERIA SPEC CULT: NORMAL
GRAM STN SPEC: NORMAL
GRAM STN SPEC: NORMAL
Lab: NORMAL

## (undated) DEVICE — PAD,NON-ADHERENT,3X8,STERILE,LF,1/PK: Brand: MEDLINE

## (undated) DEVICE — UNIVERSAL FIXATION CANNULA: Brand: VERSAONE

## (undated) DEVICE — NEEDLE HYPO 22GA L1.5IN BLK S STL HUB POLYPR SHLD REG BVL

## (undated) DEVICE — SPONGE HEMOSTAT CELLULS 4X8IN -- SURGICEL

## (undated) DEVICE — TUBING HYDR IRR --

## (undated) DEVICE — GOWN,SIRUS,FABRNF,XL,20/CS: Brand: MEDLINE

## (undated) DEVICE — DEVICE TRNSF SPIK STL 2008S] MICROTEK MEDICAL INC]

## (undated) DEVICE — DRAIN WND PENRS RADPQ 0.25X18 -- CONVERT TO ITEM 360112

## (undated) DEVICE — D&C MRMC: Brand: MEDLINE INDUSTRIES, INC.

## (undated) DEVICE — DEVON™ KNEE AND BODY STRAP 60" X 3" (1.5 M X 7.6 CM): Brand: DEVON

## (undated) DEVICE — BLADELESS OPTICAL TROCAR WITH FIXATION CANNULA: Brand: VERSAONE

## (undated) DEVICE — SURGICAL PROCEDURE KIT GEN LAPAROSCOPY LF

## (undated) DEVICE — COVER LT HNDL BLU PLAS

## (undated) DEVICE — SUT ETHBND 0 36IN SH DA GRN --

## (undated) DEVICE — ROCKER SWITCH PENCIL BLADE ELECTRODE, HOLSTER: Brand: EDGE

## (undated) DEVICE — KENDALL DL ECG CABLE AND LEAD WIRE SYSTEM, 3-LEAD, SINGLE PATIENT USE: Brand: KENDALL

## (undated) DEVICE — REM POLYHESIVE ADULT PATIENT RETURN ELECTRODE: Brand: VALLEYLAB

## (undated) DEVICE — SHEAR HARMONIC ACET 5MMX36CM -- ACE PLUS

## (undated) DEVICE — SUTURE MCRYL SZ 4-0 L27IN ABSRB UD L19MM PS-2 1/2 CIR PRIM Y426H

## (undated) DEVICE — SUTURE VCRL SZ 3-0 L27IN ABSRB UD L26MM SH 1/2 CIR J416H

## (undated) DEVICE — VAGINAL PREP TRAY: Brand: MEDLINE INDUSTRIES, INC.

## (undated) DEVICE — VISUALIZATION SYSTEM: Brand: CLEARIFY

## (undated) DEVICE — KENDALL SCD EXPRESS SLEEVES, KNEE LENGTH, MEDIUM: Brand: KENDALL SCD

## (undated) DEVICE — SOL IRRIGATION INJ NACL 0.9% 500ML BTL

## (undated) DEVICE — STERILE POLYISOPRENE POWDER-FREE SURGICAL GLOVES: Brand: PROTEXIS

## (undated) DEVICE — TOWEL SURG W17XL27IN STD BLU COT NONFENESTRATED PREWASHED

## (undated) DEVICE — CLICKLINE SCISSORS INSERT: Brand: CLICKLINE

## (undated) DEVICE — SUTURE SZ 0 27IN 5/8 CIR UR-6  TAPER PT VIOLET ABSRB VICRYL J603H

## (undated) DEVICE — APPLIER LIG CLP 5MM CONTAIN 16 TI CLP DISP ENDO CLP

## (undated) DEVICE — INFECTION CONTROL KIT SYS

## (undated) DEVICE — COVER LT HNDL PLAS RIG 1 PER PK

## (undated) DEVICE — SUTURE ABSRB L30CM 2-0 VLT SPRL PDS + STRATAFIX SXPP1B410

## (undated) DEVICE — TUBING, SUCTION, 1/4" X 10', STRAIGHT: Brand: MEDLINE

## (undated) DEVICE — TROCAR SITE CLOSURE DEVICE: Brand: ENDO CLOSE

## (undated) DEVICE — ENDOLOOP SUT LIGATURE 18IN -- 12/BX PDS II

## (undated) DEVICE — GLOVE SURG SZ 6 THK91MIL LTX FREE SYN POLYISOPRENE ANTI

## (undated) DEVICE — Device

## (undated) DEVICE — TELFA NON-ADHERENT ABSORBENT DRESSING: Brand: TELFA

## (undated) DEVICE — Z DISCONTINUED NO SUB IDED TROCAR LAP DIA8MM STD LEN BLDELSS TAPR TIP THRD N OPT VW

## (undated) DEVICE — GLOVE SURG SZ 6 L12IN FNGR THK79MIL GRN LTX FREE

## (undated) DEVICE — CHEST PACK: Brand: MEDLINE INDUSTRIES, INC.

## (undated) DEVICE — DRAPE,REIN 53X77,STERILE: Brand: MEDLINE

## (undated) DEVICE — TRAY CATH OD16FR SIL URIN M STATLOK STBL DEV SURSTP

## (undated) DEVICE — DERMABOND SKIN ADH 0.7ML -- DERMABOND ADVANCED 12/BX

## (undated) DEVICE — TUBING FLTR PLUME AWAY EVAC W/ SUCT DEV DISP PUREVIEW

## (undated) DEVICE — FORCEPS BX L240CM JAW DIA2.8MM L CAP W/ NDL MIC MESH TOOTH

## (undated) DEVICE — TUBING INSUFLTN 10FT LUER -- CONVERT TO ITEM 368568

## (undated) DEVICE — SOLUTION IRRIGATION NACL 0.9% 1000 ML FLX CONTAINER

## (undated) DEVICE — YANKAUER,BULB TIP,W/O VENT,RIGID,STERILE: Brand: MEDLINE

## (undated) DEVICE — (D)PREP SKN CHLRAPRP APPL 26ML -- CONVERT TO ITEM 371833

## (undated) DEVICE — BLADELESS OPTICAL TROCAR WITH FIXATION CANNULA: Brand: VERSAPORT

## (undated) DEVICE — 3000CC GUARDIAN II: Brand: GUARDIAN

## (undated) DEVICE — SUT PROL 0 30IN CT1 BLU --